# Patient Record
Sex: FEMALE | Race: BLACK OR AFRICAN AMERICAN | NOT HISPANIC OR LATINO | URBAN - METROPOLITAN AREA
[De-identification: names, ages, dates, MRNs, and addresses within clinical notes are randomized per-mention and may not be internally consistent; named-entity substitution may affect disease eponyms.]

---

## 2017-05-20 ENCOUNTER — EMERGENCY (EMERGENCY)
Facility: HOSPITAL | Age: 47
LOS: 1 days | Discharge: HOME | End: 2017-05-20
Admitting: EMERGENCY MEDICINE
Payer: MEDICAID

## 2017-05-20 VITALS
DIASTOLIC BLOOD PRESSURE: 83 MMHG | HEART RATE: 76 BPM | OXYGEN SATURATION: 100 % | RESPIRATION RATE: 18 BRPM | TEMPERATURE: 98 F | SYSTOLIC BLOOD PRESSURE: 143 MMHG

## 2017-05-20 DIAGNOSIS — F43.20 ADJUSTMENT DISORDER, UNSPECIFIED: ICD-10-CM

## 2017-05-20 DIAGNOSIS — R69 ILLNESS, UNSPECIFIED: ICD-10-CM

## 2017-05-20 PROCEDURE — 90792 PSYCH DIAG EVAL W/MED SRVCS: CPT

## 2017-05-20 PROCEDURE — 99284 EMERGENCY DEPT VISIT MOD MDM: CPT

## 2017-05-20 NOTE — ED BEHAVIORAL HEALTH ASSESSMENT NOTE - OTHER
family CVM argument with  ( has not called back and there is no grounds to hold Patient against her will in the ED awaiting his call back for several more hours.)

## 2017-05-20 NOTE — ED BEHAVIORAL HEALTH ASSESSMENT NOTE - SUMMARY
46 yo female with no formal psychiatric history BIB EMS from home after she locked herself in the bathroom while having a verbal argument with her  and 48 yo female with no formal psychiatric history BIB EMS from home after she locked herself in the bathroom while having a verbal argument with her  and daughter, made a suicidal statement during emotional angst which she said she did not mean literally. Patient said she wanted to communicate to her family how hurt she was by their seemingly uncaring attitude about her birthday and how her feelings was hurt. Patient denies any suicidal/homicidal ideation, names protective factors (jennifer in God; suicide is a sin; family; hope for future.) She plans on celebrating her birthday by herself by going to the mall and buying a nice outfit fort herself this weekend. 48 yo female with no formal psychiatric history, at baseline high functioning, BIB EMS from home after she locked herself in the bathroom while having a verbal argument with her  and daughter, made a suicidal statement during emotional angst which she said she did not mean literally. Patient said she wanted to communicate to her family how hurt she was by their seemingly uncaring attitude about her birthday (and Mother's Day before that) and how her feelings was hurt. Patient denies any suicidal/homicidal ideation, names protective factors (jennifer in God; suicide is a sin; family; hope for future.) She plans on celebrating her birthday by herself by going to the mall and buying a nice outfit fort herself this weekend. Patient is also displaying help seeking / motivated behavior and was able to name several things she will do for herself, change about her lifestyle so she can feel better and happier. Patient does not exhibit any psychiatric symptoms along the mood/psychosis/anxiety spectrum. She has not engaged in any high risk behavior, she does not use substances and has no weapons in the home. No clinical indication for psych admission. Discharge.

## 2017-05-20 NOTE — ED PROVIDER NOTE - OBJECTIVE STATEMENT
46 y/o F hx HTN BIBA w c/o agitation and verbalizing suicidal comment to family  secondary to family " not paying me any attention  for my birthday"  Denies pain, SOB, fever, chills, chest/abdominal discomfort. Denies SI/HI/AH/VH. Denies falling, punching or kicking any objects. Denies use of alcohol or illicit drugs.

## 2017-05-20 NOTE — ED BEHAVIORAL HEALTH ASSESSMENT NOTE - OTHER PAST PSYCHIATRIC HISTORY (INCLUDE DETAILS REGARDING ONSET, COURSE OF ILLNESS, INPATIENT/OUTPATIENT TREATMENT)
no formally known or reported psychiatric history (no psychiatric hospitalizations, no formal diagnosis or treatment; no suicide attempts; no self-injurious behavior; no hx of aggression/violence; no legal issues; no substance use)

## 2017-05-20 NOTE — ED BEHAVIORAL HEALTH NOTE - BEHAVIORAL HEALTH NOTE
in attempt to gain collateral message left at patient's home # [phone] with request for return call.

## 2017-05-20 NOTE — ED BEHAVIORAL HEALTH ASSESSMENT NOTE - DESCRIPTION
calm, cooperative overweight  for 30 years; also helping to raise 's 3 kids from a prior relationship as their mother has passed away. Patient has a 26yo daughter who just got a new boyfriend and who spends all her time with this new beau. overweight; ndometrial cancer diagnosis in 2014 (chemo, radiation; no surgery; goes for follow up q6months and was told she is in remission)

## 2017-05-20 NOTE — ED BEHAVIORAL HEALTH ASSESSMENT NOTE - SUICIDE PROTECTIVE FACTORS
High spirituality/Supportive social network or family/Responsibility to family and others/Future oriented/Fear of death or dying due to pain/suffering/Identifies reasons for living

## 2017-05-20 NOTE — ED ADULT NURSE NOTE - OBJECTIVE STATEMENT
Received pt in  pt c/o being upset with  & daughter due to the fact, they didn't recognized her on mother's day & birthday. Pt calm & cooperative denies Si/Hi/AVh at present eval on going.

## 2017-05-20 NOTE — ED BEHAVIORAL HEALTH ASSESSMENT NOTE - RISK ASSESSMENT
Chronic risk factors: ongoing substance use; psychosocial stressors; chronic untreatable illness; single. Protective factors: young; healthy; medication and treatment compliant; no history of hospitalizations, no formal diagnosis; no suicide attempts; no self-injurious behavior; no hx of aggression/violence; no legal issues; motivated for help; articulate; strong family support; access to health services. No acute risk factors identified Chronic risk factors: hx of cancer 9though in remission at this time). Protective factors: , female gender; no hx of psych hospitalizations, no formal psych diagnosis or treatment; no known hx of suicide attempts or self-injurious behavior; no hx of aggression/violence; no legal issues; no hx of substance ir drug use; motivated for help; articulate; strong family support; engaged in family. No acute risk factors identified - has history of arguing with her  and being disappointed by family

## 2017-05-20 NOTE — ED BEHAVIORAL HEALTH ASSESSMENT NOTE - REFERRAL / APPOINTMENT DETAILS
n/a. recommend patient socialize with her friends regularly 1-2/week and do something fun where she can relax, be herself and take it easy

## 2017-05-20 NOTE — ED ADULT TRIAGE NOTE - CHIEF COMPLAINT QUOTE
pt brought in by ems after family called because pt locked herself in bathroom after having verbal fight with  / pt does not have any psych hx/ pt calm at triage  denies homo cidal or suicidal thougths  pt states just feeling overwhelmed

## 2017-05-20 NOTE — ED PROVIDER NOTE - MEDICAL DECISION MAKING DETAILS
46 y/o F hx HTN  No evidence of physical injuries, broken skin or deformities.   Medical evaluation performed. There is no clinical evidence of intoxication or any acute medical problem requiring immediate intervention. Patient is awaiting psychiatric consultation. Final disposition will be determined by psychiatrist.

## 2017-05-20 NOTE — ED BEHAVIORAL HEALTH ASSESSMENT NOTE - HPI (INCLUDE ILLNESS QUALITY, SEVERITY, DURATION, TIMING, CONTEXT, MODIFYING FACTORS, ASSOCIATED SIGNS AND SYMPTOMS)
Patient is a , employed, 48 yo AAF, domiciled with family in a private home, with no formal known or reported psychiatric history (no psychiatric hospitalizations, no formal diagnosis or treatment; no suicide attempts; no self-injurious behavior; no hx of aggression/violence; no legal issues; no substance use) who was BIB EMS from home after she was engaged in a verbal argument with her , she locked herself in the bathroom Patient is a , employed, 46 yo AAF, domiciled with family in a private home, with no formal known or reported psychiatric history (no psychiatric hospitalizations, no formal diagnosis or treatment; no suicide attempts; no self-injurious behavior; no hx of aggression/violence; no legal issues; no substance use) who was BIB EMS from home after she was engaged in a verbal argument with her , she locked herself in the bathroom and made a statement to the effect of "I guess I might as well kill myself."     Endorses stable euthymic mood, regular sleep / appetite / energy level / concentration / bathroom habits. Denies any symptoms of hypomania/skyla/psychosis/depression/ anxiety/panic. Denies any active or passive suicidal or homicidal ideation. Names protective factors (jennifer; family; hope for future). Endorses medication compliance. Denies adverse medication side effects Patient is a , employed, 48 yo AAF, domiciled with family in a private home, with no formal known or reported psychiatric history (no psychiatric hospitalizations, no formal diagnosis or treatment; no suicide attempts; no self-injurious behavior; no hx of aggression/violence; no legal issues; no substance use) who was BIB EMS from home after she was engaged in a verbal argument with her , she locked herself in the bathroom and made a statement to the effect of "I guess I might as well kill myself."     Patient states that she has been doing well up until Mother's Day when her daughter told her that they will have a special celebration which turned out to be non-existent as her daughter came later on Mother's Day, spent it mostly with her new by friend and tried to make it up to Patient with balloon and new sneakers. Patient felt disappointed and rejected. Patient also helps to raise her 's 3 kids from a prior relationship and she felt like she takes care of so many people, she should be appreciated more on Mother's Day. Patient's birthday was yesterday - in the morning, her  and daughter told her to get ready, dress up nicely and they'll take her out. Patient got ready, told friends who called not to come over as she will be going out with her  and daughter and waited dressed up until 8pm when they came home. Patient felt demoralized, saddened and unappreciated. She drank some wine to relax (denies excessive use), was tossing and turning all night and could not sleep. She still felt very upset this morning and she made it known. This resulted in a verbal argument with her  and daughter who blamed her for "over reacting" so she locked herself into the bathroom, prayed to God to make her feel better and told her family "I may just as well kill myself."    Endorses stable euthymic mood, regular sleep / appetite / energy level / concentration / bathroom habits. Denies any symptoms of hypomania/skyla/psychosis/depression/ anxiety/panic. Denies any active or passive suicidal or homicidal ideation. Names protective factors (jennifer; family; hope for future). Endorses medication compliance. Denies adverse medication side effects Patient is a , employed, 48 yo AAF, domiciled with family in a private home, with no formal known or reported psychiatric history (no psychiatric hospitalizations, no formal diagnosis or treatment; no suicide attempts; no self-injurious behavior; no hx of aggression/violence; no legal issues; no substance use) who was BIB EMS from home after she was engaged in a verbal argument with her , she locked herself in the bathroom and made a statement to the effect of "I guess I might as well kill myself."     Patient states that she has been doing well up until Mother's Day when her daughter told her that they will have a special celebration which turned out to be non-existent as her daughter came later on Mother's Day, spent it mostly with her new by friend and tried to make it up to Patient with balloon and new sneakers. Patient felt disappointed and rejected. Patient also helps to raise her 's 3 kids from a prior relationship and she felt like she takes care of so many people, she should be appreciated more on Mother's Day. Patient's birthday was yesterday - in the morning, her  and daughter told her to get ready, dress up nicely and they'll take her out. Patient got ready, told friends who called not to come over as she will be going out with her  and daughter and waited dressed up until 8pm when they came home. Patient felt demoralized, saddened and unappreciated. She drank some wine to relax (denies excessive use), was tossing and turning all night and could not sleep. She still felt very upset this morning and she made it known. This resulted in a verbal argument with her  and daughter who blamed her for "over reacting" so she locked herself into the bathroom, prayed to God to make her feel better and told her family "I may just as well kill myself."  Patient states she did not mean this literally - she just wanted her family to know how bad she felt about their treatment of her. Patient states she would never kill herself as she believes this is a sin, she is [employer] and believes in God. Moreover, she has a new appreciation/ outlook on life since she got endometrial cancer diagnosis in 2014 (chemo, radiation; no surgery; goes for follow up q6months and was told she is in remission). Patient not sure who called 911 but understands that they probably got concerned and felt they had to after what she said. She said her  already called her while she was in the ED. She feels safe going home; she has no ill will towards her  and daughter. She plans on "going home, taking a shower and going to the mall to buy myself something nice for my birthday." Patient states that her sister is her confidante and told Patient not to give so much to others all the time because she needs to keep some for herself too. patient agrees with this and is planning on giving her well being some time as well - going out with friends again, returning to [employer] and finding a Zoroastrianism she likes.     Otherwise, Patient endorses a stable euthymic mood, unchanged, regular sleep / appetite / energy level / concentration. Denies any past or current symptoms of hypomania/skyla/psychosis/major depression/ anxiety/panic. Denies any active or passive suicidal or homicidal ideation. Names protective factors (jennifer; family; hope for future). Denies drug/substance use. Denies access to weapons.     COLLATERAL FROM : left message on his cell awaiting call back. An hour later, he still has not returned call back. ( has not called back and there is no grounds to hold Patient against her will in the ED awaiting his call back for several more hours.)

## 2017-05-20 NOTE — ED BEHAVIORAL HEALTH ASSESSMENT NOTE - AXIS III
overweight overweight; endometrial cancer diagnosis in 2014 (chemo, radiation; no surgery; goes for follow up q6months and was told she is in remission)

## 2021-12-20 ENCOUNTER — INPATIENT (INPATIENT)
Facility: HOSPITAL | Age: 51
LOS: 4 days | Discharge: HOME HEALTH | End: 2021-12-25
Attending: GENERAL ACUTE CARE HOSPITAL | Admitting: GENERAL ACUTE CARE HOSPITAL
Payer: MEDICAID

## 2021-12-20 VITALS
TEMPERATURE: 98 F | SYSTOLIC BLOOD PRESSURE: 160 MMHG | OXYGEN SATURATION: 100 % | HEART RATE: 94 BPM | HEIGHT: 66 IN | RESPIRATION RATE: 16 BRPM | WEIGHT: 257.94 LBS | DIASTOLIC BLOOD PRESSURE: 101 MMHG

## 2021-12-20 PROBLEM — I10 ESSENTIAL (PRIMARY) HYPERTENSION: Chronic | Status: ACTIVE | Noted: 2017-05-20

## 2021-12-20 LAB
ALBUMIN SERPL ELPH-MCNC: 3.1 G/DL — LOW (ref 3.3–5)
ALP SERPL-CCNC: 61 U/L — SIGNIFICANT CHANGE UP (ref 40–120)
ALT FLD-CCNC: 9 U/L — LOW (ref 12–78)
ANION GAP SERPL CALC-SCNC: 10 MMOL/L — SIGNIFICANT CHANGE UP (ref 5–17)
APTT BLD: 51.3 SEC — HIGH (ref 27.5–35.5)
AST SERPL-CCNC: 14 U/L — LOW (ref 15–37)
BILIRUB SERPL-MCNC: 0.4 MG/DL — SIGNIFICANT CHANGE UP (ref 0.2–1.2)
BUN SERPL-MCNC: 38 MG/DL — HIGH (ref 7–23)
CALCIUM SERPL-MCNC: 8.4 MG/DL — LOW (ref 8.5–10.1)
CHLORIDE SERPL-SCNC: 107 MMOL/L — SIGNIFICANT CHANGE UP (ref 96–108)
CO2 SERPL-SCNC: 23 MMOL/L — SIGNIFICANT CHANGE UP (ref 22–31)
CREAT SERPL-MCNC: 3.64 MG/DL — HIGH (ref 0.5–1.3)
FLUAV AG NPH QL: SIGNIFICANT CHANGE UP
FLUBV AG NPH QL: SIGNIFICANT CHANGE UP
GLUCOSE SERPL-MCNC: 87 MG/DL — SIGNIFICANT CHANGE UP (ref 70–99)
HCT VFR BLD CALC: 26.2 % — LOW (ref 34.5–45)
HCT VFR BLD CALC: 27 % — LOW (ref 34.5–45)
HGB BLD-MCNC: 8.5 G/DL — LOW (ref 11.5–15.5)
HGB BLD-MCNC: 8.7 G/DL — LOW (ref 11.5–15.5)
INR BLD: 1.07 RATIO — SIGNIFICANT CHANGE UP (ref 0.88–1.16)
MCHC RBC-ENTMCNC: 26.9 PG — LOW (ref 27–34)
MCHC RBC-ENTMCNC: 26.9 PG — LOW (ref 27–34)
MCHC RBC-ENTMCNC: 32.2 GM/DL — SIGNIFICANT CHANGE UP (ref 32–36)
MCHC RBC-ENTMCNC: 32.4 GM/DL — SIGNIFICANT CHANGE UP (ref 32–36)
MCV RBC AUTO: 82.9 FL — SIGNIFICANT CHANGE UP (ref 80–100)
MCV RBC AUTO: 83.6 FL — SIGNIFICANT CHANGE UP (ref 80–100)
NRBC # BLD: 0 /100 WBCS — SIGNIFICANT CHANGE UP (ref 0–0)
NRBC # BLD: 0 /100 WBCS — SIGNIFICANT CHANGE UP (ref 0–0)
NT-PROBNP SERPL-SCNC: 203 PG/ML — HIGH (ref 0–125)
PLATELET # BLD AUTO: 302 K/UL — SIGNIFICANT CHANGE UP (ref 150–400)
PLATELET # BLD AUTO: 321 K/UL — SIGNIFICANT CHANGE UP (ref 150–400)
POTASSIUM SERPL-MCNC: 4.2 MMOL/L — SIGNIFICANT CHANGE UP (ref 3.5–5.3)
POTASSIUM SERPL-SCNC: 4.2 MMOL/L — SIGNIFICANT CHANGE UP (ref 3.5–5.3)
PROT SERPL-MCNC: 7.6 GM/DL — SIGNIFICANT CHANGE UP (ref 6–8.3)
PROTHROM AB SERPL-ACNC: 12.4 SEC — SIGNIFICANT CHANGE UP (ref 10.6–13.6)
RBC # BLD: 3.16 M/UL — LOW (ref 3.8–5.2)
RBC # BLD: 3.23 M/UL — LOW (ref 3.8–5.2)
RBC # FLD: 15 % — HIGH (ref 10.3–14.5)
RBC # FLD: 15.4 % — HIGH (ref 10.3–14.5)
SARS-COV-2 RNA SPEC QL NAA+PROBE: SIGNIFICANT CHANGE UP
SODIUM SERPL-SCNC: 140 MMOL/L — SIGNIFICANT CHANGE UP (ref 135–145)
WBC # BLD: 7.61 K/UL — SIGNIFICANT CHANGE UP (ref 3.8–10.5)
WBC # BLD: 7.71 K/UL — SIGNIFICANT CHANGE UP (ref 3.8–10.5)
WBC # FLD AUTO: 7.61 K/UL — SIGNIFICANT CHANGE UP (ref 3.8–10.5)
WBC # FLD AUTO: 7.71 K/UL — SIGNIFICANT CHANGE UP (ref 3.8–10.5)

## 2021-12-20 PROCEDURE — 99285 EMERGENCY DEPT VISIT HI MDM: CPT

## 2021-12-20 PROCEDURE — 93970 EXTREMITY STUDY: CPT | Mod: 26

## 2021-12-20 PROCEDURE — 50432 PLMT NEPHROSTOMY CATHETER: CPT | Mod: 50

## 2021-12-20 PROCEDURE — 71045 X-RAY EXAM CHEST 1 VIEW: CPT | Mod: 26

## 2021-12-20 PROCEDURE — 76775 US EXAM ABDO BACK WALL LIM: CPT | Mod: 26

## 2021-12-20 PROCEDURE — 93010 ELECTROCARDIOGRAM REPORT: CPT

## 2021-12-20 PROCEDURE — 99222 1ST HOSP IP/OBS MODERATE 55: CPT

## 2021-12-20 RX ORDER — SODIUM CHLORIDE 9 MG/ML
1000 INJECTION INTRAMUSCULAR; INTRAVENOUS; SUBCUTANEOUS
Refills: 0 | Status: DISCONTINUED | OUTPATIENT
Start: 2021-12-20 | End: 2021-12-20

## 2021-12-20 RX ORDER — HYDRALAZINE HCL 50 MG
10 TABLET ORAL THREE TIMES A DAY
Refills: 0 | Status: DISCONTINUED | OUTPATIENT
Start: 2021-12-20 | End: 2021-12-25

## 2021-12-20 RX ORDER — METOPROLOL TARTRATE 50 MG
50 TABLET ORAL DAILY
Refills: 0 | Status: DISCONTINUED | OUTPATIENT
Start: 2021-12-20 | End: 2021-12-23

## 2021-12-20 RX ORDER — HEPARIN SODIUM 5000 [USP'U]/ML
5000 INJECTION INTRAVENOUS; SUBCUTANEOUS EVERY 12 HOURS
Refills: 0 | Status: DISCONTINUED | OUTPATIENT
Start: 2021-12-20 | End: 2021-12-22

## 2021-12-20 RX ADMIN — Medication 10 MILLIGRAM(S): at 21:46

## 2021-12-20 NOTE — PATIENT PROFILE ADULT - FALL HARM RISK - UNIVERSAL INTERVENTIONS
Bed in lowest position, wheels locked, appropriate side rails in place/Call bell, personal items and telephone in reach/Instruct patient to call for assistance before getting out of bed or chair/Non-slip footwear when patient is out of bed/[place] to call system/Physically safe environment - no spills, clutter or unnecessary equipment/Purposeful Proactive Rounding/Room/bathroom lighting operational, light cord in reach

## 2021-12-20 NOTE — ED ADULT TRIAGE NOTE - CHIEF COMPLAINT QUOTE
PT a&O x4  ambulatory with cane. hx of slipped disc 2014. . pt c.o lower back pain and new onset of left leg swelling 4-5 days lower back pain 7/10. took tylenol at home minimal relief. PMH htn

## 2021-12-20 NOTE — H&P ADULT - ASSESSMENT
52 yo F    w/PMH of HTN (on amlodipine),  sciatica  presents to the ED for b/l leg swelling.   Pt states that for the past x1-2 weeks she has noticed swelling in both of her legs.    Pt reports chronic pain that radiates down her left leg, from her sciatica, has been exacerbated  Denies fever/chills, cough, SOB, CP, abdominal pain, N/V/D or trauma.       *  admitted  with leg edema  doppler legs, pending  *    ASHLEY  vs  CKD  crt is 3.6     bladder  scan. ordered   renal u/s   renal d r iesha  *  HTN   on toprol, hydralazine  * Anemia   hb is 8.7  stool  guaiac     on  dvt  ppx/  s/q  heparin   echo ordered    50 yo F    w/PMH of HTN (on amlodipine),  sciatica  presents to the ED for b/l leg swelling.   Pt states that for the past x1-2 weeks she has noticed swelling in both of her legs.    Pt reports chronic pain that radiates down her left leg, from her sciatica, has been exacerbated  Denies fever/chills, cough, SOB, CP, abdominal pain, N/V/D or trauma.       *  admitted  with leg edema  doppler legs, pending  *    ASHLEY  vs  CKD  crt is 3.6     bladder  scan. ordered   renal u/s ordered   renal d r iesha  *  HTN   on toprol, hydralazine  * Anemia   hb is 8.7  stool  guaiac   gi dr muñoz   * on  dvt  ppx/  s/q  heparin   echo ordered    50 yo F    w/PMH of HTN (on amlodipine),  sciatica  presents to the ED for b/l leg swelling.   Pt states that for the past x1-2 weeks she has noticed swelling in both of her legs.    Pt reports chronic pain that radiates down her left leg, from her sciatica, has been exacerbated  Denies fever/chills, cough, SOB, CP, abdominal pain, N/V/D or trauma.       *  admitted  with leg edema  doppler legs, pending  *    ASHLEY  vs  CKD  was  taking alleve  for pain/  denies  dark stools  crt is 3.6  stopped  nsiad/ hctz  and  benzapril     bladder  scan. ordered   renal u/s ordered   renal d r iesha  *  HTN  was  on norvasc,  labetolol,  benzapril  and hctz  at home   on toprol, hydralazine  * Anemia/ ?  gastritis form alleve   hb is 8.7  stool  guaiac   gi dr muñoz   * on  dvt  ppx/  s/q  heparin   echo ordered

## 2021-12-20 NOTE — ED PROVIDER NOTE - OBJECTIVE STATEMENT
52 yo F w/PMH of HTN (on amlodipine), pinched nerve presents to the ED for b/l leg swelling. Pt states that for the past x1-2 weeks she has noticed swelling in both of her legs. Pt reports chronic pain that radiates down her left leg, from her sciatica, has been exacerbated, but denies any new pains. Denies fever/chills, cough, SOB, CP, abdominal pain, N/V/D or trauma.

## 2021-12-20 NOTE — ED PROVIDER NOTE - PHYSICAL EXAMINATION
General: Awake, alert and oriented. No acute distress. Well developed, hydrated and nourished. Appears stated age.   Skin: Skin in warm, dry and intact without rashes or lesions. Appropriate color for ethnicity  HENMT: head normocephalic and atraumatic; bilateral external ears without swelling. no nasal discharge. moist oral mucosa. supple neck, trachea midline  EYES: Conjunctiva clear. nonicteric sclera. EOM intact, Eyelids are normal in appearance without swelling or lesions.  Cardiac: well perfused, s1, s2, rrr  Respiratory: breathing comfortably on room air. no audible wheezing or stridor  Abdominal: nondistended, soft, nontender  MSK: Neck and back are without deformity, visible external skin changes, or signs of trauma. Curvature of the cervical, thoracic, and lumbar spine are within normal limits. no external signs of trauma. no apparent deficits in ROM of any extremity. swelling with mild pitting of bilateral lower extreities extending up to bilateral thighs.   Neurological: The patient is awake, alert and oriented to person, place, and time with normal speech. CN 2-12 grossly intact. no apparent deficits. Memory is normal and thought process is intact. No gait abnormalities are appreciated.   Psychiatric: Appropriate mood and affect. Good judgement and insight. No visual or auditory hallucinations.

## 2021-12-20 NOTE — H&P ADULT - HISTORY OF PRESENT ILLNESS
52 yo F    w/PMH of HTN (on amlodipine), pinched nerve  presents to the ED for b/l leg swelling.   Pt states that for the past x1-2 weeks she has noticed swelling in both of her legs.    Pt reports chronic pain that radiates down her left leg, from her sciatica, has been exacerbated, but denies any new pains. Denies fever/chills, cough, SOB, CP, abdominal pain, N/V/D or trauma.  52 yo F    w/PMH of HTN (on amlodipine), pinched nerve  presents to the ED for b/l leg swelling.   Pt states that for the past x1-2 weeks she has noticed swelling in both of her legs.  senies  cp/sob/abd  pain    Pt reports chronic pain that radiates down her left leg, from her sciatica,  is  able to  ambulate.   Denies fever/chills, cough, SOB, CP, abdominal pain, N/V/D or trauma.

## 2021-12-20 NOTE — H&P ADULT - NSHPLABSRESULTS_GEN_ALL_CORE
LABS:                        8.7    7.71  )-----------( 302      ( 20 Dec 2021 17:32 )             27.0     12-20    140  |  107  |  38<H>  ----------------------------<  87  4.2   |  23  |  3.64<H>    Ca    8.4<L>      20 Dec 2021 17:32    TPro  7.6  /  Alb  3.1<L>  /  TBili  0.4  /  DBili  x   /  AST  14<L>  /  ALT  9<L>  /  AlkPhos  61  12-20    PT/INR - ( 20 Dec 2021 17:32 )   PT: 12.4 sec;   INR: 1.07 ratio         PTT - ( 20 Dec 2021 17:32 )  PTT:51.3 sec

## 2021-12-20 NOTE — ED PROVIDER NOTE - CLINICAL SUMMARY MEDICAL DECISION MAKING FREE TEXT BOX
No clinical signs of trauma or infection, pt was taking aleve daily for several months up until x2 weeks ago, considered decreased  cardiac output as cause of leg swelling, however pt with no symptoms of SOB or CP. Will evaluate for DVT with US. If negative ED workup pt has PMD follow up scheduled for tomorrow, symptoms possibly due to amlodipine use. No clinical signs of trauma or infection, pt was taking aleve daily for several months up until x2 weeks ago, considered decreased  cardiac output as cause of leg swelling, however pt with no symptoms of SOB or CP. Will evaluate for DVT with US. If negative ED workup pt has PMD follow up scheduled for tomorrow, symptoms possibly due to amlodipine use.    patinet with acute renal failure, likely 2/2 nsaid use. urinating regularly. will admit for further evaluation andd treatment of acute renal failure No clinical signs of trauma or infection, pt was taking aleve daily for several months up until x2 weeks ago, considered decreased  cardiac output as cause of leg swelling, however pt with no symptoms of SOB or CP. Will evaluate for DVT with US. If negative ED workup pt has PMD follow up scheduled for tomorrow, symptoms possibly due to amlodipine use.    patinet with acute renal failure, likely 2/2 nsaid use. urinating regularly. will admit for further evaluation andd treatment of acute renal failure. US renal ordered, to be followed as inpatient

## 2021-12-20 NOTE — H&P ADULT - NSHPPHYSICALEXAM_GEN_ALL_CORE
PHYSICAL EXAMINATION:  Vital Signs Last 24 Hrs  T(C): 36.9 (20 Dec 2021 13:21), Max: 36.9 (20 Dec 2021 13:21)  T(F): 98.4 (20 Dec 2021 13:21), Max: 98.4 (20 Dec 2021 13:21)  HR: 94 (20 Dec 2021 13:21) (94 - 94)  BP: 160/101 (20 Dec 2021 13:21) (160/101 - 160/101)  BP(mean): --  RR: 16 (20 Dec 2021 13:21) (16 - 16)  SpO2: 100% (20 Dec 2021 13:21) (100% - 100%)  CAPILLARY BLOOD GLUCOSE            GENERAL: NAD, well-groomed,  HEAD:  atraumatic, normocephalic  EYES: sclera anicteric  ENMT: mucous membranes moist  NECK: supple, No JVD  CHEST/LUNG: clear to auscultation bilaterally;    no      rales   ,   no rhonchi,   HEART: normal S1, S2  ABDOMEN: BS+, soft, ND, NT   EXTREMITIES:   edema    b/l LEs  NEURO: awake, ,     moves all extremities  SKIN: no     rash PHYSICAL EXAMINATION:  Vital Signs Last 24 Hrs  T(C): 36.9 (20 Dec 2021 13:21), Max: 36.9 (20 Dec 2021 13:21)  T(F): 98.4 (20 Dec 2021 13:21), Max: 98.4 (20 Dec 2021 13:21)  HR: 94 (20 Dec 2021 13:21) (94 - 94)  BP: 160/101 (20 Dec 2021 13:21) (160/101 - 160/101)  BP(mean): --  RR: 16 (20 Dec 2021 13:21) (16 - 16)  SpO2: 100% (20 Dec 2021 13:21) (100% - 100%)  CAPILLARY BLOOD GLUCOSE            GENERAL: NAD, well-groomed,  HEAD:  atraumatic, normocephalic  EYES: sclera anicteric  ENMT: mucous membranes moist  NECK: supple, No JVD  CHEST/LUNG: clear to auscultation bilaterally;    no      rales   ,   no rhonchi,   HEART: normal S1, S2  ABDOMEN: BS+, soft, ND, NT   EXTREMITIES:   edema    b/l LEs  NEURO: awake, ,     moves all extremities  no focal weakness  SKIN: no     rash

## 2021-12-21 LAB
ANION GAP SERPL CALC-SCNC: 7 MMOL/L — SIGNIFICANT CHANGE UP (ref 5–17)
BUN SERPL-MCNC: 38 MG/DL — HIGH (ref 7–23)
CALCIUM SERPL-MCNC: 8.6 MG/DL — SIGNIFICANT CHANGE UP (ref 8.5–10.1)
CHLORIDE SERPL-SCNC: 110 MMOL/L — HIGH (ref 96–108)
CO2 SERPL-SCNC: 25 MMOL/L — SIGNIFICANT CHANGE UP (ref 22–31)
CREAT SERPL-MCNC: 3.68 MG/DL — HIGH (ref 0.5–1.3)
FERRITIN SERPL-MCNC: 276 NG/ML — HIGH (ref 15–150)
GLUCOSE SERPL-MCNC: 91 MG/DL — SIGNIFICANT CHANGE UP (ref 70–99)
HCT VFR BLD CALC: 25.9 % — LOW (ref 34.5–45)
HGB BLD-MCNC: 8.4 G/DL — LOW (ref 11.5–15.5)
IRON SATN MFR SERPL: 14 % — SIGNIFICANT CHANGE UP (ref 14–50)
IRON SATN MFR SERPL: 38 UG/DL — SIGNIFICANT CHANGE UP (ref 30–160)
MCHC RBC-ENTMCNC: 26.9 PG — LOW (ref 27–34)
MCHC RBC-ENTMCNC: 32.4 GM/DL — SIGNIFICANT CHANGE UP (ref 32–36)
MCV RBC AUTO: 83 FL — SIGNIFICANT CHANGE UP (ref 80–100)
NRBC # BLD: 0 /100 WBCS — SIGNIFICANT CHANGE UP (ref 0–0)
PLATELET # BLD AUTO: 306 K/UL — SIGNIFICANT CHANGE UP (ref 150–400)
POTASSIUM SERPL-MCNC: 4.2 MMOL/L — SIGNIFICANT CHANGE UP (ref 3.5–5.3)
POTASSIUM SERPL-SCNC: 4.2 MMOL/L — SIGNIFICANT CHANGE UP (ref 3.5–5.3)
RBC # BLD: 3.12 M/UL — LOW (ref 3.8–5.2)
RBC # FLD: 15.4 % — HIGH (ref 10.3–14.5)
SODIUM SERPL-SCNC: 142 MMOL/L — SIGNIFICANT CHANGE UP (ref 135–145)
TIBC SERPL-MCNC: 269 UG/DL — SIGNIFICANT CHANGE UP (ref 220–430)
UIBC SERPL-MCNC: 231 UG/DL — SIGNIFICANT CHANGE UP (ref 110–370)
WBC # BLD: 6.81 K/UL — SIGNIFICANT CHANGE UP (ref 3.8–10.5)
WBC # FLD AUTO: 6.81 K/UL — SIGNIFICANT CHANGE UP (ref 3.8–10.5)

## 2021-12-21 PROCEDURE — 99233 SBSQ HOSP IP/OBS HIGH 50: CPT

## 2021-12-21 PROCEDURE — 93306 TTE W/DOPPLER COMPLETE: CPT | Mod: 26

## 2021-12-21 RX ORDER — ACETAMINOPHEN 500 MG
650 TABLET ORAL EVERY 6 HOURS
Refills: 0 | Status: DISCONTINUED | OUTPATIENT
Start: 2021-12-21 | End: 2021-12-25

## 2021-12-21 RX ADMIN — Medication 10 MILLIGRAM(S): at 16:10

## 2021-12-21 RX ADMIN — Medication 50 MILLIGRAM(S): at 05:39

## 2021-12-21 RX ADMIN — HEPARIN SODIUM 5000 UNIT(S): 5000 INJECTION INTRAVENOUS; SUBCUTANEOUS at 05:39

## 2021-12-21 RX ADMIN — Medication 10 MILLIGRAM(S): at 21:18

## 2021-12-21 RX ADMIN — Medication 650 MILLIGRAM(S): at 21:18

## 2021-12-21 RX ADMIN — Medication 650 MILLIGRAM(S): at 05:39

## 2021-12-21 RX ADMIN — Medication 650 MILLIGRAM(S): at 06:10

## 2021-12-21 RX ADMIN — Medication 650 MILLIGRAM(S): at 22:00

## 2021-12-21 RX ADMIN — Medication 10 MILLIGRAM(S): at 05:39

## 2021-12-21 RX ADMIN — HEPARIN SODIUM 5000 UNIT(S): 5000 INJECTION INTRAVENOUS; SUBCUTANEOUS at 17:38

## 2021-12-21 NOTE — PHYSICAL THERAPY INITIAL EVALUATION ADULT - PERTINENT HX OF CURRENT PROBLEM, REHAB EVAL
Patient admitted with B LE swelling. Venous doppler negative for DVT. Patient with chronic back pain.

## 2021-12-21 NOTE — PHYSICAL THERAPY INITIAL EVALUATION ADULT - GAIT TRAINING, PT EVAL
Patient will ambulate 500 feet without device independently for community ambulation in 2-3 days. Patient will ascend/descend 3 steps with no rails 2-3 days to safely navigate home environment.

## 2021-12-21 NOTE — PHYSICAL THERAPY INITIAL EVALUATION ADULT - ADDITIONAL COMMENTS
Patient lives in a private house with 3 steps to enter, +B rails. Once inside everything is situated on the first floor. Patient reports she owns a straight cane but was only recently using it because of her B LE swelling.

## 2021-12-21 NOTE — PROGRESS NOTE ADULT - ASSESSMENT
52 y/o F with PMH of HTN, sciatica, presented to ED for b/l leg swelling admitted to medical for ASHLEY 2/2 NSAIDS with anemia.     HTN  Currently on hydralazine and metoprolol  F/U TSH, lipid panel, HgA1c  Titrate BP meds    ASHLEY on CKD  - Cr: 3.68  - Most likely exacerbated from chronic use of NSAIDS  - Adjust all meds as per CrCl  - Nephro consult  - Renal U/S    B/L lower extremity swelling  No DVT  F/U ECHO  - Does not appear fluid overloaded    Anemia  F/U iron studies  unknown baseline  Transfuse PRN  GI eval    Sciatica  - PT eval.    DVT PPX  Heparin

## 2021-12-21 NOTE — CONSULT NOTE ADULT - SUBJECTIVE AND OBJECTIVE BOX
Patient chart reviewed, full consult to follow.     ASHLEY associated with NSAIDs use ; moderate bilateral hydronephrosis;     Thank you for the courtesy of this consultation.         Patient chart reviewed, full consult to follow.     ASHLEY associated with NSAIDs use ; moderate bilateral hydronephrosis;   Anemia; back pain ;     UA micro; eos  CT abd pelvis to elucidate etiology of hydronephrosis; assess for HIEN  PVR acceptable   Paraprotein screen       Thank you for the courtesy of this consultation.   [facility] NEPHROLOGY SERVICES, [facility]  NEPHROLOGY AND HYPERTENSION  [address]  [address]  [place] [address]  [phone]    MD LENNY WASHINGTON MD ANDREY GONCHARUK, MD MADHU KORRAPATI, MD YELENA ROSENBERG, MD BINNY KOSHY, MD CHRISTOPHER CAPUTO, MD EDWARD BOVER, MD      Information from chart:  "Patient is a 51y old  Female who presents with a chief complaint of leg  swelling (21 Dec 2021 15:34)    HPI:   50 yo F    w/PMH of HTN (on amlodipine), pinched nerve  presents to the ED for b/l leg swelling.   Pt states that for the past x1-2 weeks she has noticed swelling in both of her legs.  senies  cp/sob/abd  pain    Pt reports chronic pain that radiates down her left leg, from her sciatica,  is  able to  ambulate.   Denies fever/chills, cough, SOB, CP, abdominal pain, N/V/D or trauma. (20 Dec 2021 18:55)   "    Patient with back pain  Taking Aleve 2-3 months daily;       PAST MEDICAL & SURGICAL HISTORY:  HTN (hypertension)    No significant past surgical history      FAMILY HISTORY:  No pertinent family history in first degree relatives      Allergies    No Known Allergies    Intolerances      Home Medications:    MEDICATIONS  (STANDING):  heparin   Injectable 5000 Unit(s) SubCutaneous every 12 hours  hydrALAZINE 10 milliGRAM(s) Oral three times a day  metoprolol succinate ER 50 milliGRAM(s) Oral daily    MEDICATIONS  (PRN):  acetaminophen     Tablet .. 650 milliGRAM(s) Oral every 6 hours PRN Severe Pain (7 - 10)    Vital Signs Last 24 Hrs  T(C): 36.9 (21 Dec 2021 17:33), Max: 37 (21 Dec 2021 00:43)  T(F): 98.4 (21 Dec 2021 17:33), Max: 98.6 (21 Dec 2021 00:43)  HR: 85 (21 Dec 2021 17:33) (85 - 89)  BP: 151/89 (21 Dec 2021 17:33) (151/89 - 165/80)  BP(mean): --  RR: 18 (21 Dec 2021 17:33) (17 - 18)  SpO2: 98% (21 Dec 2021 17:33) (98% - 100%)    Daily     Daily     CAPILLARY BLOOD GLUCOSE        PHYSICAL EXAM:      T(C): 36.9 (12-21-21 @ 17:33), Max: 37 (12-21-21 @ 00:43)  HR: 85 (12-21-21 @ 17:33) (85 - 89)  BP: 151/89 (12-21-21 @ 17:33) (151/89 - 165/80)  RR: 18 (12-21-21 @ 17:33) (17 - 18)  SpO2: 98% (12-21-21 @ 17:33) (98% - 100%)  Wt(kg): --  Lungs clear  Heart S1S2  Abd soft NT ND  Extremities:   tr edema              12-21    142  |  110<H>  |  38<H>  ----------------------------<  91  4.2   |  25  |  3.68<H>    Ca    8.6      21 Dec 2021 06:33    TPro  7.6  /  Alb  3.1<L>  /  TBili  0.4  /  DBili  x   /  AST  14<L>  /  ALT  9<L>  /  AlkPhos  61  12-20                          8.4    6.81  )-----------( 306      ( 21 Dec 2021 06:33 )             25.9     Creatinine Trend: 3.68<--, 3.64<--          Assessment   ASHLEY associated with NSAIDs use ; moderate bilateral hydronephrosis;   Anemia; back pain ;     Plan  UA micro; eos  CT abd pelvis to elucidate etiology of hydronephrosis; assess for HIEN  PVR acceptable   Paraprotein screen           Delfino Leung MD            Thank you for the courtesy of this consultation.

## 2021-12-21 NOTE — PROGRESS NOTE ADULT - SUBJECTIVE AND OBJECTIVE BOX
Patient is a 51y old  Female who presents with a chief complaint of leg  swelling (21 Dec 2021 14:11)    INTERVAL HPI/OVERNIGHT EVENTS: Patients seen and examined at bedside this morning. No acute events overnight. Pt reports no CP, SOB, or urinary problems.     MEDICATIONS  (STANDING):  heparin   Injectable 5000 Unit(s) SubCutaneous every 12 hours  hydrALAZINE 10 milliGRAM(s) Oral three times a day  metoprolol succinate ER 50 milliGRAM(s) Oral daily    MEDICATIONS  (PRN):  acetaminophen     Tablet .. 650 milliGRAM(s) Oral every 6 hours PRN Severe Pain (7 - 10)    Allergies    No Known Allergies    Intolerances      REVIEW OF SYSTEMS:  All other systems reviewed and are negative    Vital Signs Last 24 Hrs  T(C): 36.7 (21 Dec 2021 11:45), Max: 37 (20 Dec 2021 21:35)  T(F): 98.1 (21 Dec 2021 11:45), Max: 98.6 (20 Dec 2021 21:35)  HR: 88 (21 Dec 2021 11:45) (86 - 90)  BP: 164/91 (21 Dec 2021 11:45) (153/81 - 165/80)  BP(mean): --  RR: 17 (21 Dec 2021 11:45) (17 - 18)  SpO2: 100% (21 Dec 2021 11:45) (98% - 100%)  Daily     Daily   I&O's Summary    CAPILLARY BLOOD GLUCOSE        PHYSICAL EXAM:  GENERAL: NAD, well-groomed, well-developed  HEAD:  Atraumatic, Normocephalic  EYES: EOMI, PERRLA, conjunctiva and sclera clear  ENMT: No tonsillar erythema, exudates, or enlargement; Moist mucous membranes, Good dentition, No lesions  NECK: Supple, No JVD, Normal thyroid  NERVOUS SYSTEM:  Alert & Oriented X3, Good concentration; Motor Strength 5/5 B/L upper and lower extremities; DTRs 2+ intact and symmetric  CHEST/LUNG: Clear to percussion bilaterally; No rales, rhonchi, wheezing, or rubs  HEART: Regular rate and rhythm; 3/6 murmur in RUSB  ABDOMEN: Soft, Nontender, Nondistended; Bowel sounds present  EXTREMITIES:  2+ Peripheral Pulses. 2+ b/l edema to midshins.   LYMPH: No lymphadenopathy noted  SKIN: No rashes or lesions    Labs                          8.4    6.81  )-----------( 306      ( 21 Dec 2021 06:33 )             25.9     12-21    142  |  110<H>  |  38<H>  ----------------------------<  91  4.2   |  25  |  3.68<H>    Ca    8.6      21 Dec 2021 06:33    TPro  7.6  /  Alb  3.1<L>  /  TBili  0.4  /  DBili  x   /  AST  14<L>  /  ALT  9<L>  /  AlkPhos  61  12-20    PT/INR - ( 20 Dec 2021 17:32 )   PT: 12.4 sec;   INR: 1.07 ratio         PTT - ( 20 Dec 2021 17:32 )  PTT:51.3 sec

## 2021-12-21 NOTE — CONSULT NOTE ADULT - SUBJECTIVE AND OBJECTIVE BOX
HPI:   52 yo F    w/PMH of HTN (on amlodipine), pinched nerve  presents to the ED for b/l leg swelling.   Pt states that for the past x1-2 weeks she has noticed swelling in both of her legs.  senies  cp/sob/abd  pain    Pt reports chronic pain that radiates down her left leg, from her sciatica,  is  able to  ambulate.   Denies fever/chills, cough, SOB, CP, abdominal pain, N/V/D or trauma. (20 Dec 2021 18:55)  ----- As Above ------   Patient found to have an elevated BUN / creatinine   Asked to see patient regarding anemia. Patient states that she was diagnosed with anemia a long time ago but it had subsequently resolved. The patient states that she had been taking Aleve and it caused her to become constipated. She need to strain and that is when she saw bright red blood per rectum. Once she stopped the Aleve, the constipation / bleeding resolved. The patient denies melena, hematochezia, hematemesis, nausea, vomiting, abdominal pain, constipation, diarrhea, or change in bowel movements The patient was supposed to have a colonoscopy but never actually scheduled it.       PAST MEDICAL & SURGICAL HISTORY:  HTN (hypertension)    No significant past surgical history        MEDICATIONS  (STANDING):  heparin   Injectable 5000 Unit(s) SubCutaneous every 12 hours  hydrALAZINE 10 milliGRAM(s) Oral three times a day  metoprolol succinate ER 50 milliGRAM(s) Oral daily    MEDICATIONS  (PRN):  acetaminophen     Tablet .. 650 milliGRAM(s) Oral every 6 hours PRN Severe Pain (7 - 10)      Allergies    No Known Allergies    Intolerances        FAMILY HISTORY:  No pertinent family history in first degree relatives        REVIEW OF SYSTEMS:    CONSTITUTIONAL: No fever, weight loss,   EYES: No eye pain, visual disturbances, or discharge  ENMT:  No difficulty hearing, tinnitus, vertigo; No sinus or throat pain  NECK: No pain or stiffness  BREASTS: No pain, masses, or nipple discharge  RESPIRATORY: No cough, wheezing, chills or hemoptysis; No shortness of breath  CARDIOVASCULAR: No chest pain, palpitations, dizziness, or leg swelling  GASTROINTESTINAL: See above   GENITOURINARY: No dysuria, frequency, hematuria, or incontinence  NEUROLOGICAL: No headaches, memory loss, loss of strength, numbness, or tremors  SKIN: No itching, burning, rashes, or lesions   LYMPH NODES: No enlarged glands  ENDOCRINE: No heat or cold intolerance; No hair loss  MUSCULOSKELETAL: No joint pain or swelling; No muscle, back, or extremity pain  PSYCHIATRIC: No depression, anxiety, mood swings, or difficulty sleeping  HEME/LYMPH: No easy bruising, or bleeding gums  ALLERGY AND IMMUNOLOGIC: No hives or eczema          SOCIAL HISTORY:    FAMILY HISTORY:  No pertinent family history in first degree relatives        Vital Signs Last 24 Hrs  T(C): 36.7 (21 Dec 2021 11:45), Max: 37 (20 Dec 2021 21:35)  T(F): 98.1 (21 Dec 2021 11:45), Max: 98.6 (20 Dec 2021 21:35)  HR: 88 (21 Dec 2021 11:45) (86 - 90)  BP: 164/91 (21 Dec 2021 11:45) (153/81 - 165/80)  BP(mean): --  RR: 17 (21 Dec 2021 11:45) (17 - 18)  SpO2: 100% (21 Dec 2021 11:45) (98% - 100%)    PHYSICAL EXAM:    GENERAL: NAD, well-groomed, well-developed  HEAD:  Atraumatic, Normocephalic  EYES: EOMI, PERRLA, conjunctiva and sclera clear  ENMT: No tonsillar erythema, exudates, or enlargement; Moist mucous membranes, Good dentition, No lesions  NECK: Supple, No JVD, Normal thyroid  NERVOUS SYSTEM:  Alert & Oriented X3, Good concentration; Motor Strength 5/5 B/L upper and lower extremities;  CHEST/LUNG: Clear to percussion bilaterally; No rales, rhonchi, wheezing, or rubs  HEART: Regular rate and rhythm; No murmurs, rubs, or gallops  ABDOMEN: Soft, Nontender, Nondistended; Bowel sounds present  EXTREMITIES:  2+ Peripheral Pulses, No clubbing, cyanosis, or edema  LYMPH: No lymphadenopathy noted   RECTAL: Deferred   SKIN: No rashes or lesions    LABS:                        8.4    6.81  )-----------( 306      ( 21 Dec 2021 06:33 )             25.9       CBC:  12-21 @ 06:33  WBC  6.81  HGB 8.4  HCT 25.9 Plate 306  MCV 83.0  12-20 @ 21:46  WBC  7.61  HGB 8.5  HCT 26.2 Plate 321  MCV 82.9  12-20 @ 17:32  WBC  7.71  HGB 8.7  HCT 27.0 Plate 302  MCV 83.6           21 Dec 2021 06:33    142    |  110    |  38     ----------------------------<  91     4.2     |  25     |  3.68   20 Dec 2021 17:32    140    |  107    |  38     ----------------------------<  87     4.2     |  23     |  3.64     Ca    8.6        21 Dec 2021 06:33  Ca    8.4        20 Dec 2021 17:32    TPro  7.6    /  Alb  3.1    /  TBili  0.4    /  DBili  x      /  AST  14     /  ALT  9      /  AlkPhos  61     20 Dec 2021 17:32    PT/INR - ( 20 Dec 2021 17:32 )   PT: 12.4 sec;   INR: 1.07 ratio         PTT - ( 20 Dec 2021 17:32 )  PTT:51.3 sec        RADIOLOGY & ADDITIONAL STUDIES: HPI:   50 yo F    w/PMH of HTN (on amlodipine), pinched nerve  presents to the ED for b/l leg swelling.   Pt states that for the past x1-2 weeks she has noticed swelling in both of her legs.  senies  cp/sob/abd  pain    Pt reports chronic pain that radiates down her left leg, from her sciatica,  is  able to  ambulate.   Denies fever/chills, cough, SOB, CP, abdominal pain, N/V/D or trauma. (20 Dec 2021 18:55)  ----- As Above ------   Patient found to have an elevated BUN / creatinine   Asked to see patient regarding anemia. Patient states that she was diagnosed with anemia a long time ago but it had subsequently resolved. The patient states that she had been taking Aleve and it caused her to become constipated. She need to strain and that is when she saw bright red blood ( not mixed in with the stool ) per rectum. Once she stopped the Aleve, the constipation / bleeding resolved. The patient denies melena, hematochezia, hematemesis, nausea, vomiting, abdominal pain, constipation, diarrhea, or change in bowel movements The patient was supposed to have a colonoscopy but never actually scheduled it.       PAST MEDICAL & SURGICAL HISTORY:  HTN (hypertension)    No significant past surgical history        MEDICATIONS  (STANDING):  heparin   Injectable 5000 Unit(s) SubCutaneous every 12 hours  hydrALAZINE 10 milliGRAM(s) Oral three times a day  metoprolol succinate ER 50 milliGRAM(s) Oral daily    MEDICATIONS  (PRN):  acetaminophen     Tablet .. 650 milliGRAM(s) Oral every 6 hours PRN Severe Pain (7 - 10)      Allergies    No Known Allergies    Intolerances        FAMILY HISTORY:  No pertinent family history in first degree relatives        REVIEW OF SYSTEMS:    CONSTITUTIONAL: No fever, weight loss,   EYES: No eye pain, visual disturbances, or discharge  ENMT:  No difficulty hearing, tinnitus, vertigo; No sinus or throat pain  NECK: No pain or stiffness  BREASTS: No pain, masses, or nipple discharge  RESPIRATORY: No cough, wheezing, chills or hemoptysis; No shortness of breath  CARDIOVASCULAR: No chest pain, palpitations, dizziness, or leg swelling  GASTROINTESTINAL: See above   GENITOURINARY: No dysuria, frequency, hematuria, or incontinence  NEUROLOGICAL: No headaches, memory loss, loss of strength, numbness, or tremors  SKIN: No itching, burning, rashes, or lesions   LYMPH NODES: No enlarged glands  ENDOCRINE: No heat or cold intolerance; No hair loss  MUSCULOSKELETAL: No joint pain or swelling; No muscle, back, or extremity pain  PSYCHIATRIC: No depression, anxiety, mood swings, or difficulty sleeping  HEME/LYMPH: No easy bruising, or bleeding gums  ALLERGY AND IMMUNOLOGIC: No hives or eczema          SOCIAL HISTORY:    FAMILY HISTORY:  No pertinent family history in first degree relatives        Vital Signs Last 24 Hrs  T(C): 36.7 (21 Dec 2021 11:45), Max: 37 (20 Dec 2021 21:35)  T(F): 98.1 (21 Dec 2021 11:45), Max: 98.6 (20 Dec 2021 21:35)  HR: 88 (21 Dec 2021 11:45) (86 - 90)  BP: 164/91 (21 Dec 2021 11:45) (153/81 - 165/80)  BP(mean): --  RR: 17 (21 Dec 2021 11:45) (17 - 18)  SpO2: 100% (21 Dec 2021 11:45) (98% - 100%)    PHYSICAL EXAM:    GENERAL: NAD, well-groomed, well-developed  HEAD:  Atraumatic, Normocephalic  EYES: EOMI, PERRLA, conjunctiva and sclera clear  ENMT: No tonsillar erythema, exudates, or enlargement; Moist mucous membranes, Good dentition, No lesions  NECK: Supple, No JVD, Normal thyroid  NERVOUS SYSTEM:  Alert & Oriented X3, Good concentration; Motor Strength 5/5 B/L upper and lower extremities;  CHEST/LUNG: Clear to percussion bilaterally; No rales, rhonchi, wheezing, or rubs  HEART: Regular rate and rhythm; No murmurs, rubs, or gallops  ABDOMEN: Soft, Nontender, Nondistended; Bowel sounds present  EXTREMITIES:  2+ Peripheral Pulses, No clubbing, cyanosis, or edema  LYMPH: No lymphadenopathy noted   RECTAL: Deferred   SKIN: No rashes or lesions    LABS:                        8.4    6.81  )-----------( 306      ( 21 Dec 2021 06:33 )             25.9       CBC:  12-21 @ 06:33  WBC  6.81  HGB 8.4  HCT 25.9 Plate 306  MCV 83.0  12-20 @ 21:46  WBC  7.61  HGB 8.5  HCT 26.2 Plate 321  MCV 82.9  12-20 @ 17:32  WBC  7.71  HGB 8.7  HCT 27.0 Plate 302  MCV 83.6           21 Dec 2021 06:33    142    |  110    |  38     ----------------------------<  91     4.2     |  25     |  3.68   20 Dec 2021 17:32    140    |  107    |  38     ----------------------------<  87     4.2     |  23     |  3.64     Ca    8.6        21 Dec 2021 06:33  Ca    8.4        20 Dec 2021 17:32    TPro  7.6    /  Alb  3.1    /  TBili  0.4    /  DBili  x      /  AST  14     /  ALT  9      /  AlkPhos  61     20 Dec 2021 17:32    PT/INR - ( 20 Dec 2021 17:32 )   PT: 12.4 sec;   INR: 1.07 ratio         PTT - ( 20 Dec 2021 17:32 )  PTT:51.3 sec        RADIOLOGY & ADDITIONAL STUDIES:

## 2021-12-21 NOTE — PHYSICAL THERAPY INITIAL EVALUATION ADULT - BALANCE TRAINING, PT EVAL
Patient will improve static and dynamic standing balance by 1 grade without device in 2-3 days to improve safety and decrease risk of falls.

## 2021-12-22 LAB
A1C WITH ESTIMATED AVERAGE GLUCOSE RESULT: 5.6 % — SIGNIFICANT CHANGE UP (ref 4–5.6)
ALBUMIN SERPL ELPH-MCNC: 3.1 G/DL — LOW (ref 3.3–5)
ALP SERPL-CCNC: 60 U/L — SIGNIFICANT CHANGE UP (ref 40–120)
ALT FLD-CCNC: 13 U/L — SIGNIFICANT CHANGE UP (ref 12–78)
ANION GAP SERPL CALC-SCNC: 10 MMOL/L — SIGNIFICANT CHANGE UP (ref 5–17)
APPEARANCE UR: CLEAR — SIGNIFICANT CHANGE UP
AST SERPL-CCNC: 14 U/L — LOW (ref 15–37)
BACTERIA # UR AUTO: ABNORMAL
BILIRUB SERPL-MCNC: 0.4 MG/DL — SIGNIFICANT CHANGE UP (ref 0.2–1.2)
BILIRUB UR-MCNC: NEGATIVE — SIGNIFICANT CHANGE UP
BUN SERPL-MCNC: 49 MG/DL — HIGH (ref 7–23)
CALCIUM SERPL-MCNC: 8.7 MG/DL — SIGNIFICANT CHANGE UP (ref 8.5–10.1)
CHLORIDE SERPL-SCNC: 110 MMOL/L — HIGH (ref 96–108)
CO2 SERPL-SCNC: 23 MMOL/L — SIGNIFICANT CHANGE UP (ref 22–31)
COLOR SPEC: YELLOW — SIGNIFICANT CHANGE UP
CREAT ?TM UR-MCNC: 95 MG/DL — SIGNIFICANT CHANGE UP
CREAT SERPL-MCNC: 4.3 MG/DL — HIGH (ref 0.5–1.3)
DIFF PNL FLD: NEGATIVE — SIGNIFICANT CHANGE UP
EOSINOPHIL NFR URNS MANUAL: NEGATIVE — SIGNIFICANT CHANGE UP
EPI CELLS # UR: SIGNIFICANT CHANGE UP
ESTIMATED AVERAGE GLUCOSE: 114 MG/DL — SIGNIFICANT CHANGE UP (ref 68–114)
FERRITIN SERPL-MCNC: 289 NG/ML — HIGH (ref 15–150)
GLUCOSE SERPL-MCNC: 89 MG/DL — SIGNIFICANT CHANGE UP (ref 70–99)
GLUCOSE UR QL: NEGATIVE MG/DL — SIGNIFICANT CHANGE UP
HCG SERPL-ACNC: 5 MIU/ML — SIGNIFICANT CHANGE UP
HCT VFR BLD CALC: 26.3 % — LOW (ref 34.5–45)
HGB BLD-MCNC: 8.3 G/DL — LOW (ref 11.5–15.5)
IGA FLD-MCNC: 234 MG/DL — SIGNIFICANT CHANGE UP (ref 84–499)
IGG FLD-MCNC: 1028 MG/DL — SIGNIFICANT CHANGE UP (ref 610–1660)
IGM SERPL-MCNC: 65 MG/DL — SIGNIFICANT CHANGE UP (ref 35–242)
IRON SATN MFR SERPL: 16 % — SIGNIFICANT CHANGE UP (ref 14–50)
IRON SATN MFR SERPL: 43 UG/DL — SIGNIFICANT CHANGE UP (ref 30–160)
KAPPA LC SER QL IFE: 4.97 MG/DL — HIGH (ref 0.33–1.94)
KAPPA/LAMBDA FREE LIGHT CHAIN RATIO, SERUM: 2 RATIO — HIGH (ref 0.26–1.65)
KETONES UR-MCNC: NEGATIVE — SIGNIFICANT CHANGE UP
LAMBDA LC SER QL IFE: 2.48 MG/DL — SIGNIFICANT CHANGE UP (ref 0.57–2.63)
LDH SERPL L TO P-CCNC: 306 U/L — HIGH (ref 50–242)
LEUKOCYTE ESTERASE UR-ACNC: ABNORMAL
MCHC RBC-ENTMCNC: 26.4 PG — LOW (ref 27–34)
MCHC RBC-ENTMCNC: 31.6 GM/DL — LOW (ref 32–36)
MCV RBC AUTO: 83.8 FL — SIGNIFICANT CHANGE UP (ref 80–100)
NITRITE UR-MCNC: NEGATIVE — SIGNIFICANT CHANGE UP
NRBC # BLD: 0 /100 WBCS — SIGNIFICANT CHANGE UP (ref 0–0)
PH UR: 5 — SIGNIFICANT CHANGE UP (ref 5–8)
PLATELET # BLD AUTO: 314 K/UL — SIGNIFICANT CHANGE UP (ref 150–400)
POTASSIUM SERPL-MCNC: 4.3 MMOL/L — SIGNIFICANT CHANGE UP (ref 3.5–5.3)
POTASSIUM SERPL-SCNC: 4.3 MMOL/L — SIGNIFICANT CHANGE UP (ref 3.5–5.3)
PROT ?TM UR-MCNC: 5 MG/DL — SIGNIFICANT CHANGE UP (ref 0–12)
PROT ?TM UR-MCNC: 6 MG/DL — SIGNIFICANT CHANGE UP (ref 0–12)
PROT SERPL-MCNC: 6.7 G/DL — SIGNIFICANT CHANGE UP (ref 6–8.3)
PROT SERPL-MCNC: 6.7 G/DL — SIGNIFICANT CHANGE UP (ref 6–8.3)
PROT SERPL-MCNC: 7.2 GM/DL — SIGNIFICANT CHANGE UP (ref 6–8.3)
PROT UR-MCNC: NEGATIVE MG/DL — SIGNIFICANT CHANGE UP
PROT/CREAT UR-RTO: 0.1 RATIO — SIGNIFICANT CHANGE UP (ref 0–0.2)
RBC # BLD: 3.14 M/UL — LOW (ref 3.8–5.2)
RBC # FLD: 15.4 % — HIGH (ref 10.3–14.5)
RBC CASTS # UR COMP ASSIST: SIGNIFICANT CHANGE UP /HPF (ref 0–4)
SODIUM SERPL-SCNC: 143 MMOL/L — SIGNIFICANT CHANGE UP (ref 135–145)
SP GR SPEC: 1.01 — SIGNIFICANT CHANGE UP (ref 1.01–1.02)
TIBC SERPL-MCNC: 279 UG/DL — SIGNIFICANT CHANGE UP (ref 220–430)
UIBC SERPL-MCNC: 236 UG/DL — SIGNIFICANT CHANGE UP (ref 110–370)
URATE SERPL-MCNC: 9.5 MG/DL — HIGH (ref 2.5–7)
UROBILINOGEN FLD QL: NEGATIVE MG/DL — SIGNIFICANT CHANGE UP
WBC # BLD: 7.16 K/UL — SIGNIFICANT CHANGE UP (ref 3.8–10.5)
WBC # FLD AUTO: 7.16 K/UL — SIGNIFICANT CHANGE UP (ref 3.8–10.5)
WBC UR QL: SIGNIFICANT CHANGE UP

## 2021-12-22 PROCEDURE — 72141 MRI NECK SPINE W/O DYE: CPT | Mod: 26

## 2021-12-22 PROCEDURE — 76856 US EXAM PELVIC COMPLETE: CPT | Mod: 26

## 2021-12-22 PROCEDURE — 99233 SBSQ HOSP IP/OBS HIGH 50: CPT

## 2021-12-22 PROCEDURE — 99221 1ST HOSP IP/OBS SF/LOW 40: CPT

## 2021-12-22 PROCEDURE — 74176 CT ABD & PELVIS W/O CONTRAST: CPT | Mod: 26

## 2021-12-22 RX ORDER — SENNA PLUS 8.6 MG/1
2 TABLET ORAL AT BEDTIME
Refills: 0 | Status: DISCONTINUED | OUTPATIENT
Start: 2021-12-22 | End: 2021-12-25

## 2021-12-22 RX ADMIN — Medication 10 MILLIGRAM(S): at 16:00

## 2021-12-22 RX ADMIN — Medication 50 MILLIGRAM(S): at 05:56

## 2021-12-22 RX ADMIN — HEPARIN SODIUM 5000 UNIT(S): 5000 INJECTION INTRAVENOUS; SUBCUTANEOUS at 05:56

## 2021-12-22 RX ADMIN — Medication 650 MILLIGRAM(S): at 06:46

## 2021-12-22 RX ADMIN — Medication 10 MILLIGRAM(S): at 21:19

## 2021-12-22 RX ADMIN — Medication 650 MILLIGRAM(S): at 16:17

## 2021-12-22 RX ADMIN — Medication 10 MILLIGRAM(S): at 05:56

## 2021-12-22 RX ADMIN — Medication 650 MILLIGRAM(S): at 05:56

## 2021-12-22 NOTE — PROGRESS NOTE ADULT - ASSESSMENT
52 y/o F with PMH of HTN, sciatica, presented to ED for b/l leg swelling admitted to medical for ASHLEY , found to have moderate b/l hydronephrosis large uterine/myometrial abnormality on imaging concerning for malignancy.     Assessment and Plan:   b/l hydronephrosis large uterine/myometrial abnormality , concerning for malignancy   -urology unable to place stents, plan for b/l nephrotomy placement tomorrow by IR , NPO MN , hold AC   discussed case with IR unable to bx omental nodule is too tiny and RP LN are also small and not perc accessible,   patient will need GYN ONC for endometrial Bx and further care planning   MRI C/T/L spine to r/o mets   pelvic US     HTN  hydralazine and metoprolol    F/U TSH, lipid panel, HgA1c  Titrate BP meds    ASHLEY on CKD  - Cr: 3.68  - Most likely exacerbated from chronic use of NSAIDS  - Adjust all meds as per CrCl  - Nephro following   - Renal U/S Mild to moderate bilateral hydronephrosis. PVR 58cc     B/L lower extremity swelling  No DVT  ECHO: pEF    Anemia  F/U iron studies  unknown baseline  Transfuse PRN  GI eval    Sciatica  - PT eval.  MRI C/T/L spine     DVT PPX  Heparin SQ --on hold for procedure   fall precautions

## 2021-12-22 NOTE — PROGRESS NOTE ADULT - SUBJECTIVE AND OBJECTIVE BOX
[facility] NEPHROLOGY SERVICES, [facility]  NEPHROLOGY AND HYPERTENSION  [address]  [address]  [place] [address]  [phone]    MD LENNY WASHINGTON, MD MYESHA PENALOZA MD YELENA ROSENBERG, MD MILAGROS LINDA, MD REBECCA RIVERS MD          Patient events noted  no distress  ct findings discussed with patient and daughter; WON Tidwell present     MEDICATIONS  (STANDING):  hydrALAZINE 10 milliGRAM(s) Oral three times a day  metoprolol succinate ER 50 milliGRAM(s) Oral daily  senna 2 Tablet(s) Oral at bedtime    MEDICATIONS  (PRN):  acetaminophen     Tablet .. 650 milliGRAM(s) Oral every 6 hours PRN Severe Pain (7 - 10)      12-22-21 @ 07:01  -  12-23-21 @ 07:00  --------------------------------------------------------  IN: 860 mL / OUT: 0 mL / NET: 860 mL      PHYSICAL EXAM:      T(C): 36.7 (12-23-21 @ 05:05), Max: 36.8 (12-22-21 @ 17:18)  HR: 81 (12-23-21 @ 05:05) (76 - 83)  BP: 158/92 (12-23-21 @ 05:05) (147/92 - 159/98)  RR: 19 (12-23-21 @ 05:05) (18 - 19)  SpO2: 99% (12-23-21 @ 05:05) (98% - 99%)  Wt(kg): --  Lungs clear  Heart S1S2  Abd soft NT ND  Extremities:   tr edema                         --, 4.30<--, 3.68<--, 3.64<--      Assessment   ASHLEY post renal azotemia; risk for NSAIDs AIN  Uterine mass, RPLAN    Plan:  For PCN bilateral tomorrow  Discussed with patient and daughter;   Discussed with IR;       Delfino Leung MD

## 2021-12-22 NOTE — CONSULT NOTE ADULT - SUBJECTIVE AND OBJECTIVE BOX
UROLOGY CONSULT NOTE    HPI:  Patient is a 50 yo F w/PMH of HTN (on amlodipine), pinched nerve presents to the ED for b/l leg swelling. Pt states that for the past x1-2 weeks she has noticed swelling in both of her legs. Denies  cp/sob/abd  pain. Pt reports chronic pain that radiates down her left leg, from her sciatica,  is  able to  ambulate. Denies fever/chills, cough, SOB, CP, abdominal pain, N/V/D or trauma. (20 Dec 2021 18:55)      PAST MEDICAL & SURGICAL HISTORY:  HTN (hypertension)    No significant past surgical history      REVIEW OF SYSTEMS:  Constitutional: Denies fever, weight loss, fatigue  Eye: Denies eye pain, visual changes, discharge, blurred vision  ENT: Denies hearing changes, tinnitus, vertigo, sinus congestion, sore throat  Neck: Denies pain or stiffness  Respiratory: Denies cough, wheezing, chills, hemoptysis, shortness of breath, difficulty breathing  Cardiovascular: Denies chest pain, palpitations, dizziness, leg swelling  Gastrointestinal: Denies abdominal pain, nausea, vomiting, hematemesis, diarrhea, constipation, melena, hematochezia  Genitourinary: Denies dysuria, frequency, hematuria, retention, incontinence  Neurological: Denies headaches, memory loss, loss of strength, numbness, tremors  Skin: Denies itching, burning, rashes, lesions   Endocrine: Denies heat or cold intolerance, hair loss  Musculoskeletal: Denies joint pain or swelling, back, extremity pain  Psychiatric: Denies depression, anxiety, mood swings, difficulty sleeping, suicidal ideation  Hematology: Denies easy bruising, bleeding gums  Immunologic: Denies hives or eczema    MEDICATIONS  (STANDING):  hydrALAZINE 10 milliGRAM(s) Oral three times a day  metoprolol succinate ER 50 milliGRAM(s) Oral daily    MEDICATIONS  (PRN):  acetaminophen     Tablet .. 650 milliGRAM(s) Oral every 6 hours PRN Severe Pain (7 - 10)      Allergies  No Known Allergies    Intolerances        SOCIAL HISTORY          Smoking: Yes [ ]  No [ ]   ______pk yrs          ETOH  Yes [ ]  No [ ]  Social [ ]          DRUGS:  Yes [ ]  No [ ]  if so what______________    FAMILY HISTORY:  No pertinent family history in first degree relatives        Vital Signs Last 24 Hrs  T(C): 36.6 (22 Dec 2021 11:20), Max: 36.9 (21 Dec 2021 17:33)  T(F): 97.8 (22 Dec 2021 11:20), Max: 98.4 (21 Dec 2021 17:33)  HR: 76 (22 Dec 2021 11:20) (76 - 89)  BP: 147/92 (22 Dec 2021 11:20) (147/92 - 158/78)  BP(mean): --  RR: 18 (22 Dec 2021 11:20) (18 - 18)  SpO2: 98% (22 Dec 2021 11:20) (98% - 99%)    Physical Exam:    General:  Appears stated age, well-groomed, well-nourished, no distress  Eyes : DAYNA  HENT:  WNL, no JVD  Chest:  clear breath sounds  Cardiovascular:  Regular rate & rhythm  Abdomen:    :  Extremities:    Skin:    Musculoskeletal:    Neuro/Psych:        LABS:                        8.3    7.16  )-----------( 314      ( 22 Dec 2021 06:49 )             26.3     12-22    143  |  110<H>  |  49<H>  ----------------------------<  89  4.3   |  23  |  4.30<H>    Ca    8.7      22 Dec 2021 06:49    TPro  7.2  /  Alb  3.1<L>  /  TBili  0.4  /  DBili  x   /  AST  14<L>  /  ALT  13  /  AlkPhos  60  12-22    PT/INR - ( 20 Dec 2021 17:32 )   PT: 12.4 sec;   INR: 1.07 ratio         PTT - ( 20 Dec 2021 17:32 )  PTT:51.3 sec  Urinalysis Basic - ( 22 Dec 2021 06:54 )    Color: Yellow / Appearance: Clear / S.010 / pH: x  Gluc: x / Ketone: Negative  / Bili: Negative / Urobili: Negative mg/dL   Blood: x / Protein: Negative mg/dL / Nitrite: Negative   Leuk Esterase: Trace / RBC: 0-2 /HPF / WBC 3-5   Sq Epi: x / Non Sq Epi: Few / Bacteria: Occasional        RADIOLOGY & ADDITIONAL STUDIES: UROLOGY CONSULT NOTE    HPI:  Patient is a 52 yo F w/PMH of HTN (on amlodipine), pinched nerve presents to the ED for b/l leg swelling. Pt states that for the past x1-2 weeks she has noticed swelling in both of her legs. Denies  cp/sob/abd  pain. Pt reports chronic pain that radiates down her left leg, from her sciatica,  is  able to  ambulate. Denies fever/chills, cough, SOB, CP, abdominal pain, N/V/D or trauma. (20 Dec 2021 18:55)    Urology consulted for b/l hydronephrosis and increasing Cr. Patient denies any urinary symptoms, denies dysuria, incomplete emptying, gross hematuria. States that has a hx of cervical cancer with last radiation treatment in  and has been followed by oncology and gynecology since. Since admission two days ago Cr has increased from 3.6-->4.3. Bladder decompressed on imagining. Uterus enlarged with retroperitoneal lymphadenopathy.     PAST MEDICAL & SURGICAL HISTORY:  HTN (hypertension)    No significant past surgical history      REVIEW OF SYSTEMS:  Constitutional: Denies fever, weight loss, fatigue  Eye: Denies eye pain, visual changes, discharge, blurred vision  ENT: Denies hearing changes, tinnitus, vertigo, sinus congestion, sore throat  Neck: Denies pain or stiffness  Respiratory: Denies cough, wheezing, chills, hemoptysis, shortness of breath, difficulty breathing  Cardiovascular: Denies chest pain, palpitations, dizziness, leg swelling  Gastrointestinal: Denies abdominal pain, nausea, vomiting, hematemesis, diarrhea, constipation, melena, hematochezia  Genitourinary: Denies dysuria, frequency, hematuria, retention, incontinence  Neurological: Denies headaches, memory loss, loss of strength, numbness, tremors  Skin: Denies itching, burning, rashes, lesions   Endocrine: Denies heat or cold intolerance, hair loss  Musculoskeletal: Denies joint pain or swelling, back, extremity pain  Psychiatric: Denies depression, anxiety, mood swings, difficulty sleeping, suicidal ideation  Hematology: Denies easy bruising, bleeding gums  Immunologic: Denies hives or eczema    MEDICATIONS  (STANDING):  hydrALAZINE 10 milliGRAM(s) Oral three times a day  metoprolol succinate ER 50 milliGRAM(s) Oral daily    MEDICATIONS  (PRN):  acetaminophen     Tablet .. 650 milliGRAM(s) Oral every 6 hours PRN Severe Pain (7 - 10)      Allergies  No Known Allergies    Intolerances        SOCIAL HISTORY          Smoking: Yes [x ]  No [ ]   __5____pk yrs          ETOH  Yes [ ]  No [x ]  Social [ ]          DRUGS:  Yes [ ]  No [x ]  if so what______________    FAMILY HISTORY:  No pertinent family history in first degree relatives        Vital Signs Last 24 Hrs  T(C): 36.6 (22 Dec 2021 11:20), Max: 36.9 (21 Dec 2021 17:33)  T(F): 97.8 (22 Dec 2021 11:20), Max: 98.4 (21 Dec 2021 17:33)  HR: 76 (22 Dec 2021 11:20) (76 - 89)  BP: 147/92 (22 Dec 2021 11:20) (147/92 - 158/78)  BP(mean): --  RR: 18 (22 Dec 2021 11:20) (18 - 18)  SpO2: 98% (22 Dec 2021 11:20) (98% - 99%)    Physical Exam:    GENERAL: NAD, well-groomed, thin  HEAD:  Atraumatic, Normocephalic  EYES: EOMI, PERRLA, conjunctiva and sclera clear  NECK: Supple, No JVD, Normal thyroid  NERVOUS SYSTEM:  Alert & Oriented X3, Good concentration  CHEST/LUNG: Clear to percussion bilaterally  HEART: Regular rate and rhythm  ABDOMEN: Soft, mildly diffuse tenderness, Nondistended  EXTREMITIES:  2+ Peripheral Pulses  LYMPH: No cervical adenopathy  : No suprapubic tenderness, no bladder distention, no gross hematuria.  SKIN: No rashes or lesions      LABS:                        8.3    7.16  )-----------( 314      ( 22 Dec 2021 06:49 )             26.3     12-22    143  |  110<H>  |  49<H>  ----------------------------<  89  4.3   |  23  |  4.30<H>    Ca    8.7      22 Dec 2021 06:49    TPro  7.2  /  Alb  3.1<L>  /  TBili  0.4  /  DBili  x   /  AST  14<L>  /  ALT  13  /  AlkPhos  60  12-    PT/INR - ( 20 Dec 2021 17:32 )   PT: 12.4 sec;   INR: 1.07 ratio         PTT - ( 20 Dec 2021 17:32 )  PTT:51.3 sec  Urinalysis Basic - ( 22 Dec 2021 06:54 )    Color: Yellow / Appearance: Clear / S.010 / pH: x  Gluc: x / Ketone: Negative  / Bili: Negative / Urobili: Negative mg/dL   Blood: x / Protein: Negative mg/dL / Nitrite: Negative   Leuk Esterase: Trace / RBC: 0-2 /HPF / WBC 3-5   Sq Epi: x / Non Sq Epi: Few / Bacteria: Occasional        RADIOLOGY & ADDITIONAL STUDIES:  ACC: [id number] EXAM:  CT ABDOMEN AND PELVIS                          PROCEDURE DATE:  2021          INTERPRETATION:  CLINICAL INFORMATION: Acute kidney injury    COMPARISON: Ultrasound dated 2021    CONTRAST/COMPLICATIONS:  IV Contrast: None  Oral Contrast: None  Complications: None    PROCEDURE:  CT of the Abdomen and Pelvis was performed.  Sagittal and coronal reformats were performed.    FINDINGS:  LOWER CHEST: Basilar atelectasis. Cardiomegaly. Coronary artery   calcifications.    Please note that evaluation of the abdominal organs and vascular   structures is limited by lack of intravenous contrast.    LIVER: Multiple subcentimeter hepatic hypodensities, too small to further   characterize.  BILE DUCTS: Mild dilatation.  GALLBLADDER: Within normal limits.  SPLEEN: Within normal limits.  PANCREAS: Within normal limits.  ADRENALS: 1.5 cm indeterminate left adrenal nodule.  KIDNEYS/URETERS: Moderate bilateral hydroureteronephrosis. No renal or   ureteral calculi.    BLADDER: Within normal limits.  REPRODUCTIVE ORGANS: Calcified uterine fibroid. Endometrium distended by   fluid.    BOWEL: No bowel obstruction. Appendix is not visualized. No evidence of   inflammation in the pericecal region.  PERITONEUM: Mild abdominal and pelvic fluid. Omental nodularity.  VESSELS: Within normal limits.  RETROPERITONEUM/LYMPH NODES: Mild retroperitoneal, pelvic, and groin   adenopathy measuring up to 1.9 x 1.1 cm (image 2:55).  ABDOMINAL WALL: Fat-containing umbilical hernia. Subcutaneous soft tissue   edema.  BONES: Mild degenerative changes.    IMPRESSION:    1. Moderate bilateral hydroureteronephrosis. No renal or ureteral calculi.  2. Endometrium distended by fluid. Recommend pelvic ultrasound for   further assessment.  3. Omental nodularity suspicious for carcinomatosis.  4. Mild abdominal and pelvic fluid.  5. Mild retroperitoneal, pelvic, and groin adenopathy.

## 2021-12-22 NOTE — PROGRESS NOTE ADULT - ASSESSMENT
HPI:   50 yo F    w/PMH of HTN (on amlodipine), pinched nerve  presents to the ED for b/l leg swelling.   Pt states that for the past x1-2 weeks she has noticed swelling in both of her legs.  senies  cp/sob/abd  pain    Pt reports chronic pain that radiates down her left leg, from her sciatica,  is  able to  ambulate.   Denies fever/chills, cough, SOB, CP, abdominal pain, N/V/D or trauma. (20 Dec 2021 18:55)  ----- As Above ------   Patient found to have an elevated BUN / creatinine   Asked to see patient regarding anemia. Patient states that she was diagnosed with anemia a long time ago but it had subsequently resolved. The patient states that she had been taking Aleve and it caused her to become constipated. She need to strain and that is when she saw bright red blood ( not mixed in with the stool ) per rectum. Once she stopped the Aleve, the constipation / bleeding resolved. The patient denies melena, hematochezia, hematemesis, nausea, vomiting, abdominal pain, constipation, diarrhea, or change in bowel movements The patient was supposed to have a colonoscopy but never actually scheduled it.     A) Anemia, normocytic - No signs of iron deficiency anemia. Episodes of bright red blood ( not mixed in with the stool ) per rectum only when she was straining while constipated which had resolved. Patient never had a colonoscopy  No need for emergent colonoscopy. Would work up renal disease / hydronephrosis before scheduling colonoscopy. Patient states that she has a gastroenterologist in her community that she would make an appointment with once she is discharged.   B) Omental nodularity suspicious for carcinomatosis with lymphadenopathy Will need biopsy, tumor markers  c) Vague abdominal pain - Patient is lactose intolerant  -  Will add lactose res HPI:   52 yo F    w/PMH of HTN (on amlodipine), pinched nerve  presents to the ED for b/l leg swelling.   Pt states that for the past x1-2 weeks she has noticed swelling in both of her legs.  senies  cp/sob/abd  pain    Pt reports chronic pain that radiates down her left leg, from her sciatica,  is  able to  ambulate.   Denies fever/chills, cough, SOB, CP, abdominal pain, N/V/D or trauma. (20 Dec 2021 18:55)  ----- As Above ------   Patient found to have an elevated BUN / creatinine   Asked to see patient regarding anemia. Patient states that she was diagnosed with anemia a long time ago but it had subsequently resolved. The patient states that she had been taking Aleve and it caused her to become constipated. She need to strain and that is when she saw bright red blood ( not mixed in with the stool ) per rectum. Once she stopped the Aleve, the constipation / bleeding resolved. The patient denies melena, hematochezia, hematemesis, nausea, vomiting, abdominal pain, constipation, diarrhea, or change in bowel movements The patient was supposed to have a colonoscopy but never actually scheduled it.     A) Anemia, normocytic - No signs of iron deficiency anemia. Episodes of bright red blood ( not mixed in with the stool ) per rectum only when she was straining while constipated which had resolved. Patient never had a colonoscopy  No need for emergent colonoscopy. Would work up renal disease / hydronephrosis before scheduling colonoscopy. Patient states that she has a gastroenterologist in her community that she would make an appointment with once she is discharged.   B) Omental nodularity suspicious for carcinomatosis with lymphadenopathy Will need biopsy / tumor markers  C) Vague abdominal pain - Patient is lactose intolerant  -  Will add lactose restriction.

## 2021-12-22 NOTE — PROGRESS NOTE ADULT - SUBJECTIVE AND OBJECTIVE BOX
Patient is a 51y old  Female who presents with a chief complaint of leg  swelling (21 Dec 2021 14:11)    INTERVAL HPI/OVERNIGHT EVENTS: Patients seen and examined at bedside this morning. No acute events overnight.  Denies fever, chills, N/V, dizziness, HA, cough, CP, palpitations, SOB, abdominal pain, dysuria, diarrhea, constipation.     MEDICATIONS  (STANDING):  heparin   Injectable 5000 Unit(s) SubCutaneous every 12 hours  hydrALAZINE 10 milliGRAM(s) Oral three times a day  metoprolol succinate ER 50 milliGRAM(s) Oral daily    MEDICATIONS  (PRN):  acetaminophen     Tablet .. 650 milliGRAM(s) Oral every 6 hours PRN Severe Pain (7 - 10)    Allergies    No Known Allergies    Intolerances    Vital Signs Last 24 Hrs  T(C): 36.8 (22 Dec 2021 17:18), Max: 36.8 (22 Dec 2021 17:18)  T(F): 98.2 (22 Dec 2021 17:18), Max: 98.2 (22 Dec 2021 17:18)  HR: 80 (22 Dec 2021 21:17) (76 - 86)  BP: 155/84 (22 Dec 2021 21:17) (147/92 - 159/98)  BP(mean): --  RR: 18 (22 Dec 2021 17:18) (18 - 18)  SpO2: 98% (22 Dec 2021 17:18) (98% - 99%)    CAPILLARY BLOOD GLUCOSE        PHYSICAL EXAM:  GENERAL: NAD, well-groomed, well-developed, no increased WOB   HEAD:  Atraumatic, Normocephalic  EYES: EOMI, PERRLA, conjunctiva and sclera clear  ENMT: No tonsillar erythema, exudates, or enlargement; Moist mucous membranes  NECK: Supple, No JVD  NERVOUS SYSTEM:  Alert & Oriented X3, Good concentration; non focal  CHEST/LUNG: CTAB  HEART: Regular rate and rhythm; 3/6 murmur in RUSB  ABDOMEN: Soft, Nontender, Nondistended; Bowel sounds present  EXTREMITIES:  2+ Peripheral Pulses b/l . 2+ b/l edema to midshins. no calf tenderness b/l       Labs                                     8.3    7.16  )-----------( 314      ( 22 Dec 2021 06:49 )             26.3   12-22    143  |  110<H>  |  49<H>  ----------------------------<  89  4.3   |  23  |  4.30<H>    Ca    8.7      22 Dec 2021 06:49    TPro  6.7  /  Alb  x   /  TBili  x   /  DBili  x   /  AST  x   /  ALT  x   /  AlkPhos  x   12-22    Consultant(s) Notes Reveiwed [ x] Yes     Care Discussed with [x ] Consultants  [ x] Patient  [ ] Family  [ x] /   [x ] Other; RN    Care discussed in detail with patient.  All questions and concerns addressed

## 2021-12-22 NOTE — CONSULT NOTE ADULT - ASSESSMENT
Interventional Radiology  Evaluate for Procedure: B/l percutaneous nephrostomy placement.    HPI: 51y Female with moderate b/l hydronephrosis large uterine/myometrial abnormality, possible malignancy    Allergies:   Medications (Abx/Cardiac/Anticoagulation/Blood Products)  heparin   Injectable: 5000 Unit(s) SubCutaneous (12-22 @ 05:56)  hydrALAZINE: 10 milliGRAM(s) Oral (12-22 @ 05:56)  metoprolol succinate ER: 50 milliGRAM(s) Oral (12-22 @ 05:56)    Data:  T(C): 36.6  HR: 76  BP: 147/92  RR: 18  SpO2: 98%    -WBC 7.16 / HgB 8.3 / Hct 26.3 / Plt 314  -Na 143 / Cl 110 / BUN 49 / Glucose 89  -K 4.3 / CO2 23 / Cr 4.30  -ALT 13 / Alk Phos 60 / T.Bili 0.4  -INR 1.07 / PTT 51.3    Radiology: moderate b/l hydronephrosis large uterine/myometrial abnormality, possible malignancy    Assessment/Plan:   -51y Female with moderate b/l hydronephrosis large uterine/myometrial abnormality   -Urology unable to place stents  -Plan for b/l PCN, possible Nephro-U tomorrow with anesthesia. Make NPO midnight, hold anticoagulation now
HPI:   52 yo F    w/PMH of HTN (on amlodipine), pinched nerve  presents to the ED for b/l leg swelling.   Pt states that for the past x1-2 weeks she has noticed swelling in both of her legs.  senies  cp/sob/abd  pain    Pt reports chronic pain that radiates down her left leg, from her sciatica,  is  able to  ambulate.   Denies fever/chills, cough, SOB, CP, abdominal pain, N/V/D or trauma. (20 Dec 2021 18:55)  ----- As Above ------   Patient found to have an elevated BUN / creatinine   Asked to see patient regarding anemia. Patient states that she was diagnosed with anemia a long time ago but it had subsequently resolved. The patient states that she had been taking Aleve and it caused her to become constipated. She need to strain and that is when she saw bright red blood ( not mixed in with the stool ) per rectum. Once she stopped the Aleve, the constipation / bleeding resolved. The patient denies melena, hematochezia, hematemesis, nausea, vomiting, abdominal pain, constipation, diarrhea, or change in bowel movements The patient was supposed to have a colonoscopy but never actually scheduled it.     Anemia, normocytic - No signs of iron deficiency anemia. Episodes of bright red blood ( not mixed in with the stool ) per rectum only when she was straining while constipated which had resolved. Patient never had a colonoscop   ===  No need for emergent colonoscopy. Would work up renal disease / hydronephrosis before scheduling colonoscopy. Patient states that she has a gastroenterologist in her community that she would make an appointment with once she is discharged. 
Impression: Patient is a 52 yo F w/PMH of HTN (on amlodipine), pinched nerve presents to the ED for b/l leg swelling. Pt states that for the past x1-2 weeks she has noticed swelling in both of her legs. Urology consulted for b/l hydronephrosis and increasing Cr    Recommendations:  --Bladder scan post void  -- Given the level of obstruction seen on imaging in the uterus and retroperitoneum, recommend bilateral PCN placement with IR  --recommend GYN consult

## 2021-12-22 NOTE — CONSULT NOTE ADULT - ATTENDING COMMENTS
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  no voiding c/o    having bilat perc and possible neph u.    needs gyn-onc to see here or as outpatient-will need biopsy/expl lap    reviewed options re: renal issues-percs will likely provide best drainage as well as access for f/u change and studies    d/w pt and family

## 2021-12-22 NOTE — PROGRESS NOTE ADULT - SUBJECTIVE AND OBJECTIVE BOX
Patient is a 51y old  Female who presents with a chief complaint of leg  swelling (22 Dec 2021 12:11)      HPI:   52 yo F    w/PMH of HTN (on amlodipine), pinched nerve  presents to the ED for b/l leg swelling.   Pt states that for the past x1-2 weeks she has noticed swelling in both of her legs.  senies  cp/sob/abd  pain    Pt reports chronic pain that radiates down her left leg, from her sciatica,  is  able to  ambulate.   Denies fever/chills, cough, SOB, CP, abdominal pain, N/V/D or trauma. (20 Dec 2021 18:55)      INTERVAL HPI/OVERNIGHT EVENTS:  Vague abdominal pain. started in the hospital.  The patient denies melena, hematochezia, hematemesis, nausea, vomiting, constipation, diarrhea, or change in bowel movements Patient NPO for IR    MEDICATIONS  (STANDING):  hydrALAZINE 10 milliGRAM(s) Oral three times a day  metoprolol succinate ER 50 milliGRAM(s) Oral daily    MEDICATIONS  (PRN):  acetaminophen     Tablet .. 650 milliGRAM(s) Oral every 6 hours PRN Severe Pain (7 - 10)      FAMILY HISTORY:  No pertinent family history in first degree relatives        Allergies    No Known Allergies    Intolerances        PMH/PSH:  HTN (hypertension)    No significant past surgical history          REVIEW OF SYSTEMS:  CONSTITUTIONAL: No fever, weight loss, or fatigue  EYES: No eye pain, visual disturbances, or discharge  ENMT:  No difficulty hearing, tinnitus, vertigo; No sinus or throat pain  NECK: No pain or stiffness  BREASTS: No pain, masses, or nipple discharge  RESPIRATORY: No cough, wheezing, chills or hemoptysis; No shortness of breath  CARDIOVASCULAR: No chest pain, palpitations, dizziness, or leg swelling  GASTROINTESTINAL: See above   GENITOURINARY: No dysuria, frequency, hematuria, or incontinence  NEUROLOGICAL: No headaches, memory loss, loss of strength, numbness, or tremors  SKIN: No itching, burning, rashes, or lesions   LYMPH NODES: No enlarged glands  ENDOCRINE: No heat or cold intolerance; No hair loss  MUSCULOSKELETAL: No joint pain or swelling; No muscle, back, or extremity pain  PSYCHIATRIC: No depression, anxiety, mood swings, or difficulty sleeping  HEME/LYMPH: No easy bruising, or bleeding gums  ALLERGY AND IMMUNOLOGIC: No hives or eczema    Vital Signs Last 24 Hrs  T(C): 36.6 (22 Dec 2021 11:20), Max: 36.9 (21 Dec 2021 17:33)  T(F): 97.8 (22 Dec 2021 11:20), Max: 98.4 (21 Dec 2021 17:33)  HR: 76 (22 Dec 2021 11:20) (76 - 89)  BP: 147/92 (22 Dec 2021 11:20) (147/92 - 158/78)  BP(mean): --  RR: 18 (22 Dec 2021 11:20) (18 - 18)  SpO2: 98% (22 Dec 2021 11:20) (98% - 99%)    PHYSICAL EXAM:  GENERAL: NAD, well-groomed, well-developed  HEAD:  Atraumatic, Normocephalic  EYES: EOMI, PERRLA, conjunctiva and sclera clear  NECK: Supple, No JVD, Normal thyroid  NERVOUS SYSTEM:  Alert & Oriented X3, Good concentration; Motor Strength 5/5 B/L upper and lower extremities;   CHEST/LUNG: Clear to percussion bilaterally; No rales, rhonchi, wheezing, or rubs  HEART: Regular rate and rhythm; No murmurs, rubs, or gallops  ABDOMEN: Soft, Nontender, Nondistended; Bowel sounds present  EXTREMITIES:  2+ Peripheral Pulses, No clubbing, cyanosis, or edema  LYMPH: No lymphadenopathy noted  SKIN: No rashes or lesions    LAB                          8.3    7.16  )-----------( 314      ( 22 Dec 2021 06:49 )             26.3       CBC:   @ 06:49  WBC 7.16   Hgb 8.3   Hct 26.3   Plts 314  MCV 83.8   @ 06:33  WBC 6.81   Hgb 8.4   Hct 25.9   Plts 306  MCV 83.0   @ 21:46  WBC 7.61   Hgb 8.5   Hct 26.2   Plts 321  MCV 82.9   @ 17:32  WBC 7.71   Hgb 8.7   Hct 27.0   Plts 302  MCV 83.6      Chemistry:   @ 06:49  Na+ 143  K+ 4.3  Cl- 110  CO2 23  BUN 49  Cr 4.30      @ 06:33  Na+ 142  K+ 4.2  Cl- 110  CO2 25  BUN 38  Cr 3.68      @ 17:32  Na+ 140  K+ 4.2  Cl- 107  CO2 23  BUN 38  Cr 3.64         Glucose, Serum: 89 mg/dL ( @ 06:49)  Glucose, Serum: 91 mg/dL ( @ 06:33)  Glucose, Serum: 87 mg/dL ( @ 17:32)      22 Dec 2021 06:49    143    |  110    |  49     ----------------------------<  89     4.3     |  23     |  4.30   21 Dec 2021 06:33    142    |  110    |  38     ----------------------------<  91     4.2     |  25     |  3.68   20 Dec 2021 17:32    140    |  107    |  38     ----------------------------<  87     4.2     |  23     |  3.64     Ca    8.7        22 Dec 2021 06:49  Ca    8.6        21 Dec 2021 06:33  Ca    8.4        20 Dec 2021 17:32    TPro  7.2    /  Alb  3.1    /  TBili  0.4    /  DBili  x      /  AST  14     /  ALT  13     /  AlkPhos  60     22 Dec 2021 06:49  TPro  7.6    /  Alb  3.1    /  TBili  0.4    /  DBili  x      /  AST  14     /  ALT  9      /  AlkPhos  61     20 Dec 2021 17:32      PT/INR - ( 20 Dec 2021 17:32 )   PT: 12.4 sec;   INR: 1.07 ratio         PTT - ( 20 Dec 2021 17:32 )  PTT:51.3 sec    Urinalysis Basic - ( 22 Dec 2021 06:54 )    Color: Yellow / Appearance: Clear / S.010 / pH: x  Gluc: x / Ketone: Negative  / Bili: Negative / Urobili: Negative mg/dL   Blood: x / Protein: Negative mg/dL / Nitrite: Negative   Leuk Esterase: Trace / RBC: 0-2 /HPF / WBC 3-5   Sq Epi: x / Non Sq Epi: Few / Bacteria: Occasional        CAPILLARY BLOOD GLUCOSE              RADIOLOGY & ADDITIONAL TESTS:    Imaging Personally Reviewed:  [ ] YES  [ ] NO    Consultant(s) Notes Reviewed:  [ ] YES  [ ] NO    Care Discussed with Consultants/Other Providers [ ] YES  [ ] NO Patient is a 51y old  Female who presents with a chief complaint of leg  swelling (22 Dec 2021 12:11)      HPI:   50 yo F    w/PMH of HTN (on amlodipine), pinched nerve  presents to the ED for b/l leg swelling.   Pt states that for the past x1-2 weeks she has noticed swelling in both of her legs.  senies  cp/sob/abd  pain    Pt reports chronic pain that radiates down her left leg, from her sciatica,  is  able to  ambulate.   Denies fever/chills, cough, SOB, CP, abdominal pain, N/V/D or trauma. (20 Dec 2021 18:55)      INTERVAL HPI/OVERNIGHT EVENTS:  Vague abdominal pain. started in the hospital.  The patient denies melena, hematochezia, hematemesis, nausea, vomiting, constipation, diarrhea, or change in bowel movements Patient NPO for IR    MEDICATIONS  (STANDING):  hydrALAZINE 10 milliGRAM(s) Oral three times a day  metoprolol succinate ER 50 milliGRAM(s) Oral daily    MEDICATIONS  (PRN):  acetaminophen     Tablet .. 650 milliGRAM(s) Oral every 6 hours PRN Severe Pain (7 - 10)      FAMILY HISTORY:  No pertinent family history in first degree relatives        Allergies    No Known Allergies    Intolerances        PMH/PSH:  HTN (hypertension)    No significant past surgical history          REVIEW OF SYSTEMS:  CONSTITUTIONAL: No fever, weight loss, or fatigue  EYES: No eye pain, visual disturbances, or discharge  ENMT:  No difficulty hearing, tinnitus, vertigo; No sinus or throat pain  NECK: No pain or stiffness  BREASTS: No pain, masses, or nipple discharge  RESPIRATORY: No cough, wheezing, chills or hemoptysis; No shortness of breath  CARDIOVASCULAR: No chest pain, palpitations, dizziness, or leg swelling  GASTROINTESTINAL: See above   GENITOURINARY: No dysuria, frequency, hematuria, or incontinence  NEUROLOGICAL: No headaches, memory loss, loss of strength, numbness, or tremors  SKIN: No itching, burning, rashes, or lesions   LYMPH NODES: No enlarged glands  ENDOCRINE: No heat or cold intolerance; No hair loss  MUSCULOSKELETAL: No joint pain or swelling; No muscle, back, or extremity pain  PSYCHIATRIC: No depression, anxiety, mood swings, or difficulty sleeping  HEME/LYMPH: No easy bruising, or bleeding gums  ALLERGY AND IMMUNOLOGIC: No hives or eczema    Vital Signs Last 24 Hrs  T(C): 36.6 (22 Dec 2021 11:20), Max: 36.9 (21 Dec 2021 17:33)  T(F): 97.8 (22 Dec 2021 11:20), Max: 98.4 (21 Dec 2021 17:33)  HR: 76 (22 Dec 2021 11:20) (76 - 89)  BP: 147/92 (22 Dec 2021 11:20) (147/92 - 158/78)  BP(mean): --  RR: 18 (22 Dec 2021 11:20) (18 - 18)  SpO2: 98% (22 Dec 2021 11:20) (98% - 99%)    PHYSICAL EXAM:  GENERAL: NAD, well-groomed, well-developed  HEAD:  Atraumatic, Normocephalic  EYES: EOMI, PERRLA, conjunctiva and sclera clear  NECK: Supple, No JVD, Normal thyroid  NERVOUS SYSTEM:  Alert & Oriented X3, Good concentration; Motor Strength 5/5 B/L upper and lower extremities;   CHEST/LUNG: Clear to percussion bilaterally; No rales, rhonchi, wheezing, or rubs  HEART: Regular rate and rhythm; No murmurs, rubs, or gallops  ABDOMEN: Soft, Nontender, Nondistended; Bowel sounds present  EXTREMITIES:  2+ Peripheral Pulses, No clubbing, cyanosis, or edema  LYMPH: No lymphadenopathy noted  SKIN: No rashes or lesions    LAB                          8.3    7.16  )-----------( 314      ( 22 Dec 2021 06:49 )             26.3       CBC:   @ 06:49  WBC 7.16   Hgb 8.3   Hct 26.3   Plts 314  MCV 83.8   @ 06:33  WBC 6.81   Hgb 8.4   Hct 25.9   Plts 306  MCV 83.0   @ 21:46  WBC 7.61   Hgb 8.5   Hct 26.2   Plts 321  MCV 82.9   @ 17:32  WBC 7.71   Hgb 8.7   Hct 27.0   Plts 302  MCV 83.6      Chemistry:   @ 06:49  Na+ 143  K+ 4.3  Cl- 110  CO2 23  BUN 49  Cr 4.30      @ 06:33  Na+ 142  K+ 4.2  Cl- 110  CO2 25  BUN 38  Cr 3.68      @ 17:32  Na+ 140  K+ 4.2  Cl- 107  CO2 23  BUN 38  Cr 3.64         Glucose, Serum: 89 mg/dL ( @ 06:49)  Glucose, Serum: 91 mg/dL ( @ 06:33)  Glucose, Serum: 87 mg/dL ( @ 17:32)      22 Dec 2021 06:49    143    |  110    |  49     ----------------------------<  89     4.3     |  23     |  4.30   21 Dec 2021 06:33    142    |  110    |  38     ----------------------------<  91     4.2     |  25     |  3.68   20 Dec 2021 17:32    140    |  107    |  38     ----------------------------<  87     4.2     |  23     |  3.64     Ca    8.7        22 Dec 2021 06:49  Ca    8.6        21 Dec 2021 06:33  Ca    8.4        20 Dec 2021 17:32    TPro  7.2    /  Alb  3.1    /  TBili  0.4    /  DBili  x      /  AST  14     /  ALT  13     /  AlkPhos  60     22 Dec 2021 06:49  TPro  7.6    /  Alb  3.1    /  TBili  0.4    /  DBili  x      /  AST  14     /  ALT  9      /  AlkPhos  61     20 Dec 2021 17:32      PT/INR - ( 20 Dec 2021 17:32 )   PT: 12.4 sec;   INR: 1.07 ratio         PTT - ( 20 Dec 2021 17:32 )  PTT:51.3 sec    Urinalysis Basic - ( 22 Dec 2021 06:54 )    Color: Yellow / Appearance: Clear / S.010 / pH: x  Gluc: x / Ketone: Negative  / Bili: Negative / Urobili: Negative mg/dL   Blood: x / Protein: Negative mg/dL / Nitrite: Negative   Leuk Esterase: Trace / RBC: 0-2 /HPF / WBC 3-5   Sq Epi: x / Non Sq Epi: Few / Bacteria: Occasional        CAPILLARY BLOOD GLUCOSE              RADIOLOGY & ADDITIONAL TESTS:  < from: CT Abdomen and Pelvis No Cont (21 @ 09:00) >    ACC: [id number] EXAM:  CT ABDOMEN AND PELVIS                          PROCEDURE DATE:  2021          INTERPRETATION:  CLINICAL INFORMATION: Acute kidney injury    COMPARISON: Ultrasound dated 2021    CONTRAST/COMPLICATIONS:  IV Contrast: None  Oral Contrast: None  Complications: None    PROCEDURE:  CT of the Abdomen and Pelvis was performed.  Sagittal and coronal reformats were performed.    FINDINGS:  LOWER CHEST: Basilar atelectasis. Cardiomegaly. Coronary artery   calcifications.    Please note that evaluation of the abdominal organs and vascular   structures is limited by lack of intravenous contrast.    LIVER: Multiple subcentimeter hepatic hypodensities, too small to further   characterize.  BILE DUCTS: Mild dilatation.  GALLBLADDER: Within normal limits.  SPLEEN: Within normal limits.  PANCREAS: Within normal limits.  ADRENALS: 1.5 cm indeterminate left adrenal nodule.  KIDNEYS/URETERS: Moderate bilateral hydroureteronephrosis. No renal or   ureteral calculi.    BLADDER: Within normal limits.  REPRODUCTIVE ORGANS: Calcified uterine fibroid. Endometrium distended by   fluid.    BOWEL: No bowel obstruction. Appendix is not visualized. No evidence of   inflammation in the pericecal region.  PERITONEUM: Mild abdominal and pelvic fluid. Omental nodularity.  VESSELS: Within normal limits.  RETROPERITONEUM/LYMPH NODES: Mild retroperitoneal, pelvic, and groin   adenopathy measuring up to 1.9 x 1.1 cm (image 2:55).  ABDOMINAL WALL: Fat-containing umbilical hernia. Subcutaneous soft tissue   edema.  BONES: Mild degenerative changes.    IMPRESSION:    1. Moderate bilateral hydroureteronephrosis. No renal or ureteral calculi.  2. Endometrium distended by fluid. Recommend pelvic ultrasound for   further assessment.  3. Omental nodularity suspicious for carcinomatosis.  4. Mild abdominal and pelvic fluid.  5. Mild retroperitoneal, pelvic, and groin adenopathy.        --- End of Report ---            NADIA DIEGO MD; Attending Radiologist  This document has been electronically signed. Dec 22 2021  9:45AM    < end of copied text >      Imaging Personally Reviewed:  [ ] YES  [ ] NO    Consultant(s) Notes Reviewed:  [ ] YES  [ ] NO    Care Discussed with Consultants/Other Providers [ ] YES  [ ] NO

## 2021-12-23 LAB
ANION GAP SERPL CALC-SCNC: 8 MMOL/L — SIGNIFICANT CHANGE UP (ref 5–17)
BUN SERPL-MCNC: 51 MG/DL — HIGH (ref 7–23)
CALCIUM SERPL-MCNC: 8.5 MG/DL — SIGNIFICANT CHANGE UP (ref 8.5–10.1)
CANCER AG125 SERPL-ACNC: 246 U/ML — HIGH
CEA SERPL-MCNC: 769 NG/ML — HIGH (ref 0–3.8)
CHLORIDE SERPL-SCNC: 109 MMOL/L — HIGH (ref 96–108)
CHOLEST SERPL-MCNC: 118 MG/DL — SIGNIFICANT CHANGE UP
CO2 SERPL-SCNC: 24 MMOL/L — SIGNIFICANT CHANGE UP (ref 22–31)
CREAT SERPL-MCNC: 4.68 MG/DL — HIGH (ref 0.5–1.3)
GLUCOSE SERPL-MCNC: 90 MG/DL — SIGNIFICANT CHANGE UP (ref 70–99)
HCT VFR BLD CALC: 24.5 % — LOW (ref 34.5–45)
HDLC SERPL-MCNC: 49 MG/DL — LOW
HGB BLD-MCNC: 7.8 G/DL — LOW (ref 11.5–15.5)
LIPID PNL WITH DIRECT LDL SERPL: 56 MG/DL — SIGNIFICANT CHANGE UP
MCHC RBC-ENTMCNC: 26.5 PG — LOW (ref 27–34)
MCHC RBC-ENTMCNC: 31.8 GM/DL — LOW (ref 32–36)
MCV RBC AUTO: 83.3 FL — SIGNIFICANT CHANGE UP (ref 80–100)
NON HDL CHOLESTEROL: 69 MG/DL — SIGNIFICANT CHANGE UP
NRBC # BLD: 0 /100 WBCS — SIGNIFICANT CHANGE UP (ref 0–0)
PLATELET # BLD AUTO: 298 K/UL — SIGNIFICANT CHANGE UP (ref 150–400)
POTASSIUM SERPL-MCNC: 4.4 MMOL/L — SIGNIFICANT CHANGE UP (ref 3.5–5.3)
POTASSIUM SERPL-SCNC: 4.4 MMOL/L — SIGNIFICANT CHANGE UP (ref 3.5–5.3)
RBC # BLD: 2.94 M/UL — LOW (ref 3.8–5.2)
RBC # FLD: 15.4 % — HIGH (ref 10.3–14.5)
SODIUM SERPL-SCNC: 141 MMOL/L — SIGNIFICANT CHANGE UP (ref 135–145)
TRIGL SERPL-MCNC: 67 MG/DL — SIGNIFICANT CHANGE UP
TSH SERPL-MCNC: 1.62 UIU/ML — SIGNIFICANT CHANGE UP (ref 0.36–3.74)
WBC # BLD: 7.34 K/UL — SIGNIFICANT CHANGE UP (ref 3.8–10.5)
WBC # FLD AUTO: 7.34 K/UL — SIGNIFICANT CHANGE UP (ref 3.8–10.5)

## 2021-12-23 PROCEDURE — 99233 SBSQ HOSP IP/OBS HIGH 50: CPT

## 2021-12-23 PROCEDURE — 50432 PLMT NEPHROSTOMY CATHETER: CPT

## 2021-12-23 RX ORDER — FERROUS SULFATE 325(65) MG
325 TABLET ORAL DAILY
Refills: 0 | Status: DISCONTINUED | OUTPATIENT
Start: 2021-12-23 | End: 2021-12-25

## 2021-12-23 RX ORDER — METOPROLOL TARTRATE 50 MG
100 TABLET ORAL DAILY
Refills: 0 | Status: DISCONTINUED | OUTPATIENT
Start: 2021-12-24 | End: 2021-12-25

## 2021-12-23 RX ORDER — FUROSEMIDE 40 MG
20 TABLET ORAL DAILY
Refills: 0 | Status: DISCONTINUED | OUTPATIENT
Start: 2021-12-23 | End: 2021-12-24

## 2021-12-23 RX ADMIN — Medication 650 MILLIGRAM(S): at 20:18

## 2021-12-23 RX ADMIN — Medication 10 MILLIGRAM(S): at 21:15

## 2021-12-23 RX ADMIN — Medication 10 MILLIGRAM(S): at 05:44

## 2021-12-23 RX ADMIN — Medication 650 MILLIGRAM(S): at 20:48

## 2021-12-23 RX ADMIN — Medication 10 MILLIGRAM(S): at 13:13

## 2021-12-23 RX ADMIN — Medication 50 MILLIGRAM(S): at 05:44

## 2021-12-23 NOTE — PRE PROCEDURE NOTE - PRE PROCEDURE EVALUATION
Patient is a 51F h/o HTN, cervical CA s/p RT 2014, with b/l hydronephrosis and increasing creatinine likely due to compression from enlarged uterus with RP LAD.   Patient scheduled for b/l percutaneous nephrostomies today.    AM labs reviewed, attending aware.     Exam-  Gen: A&Ox3  CV: S1S2 RRR  Lungs: Respirations nonlabored  Abd: Soft NTND  : No CVA tenderness b/l, no palpable bladder distention     Patient NPO since midnight.   A/C held.   All questions answered.   Consent in chart.

## 2021-12-23 NOTE — DIETITIAN INITIAL EVALUATION ADULT. - ORAL INTAKE PTA/DIET HISTORY
Pt reports good appetite and PO intake PTA. Reports she does not consume beef PTA and has recently started to consume lots of vegetables PTA.

## 2021-12-23 NOTE — DIETITIAN INITIAL EVALUATION ADULT. - PERTINENT MEDS FT
MEDICATIONS  (STANDING):  hydrALAZINE 10 milliGRAM(s) Oral three times a day  metoprolol succinate ER 50 milliGRAM(s) Oral daily  senna 2 Tablet(s) Oral at bedtime    MEDICATIONS  (PRN):  acetaminophen     Tablet .. 650 milliGRAM(s) Oral every 6 hours PRN Severe Pain (7 - 10)

## 2021-12-23 NOTE — DIETITIAN INITIAL EVALUATION ADULT. - OTHER INFO
Pt reports good appetite and PO intake during LOS Denies difficulty chewing/swallowing. Pt denies nausea, vomiting, diarrhea, or constipation. States recent weight gain of ~8 pounds due to water retention. States  pounds ~1 month ago. Weight gain as noted below.   Pt reports MD advised avoiding potassium containing foods, though no indications noted as per lab work on this admission. Pt given potassium foods list and made aware RD remains available.

## 2021-12-23 NOTE — PROGRESS NOTE ADULT - SUBJECTIVE AND OBJECTIVE BOX
Patient is a 51y old  Female who presents with a chief complaint of leg  swelling (21 Dec 2021 14:11)    INTERVAL HPI/OVERNIGHT EVENTS: Patients seen and examined at bedside this morning. No acute events overnight.  stated she feels more pressure when urinating now . denies flank pain b/l     Denies fever, chills, N/V, dizziness, HA, cough, CP, palpitations, SOB, abdominal pain, dysuria, diarrhea, constipation, brbpr or tarry stool.     MEDICATIONS  (STANDING):  heparin   Injectable 5000 Unit(s) SubCutaneous every 12 hours  hydrALAZINE 10 milliGRAM(s) Oral three times a day  metoprolol succinate ER 50 milliGRAM(s) Oral daily    MEDICATIONS  (PRN):  acetaminophen     Tablet .. 650 milliGRAM(s) Oral every 6 hours PRN Severe Pain (7 - 10)    Allergies    No Known Allergies    Intolerances    Vital Signs Last 24 Hrs  T(C): 36.8 (23 Dec 2021 17:09), Max: 36.8 (22 Dec 2021 23:31)  T(F): 98.2 (23 Dec 2021 17:09), Max: 98.3 (22 Dec 2021 23:31)  HR: 85 (23 Dec 2021 17:09) (78 - 85)  BP: 167/98 (23 Dec 2021 17:09) (139/89 - 173/99)  BP(mean): --  RR: 18 (23 Dec 2021 17:09) (17 - 19)  SpO2: 98% (23 Dec 2021 17:09) (97% - 100%)    CAPILLARY BLOOD GLUCOSE        PHYSICAL EXAM:  GENERAL: NAD, well-groomed, well-developed, no increased WOB   HEAD:  Atraumatic, Normocephalic  EYES: EOMI, PERRLA, conjunctiva and sclera clear  ENMT: No tonsillar erythema, exudates, or enlargement; Moist mucous membranes  NECK: Supple, No JVD  NERVOUS SYSTEM:  Alert & Oriented X3, Good concentration; non focal  CHEST/LUNG: CTAB  HEART:  +S1/S2   ABDOMEN: Soft, Nontender, Nondistended; Bowel sounds present  EXTREMITIES:  2+ Peripheral Pulses b/l . 2+ b/l edema to midshins. no calf tenderness b/l   no CVA tenderness b/l       Labs                        7.8    7.34  )-----------( 298      ( 23 Dec 2021 03:25 )             24.5   12-23    141  |  109<H>  |  51<H>  ----------------------------<  90  4.4   |  24  |  4.68<H>    Ca    8.5      23 Dec 2021 03:25    TPro  6.7  /  Alb  x   /  TBili  x   /  DBili  x   /  AST  x   /  ALT  x   /  AlkPhos  x   12-22      Consultant(s) Notes Reviewed [ x] Yes     Care Discussed with [x ] Consultants  [ x] Patient  [ ] Family  [ x] /   [x ] Other; RN    Care discussed in detail with patient.  All questions and concerns addressed

## 2021-12-23 NOTE — PROGRESS NOTE ADULT - SUBJECTIVE AND OBJECTIVE BOX
Patient is a 51y old  Female who presents with a chief complaint of leg  swelling (22 Dec 2021 23:32)      HPI:   50 yo F    w/PMH of HTN (on amlodipine), pinched nerve  presents to the ED for b/l leg swelling.   Pt states that for the past x1-2 weeks she has noticed swelling in both of her legs.  senies  cp/sob/abd  pain    Pt reports chronic pain that radiates down her left leg, from her sciatica,  is  able to  ambulate.   Denies fever/chills, cough, SOB, CP, abdominal pain, N/V/D or trauma. (20 Dec 2021 18:55)      INTERVAL HPI/OVERNIGHT EVENTS:  Patient now c/o of vague abdominal discomfort on R lower back radiating around to anterior axiliary line ( yesterday it was the left side ). The patient denies melena, hematochezia, hematemesis, nausea, vomiting,  constipation, diarrhea, or change in bowel movements     MEDICATIONS  (STANDING):  hydrALAZINE 10 milliGRAM(s) Oral three times a day  metoprolol succinate ER 50 milliGRAM(s) Oral daily  senna 2 Tablet(s) Oral at bedtime    MEDICATIONS  (PRN):  acetaminophen     Tablet .. 650 milliGRAM(s) Oral every 6 hours PRN Severe Pain (7 - 10)      FAMILY HISTORY:  No pertinent family history in first degree relatives        Allergies    No Known Allergies    Intolerances        PMH/PSH:  HTN (hypertension)    No significant past surgical history          REVIEW OF SYSTEMS:  CONSTITUTIONAL: No fever, weight loss, or fatigue  EYES: No eye pain, visual disturbances, or discharge  ENMT:  No difficulty hearing, tinnitus, vertigo; No sinus or throat pain  NECK: No pain or stiffness  BREASTS: No pain, masses, or nipple discharge  RESPIRATORY: No cough, wheezing, chills or hemoptysis; No shortness of breath  CARDIOVASCULAR: No chest pain, palpitations, dizziness, or leg swelling  GASTROINTESTINAL: See above   GENITOURINARY: No dysuria, frequency, hematuria, or incontinence  NEUROLOGICAL: No headaches, memory loss, loss of strength, numbness, or tremors  SKIN: No itching, burning, rashes, or lesions   LYMPH NODES: No enlarged glands  ENDOCRINE: No heat or cold intolerance; No hair loss  MUSCULOSKELETAL: No joint pain or swelling; No muscle, back, or extremity pain  PSYCHIATRIC: No depression, anxiety, mood swings, or difficulty sleeping  HEME/LYMPH: No easy bruising, or bleeding gums  ALLERGY AND IMMUNOLOGIC: No hives or eczema    Vital Signs Last 24 Hrs  T(C): 36.7 (23 Dec 2021 05:05), Max: 36.8 (22 Dec 2021 17:18)  T(F): 98.1 (23 Dec 2021 05:05), Max: 98.3 (22 Dec 2021 23:31)  HR: 81 (23 Dec 2021 05:05) (76 - 83)  BP: 158/92 (23 Dec 2021 05:05) (147/92 - 159/98)  BP(mean): --  RR: 19 (23 Dec 2021 05:05) (18 - 19)  SpO2: 99% (23 Dec 2021 05:05) (98% - 99%)    PHYSICAL EXAM:  GENERAL: NAD, well-groomed, well-developed  HEAD:  Atraumatic, Normocephalic  EYES: EOMI, PERRLA, conjunctiva and sclera clear  NECK: Supple, No JVD, Normal thyroid  NERVOUS SYSTEM:  Alert & Oriented X3, Good concentration; Motor Strength 5/5 B/L upper and lower extremities;   CHEST/LUNG: Clear to percussion bilaterally; No rales, rhonchi, wheezing, or rubs  HEART: Regular rate and rhythm; No murmurs, rubs, or gallops  ABDOMEN: Soft, Nontender, Nondistended; Bowel sounds present  EXTREMITIES:  2+ Peripheral Pulses, No clubbing, cyanosis, or edema  LYMPH: No lymphadenopathy noted  SKIN: No rashes or lesions    LAB                          7.8    7.34  )-----------( 298      ( 23 Dec 2021 03:25 )             24.5       CBC:   @ 03:25  WBC 7.34   Hgb 7.8   Hct 24.5   Plts 298  MCV 83.3   @ 06:49  WBC 7.16   Hgb 8.3   Hct 26.3   Plts 314  MCV 83.8   @ 06:33  WBC 6.81   Hgb 8.4   Hct 25.9   Plts 306  MCV 83.0  12 @ 21:46  WBC 7.61   Hgb 8.5   Hct 26.2   Plts 321  MCV 82.9   @ 17:32  WBC 7.71   Hgb 8.7   Hct 27.0   Plts 302  MCV 83.6      Chemistry:   @ 03:25  Na+ 141  K+ 4.4  Cl- 109  CO2 24  BUN 51  Cr 4.68      @ 06:49  Na+ 143  K+ 4.3  Cl- 110  CO2 23  BUN 49  Cr 4.30      @ 06:33  Na+ 142  K+ 4.2  Cl- 110  CO2 25  BUN 38  Cr 3.68      @ 17:32  Na+ 140  K+ 4.2  Cl- 107  CO2 23  BUN 38  Cr 3.64         Glucose, Serum: 90 mg/dL ( @ 03:25)  Glucose, Serum: 89 mg/dL ( @ 06:49)  Glucose, Serum: 91 mg/dL ( @ 06:33)  Glucose, Serum: 87 mg/dL ( @ 17:32)      23 Dec 2021 03:25    141    |  109    |  51     ----------------------------<  90     4.4     |  24     |  4.68   22 Dec 2021 06:49    143    |  110    |  49     ----------------------------<  89     4.3     |  23     |  4.30   21 Dec 2021 06:33    142    |  110    |  38     ----------------------------<  91     4.2     |  25     |  3.68   20 Dec 2021 17:32    140    |  107    |  38     ----------------------------<  87     4.2     |  23     |  3.64     Ca    8.5        23 Dec 2021 03:25  Ca    8.7        22 Dec 2021 06:49  Ca    8.6        21 Dec 2021 06:33  Ca    8.4        20 Dec 2021 17:32    TPro  6.7    /  Alb  x      /  TBili  x      /  DBili  x      /  AST  x      /  ALT  x      /  AlkPhos  x      22 Dec 2021 09:39  TPro  7.2    /  Alb  3.1    /  TBili  0.4    /  DBili  x      /  AST  14     /  ALT  13     /  AlkPhos  60     22 Dec 2021 06:49  TPro  7.6    /  Alb  3.1    /  TBili  0.4    /  DBili  x      /  AST  14     /  ALT  9      /  AlkPhos  61     20 Dec 2021 17:32          Urinalysis Basic - ( 22 Dec 2021 06:54 )    Color: Yellow / Appearance: Clear / S.010 / pH: x  Gluc: x / Ketone: Negative  / Bili: Negative / Urobili: Negative mg/dL   Blood: x / Protein: Negative mg/dL / Nitrite: Negative   Leuk Esterase: Trace / RBC: 0-2 /HPF / WBC 3-5   Sq Epi: x / Non Sq Epi: Few / Bacteria: Occasional        CAPILLARY BLOOD GLUCOSE              RADIOLOGY & ADDITIONAL TESTS:    Imaging Personally Reviewed:  [ ] YES  [ ] NO    Consultant(s) Notes Reviewed:  [ ] YES  [ ] NO    Care Discussed with Consultants/Other Providers [ ] YES  [ ] NO

## 2021-12-23 NOTE — PROGRESS NOTE ADULT - SUBJECTIVE AND OBJECTIVE BOX
NEPHROLOGY PROGRESS NOTE    CHIEF COMPLAINT:  ASHLEY    HPI:  Patient presently at IR for bilateral PCN.  Small interval rise in Cr noted.  Electrolytes normal.     EXAM:  T(F): 98 (21 @ 11:38)  HR: 82 (21 @ 11:38)  BP: 139/89 (21 @ 11:38)  RR: 18 (21 @ 11:38)  SpO2: 100% (21 @ 11:38)         LABS                             7.8    7.34  )-----------( 298      ( 23 Dec 2021 03:25 )             24.5              141  |  109<H>  |  51<H>  ----------------------------<  90  4.4   |  24  |  4.68<H>    Ca    8.5      23 Dec 2021 03:25    TPro  6.7  /  Alb  x   /  TBili  x   /  DBili  x   /  AST  x   /  ALT  x   /  AlkPhos  x       Urinalysis Basic - ( 22 Dec 2021 06:54 )  Color: Yellow / Appearance: Clear / S.010 / pH: x  Gluc: x / Ketone: Negative  / Bili: Negative / Urobili: Negative mg/dL   Blood: x / Protein: Negative mg/dL / Nitrite: Negative   Leuk Esterase: Trace / RBC: 0-2 /HPF / WBC 3-5   Sq Epi: x / Non Sq Epi: Few / Bacteria: Occasional      Assessment   ASHLEY post renal azotemia  Uterine mass, RPLAN    Plan:  Monitor renal functional trend and output from nephrostomies     Denies complaints    Vital Signs Last 24 Hrs  T(C): 37.1 (12-24-21 @ 17:13), Max: 37.1 (12-24-21 @ 17:13)  T(F): 98.7 (12-24-21 @ 17:13), Max: 98.7 (12-24-21 @ 17:13)  HR: 83 (12-24-21 @ 17:13) (81 - 84)  BP: 149/90 (12-24-21 @ 17:13) (134/82 - 156/84)  RR: 18 (12-24-21 @ 17:13) (18 - 18)  SpO2: 97% (12-24-21 @ 17:13) (97% - 98%)    I&O's Detail    23 Dec 2021 07:01  -  24 Dec 2021 07:00  --------------------------------------------------------  OUT:    Nephrostomy Tube (mL): 1550 mL    Nephrostomy Tube (mL): 1850 mL  Total OUT: 3400 mL    24 Dec 2021 07:01  -  24 Dec 2021 21:33  --------------------------------------------------------  IN:    Oral Fluid: 960 mL  Total IN: 960 mL    OUT:    Nephrostomy Tube (mL): 1250 mL    Nephrostomy Tube (mL): 1100 mL  Total OUT: 2350 mL    s1s2  b/l air entry  soft, ND  AO                       8.3    8.95  )-----------( 317      ( 24 Dec 2021 20:32 )             25.5     24 Dec 2021 07:17    144    |  111    |  48     ----------------------------<  110    4.3     |  25     |  3.82     Ca    9.1        24 Dec 2021 07:17  Phos  5.6       24 Dec 2021 07:17  Mg     2.2       24 Dec 2021 07:17    TPro  7.4    /  Alb  3.2    /  TBili  0.4    /  DBili  x      /  AST  15     /  ALT  13     /  AlkPhos  59     24 Dec 2021 07:17    LIVER FUNCTIONS - ( 24 Dec 2021 07:17 )  Alb: 3.2 g/dL / Pro: 7.4 gm/dL / ALK PHOS: 59 U/L / ALT: 13 U/L / AST: 15 U/L / GGT: x           acetaminophen     Tablet .. 650 milliGRAM(s) Oral every 6 hours PRN  cyanocobalamin 1000 MICROGram(s) Oral daily  ferrous    sulfate 325 milliGRAM(s) Oral daily  furosemide    Tablet 20 milliGRAM(s) Oral daily  hydrALAZINE 10 milliGRAM(s) Oral three times a day  hydrocortisone hemorrhoidal Suppository 1 Suppository(s) Rectal daily  metoprolol succinate  milliGRAM(s) Oral daily  senna 2 Tablet(s) Oral at bedtime    Assessment/Plan:    Post-renal ASHLEY  S/p b/l PCN  Cr is improving  Avoid nephrotoxins  Pt w/post-obstructive diuresis  Pls avoid diuretics   F/u BMP, UO    [phone]

## 2021-12-23 NOTE — PROGRESS NOTE ADULT - SUBJECTIVE AND OBJECTIVE BOX
Patient seen and examined bedside resting comfortably.  No complaints offered.   Voiding spontaneously without difficulty.  NPO for PCN placement    T(F): 98 (12-23-21 @ 11:38), Max: 98.3 (12-22-21 @ 23:31)  HR: 82 (12-23-21 @ 11:38) (80 - 83)  BP: 139/89 (12-23-21 @ 11:38) (139/89 - 159/98)  RR: 18 (12-23-21 @ 11:38) (18 - 19)  SpO2: 100% (12-23-21 @ 11:38) (98% - 100%)    PHYSICAL EXAM:    General: NAD, alert and awake  HEENT: NCAT, EOMI, conjunctiva clear  Chest: nonlabored respirations, CTA b/l.  Abdomen: soft, NT/ND.   Extremities: Calf soft, nontender b/l.   : No suprapubic tenderness or bladder distention. Voiding spontaneously      LABS:                        7.8    7.34  )-----------( 298      ( 23 Dec 2021 03:25 )             24.5   12-23    141  |  109<H>  |  51<H>  ----------------------------<  90  4.4   |  24  |  4.68<H>    Ca    8.5      23 Dec 2021 03:25    TPro  6.7  /  Alb  x   /  TBili  x   /  DBili  x   /  AST  x   /  ALT  x   /  AlkPhos  x   12-22    I&O's Detail    22 Dec 2021 07:01  -  23 Dec 2021 07:00  --------------------------------------------------------  IN:    Oral Fluid: 860 mL  Total IN: 860 mL    OUT:  Total OUT: 0 mL    Total NET: 860 mL

## 2021-12-23 NOTE — PROGRESS NOTE ADULT - ASSESSMENT
Impression: Patient is a 52 yo F w/PMH of HTN (on amlodipine), pinched nerve presents to the ED for b/l leg swelling. Pt states that for the past x1-2 weeks she has noticed swelling in both of her legs. Urology consulted for b/l hydronephrosis and increasing Cr    Recommendations:  -- Given the level of obstruction seen on imaging in the uterus and retroperitoneum, recommend bilateral PCN placement with IR  --Trend Cr, post procedure  --recommend GYN onc consult

## 2021-12-23 NOTE — DIETITIAN INITIAL EVALUATION ADULT. - CALCULATED TO (ML/KG)
Subjective:      Patient ID: Leonidas Sidhu is a 76 y.o. male.     HPI    Review of Systems    Objective:   Physical Exam    Assessment:            Plan:
are normal. He exhibits no distension and no mass. There is no tenderness. There is no rebound and no guarding. Musculoskeletal: Normal range of motion. He exhibits no edema or tenderness. Lymphadenopathy:     He has no cervical adenopathy. Neurological: He is alert and oriented to person, place, and time. He has normal reflexes. No cranial nerve deficit. He exhibits normal muscle tone. Coordination normal.   Skin: Skin is warm and dry. No rash noted. No erythema. No pallor. Psychiatric: He has a normal mood and affect. Nursing note and vitals reviewed. Assessment:      1. Orthopnea     2. Moderate persistent reactive airway disease without complication     3. History of coronary artery disease     4. Exertional dyspnea           Plan:      Outpatient Encounter Prescriptions as of 12/27/2017   Medication Sig Dispense Refill    carvedilol (COREG) 3.125 MG tablet Take 3.125 mg by mouth 2 times daily (with meals)      furosemide (LASIX) 20 MG tablet Take 20 mg by mouth 2 times daily      tamsulosin (FLOMAX) 0.4 MG capsule Take 0.4 mg by mouth daily      Omega-3 Fatty Acids (FISH OIL) 1200 MG CAPS Take by mouth      multivitamin-iron-minerals-folic acid (CENTRUM) chewable tablet Take 1 tablet by mouth daily      Probiotic Product (PROBIOTIC DAILY PO) Take by mouth      albuterol sulfate HFA (PROVENTIL HFA) 108 (90 Base) MCG/ACT inhaler Inhale 2 puffs into the lungs every 6 hours as needed for Wheezing 1 Inhaler 3    simvastatin (ZOCOR) 80 MG tablet TAKE ONE TABLET BY MOUTH EVERY EVENING 30 tablet 0    aspirin 81 MG tablet Take 81 mg by mouth daily.         [DISCONTINUED] finasteride (PROSCAR) 5 MG tablet       [DISCONTINUED] albuterol sulfate HFA (PROVENTIL HFA) 108 (90 BASE) MCG/ACT inhaler Inhale 2 puffs into the lungs every 6 hours as needed for Wheezing 1 Inhaler 3    [DISCONTINUED] spironolactone (ALDACTONE) 25 MG tablet       [DISCONTINUED] Flaxseed, Linseed, (FLAX SEED OIL PO) Take  by
[id number]

## 2021-12-23 NOTE — DIETITIAN NUTRITION RISK NOTIFICATION - TREATMENT: THE FOLLOWING DIET HAS BEEN RECOMMENDED
Diet, Renal Restrictions:   For patients receiving Renal Replacement - No Protein Restr, No Conc K, No Conc Phos, Low Sodium  Lactose Restricted (Milk Sugar Intoler.) (12-22-21 @ 13:59) [Available for Activation]  Diet, NPO after Midnight:      NPO Start Date: 22-Dec-2021,   NPO Start Time: 23:59 (12-22-21 @ 12:05) [Active]  Diet, Renal Restrictions:   For patients receiving Renal Replacement - No Protein Restr, No Conc K, No Conc Phos, Low Sodium (12-20-21 @ 18:55) [Active]

## 2021-12-23 NOTE — DIETITIAN INITIAL EVALUATION ADULT. - PERTINENT LABORATORY DATA
12-23 Na141 mmol/L Glu 90 mg/dL K+ 4.4 mmol/L Cr  4.68 mg/dL<H> BUN 51 mg/dL<H> 12-22 Alb 3.1 g/dL<L> 12-23 Chol 118 mg/dL LDL --    HDL 49 mg/dL<L> Trig 67 mg/dL

## 2021-12-23 NOTE — PROGRESS NOTE ADULT - ASSESSMENT
HPI:   50 yo F    w/PMH of HTN (on amlodipine), pinched nerve  presents to the ED for b/l leg swelling.   Pt states that for the past x1-2 weeks she has noticed swelling in both of her legs.  senies  cp/sob/abd  pain    Pt reports chronic pain that radiates down her left leg, from her sciatica,  is  able to  ambulate.   Denies fever/chills, cough, SOB, CP, abdominal pain, N/V/D or trauma. (20 Dec 2021 18:55)  ----- As Above ------   Patient found to have an elevated BUN / creatinine   Asked to see patient regarding anemia. Patient states that she was diagnosed with anemia a long time ago but it had subsequently resolved. The patient states that she had been taking Aleve and it caused her to become constipated. She need to strain and that is when she saw bright red blood ( not mixed in with the stool ) per rectum. Once she stopped the Aleve, the constipation / bleeding resolved. The patient denies melena, hematochezia, hematemesis, nausea, vomiting, abdominal pain, constipation, diarrhea, or change in bowel movements The patient was supposed to have a colonoscopy but never actually scheduled it.     A) Anemia, normocytic - No signs of iron deficiency anemia !! . Episodes of bright red blood ( not mixed in with the stool ) per rectum only when she was straining while constipated which had resolved. Patient never had a colonoscopy  No need for emergent colonoscopy. Would work up renal disease / hydronephrosis before scheduling colonoscopy. Patient states that she has a gastroenterologist in her community that she would make an appointment with once she is discharged.   B) Omental nodularity suspicious for carcinomatosis with lymphadenopathy Will need biopsy / tumor markers  C) Vague abdominal pain - Patient is lactose intolerant  -  Now on  lactose restrictions. Pains seem more related to MS / radiculopathy  ==== No active GI complaints - Will follow on a PRN basis

## 2021-12-23 NOTE — PROGRESS NOTE ADULT - ASSESSMENT
52 y/o F with PMH of HTN, sciatica, presented to ED for b/l leg swelling admitted to medical for ASHLEY , found to have moderate b/l hydronephrosis large uterine/myometrial abnormality on imaging concerning for malignancy.     of note: patient has history of cervical cancer 2014 s/p chemo/radiation ; states she was "not a candidate for hysterectomy" ? but doesn't understand why, states follow-up scans have been negative . Last visited her GYN/ONC 6 months ago    follows with GYN/ONC at [facility] in [place].   Patient endorses she is going to make an appointment with her GYN/ONC within the next 1 week     Assessment and Plan:     b/l hydronephrosis large uterine/myometrial abnormality , concerning for malignancy   ASHLEY, postrenal azotemia   Pelvic US: Uterus: 9.9 cm x 7.1 cm x 7.1 cm. 3.3 x 2.9 x 2.8 cm intramural calcified fibroid in anterior uterine segment.   Fluid within the endometrium measuring 3.9 x 3.9 cm and this   is abnormal for postmenopausal status. Hysteroscopy is recommended to   rule out an obstructive lower endometrial or cervical mass lesion. In   addition, there is nodular contour of the endometrial lining on   transvaginal pelvic ultrasound. Endometrial biopsy is recommended.    MRI C/T spine limited as patient refused exam : unremarkable.   elevated , CEA    obstruction at the level of the mid-distal ureters.  -urology unable to place stents, plan for b/l nephrotomy placement today 12/23/21, NO anticoagulation 48 hours,  flush daily NS 10cc  discussed case with IR unable to bx omental nodule is too tiny and RP LN are also small and not perc accessible,   patient will need GYN ONC for endometrial Bx and further care planning/definitive management.   patient follows with GYN/ONC at [facility] and will make appointment for next week        Normocytic anemia , AOCD?   no e/o active bleeding or bruising   GI following   - No signs of iron deficiency anemia  Episodes of bright red blood ( not mixed in with the stool ) per rectum only when she was straining while constipated which had resolved. Patient never had a colonoscopy. Outpatient nonemergent colonoscopy , discussed with patient who will follow-up outpatient   monitor CBC   transfuse prn if Hgb <7 or if patient symptomatic     HTN  hydralazine 10mg tid    increased metoprolol ER to 100mg daily   TSH OK , LDL 56, A1c 5.6%    B/L lower extremity swelling  No DVT b/l   ECHO: pEF  small dose lasix added     Sciatica  MRI C/T spine limited as patient refused exam : unremarkable.   Home with outpatient PT  pain control prn     Preventative measures   Heparin SQ --on hold for procedure   general precautions   patient ambulatory

## 2021-12-23 NOTE — DIETITIAN INITIAL EVALUATION ADULT. - OTHER CALCULATIONS
Ht (cm): 167.6cm   Wt (kg): 119.5kg (dosing weight 12/21)   BMI: 42.5     IBW: 58.9kg +/- 10% %IBW: 203% UBW: 114.3kg %UBW: 105%

## 2021-12-24 LAB
ALBUMIN SERPL ELPH-MCNC: 3.2 G/DL — LOW (ref 3.3–5)
ALP SERPL-CCNC: 59 U/L — SIGNIFICANT CHANGE UP (ref 40–120)
ALT FLD-CCNC: 13 U/L — SIGNIFICANT CHANGE UP (ref 12–78)
ANION GAP SERPL CALC-SCNC: 8 MMOL/L — SIGNIFICANT CHANGE UP (ref 5–17)
AST SERPL-CCNC: 15 U/L — SIGNIFICANT CHANGE UP (ref 15–37)
BILIRUB SERPL-MCNC: 0.4 MG/DL — SIGNIFICANT CHANGE UP (ref 0.2–1.2)
BLD GP AB SCN SERPL QL: SIGNIFICANT CHANGE UP
BUN SERPL-MCNC: 48 MG/DL — HIGH (ref 7–23)
CALCIUM SERPL-MCNC: 9.1 MG/DL — SIGNIFICANT CHANGE UP (ref 8.5–10.1)
CHLORIDE SERPL-SCNC: 111 MMOL/L — HIGH (ref 96–108)
CO2 SERPL-SCNC: 25 MMOL/L — SIGNIFICANT CHANGE UP (ref 22–31)
CREAT SERPL-MCNC: 3.82 MG/DL — HIGH (ref 0.5–1.3)
FOLATE SERPL-MCNC: 11.5 NG/ML — SIGNIFICANT CHANGE UP
GLUCOSE SERPL-MCNC: 110 MG/DL — HIGH (ref 70–99)
HCT VFR BLD CALC: 25.5 % — LOW (ref 34.5–45)
HCT VFR BLD CALC: 26.7 % — LOW (ref 34.5–45)
HGB BLD-MCNC: 8.3 G/DL — LOW (ref 11.5–15.5)
HGB BLD-MCNC: 8.6 G/DL — LOW (ref 11.5–15.5)
MAGNESIUM SERPL-MCNC: 2.2 MG/DL — SIGNIFICANT CHANGE UP (ref 1.6–2.6)
MCHC RBC-ENTMCNC: 26.5 PG — LOW (ref 27–34)
MCHC RBC-ENTMCNC: 26.7 PG — LOW (ref 27–34)
MCHC RBC-ENTMCNC: 32.2 GM/DL — SIGNIFICANT CHANGE UP (ref 32–36)
MCHC RBC-ENTMCNC: 32.5 GM/DL — SIGNIFICANT CHANGE UP (ref 32–36)
MCV RBC AUTO: 82 FL — SIGNIFICANT CHANGE UP (ref 80–100)
MCV RBC AUTO: 82.4 FL — SIGNIFICANT CHANGE UP (ref 80–100)
NRBC # BLD: 0 /100 WBCS — SIGNIFICANT CHANGE UP (ref 0–0)
NRBC # BLD: 0 /100 WBCS — SIGNIFICANT CHANGE UP (ref 0–0)
PHOSPHATE SERPL-MCNC: 5.6 MG/DL — HIGH (ref 2.5–4.5)
PLATELET # BLD AUTO: 317 K/UL — SIGNIFICANT CHANGE UP (ref 150–400)
PLATELET # BLD AUTO: 346 K/UL — SIGNIFICANT CHANGE UP (ref 150–400)
POTASSIUM SERPL-MCNC: 4.3 MMOL/L — SIGNIFICANT CHANGE UP (ref 3.5–5.3)
POTASSIUM SERPL-SCNC: 4.3 MMOL/L — SIGNIFICANT CHANGE UP (ref 3.5–5.3)
PROT SERPL-MCNC: 7.4 GM/DL — SIGNIFICANT CHANGE UP (ref 6–8.3)
RBC # BLD: 3.11 M/UL — LOW (ref 3.8–5.2)
RBC # BLD: 3.24 M/UL — LOW (ref 3.8–5.2)
RBC # FLD: 15.4 % — HIGH (ref 10.3–14.5)
RBC # FLD: 15.5 % — HIGH (ref 10.3–14.5)
SODIUM SERPL-SCNC: 144 MMOL/L — SIGNIFICANT CHANGE UP (ref 135–145)
VIT B12 SERPL-MCNC: 387 PG/ML — SIGNIFICANT CHANGE UP (ref 232–1245)
WBC # BLD: 7.94 K/UL — SIGNIFICANT CHANGE UP (ref 3.8–10.5)
WBC # BLD: 8.95 K/UL — SIGNIFICANT CHANGE UP (ref 3.8–10.5)
WBC # FLD AUTO: 7.94 K/UL — SIGNIFICANT CHANGE UP (ref 3.8–10.5)
WBC # FLD AUTO: 8.95 K/UL — SIGNIFICANT CHANGE UP (ref 3.8–10.5)

## 2021-12-24 PROCEDURE — 99232 SBSQ HOSP IP/OBS MODERATE 35: CPT

## 2021-12-24 RX ORDER — PREGABALIN 225 MG/1
1000 CAPSULE ORAL DAILY
Refills: 0 | Status: DISCONTINUED | OUTPATIENT
Start: 2021-12-24 | End: 2021-12-25

## 2021-12-24 RX ORDER — HYDROCORTISONE 1 %
1 OINTMENT (GRAM) TOPICAL DAILY
Refills: 0 | Status: DISCONTINUED | OUTPATIENT
Start: 2021-12-24 | End: 2021-12-25

## 2021-12-24 RX ADMIN — Medication 20 MILLIGRAM(S): at 05:18

## 2021-12-24 RX ADMIN — Medication 10 MILLIGRAM(S): at 13:11

## 2021-12-24 RX ADMIN — Medication 10 MILLIGRAM(S): at 05:17

## 2021-12-24 RX ADMIN — PREGABALIN 1000 MICROGRAM(S): 225 CAPSULE ORAL at 11:27

## 2021-12-24 RX ADMIN — Medication 100 MILLIGRAM(S): at 05:24

## 2021-12-24 RX ADMIN — Medication 325 MILLIGRAM(S): at 11:27

## 2021-12-24 RX ADMIN — SENNA PLUS 2 TABLET(S): 8.6 TABLET ORAL at 21:47

## 2021-12-24 RX ADMIN — Medication 10 MILLIGRAM(S): at 21:47

## 2021-12-24 RX ADMIN — Medication 1 SUPPOSITORY(S): at 18:01

## 2021-12-24 NOTE — PROGRESS NOTE ADULT - SUBJECTIVE AND OBJECTIVE BOX
Denies complaints    Vital Signs Last 24 Hrs  T(C): 37.1 (12-24-21 @ 17:13), Max: 37.1 (12-24-21 @ 17:13)  T(F): 98.7 (12-24-21 @ 17:13), Max: 98.7 (12-24-21 @ 17:13)  HR: 83 (12-24-21 @ 17:13) (81 - 84)  BP: 149/90 (12-24-21 @ 17:13) (134/82 - 156/84)  RR: 18 (12-24-21 @ 17:13) (18 - 18)  SpO2: 97% (12-24-21 @ 17:13) (97% - 98%)    I&O's Detail    23 Dec 2021 07:01  -  24 Dec 2021 07:00  --------------------------------------------------------  OUT:    Nephrostomy Tube (mL): 1550 mL    Nephrostomy Tube (mL): 1850 mL  Total OUT: 3400 mL    24 Dec 2021 07:01  -  24 Dec 2021 21:33  --------------------------------------------------------  IN:    Oral Fluid: 960 mL  Total IN: 960 mL    OUT:    Nephrostomy Tube (mL): 1250 mL    Nephrostomy Tube (mL): 1100 mL  Total OUT: 2350 mL    s1s2  b/l air entry  soft, ND  AO                       8.3    8.95  )-----------( 317      ( 24 Dec 2021 20:32 )             25.5     24 Dec 2021 07:17    144    |  111    |  48     ----------------------------<  110    4.3     |  25     |  3.82     Ca    9.1        24 Dec 2021 07:17  Phos  5.6       24 Dec 2021 07:17  Mg     2.2       24 Dec 2021 07:17    TPro  7.4    /  Alb  3.2    /  TBili  0.4    /  DBili  x      /  AST  15     /  ALT  13     /  AlkPhos  59     24 Dec 2021 07:17    LIVER FUNCTIONS - ( 24 Dec 2021 07:17 )  Alb: 3.2 g/dL / Pro: 7.4 gm/dL / ALK PHOS: 59 U/L / ALT: 13 U/L / AST: 15 U/L / GGT: x           acetaminophen     Tablet .. 650 milliGRAM(s) Oral every 6 hours PRN  cyanocobalamin 1000 MICROGram(s) Oral daily  ferrous    sulfate 325 milliGRAM(s) Oral daily  furosemide    Tablet 20 milliGRAM(s) Oral daily  hydrALAZINE 10 milliGRAM(s) Oral three times a day  hydrocortisone hemorrhoidal Suppository 1 Suppository(s) Rectal daily  metoprolol succinate  milliGRAM(s) Oral daily  senna 2 Tablet(s) Oral at bedtime    Assessment/Plan:    Post-renal ASHLEY  S/p b/l PCN  Cr is improving  Avoid nephrotoxins  Pt w/post-obstructive diuresis  Pls avoid diuretics   F/u BMP, UO    [phone]

## 2021-12-24 NOTE — PROGRESS NOTE ADULT - ASSESSMENT
52 y/o F with PMH of HTN, sciatica, presented to ED for b/l leg swelling admitted to medical for ASHLEY , found to have moderate b/l hydronephrosis large uterine/myometrial abnormality on imaging concerning for malignancy.     of note: patient has history of cervical cancer 2014 s/p chemo/radiation ; states she was "not a candidate for hysterectomy" ? but doesn't understand why, states follow-up scans have been negative . Last visited her GYN/ONC 6 months ago    follows with GYN/ONC at [facility] in [place].   Patient endorses she is going to make an appointment with her GYN/ONC within the next 1 week     Assessment and Plan:     b/l hydronephrosis large uterine/myometrial abnormality , concerning for malignancy   ASHLEY, postrenal azotemia   Pelvic US: Uterus: 9.9 cm x 7.1 cm x 7.1 cm. 3.3 x 2.9 x 2.8 cm intramural calcified fibroid in anterior uterine segment.   Fluid within the endometrium measuring 3.9 x 3.9 cm and this   is abnormal for postmenopausal status. Hysteroscopy is recommended to   rule out an obstructive lower endometrial or cervical mass lesion. In   addition, there is nodular contour of the endometrial lining on   transvaginal pelvic ultrasound. Endometrial biopsy is recommended.    MRI C/T spine limited as patient refused exam : unremarkable.   elevated , CEA    obstruction at the level of the mid-distal ureters.  -urology unable to place stents, s/p b/l nephrotomy placement 12/23/21, NO anticoagulation 48 hours--until 12/26/21,  flush daily NS 10cc  discussed case with IR unable to bx omental nodule is too tiny and RP LN are also small and not perc accessible,   patient will need GYN ONC for endometrial Bx and further care planning/definitive management.   patient follows with GYN/ONC at [facility] and will make appointment for next week        Normocytic anemia , AOCD?   no e/o active bleeding or bruising   GI following   - No signs of iron deficiency anemia  Episodes of bright red blood ( not mixed in with the stool ) per rectum only when she was straining while constipated which had resolved. Patient never had a colonoscopy. Outpatient nonemergent colonoscopy , discussed with patient who will follow-up outpatient   monitor CBC   transfuse prn if Hgb <7 or if patient symptomatic   H/H stable     HTN  hydralazine 10mg tid , metoprolol ER to 100mg daily   TSH OK , LDL 56, A1c 5.6%    B/L lower extremity swelling  No DVT b/l   ECHO: pEF  small dose lasix     Sciatica  MRI C/T spine limited as patient refused exam : unremarkable.   Home with outpatient PT  pain control prn     Preventative measures   Heparin SQ --on hold until 12/26/21   general precautions   patient ambulatory    98

## 2021-12-24 NOTE — PROGRESS NOTE ADULT - TIME BILLING
GI
min spent reviewing chart, examining patient, discussing plan with patient and family
GI

## 2021-12-24 NOTE — PROGRESS NOTE ADULT - PROVIDER SPECIALTY LIST ADULT
Hospitalist
Nephrology
Gastroenterology
Gastroenterology
Nephrology
Nephrology
Hospitalist
Urology

## 2021-12-24 NOTE — PROGRESS NOTE ADULT - SUBJECTIVE AND OBJECTIVE BOX
Patient is a 51y old  Female who presents with a chief complaint of leg  swelling (21 Dec 2021 14:11)    INTERVAL HPI/OVERNIGHT EVENTS: Patients seen and examined at bedside this morning. No acute events overnight.      Denies fever, chills, N/V, dizziness, HA, cough, CP, palpitations, SOB, abdominal pain, dysuria, diarrhea, constipation, brbpr or tarry stool.     MEDICATIONS  (STANDING):  heparin   Injectable 5000 Unit(s) SubCutaneous every 12 hours  hydrALAZINE 10 milliGRAM(s) Oral three times a day  metoprolol succinate ER 50 milliGRAM(s) Oral daily    MEDICATIONS  (PRN):  acetaminophen     Tablet .. 650 milliGRAM(s) Oral every 6 hours PRN Severe Pain (7 - 10)    Allergies    No Known Allergies    Intolerances    Vital Signs Last 24 Hrs  T(C): 36.7 (24 Dec 2021 05:51), Max: 36.8 (23 Dec 2021 17:09)  T(F): 98 (24 Dec 2021 05:51), Max: 98.2 (23 Dec 2021 17:09)  HR: 84 (24 Dec 2021 05:51) (78 - 85)  BP: 134/82 (24 Dec 2021 05:51) (134/82 - 173/99)  BP(mean): --  RR: 18 (24 Dec 2021 05:51) (17 - 18)  SpO2: 98% (24 Dec 2021 05:51) (97% - 100%)      PHYSICAL EXAM:  GENERAL: NAD, well-groomed, well-developed, no increased WOB   HEAD:  Atraumatic, Normocephalic  EYES: EOMI, PERRLA, conjunctiva and sclera clear  ENMT: No tonsillar erythema, exudates, or enlargement; Moist mucous membranes  NECK: Supple, No JVD  NERVOUS SYSTEM:  Alert & Oriented X3, Good concentration; non focal  CHEST/LUNG: CTAB  HEART:  +S1/S2   ABDOMEN: Soft, Nontender, Nondistended; Bowel sounds present  EXTREMITIES:  2+ Peripheral Pulses b/l . 2+ b/l edema to midshins. no calf tenderness b/l   b/l nephrostomy tubes       Labs                                   8.6    7.94  )-----------( 346      ( 24 Dec 2021 07:17 )             26.7   12-24    144  |  111<H>  |  48<H>  ----------------------------<  110<H>  4.3   |  25  |  3.82<H>    Ca    9.1      24 Dec 2021 07:17  Phos  5.6     12-24  Mg     2.2     12-24    TPro  7.4  /  Alb  3.2<L>  /  TBili  0.4  /  DBili  x   /  AST  15  /  ALT  13  /  AlkPhos  59  12-24        Consultant(s) Notes Reviewed [ x] Yes     Care Discussed with [x ] Consultants  [ x] Patient  [ ] Family  [ x] /   [x ] Other; RN    Care discussed in detail with patient.  All questions and concerns addressed

## 2021-12-24 NOTE — PROGRESS NOTE ADULT - NUTRITIONAL ASSESSMENT
This patient has been assessed with a concern for Malnutrition and has been determined to have a diagnosis/diagnoses of Morbid obesity (BMI > 40).    This patient is being managed with:   Diet Renal Restrictions-  For patients receiving Renal Replacement - No Protein Restr No Conc K No Conc Phos Low Sodium  Entered: Dec 20 2021  6:54PM    
This patient has been assessed with a concern for Malnutrition and has been determined to have a diagnosis/diagnoses of Morbid obesity (BMI > 40).    This patient is being managed with:   Diet Renal Restrictions-  For patients receiving Renal Replacement - No Protein Restr No Conc K No Conc Phos Low Sodium  Entered: Dec 20 2021  6:54PM

## 2021-12-24 NOTE — PROGRESS NOTE ADULT - REASON FOR ADMISSION
leg  swelling

## 2021-12-25 ENCOUNTER — TRANSCRIPTION ENCOUNTER (OUTPATIENT)
Age: 51
End: 2021-12-25

## 2021-12-25 VITALS
SYSTOLIC BLOOD PRESSURE: 145 MMHG | HEART RATE: 85 BPM | DIASTOLIC BLOOD PRESSURE: 96 MMHG | RESPIRATION RATE: 17 BRPM | OXYGEN SATURATION: 98 % | TEMPERATURE: 99 F

## 2021-12-25 LAB
ANION GAP SERPL CALC-SCNC: 8 MMOL/L — SIGNIFICANT CHANGE UP (ref 5–17)
BUN SERPL-MCNC: 44 MG/DL — HIGH (ref 7–23)
CALCIUM SERPL-MCNC: 9 MG/DL — SIGNIFICANT CHANGE UP (ref 8.5–10.1)
CHLORIDE SERPL-SCNC: 106 MMOL/L — SIGNIFICANT CHANGE UP (ref 96–108)
CO2 SERPL-SCNC: 26 MMOL/L — SIGNIFICANT CHANGE UP (ref 22–31)
CREAT SERPL-MCNC: 3.23 MG/DL — HIGH (ref 0.5–1.3)
GLUCOSE SERPL-MCNC: 95 MG/DL — SIGNIFICANT CHANGE UP (ref 70–99)
HCT VFR BLD CALC: 26.9 % — LOW (ref 34.5–45)
HGB BLD-MCNC: 8.7 G/DL — LOW (ref 11.5–15.5)
MAGNESIUM SERPL-MCNC: 2.6 MG/DL — SIGNIFICANT CHANGE UP (ref 1.6–2.6)
MCHC RBC-ENTMCNC: 26.7 PG — LOW (ref 27–34)
MCHC RBC-ENTMCNC: 32.3 GM/DL — SIGNIFICANT CHANGE UP (ref 32–36)
MCV RBC AUTO: 82.5 FL — SIGNIFICANT CHANGE UP (ref 80–100)
NRBC # BLD: 0 /100 WBCS — SIGNIFICANT CHANGE UP (ref 0–0)
PHOSPHATE SERPL-MCNC: 4.3 MG/DL — SIGNIFICANT CHANGE UP (ref 2.5–4.5)
PLATELET # BLD AUTO: 328 K/UL — SIGNIFICANT CHANGE UP (ref 150–400)
POTASSIUM SERPL-MCNC: 4 MMOL/L — SIGNIFICANT CHANGE UP (ref 3.5–5.3)
POTASSIUM SERPL-SCNC: 4 MMOL/L — SIGNIFICANT CHANGE UP (ref 3.5–5.3)
RBC # BLD: 3.26 M/UL — LOW (ref 3.8–5.2)
RBC # FLD: 15.6 % — HIGH (ref 10.3–14.5)
SODIUM SERPL-SCNC: 140 MMOL/L — SIGNIFICANT CHANGE UP (ref 135–145)
WBC # BLD: 9.02 K/UL — SIGNIFICANT CHANGE UP (ref 3.8–10.5)
WBC # FLD AUTO: 9.02 K/UL — SIGNIFICANT CHANGE UP (ref 3.8–10.5)

## 2021-12-25 PROCEDURE — 99239 HOSP IP/OBS DSCHRG MGMT >30: CPT

## 2021-12-25 RX ORDER — HYDRALAZINE HCL 50 MG
1 TABLET ORAL
Qty: 90 | Refills: 0
Start: 2021-12-25 | End: 2022-01-23

## 2021-12-25 RX ORDER — HYDROCORTISONE 1 %
1 OINTMENT (GRAM) TOPICAL
Qty: 0 | Refills: 0 | DISCHARGE
Start: 2021-12-25

## 2021-12-25 RX ORDER — FERROUS SULFATE 325(65) MG
1 TABLET ORAL
Qty: 0 | Refills: 0 | DISCHARGE
Start: 2021-12-25

## 2021-12-25 RX ORDER — ACETAMINOPHEN 500 MG
2 TABLET ORAL
Qty: 0 | Refills: 0 | DISCHARGE
Start: 2021-12-25

## 2021-12-25 RX ORDER — METOPROLOL TARTRATE 50 MG
1 TABLET ORAL
Qty: 30 | Refills: 0
Start: 2021-12-25 | End: 2022-01-23

## 2021-12-25 RX ORDER — PREGABALIN 225 MG/1
1 CAPSULE ORAL
Qty: 30 | Refills: 0
Start: 2021-12-25 | End: 2022-01-23

## 2021-12-25 RX ADMIN — Medication 100 MILLIGRAM(S): at 05:36

## 2021-12-25 RX ADMIN — Medication 325 MILLIGRAM(S): at 11:52

## 2021-12-25 RX ADMIN — Medication 10 MILLIGRAM(S): at 05:36

## 2021-12-25 RX ADMIN — Medication 10 MILLIGRAM(S): at 14:51

## 2021-12-25 NOTE — DISCHARGE NOTE PROVIDER - NSDCFUADDAPPT_GEN_ALL_CORE_FT
It is important to see your primary physician as well as other necessary consultants within the next week to perform a comprehensive medical review.  Call their offices for an appointment as soon as you leave the hospital.  If you do not have a primary physician or cant reach him/her, contact the [facility] Physician Referral Service at [phone].  Your medical issues appear to be stable at this time, but if your symptoms recur or worsen, contact your physicians and/or return to the hospital if necessary.  If you encounter any issues or questions with your medication, call your physicians before stopping the medication.

## 2021-12-25 NOTE — DISCHARGE NOTE NURSING/CASE MANAGEMENT/SOCIAL WORK - PATIENT PORTAL LINK FT
You can access the FollowMyHealth Patient Portal offered by [facility] by registering at the following website: [email or web address]. By joining [employer]’s FollowMyHealth portal, you will also be able to view your health information using other applications (apps) compatible with our system.

## 2021-12-25 NOTE — DISCHARGE NOTE PROVIDER - NSDCCPCAREPLAN_GEN_ALL_CORE_FT
PRINCIPAL DISCHARGE DIAGNOSIS  Diagnosis: Acute renal failure  Assessment and Plan of Treatment:       SECONDARY DISCHARGE DIAGNOSES  Diagnosis: Omental mass  Assessment and Plan of Treatment:     Diagnosis: Endometrial thickening on ultrasound  Assessment and Plan of Treatment:     Diagnosis: Essential hypertension  Assessment and Plan of Treatment:

## 2021-12-25 NOTE — DISCHARGE NOTE PROVIDER - DETAILS OF MALNUTRITION DIAGNOSIS/DIAGNOSES
This patient has been assessed with a concern for Malnutrition and was treated during this hospitalization for the following Nutrition diagnosis/diagnoses:     -  12/23/2021: Morbid obesity (BMI > 40)

## 2021-12-25 NOTE — DISCHARGE NOTE PROVIDER - CARE PROVIDER_API CALL
Delfino Leung  INTERNAL MEDICINE  [address], [address]  [place], [place] [id number]  Phone: [phone]  Fax: [phone]  Scheduled Appointment: 12/27/2021    Eddie Partida)  Urology  [address] [place] [address]  Phone: [phone]  Fax: [phone]  Follow Up Time: 1 week    GYN/ONC,   [facility]  Phone: (   )    -  Fax: (   )    -  Follow Up Time: 1 week

## 2021-12-25 NOTE — DISCHARGE NOTE NURSING/CASE MANAGEMENT/SOCIAL WORK - NSDCPEFALRISK_GEN_ALL_CORE
For information on Fall & Injury Prevention, visit: [email or web address] OR  [email or web address] OR  https://www.cdc.gov/steadi/patient.html

## 2021-12-25 NOTE — DISCHARGE NOTE PROVIDER - CARE PROVIDERS DIRECT ADDRESSES
[email or web address]_Unknown

## 2021-12-25 NOTE — DISCHARGE NOTE PROVIDER - PROVIDER TOKENS
PROVIDER:[TOKEN:[[id number]],SCHEDULEDAPPT:[12/27/2021]],PROVIDER:[TOKEN:[[id number]],FOLLOWUP:[1 week]],FREE:[LAST:[GYN/ONC],PHONE:[(   )    -],FAX:[(   )    -],ADDRESS:[[facility]],FOLLOWUP:[1 week]]

## 2021-12-25 NOTE — DISCHARGE NOTE PROVIDER - HOSPITAL COURSE
50 y/o F with PMH of HTN, sciatica, presented to ED for b/l leg swelling admitted to medical for ASHLEY , found to have moderate b/l hydronephrosis large uterine/myometrial abnormality on imaging concerning for malignancy.     of note: patient has history of cervical cancer 2014 s/p chemo/radiation ; states she was "not a candidate for hysterectomy" ? but doesn't understand why, states follow-up scans have been negative . Last visited her GYN/ONC 6 months ago    follows with GYN/ONC at [facility] in [place].   Patient endorses she is going to make an appointment with her GYN/ONC within the next 1 week     DISCHARGE DIAGNOSES:    b/l hydronephrosis large uterine/myometrial abnormality , concerning for malignancy   ASHLEY, postrenal azotemia   Pelvic US: Uterus: 9.9 cm x 7.1 cm x 7.1 cm. 3.3 x 2.9 x 2.8 cm intramural calcified fibroid in anterior uterine segment.   Fluid within the endometrium measuring 3.9 x 3.9 cm and this   is abnormal for postmenopausal status. Hysteroscopy is recommended to   rule out an obstructive lower endometrial or cervical mass lesion. In   addition, there is nodular contour of the endometrial lining on   transvaginal pelvic ultrasound. Endometrial biopsy is recommended.    MRI C/T spine limited as patient refused exam : unremarkable.   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  -urology unable to place stents, s/p b/l nephrotomy placement 12/23/21,  flush daily NS 10cc  discussed case with IR unable to bx omental nodule is too tiny and RP LN are also small and not perc accessible,   patient will need GYN ONC for endometrial Bx and further care planning/definitive management.   patient follows with GYN/ONC at [facility] and will make appointment for next week        Normocytic anemia , AOCD?   no e/o active bleeding or bruising   - No signs of iron deficiency anemia  Episodes of bright red blood ( not mixed in with the stool ) per rectum only when she was straining while constipated which had resolved. Patient never had a colonoscopy. Outpatient nonemergent colonoscopy , discussed with patient who will follow-up outpatient   H/H stable       HTN  increase hydralazine 25mg mg tid ,   metoprolol ER to 100mg daily   TSH OK , LDL 56, A1c 5.6%    B/L lower extremity swelling  No DVT b/l   ECHO: pEF      Sciatica  MRI C/T spine limited as patient refused exam : unremarkable.   Home , refused outpatient PT   pain control prn     DISCUSSED ALL ABOVE WITH PATIENT AND DAUGHTER AT BEDSIDE. ABSOLUTELY NO NSAIDS/DIURETICS/ACEI/ARB. FOLLOW-UP WITH UROLOGY, NEPHROLOGY AND GYN/ONC WITHIN 1 WEEK. PREFERRABLY FOLLOW-UP WITH NEPHROLOGY ON MONDAY 12/27/21. TEACH BACK METHOD APPLIED. PATIENT AND DAUGHTER EXPRESSED UNDERSTANDING AND AGREEABLE.     Discharge time : 40 min     RETURN PARAMETERS DISCUSSED WITH PATIENT, PATIENT EXPRESSED UNDERSTANDING AND IS AGREEABLE. DISCUSSED WITH PATIENT ON REFRAINING FROM DRIVING UNTIL FOLLOW-UP/ CLEARED BY PMD. PATIENT EXPRESSED UNDERSTANDING.    50 y/o F with PMH of HTN, sciatica, presented to ED for b/l leg swelling admitted to medical for ASHLEY , found to have moderate b/l hydronephrosis large uterine/myometrial abnormality on imaging concerning for malignancy.     of note: patient has history of cervical cancer 2014 s/p chemo/radiation ; states she was "not a candidate for hysterectomy" ? but doesn't understand why, states follow-up scans have been negative . Last visited her GYN/ONC 6 months ago    follows with GYN/ONC at [facility] in [place].   Patient endorses she is going to make an appointment with her GYN/ONC within the next 1 week     DISCHARGE DIAGNOSES:    b/l hydronephrosis large uterine/myometrial abnormality , concerning for malignancy   ASHLEY, postrenal azotemia   Pelvic US: Uterus: 9.9 cm x 7.1 cm x 7.1 cm. 3.3 x 2.9 x 2.8 cm intramural calcified fibroid in anterior uterine segment.   Fluid within the endometrium measuring 3.9 x 3.9 cm and this   is abnormal for postmenopausal status. Hysteroscopy is recommended to   rule out an obstructive lower endometrial or cervical mass lesion. In   addition, there is nodular contour of the endometrial lining on   transvaginal pelvic ultrasound. Endometrial biopsy is recommended.    MRI C/T spine limited as patient refused exam : unremarkable.   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  -urology unable to place stents, s/p b/l nephrotomy placement 12/23/21,  flush daily NS 10cc  discussed case with IR unable to bx omental nodule is too tiny and RP LN are also small and not perc accessible,   patient will need GYN ONC for endometrial Bx and further care planning/definitive management.   patient follows with GYN/ONC at [facility] and will make appointment for next week        Normocytic anemia , AOCD?   no e/o active bleeding or bruising   - No signs of iron deficiency anemia  Episodes of bright red blood ( not mixed in with the stool ) per rectum only when she was straining while constipated which had resolved. Patient never had a colonoscopy. Outpatient nonemergent colonoscopy , discussed with patient who will follow-up outpatient   H/H stable       HTN  increase hydralazine 25mg mg tid ,   metoprolol ER to 100mg daily   TSH OK , LDL 56, A1c 5.6%    B/L lower extremity swelling  No DVT b/l   ECHO: pEF      Sciatica  MRI C/T spine limited as patient refused exam : unremarkable.   Home , refused outpatient PT   pain control prn     External hemorrhoids,   patient was stating she was constipated and was "pushing"  constipation resolved   anusol     DISCUSSED ALL ABOVE WITH PATIENT AND DAUGHTER AT BEDSIDE. ABSOLUTELY NO NSAIDS/DIURETICS/ACEI/ARB. FOLLOW-UP WITH UROLOGY, NEPHROLOGY AND GYN/ONC WITHIN 1 WEEK. PREFERRABLY FOLLOW-UP WITH NEPHROLOGY ON MONDAY 12/27/21. TEACH BACK METHOD APPLIED. PATIENT AND DAUGHTER EXPRESSED UNDERSTANDING AND AGREEABLE.     Discharge time : 40 min     RETURN PARAMETERS DISCUSSED WITH PATIENT, PATIENT EXPRESSED UNDERSTANDING AND IS AGREEABLE. DISCUSSED WITH PATIENT ON REFRAINING FROM DRIVING UNTIL FOLLOW-UP/ CLEARED BY PMD. PATIENT EXPRESSED UNDERSTANDING.    52 y/o F with PMH of HTN, sciatica, presented to ED for b/l leg swelling admitted to medical for ASHLEY , found to have moderate b/l hydronephrosis large uterine/myometrial abnormality on imaging concerning for malignancy.     of note: patient has history of cervical cancer 2014 s/p chemo/radiation ; states she was "not a candidate for hysterectomy" ? but doesn't understand why, states follow-up scans have been negative . Last visited her GYN/ONC 6 months ago    follows with GYN/ONC at [facility] in [place].   Patient endorses she is going to make an appointment with her GYN/ONC within the next 1 week     DISCHARGE DIAGNOSES:    b/l hydronephrosis large uterine/myometrial abnormality , concerning for malignancy   ASHLEY, postrenal azotemia   Pelvic US: Uterus: 9.9 cm x 7.1 cm x 7.1 cm. 3.3 x 2.9 x 2.8 cm intramural calcified fibroid in anterior uterine segment.   Fluid within the endometrium measuring 3.9 x 3.9 cm and this   is abnormal for postmenopausal status. Hysteroscopy is recommended to   rule out an obstructive lower endometrial or cervical mass lesion. In   addition, there is nodular contour of the endometrial lining on   transvaginal pelvic ultrasound. Endometrial biopsy is recommended.    MRI C/T spine limited as patient refused exam : unremarkable.   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  -urology unable to place stents, s/p b/l nephrotomy placement 12/23/21,  flush daily NS 10cc  discussed case with IR unable to bx omental nodule is too tiny and RP LN are also small and not perc accessible,   patient will need GYN ONC for endometrial Bx and further care planning/definitive management.   patient follows with GYN/ONC at [facility] and will make appointment for next week    copies of Abd CT and pelvic US provided to patient       Normocytic anemia , AOCD?   no e/o active bleeding or bruising   - No signs of iron deficiency anemia  Episodes of bright red blood ( not mixed in with the stool ) per rectum only when she was straining while constipated which had resolved. Patient never had a colonoscopy. Outpatient nonemergent colonoscopy , discussed with patient who will follow-up outpatient   H/H stable       HTN  increase hydralazine 25mg mg tid ,   metoprolol ER to 100mg daily   TSH OK , LDL 56, A1c 5.6%    B/L lower extremity swelling  No DVT b/l   ECHO: pEF      Sciatica  MRI C/T spine limited as patient refused exam : unremarkable.   Home , refused outpatient PT   pain control prn     External hemorrhoids,   patient was stating she was constipated and was "pushing"  constipation resolved   anusol     DISCUSSED ALL ABOVE WITH PATIENT AND DAUGHTER AT BEDSIDE. ABSOLUTELY NO NSAIDS/DIURETICS/ACEI/ARB. FOLLOW-UP WITH UROLOGY, NEPHROLOGY AND GYN/ONC WITHIN 1 WEEK. PREFERRABLY FOLLOW-UP WITH NEPHROLOGY ON MONDAY 12/27/21. TEACH BACK METHOD APPLIED. PATIENT AND DAUGHTER EXPRESSED UNDERSTANDING AND AGREEABLE.     Discharge time : 40 min     RETURN PARAMETERS DISCUSSED WITH PATIENT, PATIENT EXPRESSED UNDERSTANDING AND IS AGREEABLE. DISCUSSED WITH PATIENT ON REFRAINING FROM DRIVING UNTIL FOLLOW-UP/ CLEARED BY PMD. PATIENT EXPRESSED UNDERSTANDING.

## 2021-12-25 NOTE — DISCHARGE NOTE PROVIDER - NSDCMRMEDTOKEN_GEN_ALL_CORE_FT
acetaminophen 325 mg oral tablet: 2 tab(s) orally every 6 hours, As needed,   cyanocobalamin 1000 mcg oral tablet: 1 tab(s) orally once a day  ferrous sulfate 325 mg (65 mg elemental iron) oral tablet: 1 tab(s) orally once a day  hydrALAZINE 25 mg oral tablet: 1 tab(s) orally 3 times a day   hydrocortisone 25 mg rectal suppository: 1 suppository(ies) rectal once a day  metoprolol succinate 100 mg oral tablet, extended release: 1 tab(s) orally once a day

## 2021-12-25 NOTE — DISCHARGE NOTE NURSING/CASE MANAGEMENT/SOCIAL WORK - NSDCFUADDAPPT_GEN_ALL_CORE_FT
It is important to see your primary physician as well as other necessary consultants within the next week to perform a comprehensive medical review.  Call their offices for an appointment as soon as you leave the hospital.  If you do not have a primary physician or cant reach him/her, contact the [facility] Physician Referral Service at [phone].  Your medical issues appear to be stable at this time, but if your symptoms recur or worsen, contact your physicians and/or return to the hospital if necessary.  If you encounter any issues or questions with your medication, call your physicians before stopping the medication.

## 2021-12-29 ENCOUNTER — NON-APPOINTMENT (OUTPATIENT)
Age: 51
End: 2021-12-29

## 2021-12-29 ENCOUNTER — APPOINTMENT (OUTPATIENT)
Dept: UROLOGY | Facility: CLINIC | Age: 51
End: 2021-12-29
Payer: MEDICAID

## 2021-12-29 VITALS
OXYGEN SATURATION: 94 % | DIASTOLIC BLOOD PRESSURE: 100 MMHG | SYSTOLIC BLOOD PRESSURE: 172 MMHG | HEART RATE: 97 BPM | TEMPERATURE: 98 F

## 2021-12-29 DIAGNOSIS — R39.89 OTHER SYMPTOMS AND SIGNS INVOLVING THE GENITOURINARY SYSTEM: ICD-10-CM

## 2021-12-29 DIAGNOSIS — I10 ESSENTIAL (PRIMARY) HYPERTENSION: ICD-10-CM

## 2021-12-29 DIAGNOSIS — Z85.41 PERSONAL HISTORY OF MALIGNANT NEOPLASM OF CERVIX UTERI: ICD-10-CM

## 2021-12-29 DIAGNOSIS — R19.00 INTRA-ABDOMINAL AND PELVIC SWELLING, MASS AND LUMP, UNSPECIFIED SITE: ICD-10-CM

## 2021-12-29 DIAGNOSIS — Z80.3 FAMILY HISTORY OF MALIGNANT NEOPLASM OF BREAST: ICD-10-CM

## 2021-12-29 DIAGNOSIS — Z80.0 FAMILY HISTORY OF MALIGNANT NEOPLASM OF DIGESTIVE ORGANS: ICD-10-CM

## 2021-12-29 PROBLEM — Z00.00 ENCOUNTER FOR PREVENTIVE HEALTH EXAMINATION: Status: ACTIVE | Noted: 2021-12-29

## 2021-12-29 PROCEDURE — 99215 OFFICE O/P EST HI 40 MIN: CPT

## 2021-12-29 RX ORDER — FERROUS SULFATE 325(65) MG
325 TABLET ORAL
Refills: 0 | Status: ACTIVE | COMMUNITY

## 2021-12-29 RX ORDER — CYANOCOBALAMIN (VITAMIN B-12) 1000 MCG
1000 TABLET ORAL
Refills: 0 | Status: ACTIVE | COMMUNITY

## 2021-12-29 RX ORDER — METOPROLOL SUCCINATE 100 MG/1
100 TABLET, EXTENDED RELEASE ORAL
Refills: 0 | Status: ACTIVE | COMMUNITY

## 2021-12-29 RX ORDER — HYDROCORTISONE ACETATE 25 MG/1
25 SUPPOSITORY RECTAL
Refills: 0 | Status: ACTIVE | COMMUNITY

## 2021-12-29 RX ORDER — HYDRALAZINE HYDROCHLORIDE 25 MG/1
25 TABLET ORAL
Refills: 0 | Status: ACTIVE | COMMUNITY

## 2021-12-29 RX ORDER — ACETAMINOPHEN 325 MG/1
325 TABLET ORAL
Refills: 0 | Status: ACTIVE | COMMUNITY

## 2021-12-29 NOTE — ASSESSMENT
[FreeTextEntry1] : Dressings were changed and left tube was unraveled.\par Irrigated easily from both sides for no clots or mucous.\par Draining nicely as she left. \par \par Will schedule her for f/u appt and coordinate IR conversion to Nephro - u and can cap sequentially before considering internalization, as will likely need further treatment, but needs Gyn Onc STAT and likely nephrology. \par needs repeat blood work\par \par  staff gone; will call pt to arrange f/u.

## 2021-12-29 NOTE — HISTORY OF PRESENT ILLNESS
[FreeTextEntry1] : MADELYN MAN is a 51 year old F who presents with b/l indwelling PCN's placed at [facility] on 12/ /21 for mod b/l hydronephrosis. She presented to ED with b/l LE edema and a Cr of 3.64, GFR 16 ml/min. Upon discharge on 12/25/21, Cr was still 3.23. Imaging suggested carcinomatosis and nodularity seen on CT, as well as pelvic and inguinal lymphadenopathy. TVUS then showed thickened endometrium and fluid, both of which were abnormal findings. CEA and  elevated.\par \par Patient reports h/o cervical ca treated at [facility] in 2014 with chemo and radiation, after cervical biopsy. She states there was no surgery and that since then, she has had no periods. THere has been no abnormal vaginal bleeding. Mammograms have been normal. She has never had a colonoscopy.\par \par Today, she c/o bladder pressure and discomfort. She also reports that volume in bags is significantly less than yesterday, though I drained 100 ml from left bag and 75 from right side. She also voided here < 30 ml with PVR  1

## 2021-12-29 NOTE — PHYSICAL EXAM
[Abdomen Soft] : soft [Costovertebral Angle Tenderness] : no ~M costovertebral angle tenderness [FreeTextEntry1] : PCN sites were clean and intact. tubing from left back was tightly coiled - unwound and secured

## 2021-12-30 LAB — M PROTEIN 24H UR ELPH-MRATE: SIGNIFICANT CHANGE UP

## 2021-12-31 LAB
% ALBUMIN: 49.8 % — SIGNIFICANT CHANGE UP
% ALPHA 1: 6.4 % — SIGNIFICANT CHANGE UP
% ALPHA 2: 16.2 % — SIGNIFICANT CHANGE UP
% BETA: 13.4 % — SIGNIFICANT CHANGE UP
% GAMMA: 14.2 % — SIGNIFICANT CHANGE UP
ALBUMIN SERPL ELPH-MCNC: 3.3 G/DL — LOW (ref 3.6–5.5)
ALBUMIN/GLOB SERPL ELPH: 1 RATIO — SIGNIFICANT CHANGE UP
ALPHA1 GLOB SERPL ELPH-MCNC: 0.4 G/DL — SIGNIFICANT CHANGE UP (ref 0.1–0.4)
ALPHA2 GLOB SERPL ELPH-MCNC: 1.1 G/DL — HIGH (ref 0.5–1)
B-GLOBULIN SERPL ELPH-MCNC: 0.9 G/DL — SIGNIFICANT CHANGE UP (ref 0.5–1)
GAMMA GLOBULIN: 1 G/DL — SIGNIFICANT CHANGE UP (ref 0.6–1.6)
INTERPRETATION SERPL IFE-IMP: SIGNIFICANT CHANGE UP
PROT PATTERN SERPL ELPH-IMP: SIGNIFICANT CHANGE UP

## 2022-01-02 ENCOUNTER — NON-APPOINTMENT (OUTPATIENT)
Age: 52
End: 2022-01-02

## 2022-01-02 ENCOUNTER — TRANSCRIPTION ENCOUNTER (OUTPATIENT)
Age: 52
End: 2022-01-02

## 2022-01-03 ENCOUNTER — NON-APPOINTMENT (OUTPATIENT)
Age: 52
End: 2022-01-03

## 2022-01-03 DIAGNOSIS — N13.30 UNSPECIFIED HYDRONEPHROSIS: ICD-10-CM

## 2022-01-03 DIAGNOSIS — E73.9 LACTOSE INTOLERANCE, UNSPECIFIED: ICD-10-CM

## 2022-01-03 DIAGNOSIS — M54.42 LUMBAGO WITH SCIATICA, LEFT SIDE: ICD-10-CM

## 2022-01-03 DIAGNOSIS — D64.9 ANEMIA, UNSPECIFIED: ICD-10-CM

## 2022-01-03 DIAGNOSIS — R60.9 EDEMA, UNSPECIFIED: ICD-10-CM

## 2022-01-03 DIAGNOSIS — I12.9 HYPERTENSIVE CHRONIC KIDNEY DISEASE WITH STAGE 1 THROUGH STAGE 4 CHRONIC KIDNEY DISEASE, OR UNSPECIFIED CHRONIC KIDNEY DISEASE: ICD-10-CM
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
## 2022-01-03 DIAGNOSIS — T39.395A ADVERSE EFFECT OF OTHER NONSTEROIDAL ANTI-INFLAMMATORY DRUGS [NSAID], INITIAL ENCOUNTER: ICD-10-CM

## 2022-01-03 DIAGNOSIS — R93.89 ABNORMAL FINDINGS ON DIAGNOSTIC IMAGING OF OTHER SPECIFIED BODY STRUCTURES: ICD-10-CM

## 2022-01-03 DIAGNOSIS — N18.9 CHRONIC KIDNEY DISEASE, UNSPECIFIED: ICD-10-CM

## 2022-01-03 DIAGNOSIS — E66.01 MORBID (SEVERE) OBESITY DUE TO EXCESS CALORIES: ICD-10-CM

## 2022-01-03 DIAGNOSIS — K66.8 OTHER SPECIFIED DISORDERS OF PERITONEUM: ICD-10-CM

## 2022-01-03 DIAGNOSIS — N17.9 ACUTE KIDNEY FAILURE, UNSPECIFIED: ICD-10-CM

## 2022-01-05 ENCOUNTER — APPOINTMENT (OUTPATIENT)
Dept: UROLOGY | Facility: CLINIC | Age: 52
End: 2022-01-05
Payer: MEDICAID

## 2022-01-05 ENCOUNTER — NON-APPOINTMENT (OUTPATIENT)
Age: 52
End: 2022-01-05

## 2022-01-05 VITALS
HEIGHT: 66 IN | DIASTOLIC BLOOD PRESSURE: 83 MMHG | TEMPERATURE: 98.7 F | OXYGEN SATURATION: 95 % | BODY MASS INDEX: 38.89 KG/M2 | SYSTOLIC BLOOD PRESSURE: 147 MMHG | WEIGHT: 242 LBS | HEART RATE: 103 BPM

## 2022-01-05 DIAGNOSIS — R34 ANURIA AND OLIGURIA: ICD-10-CM

## 2022-01-05 DIAGNOSIS — R10.2 PELVIC AND PERINEAL PAIN: ICD-10-CM

## 2022-01-05 PROCEDURE — 99213 OFFICE O/P EST LOW 20 MIN: CPT | Mod: 25

## 2022-01-05 PROCEDURE — 51701 INSERT BLADDER CATHETER: CPT

## 2022-01-05 NOTE — HISTORY OF PRESENT ILLNESS
[FreeTextEntry1] : MADELYN MAN is a 51 year old F who presents with indwelling b/l PCN's and reports of GH the other day.\par She now has clear urine draining in bags, but is c/o pain 8/10 in pelvis. SHe was wondering if she had a UTI, but I explained there is not much in her bladder and she is sure to demonstrate some bacteria etc.\par \par She was cath'ed for only 20 ml and it will be sent for UA and culture.

## 2022-01-05 NOTE — ASSESSMENT
[FreeTextEntry1] : Pt had PCN's upside down and we re pinned to gown, THey did have urine in both and it was yellow.. Reports pelvic pressure. \par \par Told safe to take Tylenol 1000 mg po bid, as she says if she takes that amount, it helps her. I can't give TOradol right now due to her renal fxn.\par \par I explained we may end up doing cysto if GH, but need cx first.  She should keep her appointment with GYN ONC and renal which are both next week.\par \par IF n/v/f/c, to ER. I explained that I believe her pelvic pain is from the pelvic mass etc.

## 2022-01-07 LAB — BACTERIA UR CULT: NORMAL

## 2022-01-10 ENCOUNTER — NON-APPOINTMENT (OUTPATIENT)
Age: 52
End: 2022-01-10

## 2022-01-13 ENCOUNTER — APPOINTMENT (OUTPATIENT)
Dept: UROLOGY | Facility: CLINIC | Age: 52
End: 2022-01-13

## 2022-01-24 ENCOUNTER — APPOINTMENT (OUTPATIENT)
Dept: UROLOGY | Facility: CLINIC | Age: 52
End: 2022-01-24
Payer: MEDICAID

## 2022-01-24 VITALS
DIASTOLIC BLOOD PRESSURE: 90 MMHG | BODY MASS INDEX: 39.06 KG/M2 | TEMPERATURE: 98.4 F | HEART RATE: 92 BPM | WEIGHT: 242 LBS | SYSTOLIC BLOOD PRESSURE: 152 MMHG | OXYGEN SATURATION: 100 %

## 2022-01-24 DIAGNOSIS — N17.9 ACUTE KIDNEY FAILURE, UNSPECIFIED: ICD-10-CM

## 2022-01-24 DIAGNOSIS — N13.5 CROSSING VESSEL AND STRICTURE OF URETER W/OUT HYDRONEPHROSIS: ICD-10-CM

## 2022-01-24 DIAGNOSIS — R31.0 GROSS HEMATURIA: ICD-10-CM

## 2022-01-24 DIAGNOSIS — N13.30 UNSPECIFIED HYDRONEPHROSIS: ICD-10-CM

## 2022-01-24 PROCEDURE — 99213 OFFICE O/P EST LOW 20 MIN: CPT | Mod: 25

## 2022-01-24 PROCEDURE — 52000 CYSTOURETHROSCOPY: CPT

## 2022-01-24 NOTE — ASSESSMENT
[FreeTextEntry1] : Bladder biopsy was not performed due to turbid urine: instrumented specimen was obtained for cytology, FISH and culture. Explained need for tubes to remain open to gravity for Cr to ravin. She will need her kidneys functioning well should she need treatment in near future.\par \par Based on her hx, but now with lung lesions, I am not sure what we are dealing with, and she will definitely need these tubes for the immediate future. Would await oncologist's findings based on biopsy, etc.  I also suggested she consider seeing a urologist at the same location for possible ease of, and coordination of care.\par I reassured her that the PCN's were placed on 12/23/21 and can be exchanged every 3 mo.\par \par She voided well after the cystoscopy and I will call her with any pertinent findings.

## 2022-01-24 NOTE — LETTER BODY
[Dear  ___] : Dear  [unfilled], [Consult Letter:] : I had the pleasure of evaluating your patient, [unfilled]. [Please see my note below.] : Please see my note below. [Consult Closing:] : Thank you very much for allowing me to participate in the care of this patient.  If you have any questions, please do not hesitate to contact me. [FreeTextEntry1] : Please see my note. I will keep you informed. [FreeTextEntry3] : Sincerely,\par \par Fara Weinstein MD\par Clinical \par [facility]\par [facility]\par \par \par

## 2022-01-24 NOTE — HISTORY OF PRESENT ILLNESS
[FreeTextEntry1] : MADELYN MAN is a 51 year old F who presents with recent ARF, w Cr 3.5 and now 1.82/GFR 36 ml/min on 1/4/22.\par \par b/l PCN's draining yellow urine. Left side clearer and right slightly turbid.\par \par Says she is voiding from below and since she called on 1/5/22, there has been no GH.\par Her last cx here was negative for infection from a voided specimen, but today, the urine on cystoscopy had a "snowy" appearance and was somewhat turbid. 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 of a lung lesion, which is being scheduled to be biopsied.  In hospital CEA and  were both elevated.

## 2022-01-24 NOTE — PHYSICAL EXAM
[Costovertebral Angle Tenderness] : no ~M costovertebral angle tenderness [FreeTextEntry1] : PCN sites are clean

## 2022-01-28 LAB
BACTERIA UR CULT: ABNORMAL
URINE CYTOLOGY: NORMAL

## 2022-02-01 LAB — HLX UV FISH FINAL REPORT: NORMAL

## 2022-02-05 ENCOUNTER — NON-APPOINTMENT (OUTPATIENT)
Age: 52
End: 2022-02-05

## 2022-02-07 ENCOUNTER — NON-APPOINTMENT (OUTPATIENT)
Age: 52
End: 2022-02-07

## 2022-02-07 ENCOUNTER — APPOINTMENT (OUTPATIENT)
Dept: UROLOGY | Facility: CLINIC | Age: 52
End: 2022-02-07

## 2022-02-08 ENCOUNTER — EMERGENCY (EMERGENCY)
Facility: HOSPITAL | Age: 52
LOS: 0 days | Discharge: HOME | End: 2022-02-08
Attending: EMERGENCY MEDICINE
Payer: MEDICAID

## 2022-02-08 VITALS
DIASTOLIC BLOOD PRESSURE: 82 MMHG | HEART RATE: 88 BPM | RESPIRATION RATE: 16 BRPM | OXYGEN SATURATION: 98 % | SYSTOLIC BLOOD PRESSURE: 131 MMHG | TEMPERATURE: 98 F

## 2022-02-08 VITALS
TEMPERATURE: 98 F | DIASTOLIC BLOOD PRESSURE: 83 MMHG | SYSTOLIC BLOOD PRESSURE: 147 MMHG | HEART RATE: 105 BPM | RESPIRATION RATE: 16 BRPM | OXYGEN SATURATION: 97 % | WEIGHT: 244.05 LBS | HEIGHT: 66 IN

## 2022-02-08 DIAGNOSIS — M79.89 OTHER SPECIFIED SOFT TISSUE DISORDERS: ICD-10-CM

## 2022-02-08 DIAGNOSIS — N39.0 URINARY TRACT INFECTION, SITE NOT SPECIFIED: ICD-10-CM

## 2022-02-08 DIAGNOSIS — R60.0 LOCALIZED EDEMA: ICD-10-CM

## 2022-02-08 DIAGNOSIS — I10 ESSENTIAL (PRIMARY) HYPERTENSION: ICD-10-CM

## 2022-02-08 DIAGNOSIS — R10.13 EPIGASTRIC PAIN: ICD-10-CM

## 2022-02-08 LAB
ALBUMIN SERPL ELPH-MCNC: 2.3 G/DL — LOW (ref 3.3–5)
ALP SERPL-CCNC: 114 U/L — SIGNIFICANT CHANGE UP (ref 40–120)
ALT FLD-CCNC: 16 U/L — SIGNIFICANT CHANGE UP (ref 12–78)
ANION GAP SERPL CALC-SCNC: 6 MMOL/L — SIGNIFICANT CHANGE UP (ref 5–17)
APPEARANCE UR: ABNORMAL
APTT BLD: 48.3 SEC — HIGH (ref 27.5–35.5)
AST SERPL-CCNC: 21 U/L — SIGNIFICANT CHANGE UP (ref 15–37)
BACTERIA # UR AUTO: ABNORMAL
BASOPHILS # BLD AUTO: 0.05 K/UL — SIGNIFICANT CHANGE UP (ref 0–0.2)
BASOPHILS NFR BLD AUTO: 0.4 % — SIGNIFICANT CHANGE UP (ref 0–2)
BILIRUB SERPL-MCNC: 0.3 MG/DL — SIGNIFICANT CHANGE UP (ref 0.2–1.2)
BILIRUB UR-MCNC: NEGATIVE — SIGNIFICANT CHANGE UP
BUN SERPL-MCNC: 32 MG/DL — HIGH (ref 7–23)
CALCIUM SERPL-MCNC: 9.1 MG/DL — SIGNIFICANT CHANGE UP (ref 8.5–10.1)
CHLORIDE SERPL-SCNC: 104 MMOL/L — SIGNIFICANT CHANGE UP (ref 96–108)
CO2 SERPL-SCNC: 27 MMOL/L — SIGNIFICANT CHANGE UP (ref 22–31)
COLOR SPEC: YELLOW — SIGNIFICANT CHANGE UP
COMMENT - URINE: SIGNIFICANT CHANGE UP
CREAT SERPL-MCNC: 1.81 MG/DL — HIGH (ref 0.5–1.3)
DIFF PNL FLD: ABNORMAL
EOSINOPHIL # BLD AUTO: 0.07 K/UL — SIGNIFICANT CHANGE UP (ref 0–0.5)
EOSINOPHIL NFR BLD AUTO: 0.6 % — SIGNIFICANT CHANGE UP (ref 0–6)
EPI CELLS # UR: ABNORMAL
GLUCOSE SERPL-MCNC: 106 MG/DL — HIGH (ref 70–99)
GLUCOSE UR QL: NEGATIVE MG/DL — SIGNIFICANT CHANGE UP
GRAN CASTS # UR COMP ASSIST: ABNORMAL /LPF
HCT VFR BLD CALC: 24.1 % — LOW (ref 34.5–45)
HGB BLD-MCNC: 7.4 G/DL — LOW (ref 11.5–15.5)
IMM GRANULOCYTES NFR BLD AUTO: 0.7 % — SIGNIFICANT CHANGE UP (ref 0–1.5)
INR BLD: 1.35 RATIO — HIGH (ref 0.88–1.16)
KETONES UR-MCNC: NEGATIVE — SIGNIFICANT CHANGE UP
LACTATE SERPL-SCNC: 0.9 MMOL/L — SIGNIFICANT CHANGE UP (ref 0.7–2)
LEUKOCYTE ESTERASE UR-ACNC: ABNORMAL
LYMPHOCYTES # BLD AUTO: 1.02 K/UL — SIGNIFICANT CHANGE UP (ref 1–3.3)
LYMPHOCYTES # BLD AUTO: 9 % — LOW (ref 13–44)
MCHC RBC-ENTMCNC: 24.5 PG — LOW (ref 27–34)
MCHC RBC-ENTMCNC: 30.7 G/DL — LOW (ref 32–36)
MCV RBC AUTO: 79.8 FL — LOW (ref 80–100)
MONOCYTES # BLD AUTO: 0.79 K/UL — SIGNIFICANT CHANGE UP (ref 0–0.9)
MONOCYTES NFR BLD AUTO: 6.9 % — SIGNIFICANT CHANGE UP (ref 2–14)
NEUTROPHILS # BLD AUTO: 9.36 K/UL — HIGH (ref 1.8–7.4)
NEUTROPHILS NFR BLD AUTO: 82.4 % — HIGH (ref 43–77)
NITRITE UR-MCNC: POSITIVE
NRBC # BLD: 0 /100 WBCS — SIGNIFICANT CHANGE UP (ref 0–0)
PH UR: 5 — SIGNIFICANT CHANGE UP (ref 5–8)
PLATELET # BLD AUTO: 769 K/UL — HIGH (ref 150–400)
POTASSIUM SERPL-MCNC: 4.2 MMOL/L — SIGNIFICANT CHANGE UP (ref 3.5–5.3)
POTASSIUM SERPL-SCNC: 4.2 MMOL/L — SIGNIFICANT CHANGE UP (ref 3.5–5.3)
PROT SERPL-MCNC: 7.7 GM/DL — SIGNIFICANT CHANGE UP (ref 6–8.3)
PROT UR-MCNC: 100 MG/DL
PROTHROM AB SERPL-ACNC: 15.4 SEC — HIGH (ref 10.6–13.6)
RBC # BLD: 3.02 M/UL — LOW (ref 3.8–5.2)
RBC # FLD: 17.7 % — HIGH (ref 10.3–14.5)
RBC CASTS # UR COMP ASSIST: ABNORMAL /HPF (ref 0–4)
SODIUM SERPL-SCNC: 137 MMOL/L — SIGNIFICANT CHANGE UP (ref 135–145)
SP GR SPEC: 1.02 — SIGNIFICANT CHANGE UP (ref 1.01–1.02)
UROBILINOGEN FLD QL: NEGATIVE MG/DL — SIGNIFICANT CHANGE UP
WBC # BLD: 11.37 K/UL — HIGH (ref 3.8–10.5)
WBC # FLD AUTO: 11.37 K/UL — HIGH (ref 3.8–10.5)
WBC UR QL: >50

## 2022-02-08 PROCEDURE — 93970 EXTREMITY STUDY: CPT | Mod: 26

## 2022-02-08 PROCEDURE — 99284 EMERGENCY DEPT VISIT MOD MDM: CPT

## 2022-02-08 RX ORDER — CEFTRIAXONE 500 MG/1
1000 INJECTION, POWDER, FOR SOLUTION INTRAMUSCULAR; INTRAVENOUS ONCE
Refills: 0 | Status: COMPLETED | OUTPATIENT
Start: 2022-02-08 | End: 2022-02-08

## 2022-02-08 RX ORDER — CEFDINIR 250 MG/5ML
1 POWDER, FOR SUSPENSION ORAL
Qty: 20 | Refills: 0
Start: 2022-02-08 | End: 2022-02-17

## 2022-02-08 RX ORDER — TRAMADOL HYDROCHLORIDE 50 MG/1
1 TABLET ORAL
Qty: 12 | Refills: 0
Start: 2022-02-08 | End: 2022-02-10

## 2022-02-08 RX ADMIN — CEFTRIAXONE 100 MILLIGRAM(S): 500 INJECTION, POWDER, FOR SOLUTION INTRAMUSCULAR; INTRAVENOUS at 13:44

## 2022-02-08 NOTE — ED PROVIDER NOTE - CLINICAL SUMMARY MEDICAL DECISION MAKING FREE TEXT BOX
Pt with recent renal dysfunction secondary to hydronephrosis with functional nephrostomy tubes bilaterally, evaluate with labs and UA for bilateral leg edema. Evaluate for DVT with ultrasound doppler. Pt with recent renal dysfunction secondary to hydronephrosis with functional nephrostomy tubes bilaterally, evaluate with labs and UA for bilateral leg edema. Evaluate for DVT with ultrasound doppler.    Noted UTI with UA/Ucx sent Dr. Weinstein aware otherwise also case discussed with Dr Leung - Cr improving - will dc with cefdinir. Pt also complaining of back pain that had been present with lumbar disc disease, no recent fall or worsening asking for additional pain medication in addition to her tylenol for pain.

## 2022-02-08 NOTE — ED PROVIDER NOTE - PATIENT PORTAL LINK FT
You can access the FollowMyHealth Patient Portal offered by [facility] by registering at the following website: [email or web address]. By joining [employer]’s FollowMyHealth portal, you will also be able to view your health information using other applications (apps) compatible with our system.

## 2022-02-08 NOTE — ED PROVIDER NOTE - MUSCULOSKELETAL, MLM
Spine appears normal, range of motion is not limited, no muscle or joint tenderness. Bilateral legs non pitting edema +1, no calf tenderness noted, and no discoloration.

## 2022-02-08 NOTE — ED PROVIDER NOTE - OBJECTIVE STATEMENT
51 year old female with PMH of HTN, bilateral hydronephrosis, and GERD presents to ED for bilateral leg swelling. Pt noticed leg swelling for x3 days. Pt was seen by her urologist x2 weeks ago and had labs and UA done, but wasn't informed of the results yet. Pt c/o mid epigastric discomfort. Pt wants to have workup done in the ED before her next urology appointment in April. Pt denies nausea, fever, chills, SOB, CP, and, dysuria.

## 2022-02-08 NOTE — ED PROVIDER NOTE - NSFOLLOWUPINSTRUCTIONS_ED_ALL_ED_FT
Peripheral Edema       Peripheral edema is swelling that is caused by a buildup of fluid. Peripheral edema most often affects the lower legs, ankles, and feet. It can also develop in the arms, hands, and face. The area of the body that has peripheral edema will look swollen. It may also feel heavy or warm. Your clothes may start to feel tight. Pressing on the area may make a temporary dent in your skin. You may not be able to move your swollen arm or leg as much as usual.    There are many causes of peripheral edema. It can happen because of a complication of other conditions such as congestive heart failure, kidney disease, or a problem with your blood circulation. It also can be a side effect of certain medicines or because of an infection. It often happens to women during pregnancy. Sometimes, the cause is not known.      Follow these instructions at home:      Managing pain, stiffness, and swelling      •Raise (elevate) your legs while you are sitting or lying down.      •Move around often to prevent stiffness and to lessen swelling.      • Do not sit or stand for long periods of time.      •Wear support stockings as told by your health care provider.      Medicines     •Take over-the-counter and prescription medicines only as told by your health care provider.      •Your health care provider may prescribe medicine to help your body get rid of excess water (diuretic).      General instructions     •Pay attention to any changes in your symptoms.      •Follow instructions from your health care provider about limiting salt (sodium) in your diet. Sometimes, eating less salt may reduce swelling.      •Moisturize skin daily to help prevent skin from cracking and draining.      •Keep all follow-up visits as told by your health care provider. This is important.        Contact a health care provider if you have:    •A fever.      •Edema that starts suddenly or is getting worse, especially if you are pregnant or have a medical condition.      •Swelling in only one leg.      •Increased swelling, redness, or pain in one or both of your legs.      •Drainage or sores at the area where you have edema.        Get help right away if you:    •Develop shortness of breath, especially when you are lying down.      •Have pain in your chest or abdomen.      •Feel weak.      •Feel faint.        Summary    •Peripheral edema is swelling that is caused by a buildup of fluid. Peripheral edema most often affects the lower legs, ankles, and feet.      •Move around often to prevent stiffness and to lessen swelling. Do not sit or stand for long periods of time.      •Pay attention to any changes in your symptoms.      •Contact a health care provider if you have edema that starts suddenly or is getting worse, especially if you are pregnant or have a medical condition.      •Get help right away if you develop shortness of breath, especially when lying down.      This information is not intended to replace advice given to you by your health care provider. Make sure you discuss any questions you have with your health care provider.        Urinary Tract Infection in Women    WHAT YOU NEED TO KNOW:    A urinary tract infection (UTI) is caused by bacteria that get inside your urinary tract. Most bacteria that enter your urinary tract come out when you urinate. If the bacteria stay in your urinary tract, you may get an infection. Your urinary tract includes your kidneys, ureters, bladder, and urethra. Urine is made in your kidneys, and it flows from the ureters to the bladder. Urine leaves the bladder through the urethra. A UTI is more common in your lower urinary tract, which includes your bladder and urethra.     Female Urinary System         DISCHARGE INSTRUCTIONS:    Return to the emergency department if:   •You are urinating very little or not at all.      •You have a high fever with shaking chills.       •You have side or back pain that gets worse.      Call your doctor if:   •You have a fever.      •You do not feel better after 2 days of taking antibiotics.      •You are vomiting.       •You have questions or concerns about your condition or care.      Medicines:   •Antibiotics help fight a bacterial infection. If you have UTIs often (called recurrent UTIs), you may be given antibiotics to take regularly. You will be given directions for when and how to use antibiotics. The goal is to prevent UTIs but not cause antibiotic resistance by using antibiotics too often.      •Medicines may be given to decrease pain and burning when you urinate. They will also help decrease the feeling that you need to urinate often. These medicines will make your urine orange or red.      •Take your medicine as directed. Contact your healthcare provider if you think your medicine is not helping or if you have side effects. Tell him or her if you are allergic to any medicine. Keep a list of the medicines, vitamins, and herbs you take. Include the amounts, and when and why you take them. Bring the list or the pill bottles to follow-up visits. Carry your medicine list with you in case of an emergency.      Follow up with your doctor as directed: Write down your questions so you remember to ask them during your visits.     Prevent another UTI:   •Empty your bladder often. Urinate and empty your bladder as soon as you feel the need. Do not hold your urine for long periods of time.      •Wipe from front to back after you urinate or have a bowel movement. This will help prevent germs from getting into your urinary tract through your urethra.      •Drink liquids as directed. Ask how much liquid to drink each day and which liquids are best for you. You may need to drink more liquids than usual to help flush out the bacteria. Do not drink alcohol, caffeine, or citrus juices. These can irritate your bladder and increase your symptoms. Your healthcare provider may recommend cranberry juice to help prevent a UTI.      •Urinate after you have sex. This can help flush out bacteria passed during sex.      •Do not douche or use feminine deodorants. These can change the chemical balance in your vagina.      •Change sanitary pads or tampons often. This will help prevent germs from getting into your urinary tract.       •Talk to your healthcare provider about your birth control method. You may need to change your method if it is increasing your risk for UTIs.      •Wear cotton underwear and clothes that are loose. Tight pants and nylon underwear can trap moisture and cause bacteria to grow.      •Vaginal estrogen may be recommended. This medicine helps prevent UTIs in women who have gone through menopause or are in ariel-menopause.      •Do pelvic muscle exercises often. Pelvic muscle exercises may help you start and stop urinating. Strong pelvic muscles may help you empty your bladder easier. Squeeze these muscles tightly for 5 seconds like you are trying to hold back urine. Then relax for 5 seconds. Gradually work up to squeezing for 10 seconds. Do 3 sets of 15 repetitions a day, or as directed.

## 2022-02-08 NOTE — ED ADULT NURSE NOTE - OBJECTIVE STATEMENT
Patient c/o B/L swelling to lower extremities, left leg notable more swollen than the right. Swelling also noted in left ankle. States swelling goes down with elevation but as soon as she walks around the swelling returns. Denies SOB or chest pain. Patient also wants to check her urine, she is awaiting an appointment with her urologist, patient has B/L nephrostomy tubes in place.

## 2022-02-08 NOTE — ED PROVIDER NOTE - CARE PLAN
1 Principal Discharge DX:	UTI (urinary tract infection), bacterial  Secondary Diagnosis:	Edema leg

## 2022-02-08 NOTE — ED PROVIDER NOTE - CARE PROVIDER_API CALL
Delfino Leung  INTERNAL MEDICINE  [address] [facility], [address] [place] [address] [phone]  Phone: [phone]  Fax: [phone]  Follow Up Time: 4-6 Days

## 2022-02-08 NOTE — ED ADULT TRIAGE NOTE - CHIEF COMPLAINT QUOTE
p/w left leg swelling since Saturday, worse with standing. b/l draining bags tubing is "pulling, I want to get it looked at"

## 2022-02-08 NOTE — ED PROVIDER NOTE - NEUROLOGICAL, MLM
LINKS immunization registry updated  Care Everywhere updated  Health Maintenance updated  Chart reviewed for overdue Proactive [facility] ([facility]) health maintenance testing (CRS, Breast Ca, Diabetic Eye Exam)   Orders entered:N/A   Alert and oriented, no focal deficits, no motor or sensory deficits.

## 2022-02-08 NOTE — ED ADULT NURSE NOTE - DRUG PRE-SCREENING (DAST -1)
Registered Dietitian Follow-Up     Patient Profile Reviewed                           Yes [x]   No []     Nutrition History Previously Obtained        Yes [x]  No []       Pertinent Subjective Information:     Pertinent Medical Interventions:  Acute kidney injury on CKD stage 4 sec to ATN, metabolic acidosis, hyponatremia. Noted elevated potassium, and phosphorus. Decision made for Amputation Surgery LLE today - Surgery this Friday 6/19/2020. per podiatry     Diet order: No active diet order at time of assessment d/t pt in OR for Exc Dbx of st/b, Left foot     Anthropometrics:  - Ht.  - Wt.  - %wt change  - BMI  - IBW     Pertinent Lab Data:     Pertinent Meds:     Physical Findings:  - Appearance:  - GI function:  - Tubes:  - Oral/Mouth cavity:  - Skin:     Nutrition Requirements  Weight Used:     Estimated Energy Needs    Continue []  Adjust []  Adjusted Energy Recommendations:   kcal/day        Estimated Protein Needs    Continue []  Adjust []  Adjusted Protein Recommendations:   gm/day        Estimated Fluid Needs        Continue []  Adjust []  Adjusted Fluid Recommendations:   mL/day     Nutrient Intake:        [] Previous Nutrition Diagnosis:            [] Ongoing          [] Resolved    [] No active nutrition diagnosis identified at this time     Nutrition Diagnostic #1  Problem:  Etiology:  Statement:     Nutrition Diagnostic #2  Problem:  Etiology:  Statement:     Nutrition Intervention      Goal/Expected Outcome:     Indicator/Monitoring: Registered Dietitian Follow-Up     Patient Profile Reviewed                           Yes [x]   No []     Nutrition History Previously Obtained        Yes [x]  No []       Pertinent Subjective Information:     Pertinent Medical Interventions:  Acute kidney injury on CKD stage 4 sec to ATN, metabolic acidosis, hyponatremia. Noted elevated potassium, and phosphorus. Decision made for Amputation Surgery LLE today - Surgery this Friday 6/19/2020. Per podiatry, in OR today for wound debridement of left foot.      Diet order: DASH/TLC, consistent carbohydrate (no evening snack), 1,000ml fluid restriction, Lacto Veg (accepts milk and milk products)      Anthropometrics:  - Ht. 66"  - Wt.   151.4lbs (6/18) -- wt changes may be due to bed scale error, fluid shifts noted pt on lasix  145.5lbs (6/10)   132.9lbs (6/4)  - %wt change  - BMI 24.1  - IBW 142lbs     Pertinent Lab Data: (6/18) Mg 2.5, Na 126, K 5.1, , Cr 4.5, , eGFR 12, Phos 8.1, A1c 10.6% (6/6)  CAPILLARY BLOOD GLUCOSE  POCT Blood Glucose.: 244 mg/dL (18 Jun 2020 07:19)  POCT Blood Glucose.: 154 mg/dL (18 Jun 2020 04:39)  POCT Blood Glucose.: 147 mg/dL (18 Jun 2020 02:22)  POCT Blood Glucose.: 191 mg/dL (17 Jun 2020 21:52)  POCT Blood Glucose.: 150 mg/dL (17 Jun 2020 16:24)       Pertinent Meds: aspirin, abx, dextrose 5% @50ml/hr, lactated ringers @ 75ml/hr, maalox, lipitor, phoslo, folic acid, lasix, zofran, protonix, sodium bicarbonate, lokelma     Physical Findings:  - Appearance: No new edema since initial RD assessment noted in EMR  - GI function: Last BM 6/18  - Tubes: N/A  - Oral/Mouth cavity: No chewing or swallowing difficulties   - Skin: L heel, diabetic ulcer      Nutrition Requirements  Weight Used: 66kg -- continued from initial RD assessment 6/11     Estimated Energy Needs    Continue [x]  Adjust []  calorie 1586-1719kcal (MSJ x 1.2-1.3 AF)     Estimated Protein Needs    Continue [x]  Adjust []  protein 59-66g (0.9-1.0g/kg CBW) for CKD -- not on RRT at this time     Estimated Fluid Needs        Continue [x]  Adjust []  fluid per LIP     Nutrient Intake: Per EMR was consuming ~75% of his meal tray. Will continue to monitor intake after BKA     [] Previous Nutrition Diagnosis:            [] Ongoing          [] Resolved    [x] No active nutrition diagnosis identified at this time        Nutrition Intervention: meals and snacks     Goal/Expected Outcome: Pt to meet greater than 75% of estimated needs once diet is advanced from NPO      Indicator/Monitoring: diet order, energy intake, NFPF, body comp, renal/glucose profile    Recommendations:  1. Continue current diet order as tolerated  2. Add no concentrated potassium and phosphorus to diet order Registered Dietitian Follow-Up     Patient Profile Reviewed                           Yes [x]   No []     Nutrition History Previously Obtained        Yes [x]  No []       Pertinent Subjective Information: Pt sleeping and unarousable during RD visit. Per RN flow sheets, pt's PO intake was at 75% prior to OR visit. Will continue to follow up to monitor PO intake pending BKA.      Pertinent Medical Interventions:  Acute kidney injury on CKD stage 4 sec to ATN, metabolic acidosis, hyponatremia. Noted elevated potassium, and phosphorus. Decision made for Amputation Surgery LLE today - Surgery this Friday 6/19/2020. Per podiatry, in OR today for wound debridement of left foot.      Diet order: DASH/TLC, consistent carbohydrate (no evening snack), 1,000ml fluid restriction, Lacto Veg (accepts milk and milk products)      Anthropometrics:  - Ht. 66"  - Wt.   151.4lbs (6/18) -- wt changes may be due to bed scale error, fluid shifts noted pt on lasix  145.5lbs (6/10)   132.9lbs (6/4)  - %wt change  - BMI 24.1  - IBW 142lbs     Pertinent Lab Data: (6/18) Mg 2.5, Na 126, K 5.1, , Cr 4.5, , eGFR 12, Phos 8.1, A1c 10.6% (6/6)  CAPILLARY BLOOD GLUCOSE  POCT Blood Glucose.: 244 mg/dL (18 Jun 2020 07:19)  POCT Blood Glucose.: 154 mg/dL (18 Jun 2020 04:39)  POCT Blood Glucose.: 147 mg/dL (18 Jun 2020 02:22)  POCT Blood Glucose.: 191 mg/dL (17 Jun 2020 21:52)  POCT Blood Glucose.: 150 mg/dL (17 Jun 2020 16:24)       Pertinent Meds: aspirin, abx, dextrose 5% @50ml/hr, lactated ringers @ 75ml/hr, maalox, lipitor, phoslo, folic acid, lasix, zofran, protonix, sodium bicarbonate, lokelma     Physical Findings:  - Appearance: No new edema since initial RD assessment noted in EMR  - GI function: Last BM 6/18  - Tubes: N/A  - Oral/Mouth cavity: No chewing or swallowing difficulties   - Skin: L heel, diabetic ulcer      Nutrition Requirements  Weight Used: 66kg -- continued from initial RD assessment 6/11     Estimated Energy Needs    Continue [x]  Adjust []  calorie 1586-1719kcal (MSJ x 1.2-1.3 AF)     Estimated Protein Needs    Continue [x]  Adjust []  protein 59-66g (0.9-1.0g/kg CBW) for CKD -- not on RRT at this time     Estimated Fluid Needs        Continue [x]  Adjust []  fluid per LIP     Nutrient Intake: Per EMR was consuming ~75% of his meal tray. Will continue to monitor intake after BKA     [x] No active nutrition diagnosis identified at this time        Nutrition Intervention: meals and snacks     Goal/Expected Outcome: Pt to meet greater than 75% of estimated needs once diet is advanced from NPO      Indicator/Monitoring: diet order, energy intake, NFPF, body comp, renal/glucose profile    Recommendations:  1. Continue current diet order as tolerated  2. Add no concentrated potassium and phosphorus to diet order Registered Dietitian Follow-Up     Patient Profile Reviewed                           Yes [x]   No []     Nutrition History Previously Obtained        Yes [x]  No []       Pertinent Subjective Information: Pt sleeping and unarousable during RD visit. Per RN flow sheets, pt's PO intake was at 75% prior to OR visit. Will continue to follow up to monitor PO intake pending BKA.      Pertinent Medical Interventions:  Acute kidney injury on CKD stage 4 sec to ATN, metabolic acidosis, hyponatremia. Noted elevated potassium, and phosphorus. Decision made for Amputation Surgery LLE today - Surgery this Friday 6/19/2020. Per podiatry, in OR today for wound debridement of left foot.      Diet order: DASH/TLC, consistent carbohydrate (no evening snack), 1,000ml fluid restriction, Lacto Veg (accepts milk and milk products)      Anthropometrics:  - Ht. 66"  - Wt.   151.4lbs (6/18) -- wt changes may be due to bed scale error, fluid shifts noted pt on lasix  145.5lbs (6/10)   132.9lbs (6/4)  - %wt change  - BMI 24.1  - IBW 142lbs     Pertinent Lab Data: (6/18) H/H 7.3/21.2, Mg 2.5, Na 126, K 5.1, , Cr 4.5, , eGFR 12, Phos 8.1, A1c 10.6% (6/6)  CAPILLARY BLOOD GLUCOSE  POCT Blood Glucose.: 244 mg/dL (18 Jun 2020 07:19)  POCT Blood Glucose.: 154 mg/dL (18 Jun 2020 04:39)  POCT Blood Glucose.: 147 mg/dL (18 Jun 2020 02:22)  POCT Blood Glucose.: 191 mg/dL (17 Jun 2020 21:52)  POCT Blood Glucose.: 150 mg/dL (17 Jun 2020 16:24)       Pertinent Meds: aspirin, abx, dextrose 5% @50ml/hr, lactated ringers @ 75ml/hr, maalox, lipitor, phoslo, folic acid, lasix, zofran, protonix, sodium bicarbonate, lokelma     Physical Findings:  - Appearance: No new edema since initial RD assessment noted in EMR  - GI function: Last BM 6/18  - Tubes: N/A  - Oral/Mouth cavity: No chewing or swallowing difficulties   - Skin: L heel, diabetic ulcer      Nutrition Requirements  Weight Used: 66kg -- continued from initial RD assessment 6/11     Estimated Energy Needs    Continue [x]  Adjust []  calorie 1586-1719kcal (MSJ x 1.2-1.3 AF)     Estimated Protein Needs    Continue [x]  Adjust []  protein 59-66g (0.9-1.0g/kg CBW) for CKD -- not on RRT at this time     Estimated Fluid Needs        Continue [x]  Adjust []  fluid per LIP     Nutrient Intake: Per EMR was consuming ~75% of his meal tray. Will continue to monitor intake after BKA     [x] No active nutrition diagnosis identified at this time        Nutrition Intervention: meals and snacks     Goal/Expected Outcome: Pt to meet greater than 75% of estimated needs once diet is advanced from NPO      Indicator/Monitoring: diet order, energy intake, NFPF, body comp, renal/glucose profile    Recommendations:  1. Continue current diet order as tolerated  2. Add no concentrated potassium and phosphorus to diet order Registered Dietitian Follow-Up     Patient Profile Reviewed                           Yes [x]   No []     Nutrition History Previously Obtained        Yes [x]  No []       Pertinent Subjective Information: Pt sleeping and unarousable during RD visit. Per RN flow sheets, pt's PO intake was at 75% prior to OR visit. Will continue to follow up to monitor PO intake pending BKA. Per previous RD assessment noted that pt is lacto-ovo (consumes milk and milk products and eggs). Currently lacto-veg in EMR.      Pertinent Medical Interventions:  Acute kidney injury on CKD stage 4 sec to ATN, metabolic acidosis, hyponatremia. Noted elevated potassium, and phosphorus. Decision made for Amputation Surgery LLE today - Surgery this Friday 6/19/2020. Per podiatry, in OR today for wound debridement of left foot.      Diet order: DASH/TLC, consistent carbohydrate (no evening snack), 1,000ml fluid restriction, Lacto Veg (accepts milk and milk products)      Anthropometrics:  - Ht. 66"  - Wt.   151.4lbs (6/18) -- wt changes may be due to bed scale error, fluid shifts noted pt on lasix  145.5lbs (6/10)   132.9lbs (6/4)  - %wt change  - BMI 24.1  - IBW 142lbs     Pertinent Lab Data: (6/18) H/H 7.3/21.2, Mg 2.5, Na 126, K 5.1, , Cr 4.5, , eGFR 12, Phos 8.1, A1c 10.6% (6/6)  CAPILLARY BLOOD GLUCOSE  POCT Blood Glucose.: 244 mg/dL (18 Jun 2020 07:19)  POCT Blood Glucose.: 154 mg/dL (18 Jun 2020 04:39)  POCT Blood Glucose.: 147 mg/dL (18 Jun 2020 02:22)  POCT Blood Glucose.: 191 mg/dL (17 Jun 2020 21:52)  POCT Blood Glucose.: 150 mg/dL (17 Jun 2020 16:24)       Pertinent Meds: aspirin, abx, dextrose 5% @50ml/hr, lactated ringers @ 75ml/hr, maalox, lipitor, phoslo, folic acid, lasix, zofran, protonix, sodium bicarbonate, lokelma     Physical Findings:  - Appearance: No new edema since initial RD assessment noted in EMR  - GI function: Last BM 6/18  - Tubes: N/A  - Oral/Mouth cavity: No chewing or swallowing difficulties   - Skin: L heel, diabetic ulcer      Nutrition Requirements  Weight Used: 66kg -- continued from initial RD assessment 6/11     Estimated Energy Needs    Continue [x]  Adjust []  calorie 1586-1719kcal (MSJ x 1.2-1.3 AF)     Estimated Protein Needs    Continue [x]  Adjust []  protein 59-66g (0.9-1.0g/kg CBW) for CKD -- not on RRT at this time     Estimated Fluid Needs        Continue [x]  Adjust []  fluid per LIP     Nutrient Intake: Per EMR was consuming ~75% of his meal tray. Will continue to monitor intake after BKA     Nutrition Diagnostic #1  P: altered lab values   E: worsening chronic kidney disease  S: phosphorus 8.1, potassium 5.1        Nutrition Intervention: meals and snacks     Goal/Expected Outcome: Pt to meet greater than 75% of estimated needs once diet is advanced from NPO      Indicator/Monitoring: diet order, energy intake, NFPF, body comp, renal/glucose profile    Recommendations:  1. Continue current diet order as tolerated  2. Add no concentrated potassium and phosphorus to diet order  2. Add lacto-ovo diet modification as pt reported he eats milk, milk products, and eggs per initial RD assessment Registered Dietitian Follow-Up     Patient Profile Reviewed                           Yes [x]   No []     Nutrition History Previously Obtained        Yes [x]  No []       Pertinent Subjective Information: Pt sleeping and unarousable during RD visit. Per RN pt has been experiencing fecal incontinence the past few days. RN spoke with daughter who notes that pt won't eat because he is embarrassed that he needs to be changed. Daughter dose not think it is appetite related. Daughter was encouraged to bring in home cooked food to entice pt to eat. Will continue to follow up to monitor PO intake pending BKA. Per previous RD assessment noted that pt is lacto-ovo (consumes milk and milk products and eggs). Currently lacto-veg in EMR. Noted pt received brief DASH/TLC, consistent carb diet education from initial RD assessment.      Pertinent Medical Interventions:  Acute kidney injury on CKD stage 4 sec to ATN, metabolic acidosis, hyponatremia. Noted elevated potassium, and phosphorus. Decision made for Amputation Surgery LLE today - Surgery this Friday 6/19/2020. Per podiatry, in OR today for wound debridement of left foot.      Diet order: DASH/TLC, consistent carbohydrate (no evening snack), 1,000ml fluid restriction, Lacto Veg (accepts milk and milk products)      Anthropometrics:  - Ht. 66"  - Wt.   151.4lbs (6/18) -- wt changes may be due to bed scale error, fluid shifts noted pt on lasix  145.5lbs (6/10)   132.9lbs (6/4)  - %wt change  - BMI 24.1  - IBW 142lbs     Pertinent Lab Data: (6/18) H/H 7.3/21.2, Mg 2.5, Na 126, K 5.1, , Cr 4.5, , eGFR 12, Phos 8.1, A1c 10.6% (6/6)  CAPILLARY BLOOD GLUCOSE  POCT Blood Glucose.: 244 mg/dL (18 Jun 2020 07:19)  POCT Blood Glucose.: 154 mg/dL (18 Jun 2020 04:39)  POCT Blood Glucose.: 147 mg/dL (18 Jun 2020 02:22)  POCT Blood Glucose.: 191 mg/dL (17 Jun 2020 21:52)  POCT Blood Glucose.: 150 mg/dL (17 Jun 2020 16:24)       Pertinent Meds: aspirin, abx, dextrose 5% @50ml/hr, lactated ringers @ 75ml/hr, maalox, lipitor, phoslo, folic acid, lasix, zofran, protonix, sodium bicarbonate, lokelma     Physical Findings:  - Appearance: No new edema since initial RD assessment noted in EMR  - GI function: Last BM 6/18  - Tubes: N/A  - Oral/Mouth cavity: No chewing or swallowing difficulties   - Skin: L heel, diabetic ulcer      Nutrition Requirements  Weight Used: 66kg -- continued from initial RD assessment 6/11     Estimated Energy Needs    Continue [x]  Adjust []  calorie 1586-1719kcal (MSJ x 1.2-1.3 AF)     Estimated Protein Needs    Continue [x]  Adjust []  protein 59-66g (0.9-1.0g/kg CBW) for CKD -- not on RRT at this time     Estimated Fluid Needs        Continue [x]  Adjust []  fluid per LIP     Nutrient Intake: Per EMR was consuming ~75% of his meal tray. Will continue to monitor intake after BKA     Nutrition Diagnostic #1  P: altered lab values   E: worsening chronic kidney disease  S: phosphorus 8.1, potassium 5.1        Nutrition Intervention: meals and snacks     Goal/Expected Outcome: Pt to meet greater than 75% of estimated needs once diet is advanced from NPO      Indicator/Monitoring: diet order, energy intake, NFPF, body comp, renal/glucose profile    Recommendations:  1. Continue current diet order as tolerated  2. Add no concentrated potassium and phosphorus to diet order  2. Add lacto-ovo diet modification as pt reported he eats milk, milk products, and eggs per initial RD assessment  3. Encourage PO intake Registered Dietitian Follow-Up     Patient Profile Reviewed                           Yes [x]   No []     Nutrition History Previously Obtained        Yes [x]  No []       Pertinent Subjective Information: Pt sleeping and unarousable during RD visit. Per RN pt has been experiencing fecal incontinence the past few days. RN spoke with daughter who notes that pt won't eat because he is embarrassed that he needs to be changed. Daughter dose not think it is appetite related. Daughter was encouraged to bring in home cooked food to entice pt to eat. Will continue to follow up to monitor PO intake pending BKA. Per previous RD assessment noted that pt is lacto-ovo (consumes milk and milk products and eggs). Currently lacto-veg in EMR. Noted pt received brief DASH/TLC, consistent carb diet education from initial RD assessment.      Pertinent Medical Interventions:  Acute kidney injury on CKD stage 4 sec to ATN, metabolic acidosis, hyponatremia. Noted elevated potassium, and phosphorus. Decision made for Amputation Surgery LLE today - Surgery this Friday 6/19/2020. Per podiatry, in OR today for wound debridement of left foot.      Diet order: DASH/TLC, consistent carbohydrate (no evening snack), 1,000ml fluid restriction, Lacto Veg (accepts milk and milk products)      Anthropometrics:  - Ht. 66"  - Wt.   151.4lbs (6/18) -- wt changes may be due to fluid shifts noted pt on lasix  145.5lbs (6/10)   132.9lbs (6/4)  - %wt change  - BMI 24.1  - IBW 142lbs     Pertinent Lab Data: (6/18) H/H 7.3/21.2, Mg 2.5, Na 126, K 5.1, , Cr 4.5, , eGFR 12, Phos 8.1, A1c 10.6% (6/6)  CAPILLARY BLOOD GLUCOSE  POCT Blood Glucose.: 244 mg/dL (18 Jun 2020 07:19)  POCT Blood Glucose.: 154 mg/dL (18 Jun 2020 04:39)  POCT Blood Glucose.: 147 mg/dL (18 Jun 2020 02:22)  POCT Blood Glucose.: 191 mg/dL (17 Jun 2020 21:52)  POCT Blood Glucose.: 150 mg/dL (17 Jun 2020 16:24)       Pertinent Meds: aspirin, abx, dextrose 5% @50ml/hr, lactated ringers @ 75ml/hr, maalox, lipitor, phoslo, folic acid, lasix, zofran, protonix, sodium bicarbonate, lokelma     Physical Findings:  - Appearance: No new edema since initial RD assessment noted in EMR  - GI function: Last BM 6/18  - Tubes: N/A  - Oral/Mouth cavity: No chewing or swallowing difficulties   - Skin: L heel, diabetic ulcer      Nutrition Requirements  Weight Used: 66kg -- continued from initial RD assessment 6/11     Estimated Energy Needs    Continue [x]  Adjust []  calorie 1586-1719kcal (MSJ x 1.2-1.3 AF)     Estimated Protein Needs    Continue [x]  Adjust []  protein 59-66g (0.9-1.0g/kg CBW) for CKD -- not on RRT at this time     Estimated Fluid Needs        Continue [x]  Adjust []  fluid per LIP     Nutrient Intake: Per EMR was consuming ~75% of his meal tray. Will continue to monitor intake after BKA     Nutrition Diagnostic #1  P: altered lab values   E: worsening chronic kidney disease  S: phosphorus 8.1, potassium 5.1        Nutrition Intervention: meals and snacks     Goal/Expected Outcome: Pt to meet greater than 75% of estimated needs in the next 4 days     Indicator/Monitoring: diet order, energy intake, NFPF, body comp, renal/glucose profile    Recommendations:  1. Continue current diet order as tolerated  2. Add no concentrated potassium and phosphorus to diet order  2. Add lacto-ovo diet modification as pt reported he eats milk, milk products, and eggs per initial RD assessment  3. Encourage PO intake Statement Selected

## 2022-02-08 NOTE — ED ADULT NURSE NOTE - MODE OF DISCHARGE
Patient presented after waiting 30 minutes with no reaction to  injections. Discharged from clinic.    Yuki Tai RN       Ambulatory

## 2022-02-24 DIAGNOSIS — Z43.6 ENCOUNTER FOR ATTENTION TO OTHER ARTIFICIAL OPENINGS OF URINARY TRACT: ICD-10-CM

## 2022-03-14 ENCOUNTER — NON-APPOINTMENT (OUTPATIENT)
Age: 52
End: 2022-03-14

## 2022-03-17 ENCOUNTER — NON-APPOINTMENT (OUTPATIENT)
Age: 52
End: 2022-03-17

## 2022-04-11 ENCOUNTER — OUTPATIENT (OUTPATIENT)
Dept: OUTPATIENT SERVICES | Facility: HOSPITAL | Age: 52
LOS: 1 days | Discharge: HOME | End: 2022-04-11

## 2022-04-11 DIAGNOSIS — U07.1 COVID-19: ICD-10-CM

## 2022-04-11 LAB
FLUAV AG NPH QL: SIGNIFICANT CHANGE UP
FLUBV AG NPH QL: SIGNIFICANT CHANGE UP
SARS-COV-2 RNA SPEC QL NAA+PROBE: SIGNIFICANT CHANGE UP

## 2022-04-13 ENCOUNTER — OUTPATIENT (OUTPATIENT)
Dept: OUTPATIENT SERVICES | Facility: HOSPITAL | Age: 52
LOS: 1 days | Discharge: HOME | End: 2022-04-13
Payer: MEDICAID

## 2022-04-13 ENCOUNTER — APPOINTMENT (OUTPATIENT)
Age: 52
End: 2022-04-13

## 2022-04-13 ENCOUNTER — TRANSCRIPTION ENCOUNTER (OUTPATIENT)
Age: 52
End: 2022-04-13

## 2022-04-13 VITALS
DIASTOLIC BLOOD PRESSURE: 82 MMHG | SYSTOLIC BLOOD PRESSURE: 134 MMHG | TEMPERATURE: 97 F | HEART RATE: 76 BPM | RESPIRATION RATE: 17 BRPM | OXYGEN SATURATION: 99 %

## 2022-04-13 VITALS
HEART RATE: 94 BPM | TEMPERATURE: 98 F | SYSTOLIC BLOOD PRESSURE: 153 MMHG | RESPIRATION RATE: 18 BRPM | OXYGEN SATURATION: 97 % | DIASTOLIC BLOOD PRESSURE: 89 MMHG

## 2022-04-13 DIAGNOSIS — N13.30 UNSPECIFIED HYDRONEPHROSIS: ICD-10-CM

## 2022-04-13 PROCEDURE — 50435 EXCHANGE NEPHROSTOMY CATH: CPT | Mod: 50

## 2022-04-13 NOTE — ASU DISCHARGE PLAN (ADULT/PEDIATRIC) - NS MD DC FALL RISK RISK
For information on Fall & Injury Prevention, visit: [email or web address] OR  [email or web address] OR  https://www.cdc.gov/steadi/patient.html

## 2022-04-13 NOTE — PRE PROCEDURE NOTE - PRE PROCEDURE EVALUATION
Interventional Radiology    HPI: 51y Female presents for routine PCN exchange    Allergies: NKDA    Data:    T(C): --  HR: --  BP: --  RR: --  SpO2: --    Exam  General: No acute distress  Chest: Non labored breathing  Abdomen: Non-distended  Extremities: No swelling, warm      Plan: 51y Female presents for PCN Exchange  -Risks/Benefits/alternatives explained with the patient and/or healthcare proxy and witnessed informed consent obtained.

## 2022-04-13 NOTE — ASU DISCHARGE PLAN (ADULT/PEDIATRIC) - ASU DC SPECIAL INSTRUCTIONSFT
PCN Exchange    Discharge Instructions  - You have had your PCN Exchanged  - Keep the area clean and dry.  - Do not soak in a tub or pool with the drain, however you may shower with the drain and dressing covered in plastic wrap.  - Do not put traction on the drain and be careful that the drain does not get accidentally dislodged or kinked.  - Record output daily from the drain. Empty the bag as needed.  - You may resume your normal diet.  - You may resume your normal medications.  - It is normal to experience some pain over the site for the next few days. You may take apply ice to the area (20 minutes on, 20 minutes off) and take Tylenol for that pain. Do not take more frequently than every 6 hours and do not exceed more than 3000mg of Tylenol in a 24 hour period.    Notify your primary physician and/or Interventional Radiology IMMEDIATELY if you experience any of the following       - Fever of 100.4F  or 38C       - Chills or Rigors/ Shakes       - Swelling and/or Redness in the area of the puncture site       - Worsening Pain       - Blood soaked bandages or worsening bleeding       - Lightheadedness and/or dizziness upon standing       - Chest Pain/ Tightness       - Shortness of Breath       - Difficulty walking    If you have a problem that you believe requires IMMEDIATE attention, please go to your NEAREST Emergency Room. If you believe your problem can safely wait until you speak to a physician, please call Interventional Radiology for any concerns.    During Normal Weekday Business Hours- You can contact the Interventional Radiology department during normal business hours via telephone.  During Evenings and Weekends- If you need to contact Interventional Radiology during off hours, do so by calling the hospital and requesting to be connected to the Interventional Radiologist on call.

## 2022-06-01 NOTE — PHYSICAL THERAPY INITIAL EVALUATION ADULT - PLANNED THERAPY INTERVENTIONS, PT EVAL
Initial Anesthesia Post-op Note    Patient: Bozena Beltran  Procedure(s) Performed: BRONCHOSCOPY  Anesthesia type: MAC    Vitals Value Taken Time   Temp 36.3 Â°C (97.3 Â°F) 06/01/22 1110   Pulse 88 06/01/22 1205   Resp 18 06/01/22 1205   SpO2 95 % 06/01/22 1205   /73 06/01/22 1205         Patient Location: PACU Phase 1  Post-op Vital Signs:stable  Level of Consciousness: awake and alert  Respiratory Status: spontaneous ventilation  Cardiovascular stable  Hydration: euvolemic  Pain Management: adequately controlled  Handoff: Handoff to receiving nurse was performed and questions were answered  Vomiting: none  Nausea: None  Airway Patency:patent  Post-op Assessment: no complications, patient tolerated procedure well with no complications, no evidence of recall and dentition within defined limits      No complications documented. balance training/bed mobility training/gait training/strengthening/transfer training

## 2022-06-04 ENCOUNTER — EMERGENCY (EMERGENCY)
Facility: HOSPITAL | Age: 52
LOS: 0 days | Discharge: ANOTHER HOSPITAL | End: 2022-06-04
Attending: STUDENT IN AN ORGANIZED HEALTH CARE EDUCATION/TRAINING PROGRAM
Payer: MEDICAID

## 2022-06-04 VITALS
RESPIRATION RATE: 30 BRPM | TEMPERATURE: 99 F | SYSTOLIC BLOOD PRESSURE: 125 MMHG | DIASTOLIC BLOOD PRESSURE: 80 MMHG | OXYGEN SATURATION: 97 % | HEART RATE: 130 BPM

## 2022-06-04 VITALS
RESPIRATION RATE: 17 BRPM | WEIGHT: 218.04 LBS | OXYGEN SATURATION: 98 % | HEIGHT: 66 IN | HEART RATE: 162 BPM | SYSTOLIC BLOOD PRESSURE: 145 MMHG | DIASTOLIC BLOOD PRESSURE: 92 MMHG | TEMPERATURE: 103 F

## 2022-06-04 DIAGNOSIS — K66.8 OTHER SPECIFIED DISORDERS OF PERITONEUM: ICD-10-CM

## 2022-06-04 DIAGNOSIS — R50.81 FEVER PRESENTING WITH CONDITIONS CLASSIFIED ELSEWHERE: ICD-10-CM

## 2022-06-04 DIAGNOSIS — R00.0 TACHYCARDIA, UNSPECIFIED: ICD-10-CM

## 2022-06-04 DIAGNOSIS — R50.9 FEVER, UNSPECIFIED: ICD-10-CM

## 2022-06-04 DIAGNOSIS — I10 ESSENTIAL (PRIMARY) HYPERTENSION: ICD-10-CM

## 2022-06-04 DIAGNOSIS — Z87.19 PERSONAL HISTORY OF OTHER DISEASES OF THE DIGESTIVE SYSTEM: ICD-10-CM

## 2022-06-04 DIAGNOSIS — M54.50 LOW BACK PAIN, UNSPECIFIED: ICD-10-CM

## 2022-06-04 DIAGNOSIS — Z20.822 CONTACT WITH AND (SUSPECTED) EXPOSURE TO COVID-19: ICD-10-CM

## 2022-06-04 LAB
ALBUMIN SERPL ELPH-MCNC: 2.2 G/DL — LOW (ref 3.3–5)
ALP SERPL-CCNC: 81 U/L — SIGNIFICANT CHANGE UP (ref 40–120)
ALT FLD-CCNC: 7 U/L — LOW (ref 12–78)
ANION GAP SERPL CALC-SCNC: 6 MMOL/L — SIGNIFICANT CHANGE UP (ref 5–17)
ANISOCYTOSIS BLD QL: SIGNIFICANT CHANGE UP
APTT BLD: 37.5 SEC — HIGH (ref 27.5–35.5)
AST SERPL-CCNC: 16 U/L — SIGNIFICANT CHANGE UP (ref 15–37)
BASOPHILS # BLD AUTO: 0 K/UL — SIGNIFICANT CHANGE UP (ref 0–0.2)
BASOPHILS NFR BLD AUTO: 0 % — SIGNIFICANT CHANGE UP (ref 0–2)
BILIRUB SERPL-MCNC: 0.9 MG/DL — SIGNIFICANT CHANGE UP (ref 0.2–1.2)
BUN SERPL-MCNC: 15 MG/DL — SIGNIFICANT CHANGE UP (ref 7–23)
CALCIUM SERPL-MCNC: 8.7 MG/DL — SIGNIFICANT CHANGE UP (ref 8.5–10.1)
CHLORIDE SERPL-SCNC: 100 MMOL/L — SIGNIFICANT CHANGE UP (ref 96–108)
CO2 SERPL-SCNC: 31 MMOL/L — SIGNIFICANT CHANGE UP (ref 22–31)
CREAT SERPL-MCNC: 0.84 MG/DL — SIGNIFICANT CHANGE UP (ref 0.5–1.3)
DACRYOCYTES BLD QL SMEAR: SLIGHT — SIGNIFICANT CHANGE UP
EGFR: 83 ML/MIN/1.73M2 — SIGNIFICANT CHANGE UP
EOSINOPHIL # BLD AUTO: 0 K/UL — SIGNIFICANT CHANGE UP (ref 0–0.5)
EOSINOPHIL NFR BLD AUTO: 0 % — SIGNIFICANT CHANGE UP (ref 0–6)
GLUCOSE SERPL-MCNC: 105 MG/DL — HIGH (ref 70–99)
HCT VFR BLD CALC: 24.5 % — LOW (ref 34.5–45)
HGB BLD-MCNC: 7.5 G/DL — LOW (ref 11.5–15.5)
HYPOCHROMIA BLD QL: SIGNIFICANT CHANGE UP
INR BLD: 1.29 RATIO — HIGH (ref 0.88–1.16)
LACTATE SERPL-SCNC: 0.9 MMOL/L — SIGNIFICANT CHANGE UP (ref 0.7–2)
LG PLATELETS BLD QL AUTO: SLIGHT — SIGNIFICANT CHANGE UP
LYMPHOCYTES # BLD AUTO: 0.18 K/UL — LOW (ref 1–3.3)
LYMPHOCYTES # BLD AUTO: 44 % — SIGNIFICANT CHANGE UP (ref 13–44)
MACROCYTES BLD QL: SLIGHT — SIGNIFICANT CHANGE UP
MANUAL SMEAR VERIFICATION: SIGNIFICANT CHANGE UP
MCHC RBC-ENTMCNC: 27.1 PG — SIGNIFICANT CHANGE UP (ref 27–34)
MCHC RBC-ENTMCNC: 30.6 G/DL — LOW (ref 32–36)
MCV RBC AUTO: 88.4 FL — SIGNIFICANT CHANGE UP (ref 80–100)
MICROCYTES BLD QL: SLIGHT — SIGNIFICANT CHANGE UP
MONOCYTES # BLD AUTO: 0.02 K/UL — SIGNIFICANT CHANGE UP (ref 0–0.9)
MONOCYTES NFR BLD AUTO: 4 % — SIGNIFICANT CHANGE UP (ref 2–14)
NEUTROPHILS # BLD AUTO: 0.17 K/UL — LOW (ref 1.8–7.4)
NEUTROPHILS NFR BLD AUTO: 40 % — LOW (ref 43–77)
NRBC # BLD: 0 /100 — SIGNIFICANT CHANGE UP (ref 0–0)
NRBC # BLD: SIGNIFICANT CHANGE UP /100 WBCS (ref 0–0)
OVALOCYTES BLD QL SMEAR: SIGNIFICANT CHANGE UP
PLAT MORPH BLD: NORMAL — SIGNIFICANT CHANGE UP
PLATELET # BLD AUTO: 202 K/UL — SIGNIFICANT CHANGE UP (ref 150–400)
PLATELET CLUMP BLD QL SMEAR: ABNORMAL
POIKILOCYTOSIS BLD QL AUTO: SIGNIFICANT CHANGE UP
POLYCHROMASIA BLD QL SMEAR: SLIGHT — SIGNIFICANT CHANGE UP
POTASSIUM SERPL-MCNC: 3 MMOL/L — LOW (ref 3.5–5.3)
POTASSIUM SERPL-SCNC: 3 MMOL/L — LOW (ref 3.5–5.3)
PROT SERPL-MCNC: 7 GM/DL — SIGNIFICANT CHANGE UP (ref 6–8.3)
PROTHROM AB SERPL-ACNC: 15.4 SEC — HIGH (ref 10.5–13.4)
RAPID RVP RESULT: SIGNIFICANT CHANGE UP
RBC # BLD: 2.77 M/UL — LOW (ref 3.8–5.2)
RBC # FLD: 21.3 % — HIGH (ref 10.3–14.5)
RBC BLD AUTO: SIGNIFICANT CHANGE UP
SARS-COV-2 RNA SPEC QL NAA+PROBE: SIGNIFICANT CHANGE UP
SCHISTOCYTES BLD QL AUTO: SLIGHT — SIGNIFICANT CHANGE UP
SODIUM SERPL-SCNC: 137 MMOL/L — SIGNIFICANT CHANGE UP (ref 135–145)
TARGETS BLD QL SMEAR: SIGNIFICANT CHANGE UP
TOXIC GRANULES BLD QL SMEAR: PRESENT — SIGNIFICANT CHANGE UP
VARIANT LYMPHS # BLD: 12 % — HIGH (ref 0–6)
WBC # BLD: 0.42 K/UL — CRITICAL LOW (ref 3.8–10.5)
WBC # FLD AUTO: 0.42 K/UL — CRITICAL LOW (ref 3.8–10.5)

## 2022-06-04 PROCEDURE — 71045 X-RAY EXAM CHEST 1 VIEW: CPT | Mod: 26

## 2022-06-04 PROCEDURE — 99285 EMERGENCY DEPT VISIT HI MDM: CPT

## 2022-06-04 PROCEDURE — 74176 CT ABD & PELVIS W/O CONTRAST: CPT | Mod: 26,MA

## 2022-06-04 PROCEDURE — 93010 ELECTROCARDIOGRAM REPORT: CPT

## 2022-06-04 RX ORDER — MEROPENEM 1 G/30ML
1000 INJECTION INTRAVENOUS ONCE
Refills: 0 | Status: COMPLETED | OUTPATIENT
Start: 2022-06-04 | End: 2022-06-04

## 2022-06-04 RX ORDER — MORPHINE SULFATE 50 MG/1
4 CAPSULE, EXTENDED RELEASE ORAL ONCE
Refills: 0 | Status: DISCONTINUED | OUTPATIENT
Start: 2022-06-04 | End: 2022-06-04

## 2022-06-04 RX ORDER — POTASSIUM CHLORIDE 20 MEQ
10 PACKET (EA) ORAL ONCE
Refills: 0 | Status: COMPLETED | OUTPATIENT
Start: 2022-06-04 | End: 2022-06-04

## 2022-06-04 RX ORDER — SODIUM CHLORIDE 9 MG/ML
2000 INJECTION, SOLUTION INTRAVENOUS ONCE
Refills: 0 | Status: COMPLETED | OUTPATIENT
Start: 2022-06-04 | End: 2022-06-04

## 2022-06-04 RX ORDER — KETOROLAC TROMETHAMINE 30 MG/ML
15 SYRINGE (ML) INJECTION ONCE
Refills: 0 | Status: DISCONTINUED | OUTPATIENT
Start: 2022-06-04 | End: 2022-06-04

## 2022-06-04 RX ORDER — ACETAMINOPHEN 500 MG
1000 TABLET ORAL ONCE
Refills: 0 | Status: COMPLETED | OUTPATIENT
Start: 2022-06-04 | End: 2022-06-04

## 2022-06-04 RX ORDER — VANCOMYCIN HCL 1 G
1000 VIAL (EA) INTRAVENOUS ONCE
Refills: 0 | Status: DISCONTINUED | OUTPATIENT
Start: 2022-06-04 | End: 2022-06-04

## 2022-06-04 RX ORDER — POTASSIUM CHLORIDE 20 MEQ
10 PACKET (EA) ORAL ONCE
Refills: 0 | Status: DISCONTINUED | OUTPATIENT
Start: 2022-06-04 | End: 2022-06-04

## 2022-06-04 RX ORDER — PIPERACILLIN AND TAZOBACTAM 4; .5 G/20ML; G/20ML
3.38 INJECTION, POWDER, LYOPHILIZED, FOR SOLUTION INTRAVENOUS ONCE
Refills: 0 | Status: COMPLETED | OUTPATIENT
Start: 2022-06-04 | End: 2022-06-04

## 2022-06-04 RX ORDER — HYDROMORPHONE HYDROCHLORIDE 2 MG/ML
1 INJECTION INTRAMUSCULAR; INTRAVENOUS; SUBCUTANEOUS ONCE
Refills: 0 | Status: DISCONTINUED | OUTPATIENT
Start: 2022-06-04 | End: 2022-06-04

## 2022-06-04 RX ORDER — ONDANSETRON 8 MG/1
4 TABLET, FILM COATED ORAL ONCE
Refills: 0 | Status: COMPLETED | OUTPATIENT
Start: 2022-06-04 | End: 2022-06-04

## 2022-06-04 RX ADMIN — Medication 15 MILLIGRAM(S): at 19:01

## 2022-06-04 RX ADMIN — PIPERACILLIN AND TAZOBACTAM 200 GRAM(S): 4; .5 INJECTION, POWDER, LYOPHILIZED, FOR SOLUTION INTRAVENOUS at 22:34

## 2022-06-04 RX ADMIN — Medication 400 MILLIGRAM(S): at 16:40

## 2022-06-04 RX ADMIN — Medication 100 MILLIEQUIVALENT(S): at 21:25

## 2022-06-04 RX ADMIN — HYDROMORPHONE HYDROCHLORIDE 1 MILLIGRAM(S): 2 INJECTION INTRAMUSCULAR; INTRAVENOUS; SUBCUTANEOUS at 22:34

## 2022-06-04 RX ADMIN — MEROPENEM 100 MILLIGRAM(S): 1 INJECTION INTRAVENOUS at 18:01

## 2022-06-04 RX ADMIN — MORPHINE SULFATE 4 MILLIGRAM(S): 50 CAPSULE, EXTENDED RELEASE ORAL at 19:01

## 2022-06-04 RX ADMIN — SODIUM CHLORIDE 2000 MILLILITER(S): 9 INJECTION, SOLUTION INTRAVENOUS at 16:40

## 2022-06-04 RX ADMIN — ONDANSETRON 4 MILLIGRAM(S): 8 TABLET, FILM COATED ORAL at 16:40

## 2022-06-04 RX ADMIN — Medication 100 MILLIEQUIVALENT(S): at 18:45

## 2022-06-04 RX ADMIN — MORPHINE SULFATE 4 MILLIGRAM(S): 50 CAPSULE, EXTENDED RELEASE ORAL at 16:39

## 2022-06-04 NOTE — ED PROVIDER NOTE - OBJECTIVE STATEMENT
Pt is a 52 year old female with PMH of HTN, b/l hydronephrosis with b/l nephrostomy tubes, exchanged in April by IR, cervical cancer unstaged, on chemo 5 days ago, with weekly VNS service noted fever and tachycardia to 140s. Pt c/o chills at 10 AM this morning. C/o chronic lower back pain. Hx of herniated disc in lower back, hydronephrosis due to cervical cancer and enlarged uterus causing mass effect on ureters. Pt able void. Denies purulent drainage from nephrostomy tube.

## 2022-06-04 NOTE — ED ADULT NURSE NOTE - OBJECTIVE STATEMENT
Pt presents to the ED complaining of weakness. Hx b/l nephrostomy tubes, unstaged cervical cancer, on chemo [id number]. Pt febrile/tachy in triage. complaining of constant back pain, chills and fever. Pt states she's able to void, but slowly.

## 2022-06-04 NOTE — ED ADULT TRIAGE NOTE - CHIEF COMPLAINT QUOTE
pt a&O x4 pt biba form home hx Cervical CA, receiving chemo, bilateral nephrostomy tubes. Pt c.o of generalized weakness, nausea, lower back pain. Hr 142, PVC on ems monitor.

## 2022-06-04 NOTE — ED PROVIDER NOTE - PROGRESS NOTE DETAILS
Pt signed out by Dr. Olvera pending CT and admission for neutropenic fever. CT shows pneumoperitoneum and probable perforated uterus. OB/GYN Dr. Ibarra consulted, recommends transfer. Patient is having her back pain, but no abdominal pain. BP stable, alert and oriented. Pt signed out by Dr. Olvera pending CT and admission for neutropenic fever. CT shows pneumoperitoneum and probable perforated uterus. OB/GYN Dr. Ibarra consulted, recommends transfer. Patient is having her back pain, but no abdominal pain. BP stable, alert and oriented. Pt stable for transfer.

## 2022-06-04 NOTE — ED PROVIDER NOTE - NSDESTINATION_ED_A_ED
"Clinic Administered Medication Documentation    Administrations This Visit     testosterone cypionate (DEPOTESTOSTERONE) injection 150 mg     Admin Date  05/19/2022 Action  Given Dose  150 mg Route  Intramuscular Site  Right Ventrogluteal Administered By  Madelin Norton RN    Ordering Provider: Sumaya Alonso MD    Patient Supplied?: Yes    Comments: right ventrogluteal                  Testosterone Documentation     Prior to injection, verified patient identity using patient's name and date of birth. Medication was administered. Please see MAR and medication order for additional information. Patient instructed to remain in clinic for 15 minutes, report any adverse reaction to staff immediately  and stay in clinic after the injection but patient declined.    Reminders     - Check vial for refills remaining and initiate refill request if no refills remain.      - Verify with patient that medication was paid for at pharmacy. If it was, check the \"patient supplied\" box on the MAR.     Was entire vial of medication used? No, The remainder 50MG of 200MG was discarded as unavoidable waste.  Vial/Syringe: Single dose vial  Expiration Date:  11/30/2023    Was this medication supplied by the patient? Yes, Medication was received directly from patient in a tamper proof bag (follow site specific policies)      Given Right ventrogluteal site    Madelin Norton RN      "
[facility]

## 2022-06-05 ENCOUNTER — INPATIENT (INPATIENT)
Facility: HOSPITAL | Age: 52
LOS: 44 days | Discharge: OTHER INSTITUTION | End: 2022-07-20
Attending: OBSTETRICS & GYNECOLOGY
Payer: MEDICAID

## 2022-06-05 VITALS
OXYGEN SATURATION: 96 % | TEMPERATURE: 101 F | SYSTOLIC BLOOD PRESSURE: 119 MMHG | DIASTOLIC BLOOD PRESSURE: 89 MMHG | HEART RATE: 140 BPM | RESPIRATION RATE: 18 BRPM | HEIGHT: 66 IN

## 2022-06-05 DIAGNOSIS — Z93.6 OTHER ARTIFICIAL OPENINGS OF URINARY TRACT STATUS: Chronic | ICD-10-CM

## 2022-06-05 DIAGNOSIS — K66.8 OTHER SPECIFIED DISORDERS OF PERITONEUM: ICD-10-CM

## 2022-06-05 LAB
ANION GAP SERPL CALC-SCNC: 16 MMOL/L — HIGH (ref 7–14)
ANISOCYTOSIS BLD QL: SLIGHT — SIGNIFICANT CHANGE UP
APPEARANCE UR: CLEAR — SIGNIFICANT CHANGE UP
APPEARANCE UR: CLEAR — SIGNIFICANT CHANGE UP
BACTERIA # UR AUTO: ABNORMAL
BACTERIA # UR AUTO: NEGATIVE — SIGNIFICANT CHANGE UP
BASOPHILS # BLD AUTO: 0 K/UL — SIGNIFICANT CHANGE UP (ref 0–0.2)
BASOPHILS NFR BLD AUTO: 0 % — SIGNIFICANT CHANGE UP (ref 0–2)
BILIRUB UR-MCNC: NEGATIVE — SIGNIFICANT CHANGE UP
BILIRUB UR-MCNC: NEGATIVE — SIGNIFICANT CHANGE UP
BLD GP AB SCN SERPL QL: SIGNIFICANT CHANGE UP
BUN SERPL-MCNC: 19 MG/DL — SIGNIFICANT CHANGE UP (ref 7–23)
CALCIUM SERPL-MCNC: 8.6 MG/DL — SIGNIFICANT CHANGE UP (ref 8.4–10.5)
CHLORIDE SERPL-SCNC: 99 MMOL/L — SIGNIFICANT CHANGE UP (ref 98–107)
CO2 SERPL-SCNC: 18 MMOL/L — LOW (ref 22–31)
COLOR SPEC: YELLOW — SIGNIFICANT CHANGE UP
COLOR SPEC: YELLOW — SIGNIFICANT CHANGE UP
CREAT SERPL-MCNC: 0.78 MG/DL — SIGNIFICANT CHANGE UP (ref 0.5–1.3)
DACRYOCYTES BLD QL SMEAR: SLIGHT — SIGNIFICANT CHANGE UP
DIFF PNL FLD: ABNORMAL
DIFF PNL FLD: ABNORMAL
EGFR: 90 ML/MIN/1.73M2 — SIGNIFICANT CHANGE UP
EOSINOPHIL # BLD AUTO: 0 K/UL — SIGNIFICANT CHANGE UP (ref 0–0.5)
EOSINOPHIL NFR BLD AUTO: 0 % — SIGNIFICANT CHANGE UP (ref 0–6)
EPI CELLS # UR: 2 /HPF — SIGNIFICANT CHANGE UP (ref 0–5)
EPI CELLS # UR: 2 /HPF — SIGNIFICANT CHANGE UP (ref 0–5)
GIANT PLATELETS BLD QL SMEAR: PRESENT — SIGNIFICANT CHANGE UP
GLUCOSE BLDC GLUCOMTR-MCNC: 97 MG/DL — SIGNIFICANT CHANGE UP (ref 70–99)
GLUCOSE SERPL-MCNC: 87 MG/DL — SIGNIFICANT CHANGE UP (ref 70–99)
GLUCOSE UR QL: NEGATIVE — SIGNIFICANT CHANGE UP
GLUCOSE UR QL: NEGATIVE — SIGNIFICANT CHANGE UP
HCG SERPL-ACNC: <5 MIU/ML — SIGNIFICANT CHANGE UP
HCT VFR BLD CALC: 25.4 % — LOW (ref 34.5–45)
HGB BLD-MCNC: 7.6 G/DL — LOW (ref 11.5–15.5)
HYALINE CASTS # UR AUTO: 1 /LPF — SIGNIFICANT CHANGE UP (ref 0–7)
HYPOCHROMIA BLD QL: SLIGHT — SIGNIFICANT CHANGE UP
IANC: 0.2 K/UL — LOW (ref 1.8–7.4)
KETONES UR-MCNC: NEGATIVE — SIGNIFICANT CHANGE UP
KETONES UR-MCNC: NEGATIVE — SIGNIFICANT CHANGE UP
LEUKOCYTE ESTERASE UR-ACNC: ABNORMAL
LEUKOCYTE ESTERASE UR-ACNC: ABNORMAL
LYMPHOCYTES # BLD AUTO: 0.22 K/UL — LOW (ref 1–3.3)
LYMPHOCYTES # BLD AUTO: 43.9 % — SIGNIFICANT CHANGE UP (ref 13–44)
MACROCYTES BLD QL: SLIGHT — SIGNIFICANT CHANGE UP
MAGNESIUM SERPL-MCNC: 1.9 MG/DL — SIGNIFICANT CHANGE UP (ref 1.6–2.6)
MANUAL SMEAR VERIFICATION: SIGNIFICANT CHANGE UP
MCHC RBC-ENTMCNC: 27.2 PG — SIGNIFICANT CHANGE UP (ref 27–34)
MCHC RBC-ENTMCNC: 29.9 GM/DL — LOW (ref 32–36)
MCV RBC AUTO: 91 FL — SIGNIFICANT CHANGE UP (ref 80–100)
METAMYELOCYTES # FLD: 3.8 % — HIGH (ref 0–1)
MONOCYTES # BLD AUTO: 0.09 K/UL — SIGNIFICANT CHANGE UP (ref 0–0.9)
MONOCYTES NFR BLD AUTO: 18.7 % — HIGH (ref 2–14)
MYELOCYTES NFR BLD: 2.8 % — HIGH (ref 0–0)
NEUTROPHILS # BLD AUTO: 0.15 K/UL — LOW (ref 1.8–7.4)
NEUTROPHILS NFR BLD AUTO: 25.2 % — LOW (ref 43–77)
NEUTS BAND # BLD: 5.6 % — SIGNIFICANT CHANGE UP (ref 0–6)
NITRITE UR-MCNC: NEGATIVE — SIGNIFICANT CHANGE UP
NITRITE UR-MCNC: NEGATIVE — SIGNIFICANT CHANGE UP
PH UR: 6.5 — SIGNIFICANT CHANGE UP (ref 5–8)
PH UR: 7 — SIGNIFICANT CHANGE UP (ref 5–8)
PHOSPHATE SERPL-MCNC: 3.8 MG/DL — SIGNIFICANT CHANGE UP (ref 2.5–4.5)
PLAT MORPH BLD: NORMAL — SIGNIFICANT CHANGE UP
PLATELET # BLD AUTO: 184 K/UL — SIGNIFICANT CHANGE UP (ref 150–400)
PLATELET COUNT - ESTIMATE: NORMAL — SIGNIFICANT CHANGE UP
POIKILOCYTOSIS BLD QL AUTO: SLIGHT — SIGNIFICANT CHANGE UP
POLYCHROMASIA BLD QL SMEAR: SLIGHT — SIGNIFICANT CHANGE UP
POTASSIUM SERPL-MCNC: 4.6 MMOL/L — SIGNIFICANT CHANGE UP (ref 3.5–5.3)
POTASSIUM SERPL-SCNC: 4.6 MMOL/L — SIGNIFICANT CHANGE UP (ref 3.5–5.3)
PROT UR-MCNC: ABNORMAL
PROT UR-MCNC: ABNORMAL
RBC # BLD: 2.79 M/UL — LOW (ref 3.8–5.2)
RBC # FLD: 21 % — HIGH (ref 10.3–14.5)
RBC BLD AUTO: ABNORMAL
RBC CASTS # UR COMP ASSIST: 6 /HPF — HIGH (ref 0–4)
RBC CASTS # UR COMP ASSIST: 8 /HPF — HIGH (ref 0–4)
SCHISTOCYTES BLD QL AUTO: SLIGHT — SIGNIFICANT CHANGE UP
SODIUM SERPL-SCNC: 133 MMOL/L — LOW (ref 135–145)
SP GR SPEC: 1.01 — SIGNIFICANT CHANGE UP (ref 1–1.05)
SP GR SPEC: 1.01 — SIGNIFICANT CHANGE UP (ref 1–1.05)
SPHEROCYTES BLD QL SMEAR: SLIGHT — SIGNIFICANT CHANGE UP
UROBILINOGEN FLD QL: ABNORMAL
UROBILINOGEN FLD QL: ABNORMAL
WBC # BLD: 0.5 K/UL — CRITICAL LOW (ref 3.8–10.5)
WBC # FLD AUTO: 0.5 K/UL — CRITICAL LOW (ref 3.8–10.5)
WBC UR QL: 6 /HPF — HIGH (ref 0–5)
WBC UR QL: 7 /HPF — HIGH (ref 0–5)

## 2022-06-05 PROCEDURE — 93010 ELECTROCARDIOGRAM REPORT: CPT

## 2022-06-05 PROCEDURE — 71275 CT ANGIOGRAPHY CHEST: CPT | Mod: 26

## 2022-06-05 PROCEDURE — 99222 1ST HOSP IP/OBS MODERATE 55: CPT

## 2022-06-05 PROCEDURE — 99223 1ST HOSP IP/OBS HIGH 75: CPT | Mod: GC

## 2022-06-05 PROCEDURE — 99291 CRITICAL CARE FIRST HOUR: CPT

## 2022-06-05 PROCEDURE — 74177 CT ABD & PELVIS W/CONTRAST: CPT | Mod: 26

## 2022-06-05 PROCEDURE — 93010 ELECTROCARDIOGRAM REPORT: CPT | Mod: 77

## 2022-06-05 RX ORDER — AMLODIPINE BESYLATE 2.5 MG/1
10 TABLET ORAL DAILY
Refills: 0 | Status: DISCONTINUED | OUTPATIENT
Start: 2022-06-05 | End: 2022-06-09

## 2022-06-05 RX ORDER — SODIUM CHLORIDE 9 MG/ML
1000 INJECTION, SOLUTION INTRAVENOUS ONCE
Refills: 0 | Status: COMPLETED | OUTPATIENT
Start: 2022-06-05 | End: 2022-06-05

## 2022-06-05 RX ORDER — CEFEPIME 1 G/1
2000 INJECTION, POWDER, FOR SOLUTION INTRAMUSCULAR; INTRAVENOUS EVERY 8 HOURS
Refills: 0 | Status: DISCONTINUED | OUTPATIENT
Start: 2022-06-05 | End: 2022-06-08

## 2022-06-05 RX ORDER — IBUPROFEN 200 MG
600 TABLET ORAL EVERY 6 HOURS
Refills: 0 | Status: DISCONTINUED | OUTPATIENT
Start: 2022-06-05 | End: 2022-06-07

## 2022-06-05 RX ORDER — CEFEPIME 1 G/1
INJECTION, POWDER, FOR SOLUTION INTRAMUSCULAR; INTRAVENOUS
Refills: 0 | Status: DISCONTINUED | OUTPATIENT
Start: 2022-06-05 | End: 2022-06-08

## 2022-06-05 RX ORDER — SODIUM CHLORIDE 9 MG/ML
1000 INJECTION, SOLUTION INTRAVENOUS
Refills: 0 | Status: DISCONTINUED | OUTPATIENT
Start: 2022-06-05 | End: 2022-06-08

## 2022-06-05 RX ORDER — METOPROLOL TARTRATE 50 MG
100 TABLET ORAL DAILY
Refills: 0 | Status: DISCONTINUED | OUTPATIENT
Start: 2022-06-05 | End: 2022-06-09

## 2022-06-05 RX ORDER — HYDROMORPHONE HYDROCHLORIDE 2 MG/ML
1 INJECTION INTRAMUSCULAR; INTRAVENOUS; SUBCUTANEOUS ONCE
Refills: 0 | Status: DISCONTINUED | OUTPATIENT
Start: 2022-06-05 | End: 2022-06-05

## 2022-06-05 RX ORDER — CEFEPIME 1 G/1
2000 INJECTION, POWDER, FOR SOLUTION INTRAMUSCULAR; INTRAVENOUS ONCE
Refills: 0 | Status: COMPLETED | OUTPATIENT
Start: 2022-06-05 | End: 2022-06-05

## 2022-06-05 RX ORDER — METOCLOPRAMIDE HCL 10 MG
10 TABLET ORAL EVERY 8 HOURS
Refills: 0 | Status: DISCONTINUED | OUTPATIENT
Start: 2022-06-05 | End: 2022-06-10

## 2022-06-05 RX ORDER — ACETAMINOPHEN 500 MG
1000 TABLET ORAL ONCE
Refills: 0 | Status: COMPLETED | OUTPATIENT
Start: 2022-06-05 | End: 2022-06-05

## 2022-06-05 RX ORDER — FILGRASTIM 480MCG/1.6
500 VIAL (ML) INJECTION ONCE
Refills: 0 | Status: COMPLETED | OUTPATIENT
Start: 2022-06-05 | End: 2022-06-05

## 2022-06-05 RX ORDER — HEPARIN SODIUM 5000 [USP'U]/ML
5000 INJECTION INTRAVENOUS; SUBCUTANEOUS EVERY 8 HOURS
Refills: 0 | Status: DISCONTINUED | OUTPATIENT
Start: 2022-06-05 | End: 2022-06-08

## 2022-06-05 RX ORDER — SODIUM CHLORIDE 9 MG/ML
1000 INJECTION INTRAMUSCULAR; INTRAVENOUS; SUBCUTANEOUS ONCE
Refills: 0 | Status: COMPLETED | OUTPATIENT
Start: 2022-06-05 | End: 2022-06-05

## 2022-06-05 RX ORDER — INFLUENZA VIRUS VACCINE 15; 15; 15; 15 UG/.5ML; UG/.5ML; UG/.5ML; UG/.5ML
0.5 SUSPENSION INTRAMUSCULAR ONCE
Refills: 0 | Status: COMPLETED | OUTPATIENT
Start: 2022-06-05 | End: 2022-06-05

## 2022-06-05 RX ORDER — HYDROMORPHONE HYDROCHLORIDE 2 MG/ML
0.5 INJECTION INTRAMUSCULAR; INTRAVENOUS; SUBCUTANEOUS EVERY 4 HOURS
Refills: 0 | Status: DISCONTINUED | OUTPATIENT
Start: 2022-06-05 | End: 2022-06-05

## 2022-06-05 RX ORDER — FENTANYL CITRATE 50 UG/ML
50 INJECTION INTRAVENOUS ONCE
Refills: 0 | Status: DISCONTINUED | OUTPATIENT
Start: 2022-06-05 | End: 2022-06-05

## 2022-06-05 RX ORDER — ONDANSETRON 8 MG/1
4 TABLET, FILM COATED ORAL EVERY 6 HOURS
Refills: 0 | Status: DISCONTINUED | OUTPATIENT
Start: 2022-06-05 | End: 2022-06-10

## 2022-06-05 RX ORDER — FILGRASTIM 480MCG/1.6
5 VIAL (ML) INJECTION ONCE
Refills: 0 | Status: DISCONTINUED | OUTPATIENT
Start: 2022-06-05 | End: 2022-06-05

## 2022-06-05 RX ORDER — HYDROMORPHONE HYDROCHLORIDE 2 MG/ML
1 INJECTION INTRAMUSCULAR; INTRAVENOUS; SUBCUTANEOUS EVERY 4 HOURS
Refills: 0 | Status: DISCONTINUED | OUTPATIENT
Start: 2022-06-05 | End: 2022-06-07

## 2022-06-05 RX ORDER — ACETAMINOPHEN 500 MG
975 TABLET ORAL EVERY 6 HOURS
Refills: 0 | Status: DISCONTINUED | OUTPATIENT
Start: 2022-06-05 | End: 2022-06-08

## 2022-06-05 RX ADMIN — Medication 100 MILLIGRAM(S): at 05:23

## 2022-06-05 RX ADMIN — Medication 1000 MILLIGRAM(S): at 02:15

## 2022-06-05 RX ADMIN — HYDROMORPHONE HYDROCHLORIDE 1 MILLIGRAM(S): 2 INJECTION INTRAMUSCULAR; INTRAVENOUS; SUBCUTANEOUS at 18:38

## 2022-06-05 RX ADMIN — Medication 600 MILLIGRAM(S): at 06:20

## 2022-06-05 RX ADMIN — FENTANYL CITRATE 50 MICROGRAM(S): 50 INJECTION INTRAVENOUS at 03:23

## 2022-06-05 RX ADMIN — SODIUM CHLORIDE 1000 MILLILITER(S): 9 INJECTION, SOLUTION INTRAVENOUS at 09:27

## 2022-06-05 RX ADMIN — HYDROMORPHONE HYDROCHLORIDE 1 MILLIGRAM(S): 2 INJECTION INTRAMUSCULAR; INTRAVENOUS; SUBCUTANEOUS at 23:56

## 2022-06-05 RX ADMIN — FENTANYL CITRATE 50 MICROGRAM(S): 50 INJECTION INTRAVENOUS at 03:53

## 2022-06-05 RX ADMIN — Medication 600 MILLIGRAM(S): at 05:23

## 2022-06-05 RX ADMIN — HYDROMORPHONE HYDROCHLORIDE 1 MILLIGRAM(S): 2 INJECTION INTRAMUSCULAR; INTRAVENOUS; SUBCUTANEOUS at 14:29

## 2022-06-05 RX ADMIN — HYDROMORPHONE HYDROCHLORIDE 1 MILLIGRAM(S): 2 INJECTION INTRAMUSCULAR; INTRAVENOUS; SUBCUTANEOUS at 22:00

## 2022-06-05 RX ADMIN — HYDROMORPHONE HYDROCHLORIDE 1 MILLIGRAM(S): 2 INJECTION INTRAMUSCULAR; INTRAVENOUS; SUBCUTANEOUS at 10:30

## 2022-06-05 RX ADMIN — HEPARIN SODIUM 5000 UNIT(S): 5000 INJECTION INTRAVENOUS; SUBCUTANEOUS at 05:24

## 2022-06-05 RX ADMIN — Medication 1000 MILLIGRAM(S): at 01:45

## 2022-06-05 RX ADMIN — Medication 975 MILLIGRAM(S): at 05:23

## 2022-06-05 RX ADMIN — HYDROMORPHONE HYDROCHLORIDE 1 MILLIGRAM(S): 2 INJECTION INTRAMUSCULAR; INTRAVENOUS; SUBCUTANEOUS at 15:00

## 2022-06-05 RX ADMIN — HYDROMORPHONE HYDROCHLORIDE 1 MILLIGRAM(S): 2 INJECTION INTRAMUSCULAR; INTRAVENOUS; SUBCUTANEOUS at 19:09

## 2022-06-05 RX ADMIN — AMLODIPINE BESYLATE 10 MILLIGRAM(S): 2.5 TABLET ORAL at 05:24

## 2022-06-05 RX ADMIN — ONDANSETRON 4 MILLIGRAM(S): 8 TABLET, FILM COATED ORAL at 13:21

## 2022-06-05 RX ADMIN — HYDROMORPHONE HYDROCHLORIDE 1 MILLIGRAM(S): 2 INJECTION INTRAMUSCULAR; INTRAVENOUS; SUBCUTANEOUS at 21:16

## 2022-06-05 RX ADMIN — Medication 500 MICROGRAM(S): at 14:18

## 2022-06-05 RX ADMIN — HYDROMORPHONE HYDROCHLORIDE 1 MILLIGRAM(S): 2 INJECTION INTRAMUSCULAR; INTRAVENOUS; SUBCUTANEOUS at 10:14

## 2022-06-05 RX ADMIN — Medication 400 MILLIGRAM(S): at 01:15

## 2022-06-05 RX ADMIN — SODIUM CHLORIDE 150 MILLILITER(S): 9 INJECTION, SOLUTION INTRAVENOUS at 08:22

## 2022-06-05 RX ADMIN — CEFEPIME 100 MILLIGRAM(S): 1 INJECTION, POWDER, FOR SOLUTION INTRAMUSCULAR; INTRAVENOUS at 21:17

## 2022-06-05 RX ADMIN — Medication 975 MILLIGRAM(S): at 06:20

## 2022-06-05 RX ADMIN — FENTANYL CITRATE 50 MICROGRAM(S): 50 INJECTION INTRAVENOUS at 02:15

## 2022-06-05 RX ADMIN — CEFEPIME 100 MILLIGRAM(S): 1 INJECTION, POWDER, FOR SOLUTION INTRAMUSCULAR; INTRAVENOUS at 13:21

## 2022-06-05 RX ADMIN — SODIUM CHLORIDE 1000 MILLILITER(S): 9 INJECTION INTRAMUSCULAR; INTRAVENOUS; SUBCUTANEOUS at 01:14

## 2022-06-05 RX ADMIN — SODIUM CHLORIDE 150 MILLILITER(S): 9 INJECTION, SOLUTION INTRAVENOUS at 21:18

## 2022-06-05 RX ADMIN — CEFEPIME 100 MILLIGRAM(S): 1 INJECTION, POWDER, FOR SOLUTION INTRAMUSCULAR; INTRAVENOUS at 03:26

## 2022-06-05 RX ADMIN — FENTANYL CITRATE 50 MICROGRAM(S): 50 INJECTION INTRAVENOUS at 01:20

## 2022-06-05 RX ADMIN — HEPARIN SODIUM 5000 UNIT(S): 5000 INJECTION INTRAVENOUS; SUBCUTANEOUS at 21:16

## 2022-06-05 RX ADMIN — HEPARIN SODIUM 5000 UNIT(S): 5000 INJECTION INTRAVENOUS; SUBCUTANEOUS at 13:21

## 2022-06-05 NOTE — H&P ADULT - HISTORY OF PRESENT ILLNESS
Gyn Oncology Admission Note    52y  with recurrent cervical CA (per patient stage III) presenting to the ED as transfer from [place] a/w neutropenic fever. Patient reports she had last cycle of chemotherapy 5 days prior.  Patient had VNS care stop by yesterday and was noted to be tachycardic on vital signs and was sent to the ED.  She reports feeling weak + fatigued. + upper abdominal pain that worsens while having chills. She denies any vomiting, chest pain, SOB. Last BM yesterday morning which was normal.     At [facility] she was given Meropenem and Zosyn and was noted to be tachycardic to 140s and febrile to 38.9 and was thus transferred for further management.     MICU was consulted on patient with recommendation to continue with broad spectrum antibiotics. Patient receives Gyn Oncology care at [facility] [employer]). She reports that she initially was diagnosed with stage III cervical CA in  s/p VBRT + chemo but never had surgical intervention. She reports now being s/p 5 cycles of chemo, unsure of regimen but next due .    OB/GYN HISTORY:  x 1, sAB x 2. Denies any ovarian cysts, fibroids, STIs   PSHx: b/l PCN  and exchange  , hernia repair  PMHx:  HTN   All: No Known Allergies  Meds: Metoprolol 100mg qd, unsure of other medications  Psych: denies any anxiety or depression   Social: Denies any tobacco, alcohol, or illicit substance use                Gyn Oncology Admission Note    52y  with recurrent cervical CA (per patient stage III) presenting to the ED as transfer from [place] a/w neutropenic fever. Patient reports she had last cycle of chemotherapy 5 days prior.  Patient had VNS care stop by yesterday and was noted to be tachycardic on vital signs and was sent to the ED.  She reports feeling weak + fatigued. + upper abdominal pain that worsens while having chills. She denies any vomiting, chest pain, SOB. Last BM yesterday morning which was normal.     At [facility] she was given Meropenem and Zosyn and was noted to be tachycardic to 140s and febrile to 39.5 and was thus transferred for further management.     MICU was consulted on patient with recommendation to continue with broad spectrum antibiotics. Patient receives Gyn Oncology care at [facility] [employer] [place]). She reports that she initially was diagnosed with stage III cervical CA in  s/p VBRT + chemo but never had surgical intervention. She reports now being s/p 5 cycles of chemo, unsure of regimen but next due .    OB/GYN HISTORY:  x 1, sAB x 2. Denies any ovarian cysts, fibroids, STIs   PSHx: b/l PCN  and exchange  , hernia repair  PMHx:  HTN   All: No Known Allergies  Meds: Metoprolol 100mg qd, unsure of other medications  Psych: denies any anxiety or depression   Social: Denies any tobacco, alcohol, or illicit substance use

## 2022-06-05 NOTE — H&P ADULT - ASSESSMENT
52y  with recurrent cervical CA (per patient stage III) presenting to the ED as transfer from [place] a/w neutropenic fever. CTAP significant for pneumoperitoneum to the right of the uterus.     Neuro: PO pain meds.   CV: continue to monitor tachycardia. Hx of HTN, Lopressor PRN. f/u AM CBC.  Pulm: Saturating well on room air, encourage oob/amb  GI: Continue regular diet  :  Voiding spontaneously  Heme: HSQ and SCDs for DVT ppx  FEN: LR@100.  replete electrolytes prn   ID:   - Tmax 39  @  - Cefepime (-)  s/p Meropenem + Zosyn ()   - f/u Bcx, Ucx ()  Endo: No active issues   Dispo: admission for management of neutropenic fever     d/w Peter Aiken PGY-6  Shasta Cosme PGY-2 52y  with recurrent cervical CA (per patient stage III) presenting to the ED as transfer from [place] a/w neutropenic fever. CTAP significant for pneumoperitoneum to the right of the uterus.     Neuro: PO pain meds.   CV: continue to monitor tachycardia. Hx of HTN, Lopressor PRN. f/u AM CBC.  Pulm: Saturating well on room air, encourage oob/amb  GI: Continue regular diet  :  Voiding spontaneously  Heme: HSQ and SCDs for DVT ppx  FEN: LR@100.  replete electrolytes prn   ID:   - Tmax 39.5  @1552  - Cefepime (-)  s/p Meropenem + Zosyn ()   - f/u Bcx, Ucx (), AM CBC   Endo: No active issues   Dispo: admission for management of neutropenic fever     d/w Peter Aiken PGY-6  Shasta Cosme PGY-2 52y  with recurrent cervical CA (per patient stage III) presenting to the ED as transfer from [place] a/w neutropenic fever. CTAP significant for pneumoperitoneum to the right of the uterus.     Neuro: PO pain meds.   CV: continue to monitor tachycardia. Hx of HTN, Lopressor PRN. f/u AM CBC.  Pulm: Saturating well on room air, encourage oob/amb  GI: Continue regular diet  :  Voiding spontaneously  Heme: HSQ and SCDs for DVT ppx  FEN: LR@100.  replete electrolytes prn   ID:   - Tmax 39.5  @1552  - Cefepime (-)  s/p Meropenem + Zosyn ()   - f/u Bcx, Ucx (), AM CBC   Endo: No active issues   Dispo: admission for management of neutropenic fever     d/w Peter Aiken PGY-6    Shasta Cosme PGY-2    GYN ONC Fellow Addendum:  Chart reviewed. Pt seen and evaluated. 51 yo with recurrent cervical cancer receiving chemotherapy with 3 agents (presumably platinum, taxol, avastin) with cycle 5 given on  presented to [place] ER with abdominal pain and elevated heart rate identified by home nurse. Found to be severely neutropenic and febrile to 39.5. CT scan showed limited visualization of the known cervical cancer due to the lack of intravenous contrast. Increased soft tissue and inflammatory change adjacent to the lower uterine segment/cervix with associated fluid and gas distended endometrium. Pneumoperitoneum with slightly greater foci of free air to the right of the uterus with potential source the uterus or cervix possibly at the level of the cervical cancer. Descending and sigmoid colitis with the distal sigmoid colon abutting the right uterine wall. She was given zosyn and meropenem and transferred to [facility].    Pt persistently tachycardic to 130s. No further fevers. On exam, abd soft, nondistended, mildly TTP, -rebound, -guarding.     Febrile Neutropenia  - Cefepime 2g q8H, F/u BCx, UCx  - Aggressive fluid resuscitation  - , given risk factors will give neupogen  - CT chest to r/o PE as cause of tachycardia  - CT A/P with IV contrast to better evaluate mass. Likely tumor necrosis rather than pneumoperitoneum given benign abdomen.  - DVT ppx: lovenox    D/w Dr. Lillymer  A. Kredentser, PGY6

## 2022-06-05 NOTE — ED ADULT TRIAGE NOTE - CHIEF COMPLAINT QUOTE
alert oriented transfer fron [place]  c/o fever and tachycardia  in triage Temporal Arteritis 100.7 dx with pneumoperitoneum at [facility]   c/o low back and abd pain   PMHx cervical Ca on chemo  left 18g helock intact LR infusing  right 20g heplock  was given K riders at [facility]

## 2022-06-05 NOTE — ED ADULT NURSE NOTE - CHIEF COMPLAINT QUOTE
alert oriented transfer fron [place]  c/o fever and tachycardia  in triage Temporal Arteritis 100.7 dx with pneumoperitoneum at [place]   c/o low back and abd pain   PMHx cervical Ca on chemo  left 18g helock intact LR infusing  right 20g heplock  was given K riders at [place]

## 2022-06-05 NOTE — PATIENT PROFILE ADULT - FALL HARM RISK - RISK INTERVENTIONS

## 2022-06-05 NOTE — ED PROVIDER NOTE - CLINICAL SUMMARY MEDICAL DECISION MAKING FREE TEXT BOX
52 year old female with PMH of HTN, b/l hydronephrosis with b/l nephrostomy tubes, exchanged in April by IR, cervical cancer unstaged, on chemo 5 days ago, with weekly VNS service noted fever and tachycardia to 140s found to have pneumoperitoneum at [facility] and transferred to [facility] for OBGYN consult and recs. Pt has pain at this time and found to have fever 100.3F here. Will provide IVF, IV tylenol, pain meds for pain and make NPO for possible OR. OBGYN consulted and aware. Pt has tachycardia but appears otherwise vitally stable. Already received IVAbx at OSH prior to arrival. Will monitor closely and likely admit to GYN service.

## 2022-06-05 NOTE — ED ADULT NURSE NOTE - OBJECTIVE STATEMENT
Pt arrives as transfer from Pt arrives as transfer from [facility], pt arrives with 18g iv to Left AC and 20g iv to Right AC placed on 6/4/22.  Pt arrives febrile and tachy.  Transfer for OBGYN consult due to CT findings.  Pt seen by VNS and told to go to hospital due to fever and HR.  Pt assessed by Attending and medicated as per EMAR.  Urine is dark but pt reports it was yellow earlier today.  Pt c/o pain to lower back and abd.

## 2022-06-05 NOTE — CHART NOTE - NSCHARTNOTEFT_GEN_A_CORE
GYN ONC Fellow:  CT A/P read with stable pneumoperitoneum. Discussed findings and clinical picture with Radiology. Likely due to tumor necrosis; however, will defer to final read in AM. With benign exam and stability of imaging low likelihood of perforated viscus. Will continue to monitor closely.    GRIS Mckeon, PGY6

## 2022-06-05 NOTE — CHART NOTE - NSCHARTNOTEFT_GEN_A_CORE
*charting delayed 2/2 clinical duties*    Pt reports feel tired with unchanged and mild upper abdominal pain. No peritoneal signs (rebound, guarding) on abdominal exam. Persistent tachycardia with stable blood pressures. EKG with sinus tachycardia and non-specific t-wave abnormalities. CTAP discussed with radiology, free intraperitoneal air with foci in endometrium and to right of uterus with defect in uterine wall. Loop of bowel overlaying uterus with thickening, can not r/o perforation given lack of PO contrast. CTA repeated given inadequate study, no evidence fo clot in proximal arteries. Distal arteries with filling defect, possibly 2/2 timing of contrast though radiology resident deferred to attending evaluation (Rads=Dr Almazan). Will repeat EKG and f/u final CT reads in AM.    Joelle Garcia, PGY-2  d/w Dr Aiken *charting delayed 2/2 clinical duties*    Pt reports feel tired with unchanged and mild upper abdominal pain. No peritoneal signs (rebound, guarding) on abdominal exam. Persistent tachycardia with stable blood pressures. EKG with sinus tachycardia and non-specific t-wave abnormalities. CTAP discussed with radiology, free intraperitoneal air with foci in endometrium and to right of uterus with defect in uterine wall. Loop of bowel overlaying uterus with thickening, can not r/o perforation given lack of PO contrast. CTA repeated given inadequate study, no evidence fo clot in proximal arteries. Distal arteries with filling defect, possibly 2/2 timing of contrast though radiology resident deferred to attending evaluation (Rads=Dr Almazan). Will repeat EKG and f/u final CT reads in AM.    Joelle Garcia, PGY-2  d/w Dr Aiken    addaye: repeat EKG: sinus tachycardia with occasional PVC

## 2022-06-05 NOTE — PATIENT PROFILE ADULT - NSPROPASSIVESMOKEEXPOSURE_GEN_A_NUR
Unknown Griseofulvin Counseling:  I discussed with the patient the risks of griseofulvin including but not limited to photosensitivity, cytopenia, liver damage, nausea/vomiting and severe allergy.  The patient understands that this medication is best absorbed when taken with a fatty meal (e.g., ice cream or french fries).

## 2022-06-05 NOTE — H&P ADULT - NSHPLABSRESULTS_GEN_ALL_CORE
LABS:                        7.5    0.42  )-----------( 202      ( 04 Jun 2022 16:35 )             24.5     06-04    137  |  100  |  15  ----------------------------<  105<H>  3.0<L>   |  31  |  0.84    Ca    8.7      04 Jun 2022 16:35    TPro  7.0  /  Alb  2.2<L>  /  TBili  0.9  /  DBili  x   /  AST  16  /  ALT  7<L>  /  AlkPhos  81  06-04    PT/INR - ( 04 Jun 2022 16:35 )   PT: 15.4 sec;   INR: 1.29 ratio         PTT - ( 04 Jun 2022 16:35 )  PTT:37.5 sec    RADIOLOGY & ADDITIONAL STUDIES:  < from: CT Abdomen and Pelvis No Cont (06.04.22 @ 20:58) >    INTERPRETATION:  CLINICAL INFORMATION: Weakness, sepsis and back pain.   Bilateral hydronephrosis and bilateral nephrostomy tubes last exchanged   in April. Unstaged cervical cancer with last chemotherapy 5 days ago.    COMPARISON: CT of the abdomen and pelvis from 12/22/2021.    CONTRAST/COMPLICATIONS:  IV Contrast: None  Evaluation of the visceral organs is limited without   intravenous contrast  Oral Contrast: None  Complications: None reported at time of study completion    PROCEDURE:  CT of the Abdomen and Pelvis was performed.  Sagittal and coronal reformats were performed.    FINDINGS:  LOWER CHEST: Bibasilar subsegmental atelectasis. 1.3 cm round airspace   opacity in the right lower lobe (series 2, image 12) previously more   triangular in configuration.    LIVER: Hepatic cysts.  BILE DUCTS: Normal caliber.  GALLBLADDER: Within normal limits.  SPLEEN: Within normal limits.  PANCREAS: Within normal limits.  ADRENALS: Indeterminate 1.3 cm left adrenal nodule. Right adrenal gland   within normal limits.  KIDNEYS/URETERS: Bilateral percutaneous nephrostomy tubes with pigtails   in the renal pelvis. Focus of gas in the right renal pelvis likely post   procedural in nature. No hydronephrosis.    BLADDER: Within normal limits.  REPRODUCTIVE ORGANS: Calcified anterior uterine fibroid. Distended   endometrium to 3.6 cm with fluid and gas. Limited visualization of the   known cervical cancer due to the lack of intravenous contrast. Increased   soft tissue and inflammatory change adjacent to the lower uterine   segment/cervix.    BOWEL: Small hiatal hernia. Circumferential wall thickening of the   descending and sigmoid colon compatible with a colitis. Distal sigmoid   colon abuts the right uterine wall. Moderate amount of stool in the   colon. No bowel obstruction. Appendix is not visualized.  PERITONEUM: Moderate volume of abdominal and pelvic ascites.   Pneumoperitoneum with slightly greater foci of of free air to the right   of the uterus. Previously demonstrated omental nodularity likely obscured   by the ascites.  VESSELS: Within normal limits.  RETROPERITONEUM/LYMPH NODES: Retroperitoneal lymphadenopathy measuring up   to 2.0 x 1.5 cm.  ABDOMINAL WALL: Small supraumbilical hernia containing fat and fluid.   Generalized anasarca.  BONES: Mild degenerative changes of the spine.    IMPRESSION:  1. Limited visualization of the known cervical cancer due to thelack of   intravenous contrast. Increased soft tissue and inflammatory change   adjacent to the lower uterine segment/cervix with associated fluid and   gas distended endometrium.  2. Pneumoperitoneum with slightly greater foci of of free air to the   right of the uterus with potential source the uterus or cervix possibly   at the level of the cervical cancer.  3. Descending and sigmoid colitis with the distal sigmoid colon abutting   the right uterine wall.  4. Moderate volume abdominal and pelvic ascites.  5. Round airspace opacity in the right lower lobe for which a pulmonary   nodule secondary to metastatic disease is not excluded.    Findings were discussed with Dr. Garcia 6/4/2022 9:55 PM by Dr. Coe with   read back confirmation.

## 2022-06-05 NOTE — CONSULT NOTE ADULT - ASSESSMENT
52F with HTN, b/l hydronephrosis s/p b/l NT's (placed Dec 2021 s/p exchange Apr 2022), cervical cancer on chemotherapy transferred from [facility] for GYN evaluation of pneumoperitoneum possibly 2/2 perforated uterus. MICU consulted for sepsis.     #Sepsis   -Pt currently hemodynamically stable, -140's   -Bcx/Ucx collected, s/p abx   -CT AP with endometrial fluid/gas, pneumoperitoneum, ascites, and colitis.   -Would continue with broad spectrum abx, source likely abdominal 2/2 possibly perforated uterus  -Further workup per GYN team  -If not amenable to GYN intervention, would recommend surgery consult     Patient not currently a candidate for MICU. Please reconsult as needed.     Mary Anne Fonseca, PGY-2  Available on [employer] Teams

## 2022-06-05 NOTE — H&P ADULT - NSHPSOCIALHISTORY_GEN_ALL_CORE
Psych: denies any anxiety or depression   Social: Denies any tobacco, alcohol, or illicit substance use

## 2022-06-05 NOTE — H&P ADULT - ATTENDING COMMENTS
patient presenting with neutropenia - continue cefepime  cx pending  CT chest PE protocol given tachycardia  Fluid rescusitation  CT with pneumoperitoneum surrounding the uterus, likely tumor necrosis, not peritoneal  will re-eval abd/pelvis with contrast CT  will discuss palliative care given 3rd line of chemo  no aggressive measures given disease status

## 2022-06-05 NOTE — ED PROVIDER NOTE - CROS ED RESP ALL NEG
negative... Bi-Rhombic Flap Text: The defect edges were debeveled with a #15 scalpel blade.  Given the location of the defect and the proximity to free margins a bi-rhombic flap was deemed most appropriate.  Using a sterile surgical marker, an appropriate rhombic flap was drawn incorporating the defect. The area thus outlined was incised deep to adipose tissue with a #15 scalpel blade.  The skin margins were undermined to an appropriate distance in all directions utilizing iris scissors.

## 2022-06-05 NOTE — ED PROVIDER NOTE - CRITICAL CARE ATTENDING CONTRIBUTION TO CARE
Upon my evaluation, this patient had a high probability of imminent or life-threatening deterioration due to PNEUMOPERITONEUM which required my direct attention, intervention, and personal management.  The patient has a  medical condition that impairs one or more vital organ systems.  Frequent personal assessment and adjustment of medical interventions was performed.      I have personally provided 33 minutes of critical care time exclusive of time spent on separately billable procedures. Time includes review of laboratory data, radiology results, discussion with consultants, patient and family; monitoring for potential decompensation, as well as time spent retrieving data and reviewing the chart and documenting the visit. Interventions were performed as documented above. Upon my evaluation, this patient had a high probability of imminent or life-threatening deterioration due to PNEUMOPERITONEUM and TACHYCARDIA which required my direct attention, intervention, and personal management.  The patient has a  medical condition that impairs one or more vital organ systems.  Frequent personal assessment and adjustment of medical interventions was performed.      I have personally provided 33 minutes of critical care time exclusive of time spent on separately billable procedures. Time includes review of laboratory data, radiology results, discussion with consultants, patient and family; monitoring for potential decompensation, as well as time spent retrieving data and reviewing the chart and documenting the visit. Interventions were performed as documented above.

## 2022-06-05 NOTE — H&P ADULT - NSHPPHYSICALEXAM_GEN_ALL_CORE
Vital Signs Last 24 Hrs  T(C): 37.4 (05 Jun 2022 02:52), Max: 39.5 (04 Jun 2022 15:52)  T(F): 99.3 (05 Jun 2022 02:52), Max: 103.1 (04 Jun 2022 15:52)  HR: 125 (05 Jun 2022 02:52) (125 - 162)  BP: 121/74 (05 Jun 2022 02:52) (119/89 - 145/92)  BP(mean): --  RR: 19 (05 Jun 2022 02:52) (17 - 30)  SpO2: 100% (05 Jun 2022 02:52) (96% - 100%)    PHYSICAL EXAM:    GENERAL: NAD, well-developed  HEAD:  Atraumatic, Normocephalic  EYES:  conjunctiva and sclera clear  ENMT:  Moist mucous membranes, Good dentition, No lesions  NECK: Supple  NERVOUS SYSTEM:  Alert & Oriented X3, Good concentration  CHEST/LUNG: Clear to percussion bilaterally  HEART: tachycardic, normal rhythm; No murmurs, rubs, or gallops  ABDOMEN: Soft, mildly distended; Bowel sounds present, No rebound, No guarding. Mild tenderness to palpation in lower abdomen  EXTREMITIES:  + b/l 2+ LE edema  SKIN: No rashes or lesions

## 2022-06-05 NOTE — RAPID RESPONSE TEAM SUMMARY - NSSITUATIONBACKGROUNDRRT_GEN_ALL_CORE
RN team called rapid response due to HR in 130s. Patient noted to have /110, otherwise VS stable and patient seen and evaluated with Keri Gavin PGY-4 and  Dr. Barajas. Patient denies chest pain, SOB.

## 2022-06-05 NOTE — ED PROVIDER NOTE - OBJECTIVE STATEMENT
51 yo F with Past Medical History of HTN, b/l hydronephrosis with b/l nephrostomy tubes, exchanged in April by IR, cervical cancer unstaged, on chemo 5 days ago, with weekly VNS service noted fever and tachycardia to 140s. Pt c/o chills at 10 AM this morning. C/o chronic lower back pain. Hx of herniated disc in lower back, hydronephrosis due to cervical cancer and enlarged uterus causing mass effect on ureters. Pt able void. Denies purulent drainage from nephrostomy tube. Pt was seen at [facility] and transferred to [facility] for OBGYN to see as there was pneumoperitoneum found on CT and possible source is perforated uterus from CA. Pt states she has 8/10 pain in back and 10/10 in abd/pelv.

## 2022-06-05 NOTE — CONSULT NOTE ADULT - SUBJECTIVE AND OBJECTIVE BOX
CHIEF COMPLAINT: Fever + tachycardia     HPI: 52F with HTN, b/l hydronephrosis s/p b/l NT's (placed Dec 2021 s/p exchange Apr 2022), cervical cancer on chemotherapy transferred from [facility] after p/w fever to 101 and tachycardia to 140's per visiting RN. Per patient, she received chemotherapy 5 days ago and afterwards she stated she felt fatigued for 2 days but otherwise in her usual state of health.1 day prior, she noted some chills. Denies CP, palpitations, SOB, n/v/d. She has had exacerbation of her abdominal pain and lower back pain. Denies abdominal distension, dysuria, change in vaginal discharge, flank pain, change in NT output.     At [facility], patient had a CT AP, which showed pneumoperitoneum possibly 2/2 perforated uterus. Patient septic, febrile to 102 and tachycardic to 140's. Bcx were collected and she received meropenem and zosyn. Thus far, patient has received 3L IVF and also ordered for vancomycin. Gyn consulted in ED. MICU consulted for sepsis.     PAST MEDICAL & SURGICAL HISTORY:  HTN (hypertension)      No significant past surgical history          FAMILY HISTORY:  No pertinent family history in first degree relatives        SOCIAL HISTORY:      Allergies  No Known Allergies  Intolerances            OBJECTIVE:  ICU Vital Signs Last 24 Hrs  T(C): 37.4 (05 Jun 2022 02:16), Max: 39.5 (04 Jun 2022 15:52)  T(F): 99.4 (05 Jun 2022 02:16), Max: 103.1 (04 Jun 2022 15:52)  HR: 134 (05 Jun 2022 02:16) (130 - 162)  BP: 137/87 (05 Jun 2022 02:16) (119/89 - 145/92)  RR: 17 (05 Jun 2022 01:17) (17 - 30)  SpO2: 100% (05 Jun 2022 01:17) (96% - 100%)          HOSPITAL MEDICATIONS:  MEDICATIONS  (STANDING):    MEDICATIONS  (PRN):      LABS:                        7.5    0.42  )-----------( 202      ( 04 Jun 2022 16:35 )             24.5     06-04    137  |  100  |  15  ----------------------------<  105<H>  3.0<L>   |  31  |  0.84    Ca    8.7      04 Jun 2022 16:35    TPro  7.0  /  Alb  2.2<L>  /  TBili  0.9  /  DBili  x   /  AST  16  /  ALT  7<L>  /  AlkPhos  81  06-04    PT/INR - ( 04 Jun 2022 16:35 )   PT: 15.4 sec;   INR: 1.29 ratio         PTT - ( 04 Jun 2022 16:35 )  PTT:37.5 sec          MICROBIOLOGY:     RADIOLOGY:  [ ] Reviewed and interpreted by me    EKG: CHIEF COMPLAINT: Fever + tachycardia     HPI: 52F with HTN, b/l hydronephrosis s/p b/l NT's (placed Dec 2021 s/p exchange Apr 2022), cervical cancer on chemotherapy transferred from [facility] after p/w fever to 101 and tachycardia to 140's per visiting RN. Per patient, she received chemotherapy 5 days ago and afterwards she stated she felt fatigued for 2 days but otherwise in her usual state of health.1 day prior, she noted some chills. Denies CP, palpitations, SOB, n/v/d. She has had exacerbation of her abdominal pain and lower back pain. Denies abdominal distension, dysuria, change in vaginal discharge, flank pain, change in NT output.     At [facility], patient had a CT AP, which showed pneumoperitoneum possibly 2/2 perforated uterus. Patient septic, febrile to 102 and tachycardic to 140's. Bcx were collected and she received meropenem and zosyn. Thus far, patient has received 3L IVF and also ordered for vancomycin. Gyn consulted in ED. MICU consulted for sepsis.     PAST MEDICAL & SURGICAL HISTORY:  HTN (hypertension)      No significant past surgical history          FAMILY HISTORY:  No pertinent family history in first degree relatives          Allergies  No Known Allergies  Intolerances            OBJECTIVE:  ICU Vital Signs Last 24 Hrs  T(C): 37.4 (05 Jun 2022 02:16), Max: 39.5 (04 Jun 2022 15:52)  T(F): 99.4 (05 Jun 2022 02:16), Max: 103.1 (04 Jun 2022 15:52)  HR: 134 (05 Jun 2022 02:16) (130 - 162)  BP: 137/87 (05 Jun 2022 02:16) (119/89 - 145/92)  RR: 17 (05 Jun 2022 01:17) (17 - 30)  SpO2: 100% (05 Jun 2022 01:17) (96% - 100%)    Physical Exam:     General: No acute distress, well-appearing female laying on strecher     Eyes: PERRL, EOMI     ENT: MMM, no oropharyngeal lesions or erythema appreciated     Pulm: No increased WOB. CTAB. No wheezing.     CV: Tachycardic. S1&S2+. No M/R/G appreciated.     Abdomen: +BS. Soft, distended. Tender to deep palpation below her umbilicus. No organomegaly.     : B/l NT's with yellow urine.     MSK: Nml ROM    Extremities: No peripheral edema or cyanosis.     Neuro: A&Ox3, no focal deficits     Skin: Warm and dry. No visible rash.       HOSPITAL MEDICATIONS:  MEDICATIONS  (STANDING):    MEDICATIONS  (PRN):      LABS:                        7.5    0.42  )-----------( 202      ( 04 Jun 2022 16:35 )             24.5     06-04    137  |  100  |  15  ----------------------------<  105<H>  3.0<L>   |  31  |  0.84    Ca    8.7      04 Jun 2022 16:35    TPro  7.0  /  Alb  2.2<L>  /  TBili  0.9  /  DBili  x   /  AST  16  /  ALT  7<L>  /  AlkPhos  81  06-04    PT/INR - ( 04 Jun 2022 16:35 )   PT: 15.4 sec;   INR: 1.29 ratio         PTT - ( 04 Jun 2022 16:35 )  PTT:37.5 sec          MICROBIOLOGY:     RADIOLOGY:  [ ] Reviewed and interpreted by me    EKG:

## 2022-06-06 DIAGNOSIS — I10 ESSENTIAL (PRIMARY) HYPERTENSION: ICD-10-CM

## 2022-06-06 DIAGNOSIS — R33.9 RETENTION OF URINE, UNSPECIFIED: ICD-10-CM

## 2022-06-06 DIAGNOSIS — D70.9 NEUTROPENIA, UNSPECIFIED: ICD-10-CM

## 2022-06-06 DIAGNOSIS — N13.9 OBSTRUCTIVE AND REFLUX UROPATHY, UNSPECIFIED: ICD-10-CM

## 2022-06-06 DIAGNOSIS — A41.9 SEPSIS, UNSPECIFIED ORGANISM: ICD-10-CM

## 2022-06-06 DIAGNOSIS — C55 MALIGNANT NEOPLASM OF UTERUS, PART UNSPECIFIED: ICD-10-CM

## 2022-06-06 LAB
ANION GAP SERPL CALC-SCNC: 17 MMOL/L — HIGH (ref 7–14)
BASOPHILS # BLD AUTO: 0.01 K/UL — SIGNIFICANT CHANGE UP (ref 0–0.2)
BASOPHILS NFR BLD AUTO: 2.5 % — HIGH (ref 0–2)
BLD GP AB SCN SERPL QL: NEGATIVE — SIGNIFICANT CHANGE UP
BUN SERPL-MCNC: 21 MG/DL — SIGNIFICANT CHANGE UP (ref 7–23)
CALCIUM SERPL-MCNC: 9.1 MG/DL — SIGNIFICANT CHANGE UP (ref 8.4–10.5)
CHLORIDE SERPL-SCNC: 96 MMOL/L — LOW (ref 98–107)
CO2 SERPL-SCNC: 24 MMOL/L — SIGNIFICANT CHANGE UP (ref 22–31)
CREAT SERPL-MCNC: 0.83 MG/DL — SIGNIFICANT CHANGE UP (ref 0.5–1.3)
EGFR: 84 ML/MIN/1.73M2 — SIGNIFICANT CHANGE UP
EOSINOPHIL # BLD AUTO: 0 K/UL — SIGNIFICANT CHANGE UP (ref 0–0.5)
EOSINOPHIL NFR BLD AUTO: 0 % — SIGNIFICANT CHANGE UP (ref 0–6)
GLUCOSE SERPL-MCNC: 92 MG/DL — SIGNIFICANT CHANGE UP (ref 70–99)
HCT VFR BLD CALC: 28.4 % — LOW (ref 34.5–45)
HGB BLD-MCNC: 8.7 G/DL — LOW (ref 11.5–15.5)
IANC: 0.16 K/UL — LOW (ref 1.8–7.4)
IMM GRANULOCYTES NFR BLD AUTO: 0 % — SIGNIFICANT CHANGE UP (ref 0–1.5)
LACTATE SERPL-SCNC: 2.5 MMOL/L — HIGH (ref 0.5–2)
LYMPHOCYTES # BLD AUTO: 0.18 K/UL — LOW (ref 1–3.3)
LYMPHOCYTES # BLD AUTO: 45 % — HIGH (ref 13–44)
MAGNESIUM SERPL-MCNC: 2.2 MG/DL — SIGNIFICANT CHANGE UP (ref 1.6–2.6)
MCHC RBC-ENTMCNC: 27.1 PG — SIGNIFICANT CHANGE UP (ref 27–34)
MCHC RBC-ENTMCNC: 30.6 GM/DL — LOW (ref 32–36)
MCV RBC AUTO: 88.5 FL — SIGNIFICANT CHANGE UP (ref 80–100)
MONOCYTES # BLD AUTO: 0.05 K/UL — SIGNIFICANT CHANGE UP (ref 0–0.9)
MONOCYTES NFR BLD AUTO: 12.5 % — SIGNIFICANT CHANGE UP (ref 2–14)
NEUTROPHILS # BLD AUTO: 0.16 K/UL — LOW (ref 1.8–7.4)
NEUTROPHILS NFR BLD AUTO: 40 % — LOW (ref 43–77)
NRBC # BLD: 0 /100 WBCS — SIGNIFICANT CHANGE UP
NRBC # FLD: 0 K/UL — SIGNIFICANT CHANGE UP
PHOSPHATE SERPL-MCNC: 3.5 MG/DL — SIGNIFICANT CHANGE UP (ref 2.5–4.5)
PLATELET # BLD AUTO: 147 K/UL — LOW (ref 150–400)
POTASSIUM SERPL-MCNC: 4.4 MMOL/L — SIGNIFICANT CHANGE UP (ref 3.5–5.3)
POTASSIUM SERPL-SCNC: 4.4 MMOL/L — SIGNIFICANT CHANGE UP (ref 3.5–5.3)
RBC # BLD: 3.21 M/UL — LOW (ref 3.8–5.2)
RBC # FLD: 20.2 % — HIGH (ref 10.3–14.5)
RH IG SCN BLD-IMP: POSITIVE — SIGNIFICANT CHANGE UP
RH IG SCN BLD-IMP: POSITIVE — SIGNIFICANT CHANGE UP
SODIUM SERPL-SCNC: 137 MMOL/L — SIGNIFICANT CHANGE UP (ref 135–145)
TSH SERPL-MCNC: 2.89 UIU/ML — SIGNIFICANT CHANGE UP (ref 0.27–4.2)
WBC # BLD: 0.4 K/UL — CRITICAL LOW (ref 3.8–10.5)
WBC # FLD AUTO: 0.4 K/UL — CRITICAL LOW (ref 3.8–10.5)

## 2022-06-06 PROCEDURE — 99223 1ST HOSP IP/OBS HIGH 75: CPT

## 2022-06-06 PROCEDURE — 99232 SBSQ HOSP IP/OBS MODERATE 35: CPT

## 2022-06-06 RX ORDER — FILGRASTIM 480MCG/1.6
480 VIAL (ML) INJECTION DAILY
Refills: 0 | Status: DISCONTINUED | OUTPATIENT
Start: 2022-06-06 | End: 2022-06-10

## 2022-06-06 RX ORDER — FILGRASTIM 480MCG/1.6
500 VIAL (ML) INJECTION DAILY
Refills: 0 | Status: DISCONTINUED | OUTPATIENT
Start: 2022-06-06 | End: 2022-06-06

## 2022-06-06 RX ADMIN — HYDROMORPHONE HYDROCHLORIDE 1 MILLIGRAM(S): 2 INJECTION INTRAMUSCULAR; INTRAVENOUS; SUBCUTANEOUS at 10:52

## 2022-06-06 RX ADMIN — HYDROMORPHONE HYDROCHLORIDE 1 MILLIGRAM(S): 2 INJECTION INTRAMUSCULAR; INTRAVENOUS; SUBCUTANEOUS at 21:10

## 2022-06-06 RX ADMIN — HEPARIN SODIUM 5000 UNIT(S): 5000 INJECTION INTRAVENOUS; SUBCUTANEOUS at 13:34

## 2022-06-06 RX ADMIN — Medication 975 MILLIGRAM(S): at 14:30

## 2022-06-06 RX ADMIN — SODIUM CHLORIDE 150 MILLILITER(S): 9 INJECTION, SOLUTION INTRAVENOUS at 13:39

## 2022-06-06 RX ADMIN — HEPARIN SODIUM 5000 UNIT(S): 5000 INJECTION INTRAVENOUS; SUBCUTANEOUS at 21:10

## 2022-06-06 RX ADMIN — Medication 975 MILLIGRAM(S): at 13:35

## 2022-06-06 RX ADMIN — Medication 100 MILLIGRAM(S): at 05:26

## 2022-06-06 RX ADMIN — Medication 10 MILLIGRAM(S): at 16:14

## 2022-06-06 RX ADMIN — Medication 480 MICROGRAM(S): at 13:31

## 2022-06-06 RX ADMIN — HYDROMORPHONE HYDROCHLORIDE 1 MILLIGRAM(S): 2 INJECTION INTRAMUSCULAR; INTRAVENOUS; SUBCUTANEOUS at 17:32

## 2022-06-06 RX ADMIN — CEFEPIME 100 MILLIGRAM(S): 1 INJECTION, POWDER, FOR SOLUTION INTRAMUSCULAR; INTRAVENOUS at 21:10

## 2022-06-06 RX ADMIN — Medication 600 MILLIGRAM(S): at 11:45

## 2022-06-06 RX ADMIN — SODIUM CHLORIDE 150 MILLILITER(S): 9 INJECTION, SOLUTION INTRAVENOUS at 17:02

## 2022-06-06 RX ADMIN — SODIUM CHLORIDE 150 MILLILITER(S): 9 INJECTION, SOLUTION INTRAVENOUS at 21:10

## 2022-06-06 RX ADMIN — HEPARIN SODIUM 5000 UNIT(S): 5000 INJECTION INTRAVENOUS; SUBCUTANEOUS at 05:27

## 2022-06-06 RX ADMIN — Medication 600 MILLIGRAM(S): at 10:49

## 2022-06-06 RX ADMIN — HYDROMORPHONE HYDROCHLORIDE 1 MILLIGRAM(S): 2 INJECTION INTRAMUSCULAR; INTRAVENOUS; SUBCUTANEOUS at 11:15

## 2022-06-06 RX ADMIN — AMLODIPINE BESYLATE 10 MILLIGRAM(S): 2.5 TABLET ORAL at 05:26

## 2022-06-06 RX ADMIN — CEFEPIME 100 MILLIGRAM(S): 1 INJECTION, POWDER, FOR SOLUTION INTRAMUSCULAR; INTRAVENOUS at 05:26

## 2022-06-06 RX ADMIN — Medication 600 MILLIGRAM(S): at 17:33

## 2022-06-06 RX ADMIN — Medication 10 MILLIGRAM(S): at 05:13

## 2022-06-06 RX ADMIN — HYDROMORPHONE HYDROCHLORIDE 1 MILLIGRAM(S): 2 INJECTION INTRAMUSCULAR; INTRAVENOUS; SUBCUTANEOUS at 00:15

## 2022-06-06 RX ADMIN — ONDANSETRON 4 MILLIGRAM(S): 8 TABLET, FILM COATED ORAL at 04:22

## 2022-06-06 RX ADMIN — HYDROMORPHONE HYDROCHLORIDE 1 MILLIGRAM(S): 2 INJECTION INTRAMUSCULAR; INTRAVENOUS; SUBCUTANEOUS at 21:35

## 2022-06-06 RX ADMIN — ONDANSETRON 4 MILLIGRAM(S): 8 TABLET, FILM COATED ORAL at 10:56

## 2022-06-06 RX ADMIN — HYDROMORPHONE HYDROCHLORIDE 1 MILLIGRAM(S): 2 INJECTION INTRAMUSCULAR; INTRAVENOUS; SUBCUTANEOUS at 17:02

## 2022-06-06 RX ADMIN — CEFEPIME 100 MILLIGRAM(S): 1 INJECTION, POWDER, FOR SOLUTION INTRAMUSCULAR; INTRAVENOUS at 13:31

## 2022-06-06 RX ADMIN — Medication 600 MILLIGRAM(S): at 17:03

## 2022-06-06 NOTE — PROVIDER CONTACT NOTE (OTHER) - ACTION/TREATMENT ORDERED:
MD at bedside, repeat EKG done, Pt going for CT Angio shortly, pt will receive 1 unit PRBC after test

## 2022-06-06 NOTE — PROGRESS NOTE ADULT - ATTENDING COMMENTS
Patient seen and evaluated on am rounds with PA.   Treatment records from [facility] obtained.   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   Treated with definitive radiation and weekly cisplatin at [facility].   Final radiation was 10/29/2014.   Was in CR until late 2021 at which time recurrence was diagnosed in setting of bilateral ureteral obstruction.   Underwent bilateral PCNs and tissue biopsy.   Scans showed a PET avid uterine mass, carcinomatosis, adenopathy and pulmonary disease confirmed by EBUS biopsy.   Started on systemic chemotherapy with Carbo AUC 5, Paxol 135 mg/m2 and Avastin 15 mg/m2 on q3 week schedule.   Received cycle 5 6 days ago.   Presented with profound neutropenia, fever.   CT with obvious carcinomatosis, moderate ascites and air in uterus and in small pockets in pelvis.   Treatment for neutropenic fever initiated with Cefepime and Neupogen.   ANC remains <200 today.   Scans reviewed in detail - there is some concern for small volume bowel perforation - particularly as patient has been on ambar.   Abdominal exam today is soft and non-tender without rebound or guarding.   Will need studies with oral/rectal contrast to better evaluate the integrity of the bowel.   There appears to be very little response to the chemo that has been administered.

## 2022-06-06 NOTE — CONSULT NOTE ADULT - ASSESSMENT
52F HTN, cervical CA on chemo - last session last week - c/b b/l hydronephrosis s/p B/L nephrostomy tube placement, p/w severe sepsis from neutropenic fever vs infected tumor.

## 2022-06-06 NOTE — PROGRESS NOTE ADULT - ASSESSMENT
52y  with recurrent cervical CA who presented to the ED as transfer from [place] admitted for mgmt of neutropenic fever. On presentation, patient was found to be tachycardic to 130s, ANC of 200, and febrile. CTAP significant for pneumoperitoneum to the right of the uterus, CT Angio w/o evidence of PE. Patient currently in stable condition, afebrile overnight, abdominal pain improved.     Onc: recurrent cervical cancer  - recently started on new chemo regimen, last cycle on   - CTAP with increased soft tissue and inflammatory change adjacent to the lower uterine segment/cervix with associated fluid and gas distended endometrium. Pneumoperitoneum with slightly greater foci of free air to the right of the uterus with potential source the uterus or cervix possibly at the level of the cervical cancer; likely tumor necrosis rather than pneumoperitoneum given benign abdomen  - F/U final read for CTAP and CT Angio  Neuro: PO Tylenol and Dilaudid prn for pain control  CV: hemodynamically stable, continue to monitor tachycardia  - s/p 1uPRBc overnight, f/u AM CBC  - Hx of HTN, Cont home Amlodipine and Metoprolol   Pulm: O2 sat wnl on RA, increase ambulation, encourage incentive spirometry use  GI: tolerating reg diet, Zofran/Reglan prn  : Voiding spontaneously UOP adequate  FEN: LR@150; f/u BMP, replete electrolytes prn  Heme: HSQ and SCDs while in bed for DVT ppx  ID: Neutropenic fever, s/p Neupogen (22)  - WBC ct 0.5, ; con't to monitor  - afebrile overnight; Tmax 39.5 (on ); cont to trend fever curve  - Cefepime (-)  s/p Meropenem + Zosyn ()   - f/u Bcx, Ucx ()  Dispo: continue routine post-op care    Patient seen and examined by gyn-onc team and attending.    NANCY Parker MD PGY2 52y  with recurrent cervical CA who presented to the ED as transfer from [place] admitted for mgmt of neutropenic fever. On presentation, patient was found to be tachycardic to 130s, ANC of 200, and febrile. CTAP significant for pneumoperitoneum to the right of the uterus, CT Angio w/o evidence of PE. Patient currently in stable condition, afebrile overnight, abdominal pain improved.     Onc: recurrent cervical cancer  - recently started on new chemo regimen, last cycle on   - CTAP with increased soft tissue and inflammatory change adjacent to the lower uterine segment/cervix with associated fluid and gas distended endometrium. Pneumoperitoneum with slightly greater foci of free air to the right of the uterus with potential source the uterus or cervix possibly at the level of the cervical cancer; likely tumor necrosis rather than pneumoperitoneum given benign abdomen  - F/U final read for CTAP and CT Angio  Neuro: PO Tylenol and Dilaudid prn for pain control  CV: hemodynamically stable, continue to monitor tachycardia  - s/p 1uPRBc overnight, f/u AM CBC  - Hx of HTN, Cont home Amlodipine and Metoprolol   Pulm: O2 sat wnl on RA, increase ambulation, encourage incentive spirometry use  GI: tolerating reg diet, Zofran/Reglan prn  : Voiding spontaneously UOP adequate  FEN: LR@150; f/u BMP, replete electrolytes prn  Heme: HSQ and SCDs while in bed for DVT ppx  ID: Neutropenic fever, s/p Neupogen (22)  - WBC ct 0.5, ; con't to monitor  - afebrile overnight; Tmax 39.5 (on ); cont to trend fever curve  - Cefepime (-)  s/p Meropenem + Zosyn ()   - f/u Bcx, Ucx ()      Patient seen and examined by gyn-onc team and attending.    NANCY Parker MD PGY2

## 2022-06-06 NOTE — PROGRESS NOTE ADULT - SUBJECTIVE AND OBJECTIVE BOX
Gyn ONC Progress Note HD#2    Subjective:   Patient seen and examined at bedside.  No acute events overnight. No acute complaints.  Pain well controlled. Patient is tolerating PO, but dec appetite. She reports mild nausea without vomiting. She is passing flatus and voiding spontaneously.Denies lightheadedness, dizziness, CP, SOB, N/V, fevers, and chills.    Objective:  T(F): 98.3 (22 @ 01:38), Max: 98.7 (22 @ 18:08)  HR: 124 (22 @ 01:38) (77 - 130)  BP: 128/87 (22 @ 01:38) (128/87 - 139/96)  RR: 16 (22 @ 01:38) (16 - 18)  SpO2: 96% (22 @ 01:38) (95% - 98%)  Wt(kg): --    I&O's Summary  2022 07:01  -  2022 06:47  --------------------------------------------------------  IN: 3990 mL / OUT: 1455 mL / NET: 2535 mL      MEDICATIONS  (STANDING):  amLODIPine   Tablet 10 milliGRAM(s) Oral daily  cefepime   IVPB      cefepime   IVPB 2000 milliGRAM(s) IV Intermittent every 8 hours  heparin   Injectable 5000 Unit(s) SubCutaneous every 8 hours  lactated ringers. 1000 milliLiter(s) (150 mL/Hr) IV Continuous <Continuous>  metoprolol succinate  milliGRAM(s) Oral daily    MEDICATIONS  (PRN):  acetaminophen     Tablet .. 975 milliGRAM(s) Oral every 6 hours PRN Temp greater or equal to 38C (100.4F), Mild Pain (1 - 3)  HYDROmorphone  Injectable 1 milliGRAM(s) IV Push every 4 hours PRN Severe Pain (7 - 10)  ibuprofen  Tablet. 600 milliGRAM(s) Oral every 6 hours PRN Moderate Pain (4 - 6)  metoclopramide Injectable 10 milliGRAM(s) IV Push every 8 hours PRN nausea/vomiting  ondansetron Injectable 4 milliGRAM(s) IV Push every 6 hours PRN Nausea and/or Vomiting      Physical Exam:  Constitutional: NAD, A+O x3  CV: RRR  Lungs: clear to auscultation bilaterally  Abdomen: soft, nondistended, no guarding/rebound, normal bowel sounds  Back: b/l nephrostomy tubes draining dark yellow urine  Extremities: no lower extremity edema or calf tenderness bilaterally; venodynes in place    LABS:               7.6    0.50  )-----------( 184      ( -05 @ 11:08 )             25.4                7.5    0.42  )-----------( 202      ( 04 @ 16:35 )             24.5     06-05  133<L>  |  99     |  19     ----------------------------<  87     4.6     |  18<L>  |  0.78     Ca    8.6        2022 11:08  Phos  3.8       06-05  Mg     1.90      06-05    PT/INR - ( 2022 16:35 )   PT: 15.4 sec;   INR: 1.29 ratio    PTT - ( 2022 16:35 )  PTT:37.5 sec    Urinalysis Basic - ( 2022 10:46 )  Color: Yellow / Appearance: Clear / S.014 / pH: x  Gluc: x / Ketone: Negative  / Bili: Negative / Urobili: 6 mg/dL   Blood: x / Protein: 100 mg/dL / Nitrite: Negative   Leuk Esterase: Small / RBC: 8 /HPF / WBC 7 /HPF   Sq Epi: x / Non Sq Epi: 2 /HPF / Bacteria: Few

## 2022-06-06 NOTE — PROVIDER CONTACT NOTE (OTHER) - BACKGROUND
patient admitted for peritoneal disorder as per admitting with hx of CA-chemo/VBRT, patient continues tachy in 120s.

## 2022-06-06 NOTE — CONSULT NOTE ADULT - SUBJECTIVE AND OBJECTIVE BOX
[facility] Division of Hospital Medicine  Waldo Busby MD  Pager (JOEL-FINA, 8A-5P): [phone]  Other Times:  [phone]    Patient is a 52y old  Female who presents with a chief complaint of neutropenic fever (2022 06:47)      HPI:  Patient is 52F HTN, cervical CA on chemo - last session last week - c/b b/l hydronephrosis s/p B/L nephrostomy tube placement, p/w severe sepsis from neutropenic fever vs infected tumor. Mild abdominal pain - vague, 2/10 in severity, diffuse - but complaining of nausea, fevers..  Offers no other complaints.   No CP, SOB, Cough, lightheadedness, dizziness, abdominal pain, diarrhea, dysuria.    Pain Symptoms if applicable:             	                         none	   mild         moderate         severe  Pain:	             2               0	    1-3	     4-6	         7-10  Location:	Abdomen  Modifying factors:	  Associated symptoms:	    Allergies    No Known Allergies    Intolerances        HOME MEDICATIONS: Reviewed    MEDICATIONS  (STANDING):  amLODIPine   Tablet 10 milliGRAM(s) Oral daily  cefepime   IVPB      cefepime   IVPB 2000 milliGRAM(s) IV Intermittent every 8 hours  filgrastim-sndz (ZARXIO) Injectable 480 MICROGram(s) SubCutaneous daily  heparin   Injectable 5000 Unit(s) SubCutaneous every 8 hours  lactated ringers. 1000 milliLiter(s) (150 mL/Hr) IV Continuous <Continuous>  metoprolol succinate  milliGRAM(s) Oral daily    MEDICATIONS  (PRN):  acetaminophen     Tablet .. 975 milliGRAM(s) Oral every 6 hours PRN Temp greater or equal to 38C (100.4F), Mild Pain (1 - 3)  HYDROmorphone  Injectable 1 milliGRAM(s) IV Push every 4 hours PRN Severe Pain (7 - 10)  ibuprofen  Tablet. 600 milliGRAM(s) Oral every 6 hours PRN Moderate Pain (4 - 6)  metoclopramide Injectable 10 milliGRAM(s) IV Push every 8 hours PRN nausea/vomiting  ondansetron Injectable 4 milliGRAM(s) IV Push every 6 hours PRN Nausea and/or Vomiting      PAST MEDICAL & SURGICAL HISTORY:  HTN (hypertension)      Cervical cancer      Nephrostomy status          SOCIAL HISTORY:  No tobacco/alcohol use/abuse.    FAMILY HISTORY:  FH: liver cancer (Mother)        REVIEW OF SYSTEMS:    CONSTITUTIONAL: No weight loss, or fatigue  EYES: No eye pain, visual disturbances, or discharge  ENMT:  No difficulty hearing, tinnitus, vertigo; No sinus or throat pain  NECK: No pain or stiffness  RESPIRATORY: No cough, wheezing, chills or hemoptysis; No shortness of breath  CARDIOVASCULAR: No chest pain, palpitations, dizziness, or leg swelling  GASTROINTESTINAL: No abdominal or epigastric pain. No hematemesis; No diarrhea or constipation. No melena or hematochezia.  GENITOURINARY: No dysuria, frequency, hematuria, or incontinence  NEUROLOGICAL: No headaches, memory loss, loss of strength, numbness, or tremors  SKIN: No itching, burning, rashes, or lesions   LYMPH NODES: No enlarged glands  ENDOCRINE: No heat or cold intolerance; No hair loss  MUSCULOSKELETAL: No muscle or back pain  PSYCHIATRIC: No depression, anxiety, mood swings, or difficulty sleeping  HEME/LYMPH: No easy bruising, or bleeding gums  ALLERGY AND IMMUNOLOGIC: No hives or eczema    [] Unable to obtain due to poor mental status    Vital Signs Last 24 Hrs  T(C): 36.8 (2022 10:01), Max: 37.1 (2022 18:08)  T(F): 98.2 (2022 10:01), Max: 98.7 (2022 18:08)  HR: 109 (2022 10:01) (77 - 132)  BP: 121/81 (2022 10:01) (121/81 - 139/96)  BP(mean): --  RR: 18 (2022 10:01) (16 - 18)  SpO2: 97% (2022 10:01) (95% - 98%)  CAPILLARY BLOOD GLUCOSE          PHYSICAL EXAM:    CONSTITUTIONAL: NAD, obese  EYES: PERRLA; conjunctiva and sclera clear  ENMT: Moist oral mucosa, no pharyngeal injection or exudates; normal dentition  NECK: Supple, no palpable masses; no thyromegaly  RESPIRATORY: Normal respiratory effort; lungs are clear to auscultation bilaterally  CARDIOVASCULAR: tachycardia, normal S1 and S2, no murmur/rub/gallop; No lower extremity edema; Peripheral pulses are 2+ bilaterally  ABDOMEN: minimally tender to palpation, quiet bowel sounds, no rebound/guarding; No hepatosplenomegaly; nephrostomy tubes in place; draining clear urine.  MUSCULOSKELETAL:  Normal gait; no clubbing or cyanosis of digits; no joint swelling or tenderness to palpation  PSYCH: A+O to person, place, and time; affect appropriate  NEUROLOGY: CN 2-12 are intact and symmetric; no gross sensory deficits   SKIN: No rashes; no palpable lesions    LABS:                        8.7    0.40  )-----------( 147      ( 2022 06:00 )             28.4     06-06    137  |  96<L>  |  21  ----------------------------<  92  4.4   |  24  |  0.83    Ca    9.1      2022 06:00  Phos  3.5     06-06  Mg     2.20     06-06    TPro  7.0  /  Alb  2.2<L>  /  TBili  0.9  /  DBili  x   /  AST  16  /  ALT  7<L>  /  AlkPhos  81  06-04    PT/INR - ( 2022 16:35 )   PT: 15.4 sec;   INR: 1.29 ratio         PTT - ( 2022 16:35 )  PTT:37.5 sec  Urinalysis Basic - ( 2022 10:46 )    Color: Yellow / Appearance: Clear / S.014 / pH: x  Gluc: x / Ketone: Negative  / Bili: Negative / Urobili: 6 mg/dL   Blood: x / Protein: 100 mg/dL / Nitrite: Negative   Leuk Esterase: Small / RBC: 8 /HPF / WBC 7 /HPF   Sq Epi: x / Non Sq Epi: 2 /HPF / Bacteria: Few      CAPILLARY BLOOD GLUCOSE      POCT Blood Glucose.: 97 mg/dL (2022 04:21)      RADIOLOGY & ADDITIONAL STUDIES:    Imaging:   CT of A/P pending  Personally Reviewed:  [ ] YES               EKG:   Personally Reviewed:  [ ] YES       Care Discussed with Consultant(s)/Other Providers:  Care Discussed with Primary Team.      [ ] Increased delirium risk  [ ] Delirium and other risks can be reduced by:          -early ambulation          -minimizing "tethers" - IV, oxygen, catheters, etc          -avoiding hypnotics and sedatives          -maintaining hydration/nutrition          -avoid anticholinergics - diphenhydramine, etc          -pain control          -supportive environment

## 2022-06-07 ENCOUNTER — TRANSCRIPTION ENCOUNTER (OUTPATIENT)
Age: 52
End: 2022-06-07

## 2022-06-07 LAB
-  AMIKACIN: SIGNIFICANT CHANGE UP
-  AMIKACIN: SIGNIFICANT CHANGE UP
-  AMOXICILLIN/CLAVULANIC ACID: SIGNIFICANT CHANGE UP
-  AMOXICILLIN/CLAVULANIC ACID: SIGNIFICANT CHANGE UP
-  AMPICILLIN/SULBACTAM: SIGNIFICANT CHANGE UP
-  AMPICILLIN/SULBACTAM: SIGNIFICANT CHANGE UP
-  AMPICILLIN: SIGNIFICANT CHANGE UP
-  AZTREONAM: SIGNIFICANT CHANGE UP
-  AZTREONAM: SIGNIFICANT CHANGE UP
-  CEFAZOLIN: SIGNIFICANT CHANGE UP
-  CEFAZOLIN: SIGNIFICANT CHANGE UP
-  CEFEPIME: SIGNIFICANT CHANGE UP
-  CEFEPIME: SIGNIFICANT CHANGE UP
-  CEFOXITIN: SIGNIFICANT CHANGE UP
-  CEFOXITIN: SIGNIFICANT CHANGE UP
-  CEFTRIAXONE: SIGNIFICANT CHANGE UP
-  CEFTRIAXONE: SIGNIFICANT CHANGE UP
-  CIPROFLOXACIN: SIGNIFICANT CHANGE UP
-  ERTAPENEM: SIGNIFICANT CHANGE UP
-  ERTAPENEM: SIGNIFICANT CHANGE UP
-  GENTAMICIN: SIGNIFICANT CHANGE UP
-  GENTAMICIN: SIGNIFICANT CHANGE UP
-  LEVOFLOXACIN: SIGNIFICANT CHANGE UP
-  MEROPENEM: SIGNIFICANT CHANGE UP
-  MEROPENEM: SIGNIFICANT CHANGE UP
-  PIPERACILLIN/TAZOBACTAM: SIGNIFICANT CHANGE UP
-  PIPERACILLIN/TAZOBACTAM: SIGNIFICANT CHANGE UP
-  TETRACYCLINE: SIGNIFICANT CHANGE UP
-  TIGECYCLINE: SIGNIFICANT CHANGE UP
-  TIGECYCLINE: SIGNIFICANT CHANGE UP
-  TOBRAMYCIN: SIGNIFICANT CHANGE UP
-  TOBRAMYCIN: SIGNIFICANT CHANGE UP
-  TRIMETHOPRIM/SULFAMETHOXAZOLE: SIGNIFICANT CHANGE UP
-  TRIMETHOPRIM/SULFAMETHOXAZOLE: SIGNIFICANT CHANGE UP
-  VANCOMYCIN: SIGNIFICANT CHANGE UP
ALBUMIN SERPL ELPH-MCNC: 2.7 G/DL — LOW (ref 3.3–5)
ALP SERPL-CCNC: 69 U/L — SIGNIFICANT CHANGE UP (ref 40–120)
ALT FLD-CCNC: 9 U/L — SIGNIFICANT CHANGE UP (ref 4–33)
ANION GAP SERPL CALC-SCNC: 19 MMOL/L — HIGH (ref 7–14)
ANION GAP SERPL CALC-SCNC: 22 MMOL/L — HIGH (ref 7–14)
ANISOCYTOSIS BLD QL: SLIGHT — SIGNIFICANT CHANGE UP
APTT BLD: 42.1 SEC — HIGH (ref 27–36.3)
AST SERPL-CCNC: 25 U/L — SIGNIFICANT CHANGE UP (ref 4–32)
BASE EXCESS BLDV CALC-SCNC: -2.8 MMOL/L — LOW (ref -2–3)
BASOPHILS # BLD AUTO: 0 K/UL — SIGNIFICANT CHANGE UP (ref 0–0.2)
BASOPHILS # BLD AUTO: 0.01 K/UL — SIGNIFICANT CHANGE UP (ref 0–0.2)
BASOPHILS NFR BLD AUTO: 0 % — SIGNIFICANT CHANGE UP (ref 0–2)
BASOPHILS NFR BLD AUTO: 2.1 % — HIGH (ref 0–2)
BILIRUB DIRECT SERPL-MCNC: 1 MG/DL — HIGH (ref 0–0.3)
BILIRUB INDIRECT FLD-MCNC: 0.4 MG/DL — SIGNIFICANT CHANGE UP (ref 0–1)
BILIRUB SERPL-MCNC: 1.4 MG/DL — HIGH (ref 0.2–1.2)
BLOOD GAS VENOUS COMPREHENSIVE RESULT: SIGNIFICANT CHANGE UP
BUN SERPL-MCNC: 29 MG/DL — HIGH (ref 7–23)
BUN SERPL-MCNC: 34 MG/DL — HIGH (ref 7–23)
CALCIUM SERPL-MCNC: 9.2 MG/DL — SIGNIFICANT CHANGE UP (ref 8.4–10.5)
CALCIUM SERPL-MCNC: 9.6 MG/DL — SIGNIFICANT CHANGE UP (ref 8.4–10.5)
CHLORIDE BLDV-SCNC: 99 MMOL/L — SIGNIFICANT CHANGE UP (ref 96–108)
CHLORIDE SERPL-SCNC: 96 MMOL/L — LOW (ref 98–107)
CHLORIDE SERPL-SCNC: 97 MMOL/L — LOW (ref 98–107)
CO2 BLDV-SCNC: 24.6 MMOL/L — SIGNIFICANT CHANGE UP (ref 22–26)
CO2 SERPL-SCNC: 20 MMOL/L — LOW (ref 22–31)
CO2 SERPL-SCNC: 21 MMOL/L — LOW (ref 22–31)
CREAT SERPL-MCNC: 1.11 MG/DL — SIGNIFICANT CHANGE UP (ref 0.5–1.3)
CREAT SERPL-MCNC: 1.12 MG/DL — SIGNIFICANT CHANGE UP (ref 0.5–1.3)
CULTURE RESULTS: SIGNIFICANT CHANGE UP
CULTURE RESULTS: SIGNIFICANT CHANGE UP
DACRYOCYTES BLD QL SMEAR: SLIGHT — SIGNIFICANT CHANGE UP
EGFR: 58 ML/MIN/1.73M2 — LOW
EGFR: 59 ML/MIN/1.73M2 — LOW
EOSINOPHIL # BLD AUTO: 0 K/UL — SIGNIFICANT CHANGE UP (ref 0–0.5)
EOSINOPHIL # BLD AUTO: 0.01 K/UL — SIGNIFICANT CHANGE UP (ref 0–0.5)
EOSINOPHIL NFR BLD AUTO: 0 % — SIGNIFICANT CHANGE UP (ref 0–6)
EOSINOPHIL NFR BLD AUTO: 2.2 % — SIGNIFICANT CHANGE UP (ref 0–6)
GAS PNL BLDV: 136 MMOL/L — SIGNIFICANT CHANGE UP (ref 136–145)
GIANT PLATELETS BLD QL SMEAR: PRESENT — SIGNIFICANT CHANGE UP
GLUCOSE BLDV-MCNC: 69 MG/DL — LOW (ref 70–99)
GLUCOSE SERPL-MCNC: 78 MG/DL — SIGNIFICANT CHANGE UP (ref 70–99)
GLUCOSE SERPL-MCNC: 80 MG/DL — SIGNIFICANT CHANGE UP (ref 70–99)
HCO3 BLDV-SCNC: 23 MMOL/L — SIGNIFICANT CHANGE UP (ref 22–29)
HCT VFR BLD CALC: 26.5 % — LOW (ref 34.5–45)
HCT VFR BLD CALC: 26.9 % — LOW (ref 34.5–45)
HCT VFR BLDA CALC: 26 % — LOW (ref 34.5–46.5)
HGB BLD CALC-MCNC: 8.5 G/DL — LOW (ref 11.5–15.5)
HGB BLD-MCNC: 8.3 G/DL — LOW (ref 11.5–15.5)
HGB BLD-MCNC: 8.5 G/DL — LOW (ref 11.5–15.5)
HYPOCHROMIA BLD QL: SLIGHT — SIGNIFICANT CHANGE UP
IANC: 0.15 K/UL — LOW (ref 1.8–7.4)
IANC: 0.16 K/UL — LOW (ref 1.8–7.4)
IMM GRANULOCYTES NFR BLD AUTO: 0 % — SIGNIFICANT CHANGE UP (ref 0–1.5)
IMM GRANULOCYTES NFR BLD AUTO: 6.4 % — HIGH (ref 0–1.5)
INR BLD: 1.28 RATIO — HIGH (ref 0.88–1.16)
LACTATE BLDV-MCNC: 3.8 MMOL/L — HIGH (ref 0.5–2)
LACTATE SERPL-SCNC: 1.9 MMOL/L — SIGNIFICANT CHANGE UP (ref 0.5–2)
LACTATE SERPL-SCNC: 3.4 MMOL/L — HIGH (ref 0.5–2)
LYMPHOCYTES # BLD AUTO: 0.13 K/UL — LOW (ref 1–3.3)
LYMPHOCYTES # BLD AUTO: 0.19 K/UL — LOW (ref 1–3.3)
LYMPHOCYTES # BLD AUTO: 27.7 % — SIGNIFICANT CHANGE UP (ref 13–44)
LYMPHOCYTES # BLD AUTO: 41.3 % — SIGNIFICANT CHANGE UP (ref 13–44)
MACROCYTES BLD QL: SLIGHT — SIGNIFICANT CHANGE UP
MAGNESIUM SERPL-MCNC: 2 MG/DL — SIGNIFICANT CHANGE UP (ref 1.6–2.6)
MAGNESIUM SERPL-MCNC: 2.1 MG/DL — SIGNIFICANT CHANGE UP (ref 1.6–2.6)
MANUAL SMEAR VERIFICATION: SIGNIFICANT CHANGE UP
MCHC RBC-ENTMCNC: 27.4 PG — SIGNIFICANT CHANGE UP (ref 27–34)
MCHC RBC-ENTMCNC: 27.6 PG — SIGNIFICANT CHANGE UP (ref 27–34)
MCHC RBC-ENTMCNC: 31.3 GM/DL — LOW (ref 32–36)
MCHC RBC-ENTMCNC: 31.6 GM/DL — LOW (ref 32–36)
MCV RBC AUTO: 87.3 FL — SIGNIFICANT CHANGE UP (ref 80–100)
MCV RBC AUTO: 87.5 FL — SIGNIFICANT CHANGE UP (ref 80–100)
METAMYELOCYTES # FLD: 1 % — SIGNIFICANT CHANGE UP (ref 0–1)
METHOD TYPE: SIGNIFICANT CHANGE UP
MICROCYTES BLD QL: SLIGHT — SIGNIFICANT CHANGE UP
MONOCYTES # BLD AUTO: 0.11 K/UL — SIGNIFICANT CHANGE UP (ref 0–0.9)
MONOCYTES # BLD AUTO: 0.14 K/UL — SIGNIFICANT CHANGE UP (ref 0–0.9)
MONOCYTES NFR BLD AUTO: 23.9 % — HIGH (ref 2–14)
MONOCYTES NFR BLD AUTO: 29.8 % — HIGH (ref 2–14)
NEUTROPHILS # BLD AUTO: 0.15 K/UL — LOW (ref 1.8–7.4)
NEUTROPHILS # BLD AUTO: 0.16 K/UL — LOW (ref 1.8–7.4)
NEUTROPHILS NFR BLD AUTO: 32.6 % — LOW (ref 43–77)
NEUTROPHILS NFR BLD AUTO: 34 % — LOW (ref 43–77)
NEUTS BAND # BLD: 10.3 % — CRITICAL HIGH (ref 0–6)
NRBC # BLD: 0 /100 WBCS — SIGNIFICANT CHANGE UP
NRBC # BLD: 0 /100 WBCS — SIGNIFICANT CHANGE UP
NRBC # BLD: 1 /100 — HIGH (ref 0–0)
NRBC # FLD: 0 K/UL — SIGNIFICANT CHANGE UP
NRBC # FLD: 0 K/UL — SIGNIFICANT CHANGE UP
ORGANISM # SPEC MICROSCOPIC CNT: SIGNIFICANT CHANGE UP
OVALOCYTES BLD QL SMEAR: SLIGHT — SIGNIFICANT CHANGE UP
PCO2 BLDV: 45 MMHG — HIGH (ref 39–42)
PH BLDV: 7.32 — SIGNIFICANT CHANGE UP (ref 7.32–7.43)
PHOSPHATE SERPL-MCNC: 2.7 MG/DL — SIGNIFICANT CHANGE UP (ref 2.5–4.5)
PHOSPHATE SERPL-MCNC: 3.4 MG/DL — SIGNIFICANT CHANGE UP (ref 2.5–4.5)
PLAT MORPH BLD: NORMAL — SIGNIFICANT CHANGE UP
PLATELET # BLD AUTO: 50 K/UL — LOW (ref 150–400)
PLATELET # BLD AUTO: 85 K/UL — LOW (ref 150–400)
PLATELET COUNT - ESTIMATE: ABNORMAL
PO2 BLDV: 29 MMHG — SIGNIFICANT CHANGE UP
POIKILOCYTOSIS BLD QL AUTO: SLIGHT — SIGNIFICANT CHANGE UP
POLYCHROMASIA BLD QL SMEAR: SIGNIFICANT CHANGE UP
POTASSIUM BLDV-SCNC: 3.9 MMOL/L — SIGNIFICANT CHANGE UP (ref 3.5–5.1)
POTASSIUM SERPL-MCNC: 3.4 MMOL/L — LOW (ref 3.5–5.3)
POTASSIUM SERPL-MCNC: 4.3 MMOL/L — SIGNIFICANT CHANGE UP (ref 3.5–5.3)
POTASSIUM SERPL-SCNC: 3.4 MMOL/L — LOW (ref 3.5–5.3)
POTASSIUM SERPL-SCNC: 4.3 MMOL/L — SIGNIFICANT CHANGE UP (ref 3.5–5.3)
PROT SERPL-MCNC: 7.3 G/DL — SIGNIFICANT CHANGE UP (ref 6–8.3)
PROTHROM AB SERPL-ACNC: 14.9 SEC — HIGH (ref 10.5–13.4)
RBC # BLD: 3.03 M/UL — LOW (ref 3.8–5.2)
RBC # BLD: 3.08 M/UL — LOW (ref 3.8–5.2)
RBC # FLD: 20.7 % — HIGH (ref 10.3–14.5)
RBC # FLD: 20.7 % — HIGH (ref 10.3–14.5)
RBC BLD AUTO: ABNORMAL
SAO2 % BLDV: 40.4 % — SIGNIFICANT CHANGE UP
SODIUM SERPL-SCNC: 137 MMOL/L — SIGNIFICANT CHANGE UP (ref 135–145)
SODIUM SERPL-SCNC: 138 MMOL/L — SIGNIFICANT CHANGE UP (ref 135–145)
SPECIMEN SOURCE: SIGNIFICANT CHANGE UP
SPECIMEN SOURCE: SIGNIFICANT CHANGE UP
SPHEROCYTES BLD QL SMEAR: SLIGHT — SIGNIFICANT CHANGE UP
VARIANT LYMPHS # BLD: 8.3 % — HIGH (ref 0–6)
WBC # BLD: 0.39 K/UL — CRITICAL LOW (ref 3.8–10.5)
WBC # BLD: 0.47 K/UL — CRITICAL LOW (ref 3.8–10.5)
WBC # FLD AUTO: 0.39 K/UL — CRITICAL LOW (ref 3.8–10.5)
WBC # FLD AUTO: 0.47 K/UL — CRITICAL LOW (ref 3.8–10.5)

## 2022-06-07 PROCEDURE — 99233 SBSQ HOSP IP/OBS HIGH 50: CPT

## 2022-06-07 PROCEDURE — 99232 SBSQ HOSP IP/OBS MODERATE 35: CPT

## 2022-06-07 RX ORDER — POTASSIUM CHLORIDE 20 MEQ
20 PACKET (EA) ORAL
Refills: 0 | Status: COMPLETED | OUTPATIENT
Start: 2022-06-07 | End: 2022-06-07

## 2022-06-07 RX ADMIN — Medication 480 MICROGRAM(S): at 11:31

## 2022-06-07 RX ADMIN — HYDROMORPHONE HYDROCHLORIDE 1 MILLIGRAM(S): 2 INJECTION INTRAMUSCULAR; INTRAVENOUS; SUBCUTANEOUS at 03:35

## 2022-06-07 RX ADMIN — Medication 10 MILLIGRAM(S): at 21:07

## 2022-06-07 RX ADMIN — CEFEPIME 100 MILLIGRAM(S): 1 INJECTION, POWDER, FOR SOLUTION INTRAMUSCULAR; INTRAVENOUS at 21:06

## 2022-06-07 RX ADMIN — HYDROMORPHONE HYDROCHLORIDE 1 MILLIGRAM(S): 2 INJECTION INTRAMUSCULAR; INTRAVENOUS; SUBCUTANEOUS at 12:16

## 2022-06-07 RX ADMIN — HEPARIN SODIUM 5000 UNIT(S): 5000 INJECTION INTRAVENOUS; SUBCUTANEOUS at 05:28

## 2022-06-07 RX ADMIN — Medication 600 MILLIGRAM(S): at 05:28

## 2022-06-07 RX ADMIN — HYDROMORPHONE HYDROCHLORIDE 1 MILLIGRAM(S): 2 INJECTION INTRAMUSCULAR; INTRAVENOUS; SUBCUTANEOUS at 12:01

## 2022-06-07 RX ADMIN — Medication 975 MILLIGRAM(S): at 23:00

## 2022-06-07 RX ADMIN — CEFEPIME 100 MILLIGRAM(S): 1 INJECTION, POWDER, FOR SOLUTION INTRAMUSCULAR; INTRAVENOUS at 05:36

## 2022-06-07 RX ADMIN — HEPARIN SODIUM 5000 UNIT(S): 5000 INJECTION INTRAVENOUS; SUBCUTANEOUS at 21:07

## 2022-06-07 RX ADMIN — Medication 20 MILLIEQUIVALENT(S): at 09:48

## 2022-06-07 RX ADMIN — Medication 20 MILLIEQUIVALENT(S): at 08:45

## 2022-06-07 RX ADMIN — SODIUM CHLORIDE 150 MILLILITER(S): 9 INJECTION, SOLUTION INTRAVENOUS at 18:58

## 2022-06-07 RX ADMIN — Medication 975 MILLIGRAM(S): at 11:32

## 2022-06-07 RX ADMIN — Medication 600 MILLIGRAM(S): at 06:15

## 2022-06-07 RX ADMIN — AMLODIPINE BESYLATE 10 MILLIGRAM(S): 2.5 TABLET ORAL at 05:28

## 2022-06-07 RX ADMIN — Medication 975 MILLIGRAM(S): at 12:02

## 2022-06-07 RX ADMIN — HYDROMORPHONE HYDROCHLORIDE 1 MILLIGRAM(S): 2 INJECTION INTRAMUSCULAR; INTRAVENOUS; SUBCUTANEOUS at 03:07

## 2022-06-07 RX ADMIN — HYDROMORPHONE HYDROCHLORIDE 1 MILLIGRAM(S): 2 INJECTION INTRAMUSCULAR; INTRAVENOUS; SUBCUTANEOUS at 08:10

## 2022-06-07 RX ADMIN — HYDROMORPHONE HYDROCHLORIDE 1 MILLIGRAM(S): 2 INJECTION INTRAMUSCULAR; INTRAVENOUS; SUBCUTANEOUS at 07:55

## 2022-06-07 RX ADMIN — HEPARIN SODIUM 5000 UNIT(S): 5000 INJECTION INTRAVENOUS; SUBCUTANEOUS at 13:34

## 2022-06-07 RX ADMIN — SODIUM CHLORIDE 150 MILLILITER(S): 9 INJECTION, SOLUTION INTRAVENOUS at 21:06

## 2022-06-07 RX ADMIN — CEFEPIME 100 MILLIGRAM(S): 1 INJECTION, POWDER, FOR SOLUTION INTRAMUSCULAR; INTRAVENOUS at 13:34

## 2022-06-07 RX ADMIN — Medication 20 MILLIEQUIVALENT(S): at 11:31

## 2022-06-07 RX ADMIN — Medication 100 MILLIGRAM(S): at 05:28

## 2022-06-07 NOTE — PROGRESS NOTE ADULT - ATTENDING COMMENTS
Patient seen and examined, agree with gyn housestaff  Repeat CT today with PO contrast  Monitor clinically

## 2022-06-07 NOTE — PROGRESS NOTE ADULT - PROBLEM SELECTOR PLAN 2
s/p chemo last week but also possibility of infected uterine tumor  - Cefepime IV (6/5-)  - Zarxio given  - monitor CBC.

## 2022-06-07 NOTE — PROGRESS NOTE ADULT - ASSESSMENT
52F HTN, cervical CA on chemo - last session last week - c/b b/l hydronephrosis s/p B/L nephrostomy tube placement, p/w severe sepsis from neutropenic fever vs infected necrotic tumor.

## 2022-06-07 NOTE — PROGRESS NOTE ADULT - SUBJECTIVE AND OBJECTIVE BOX
[facility] Division of Hospital Medicine  Waldo Busby MD  Pager (JOEL-F, 8A-5P): [phone]  Other Times:  [phone]    Patient is a 52y old  Female who presents with a chief complaint of neutropenic fever (2022 06:37)    SUBJECTIVE / OVERNIGHT EVENTS:  Patient states taht she feels better than yesterday. Still with nausea.  Awaiting for CT of A/P with PO/IV contrast to better visualize uterus. No pain at this time.  No F/C, CP, SOB, Cough, lightheadedness, dizziness, abdominal pain, diarrhea, dysuria.    MEDICATIONS  (STANDING):  amLODIPine   Tablet 10 milliGRAM(s) Oral daily  cefepime   IVPB      cefepime   IVPB 2000 milliGRAM(s) IV Intermittent every 8 hours  filgrastim-sndz (ZARXIO) Injectable 480 MICROGram(s) SubCutaneous daily  heparin   Injectable 5000 Unit(s) SubCutaneous every 8 hours  lactated ringers. 1000 milliLiter(s) (150 mL/Hr) IV Continuous <Continuous>  metoprolol succinate  milliGRAM(s) Oral daily  potassium chloride    Tablet ER 20 milliEquivalent(s) Oral every 2 hours    MEDICATIONS  (PRN):  acetaminophen     Tablet .. 975 milliGRAM(s) Oral every 6 hours PRN Temp greater or equal to 38C (100.4F), Mild Pain (1 - 3)  HYDROmorphone  Injectable 1 milliGRAM(s) IV Push every 4 hours PRN Severe Pain (7 - 10)  ibuprofen  Tablet. 600 milliGRAM(s) Oral every 6 hours PRN Moderate Pain (4 - 6)  metoclopramide Injectable 10 milliGRAM(s) IV Push every 8 hours PRN nausea/vomiting  ondansetron Injectable 4 milliGRAM(s) IV Push every 6 hours PRN Nausea and/or Vomiting      Vital Signs Last 24 Hrs  T(C): 37.1 (2022 10:16), Max: 37.1 (2022 10:16)  T(F): 98.8 (2022 10:16), Max: 98.8 (2022 10:16)  HR: 104 (2022 10:16) (104 - 127)  BP: 108/68 (2022 10:16) (108/68 - 129/89)  BP(mean): --  RR: 18 (2022 10:16) (16 - 20)  SpO2: 96% (2022 10:16) (94% - 100%)  CAPILLARY BLOOD GLUCOSE        I&O's Summary    2022 07:01  -  2022 07:00  --------------------------------------------------------  IN: 4765 mL / OUT: 889 mL / NET: 3876 mL    2022 07:01  -  2022 10:32  --------------------------------------------------------  IN: 740 mL / OUT: 100 mL / NET: 640 mL        PHYSICAL EXAM:  CONSTITUTIONAL: NAD  EYES: PERRLA; conjunctiva and sclera clear  ENMT: Moist oral mucosa, no pharyngeal injection or exudates; normal dentition  NECK: Supple, no palpable masses; no thyromegaly  RESPIRATORY: Normal respiratory effort; lungs are clear to auscultation bilaterally  CARDIOVASCULAR: Regular rate and rhythm, normal S1 and S2, no murmur/rub/gallop; No lower extremity edema; Peripheral pulses are 2+ bilaterally  ABDOMEN: Tender to palpation, quiet bowel sounds, mild guarding; No hepatosplenomegaly  MUSCULOSKELETAL:  Did not assess gait; no clubbing or cyanosis of digits; no joint swelling or tenderness to palpation  PSYCH: A+O to person, place, and time; affect appropriate  NEUROLOGY: CN 2-12 are intact and symmetric; no gross sensory deficits   SKIN: No rashes; no palpable lesions    LABS:                        8.3    0.39  )-----------( 85       ( 2022 05:40 )             26.5     -07    137  |  97<L>  |  29<H>  ----------------------------<  80  3.4<L>   |  21<L>  |  1.12    Ca    9.2      2022 05:40  Phos  3.4     06-07  Mg     2.00     06-07            Urinalysis Basic - ( 2022 10:46 )    Color: Yellow / Appearance: Clear / S.014 / pH: x  Gluc: x / Ketone: Negative  / Bili: Negative / Urobili: 6 mg/dL   Blood: x / Protein: 100 mg/dL / Nitrite: Negative   Leuk Esterase: Small / RBC: 8 /HPF / WBC 7 /HPF   Sq Epi: x / Non Sq Epi: 2 /HPF / Bacteria: Few        RADIOLOGY & ADDITIONAL TESTS:    Imaging Personally Reviewed:    Care Discussed with Consultants/Other Providers:

## 2022-06-07 NOTE — PROGRESS NOTE ADULT - SUBJECTIVE AND OBJECTIVE BOX
HD#3    Subjective:   Pt seen and examined at bedside. No events overnight. Reports increased abdominal tightness/ full sensation. Pain well controlled with current pain regimen. Patient reports increased ambulation. Passing flatus. Able to tolerate a pretzels overnight. Baseline nausea, reports emesis. Small volume bilious fluid. Pt denies fever, chills, chest pain, SOB, lightheadedness, dizziness.    Objective:  T(F): 97.7 (22 @ 01:48), Max: 98.3 (22 @ 17:33)  HR: 110 (22 @ 03:05) (109 - 127)  BP: 128/76 (22 @ 03:05) (121/81 - 129/89)  RR: 18 (22 @ 01:48) (17 - 20)  SpO2: 98% (22 @ 01:48) (97% - 100%)  Wt(kg): --  I&O's Summary    2022 07:  -  2022 07:00  --------------------------------------------------------  IN: 4730 mL / OUT: 1680 mL / NET: 3050 mL    2022 07:01  -  2022 06:37  --------------------------------------------------------  IN: 3445 mL / OUT: 839 mL / NET: 2606 mL      CAPILLARY BLOOD GLUCOSE      MEDICATIONS  (STANDING):  amLODIPine   Tablet 10 milliGRAM(s) Oral daily  cefepime   IVPB      cefepime   IVPB 2000 milliGRAM(s) IV Intermittent every 8 hours  filgrastim-sndz (ZARXIO) Injectable 480 MICROGram(s) SubCutaneous daily  heparin   Injectable 5000 Unit(s) SubCutaneous every 8 hours  lactated ringers. 1000 milliLiter(s) (150 mL/Hr) IV Continuous <Continuous>  metoprolol succinate  milliGRAM(s) Oral daily    MEDICATIONS  (PRN):  acetaminophen     Tablet .. 975 milliGRAM(s) Oral every 6 hours PRN Temp greater or equal to 38C (100.4F), Mild Pain (1 - 3)  HYDROmorphone  Injectable 1 milliGRAM(s) IV Push every 4 hours PRN Severe Pain (7 - 10)  ibuprofen  Tablet. 600 milliGRAM(s) Oral every 6 hours PRN Moderate Pain (4 - 6)  metoclopramide Injectable 10 milliGRAM(s) IV Push every 8 hours PRN nausea/vomiting  ondansetron Injectable 4 milliGRAM(s) IV Push every 6 hours PRN Nausea and/or Vomiting      Physical Exam:  Constitutional: NAD, A+O x3  CV: Tachycardic with a regular rhythm Cap refill <2s   Lungs: CTA b/l, good air flow b/l  Abdomen: soft, moderate distension, tender to palpation. Without guarding rebound, or peritoneal signs. + Bowel sounds  b/l Nephrostomy tube in place. Recently emptied, containing dark brown urine.   Extremities: no lower extremity edema or calf tenderness bilaterally; Venodyne in place and turned on.     LABS:      137    |  96<L>  |  21     ----------------------------<  92     4.4     |  24     |  0.83     Ca    9.1        2022 06:00  Phos  3.5       -  Mg     2.20      -06        Urinalysis Basic - ( 2022 10:46 )    Color: Yellow / Appearance: Clear / S.014 / pH: x  Gluc: x / Ketone: Negative  / Bili: Negative / Urobili: 6 mg/dL   Blood: x / Protein: 100 mg/dL / Nitrite: Negative   Leuk Esterase: Small / RBC: 8 /HPF / WBC 7 /HPF   Sq Epi: x / Non Sq Epi: 2 /HPF / Bacteria: Few            < from: CT Angio Chest PE Protocol w/ IV Cont (22 @ 21:47) >  IMPRESSION:    *  No pulmonary embolus.  *  Redemonstration of bilateral lung nodules and mildly enlarged right   paratracheal/right hilar lymph nodes.    --- End of Report ---    < end of copied text >  < from: CT Abdomen and Pelvis w/ IV Cont (22 @ 17:45) >  IMPRESSION:  *  No central pulmonary embolism.  *Redemonstration of ill-defined fluid and gas collection slightly   posterior to the right uterine wall. In addition there is gas within the   uterine cavity, new from . Findings may represent infected and   necrotic tumor versus a rectosigmoid-vaginal fistula. CT abdomen pelvis   with oral and rectal contrast may be done for further evaluation as   clinically warranted.  *  Bilateral lung nodules probably metastatic. Further evaluation can be   performed with PET CT.  *  Mediastinal, hilar and retroperitoneal lymphadenopathy, as described   above.  *  Redemonstration of descending and rectosigmoid colitis with the   sigmoid colon abutting the right uterine wall.  *  Relatively unchanged moderate volume ascites.    --- End of Report ---    < end of copied text >

## 2022-06-07 NOTE — CONSULT NOTE ADULT - SUBJECTIVE AND OBJECTIVE BOX
HPI:  52F with recurrent cervical CA (stage III, s/p chemo) c/b b/l hydronephrosis s/p B/L nephrostomy tube placement, p/w severe sepsis from neutropenic fever vs infected necrotic tumor.    Neurology consulted for acute mental status change on 6/7. Per gyn onc chart note, The patient was about to get a CT A/P with oral contrast when she started spitting up. She became agitated and refused the scan. She was brought back to the floor. On the floor when evaluated, she was oriented to person/place; however, was not oriented to time. She was answering basic questions incorrectly, but was responsive. Her family was at bedside and concerned about her change in mental status. The patient said that she feels well. Denies HA, dizziness, lightheadedness, CP, SOB, palpitations, abd pain. She has been spitting up mostly saliva and occasional brown emesis. Vomiting has increased in frequency since her intake of PO contrast.   Per RN at bedside, pt was repeating phrases and words, "not making sense".   On interview, pt vomiting profusely and giving limited hx. Pt reports that her daughter was there earlier and was concerned that she was confused, unable to elaborate.       REVIEW OF SYSTEMS    A 10-system ROS was performed and is negative except for those items noted above and/or in the HPI.    PAST MEDICAL & SURGICAL HISTORY:  HTN (hypertension)      Cervical cancer      Nephrostomy status        FAMILY HISTORY:  FH: liver cancer (Mother)      SOCIAL HISTORY:   T/E/D:   Occupation:   Lives with:     MEDICATIONS (HOME):  Home Medications:    MEDICATIONS  (STANDING):  amLODIPine   Tablet 10 milliGRAM(s) Oral daily  cefepime   IVPB      cefepime   IVPB 2000 milliGRAM(s) IV Intermittent every 8 hours  filgrastim-sndz (ZARXIO) Injectable 480 MICROGram(s) SubCutaneous daily  heparin   Injectable 5000 Unit(s) SubCutaneous every 8 hours  lactated ringers. 1000 milliLiter(s) (150 mL/Hr) IV Continuous <Continuous>  metoprolol succinate  milliGRAM(s) Oral daily    MEDICATIONS  (PRN):  acetaminophen     Tablet .. 975 milliGRAM(s) Oral every 6 hours PRN Temp greater or equal to 38C (100.4F), Mild Pain (1 - 3)  metoclopramide Injectable 10 milliGRAM(s) IV Push every 8 hours PRN nausea/vomiting  ondansetron Injectable 4 milliGRAM(s) IV Push every 6 hours PRN Nausea and/or Vomiting    ALLERGIES/INTOLERANCES:  Allergies  No Known Allergies    Intolerances    VITALS & EXAMINATION:  Vital Signs Last 24 Hrs  T(C): 36.4 (07 Jun 2022 20:45), Max: 37.1 (07 Jun 2022 10:16)  T(F): 97.5 (07 Jun 2022 20:45), Max: 98.8 (07 Jun 2022 10:16)  HR: 110 (07 Jun 2022 20:45) (104 - 116)  BP: 121/81 (07 Jun 2022 20:45) (108/68 - 128/76)  BP(mean): --  RR: 17 (07 Jun 2022 20:45) (16 - 18)  SpO2: 100% (07 Jun 2022 20:45) (94% - 100%)    General: Exam very limited as pt actively vomiting  Constitutional: Obese Female, appears stated age, in apparent distress, actively vomiting  Head: Normocephalic & atraumatic.  Respiratory: Visible increased work of breathing at rest    Neurological (>12):  MS: Awake, alert, oriented to person, place (hospital). Not oriented to time (perseverating on 8 22). Exam . Normal affect. Follows most simple commands. Unable to test more of MS exam given pt's refusal.     Language: Perseverating, repeating 8, 22, "oh my god", "not today". Speech is clear, fluent with mildly impaired repetition (although limited by MS) & comprehension (able to name objects thumb, follow most simple commands.    CNs:   BTT b/l. EOMI. V1-3 intact to LT, well developed masseter muscles b/l. No facial asymmetry b/l, full eye closure strength b/l. Hearing grossly normal (rubbing fingers) b/l. Gag reflex deferred. Head turning & shoulder shrug intact b/l. Tongue midline, normal movements, no atrophy.    Motor: Normal muscle bulk. B/l postural and action tremors. No pronator drift. 4/5 b/l biceps, triceps,  strength. B/l LE at least 3/5.     Sensation: Intact to LT b/l throughout.     Cortical: Extinction on DSS (neglect): none    Reflexes:              Biceps(C5)       BR(C6)     Triceps(C7)               Patellar(L4)    Achilles(S1)    Plantar Resp  R	1	          1             		        0		    0		mute  L	1	          1	             		        0		    0		mute     Coordination:  B/l intention tremors to FTN     Gait: deferred    LABORATORY:  CBC                       8.5    0.47  )-----------( 50       ( 07 Jun 2022 20:28 )             26.9     Chem 06-07    138  |  96<L>  |  34<H>  ----------------------------<  78  4.3   |  20<L>  |  1.11    Ca    9.6      07 Jun 2022 20:28  Phos  2.7     06-07  Mg     2.10     06-07    TPro  7.3  /  Alb  2.7<L>  /  TBili  1.4<H>  /  DBili  1.0<H>  /  AST  25  /  ALT  9   /  AlkPhos  69  06-07    LFTs LIVER FUNCTIONS - ( 07 Jun 2022 20:28 )  Alb: 2.7 g/dL / Pro: 7.3 g/dL / ALK PHOS: 69 U/L / ALT: 9 U/L / AST: 25 U/L / GGT: x           Coagulopathy PT/INR - ( 07 Jun 2022 20:28 )   PT: 14.9 sec;   INR: 1.28 ratio         PTT - ( 07 Jun 2022 20:28 )  PTT:42.1 sec      STUDIES & IMAGING:  Studies (EKG, EEG, EMG, etc):     Radiology (XR, CT, MR, U/S, TTE/DK): HPI:  52F with recurrent cervical CA (stage III, s/p chemo) c/b b/l hydronephrosis s/p B/L nephrostomy tube placement, p/w severe sepsis from neutropenic fever vs infected necrotic tumor. Pt received (Carbo AUC 5, Paxol 135 mg/m2 and Avastin 15 mg/m2 on q3 week schedule), last cycle on 5/31.    Neurology consulted for acute mental status change on 6/7. Per gyn onc chart note, The patient was about to get a CT A/P with oral contrast when she started spitting up. She became agitated and refused the scan. She was brought back to the floor. On the floor when evaluated, she was oriented to person/place; however, was not oriented to time. She was answering basic questions incorrectly, but was responsive. Her family was at bedside and concerned about her change in mental status. The patient said that she feels well. Denies HA, dizziness, lightheadedness, CP, SOB, palpitations, abd pain. She has been spitting up mostly saliva and occasional brown emesis. Vomiting has increased in frequency since her intake of PO contrast.   Per RN at bedside, pt was repeating phrases and words, "not making sense".   On interview, pt vomiting profusely and giving limited hx. Pt reports that her daughter was there earlier and was concerned that she was confused, unable to elaborate.       REVIEW OF SYSTEMS    A 10-system ROS was performed and is negative except for those items noted above and/or in the HPI.    PAST MEDICAL & SURGICAL HISTORY:  HTN (hypertension)      Cervical cancer      Nephrostomy status        FAMILY HISTORY:  FH: liver cancer (Mother)      SOCIAL HISTORY:   T/E/D:   Occupation:   Lives with:     MEDICATIONS (HOME):  Home Medications:    MEDICATIONS  (STANDING):  amLODIPine   Tablet 10 milliGRAM(s) Oral daily  cefepime   IVPB      cefepime   IVPB 2000 milliGRAM(s) IV Intermittent every 8 hours  filgrastim-sndz (ZARXIO) Injectable 480 MICROGram(s) SubCutaneous daily  heparin   Injectable 5000 Unit(s) SubCutaneous every 8 hours  lactated ringers. 1000 milliLiter(s) (150 mL/Hr) IV Continuous <Continuous>  metoprolol succinate  milliGRAM(s) Oral daily    MEDICATIONS  (PRN):  acetaminophen     Tablet .. 975 milliGRAM(s) Oral every 6 hours PRN Temp greater or equal to 38C (100.4F), Mild Pain (1 - 3)  metoclopramide Injectable 10 milliGRAM(s) IV Push every 8 hours PRN nausea/vomiting  ondansetron Injectable 4 milliGRAM(s) IV Push every 6 hours PRN Nausea and/or Vomiting    ALLERGIES/INTOLERANCES:  Allergies  No Known Allergies    Intolerances    VITALS & EXAMINATION:  Vital Signs Last 24 Hrs  T(C): 36.4 (07 Jun 2022 20:45), Max: 37.1 (07 Jun 2022 10:16)  T(F): 97.5 (07 Jun 2022 20:45), Max: 98.8 (07 Jun 2022 10:16)  HR: 110 (07 Jun 2022 20:45) (104 - 116)  BP: 121/81 (07 Jun 2022 20:45) (108/68 - 128/76)  BP(mean): --  RR: 17 (07 Jun 2022 20:45) (16 - 18)  SpO2: 100% (07 Jun 2022 20:45) (94% - 100%)    General: Exam very limited as pt actively vomiting  Constitutional: Obese Female, appears stated age, in apparent distress, actively vomiting  Head: Normocephalic & atraumatic.  Respiratory: Visible increased work of breathing at rest    Neurological (>12):  MS: Awake, alert, oriented to person, place (hospital). Not oriented to time (perseverating on 8 22). Exam . Normal affect. Follows most simple commands. Unable to test more of MS exam given pt's refusal.     Language: Perseverating, repeating 8, 22, "oh my god", "not today". Speech is clear, fluent with mildly impaired repetition (although limited by MS) & comprehension (able to name objects thumb, follow most simple commands.    CNs:   BTT b/l. EOMI. V1-3 intact to LT, well developed masseter muscles b/l. No facial asymmetry b/l, full eye closure strength b/l. Hearing grossly normal (rubbing fingers) b/l. Gag reflex deferred. Head turning & shoulder shrug intact b/l. Tongue midline, normal movements, no atrophy.    Motor: Normal muscle bulk. B/l postural and action tremors. No pronator drift. 4/5 b/l biceps, triceps,  strength. B/l LE at least 3/5.     Sensation: Intact to LT b/l throughout.     Cortical: Extinction on DSS (neglect): none    Reflexes:              Biceps(C5)       BR(C6)     Triceps(C7)               Patellar(L4)    Achilles(S1)    Plantar Resp  R	1	          1             		        0		    0		mute  L	1	          1	             		        0		    0		mute     Coordination:  B/l intention tremors to FTN     Gait: deferred    LABORATORY:  CBC                       8.5    0.47  )-----------( 50       ( 07 Jun 2022 20:28 )             26.9     Chem 06-07    138  |  96<L>  |  34<H>  ----------------------------<  78  4.3   |  20<L>  |  1.11    Ca    9.6      07 Jun 2022 20:28  Phos  2.7     06-07  Mg     2.10     06-07    TPro  7.3  /  Alb  2.7<L>  /  TBili  1.4<H>  /  DBili  1.0<H>  /  AST  25  /  ALT  9   /  AlkPhos  69  06-07    LFTs LIVER FUNCTIONS - ( 07 Jun 2022 20:28 )  Alb: 2.7 g/dL / Pro: 7.3 g/dL / ALK PHOS: 69 U/L / ALT: 9 U/L / AST: 25 U/L / GGT: x           Coagulopathy PT/INR - ( 07 Jun 2022 20:28 )   PT: 14.9 sec;   INR: 1.28 ratio         PTT - ( 07 Jun 2022 20:28 )  PTT:42.1 sec      STUDIES & IMAGING:  Studies (EKG, EEG, EMG, etc):     Radiology (XR, CT, MR, U/S, TTE/DK):

## 2022-06-07 NOTE — PROGRESS NOTE ADULT - ASSESSMENT
52y  with recurrent cervical CA who presented to the ED as transfer from [place] admitted for mgmt of neutropenic fever. On presentation, patient was found to be tachycardic to 130s, ANC of 200, and febrile.     Onc: recurrent cervical cancer. 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 Treated with definitive radiation and weekly cisplatin at [facility]. Final radiation was 10/29/2014.   - Recurrence in  with bilateral ureteral obstruction. Recently started on new chemo regimen (Carbo AUC 5, Paxol 135 mg/m2 and Avastin 15 mg/m2 on q3 week schedule), last cycle on   - CT A/P as above. Demonstrates carcinomatosis   Neuro: PO Tylenol and Motrin for pain control  CV: Tachycardic.  - s/p 1uPRBc. Hct trend as above. F/U AM CBC   - Hx of HTN, Cont home Amlodipine and Metoprolol.   - Per hospitalist, tachycardia 2/2 to sepsis. Treat underlying cause.   Pulm: CTA without evidence of a PE. Report as above. O2 sat wnl on RA, increase ambulation, encourage incentive spirometry use  GI: Tolerating reg diet, Zofran/Reglan prn       CT abdomen/pelvis as above. Concern for necrotic tumor verse rectosigmoid-vaginal fistula. With moderate volume ascites        Will need CT A/P with rectal/oral contrast.   : B/L nephrostomy tubes in place.   FEN: LR@150; f/u BMP, replete electrolytes prn  Heme: HSQ and SCDs while in bed for DVT ppx  ID: Neutropenic fever, continue daily Neupogen   - WBC ct 0.5, ANC <200; con't to monitor  - F/u AM lactate   - afebrile overnight; Tmax 39.5 (on ); cont to trend fever curve  - Cefepime (-)  s/p Meropenem + Zosyn ()   - f/u Bcx, Ucx()    Linda Magallanes, PGY-1

## 2022-06-07 NOTE — PROGRESS NOTE ADULT - PROBLEM SELECTOR PLAN 1
with leukopenia, tachycardia, fever POA  source could be infected tumor or neutropenic fever  - Lactate improved  - IVF hydration given - continue to monitor fluid status.  - on Cefepime IV  - Monitor CBC  - tachycardia likely secondary to ongoing sepsis - treat underlying cause.

## 2022-06-07 NOTE — CONSULT NOTE ADULT - ASSESSMENT
52F with recurrent cervical CA (per patient stage III) presenting to the ED as transfer from [place] a/w neutropenic fever. Neurology consulted for acute mental status change on 6/7. Per gyn onc chart note, pt couldn't tolerate CT A/P with oral contrast with increased vomiting. She became agitated and refused the scan. She was brought back to the floor. On the floor when evaluated, she was oriented to person/place; however, was not oriented to time. She was answering basic questions incorrectly, but was responsive. Per RN at bedside, pt was repeating phrases and words, "not making sense". Neuro exam notable for perseveration, oriented to person, place, generalized weakness.    Impression: AMS (perseveration, disorientation) likely 2/2 toxic/metabolic/infectious etiology vs. 2/2 cefepime     Recommendations:   [] check CBC, CMP, Mg, Phos, UA, Utox, TSH, T3/T4, RPR, vitamin B1, B6, B12, folate, homocysteine, methylmalonic acid, lactate, creatinine kinase, ammonia, Cu, SPEP, HIV, ESR, CRP, Zn  [] recommend considering alternative antibiotic to cefepime   [] f/u CTH  [] EEG (awake and asleep)  [] neurochecks  [] fall precautions  [] rest of toxic/metabolic/infectious workup per primary team    Case to be seen and discussed with Dr. Cage in AM

## 2022-06-07 NOTE — PROGRESS NOTE ADULT - PROBLEM SELECTOR PLAN 1
GYN ONC Fellow Addendum:    Pt seen and examined at bedside. Agree with above. Pain controlled. Tolerating reg diet, -n/v. +flatus, +BM. ambulating and voiding. Denies fevers, chills.     VS reviewed  Labs reviewed    - Continue current pain regimen  - Febrile neutropenia: last T 38.2 on 6/5 @0024, continue cefepime; CT A/P with po contrast  - Neutropenia: due to chemo,  continue daily neupogen, neutropenic precautions  - Thromboytopenia: due to chemo, no evidence of bleeding  - Rising Cr: likely due to contrast, continue IVF  - Tachycardia: improved  - Encourage ambulation and IS use  - Replete lytes prn  - DVT ppx: HSQ  - Dispo: continue inpatient management    GRIS Mckeon, PGY6

## 2022-06-07 NOTE — PROGRESS NOTE ADULT - PROBLEM SELECTOR PLAN 3
Concern for infected tumor vs perforation  - management as per primary team.  - Awaiting CT A/P with PO/IV contrast

## 2022-06-07 NOTE — CHART NOTE - NSCHARTNOTEFT_GEN_A_CORE
Pt seen at bedside. Oriented to self, but not place or time. Requires frequent redirection. Unable to consent at this time for imaging. Health care proxy Lydia Oleary called with no answer at provided number to provide consent. Will try again at later time.     Joelle Garcia, PGY-2 Pt seen at bedside. Oriented to self, but not place or time. Requires frequent redirection. Unable to consent at this time for imaging given patient's inability to follow directions.     Joelle Garcia, PGY-2 Pt seen at bedside. Oriented to self, but not place or time. Requires frequent redirection. Unable to consent at this time for imaging given patient's inability to follow directions. Discussed with CT tech who attempted CT AP earlier today. Per CT tech, pt would need to be fully sedated for imaging.     Joelle Garcia, PGY-2 Pt seen at bedside. Oriented to self, but not place or time. Requires frequent redirection. Unable to consent at this time for imaging given patient's inability to follow directions. Discussed with CT tech who attempted CT AP earlier today. Per CT tech, pt would need to be fully sedated for imaging. Will defer CT overnight.     Joelle Garcia, PGY-2    d/w Dr Aiken

## 2022-06-08 ENCOUNTER — TRANSCRIPTION ENCOUNTER (OUTPATIENT)
Age: 52
End: 2022-06-08

## 2022-06-08 ENCOUNTER — RESULT REVIEW (OUTPATIENT)
Age: 52
End: 2022-06-08

## 2022-06-08 DIAGNOSIS — D69.6 THROMBOCYTOPENIA, UNSPECIFIED: ICD-10-CM

## 2022-06-08 DIAGNOSIS — K92.2 GASTROINTESTINAL HEMORRHAGE, UNSPECIFIED: ICD-10-CM

## 2022-06-08 DIAGNOSIS — E03.9 HYPOTHYROIDISM, UNSPECIFIED: ICD-10-CM

## 2022-06-08 DIAGNOSIS — G93.40 ENCEPHALOPATHY, UNSPECIFIED: ICD-10-CM

## 2022-06-08 DIAGNOSIS — D62 ACUTE POSTHEMORRHAGIC ANEMIA: ICD-10-CM

## 2022-06-08 LAB
A1C WITH ESTIMATED AVERAGE GLUCOSE RESULT: 5.5 % — SIGNIFICANT CHANGE UP (ref 4–5.6)
ALBUMIN SERPL ELPH-MCNC: 2.3 G/DL — LOW (ref 3.3–5)
ALP SERPL-CCNC: 58 U/L — SIGNIFICANT CHANGE UP (ref 40–120)
ALT FLD-CCNC: 8 U/L — SIGNIFICANT CHANGE UP (ref 4–33)
AMMONIA BLD-MCNC: 28 UMOL/L — SIGNIFICANT CHANGE UP (ref 11–55)
AMPHET UR-MCNC: NEGATIVE — SIGNIFICANT CHANGE UP
ANION GAP SERPL CALC-SCNC: 19 MMOL/L — HIGH (ref 7–14)
ANION GAP SERPL CALC-SCNC: 20 MMOL/L — HIGH (ref 7–14)
APTT BLD: 31.8 SEC — SIGNIFICANT CHANGE UP (ref 27–36.3)
APTT BLD: 41 SEC — HIGH (ref 27–36.3)
AST SERPL-CCNC: 23 U/L — SIGNIFICANT CHANGE UP (ref 4–32)
BARBITURATES UR SCN-MCNC: NEGATIVE — SIGNIFICANT CHANGE UP
BASOPHILS # BLD AUTO: 0 K/UL — SIGNIFICANT CHANGE UP (ref 0–0.2)
BASOPHILS # BLD AUTO: 0.01 K/UL — SIGNIFICANT CHANGE UP (ref 0–0.2)
BASOPHILS NFR BLD AUTO: 0 % — SIGNIFICANT CHANGE UP (ref 0–2)
BASOPHILS NFR BLD AUTO: 0.9 % — SIGNIFICANT CHANGE UP (ref 0–2)
BENZODIAZ UR-MCNC: NEGATIVE — SIGNIFICANT CHANGE UP
BILIRUB SERPL-MCNC: 1.2 MG/DL — SIGNIFICANT CHANGE UP (ref 0.2–1.2)
BLD GP AB SCN SERPL QL: NEGATIVE — SIGNIFICANT CHANGE UP
BLOOD GAS ARTERIAL - LYTES,HGB,ICA,LACT RESULT: SIGNIFICANT CHANGE UP
BUN SERPL-MCNC: 33 MG/DL — HIGH (ref 7–23)
BUN SERPL-MCNC: 38 MG/DL — HIGH (ref 7–23)
CALCIUM SERPL-MCNC: 8.4 MG/DL — SIGNIFICANT CHANGE UP (ref 8.4–10.5)
CALCIUM SERPL-MCNC: 9.1 MG/DL — SIGNIFICANT CHANGE UP (ref 8.4–10.5)
CHLORIDE SERPL-SCNC: 101 MMOL/L — SIGNIFICANT CHANGE UP (ref 98–107)
CHLORIDE SERPL-SCNC: 98 MMOL/L — SIGNIFICANT CHANGE UP (ref 98–107)
CK SERPL-CCNC: 28 U/L — SIGNIFICANT CHANGE UP (ref 25–170)
CO2 SERPL-SCNC: 21 MMOL/L — LOW (ref 22–31)
CO2 SERPL-SCNC: 21 MMOL/L — LOW (ref 22–31)
COCAINE METAB.OTHER UR-MCNC: NEGATIVE — SIGNIFICANT CHANGE UP
CREAT SERPL-MCNC: 0.9 MG/DL — SIGNIFICANT CHANGE UP (ref 0.5–1.3)
CREAT SERPL-MCNC: 1.07 MG/DL — SIGNIFICANT CHANGE UP (ref 0.5–1.3)
CREATININE URINE RESULT, DAU: 42 MG/DL — SIGNIFICANT CHANGE UP
CRP SERPL-MCNC: 421.9 MG/L — HIGH
EGFR: 62 ML/MIN/1.73M2 — SIGNIFICANT CHANGE UP
EGFR: 76 ML/MIN/1.73M2 — SIGNIFICANT CHANGE UP
EOSINOPHIL # BLD AUTO: 0 K/UL — SIGNIFICANT CHANGE UP (ref 0–0.5)
EOSINOPHIL # BLD AUTO: 0 K/UL — SIGNIFICANT CHANGE UP (ref 0–0.5)
EOSINOPHIL NFR BLD AUTO: 0 % — SIGNIFICANT CHANGE UP (ref 0–6)
EOSINOPHIL NFR BLD AUTO: 0 % — SIGNIFICANT CHANGE UP (ref 0–6)
ERYTHROCYTE [SEDIMENTATION RATE] IN BLOOD: 119 MM/HR — HIGH (ref 4–25)
ESTIMATED AVERAGE GLUCOSE: 111 — SIGNIFICANT CHANGE UP
FIBRINOGEN PPP-MCNC: 996 MG/DL — HIGH (ref 330–520)
FOLATE SERPL-MCNC: 9.3 NG/ML — SIGNIFICANT CHANGE UP (ref 3.1–17.5)
GAS PNL BLDA: SIGNIFICANT CHANGE UP
GLUCOSE SERPL-MCNC: 83 MG/DL — SIGNIFICANT CHANGE UP (ref 70–99)
GLUCOSE SERPL-MCNC: 93 MG/DL — SIGNIFICANT CHANGE UP (ref 70–99)
HCT VFR BLD CALC: 22.7 % — LOW (ref 34.5–45)
HCT VFR BLD CALC: 23.4 % — LOW (ref 34.5–45)
HCT VFR BLD CALC: 26.4 % — LOW (ref 34.5–45)
HCYS SERPL-MCNC: 7.5 UMOL/L — SIGNIFICANT CHANGE UP
HGB BLD-MCNC: 7.3 G/DL — LOW (ref 11.5–15.5)
HGB BLD-MCNC: 7.4 G/DL — LOW (ref 11.5–15.5)
HGB BLD-MCNC: 8.9 G/DL — LOW (ref 11.5–15.5)
HIV 1+2 AB+HIV1 P24 AG SERPL QL IA: SIGNIFICANT CHANGE UP
IANC: 0.32 K/UL — LOW (ref 1.8–7.4)
IANC: 0.69 K/UL — LOW (ref 1.8–7.4)
IMM GRANULOCYTES NFR BLD AUTO: 7.1 % — HIGH (ref 0–1.5)
IMM GRANULOCYTES NFR BLD AUTO: 8.9 % — HIGH (ref 0–1.5)
INR BLD: 1.4 RATIO — HIGH (ref 0.88–1.16)
INR BLD: 1.43 RATIO — HIGH (ref 0.88–1.16)
LACTATE SERPL-SCNC: 2.7 MMOL/L — HIGH (ref 0.5–2)
LYMPHOCYTES # BLD AUTO: 0.12 K/UL — LOW (ref 1–3.3)
LYMPHOCYTES # BLD AUTO: 0.19 K/UL — LOW (ref 1–3.3)
LYMPHOCYTES # BLD AUTO: 17 % — SIGNIFICANT CHANGE UP (ref 13–44)
LYMPHOCYTES # BLD AUTO: 21.4 % — SIGNIFICANT CHANGE UP (ref 13–44)
MAGNESIUM SERPL-MCNC: 1.7 MG/DL — SIGNIFICANT CHANGE UP (ref 1.6–2.6)
MAGNESIUM SERPL-MCNC: 2 MG/DL — SIGNIFICANT CHANGE UP (ref 1.6–2.6)
MCHC RBC-ENTMCNC: 27.1 PG — SIGNIFICANT CHANGE UP (ref 27–34)
MCHC RBC-ENTMCNC: 28.2 PG — SIGNIFICANT CHANGE UP (ref 27–34)
MCHC RBC-ENTMCNC: 29.6 PG — SIGNIFICANT CHANGE UP (ref 27–34)
MCHC RBC-ENTMCNC: 31.6 GM/DL — LOW (ref 32–36)
MCHC RBC-ENTMCNC: 32.2 GM/DL — SIGNIFICANT CHANGE UP (ref 32–36)
MCHC RBC-ENTMCNC: 33.7 GM/DL — SIGNIFICANT CHANGE UP (ref 32–36)
MCV RBC AUTO: 85.7 FL — SIGNIFICANT CHANGE UP (ref 80–100)
MCV RBC AUTO: 87.6 FL — SIGNIFICANT CHANGE UP (ref 80–100)
MCV RBC AUTO: 87.7 FL — SIGNIFICANT CHANGE UP (ref 80–100)
METHADONE UR-MCNC: NEGATIVE — SIGNIFICANT CHANGE UP
MONOCYTES # BLD AUTO: 0.08 K/UL — SIGNIFICANT CHANGE UP (ref 0–0.9)
MONOCYTES # BLD AUTO: 0.13 K/UL — SIGNIFICANT CHANGE UP (ref 0–0.9)
MONOCYTES NFR BLD AUTO: 11.6 % — SIGNIFICANT CHANGE UP (ref 2–14)
MONOCYTES NFR BLD AUTO: 14.3 % — HIGH (ref 2–14)
NEUTROPHILS # BLD AUTO: 0.32 K/UL — LOW (ref 1.8–7.4)
NEUTROPHILS # BLD AUTO: 0.69 K/UL — LOW (ref 1.8–7.4)
NEUTROPHILS NFR BLD AUTO: 57.2 % — SIGNIFICANT CHANGE UP (ref 43–77)
NEUTROPHILS NFR BLD AUTO: 61.6 % — SIGNIFICANT CHANGE UP (ref 43–77)
NRBC # BLD: 4 /100 WBCS — SIGNIFICANT CHANGE UP
NRBC # BLD: 5 /100 WBCS — SIGNIFICANT CHANGE UP
NRBC # BLD: 6 /100 WBCS — SIGNIFICANT CHANGE UP
NRBC # FLD: 0.02 K/UL — HIGH
NRBC # FLD: 0.07 K/UL — HIGH
NRBC # FLD: 0.08 K/UL — HIGH
OPIATES UR-MCNC: POSITIVE
OXYCODONE UR-MCNC: POSITIVE
PCP SPEC-MCNC: SIGNIFICANT CHANGE UP
PCP UR-MCNC: NEGATIVE — SIGNIFICANT CHANGE UP
PHOSPHATE SERPL-MCNC: 2.6 MG/DL — SIGNIFICANT CHANGE UP (ref 2.5–4.5)
PHOSPHATE SERPL-MCNC: 3 MG/DL — SIGNIFICANT CHANGE UP (ref 2.5–4.5)
PLATELET # BLD AUTO: 34 K/UL — LOW (ref 150–400)
PLATELET # BLD AUTO: 45 K/UL — LOW (ref 150–400)
PLATELET # BLD AUTO: 66 K/UL — LOW (ref 150–400)
POTASSIUM SERPL-MCNC: 3 MMOL/L — LOW (ref 3.5–5.3)
POTASSIUM SERPL-MCNC: 3.1 MMOL/L — LOW (ref 3.5–5.3)
POTASSIUM SERPL-SCNC: 3 MMOL/L — LOW (ref 3.5–5.3)
POTASSIUM SERPL-SCNC: 3.1 MMOL/L — LOW (ref 3.5–5.3)
PROT SERPL-MCNC: 6.3 G/DL — SIGNIFICANT CHANGE UP (ref 6–8.3)
PROT SERPL-MCNC: 6.3 G/DL — SIGNIFICANT CHANGE UP (ref 6–8.3)
PROTHROM AB SERPL-ACNC: 16.3 SEC — HIGH (ref 10.5–13.4)
PROTHROM AB SERPL-ACNC: 16.7 SEC — HIGH (ref 10.5–13.4)
RBC # BLD: 2.59 M/UL — LOW (ref 3.8–5.2)
RBC # BLD: 2.73 M/UL — LOW (ref 3.8–5.2)
RBC # BLD: 3.01 M/UL — LOW (ref 3.8–5.2)
RBC # FLD: 15.9 % — HIGH (ref 10.3–14.5)
RBC # FLD: 19.8 % — HIGH (ref 10.3–14.5)
RBC # FLD: 20.6 % — HIGH (ref 10.3–14.5)
RH IG SCN BLD-IMP: POSITIVE — SIGNIFICANT CHANGE UP
SARS-COV-2 RNA SPEC QL NAA+PROBE: SIGNIFICANT CHANGE UP
SODIUM SERPL-SCNC: 139 MMOL/L — SIGNIFICANT CHANGE UP (ref 135–145)
SODIUM SERPL-SCNC: 141 MMOL/L — SIGNIFICANT CHANGE UP (ref 135–145)
T PALLIDUM AB TITR SER: NEGATIVE — SIGNIFICANT CHANGE UP
T3 SERPL-MCNC: 41 NG/DL — LOW (ref 80–200)
T4 AB SER-ACNC: 3.17 UG/DL — LOW (ref 5.1–13)
T4 FREE SERPL-MCNC: 0.8 NG/DL — LOW (ref 0.9–1.8)
THC UR QL: NEGATIVE — SIGNIFICANT CHANGE UP
VIT B12 SERPL-MCNC: 2000 PG/ML — HIGH (ref 200–900)
WBC # BLD: 0.56 K/UL — CRITICAL LOW (ref 3.8–10.5)
WBC # BLD: 1.12 K/UL — LOW (ref 3.8–10.5)
WBC # BLD: 1.74 K/UL — LOW (ref 3.8–10.5)
WBC # FLD AUTO: 0.56 K/UL — CRITICAL LOW (ref 3.8–10.5)
WBC # FLD AUTO: 1.12 K/UL — LOW (ref 3.8–10.5)
WBC # FLD AUTO: 1.74 K/UL — LOW (ref 3.8–10.5)

## 2022-06-08 PROCEDURE — 97605 NEG PRS WND THER DME<=50SQCM: CPT

## 2022-06-08 PROCEDURE — 88302 TISSUE EXAM BY PATHOLOGIST: CPT | Mod: 26

## 2022-06-08 PROCEDURE — 44143 PARTIAL REMOVAL OF COLON: CPT

## 2022-06-08 PROCEDURE — 99223 1ST HOSP IP/OBS HIGH 75: CPT | Mod: GC

## 2022-06-08 PROCEDURE — 49010 EXPLORATION BEHIND ABDOMEN: CPT

## 2022-06-08 PROCEDURE — 44139 MOBILIZATION OF COLON: CPT

## 2022-06-08 PROCEDURE — 70450 CT HEAD/BRAIN W/O DYE: CPT | Mod: 26

## 2022-06-08 PROCEDURE — 99223 1ST HOSP IP/OBS HIGH 75: CPT

## 2022-06-08 PROCEDURE — 74018 RADEX ABDOMEN 1 VIEW: CPT | Mod: 26

## 2022-06-08 PROCEDURE — 49020 DRAINAGE ABDOM ABSCESS OPEN: CPT | Mod: 59

## 2022-06-08 PROCEDURE — 84165 PROTEIN E-PHORESIS SERUM: CPT | Mod: 26

## 2022-06-08 PROCEDURE — 99233 SBSQ HOSP IP/OBS HIGH 50: CPT

## 2022-06-08 PROCEDURE — 88309 TISSUE EXAM BY PATHOLOGIST: CPT | Mod: 26

## 2022-06-08 PROCEDURE — 99222 1ST HOSP IP/OBS MODERATE 55: CPT

## 2022-06-08 PROCEDURE — 88342 IMHCHEM/IMCYTCHM 1ST ANTB: CPT | Mod: 26

## 2022-06-08 PROCEDURE — 88304 TISSUE EXAM BY PATHOLOGIST: CPT | Mod: 26

## 2022-06-08 DEVICE — STAPLER COVIDIEN TRI-STAPLE 60MM PURPLE RELOAD: Type: IMPLANTABLE DEVICE | Status: FUNCTIONAL

## 2022-06-08 DEVICE — LIGATING CLIPS WECK HORIZON MEDIUM (BLUE) 24: Type: IMPLANTABLE DEVICE | Status: FUNCTIONAL

## 2022-06-08 DEVICE — STAPLER ECHELON CONTOUR CURVED CUTTER 40MM WITH (GREEN) RELOAD: Type: IMPLANTABLE DEVICE | Status: FUNCTIONAL

## 2022-06-08 DEVICE — SURGICEL NU-KNIT 6 X 9": Type: IMPLANTABLE DEVICE | Status: FUNCTIONAL

## 2022-06-08 DEVICE — LIGATING CLIPS WECK HORIZON LARGE (ORANGE) 24: Type: IMPLANTABLE DEVICE | Status: FUNCTIONAL

## 2022-06-08 RX ORDER — PIPERACILLIN AND TAZOBACTAM 4; .5 G/20ML; G/20ML
3.38 INJECTION, POWDER, LYOPHILIZED, FOR SOLUTION INTRAVENOUS ONCE
Refills: 0 | Status: COMPLETED | OUTPATIENT
Start: 2022-06-08 | End: 2022-06-08

## 2022-06-08 RX ORDER — SODIUM CHLORIDE 9 MG/ML
1000 INJECTION, SOLUTION INTRAVENOUS
Refills: 0 | Status: DISCONTINUED | OUTPATIENT
Start: 2022-06-08 | End: 2022-06-10

## 2022-06-08 RX ORDER — PANTOPRAZOLE SODIUM 20 MG/1
40 TABLET, DELAYED RELEASE ORAL
Refills: 0 | Status: DISCONTINUED | OUTPATIENT
Start: 2022-06-08 | End: 2022-06-20

## 2022-06-08 RX ORDER — DIPHENHYDRAMINE HCL 50 MG
25 CAPSULE ORAL ONCE
Refills: 0 | Status: COMPLETED | OUTPATIENT
Start: 2022-06-08 | End: 2022-06-08

## 2022-06-08 RX ORDER — POTASSIUM CHLORIDE 20 MEQ
10 PACKET (EA) ORAL
Refills: 0 | Status: DISCONTINUED | OUTPATIENT
Start: 2022-06-08 | End: 2022-06-08

## 2022-06-08 RX ORDER — MAGNESIUM SULFATE 500 MG/ML
2 VIAL (ML) INJECTION ONCE
Refills: 0 | Status: COMPLETED | OUTPATIENT
Start: 2022-06-08 | End: 2022-06-09

## 2022-06-08 RX ORDER — POTASSIUM CHLORIDE 20 MEQ
10 PACKET (EA) ORAL
Refills: 0 | Status: COMPLETED | OUTPATIENT
Start: 2022-06-08 | End: 2022-06-08

## 2022-06-08 RX ORDER — HALOPERIDOL DECANOATE 100 MG/ML
5 INJECTION INTRAMUSCULAR ONCE
Refills: 0 | Status: COMPLETED | OUTPATIENT
Start: 2022-06-08 | End: 2022-06-08

## 2022-06-08 RX ORDER — HALOPERIDOL DECANOATE 100 MG/ML
2 INJECTION INTRAMUSCULAR ONCE
Refills: 0 | Status: COMPLETED | OUTPATIENT
Start: 2022-06-08 | End: 2022-06-08

## 2022-06-08 RX ORDER — POTASSIUM CHLORIDE 20 MEQ
10 PACKET (EA) ORAL
Refills: 0 | Status: COMPLETED | OUTPATIENT
Start: 2022-06-08 | End: 2022-06-09

## 2022-06-08 RX ORDER — HALOPERIDOL DECANOATE 100 MG/ML
0.5 INJECTION INTRAMUSCULAR EVERY 6 HOURS
Refills: 0 | Status: DISCONTINUED | OUTPATIENT
Start: 2022-06-08 | End: 2022-06-09

## 2022-06-08 RX ORDER — SODIUM CHLORIDE 9 MG/ML
1000 INJECTION INTRAMUSCULAR; INTRAVENOUS; SUBCUTANEOUS
Refills: 0 | Status: DISCONTINUED | OUTPATIENT
Start: 2022-06-08 | End: 2022-06-08

## 2022-06-08 RX ORDER — PIPERACILLIN AND TAZOBACTAM 4; .5 G/20ML; G/20ML
3.38 INJECTION, POWDER, LYOPHILIZED, FOR SOLUTION INTRAVENOUS EVERY 8 HOURS
Refills: 0 | Status: DISCONTINUED | OUTPATIENT
Start: 2022-06-08 | End: 2022-06-20

## 2022-06-08 RX ORDER — PROPOFOL 10 MG/ML
10 INJECTION, EMULSION INTRAVENOUS
Qty: 1000 | Refills: 0 | Status: DISCONTINUED | OUTPATIENT
Start: 2022-06-08 | End: 2022-06-09

## 2022-06-08 RX ORDER — ACETAMINOPHEN 500 MG
1000 TABLET ORAL EVERY 6 HOURS
Refills: 0 | Status: COMPLETED | OUTPATIENT
Start: 2022-06-08 | End: 2022-06-09

## 2022-06-08 RX ADMIN — Medication 2 MILLIGRAM(S): at 14:23

## 2022-06-08 RX ADMIN — HALOPERIDOL DECANOATE 0.5 MILLIGRAM(S): 100 INJECTION INTRAMUSCULAR at 11:52

## 2022-06-08 RX ADMIN — PROPOFOL 6.02 MICROGRAM(S)/KG/MIN: 10 INJECTION, EMULSION INTRAVENOUS at 23:39

## 2022-06-08 RX ADMIN — Medication 100 MILLIEQUIVALENT(S): at 11:10

## 2022-06-08 RX ADMIN — HEPARIN SODIUM 5000 UNIT(S): 5000 INJECTION INTRAVENOUS; SUBCUTANEOUS at 05:58

## 2022-06-08 RX ADMIN — Medication 25 MILLIGRAM(S): at 02:12

## 2022-06-08 RX ADMIN — SODIUM CHLORIDE 125 MILLILITER(S): 9 INJECTION, SOLUTION INTRAVENOUS at 23:38

## 2022-06-08 RX ADMIN — Medication 400 MILLIGRAM(S): at 16:38

## 2022-06-08 RX ADMIN — Medication 975 MILLIGRAM(S): at 00:05

## 2022-06-08 RX ADMIN — Medication 480 MICROGRAM(S): at 11:52

## 2022-06-08 RX ADMIN — PANTOPRAZOLE SODIUM 40 MILLIGRAM(S): 20 TABLET, DELAYED RELEASE ORAL at 05:58

## 2022-06-08 RX ADMIN — SODIUM CHLORIDE 150 MILLILITER(S): 9 INJECTION INTRAMUSCULAR; INTRAVENOUS; SUBCUTANEOUS at 11:52

## 2022-06-08 RX ADMIN — Medication 100 MILLIEQUIVALENT(S): at 14:49

## 2022-06-08 RX ADMIN — Medication 400 MILLIGRAM(S): at 09:43

## 2022-06-08 RX ADMIN — CEFEPIME 100 MILLIGRAM(S): 1 INJECTION, POWDER, FOR SOLUTION INTRAMUSCULAR; INTRAVENOUS at 05:58

## 2022-06-08 RX ADMIN — ONDANSETRON 4 MILLIGRAM(S): 8 TABLET, FILM COATED ORAL at 00:31

## 2022-06-08 RX ADMIN — PIPERACILLIN AND TAZOBACTAM 200 GRAM(S): 4; .5 INJECTION, POWDER, LYOPHILIZED, FOR SOLUTION INTRAVENOUS at 08:22

## 2022-06-08 RX ADMIN — PIPERACILLIN AND TAZOBACTAM 25 GRAM(S): 4; .5 INJECTION, POWDER, LYOPHILIZED, FOR SOLUTION INTRAVENOUS at 17:13

## 2022-06-08 RX ADMIN — HALOPERIDOL DECANOATE 5 MILLIGRAM(S): 100 INJECTION INTRAMUSCULAR at 13:49

## 2022-06-08 RX ADMIN — Medication 100 MILLIEQUIVALENT(S): at 10:16

## 2022-06-08 NOTE — CONSULT NOTE ADULT - SUBJECTIVE AND OBJECTIVE BOX
HPI:    52y  with recurrent cervical CA (per patient stage III) presenting to the ED as transfer from [place] a/w neutropenic fever.     Consulted for: Hypothyroidism r/o    Patient's TFTs were checked and TSH was found to be wnl at 2.89. Total T4 was low at 3.17 and TT3 was low at 41.   Unable to obtain any history from patient as she had received Haloperidol and Ativan for CT scan done prior to interview.    PAST MEDICAL & SURGICAL HISTORY:  HTN (hypertension)  Cervical cancer  Nephrostomy status    FAMILY HISTORY:  FH: liver cancer (Mother)    Social History: Unable to obtain from patient     Home Medications:       MEDICATIONS  (STANDING):  acetaminophen   IVPB .. 1000 milliGRAM(s) IV Intermittent every 6 hours  amLODIPine   Tablet 10 milliGRAM(s) Oral daily  filgrastim-sndz (ZARXIO) Injectable 480 MICROGram(s) SubCutaneous daily  haloperidol    Injectable 0.5 milliGRAM(s) IntraMuscular every 6 hours  metoprolol succinate  milliGRAM(s) Oral daily  pantoprazole  Injectable 40 milliGRAM(s) IV Push two times a day  piperacillin/tazobactam IVPB.. 3.375 Gram(s) IV Intermittent every 8 hours  sodium chloride 0.9%. 1000 milliLiter(s) (150 mL/Hr) IV Continuous <Continuous>    MEDICATIONS  (PRN):  haloperidol    Injectable 0.5 milliGRAM(s) IntraMuscular every 6 hours PRN Agitation  metoclopramide Injectable 10 milliGRAM(s) IV Push every 8 hours PRN nausea/vomiting  ondansetron Injectable 4 milliGRAM(s) IV Push every 6 hours PRN Nausea and/or Vomiting      Allergies    No Known Allergies    Intolerances      Review of Systems:  unable to obtain     PHYSICAL EXAM:  VITALS: T(C): 36.6 (22 @ 14:00)  T(F): 97.8 (22 @ 14:00), Max: 98 (22 @ 18:11)  HR: 115 (22 @ 14:00) (110 - 122)  BP: 106/66 (22 @ 14:00) (106/66 - 129/77)  RR:  (16 - 20)  SpO2:  (94% - 100%)  Wt(kg): --  GENERAL: NAD, asleep p  EYES: asleep  THYROID: normal appearance  RESPIRATORY: no visual respiratory distress  GI: Soft  PSYCH: asleep                              7.4    0.56  )-----------( 45       ( 2022 06:00 )             23.4       08    139  |  98  |  38<H>  ----------------------------<  83  3.0<L>   |  21<L>  |  1.07    eGFR: 62    Ca    9.1        Mg     2.00       Phos  2.6     08    TPro  6.3  /  Alb  2.3<L>  /  TBili  1.2  /  DBili  x   /  AST  23  /  ALT  8   /  AlkPhos  58        Thyroid Function Tests:   @ 06:00 TSH -- FreeT4 -- T3 41 Anti TPO -- Anti Thyroglobulin Ab -- TSI --   @ 06:59 TSH 2.89 FreeT4 -- T3 -- Anti TPO -- Anti Thyroglobulin Ab -- TSI --      A1C with Estimated Average Glucose Result: 5.5 % (22 @ 06:00)  A1C with Estimated Average Glucose Result: 5.6 % (21 @ 14:09)          Radiology:                HPI:    52y  with recurrent cervical CA (per patient stage III) presenting to the ED as transfer from [place] a/w neutropenic fever.     Consulted for: Hypothyroidism r/o    Patient's TFTs were checked and TSH was found to be wnl at 2.89. Total T4 was low at 3.17 and TT3 was low at 41.   Unable to obtain any history from patient as she had received Haloperidol and Ativan for CT scan done prior to interview.          PAST MEDICAL & SURGICAL HISTORY:  HTN (hypertension)  Cervical cancer  Nephrostomy status    FAMILY HISTORY:  FH: liver cancer (Mother)    Social History: Unable to obtain from patient     Home Medications:     Home Medications:   * No Current Medications as of 2022 11:45 documented in Structured Notes  · 	traMADol 50 mg oral tablet: 1 tab(s) orally every 6 hours MDD:4  · 	cefdinir 300 mg oral capsule: 1 cap(s) orally every 12 hours   · 	cyanocobalamin 1000 mcg oral tablet: 1 tab(s) orally once a day  · 	hydrALAZINE 25 mg oral tablet: 1 tab(s) orally 3 times a day   · 	ferrous sulfate 325 mg (65 mg elemental iron) oral tablet: 1 tab(s) orally once a day  · 	hydrocortisone 25 mg rectal suppository: 1 suppository(ies) rectal once a day  · 	metoprolol succinate 100 mg oral tablet, extended release: 1 tab(s) orally once a day  · 	acetaminophen 325 mg oral tablet: 2 tab(s) orally every 6 hours, As needed,       MEDICATIONS  (STANDING):  acetaminophen   IVPB .. 1000 milliGRAM(s) IV Intermittent every 6 hours  amLODIPine   Tablet 10 milliGRAM(s) Oral daily  filgrastim-sndz (ZARXIO) Injectable 480 MICROGram(s) SubCutaneous daily  haloperidol    Injectable 0.5 milliGRAM(s) IntraMuscular every 6 hours  metoprolol succinate  milliGRAM(s) Oral daily  pantoprazole  Injectable 40 milliGRAM(s) IV Push two times a day  piperacillin/tazobactam IVPB.. 3.375 Gram(s) IV Intermittent every 8 hours  sodium chloride 0.9%. 1000 milliLiter(s) (150 mL/Hr) IV Continuous <Continuous>    MEDICATIONS  (PRN):  haloperidol    Injectable 0.5 milliGRAM(s) IntraMuscular every 6 hours PRN Agitation  metoclopramide Injectable 10 milliGRAM(s) IV Push every 8 hours PRN nausea/vomiting  ondansetron Injectable 4 milliGRAM(s) IV Push every 6 hours PRN Nausea and/or Vomiting      Allergies    No Known Allergies    Intolerances      Review of Systems:  unable to obtain     PHYSICAL EXAM:  VITALS: T(C): 36.6 (22 @ 14:00)  T(F): 97.8 (22 @ 14:00), Max: 98 (22 @ 18:11)  HR: 115 (22 @ 14:00) (110 - 122)  BP: 106/66 (22 @ 14:00) (106/66 - 129/77)  RR:  (16 - 20)  SpO2:  (94% - 100%)  Wt(kg): --  GENERAL: NAD, asleep  EYES: asleep  THYROID: normal appearance  RESPIRATORY: no visual respiratory distress  GI: Soft  PSYCH: asleep                              7.4    0.56  )-----------( 45       ( 2022 06:00 )             23.4       08    139  |  98  |  38<H>  ----------------------------<  83  3.0<L>   |  21<L>  |  1.07    eGFR: 62    Ca    9.1        Mg     2.00       Phos  2.6         TPro  6.3  /  Alb  2.3<L>  /  TBili  1.2  /  DBili  x   /  AST  23  /  ALT  8   /  AlkPhos  58        Thyroid Function Tests:   @ 06:00 TSH -- FreeT4 -- T3 41 Anti TPO -- Anti Thyroglobulin Ab -- TSI --   @ 06:59 TSH 2.89 FreeT4 -- T3 -- Anti TPO -- Anti Thyroglobulin Ab -- TSI --      A1C with Estimated Average Glucose Result: 5.5 % (22 @ 06:00)  A1C with Estimated Average Glucose Result: 5.6 % (21 @ 14:09)          Radiology:

## 2022-06-08 NOTE — PROGRESS NOTE ADULT - ASSESSMENT
52F HTN, cervical CA on chemo - last session last week - c/b b/l hydronephrosis s/p B/L nephrostomy tube placement, p/w severe sepsis from neutropenic fever vs infected necrotic tumor c/b acute encephalopathy with acute anemia and coagulopathy from thrombocytopenia with GI Bleed.

## 2022-06-08 NOTE — PROGRESS NOTE ADULT - PROBLEM SELECTOR PLAN 2
with leukopenia, tachycardia, fever POA  source could be infected tumor or neutropenic fever c/b GI bleed  - Lactate elevated  - IVF hydration given - continue to monitor fluid status.  - on Cefepime IV changed to Zosyn IV  - Monitor CBC  - tachycardia likely secondary to ongoing sepsis - treat underlying cause.

## 2022-06-08 NOTE — CONSULT NOTE ADULT - SUBJECTIVE AND OBJECTIVE BOX
Chief Complaint:  Patient is a 52y old  Female who presents with a chief complaint of neutropenic fever (08 Jun 2022 06:55)      HPI: 52F with recurrent cervical CA (stage III, s/p chemo) c/b b/l hydronephrosis s/p B/L nephrostomy tube placement, p/w severe sepsis from neutropenic fever vs infected necrotic tumor. Pt received (Carbo AUC 5, Paxol 135 mg/m2 and Avastin 15 mg/m2 on q3 week schedule), last cycle on 5/31.        Otherwise, patient denies fevers, chills, weight loss, dysphagia, odynophagia, early satiety, poor oral intake, abdominal pain, nausea, vomiting, diarrhea, melena, hematemesis, hematochezia, change in stool caliber, or family history of GI-related cancers.    Allergies:  No Known Allergies      Home Medications:    Hospital Medications:  acetaminophen   IVPB .. 1000 milliGRAM(s) IV Intermittent every 6 hours  amLODIPine   Tablet 10 milliGRAM(s) Oral daily  filgrastim-sndz (ZARXIO) Injectable 480 MICROGram(s) SubCutaneous daily  haloperidol    Injectable 2 milliGRAM(s) IV Push once  heparin   Injectable 5000 Unit(s) SubCutaneous every 8 hours  lactated ringers. 1000 milliLiter(s) IV Continuous <Continuous>  metoclopramide Injectable 10 milliGRAM(s) IV Push every 8 hours PRN  metoprolol succinate  milliGRAM(s) Oral daily  ondansetron Injectable 4 milliGRAM(s) IV Push every 6 hours PRN  pantoprazole  Injectable 40 milliGRAM(s) IV Push two times a day      PMHX/PSHX:  HTN (hypertension)    Cervical cancer    No significant past surgical history    Nephrostomy status        Family history:  No pertinent family history in first degree relatives    FH: liver cancer (Mother)     Denies any family history of GI-related disease or cancers.    Social History:   ETOH: denies  Tobacco: denies  Illicit drug use: denies    ROS: 14 point ROS negative unless otherwise stated in HPI      Vital Signs:  Vital Signs Last 24 Hrs  T(C): 36.3 (08 Jun 2022 06:26), Max: 37.1 (07 Jun 2022 10:16)  T(F): 97.4 (08 Jun 2022 06:26), Max: 98.8 (07 Jun 2022 10:16)  HR: 110 (08 Jun 2022 06:26) (104 - 122)  BP: 117/90 (08 Jun 2022 06:26) (108/68 - 121/81)  BP(mean): --  RR: 20 (08 Jun 2022 06:26) (16 - 20)  SpO2: 99% (08 Jun 2022 06:26) (94% - 100%)  Daily     Daily     PHYSICAL EXAM:     GENERAL:  Appears stated age, well-groomed, well-nourished, no distress  HEENT:  NC/AT,  conjunctivae clear and pink  CHEST:  Full & symmetric excursion, no increased effort, breath sounds clear  HEART:  Regular rhythm, S1, S2, no murmur/rub/S3/S4  ABDOMEN:  Soft, non-tender, non-distended, normoactive bowel sounds,    EXTREMITIES:  no cyanosis,clubbing or edema  SKIN:  No rash/erythema/ecchymoses/petechiae/wounds/abscess/warm/dry  NEURO:  Alert, oriented      LABS:                        7.4    0.56  )-----------( 45       ( 08 Jun 2022 06:00 )             23.4     06-08    139  |  98  |  38<H>  ----------------------------<  83  3.0<L>   |  21<L>  |  1.07    Ca    9.1      08 Jun 2022 06:00  Phos  2.6     06-08  Mg     2.00     06-08    TPro  6.3  /  Alb  2.3<L>  /  TBili  1.2  /  DBili  x   /  AST  23  /  ALT  8   /  AlkPhos  58  06-08    LIVER FUNCTIONS - ( 08 Jun 2022 06:00 )  Alb: 2.3 g/dL / Pro: 6.3 g/dL / ALK PHOS: 58 U/L / ALT: 8 U/L / AST: 23 U/L / GGT: x           PT/INR - ( 07 Jun 2022 20:28 )   PT: 14.9 sec;   INR: 1.28 ratio         PTT - ( 07 Jun 2022 20:28 )  PTT:42.1 sec    Amylase Serum--      Lipase serum--       [id number]      Imaging:              Chief Complaint:  Patient is a 52y old  Female who presents with a chief complaint of neutropenic fever (08 Jun 2022 06:55)    HPI: 52F with recurrent cervical CA (stage III, dx 2014 s/p VBRT + chemo, never had surgical intervention) c/b b/l hydronephrosis s/p B/L nephrostomy tube placement, p/w severe sepsis from neutropenic fever vs infected necrotic tumor; transferred from [facility] 6/5 for management of sepsis. Pt received (Carbo AUC 5, Paxol 135 mg/m2 and Avastin 15 mg/m2 on q3 week schedule), last cycle on 5/31. Patient receives Gyn Oncology care at [facility] ([place]     Pt was reporting fatigue and upper abd pain. Had CT A/P with IV cont 6/5 showing redemonstration of ill-defined fluid and gas collection slightly posterior to the right uterine wall; in addition there is gas within the uterine cavity, new from 2021, findings may represent infected and necrotic tumor versus a rectosigmoid-vaginal fistula, redemonstration of descending and rectosigmoid colitis with the sigmoid colon abutting the right uterine wall, relatively unchanged moderate volume ascites. Between 6/6-6/7 started having N/V, initially started as bilious emesis but had reported brown emesis 6/7-6/8 which worsened after taking PO contrast for CT AP a/w acute change in mental status. She became agitated and refused the scan. GI consulted for evaluation of N/V.    Otherwise, patient denies chills, weight loss, dysphagia, odynophagia, early satiety, poor oral intake, diarrhea, melena, hematemesis, hematochezia, change in stool caliber, or family history of GI-related cancers.    Allergies:  No Known Allergies      Home Medications:    Hospital Medications:  acetaminophen   IVPB .. 1000 milliGRAM(s) IV Intermittent every 6 hours  amLODIPine   Tablet 10 milliGRAM(s) Oral daily  filgrastim-sndz (ZARXIO) Injectable 480 MICROGram(s) SubCutaneous daily  haloperidol    Injectable 2 milliGRAM(s) IV Push once  heparin   Injectable 5000 Unit(s) SubCutaneous every 8 hours  lactated ringers. 1000 milliLiter(s) IV Continuous <Continuous>  metoclopramide Injectable 10 milliGRAM(s) IV Push every 8 hours PRN  metoprolol succinate  milliGRAM(s) Oral daily  ondansetron Injectable 4 milliGRAM(s) IV Push every 6 hours PRN  pantoprazole  Injectable 40 milliGRAM(s) IV Push two times a day      PMHX/PSHX:  HTN (hypertension)    Cervical cancer    No significant past surgical history    Nephrostomy status        Family history:  No pertinent family history in first degree relatives    FH: liver cancer (Mother)     Denies any family history of GI-related disease or cancers.    Social History:   ETOH: denies  Tobacco: denies  Illicit drug use: denies    ROS: 14 point ROS negative unless otherwise stated in HPI      Vital Signs:  Vital Signs Last 24 Hrs  T(C): 36.3 (08 Jun 2022 06:26), Max: 37.1 (07 Jun 2022 10:16)  T(F): 97.4 (08 Jun 2022 06:26), Max: 98.8 (07 Jun 2022 10:16)  HR: 110 (08 Jun 2022 06:26) (104 - 122)  BP: 117/90 (08 Jun 2022 06:26) (108/68 - 121/81)  BP(mean): --  RR: 20 (08 Jun 2022 06:26) (16 - 20)  SpO2: 99% (08 Jun 2022 06:26) (94% - 100%)  Daily     Daily     PHYSICAL EXAM:     GENERAL:  Appears stated age, well-groomed, well-nourished, no distress  HEENT:  NC/AT,  conjunctivae clear and pink  CHEST:  Full & symmetric excursion, no increased effort, breath sounds clear  HEART:  Regular rhythm, S1, S2, no murmur/rub/S3/S4  ABDOMEN:  Soft, non-tender, non-distended, normoactive bowel sounds,    EXTREMITIES:  no cyanosis,clubbing or edema  SKIN:  No rash/erythema/ecchymoses/petechiae/wounds/abscess/warm/dry  NEURO:  Alert, oriented      LABS:                        7.4    0.56  )-----------( 45       ( 08 Jun 2022 06:00 )             23.4     06-08    139  |  98  |  38<H>  ----------------------------<  83  3.0<L>   |  21<L>  |  1.07    Ca    9.1      08 Jun 2022 06:00  Phos  2.6     06-08  Mg     2.00     06-08    TPro  6.3  /  Alb  2.3<L>  /  TBili  1.2  /  DBili  x   /  AST  23  /  ALT  8   /  AlkPhos  58  06-08    LIVER FUNCTIONS - ( 08 Jun 2022 06:00 )  Alb: 2.3 g/dL / Pro: 6.3 g/dL / ALK PHOS: 58 U/L / ALT: 8 U/L / AST: 23 U/L / GGT: x           PT/INR - ( 07 Jun 2022 20:28 )   PT: 14.9 sec;   INR: 1.28 ratio         PTT - ( 07 Jun 2022 20:28 )  PTT:42.1 sec    Amylase Serum--      Lipase serum--       [id number]      Imaging:              Chief Complaint:  Patient is a 52y old  Female who presents with a chief complaint of neutropenic fever (08 Jun 2022 06:55)    HPI: 52F with recurrent cervical CA (stage III, dx 2014 s/p VBRT + chemo, never had surgical intervention) c/b b/l hydronephrosis s/p B/L nephrostomy tube placement, p/w severe sepsis from neutropenic fever vs infected necrotic tumor; transferred from [facility] 6/5 for management of sepsis. History obtained through chart review as pt was too altered to provide history. Pt received (Carbo AUC 5, Paxol 135 mg/m2 and Avastin 15 mg/m2 on q3 week schedule), last cycle on 5/31. Patient receives Gyn Oncology care at [facility]).     Upon transfer was reporting fatigue and upper abd pain. Had CT A/P with IV cont 6/5 showing redemonstration of ill-defined fluid and gas collection slightly posterior to the right uterine wall; in addition there is gas within the uterine cavity, new from 2021, findings may represent infected and necrotic tumor versus a rectosigmoid-vaginal fistula, redemonstration of descending and rectosigmoid colitis with the sigmoid colon abutting the right uterine wall, relatively unchanged moderate volume ascites. Between 6/6-6/7 started having N/V, initially started as bilious emesis but had reported brown emesis 6/7-6/8 which worsened after taking PO contrast for CT AP a/w acute change in mental status. She became agitated and refused the scan. GI consulted for evaluation of N/V.    Allergies:  No Known Allergies      Home Medications:    Hospital Medications:  acetaminophen   IVPB .. 1000 milliGRAM(s) IV Intermittent every 6 hours  amLODIPine   Tablet 10 milliGRAM(s) Oral daily  filgrastim-sndz (ZARXIO) Injectable 480 MICROGram(s) SubCutaneous daily  haloperidol    Injectable 2 milliGRAM(s) IV Push once  heparin   Injectable 5000 Unit(s) SubCutaneous every 8 hours  lactated ringers. 1000 milliLiter(s) IV Continuous <Continuous>  metoclopramide Injectable 10 milliGRAM(s) IV Push every 8 hours PRN  metoprolol succinate  milliGRAM(s) Oral daily  ondansetron Injectable 4 milliGRAM(s) IV Push every 6 hours PRN  pantoprazole  Injectable 40 milliGRAM(s) IV Push two times a day      PMHX/PSHX:  HTN (hypertension)    Cervical cancer    No significant past surgical history    Nephrostomy status        Family history:  unable to obtain as pt is altered    Social History: unable to obtain as pt is altered    ROS: unable to obtain as pt is altered      Vital Signs:  Vital Signs Last 24 Hrs  T(C): 36.3 (08 Jun 2022 06:26), Max: 37.1 (07 Jun 2022 10:16)  T(F): 97.4 (08 Jun 2022 06:26), Max: 98.8 (07 Jun 2022 10:16)  HR: 110 (08 Jun 2022 06:26) (104 - 122)  BP: 117/90 (08 Jun 2022 06:26) (108/68 - 121/81)  BP(mean): --  RR: 20 (08 Jun 2022 06:26) (16 - 20)  SpO2: 99% (08 Jun 2022 06:26) (94% - 100%)  Daily     Daily     PHYSICAL EXAM: Pt refusing examination    GENERAL:  Appears stated age, chronically ill appearing  HEENT: +bitemporal wasting      LABS:                        7.4    0.56  )-----------( 45       ( 08 Jun 2022 06:00 )             23.4     06-08    139  |  98  |  38<H>  ----------------------------<  83  3.0<L>   |  21<L>  |  1.07    Ca    9.1      08 Jun 2022 06:00  Phos  2.6     06-08  Mg     2.00     06-08    TPro  6.3  /  Alb  2.3<L>  /  TBili  1.2  /  DBili  x   /  AST  23  /  ALT  8   /  AlkPhos  58  06-08    LIVER FUNCTIONS - ( 08 Jun 2022 06:00 )  Alb: 2.3 g/dL / Pro: 6.3 g/dL / ALK PHOS: 58 U/L / ALT: 8 U/L / AST: 23 U/L / GGT: x           PT/INR - ( 07 Jun 2022 20:28 )   PT: 14.9 sec;   INR: 1.28 ratio         PTT - ( 07 Jun 2022 20:28 )  PTT:42.1 sec    Amylase Serum--      Lipase serum--       [id number]      Imaging:         ACC: [id number] EXAM:  CT ABDOMEN AND PELVIS IC                        ACC: [id number] EXAM:  CT ANGIO CHEST PULM ART [facility]                          PROCEDURE DATE:  06/05/2022          INTERPRETATION:  CLINICAL INFORMATION: Cervical cancer with poor   evaluation of the lesion on recent CT abdomen pelvis. Persistent   tachycardia. Evaluate for pulmonary embolism.    COMPARISON: CT abdomen pelvis 6/4/2022, 12/22/2021    CONTRAST/COMPLICATIONS:  IV Contrast: Omnipaque 350 (accession [id number])  90 cc administered   0   cc discarded  Oral Contrast: NONE  Complications: None reported at time of study completion    PROCEDURE:  CT Angiography of the Chest was performed followed by portal venous phase   imaging of the Abdomen and Pelvis.  Sagittal and coronal reformats were performed as well as 3D (MIP)   reconstructions.    FINDINGS:  CHEST:  LUNGS AND LARGE AIRWAYS: Patent central airways. Left upper lobe 0.9 cm   nodule (2:53). Right lower lobe subpleural nodule measures 1.5 cm (2:84),   previously 1.2 cm on CT dated 12/22/2021. There are other smaller nodules   for example: 0.2-0.3 cm right upper lobe nodules (series 3 image 58).   Bilateral atelectasis/subsegmental atelectasis.  PLEURA: A left pleural effusion.  VESSELS: Limited evaluationfor pulmonary embolism due to poorly timed   and/or inadequate bolus to adequately opacify the pulmonary arteries. No   central pulmonary embolism. Atherosclerotic changes of the coronary   arteries.  HEART: Heart size is normal. No pericardial effusion.  MEDIASTINUM AND KEARA: Mediastinal and hilar lymphadenopathy for reference   there is a right paratracheal lymph node that measures 2.4 x 1.8 cm   (2:49) in the right hilar lymph node that measures 1.7 x 1.3 cm (2:62).   0.7 cm short axis left supraclavicular lymph node (series 3 image 34).  CHEST WALL AND LOWER NECK: Within normal limits.    ABDOMEN AND PELVIS:  LIVER: Few too small to characterize hypodensities within the liver   parenchyma are unchanged.  BILE DUCTS: Normal caliber.  GALLBLADDER: Layering sludge.  SPLEEN: Within normal limits.  PANCREAS: Within normal limits.  ADRENALS: Indeterminate 1.2 cm left adrenal nodule is unchanged from 21.   The right adrenal gland is within normal limits.  KIDNEYS/URETERS: Bilateral percutaneous nephrostomy tubes. Focus of air   in the right renal pelvis, likely postprocedural in nature. Right kidney   subcentimeter hypodensity, too small to characterize.    BLADDER: Within normal limits.  REPRODUCTIVE ORGANS: Calcified anterior uterine fibroid. Redemonstration   of endometrial distention up to 3.6 cm, filled with fluid and gas.   Redemonstration of ill-defined fluid and gas collection slightly   posterior to the right uterine wall. A portion of the collection measures   3.3 x 1.6 cm (2:209).    BOWEL: Circumferential thickening of the rectosigmoid and descending   colon, compatible with colitis. The distal sigmoid colon again abuts the   right uterine wall. No bowel obstruction. Appendix is not visualized.  PERITONEUM: Moderate volume ascites, likely obscuring previously   visualized omental nodularity. Decrease in pneumoperitoneum.  VESSELS: Within normal limits.  RETROPERITONEUM/LYMPH NODES: Retroperitoneal lymphadenopathy, for   reference there is a 2.1 x 1.2 left para-aortic lymph node (2:152) with   internal calcification.  ABDOMINAL WALL: Anasarca. Small supraumbilical hernia containing fat and   fluid.  BONES: Degenerative changes. Old right-sided rib fractures.    IMPRESSION:  *  No central pulmonary embolism.  *Redemonstration of ill-defined fluid and gas collection slightly   posterior to the right uterine wall. In addition there is gas within the   uterine cavity, new from 2021. Findings may represent infected and   necrotic tumor versus a rectosigmoid-vaginal fistula. CT abdomen pelvis   with oral and rectal contrast may be done for further evaluation as   clinically warranted.  *  Bilateral lung nodules probably metastatic. Further evaluation can be   performed with PET CT.  *  Mediastinal, hilar and retroperitoneal lymphadenopathy, as described   above.  *  Redemonstration of descending and rectosigmoid colitis with the   sigmoid colon abutting the right uterine wall.  *  Relatively unchanged moderate volume ascites.       HPI: 52F with recurrent cervical CA (stage III, dx 2014 s/p VBRT + chemo, never had surgical intervention) c/b b/l hydronephrosis s/p B/L nephrostomy tube placement, p/w severe sepsis from neutropenic fever vs infected necrotic tumor; transferred from [facility] 6/5 for management of sepsis.     History obtained through chart review as pt was too altered to provide history. Pt received (Carbo AUC 5, Paxol 135 mg/m2 and Avastin 15 mg/m2 on q3 week schedule), last cycle on 5/31. Patient receives Gyn Oncology care at [facility]).     Upon transfer was reporting fatigue and upper abd pain. Had CT A/P with IV cont 6/5 showing redemonstration of ill-defined fluid and gas collection slightly posterior to the right uterine wall; in addition there is gas within the uterine cavity, new from 2021, findings may represent infected and necrotic tumor versus a rectosigmoid-vaginal fistula, re-demonstration of descending and rectosigmoid colitis with the sigmoid colon abutting the right uterine wall, relatively unchanged moderate volume ascites. Between 6/6-6/7 started having N/V, initially started as bilious emesis but had reported brown emesis 6/7-6/8 which worsened after taking PO contrast for CT AP a/w acute change in mental status. She became agitated and refused the scan. GI consulted for evaluation of N/V.    Allergies:  No Known Allergies      Home Medications:    Hospital Medications:  acetaminophen   IVPB .. 1000 milliGRAM(s) IV Intermittent every 6 hours  amLODIPine   Tablet 10 milliGRAM(s) Oral daily  filgrastim-sndz (ZARXIO) Injectable 480 MICROGram(s) SubCutaneous daily  haloperidol    Injectable 2 milliGRAM(s) IV Push once  heparin   Injectable 5000 Unit(s) SubCutaneous every 8 hours  lactated ringers. 1000 milliLiter(s) IV Continuous <Continuous>  metoclopramide Injectable 10 milliGRAM(s) IV Push every 8 hours PRN  metoprolol succinate  milliGRAM(s) Oral daily  ondansetron Injectable 4 milliGRAM(s) IV Push every 6 hours PRN  pantoprazole  Injectable 40 milliGRAM(s) IV Push two times a day      PMHX/PSHX:  HTN (hypertension)    Cervical cancer    No significant past surgical history    Nephrostomy status        Family history:  unable to obtain as pt is altered    Social History: unable to obtain as pt is altered    ROS: unable to obtain as pt is altered      Vital Signs:  Vital Signs Last 24 Hrs  T(C): 36.3 (08 Jun 2022 06:26), Max: 37.1 (07 Jun 2022 10:16)  T(F): 97.4 (08 Jun 2022 06:26), Max: 98.8 (07 Jun 2022 10:16)  HR: 110 (08 Jun 2022 06:26) (104 - 122)  BP: 117/90 (08 Jun 2022 06:26) (108/68 - 121/81)  BP(mean): --  RR: 20 (08 Jun 2022 06:26) (16 - 20)  SpO2: 99% (08 Jun 2022 06:26) (94% - 100%)  Daily     Daily     PHYSICAL EXAM:     Exam limited as patient uncooperative and agitated    GENERAL:  Appears stated age, chronically ill appearing  HEENT: +bitemporal wasting      LABS:                        7.4    0.56  )-----------( 45       ( 08 Jun 2022 06:00 )             23.4     06-08    139  |  98  |  38<H>  ----------------------------<  83  3.0<L>   |  21<L>  |  1.07    Ca    9.1      08 Jun 2022 06:00  Phos  2.6     06-08  Mg     2.00     06-08    TPro  6.3  /  Alb  2.3<L>  /  TBili  1.2  /  DBili  x   /  AST  23  /  ALT  8   /  AlkPhos  58  06-08    LIVER FUNCTIONS - ( 08 Jun 2022 06:00 )  Alb: 2.3 g/dL / Pro: 6.3 g/dL / ALK PHOS: 58 U/L / ALT: 8 U/L / AST: 23 U/L / GGT: x           PT/INR - ( 07 Jun 2022 20:28 )   PT: 14.9 sec;   INR: 1.28 ratio         PTT - ( 07 Jun 2022 20:28 )  PTT:42.1 sec    Amylase Serum--      Lipase serum--       [id number]      Imaging:         ACC: [id number] EXAM:  CT ABDOMEN AND PELVIS IC                        ACC: [id number] EXAM:  CT ANGIO CHEST PULM ART [facility]                          PROCEDURE DATE:  06/05/2022          INTERPRETATION:  CLINICAL INFORMATION: Cervical cancer with poor   evaluation of the lesion on recent CT abdomen pelvis. Persistent   tachycardia. Evaluate for pulmonary embolism.    COMPARISON: CT abdomen pelvis 6/4/2022, 12/22/2021    CONTRAST/COMPLICATIONS:  IV Contrast: Omnipaque 350 (accession [id number])  90 cc administered   0   cc discarded  Oral Contrast: NONE  Complications: None reported at time of study completion    PROCEDURE:  CT Angiography of the Chest was performed followed by portal venous phase   imaging of the Abdomen and Pelvis.  Sagittal and coronal reformats were performed as well as 3D (MIP)   reconstructions.    FINDINGS:  CHEST:  LUNGS AND LARGE AIRWAYS: Patent central airways. Left upper lobe 0.9 cm   nodule (2:53). Right lower lobe subpleural nodule measures 1.5 cm (2:84),   previously 1.2 cm on CT dated 12/22/2021. There are other smaller nodules   for example: 0.2-0.3 cm right upper lobe nodules (series 3 image 58).   Bilateral atelectasis/subsegmental atelectasis.  PLEURA: A left pleural effusion.  VESSELS: Limited evaluationfor pulmonary embolism due to poorly timed   and/or inadequate bolus to adequately opacify the pulmonary arteries. No   central pulmonary embolism. Atherosclerotic changes of the coronary   arteries.  HEART: Heart size is normal. No pericardial effusion.  MEDIASTINUM AND KEARA: Mediastinal and hilar lymphadenopathy for reference   there is a right paratracheal lymph node that measures 2.4 x 1.8 cm   (2:49) in the right hilar lymph node that measures 1.7 x 1.3 cm (2:62).   0.7 cm short axis left supraclavicular lymph node (series 3 image 34).  CHEST WALL AND LOWER NECK: Within normal limits.    ABDOMEN AND PELVIS:  LIVER: Few too small to characterize hypodensities within the liver   parenchyma are unchanged.  BILE DUCTS: Normal caliber.  GALLBLADDER: Layering sludge.  SPLEEN: Within normal limits.  PANCREAS: Within normal limits.  ADRENALS: Indeterminate 1.2 cm left adrenal nodule is unchanged from 21.   The right adrenal gland is within normal limits.  KIDNEYS/URETERS: Bilateral percutaneous nephrostomy tubes. Focus of air   in the right renal pelvis, likely postprocedural in nature. Right kidney   subcentimeter hypodensity, too small to characterize.    BLADDER: Within normal limits.  REPRODUCTIVE ORGANS: Calcified anterior uterine fibroid. Redemonstration   of endometrial distention up to 3.6 cm, filled with fluid and gas.   Redemonstration of ill-defined fluid and gas collection slightly   posterior to the right uterine wall. A portion of the collection measures   3.3 x 1.6 cm (2:209).    BOWEL: Circumferential thickening of the rectosigmoid and descending   colon, compatible with colitis. The distal sigmoid colon again abuts the   right uterine wall. No bowel obstruction. Appendix is not visualized.  PERITONEUM: Moderate volume ascites, likely obscuring previously   visualized omental nodularity. Decrease in pneumoperitoneum.  VESSELS: Within normal limits.  RETROPERITONEUM/LYMPH NODES: Retroperitoneal lymphadenopathy, for   reference there is a 2.1 x 1.2 left para-aortic lymph node (2:152) with   internal calcification.  ABDOMINAL WALL: Anasarca. Small supraumbilical hernia containing fat and   fluid.  BONES: Degenerative changes. Old right-sided rib fractures.    IMPRESSION:  *  No central pulmonary embolism.  *Redemonstration of ill-defined fluid and gas collection slightly   posterior to the right uterine wall. In addition there is gas within the   uterine cavity, new from 2021. Findings may represent infected and   necrotic tumor versus a rectosigmoid-vaginal fistula. CT abdomen pelvis   with oral and rectal contrast may be done for further evaluation as   clinically warranted.  *  Bilateral lung nodules probably metastatic. Further evaluation can be   performed with PET CT.  *  Mediastinal, hilar and retroperitoneal lymphadenopathy, as described   above.  *  Redemonstration of descending and rectosigmoid colitis with the   sigmoid colon abutting the right uterine wall.  *  Relatively unchanged moderate volume ascites.

## 2022-06-08 NOTE — CHART NOTE - NSCHARTNOTEFT_GEN_A_CORE
GYN ONC Fellow:    I spoke with Lydia (patient's daughter). We reviewed her hospital and clinical course, including concern for possible bowel perforation causing pneumoperitoneum seen on prior CT scan. We discussed the patient's acute mental status change and concern for stroke vs ICH vs brain mets and plan for urgent CT head. I expressed my concern that this may represent progression of her underlying malignancy given extent of disease on imaging in reference to prior scans despite chemotherapy. Discussed that pt unable to comply with instructions for po contrast at this time to assess for perforation. Will obtain abdominal x-ray to assess for worsening free air. Due to extent of disease, we discussed that the risks posed by surgery to correct a perforation would be of limited prognostic benefit.     Lydia ([phone]) and her father Jeff ([phone]) are the HCP for the patient. We discussed DNR/DNI in the event that natural death occurs. At this time, desire all intervention FULL CODE.    GRIS Mckeon, PGY6

## 2022-06-08 NOTE — PROGRESS NOTE ADULT - PROBLEM SELECTOR PLAN 1
GYN ONC Fellow Addendum:    Pt seen and examined at bedside. Agree with above. Overnight pt with worsening mental status. This AM orients to voice but does not participate in interaction. Episode of coffee ground emesis overnight.     VS reviewed  Labs reviewed    - AMS: obtain CT head, appreciate neuro recs, 1:1 sit, T3/T4 low consult endocrine  - Febrile neutropenia: afebrile x 48 hours, change cefepime to zosyn due to possible neurotoxic side effects, continue daily neupogen,  now  - GI Bleed: transfuse 1 unit, consult GI  - Thrombocytopenia: likely due to chemo, transfuse platelets in setting of GI bleed  - NPO, IVF  - Encourage ambulation and IS use  - Replete lytes prn  - DVT ppx: SCDs, hold chemical ppx due to plts and bleed  - Dispo: continue inpatient management    GRIS Mckeon, PGY6 GYN ONC Fellow Addendum:    Pt seen and examined at bedside. Agree with above. Overnight pt with worsening mental status. This AM orients to voice but does not participate in interaction. Episode of coffee ground emesis overnight.     VS reviewed  Labs reviewed    - AMS: obtain CT head, appreciate neuro recs, 1:1 sit, T3/T4 low consult endocrine  - Febrile neutropenia: afebrile x 48 hours, change cefepime to zosyn due to possible neurotoxic side effects, BCx no growth, neph tube cx with serratia and few e faecalis;  continue daily neupogen,  now  - GI Bleed: transfuse 1 unit, consult GI  - Thrombocytopenia: likely due to chemo, transfuse platelets in setting of GI bleed  - NPO, IVF  - Encourage ambulation and IS use  - Replete lytes prn  - DVT ppx: SCDs, hold chemical ppx due to plts and bleed  - Dispo: continue inpatient management    GRIS Mckeon, PGY6

## 2022-06-08 NOTE — PROGRESS NOTE ADULT - PROBLEM SELECTOR PLAN 3
Coffee ground emesis noted on 6/7 - complicated by acute blood loss anemia and thrombocytopenia  - GI consult appreciated  - Protonix IV BID  - NPO except meds

## 2022-06-08 NOTE — CHART NOTE - NSCHARTNOTEFT_GEN_A_CORE
R2 GYN ONC Event Note    Patient seen at bedside and escorted to CT for urgent Head CT for change in mental status, AAO x0. Patient received 5mg of Haloperidol prior to transport for irritation. Upon transfer from bed to CT scanner, patient became more agitated, 2mg of Ativan given and patient was able to remain calm for the remainder of the exam.     Vital Signs Last 24 Hrs  T(C): 36.7 (2022 10:26), Max: 36.7 (2022 18:11)  T(F): 98 (2022 10:26), Max: 98 (2022 18:11)  HR: 119 (2022 10:26) (110 - 122)  BP: 129/77 (2022 10:26) (109/91 - 129/77)  BP(mean): --  RR: 18 (2022 10:26) (16 - 20)  SpO2: 94% (2022 10:26) (94% - 100%)      52y  with recurrent cervical CA who presented to the ED as transfer from [place] admitted for mgmt of neutropenic fever. On presentation, patient was found to be tachycardic to 130s, ANC of 200, and febrile. She received 1uPRBC on admission had H/H has been stable. She became acutely altered yesterday afternoon without improvement. Tachycardia has been improving and vitals otherwise wnl.  - f/u Head CT read  - Appreciate GHOS recs  - Neuro recs reviewed, MRI and EEG to be ordered  - f/u GI final recs  - Transfuse 1uPRBC  - Will con't plan otherwise noted in progress note    1:1 at bedside for transport  Transported w/ Dr. Headley PGY-4 and Dr. Frankel PGY-3  Elena PGY-2

## 2022-06-08 NOTE — PROGRESS NOTE ADULT - PROBLEM SELECTOR PLAN 6
s/p chemo last week but also possibility of infected uterine tumor  - Cefepime IV (6/5-8)  - Zarxio given  - monitor CBC.

## 2022-06-08 NOTE — CHART NOTE - NSCHARTNOTEFT_GEN_A_CORE
Patient seen and evaluated. Imaging reviewed - CT head negative  patient has acute mental status changes, and AXR with increased free air  patient likely has a perforated viscous, I discussed with her daughter Lydia and Aunt Adrianna the findings. We discussed terminal cancer and residual disease after 5 cycles of chemo. We discussed that she would need emergent surgery, including colostomy - prognosis is poor, she will need ICU care and potentially may not get better after surgery. Patient is altered and cannot make her own decisions. She is not , although has lived with [facility] for 30[facility] years. Daughter states she is the HCP, no documentation at this time.  We discussed next steps, daughter and Aunt want to proceed with surgery. They understand that she may never get better after surgery given tumor burden and bowel perforation.  They are aware she will have a permanent colostomy  all questions were answered in detail. Will plan for exlap/possible bowel resection, colostomy/ileostomy, all indicated procedures  discussed risks including bleeding, infection, injury to bowel/bladder/vessels/nerves/ureters, risks of blood transfusion, reoperation, ICU admission  patient's platelets are low, she is neutropenic will finish 1 u pRBCs on floor and plan for additional blood in the OR and platelets    Betina Merino MD

## 2022-06-08 NOTE — PROGRESS NOTE ADULT - ASSESSMENT
Assessment/Plan: 52y  with recurrent cervical CA who presented to the ED as transfer from [place] admitted for mgmt of neutropenic fever. On presentation, patient was found to be tachycardic to 130s, ANC of 200, and febrile. She received 1uPRBC on admission had H/H has been stable. She became acutely altered yesterday afternoon without improvement. Tachycardia has been improving and vitals otherwise wnl.    Onc: recurrent cervical cancer. 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 Treated with definitive radiation and weekly cisplatin at [facility]. Final radiation was 10/29/2014.   - Recurrence in  with bilateral ureteral obstruction. Recently started on new chemo regimen (Carbo AUC 5, Paxol 135 mg/m2 and Avastin 15 mg/m2 on q3 week schedule), last cycle on   - CT A/P as above. Demonstrates carcinomatosis   Neuro: Hold narcotics for AMS. Will switch to IV tylenol for pain. Neurology consulted for acute AMS. Will continue 1:1 for monitoring.   CV: Tachycardia to 100s-110s which was an overall improvement since admission. Continue Amlodipine/Metoprolol for HTN. f/u AM CBC. Continue to monitor VS Per routine.  Pulm: Saturating well on RA. Increase incentive spirometry.  GI: Made NPO for episode of coffee ground emesis. GI consulted for recs. Continue IV Protonix/Reglan.  : b/l nephrostomy tubes in place. Unable to evaluate insertion site this AM as patient refused. UOP has been decreasing; however, this AM drained 90/110 of dark yellow urine. Patient has ASHLEY likley 2/2 IV Contrast & Dehydration. Will continue to monitor UOP. f/u AM BMP. Replete electrolytes PRN  Heme: Continue HSQ/Venodynes for DVT ppx. Increase OOB.    ID: Afebrile overnight. Continue Cefepime for presumed sepsis on admission. Continue to trend WBC. Continue Neupogen for neutropenic fever. f/u AM CBC w/ diff  Dispo: continue inpatient care. Appreciate Neurology/General hospitalist/GI recs.     Eddie Headley PGY-4       Assessment/Plan: 52y  with recurrent cervical CA who presented to the ED as transfer from [place] admitted for mgmt of neutropenic fever. On presentation, patient was found to be tachycardic to 130s, ANC of 200, and febrile. She received 1uPRBC on admission had H/H has been stable. She became acutely altered yesterday afternoon without improvement. Tachycardia has been improving and vitals otherwise wnl.    Onc: recurrent cervical cancer. 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 Treated with definitive radiation and weekly cisplatin at [facility]. Final radiation was 10/29/2014.   - Recurrence in  with bilateral ureteral obstruction. Recently started on new chemo regimen (Carbo AUC 5, Paxol 135 mg/m2 and Avastin 15 mg/m2 on q3 week schedule), last cycle on   - CT A/P as above. Demonstrates carcinomatosis   Neuro: Hold narcotics for AMS. Will switch to IV tylenol for pain. Neurology consulted for acute AMS. Will continue 1:1 for monitoring.   CV: Tachycardia to 100s-110s which was an overall improvement since admission. Continue Amlodipine/Metoprolol for HTN. f/u AM CBC. Continue to monitor VS Per routine.  - Per hospitalist, tachycardia 2/2 to sepsis. Treat underlying cause.  Pulm: Saturating well on RA. Increase incentive spirometry. CTA without evidence of a PE  GI: Made NPO for episode of coffee ground emesis. GI consulted for recs. Continue IV Protonix/Reglan. Pt refused CTAP w/ PO Contrast yesterday. Will defer to GI for recs.   : b/l nephrostomy tubes in place. Unable to evaluate insertion site this AM as patient refused. UOP has been decreasing; however, this AM drained 90/110 of dark yellow urine. Patient has ASHLEY likley 2/2 IV Contrast & Dehydration. Will continue to monitor UOP. f/u AM BMP. Replete electrolytes PRN  Heme: Continue HSQ/Venodynes for DVT ppx. Increase OOB.    ID: Afebrile overnight. Continue Cefepime for presumed sepsis on admission. Continue to trend WBC. Continue Zarixo for neutropenic fever. WBC 0.47, ANC 0.16. Lactate 2.5->1.9->3.4. f/u AM CBC w/ diff  Dispo: continue inpatient care. Appreciate Neurology/General hospitalist/GI recs.     Eddie Headley PGY-4

## 2022-06-08 NOTE — PROVIDER CONTACT NOTE (OTHER) - SITUATION
MD Ma was called because patient vomited large amount of coffee color emesis. During the shift patient also had episodes of nausea and spitting up small amount of brown color fluid

## 2022-06-08 NOTE — PROGRESS NOTE ADULT - PROBLEM SELECTOR PLAN 1
Concern for acute exacerbation of her medical condition as cause   but need to r/o mets to Brain from malignancy and ICH  - Awaiting CT Head  - treat underlying medical condition  - Appreciate neurology consult  - haldol PRN for patient safety if agitation is causing self-harm.  - continue neuro checks

## 2022-06-08 NOTE — BRIEF OPERATIVE NOTE - COMMENTS
Pt left the OR intubated with abthera wound vac in place. Pt left the OR intubated with abthera wound vac in place.  Dictation# [id number]

## 2022-06-08 NOTE — BRIEF OPERATIVE NOTE - OPERATION/FINDINGS
EUA: Large mas approximately 1-2cm within the vagina along the anterior abdominal wall. Cole blood seen when hilliard placed likely from fistula vs. tumor.   Abdomen: diffusely adherent bowel within the abdomen/pelvis with grossly metastatic disease. Large tumor noted in the pelvis. Peritoneal carcinomatosis. Multiple omental nodules. Liver edge appeared normal. Large stool burden in transverse colon with decompressed bowel in the sigmoid. 1cm hole noted in distal sigmoid colon. EUA: Firm /solid mass, fibrosis/nonmobile. Cole blood seen when hilliard placed likely from fistula vs. tumor. approx 600 cc blood, irrigated hilliard and exit of saline through the vagina noted, fistula vesicovaginal  Abdomen: diffusely adherent bowel within the abdomen/pelvis. Large tumor vs. radiation noted in the pelvis. Liver edge appeared normal. Large stool burden in transverse colon with decompressed bowel in the sigmoid. 1cm hole noted in distal sigmoid colon.

## 2022-06-08 NOTE — CHART NOTE - NSCHARTNOTEFT_GEN_A_CORE
Spoke with her daughter again, plan to proceed with OR  discussed case with Surg Onc , plan for divertion, or washout alone pending intraop findings  2 physician consent obtained for surgery given HCP is daughter on the phone, not at [facility] yet, on her way.

## 2022-06-08 NOTE — CONSULT NOTE ADULT - ASSESSMENT
52y  with recurrent cervical CA (per patient stage III) presenting to the ED as transfer from [place] a/w neutropenic fever.     Consulted for: Hypothyroidism r/o    #Hypothyroidism  TSH 2.89, TT3 41, TT4 3.17.   -Check FT4 in setting of low albumin, which can falsely decrease total T3 and T4 values  -No hx of immunotherapy use  -Will obtain further history when patient is more awake.     Hazel Starr MD  Endocrine Fellow  Can be reached via teams. For follow up questions, discharge recommendations, or new consults, please call answering service at [phone] (weekdays); [phone] (nights/weekends)

## 2022-06-08 NOTE — PROGRESS NOTE ADULT - SUBJECTIVE AND OBJECTIVE BOX
[facility] Division of Hospital Medicine  Waldo Busby MD  Pager (M-F, 8A-5P): [phone]  Other Times:  [phone]    Patient is a 52y old  Female who presents with a chief complaint of neutropenic fever (08 Jun 2022 07:43)    SUBJECTIVE / OVERNIGHT EVENTS:  events from previous 24 hours noted.  Currently still altered in mental status - unable to participate in the history and physical. No overnight issues with F/C, SOB, Cough, diarrhea.    MEDICATIONS  (STANDING):  acetaminophen   IVPB .. 1000 milliGRAM(s) IV Intermittent every 6 hours  amLODIPine   Tablet 10 milliGRAM(s) Oral daily  filgrastim-sndz (ZARXIO) Injectable 480 MICROGram(s) SubCutaneous daily  lactated ringers. 1000 milliLiter(s) (150 mL/Hr) IV Continuous <Continuous>  metoprolol succinate  milliGRAM(s) Oral daily  pantoprazole  Injectable 40 milliGRAM(s) IV Push two times a day  piperacillin/tazobactam IVPB.. 3.375 Gram(s) IV Intermittent every 8 hours  potassium chloride  10 mEq/100 mL IVPB 10 milliEquivalent(s) IV Intermittent every 1 hour    MEDICATIONS  (PRN):  haloperidol    Injectable 0.5 milliGRAM(s) IntraMuscular every 6 hours PRN Agitation  metoclopramide Injectable 10 milliGRAM(s) IV Push every 8 hours PRN nausea/vomiting  ondansetron Injectable 4 milliGRAM(s) IV Push every 6 hours PRN Nausea and/or Vomiting      Vital Signs Last 24 Hrs  T(C): 36.3 (08 Jun 2022 06:26), Max: 37.1 (07 Jun 2022 14:00)  T(F): 97.4 (08 Jun 2022 06:26), Max: 98.8 (07 Jun 2022 14:00)  HR: 110 (08 Jun 2022 06:26) (106 - 122)  BP: 117/90 (08 Jun 2022 06:26) (109/91 - 121/81)  BP(mean): --  RR: 20 (08 Jun 2022 06:26) (16 - 20)  SpO2: 99% (08 Jun 2022 06:26) (96% - 100%)  CAPILLARY BLOOD GLUCOSE        I&O's Summary    07 Jun 2022 07:01  -  08 Jun 2022 07:00  --------------------------------------------------------  IN: 4160 mL / OUT: 870 mL / NET: 3290 mL        PHYSICAL EXAM:  CONSTITUTIONAL: NAD  EYES: PERRLA; conjunctiva and sclera clear  ENMT: Moist oral mucosa, no pharyngeal injection or exudates; normal dentition  NECK: Supple, no palpable masses; no thyromegaly  RESPIRATORY: Normal respiratory effort; lungs are clear to auscultation bilaterally  CARDIOVASCULAR: Regular rate and rhythm, normal S1 and S2, no murmur/rub/gallop; No lower extremity edema; Peripheral pulses are 2+ bilaterally  ABDOMEN: Tender to palpation, quiet bowel sounds, mild guarding; No hepatosplenomegaly  MUSCULOSKELETAL:  Did not assess gait; no clubbing or cyanosis of digits; no joint swelling or tenderness to palpation  PSYCH: Awake but not oriented to person, place, or time; agitated  NEUROLOGY: Unable to participate in the neuro exam.  SKIN: No rashes; no palpable lesions    LABS:                        7.4    0.56  )-----------( 45       ( 08 Jun 2022 06:00 )             23.4     06-08    139  |  98  |  38<H>  ----------------------------<  83  3.0<L>   |  21<L>  |  1.07    Ca    9.1      08 Jun 2022 06:00  Phos  2.6     06-08  Mg     2.00     06-08    TPro  6.3  /  Alb  2.3<L>  /  TBili  1.2  /  DBili  x   /  AST  23  /  ALT  8   /  AlkPhos  58  06-08    PT/INR - ( 07 Jun 2022 20:28 )   PT: 14.9 sec;   INR: 1.28 ratio         PTT - ( 07 Jun 2022 20:28 )  PTT:42.1 sec  CARDIAC MARKERS ( 08 Jun 2022 06:00 )  x     / x     / 28 U/L / x     / x              RADIOLOGY & ADDITIONAL TESTS:    Imaging Personally Reviewed:    Care Discussed with Consultants/Other Providers:

## 2022-06-08 NOTE — PROGRESS NOTE ADULT - ATTENDING COMMENTS
Patient seen and examined at 12 - patient has acute mental status changes, will obtain CT head noncontrast now  called radiology  patient has abdominal discomfort, doesn't want abdominal exam - however appears more tender than previous, abd exam prior was soft/nt  Will call family for further discussion of findings.

## 2022-06-08 NOTE — CONSULT NOTE ADULT - ASSESSMENT
52F with recurrent cervical CA (stage III, dx 2014 s/p VBRT + chemo, never had surgical intervention) c/b b/l hydronephrosis s/p B/L nephrostomy tube placement, p/w severe sepsis from neutropenic fever vs infected necrotic tumor; transferred from [facility] 6/5 for management of sepsis. History obtained through chart review as pt was too altered to provide history. Pt received (Carbo AUC 5, Paxol 135 mg/m2 and Avastin 15 mg/m2 on q3 week schedule), last cycle on 5/31. [facility] Upon transfer was reporting fatigue and upper abd pain. Had CT A/P with IV cont 6/5 showing redemonstration of ill-defined fluid and gas collection slightly posterior to the right uterine wall; in addition there is gas within the uterine cavity, new from 2021, findings may represent infected and necrotic tumor versus a rectosigmoid-vaginal fistula, redemonstration of descending and rectosigmoid colitis with the sigmoid colon abutting the right uterine wall, relatively unchanged moderate volume ascites. Between 6/6-6/7 started having N/V, initially started as bilious emesis but had reported brown emesis 6/7-6/8 which worsened after taking PO contrast for CT AP a/w acute change in mental status. She became agitated and refused the scan. GI consulted for evaluation of N/V.    #abd pain with N/V c/b 1x CGE- d/dx includes infectious etiology given neutropenic fever includes gastroenteritis vs ascites vs esophagitis vs rarer central causes such as brain lesions  # 52F with recurrent cervical CA (stage III, dx 2014 s/p VBRT + chemo, never had surgical intervention) c/b b/l hydronephrosis s/p B/L nephrostomy tube placement, p/w severe sepsis from neutropenic fever vs infected necrotic tumor; transferred from [facility] 6/5 for management of sepsis. History obtained through chart review as pt was too altered to provide history. Pt received (Carbo AUC 5, Paxol 135 mg/m2 and Avastin 15 mg/m2 on q3 week schedule), last cycle on 5/31. [facility] Upon transfer was reporting fatigue and upper abd pain. Had CT A/P with IV cont 6/5 showing redemonstration of ill-defined fluid and gas collection slightly posterior to the right uterine wall; in addition there is gas within the uterine cavity, new from 2021, findings may represent infected and necrotic tumor versus a rectosigmoid-vaginal fistula, redemonstration of descending and rectosigmoid colitis with the sigmoid colon abutting the right uterine wall, relatively unchanged moderate volume ascites. Between 6/6-6/7 started having N/V, initially started as bilious emesis but had reported brown emesis 6/7-6/8 which worsened after taking PO contrast for CT AP a/w acute change in mental status. She became agitated and refused the scan. GI consulted for evaluation of N/V.    #abd pain with N/V c/b 1x CGE- d/dx includes infectious etiology given neutropenic fever includes gastroenteritis vs ascites vs esophagitis vs rarer central causes such as brain lesions  #pneumoperitoenum- appears to be decreasing  52F with recurrent cervical CA (stage III, dx 2014 s/p VBRT + chemo, never had surgical intervention) c/b b/l hydronephrosis s/p B/L nephrostomy tube placement, p/w severe sepsis from neutropenic fever vs infected necrotic tumor; transferred from [facility] 6/5 for management of sepsis. History obtained through chart review as pt was too altered to provide history. Upon transfer was reporting fatigue and upper abd pain. Had CT A/P with IV cont 6/5 showing redemonstration of ill-defined fluid and gas collection slightly posterior to the right uterine wall; in addition there is gas within the uterine cavity, new from 2021, findings may represent infected and necrotic tumor versus a rectosigmoid-vaginal fistula, redemonstration of descending and rectosigmoid colitis with the sigmoid colon abutting the right uterine wall, relatively unchanged moderate volume ascites. Between 6/6-6/7 started having N/V, initially started as bilious emesis but had reported brown emesis 6/7-6/8 which worsened after taking PO contrast for CT AP a/w acute change in mental status. GI consulted for evaluation of N/V.    Impression:  #abd pain with N/V c/b 1x CGE- d/dx includes infectious etiology given neutropenic fever includes gastroenteritis vs ascites vs esophagitis vs MWT vs malignancy vs CNS causes such as brain lesions  #pneumoperitoenum- appears to be decreasing on latest CT A/P  #gas in uterine cavity- c/f infected tumor vs rectosigmoid-vaginal fistula  #descending and rectosigmoid colitis with the sigmoid colon abutting the right uterine wall- likely 2/2 underlying malignancy vs possible infectious etiology  #sepsis 2/2 neutropenic fever vs infected necrotic tumor- s/p cefepime (stopped due to acute encephalopathy); now on Zoysn  #acute encephalopathy- could be 2/2 infectious vs medication induced (thought possibly 2/2 cefepime vs opiates), pending CTH to r/o mass effect/brain mets and further w/u by neuro  #recurrent cervical CA (stage III, dx 2014 s/p VBRT + chemo, never had surgical intervention) - s/p (Carbo AUC 5, Paxol 135 mg/m2 and Avastin 15 mg/m2 on q3 week schedule), last cycle on 5/31. Patient receives Gyn Oncology care at [facility]).   #pancytopenia likely 2/2 underlying malignancy/chemo    Recs:  -given neutropenic fever, acute encephalopathy, no plan for EGD at this time  -attempted to contact LANETTE pt's daughter Lydia to discuss findings/plan/GOC multiple times but no response  -trend vitals, CBC, and monitor for clinical signs of bleeding  -if diarrhea --> send C diff and GI PCR  -maintain active type and screen  -transfusion goal to maintain hemoglobin >/= 7.0, plt >50 K  -avoid NSAIDs  -PPI IV BID acceptable for now  -encephalopathy workup per neuro  -c/w abx for neutropenic fever    **THIS NOTE IS NOT FINALIZED UNTIL SIGNED BY THE ATTENDING**    Avelina Escobar MD  GI Fellow, PGY-4  Available via Microsoft Teams    NON-URGENT CONSULTS:  Please email [email or web address] [place]  [email or web address]  AT NIGHT AND ON WEEKENDS:  Contact on-call GI fellow via answering service ([phone]) from 5pm-8am and on weekends/holidays  MONDAY-FRIDAY 8AM-5PM:  Pager# [phone] ([facility]) or [phone] ([facility])  GI Phone# [phone] ([facility])       52F with recurrent cervical CA (stage III, dx 2014 s/p VBRT + chemo, never had surgical intervention) c/b b/l hydronephrosis s/p B/L nephrostomy tube placement, p/w severe sepsis from neutropenic fever vs infected necrotic tumor; transferred from [facility] 6/5 for management of sepsis. GI consulted for evaluation of N/V and coffee ground emesis.    Impression:  #abd pain with N/V c/b 1x CGE- d/dx of nausea/vomiting includes infectious etiology given neutropenic fever (ie gastroenteritis, colitis) vs ascites (with associated compression) vs underlying malignancy vs CNS abnormality given AMS; may have had CGE in setting of esophagitis vs MWT vs malignancy  #pneumoperitoenum- possibly from necrotic tumor, appears to be decreasing on latest CT A/P  #gas in uterine cavity- c/f infected tumor vs rectosigmoid-vaginal fistula  #descending and rectosigmoid colitis with the sigmoid colon abutting the right uterine wall- likely 2/2 underlying malignancy vs possible infectious etiology  #sepsis 2/2 neutropenic fever vs infected necrotic tumor- s/p cefepime (stopped due to acute encephalopathy); now on Zoysn  #acute encephalopathy- could be 2/2 infectious vs medication induced (thought possibly 2/2 cefepime vs opiates), pending CTH to r/o mass effect/brain mets and further w/u by neuro  #recurrent cervical CA (stage III, dx 2014 s/p VBRT + chemo, never had surgical intervention) - s/p (Carbo AUC 5, Paxol 135 mg/m2 and Avastin 15 mg/m2 on q3 week schedule), last cycle on 5/31. Patient receives Gyn Oncology care at [facility]).   #pancytopenia likely 2/2 underlying malignancy/chemo    Recs:  -given neutropenic fever, acute encephalopathy, and absence of ongoing bleeding, no plan for EGD at this time  -attempted to contact NOK pt's daughter Lydia to discuss findings/plan/GOC multiple times but no response  -trend vitals, CBC, and monitor for clinical signs of bleeding  -if diarrhea --> send C diff and GI PCR  -maintain active type and screen  -transfusion goal to maintain hemoglobin >/= 7.0, plt >50 K  -avoid NSAIDs  -PPI IV BID acceptable for now  -encephalopathy workup per neuro  -c/w abx for neutropenic fever    **THIS NOTE IS NOT FINALIZED UNTIL SIGNED BY THE ATTENDING**    Avelina Escobar MD  GI Fellow, PGY-4  Available via Microsoft Teams    NON-URGENT CONSULTS:  Please email [email or web address]  [email or web address]  AT NIGHT AND ON WEEKENDS:  Contact on-call GI fellow via answering service ([phone]) from 5pm-8am and on weekends/holidays  MONDAY-FRIDAY 8AM-5PM:  Pager# [phone] ([facility]) or [phone] ([facility])  GI Phone# [phone] ([facility])

## 2022-06-08 NOTE — PROGRESS NOTE ADULT - PROBLEM SELECTOR PLAN 4
likely due to acute GI bleed  - transfuse PRBC to optimized Hgb level - ideally Hgb >7 but currently would benefit from at least 1u PRBC due to bone marrow suppression from recent chemo and GI bleed as consumptive process.

## 2022-06-08 NOTE — PROGRESS NOTE ADULT - SUBJECTIVE AND OBJECTIVE BOX
R4 Gyn ONC Progress Note HD#4    Subjective:   Pt seen and examined at bedside. Overnight, the patient was unable to tolerate her CT AP w/ PO contrast. She became acutely confused and combative. She was placed on a 1:1 observation for monitoring. This morning she was lethargic. She had not slept overnight and felt tired. She responded with yes/no to questions. This morning she had an episode of significant coffee ground emesis. Vomiting has increased since PO contrast intake. Pt denies fever, chills, chest pain, SOB, lightheadedness, dizziness.      Objective:  T(F): 97.4 (06-08-22 @ 06:26), Max: 98.8 (06-07-22 @ 10:16)  HR: 110 (06-08-22 @ 06:26) (104 - 122)  BP: 117/90 (06-08-22 @ 06:26) (108/68 - 121/81)  RR: 20 (06-08-22 @ 06:26) (16 - 20)  SpO2: 99% (06-08-22 @ 06:26) (94% - 100%)  Wt(kg): --  I&O's Summary    06 Jun 2022 07:01  -  07 Jun 2022 07:00  --------------------------------------------------------  IN: 4765 mL / OUT: 889 mL / NET: 3876 mL    07 Jun 2022 07:01  -  08 Jun 2022 06:56  --------------------------------------------------------  IN: 4160 mL / OUT: 870 mL / NET: 3290 mL      CAPILLARY BLOOD GLUCOSE          MEDICATIONS  (STANDING):  amLODIPine   Tablet 10 milliGRAM(s) Oral daily  cefepime   IVPB 2000 milliGRAM(s) IV Intermittent every 8 hours  cefepime   IVPB      filgrastim-sndz (ZARXIO) Injectable 480 MICROGram(s) SubCutaneous daily  heparin   Injectable 5000 Unit(s) SubCutaneous every 8 hours  lactated ringers. 1000 milliLiter(s) (150 mL/Hr) IV Continuous <Continuous>  metoprolol succinate  milliGRAM(s) Oral daily  pantoprazole  Injectable 40 milliGRAM(s) IV Push two times a day    MEDICATIONS  (PRN):  acetaminophen     Tablet .. 975 milliGRAM(s) Oral every 6 hours PRN Temp greater or equal to 38C (100.4F), Mild Pain (1 - 3)  metoclopramide Injectable 10 milliGRAM(s) IV Push every 8 hours PRN nausea/vomiting  ondansetron Injectable 4 milliGRAM(s) IV Push every 6 hours PRN Nausea and/or Vomiting      Physical Exam:  Constitutional: NAD, A+O x3  Lungs/CV: would not allow ascultation  Abdomen: soft, softly-distended, mild tenderness, no guarding/rebound  Nephrostomy tubes draining dark yellow urine. Would not allow for evaluation of insertion sites.  Extremities: no lower extremity edema or calf tenderness bilaterally; venodynes in place    LABS:  06-07    138    |  96<L>  |  34<H>  ----------------------------<  78     4.3     |  20<L>  |  1.11   06-07    137    |  97<L>  |  29<H>  ----------------------------<  80     3.4<L>   |  21<L>  |  1.12     Ca    9.6        07 Jun 2022 20:28  Ca    9.2        07 Jun 2022 05:40  Phos  2.7       06-07  Phos  3.4       06-07  Mg     2.10      06-07  Mg     2.00      06-07    TPro  7.3    /  Alb  2.7<L>  /  TBili  1.4<H>  /  DBili  1.0<H>  /  AST  25     /  ALT  9      /  AlkPhos  69     06-07        PT/INR - ( 07 Jun 2022 20:28 )   PT: 14.9 sec;   INR: 1.28 ratio         PTT - ( 07 Jun 2022 20:28 )  PTT:42.1 sec

## 2022-06-08 NOTE — PROGRESS NOTE ADULT - PROBLEM SELECTOR PLAN 5
Suspect related to consumptive process from GI bleed and necrotic uterine mass with impaired production from bone marrow suppression from recent chemo  - would transfuse single donor Plt since Plt <50

## 2022-06-09 ENCOUNTER — TRANSCRIPTION ENCOUNTER (OUTPATIENT)
Age: 52
End: 2022-06-09

## 2022-06-09 PROBLEM — C53.9 MALIGNANT NEOPLASM OF CERVIX UTERI, UNSPECIFIED: Chronic | Status: ACTIVE | Noted: 2022-06-05

## 2022-06-09 LAB
ANION GAP SERPL CALC-SCNC: 17 MMOL/L — HIGH (ref 7–14)
APTT BLD: 33.2 SEC — SIGNIFICANT CHANGE UP (ref 27–36.3)
BASOPHILS # BLD AUTO: 0 K/UL — SIGNIFICANT CHANGE UP (ref 0–0.2)
BASOPHILS NFR BLD AUTO: 0 % — SIGNIFICANT CHANGE UP (ref 0–2)
BLOOD GAS ARTERIAL COMPREHENSIVE RESULT: SIGNIFICANT CHANGE UP
BLOOD GAS ARTERIAL COMPREHENSIVE RESULT: SIGNIFICANT CHANGE UP
BUN SERPL-MCNC: 32 MG/DL — HIGH (ref 7–23)
CALCIUM SERPL-MCNC: 8 MG/DL — LOW (ref 8.4–10.5)
CHLORIDE SERPL-SCNC: 101 MMOL/L — SIGNIFICANT CHANGE UP (ref 98–107)
CO2 SERPL-SCNC: 21 MMOL/L — LOW (ref 22–31)
CREAT SERPL-MCNC: 0.92 MG/DL — SIGNIFICANT CHANGE UP (ref 0.5–1.3)
EGFR: 74 ML/MIN/1.73M2 — SIGNIFICANT CHANGE UP
EOSINOPHIL # BLD AUTO: 0.03 K/UL — SIGNIFICANT CHANGE UP (ref 0–0.5)
EOSINOPHIL NFR BLD AUTO: 0.9 % — SIGNIFICANT CHANGE UP (ref 0–6)
GLUCOSE BLDC GLUCOMTR-MCNC: 91 MG/DL — SIGNIFICANT CHANGE UP (ref 70–99)
GLUCOSE BLDC GLUCOMTR-MCNC: 97 MG/DL — SIGNIFICANT CHANGE UP (ref 70–99)
GLUCOSE SERPL-MCNC: 104 MG/DL — HIGH (ref 70–99)
HCT VFR BLD CALC: 25.2 % — LOW (ref 34.5–45)
HGB BLD-MCNC: 8.8 G/DL — LOW (ref 11.5–15.5)
IANC: 2.63 K/UL — SIGNIFICANT CHANGE UP (ref 1.8–7.4)
INR BLD: 1.37 RATIO — HIGH (ref 0.88–1.16)
LYMPHOCYTES # BLD AUTO: 0.27 K/UL — LOW (ref 1–3.3)
LYMPHOCYTES # BLD AUTO: 8.2 % — LOW (ref 13–44)
MAGNESIUM SERPL-MCNC: 1.9 MG/DL — SIGNIFICANT CHANGE UP (ref 1.6–2.6)
MCHC RBC-ENTMCNC: 29.5 PG — SIGNIFICANT CHANGE UP (ref 27–34)
MCHC RBC-ENTMCNC: 34.9 GM/DL — SIGNIFICANT CHANGE UP (ref 32–36)
MCV RBC AUTO: 84.6 FL — SIGNIFICANT CHANGE UP (ref 80–100)
MONOCYTES # BLD AUTO: 0.18 K/UL — SIGNIFICANT CHANGE UP (ref 0–0.9)
MONOCYTES NFR BLD AUTO: 5.5 % — SIGNIFICANT CHANGE UP (ref 2–14)
NEUTROPHILS # BLD AUTO: 2.66 K/UL — SIGNIFICANT CHANGE UP (ref 1.8–7.4)
NEUTROPHILS NFR BLD AUTO: 51.8 % — SIGNIFICANT CHANGE UP (ref 43–77)
NRBC # BLD: 2 /100 WBCS — SIGNIFICANT CHANGE UP
NRBC # FLD: 0.08 K/UL — HIGH
PHOSPHATE SERPL-MCNC: 2.8 MG/DL — SIGNIFICANT CHANGE UP (ref 2.5–4.5)
PLATELET # BLD AUTO: 38 K/UL — LOW (ref 150–400)
POTASSIUM SERPL-MCNC: 3.2 MMOL/L — LOW (ref 3.5–5.3)
POTASSIUM SERPL-SCNC: 3.2 MMOL/L — LOW (ref 3.5–5.3)
PROTHROM AB SERPL-ACNC: 16 SEC — HIGH (ref 10.5–13.4)
RBC # BLD: 2.98 M/UL — LOW (ref 3.8–5.2)
RBC # FLD: 16.4 % — HIGH (ref 10.3–14.5)
SODIUM SERPL-SCNC: 139 MMOL/L — SIGNIFICANT CHANGE UP (ref 135–145)
WBC # BLD: 3.33 K/UL — LOW (ref 3.8–10.5)
WBC # FLD AUTO: 3.33 K/UL — LOW (ref 3.8–10.5)

## 2022-06-09 PROCEDURE — 99233 SBSQ HOSP IP/OBS HIGH 50: CPT

## 2022-06-09 PROCEDURE — 71045 X-RAY EXAM CHEST 1 VIEW: CPT | Mod: 26

## 2022-06-09 PROCEDURE — 99232 SBSQ HOSP IP/OBS MODERATE 35: CPT | Mod: GC

## 2022-06-09 PROCEDURE — 99291 CRITICAL CARE FIRST HOUR: CPT

## 2022-06-09 PROCEDURE — 99232 SBSQ HOSP IP/OBS MODERATE 35: CPT

## 2022-06-09 PROCEDURE — 99292 CRITICAL CARE ADDL 30 MIN: CPT

## 2022-06-09 RX ORDER — NOREPINEPHRINE BITARTRATE/D5W 8 MG/250ML
0.05 PLASTIC BAG, INJECTION (ML) INTRAVENOUS
Qty: 8 | Refills: 0 | Status: DISCONTINUED | OUTPATIENT
Start: 2022-06-09 | End: 2022-06-11

## 2022-06-09 RX ORDER — DEXMEDETOMIDINE HYDROCHLORIDE IN 0.9% SODIUM CHLORIDE 4 UG/ML
0.05 INJECTION INTRAVENOUS
Qty: 400 | Refills: 0 | Status: DISCONTINUED | OUTPATIENT
Start: 2022-06-09 | End: 2022-06-11

## 2022-06-09 RX ORDER — FENTANYL CITRATE 50 UG/ML
25 INJECTION INTRAVENOUS ONCE
Refills: 0 | Status: DISCONTINUED | OUTPATIENT
Start: 2022-06-09 | End: 2022-06-09

## 2022-06-09 RX ORDER — FENTANYL CITRATE 50 UG/ML
0.5 INJECTION INTRAVENOUS
Qty: 2500 | Refills: 0 | Status: DISCONTINUED | OUTPATIENT
Start: 2022-06-09 | End: 2022-06-10

## 2022-06-09 RX ORDER — CHLORHEXIDINE GLUCONATE 213 G/1000ML
15 SOLUTION TOPICAL EVERY 12 HOURS
Refills: 0 | Status: DISCONTINUED | OUTPATIENT
Start: 2022-06-09 | End: 2022-06-19

## 2022-06-09 RX ORDER — NOREPINEPHRINE BITARTRATE/D5W 8 MG/250ML
0.05 PLASTIC BAG, INJECTION (ML) INTRAVENOUS
Qty: 8 | Refills: 0 | Status: DISCONTINUED | OUTPATIENT
Start: 2022-06-09 | End: 2022-06-09

## 2022-06-09 RX ORDER — SODIUM CHLORIDE 9 MG/ML
1000 INJECTION, SOLUTION INTRAVENOUS ONCE
Refills: 0 | Status: COMPLETED | OUTPATIENT
Start: 2022-06-09 | End: 2022-06-09

## 2022-06-09 RX ORDER — CHLORHEXIDINE GLUCONATE 213 G/1000ML
1 SOLUTION TOPICAL DAILY
Refills: 0 | Status: DISCONTINUED | OUTPATIENT
Start: 2022-06-09 | End: 2022-06-21

## 2022-06-09 RX ADMIN — Medication 400 MILLIGRAM(S): at 00:29

## 2022-06-09 RX ADMIN — FENTANYL CITRATE 5.02 MICROGRAM(S)/KG/HR: 50 INJECTION INTRAVENOUS at 08:17

## 2022-06-09 RX ADMIN — Medication 100 MILLIEQUIVALENT(S): at 00:41

## 2022-06-09 RX ADMIN — Medication 9.41 MICROGRAM(S)/KG/MIN: at 19:13

## 2022-06-09 RX ADMIN — SODIUM CHLORIDE 125 MILLILITER(S): 9 INJECTION, SOLUTION INTRAVENOUS at 08:18

## 2022-06-09 RX ADMIN — Medication 400 MILLIGRAM(S): at 05:42

## 2022-06-09 RX ADMIN — PIPERACILLIN AND TAZOBACTAM 25 GRAM(S): 4; .5 INJECTION, POWDER, LYOPHILIZED, FOR SOLUTION INTRAVENOUS at 08:23

## 2022-06-09 RX ADMIN — FENTANYL CITRATE 5.02 MICROGRAM(S)/KG/HR: 50 INJECTION INTRAVENOUS at 19:12

## 2022-06-09 RX ADMIN — Medication 1000 MILLIGRAM(S): at 06:00

## 2022-06-09 RX ADMIN — FENTANYL CITRATE 25 MICROGRAM(S): 50 INJECTION INTRAVENOUS at 11:33

## 2022-06-09 RX ADMIN — PIPERACILLIN AND TAZOBACTAM 25 GRAM(S): 4; .5 INJECTION, POWDER, LYOPHILIZED, FOR SOLUTION INTRAVENOUS at 16:17

## 2022-06-09 RX ADMIN — Medication 1000 MILLIGRAM(S): at 00:45

## 2022-06-09 RX ADMIN — PANTOPRAZOLE SODIUM 40 MILLIGRAM(S): 20 TABLET, DELAYED RELEASE ORAL at 05:41

## 2022-06-09 RX ADMIN — CHLORHEXIDINE GLUCONATE 15 MILLILITER(S): 213 SOLUTION TOPICAL at 05:41

## 2022-06-09 RX ADMIN — PIPERACILLIN AND TAZOBACTAM 25 GRAM(S): 4; .5 INJECTION, POWDER, LYOPHILIZED, FOR SOLUTION INTRAVENOUS at 00:30

## 2022-06-09 RX ADMIN — PROPOFOL 6.02 MICROGRAM(S)/KG/MIN: 10 INJECTION, EMULSION INTRAVENOUS at 08:17

## 2022-06-09 RX ADMIN — Medication 9.41 MICROGRAM(S)/KG/MIN: at 08:18

## 2022-06-09 RX ADMIN — Medication 100 MILLIEQUIVALENT(S): at 01:40

## 2022-06-09 RX ADMIN — SODIUM CHLORIDE 1000 MILLILITER(S): 9 INJECTION, SOLUTION INTRAVENOUS at 05:05

## 2022-06-09 RX ADMIN — DEXMEDETOMIDINE HYDROCHLORIDE IN 0.9% SODIUM CHLORIDE 1.26 MICROGRAM(S)/KG/HR: 4 INJECTION INTRAVENOUS at 19:12

## 2022-06-09 RX ADMIN — DEXMEDETOMIDINE HYDROCHLORIDE IN 0.9% SODIUM CHLORIDE 1.26 MICROGRAM(S)/KG/HR: 4 INJECTION INTRAVENOUS at 14:18

## 2022-06-09 RX ADMIN — CHLORHEXIDINE GLUCONATE 15 MILLILITER(S): 213 SOLUTION TOPICAL at 17:56

## 2022-06-09 RX ADMIN — Medication 480 MICROGRAM(S): at 14:06

## 2022-06-09 RX ADMIN — Medication 100 MILLIEQUIVALENT(S): at 03:20

## 2022-06-09 RX ADMIN — Medication 9.41 MICROGRAM(S)/KG/MIN: at 05:35

## 2022-06-09 RX ADMIN — FENTANYL CITRATE 25 MICROGRAM(S): 50 INJECTION INTRAVENOUS at 08:29

## 2022-06-09 RX ADMIN — SODIUM CHLORIDE 100 MILLILITER(S): 9 INJECTION, SOLUTION INTRAVENOUS at 19:13

## 2022-06-09 RX ADMIN — PIPERACILLIN AND TAZOBACTAM 25 GRAM(S): 4; .5 INJECTION, POWDER, LYOPHILIZED, FOR SOLUTION INTRAVENOUS at 23:33

## 2022-06-09 RX ADMIN — Medication 25 GRAM(S): at 00:41

## 2022-06-09 RX ADMIN — CHLORHEXIDINE GLUCONATE 1 APPLICATION(S): 213 SOLUTION TOPICAL at 12:00

## 2022-06-09 RX ADMIN — PANTOPRAZOLE SODIUM 40 MILLIGRAM(S): 20 TABLET, DELAYED RELEASE ORAL at 17:56

## 2022-06-09 NOTE — DISCHARGE NOTE PROVIDER - NSDCCPCAREPLAN_GEN_ALL_CORE_FT
PRINCIPAL DISCHARGE DIAGNOSIS  Diagnosis: Recurrent cervical cancer  Assessment and Plan of Treatment:        PRINCIPAL DISCHARGE DIAGNOSIS  Diagnosis: Recurrent cervical cancer  Assessment and Plan of Treatment:       SECONDARY DISCHARGE DIAGNOSES  Diagnosis: Neutropenic fever  Assessment and Plan of Treatment:     Diagnosis: Obstructive uropathy  Assessment and Plan of Treatment:     Diagnosis: GI bleed  Assessment and Plan of Treatment:     Diagnosis: Anemia due to acute blood loss  Assessment and Plan of Treatment:     Diagnosis: Severe sepsis  Assessment and Plan of Treatment:

## 2022-06-09 NOTE — PROGRESS NOTE ADULT - ASSESSMENT
52F with recurrent cervical CA (stage III, dx 2014 s/p VBRT + chemo, never had surgical intervention) c/b b/l hydronephrosis s/p B/L nephrostomy tube placement, p/w severe sepsis from neutropenic fever vs infected necrotic tumor; transferred from [facility] 6/5 for management of sepsis.  Patient became acutely altered on 6/8/22 with increased free air noted on AXR with concern for perforated viscous.  Patient is now S/P ex-lap, washout, open resection of left colon and creation of colostomy on 6/9/22. GI consulted for evaluation of N/V and coffee ground emesis.    Impression:  #abd pain with N/V c/b 1x CGE- d/dx of nausea/vomiting includes infectious etiology given neutropenic fever (ie gastroenteritis, colitis) vs ascites (with associated compression) vs underlying malignancy vs CNS abnormality given AMS; may have had CGE in setting of esophagitis vs MWT vs malignancy  #pneumoperitoenum- possibly from necrotic tumor; S/P ex-lap, washout, open resection of left colon and creation of colostomy on 6/9/22  #gas in uterine cavity- c/f infected tumor vs rectosigmoid-vaginal fistula  #descending and rectosigmoid colitis with the sigmoid colon abutting the right uterine wall- likely 2/2 underlying malignancy vs possible infectious etiology  #sepsis 2/2 neutropenic fever vs infected necrotic tumor- s/p cefepime (stopped due to acute encephalopathy); now on Zoysn  #acute encephalopathy- could be 2/2 infectious vs medication induced (thought possibly 2/2 cefepime vs opiates), pending CTH to r/o mass effect/brain mets and further w/u by neuro  #recurrent cervical CA (stage III, dx 2014 s/p VBRT + chemo, never had surgical intervention) - s/p (Carbo AUC 5, Paxol 135 mg/m2 and Avastin 15 mg/m2 on q3 week schedule), last cycle on 5/31. Patient receives Gyn Oncology care at [facility] Jose Pizarro).   #pancytopenia likely 2/2 underlying malignancy/chemo    Recs:  -keep NGT to low intermittent suction as continuous suction will cause mucosal trauma/injury/ulceration/bleeding  -given neutropenic fever, acute encephalopathy, and absence of ongoing bleeding, no plan for EGD at this time  -attempted to contact NOK pt's daughter Lydia to discuss findings/plan/GOC multiple times but no response  -trend vitals, CBC, and monitor for clinical signs of bleeding  -if diarrhea --> send C diff and GI PCR  -maintain active type and screen  -transfusion goal to maintain hemoglobin >/= 7.0, plt >50 K  -avoid NSAIDs  -PPI IV BID acceptable for now  -encephalopathy workup per neuro  -c/w abx for neutropenic fever    We will sign off at this time. Please reconsult/page if questions.      **THIS NOTE IS NOT FINALIZED UNTIL SIGNED BY THE ATTENDING**    Avelina Escobar MD  GI Fellow, PGY-4  Available via [employer] Teams    NON-URGENT CONSULTS:  Please email [email or web address]  [email or web address]  AT NIGHT AND ON WEEKENDS:  Contact on-call GI fellow via answering service ([phone]) from 5pm-8am and on weekends/holidays  MONDAY-FRIDAY 8AM-5PM:  Pager# [phone] ([facility]) or [phone] ([facility])  GI Phone# [phone] ([facility])   52F with recurrent cervical CA (stage III, dx 2014 s/p VBRT + chemo, never had surgical intervention) c/b b/l hydronephrosis s/p B/L nephrostomy tube placement, p/w severe sepsis from neutropenic fever vs infected necrotic tumor; transferred from [facility] 6/5 for management of sepsis.  Patient became acutely altered on 6/8/22 with increased free air noted on AXR with concern for perforated viscous.  Patient is now S/P ex-lap, washout, open resection of left colon and creation of colostomy on 6/9/22. GI consulted for evaluation of N/V and coffee ground emesis.    Impression:  #abd pain with N/V c/b 1x CGE- d/dx of nausea/vomiting includes infectious etiology given neutropenic fever (ie gastroenteritis, colitis) vs ascites (with associated compression) vs underlying malignancy vs CNS abnormality given AMS; may have had CGE in setting of bowel perf vs esophagitis vs MWT vs malignancy  #pneumoperitoenum- possibly from necrotic tumor; S/P ex-lap, washout, open resection of left colon and creation of colostomy on 6/9/22  #gas in uterine cavity- c/f infected tumor vs rectosigmoid-vaginal fistula  #descending and rectosigmoid colitis with the sigmoid colon abutting the right uterine wall- likely 2/2 underlying malignancy vs possible infectious etiology  #sepsis 2/2 neutropenic fever vs infected necrotic tumor- s/p cefepime (stopped due to acute encephalopathy); now on Zoysn  #acute encephalopathy- could be 2/2 infectious vs medication induced (thought possibly 2/2 cefepime vs opiates), pending CTH to r/o mass effect/brain mets and further w/u by neuro  #recurrent cervical CA (stage III, dx 2014 s/p VBRT + chemo, never had surgical intervention) - s/p (Carbo AUC 5, Paxol 135 mg/m2 and Avastin 15 mg/m2 on q3 week schedule), last cycle on 5/31. Patient receives Gyn Oncology care at [facility] Jose Pizarro).   #pancytopenia likely 2/2 underlying malignancy/chemo    Recs:  -keep NGT to low intermittent suction as continuous suction may cause mucosal trauma/injury/ulceration/bleeding  -attempted to contact NO pt's daughter Lydia to discuss findings/plan/GOC multiple times but no response; however, given current clinical status and absence of recurrent overt bleeding, would defer EGD  -trend vitals, CBC, and monitor for clinical signs of bleeding  -maintain active type and screen  -transfusion goal to maintain hemoglobin >/= 7.0, plt >50 K  -avoid NSAIDs  -PPI IV BID acceptable for now  -encephalopathy workup per neuro  -c/w abx for neutropenic fever    We will sign off at this time. Please reconsult/page if questions.      **THIS NOTE IS NOT FINALIZED UNTIL SIGNED BY THE ATTENDING**    Avelina Escobar MD  GI Fellow, PGY-4  Available via [employer] Teams    NON-URGENT CONSULTS:  Please email [email or web address]  AT NIGHT AND ON WEEKENDS:  Contact on-call GI fellow via answering service ([phone]) from 5pm-8am and on weekends/holidays  MONDAY-FRIDAY 8AM-5PM:  Pager# [phone] ([facility]) or [phone] ([facility])  GI Phone# [phone] ([facility])   07-Sep-2018

## 2022-06-09 NOTE — PROGRESS NOTE ADULT - PROBLEM SELECTOR PLAN 1
GYN ONC Fellow Addendum:    Pt seen and examined at bedside. Intubated and sedated.     VS reviewed  Labs reviewed    - Sepsis due to sigmoid perforation: continue zosyn, on levophed 0.03 wean as tolerated, continue IVF, lactate improved  - Neutropenia: continue neupogen until WBC 10  - Thrombocytopenia  - NPO, NGT  - GI ppx  - Replete lytes prn  - DVT ppx: SCDs, hold chemical in setting of low plts  - Appreciate SICU care    GRIS Mckeon, PGY6

## 2022-06-09 NOTE — CONSULT NOTE ADULT - SUBJECTIVE AND OBJECTIVE BOX
SICU Consultation Note  =====================================================  HPI: 52y female with recurrent cervical CA (per patient stage III) presentingas transfer from [facility] neutropenic fever. Patient reports she had last cycle of chemotherapy 5 days prior.  Patient had VNS care stop by yesterday and was noted to be tachycardic on vital signs and was sent to the ED.  She reports feeling weak + fatigued. + upper abdominal pain that worsens while having chills. She denies any vomiting, chest pain, SOB.    At [facility] she was given Meropenem and Zosyn and was noted to be tachycardic to 140s and febrile to 39.5 and was thus transferred for further management. She became acutely altered yesterday afternoon without improvement and AXR with increased free air concern for perforated viscous. She is now s/p washout, open resection of left colon, colostomy. SICU consulted for close hemodynamic monitoring.       Surgery Information Washout, open resection of left colon, colostomy   Case Duration: 4hr   EBL: 2L 	IV Fluids: 4.5 L crystalloid, 3L albumin	Blood Products:  5u PRBC	Urine Output: 300mL           Gyn Oncology Admission Note    52y  with recurrent cervical CA (per patient stage III) presenting to the ED as transfer from [facility] neutropenic fever. Patient reports she had last cycle of chemotherapy 5 days prior.  Patient had VNS care stop by yesterday and was noted to be tachycardic on vital signs and was sent to the ED.  She reports feeling weak + fatigued. + upper abdominal pain that worsens while having chills. She denies any vomiting, chest pain, SOB. Last BM yesterday morning which was normal.     At [facility] she was given Meropenem and Zosyn and was noted to be tachycardic to 140s and febrile to 39.5 and was thus transferred for further management.     MICU was consulted on patient with recommendation to continue with broad spectrum antibiotics. Patient receives Gyn Oncology care at [facility]). She reports that she initially was diagnosed with stage III cervical CA in  s/p VBRT + chemo but never had surgical intervention. She reports now being s/p 5 cycles of chemo, unsure of regimen but next due .    OB/GYN HISTORY:  x 1, sAB x 2. Denies any ovarian cysts, fibroids, STIs   PSHx: b/l PCN  and exchange  , hernia repair  PMHx:  HTN   All: No Known Allergies  Meds: Metoprolol 100mg qd, unsure of other medications  Psych: denies any anxiety or depression   Social: Denies any tobacco, alcohol, or illicit substance use              (2022 03:21)    Allergies:   PAST MEDICAL & SURGICAL HISTORY:  HTN (hypertension)      Cervical cancer      Nephrostomy status        FAMILY HISTORY:  FH: liver cancer (Mother)        ADVANCE DIRECTIVES: Full Code      REVIEW OF SYSTEMS:    General: Non-Contributory  Skin/Breast: Non-Contributory  Ophthalmologic: Non-Contributory  ENMT: Non-Contributory  Respiratory and Thorax: Non-Contributory  Cardiovascular: Non-Contributory  Gastrointestinal: Non-Contributory  Genitourinary: Non-Contributory  Musculoskeletal: Non-Contributory  Neurological: Non-Contributory  Psychiatric: Non-Contributory  Hematology/Lymphatics: Non-Contributory  Endocrine: Non-Contributory  Allergic/Immunologic: Non-Contributory    HOME MEDICATIONS:    · 	traMADol 50 mg oral tablet: 1 tab(s) orally every 6 hours MDD:4  · 	cefdinir 300 mg oral capsule: 1 cap(s) orally every 12 hours   · 	cyanocobalamin 1000 mcg oral tablet: 1 tab(s) orally once a day  · 	hydrALAZINE 25 mg oral tablet: 1 tab(s) orally 3 times a day   · 	ferrous sulfate 325 mg (65 mg elemental iron) oral tablet: 1 tab(s) orally once a day  · 	hydrocortisone 25 mg rectal suppository: 1 suppository(ies) rectal once a day  · 	metoprolol succinate 100 mg oral tablet, extended release: 1 tab(s) orally once a day  · 	acetaminophen 325 mg oral tablet: 2 tab(s) orally every 6 hours, As needed,     CURRENT MEDICATIONS:   --------------------------------------------------------------------------------------  Neurologic Medications  acetaminophen   IVPB .. 1000 milliGRAM(s) IV Intermittent every 6 hours  haloperidol    Injectable 0.5 milliGRAM(s) IntraMuscular every 6 hours PRN Agitation  haloperidol    Injectable 0.5 milliGRAM(s) IntraMuscular every 6 hours  metoclopramide Injectable 10 milliGRAM(s) IV Push every 8 hours PRN nausea/vomiting  ondansetron Injectable 4 milliGRAM(s) IV Push every 6 hours PRN Nausea and/or Vomiting  propofol Infusion 10 MICROgram(s)/kG/Min IV Continuous <Continuous>    Respiratory Medications    Cardiovascular Medications  amLODIPine   Tablet 10 milliGRAM(s) Oral daily  metoprolol succinate  milliGRAM(s) Oral daily    Gastrointestinal Medications  lactated ringers. 1000 milliLiter(s) IV Continuous <Continuous>  magnesium sulfate  IVPB 2 Gram(s) IV Intermittent once  pantoprazole  Injectable 40 milliGRAM(s) IV Push two times a day  potassium chloride  10 mEq/100 mL IVPB 10 milliEquivalent(s) IV Intermittent every 1 hour    Genitourinary Medications    Hematologic/Oncologic Medications  filgrastim-sndz (ZARXIO) Injectable 480 MICROGram(s) SubCutaneous daily    Antimicrobial/Immunologic Medications  piperacillin/tazobactam IVPB.. 3.375 Gram(s) IV Intermittent every 8 hours    Endocrine/Metabolic Medications    Topical/Other Medications    --------------------------------------------------------------------------------------    VITAL SIGNS, INS/OUTS (last 24 hours):  --------------------------------------------------------------------------------------  Vital Signs Last 24 Hrs  T(C): 36.3 (2022 00:00), Max: 36.7 (2022 05:00)  T(F): 97.4 (2022 00:00), Max: 98 (2022 05:00)  HR: 124 (2022 00:17) (110 - 124)  BP: 129/71 (2022 00:00) (106/66 - 186/110)  BP(mean): 87 (2022 00:00) (87 - 130)  RR: 18 (2022 00:00) (16 - 20)  SpO2: 94% (2022 00:17) (91% - 99%)    I&O's Detail    2022 07:01  -  2022 07:00  --------------------------------------------------------  IN:    IV PiggyBack: 50 mL    Lactated Ringers: 3450 mL    Oral Fluid: 960 mL  Total IN: 4460 mL    OUT:    Nephrostomy Tube (mL): 410 mL    Nephrostomy Tube (mL): 410 mL    Voided (mL): 50 mL  Total OUT: 870 mL    Total NET: 3590 mL      2022 07:01  -  2022 00:54  --------------------------------------------------------  IN:    IV PiggyBack: 550 mL    Lactated Ringers: 600 mL    Lactated Ringers: 250 mL    Propofol: 63.3 mL    sodium chloride 0.9%: 600 mL  Total IN: 2063.3 mL    OUT:    Nephrostomy Tube (mL): 1000 mL    Nephrostomy Tube (mL): 1150 mL    Oral Fluid: 0 mL  Total OUT: 2150 mL    Total NET: -86.7 mL        --------------------------------------------------------------------------------------    EXAM  NEUROLOGY  Exam:     HEENT  Exam: Normocephalic, atraumatic.     RESPIRATORY  Exam: Normal expansion/effort.    Mechanical Ventilation: Mode: AC/ CMV (Assist Control/ Continuous Mandatory Ventilation), RR (machine): 18, TV (machine): 500, FiO2: 60, PEEP: 5, MAP: 10, PIP: 26    CARDIOVASCULAR  Exam: S1, S2.  Regular rate and rhythm.      GI/NUTRITION  Exam: Abdomen soft, Non-tender, Non-distended. Midline wound c/d/i with abthera vac in place  Current Diet:  NPO     VASCULAR  Exam: Extremities warm, pink, well-perfused.        METABOLIC/FLUIDS/ELECTROLYTES  lactated ringers. 1000 milliLiter(s) IV Continuous <Continuous>  magnesium sulfate  IVPB 2 Gram(s) IV Intermittent once  potassium chloride  10 mEq/100 mL IVPB 10 milliEquivalent(s) IV Intermittent every 1 hour      HEMATOLOGIC  [x] DVT Prophylaxis:   Transfusions:	[x] PRBC	[x] Platelets		[x] FFP	[x] Cryoprecipitate    INFECTIOUS DISEASE  Antimicrobials/Immunologic Medications:  filgrastim-sndz (ZARXIO) Injectable 480 MICROGram(s) SubCutaneous daily  piperacillin/tazobactam IVPB.. 3.375 Gram(s) IV Intermittent every 8 hours      Tubes/Lines/Drains   [x] Peripheral IV  [] Central Venous Line     	[] R	[] L	[] IJ	[] Fem	[] SC	Date Placed:   [x] Arterial Line		[] R	[x] L	[] Fem	[x] Rad	[] Ax	Date Placed:   [] PICC:         	[] Midline		[] Mediport  [x] Urinary Catheter		Date Placed:     LABS  --------------------------------------------------------------------------------------                        8.9    1.74  )-----------( 66       ( 2022 22:35 )             26.4     06-08    141  |  101  |  33<H>  ----------------------------<  93  3.1<L>   |  21<L>  |  0.90    Ca    8.4      2022 22:35  Phos  3.0       Mg     1.70         TPro  6.3  /  Alb  2.3<L>  /  TBili  1.2  /  DBili  x   /  AST  23  /  ALT  8   /  AlkPhos  58  0608    PT/INR - ( 2022 22:35 )   PT: 16.3 sec;   INR: 1.40 ratio         PTT - ( 2022 22:35 )  PTT:31.8 sec  --------------------------------------------------------------------------------------        ASSESSMENT:  52y Female ***    PLAN:  ***  Neurologic:   Respiratory:   Cardiovascular:   Gastrointestinal/Nutrition:   Renal/Genitourinary:   Hematologic:   Infectious Disease:   Tubes/Lines/Drains:   Endocrine:   Disposition:     --------------------------------------------------------------------------------------    Critical Care Diagnoses:   SICU Consultation Note  =====================================================  HPI: 52y female with recurrent cervical CA (per patient stage III) presenting as transfer from [facility] neutropenic fever. Patient reports she had last cycle of chemotherapy 5 days prior.  Patient had VNS care stop by yesterday and was noted to be tachycardic on vital signs and was sent to the ED.  She reports feeling weak + fatigued. + upper abdominal pain that worsens while having chills. She denies any vomiting, chest pain, SOB.    At [facility] she was given Meropenem and Zosyn and was noted to be tachycardic to 140s and febrile to 39.5 and was thus transferred for further management. She became acutely altered yesterday afternoon without improvement and AXR with increased free air concern for perforated viscous. She is now s/p washout, open resection of left colon, colostomy. SICU consulted for close hemodynamic monitoring.       Surgery Information Washout, open resection of left colon, colostomy   Case Duration: 4hr   EBL: 2L 	IV Fluids: 4.5 L crystalloid, 3L albumin	Blood Products:  5u PRBC	Urine Output: 300mL           Gyn Oncology Admission Note    52y  with recurrent cervical CA (per patient stage III) presenting to the ED as transfer from [facility] neutropenic fever. Patient reports she had last cycle of chemotherapy 5 days prior.  Patient had VNS care stop by yesterday and was noted to be tachycardic on vital signs and was sent to the ED.  She reports feeling weak + fatigued. + upper abdominal pain that worsens while having chills. She denies any vomiting, chest pain, SOB. Last BM yesterday morning which was normal.     At [facility] she was given Meropenem and Zosyn and was noted to be tachycardic to 140s and febrile to 39.5 and was thus transferred for further management.     MICU was consulted on patient with recommendation to continue with broad spectrum antibiotics. Patient receives Gyn Oncology care at [facility]). She reports that she initially was diagnosed with stage III cervical CA in  s/p VBRT + chemo but never had surgical intervention. She reports now being s/p 5 cycles of chemo, unsure of regimen but next due .    OB/GYN HISTORY:  x 1, sAB x 2. Denies any ovarian cysts, fibroids, STIs   PSHx: b/l PCN  and exchange  , hernia repair  PMHx:  HTN   All: No Known Allergies  Meds: Metoprolol 100mg qd, unsure of other medications  Psych: denies any anxiety or depression   Social: Denies any tobacco, alcohol, or illicit substance use              (2022 03:21)    Allergies:   PAST MEDICAL & SURGICAL HISTORY:  HTN (hypertension)      Cervical cancer      Nephrostomy status        FAMILY HISTORY:  FH: liver cancer (Mother)        ADVANCE DIRECTIVES: Full Code      REVIEW OF SYSTEMS:    General: Non-Contributory  Skin/Breast: Non-Contributory  Ophthalmologic: Non-Contributory  ENMT: Non-Contributory  Respiratory and Thorax: Non-Contributory  Cardiovascular: Non-Contributory  Gastrointestinal: Non-Contributory  Genitourinary: Non-Contributory  Musculoskeletal: Non-Contributory  Neurological: Non-Contributory  Psychiatric: Non-Contributory  Hematology/Lymphatics: Non-Contributory  Endocrine: Non-Contributory  Allergic/Immunologic: Non-Contributory    HOME MEDICATIONS:    · 	traMADol 50 mg oral tablet: 1 tab(s) orally every 6 hours MDD:4  · 	cefdinir 300 mg oral capsule: 1 cap(s) orally every 12 hours   · 	cyanocobalamin 1000 mcg oral tablet: 1 tab(s) orally once a day  · 	hydrALAZINE 25 mg oral tablet: 1 tab(s) orally 3 times a day   · 	ferrous sulfate 325 mg (65 mg elemental iron) oral tablet: 1 tab(s) orally once a day  · 	hydrocortisone 25 mg rectal suppository: 1 suppository(ies) rectal once a day  · 	metoprolol succinate 100 mg oral tablet, extended release: 1 tab(s) orally once a day  · 	acetaminophen 325 mg oral tablet: 2 tab(s) orally every 6 hours, As needed,     CURRENT MEDICATIONS:   --------------------------------------------------------------------------------------  Neurologic Medications  acetaminophen   IVPB .. 1000 milliGRAM(s) IV Intermittent every 6 hours  haloperidol    Injectable 0.5 milliGRAM(s) IntraMuscular every 6 hours PRN Agitation  haloperidol    Injectable 0.5 milliGRAM(s) IntraMuscular every 6 hours  metoclopramide Injectable 10 milliGRAM(s) IV Push every 8 hours PRN nausea/vomiting  ondansetron Injectable 4 milliGRAM(s) IV Push every 6 hours PRN Nausea and/or Vomiting  propofol Infusion 10 MICROgram(s)/kG/Min IV Continuous <Continuous>    Respiratory Medications    Cardiovascular Medications  amLODIPine   Tablet 10 milliGRAM(s) Oral daily  metoprolol succinate  milliGRAM(s) Oral daily    Gastrointestinal Medications  lactated ringers. 1000 milliLiter(s) IV Continuous <Continuous>  magnesium sulfate  IVPB 2 Gram(s) IV Intermittent once  pantoprazole  Injectable 40 milliGRAM(s) IV Push two times a day  potassium chloride  10 mEq/100 mL IVPB 10 milliEquivalent(s) IV Intermittent every 1 hour    Genitourinary Medications    Hematologic/Oncologic Medications  filgrastim-sndz (ZARXIO) Injectable 480 MICROGram(s) SubCutaneous daily    Antimicrobial/Immunologic Medications  piperacillin/tazobactam IVPB.. 3.375 Gram(s) IV Intermittent every 8 hours    Endocrine/Metabolic Medications    Topical/Other Medications    --------------------------------------------------------------------------------------    VITAL SIGNS, INS/OUTS (last 24 hours):  --------------------------------------------------------------------------------------  Vital Signs Last 24 Hrs  T(C): 36.3 (2022 00:00), Max: 36.7 (2022 05:00)  T(F): 97.4 (2022 00:00), Max: 98 (2022 05:00)  HR: 124 (2022 00:17) (110 - 124)  BP: 129/71 (2022 00:00) (106/66 - 186/110)  BP(mean): 87 (2022 00:00) (87 - 130)  RR: 18 (2022 00:00) (16 - 20)  SpO2: 94% (2022 00:17) (91% - 99%)    I&O's Detail    2022 07:01  -  2022 07:00  --------------------------------------------------------  IN:    IV PiggyBack: 50 mL    Lactated Ringers: 3450 mL    Oral Fluid: 960 mL  Total IN: 4460 mL    OUT:    Nephrostomy Tube (mL): 410 mL    Nephrostomy Tube (mL): 410 mL    Voided (mL): 50 mL  Total OUT: 870 mL    Total NET: 3590 mL      2022 07:01  -  2022 00:54  --------------------------------------------------------  IN:    IV PiggyBack: 550 mL    Lactated Ringers: 600 mL    Lactated Ringers: 250 mL    Propofol: 63.3 mL    sodium chloride 0.9%: 600 mL  Total IN: 2063.3 mL    OUT:    Nephrostomy Tube (mL): 1000 mL    Nephrostomy Tube (mL): 1150 mL    Oral Fluid: 0 mL  Total OUT: 2150 mL    Total NET: -86.7 mL        --------------------------------------------------------------------------------------    EXAM  NEUROLOGY  Exam: Intubated and Sedated    HEENT  Exam: Normocephalic, atraumatic.     RESPIRATORY  Exam: Normal expansion/effort.    Mechanical Ventilation: Mode: AC/ CMV (Assist Control/ Continuous Mandatory Ventilation), RR (machine): 18, TV (machine): 500, FiO2: 60, PEEP: 5, MAP: 10, PIP: 26    CARDIOVASCULAR  Exam: S1, S2.  Regular rate and rhythm.      GI/NUTRITION  Exam: Abdomen soft, Non-tender, Non-distended. Midline wound c/d/i with abthera vac in place  Current Diet:  NPO     VASCULAR  Exam: Extremities warm, pink, well-perfused.        METABOLIC/FLUIDS/ELECTROLYTES  lactated ringers. 1000 milliLiter(s) IV Continuous <Continuous>  magnesium sulfate  IVPB 2 Gram(s) IV Intermittent once  potassium chloride  10 mEq/100 mL IVPB 10 milliEquivalent(s) IV Intermittent every 1 hour      HEMATOLOGIC  [x] DVT Prophylaxis:   Transfusions:	[x] PRBC	[x] Platelets		[x] FFP	[x] Cryoprecipitate    INFECTIOUS DISEASE  Antimicrobials/Immunologic Medications:  filgrastim-sndz (ZARXIO) Injectable 480 MICROGram(s) SubCutaneous daily  piperacillin/tazobactam IVPB.. 3.375 Gram(s) IV Intermittent every 8 hours      Tubes/Lines/Drains   [x] Peripheral IV  [] Central Venous Line     	[] R	[] L	[] IJ	[] Fem	[] SC	Date Placed:   [x] Arterial Line		[] R	[x] L	[] Fem	[x] Rad	[] Ax	Date Placed:   [] PICC:         	[] Midline		[] Mediport  [x] Urinary Catheter		Date Placed:     LABS  --------------------------------------------------------------------------------------                        8.9    1.74  )-----------( 66       ( 2022 22:35 )             26.4     06-08    141  |  101  |  33<H>  ----------------------------<  93  3.1<L>   |  21<L>  |  0.90    Ca    8.4      2022 22:35  Phos  3.0     06-08  Mg     1.70     06-08    TPro  6.3  /  Alb  2.3<L>  /  TBili  1.2  /  DBili  x   /  AST  23  /  ALT  8   /  AlkPhos  58  06-08    PT/INR - ( 2022 22:35 )   PT: 16.3 sec;   INR: 1.40 ratio         PTT - ( 2022 22:35 )  PTT:31.8 sec  --------------------------------------------------------------------------------------        ASSESSMENT:  52y with a PMHx of HTN, cervical CA (stage III)  on chemo - last session last week - c/b b/l hydronephrosis s/p B/L nephrostomy tube placement, p/w severe sepsis from neutropenic fever. Pt was acutely altered yesterday afternoon without improvement and AXR showed increased free air with concern for perforated viscous. She is now s/p washout, open resection of left colon, with colostomy creation. Recovering in SICU  for close hemodynamic monitoring.       PLAN:    Neurologic:  - sedated with Propofol   - IV Tylenol PRN     Respiratory:   - intubated  - monitor daily CXR and ABG  - continuous pulse ox    Cardiovascular:   - hold home meds  - maintain MAP > 65    Gastrointestinal/Nutrition:   - NPO  - NGT- monitor output  - Ostomy- monitor output  - GI ppx with Protonix daily     Renal/Genitourinary:   - hilliard   - strict I's and O's  - LR at 125/hr  - monitor electrolytes and replete PRN    Hematologic:   - Received 5u PRBC intra op  - monitor H/H  - transfuse PRN    Infectious Disease:   - neutropenic fever   - monitor CBC  - Cont. Zosyn    Endocrine:   - monitor glucose q6 hours    Tubes/Lines/Drains:   PIVs  LUE arrow  L. Radial A-line  NGT  Nephrostomy tube x2  Hilliard    Disposition: SICU    --------------------------------------------------------------------------------------    Critical Care Diagnoses:

## 2022-06-09 NOTE — PROGRESS NOTE ADULT - SUBJECTIVE AND OBJECTIVE BOX
Neurology Progress Note    Interval History - patient underwent emergent repair of perforated viscus    Subjective:  Pt seen and examined during bedside rounds - she is unable to provide history due to intubation/sedation.    Objective:   Vital Signs Last 24 Hrs  T(C): 36.4 (09 Jun 2022 08:00), Max: 36.7 (08 Jun 2022 17:29)  T(F): 97.5 (09 Jun 2022 08:00), Max: 98 (08 Jun 2022 17:29)  HR: 114 (09 Jun 2022 10:32) (110 - 124)  BP: 131/77 (09 Jun 2022 08:00) (100/62 - 186/110)  BP(mean): 93 (09 Jun 2022 08:00) (74 - 130)  RR: 16 (09 Jun 2022 10:00) (15 - 26)  SpO2: 100% (09 Jun 2022 10:32) (91% - 100%)    General Exam:   PHYSICAL EXAM:  GENERAL: NAD, intubated/sedated on Propofol and Fentanyl for comfort  HEENT: Normocephalic;  conjunctivae and sclerae clear; dry mucous membranes  NECK: Supple  EXTREMITIES: No cyanosis; lower extremity non-pitting edema b/l; no calf tenderness  SKIN: Warm and dry; no rash    NEURO EXAM:  - Mental Status: Eyes closed, do not open spontaneously, does not respond to external stimuli, does not follow commands  Language: intubated  - Cranial Nerves II-XII:  PERRL 2mm b/l (right more sluggish); slight esotropia of left eye but no overt deviation, does not move extraocular muscles on command, blink to threat intact bilaterally, corneals intact bilaterally, OCR deferred, unable to assess facial symmetry reliably due to ETT, cough/gag deferred  - Motor: withdraws to noxious stimuli in all extremities, right more than left, UE>LE; normal muscle bulk and tone throughout; RLE muscle stiffness  - Reflexes: 2+ and symmetric at the biceps, triceps, brachioradialis, could not elicit patellars or ankles;  plantar reflexes mute  - Sensory: grimaces in response to noxious stimuli applied to UEs  - Coordination/Gait: unable to assess due to mental status/fall risk    Other:    06-09    139  |  101  |  32<H>  ----------------------------<  104<H>  3.2<L>   |  21<L>  |  0.92    Ca    8.0<L>      09 Jun 2022 05:19  Phos  2.8     06-09  Mg     1.90     06-09    TPro  6.3  /  Alb  2.3<L>  /  TBili  1.2  /  DBili  x   /  AST  23  /  ALT  8   /  AlkPhos  58  06-08    LIVER FUNCTIONS - ( 08 Jun 2022 06:00 )  Alb: 2.3 g/dL / Pro: 6.3 g/dL / ALK PHOS: 58 U/L / ALT: 8 U/L / AST: 23 U/L / GGT: x                                 8.8    3.33  )-----------( 38       ( 09 Jun 2022 05:19 )             25.2     Radiology  CT Head No Cont (06.08.22 @ 14:35) >  Impression:  Unremarkable noncontrast head CT.    MEDICATIONS  (STANDING):  chlorhexidine 0.12% Liquid 15 milliLiter(s) Oral Mucosa every 12 hours  chlorhexidine 4% Liquid 1 Application(s) Topical daily  fentaNYL   Infusion. 0.5 MICROgram(s)/kG/Hr (5.02 mL/Hr) IV Continuous <Continuous>  filgrastim-sndz (ZARXIO) Injectable 480 MICROGram(s) SubCutaneous daily  lactated ringers. 1000 milliLiter(s) (125 mL/Hr) IV Continuous <Continuous>  norepinephrine Infusion 0.05 MICROgram(s)/kG/Min (9.41 mL/Hr) IV Continuous <Continuous>  pantoprazole  Injectable 40 milliGRAM(s) IV Push two times a day  piperacillin/tazobactam IVPB.. 3.375 Gram(s) IV Intermittent every 8 hours  propofol Infusion 10 MICROgram(s)/kG/Min (6.02 mL/Hr) IV Continuous <Continuous>    MEDICATIONS  (PRN):  metoclopramide Injectable 10 milliGRAM(s) IV Push every 8 hours PRN nausea/vomiting  ondansetron Injectable 4 milliGRAM(s) IV Push every 6 hours PRN Nausea and/or Vomiting            Neurology Progress Note    Interval History - patient underwent emergent repair of perforated viscus    Subjective:  Pt seen and examined during bedside rounds - she is unable to provide history due to intubation/sedation.    Objective:   Vital Signs Last 24 Hrs  T(C): 36.4 (09 Jun 2022 08:00), Max: 36.7 (08 Jun 2022 17:29)  T(F): 97.5 (09 Jun 2022 08:00), Max: 98 (08 Jun 2022 17:29)  HR: 114 (09 Jun 2022 10:32) (110 - 124)  BP: 131/77 (09 Jun 2022 08:00) (100/62 - 186/110)  BP(mean): 93 (09 Jun 2022 08:00) (74 - 130)  RR: 16 (09 Jun 2022 10:00) (15 - 26)  SpO2: 100% (09 Jun 2022 10:32) (91% - 100%)    General Exam:   PHYSICAL EXAM:  GENERAL: NAD, intubated/sedated on Propofol and Fentanyl for comfort  HEENT: Normocephalic;  conjunctivae and sclerae clear; dry mucous membranes  NECK: Supple  EXTREMITIES: No cyanosis; lower extremity non-pitting edema b/l; no calf tenderness  SKIN: Warm and dry; no rash    NEURO EXAM:  - Mental Status: Eyes closed, do not open spontaneously, does not respond to external stimuli, does not follow commands  Language: intubated  - Cranial Nerves II-XII:  PERRL 2mm b/l (right more sluggish); slight esotropia of left eye but no overt deviation, does not move extraocular muscles on command, blink to threat intact bilaterally, corneals intact bilaterally, OCR deferred with significant voluntary head movement, unable to assess facial symmetry reliably due to ETT, cough/gag deferred  - Motor: withdraws to noxious stimuli in all extremities, right more than left, UE>LE; normal muscle bulk and tone throughout; RLE muscle stiffness  - Reflexes: 2+ and symmetric at the biceps, triceps, brachioradialis, could not elicit patellars or ankles;  plantar reflexes mute  - Sensory: grimaces in response to noxious stimuli applied to UEs  - Coordination/Gait: unable to assess due to mental status/fall risk    Other:    06-09    139  |  101  |  32<H>  ----------------------------<  104<H>  3.2<L>   |  21<L>  |  0.92    Ca    8.0<L>      09 Jun 2022 05:19  Phos  2.8     06-09  Mg     1.90     06-09    TPro  6.3  /  Alb  2.3<L>  /  TBili  1.2  /  DBili  x   /  AST  23  /  ALT  8   /  AlkPhos  58  06-08    LIVER FUNCTIONS - ( 08 Jun 2022 06:00 )  Alb: 2.3 g/dL / Pro: 6.3 g/dL / ALK PHOS: 58 U/L / ALT: 8 U/L / AST: 23 U/L / GGT: x                                 8.8    3.33  )-----------( 38       ( 09 Jun 2022 05:19 )             25.2     Radiology  CT Head No Cont (06.08.22 @ 14:35) >  Impression:  Unremarkable noncontrast head CT.    MEDICATIONS  (STANDING):  chlorhexidine 0.12% Liquid 15 milliLiter(s) Oral Mucosa every 12 hours  chlorhexidine 4% Liquid 1 Application(s) Topical daily  fentaNYL   Infusion. 0.5 MICROgram(s)/kG/Hr (5.02 mL/Hr) IV Continuous <Continuous>  filgrastim-sndz (ZARXIO) Injectable 480 MICROGram(s) SubCutaneous daily  lactated ringers. 1000 milliLiter(s) (125 mL/Hr) IV Continuous <Continuous>  norepinephrine Infusion 0.05 MICROgram(s)/kG/Min (9.41 mL/Hr) IV Continuous <Continuous>  pantoprazole  Injectable 40 milliGRAM(s) IV Push two times a day  piperacillin/tazobactam IVPB.. 3.375 Gram(s) IV Intermittent every 8 hours  propofol Infusion 10 MICROgram(s)/kG/Min (6.02 mL/Hr) IV Continuous <Continuous>    MEDICATIONS  (PRN):  metoclopramide Injectable 10 milliGRAM(s) IV Push every 8 hours PRN nausea/vomiting  ondansetron Injectable 4 milliGRAM(s) IV Push every 6 hours PRN Nausea and/or Vomiting

## 2022-06-09 NOTE — PROGRESS NOTE ADULT - ASSESSMENT
52F with recurrent cervical CA (per patient stage III) presenting to the ED as transfer from [place] a/w neutropenic fever. Neurology consulted for acute mental status change on 6/7. Per gyn onc chart note, pt couldn't tolerate CT A/P with oral contrast with increased vomiting. She became agitated and refused the scan. She was brought back to the floor. On the floor when evaluated, she was oriented to person/place; however, was not oriented to time. She was answering basic questions incorrectly, but was responsive. Per RN at bedside, pt was repeating phrases and words, "not making sense". Course complicated by perforated viscus requiring emergent repair, performed 6/8. Repeat neuro exam today limited due to sedation (Propofol/Fentanyl); does not open eyes or follow commands, no spontaneous movement of extremities, withdrawal of upper extremities to noxious stimuli R>L and minimal response in LEs with nailbed pressure, does not localize to painful stimuli.    Impression: Altered mental status secondary to septic encephalopathy however will rule out nonconvulsive seizures and CNS infectious if no source found    Recommendations:  [] Wean sedation as tolerated  [] MRI brain w/wo irina  [] Continuous EEG  [] Infectious disease consultation    Pt seen and examined w/ neurology attending, Dr. Anu Cage.    Mila Donaldson,   PGY-3  Neurology Resident

## 2022-06-09 NOTE — DISCHARGE NOTE PROVIDER - NSDCCPTREATMENT_GEN_ALL_CORE_FT
PRINCIPAL PROCEDURE  Procedure: Exploratory laparotomy  Findings and Treatment:       SECONDARY PROCEDURE  Procedure: Abdominal washout  Findings and Treatment:

## 2022-06-09 NOTE — DIETITIAN INITIAL EVALUATION ADULT - ORAL INTAKE PTA/DIET HISTORY
Pt on mechanical ventilation/sedation.  Unable to obtain nutrition hx at this time.  Extensive chart review conducted to obtain nutrition related information.  Pt currently w/NGT to [facility] for gastric decompression.  S/P OR yesterday.  Plan for possible OR tomorrow for abdominal washout and possible closure.  Pt transferred from [facility] w/abdominal pain.  Pt noted to take FeSO4 supplements at home.  Pt noted with N/V on 6/6-6/7 and  w/brown emesis on 6/7-6/8.  Pt was on Regular diet w/Ensure Enlive 3x/d prior to NPO status on 6/6. Pt w/previous [facility] admission 12/2021-RDN note reviewed.  Pt w/good intake at that time.  Was on a renal replacement diet during that admission.  Recent wt gain reported 2/2 fluid retention.  UBW noted as 252lbs (114.5kg) and wt increased to 2643.5lbs (119.5kg).  Current admission wt indicates recent wt loss.

## 2022-06-09 NOTE — CHART NOTE - NSCHARTNOTEFT_GEN_A_CORE
GYN ONC Fellow:    I spoke with Lydia, the patient's daughter. We discussed her mother's ongoing critical condition that has remained stable throughout the day. We discussed plan of care with return to OR tomorrow for washout and possible abdominal closure. I recommended that she come to the hospital tomorrow morning in order to sign HCP and consent forms and to answer all related questions.    GRIS Mckeon. PGY6

## 2022-06-09 NOTE — PROGRESS NOTE ADULT - ASSESSMENT
Ms. Rudd is a 52 Year-Old Lady with recurrent cervical CA on chemo presenting with intraabominal free air found to have bowel perforation now status post 6/8  exploratory laparotomy, abdominal washout, rectosigmoid colectomy, diverting colostomy and ABThera™ placement.     - Appreciate excellent care per Surgical Intensive Care Unit, Gyn Onc.   - Monitor ostomy function.   - [facility] discussion.     D Team Surgical Oncology Pager #[phone]

## 2022-06-09 NOTE — DIETITIAN INITIAL EVALUATION ADULT - NSFNSGIIOFT_GEN_A_CORE
06-08-22 @ 07:01  -  06-09-22 @ 07:00  --------------------------------------------------------  OUT:    Nasogastric/Oral tube (mL): 200 mL  Total OUT: 200 mL    Total NET: -200 mL

## 2022-06-09 NOTE — CHART NOTE - NSCHARTNOTEFT_GEN_A_CORE
Patient seen and examined at bedside, recently post-op. Patient is sedated on propofol. Unresponsive to stimuli at this time. No acute postoperative events.     Vital Signs Last 24 Hours  T(C): 36.3 (06-09-22 @ 00:00), Max: 36.7 (06-08-22 @ 05:00)  HR: 119 (06-09-22 @ 01:00) (110 - 124)  BP: 109/64 (06-09-22 @ 01:00) (106/66 - 186/110)  RR: 18 (06-09-22 @ 01:00) (16 - 20)  SpO2: 96% (06-09-22 @ 01:00) (91% - 99%)    I&O's Summary    07 Jun 2022 07:01  -  08 Jun 2022 07:00  --------------------------------------------------------  IN: 4460 mL / OUT: 870 mL / NET: 3590 mL    08 Jun 2022 07:01  -  09 Jun 2022 01:20  --------------------------------------------------------  IN: 2303.4 mL / OUT: 3055 mL / NET: -751.6 mL        Physical Exam:  Gen: Comfortable, NAD  Psych: sedated   CV: Tachycardic, regular rhythm   Pulm: Mechanical breath sounds 2/2 ventilator otherwise clear   Abd: Soft, non-distended, +BS, abthera in place with surrounding incision edges well perfused; ostomy pink and well perfused, scant serosanguinous output; NG tube in place with bilious output   : b/l nephrostomy tubes draining light yellow urine; catheter with blood tinged urine   Ext: Warm & well perfused.     Labs:             8.9<L>  1.74<L> )-----------( 66<L>    ( 06-08 @ 22:35 )             26.4<L>               7.3<L>  1.12<L> )-----------( 34<L>    ( 06-08 @ 18:14 )             22.7<L>               7.4<L>  0.56<LL> )-----------( 45<L>    ( 06-08 @ 06:00 )             23.4<L>               8.5<L>  0.47<LL> )-----------( 50<L>    ( 06-07 @ 20:28 )             26.9<L>               8.3<L>  0.39<LL> )-----------( 85<L>    ( 06-07 @ 05:40 )             26.5<L>        MEDICATIONS  (STANDING):  acetaminophen   IVPB .. 1000 milliGRAM(s) IV Intermittent every 6 hours  chlorhexidine 4% Liquid 1 Application(s) Topical daily  filgrastim-sndz (ZARXIO) Injectable 480 MICROGram(s) SubCutaneous daily  lactated ringers. 1000 milliLiter(s) (125 mL/Hr) IV Continuous <Continuous>  pantoprazole  Injectable 40 milliGRAM(s) IV Push two times a day  piperacillin/tazobactam IVPB.. 3.375 Gram(s) IV Intermittent every 8 hours  potassium chloride  10 mEq/100 mL IVPB 10 milliEquivalent(s) IV Intermittent every 1 hour  propofol Infusion 10 MICROgram(s)/kG/Min (6.02 mL/Hr) IV Continuous <Continuous>    MEDICATIONS  (PRN):  metoclopramide Injectable 10 milliGRAM(s) IV Push every 8 hours PRN nausea/vomiting  ondansetron Injectable 4 milliGRAM(s) IV Push every 6 hours PRN Nausea and/or Vomiting      A/P:     Joelle Garcia, PGY-2 Patient seen and examined at bedside, recently post-op. Patient is sedated on propofol. Unresponsive to stimuli at this time. No acute postoperative events.     Vital Signs Last 24 Hours  T(C): 36.3 (22 @ 00:00), Max: 36.7 (22 @ 05:00)  HR: 119 (22 @ 01:00) (110 - 124)  BP: 109/64 (22 @ 01:00) (106/66 - 186/110)  RR: 18 (22 @ 01:00) (16 - 20)  SpO2: 96% (22 @ 01:00) (91% - 99%)    I&O's Summary    2022 07:  -  2022 07:00  --------------------------------------------------------  IN: 4460 mL / OUT: 870 mL / NET: 3590 mL    2022 07:01  -  2022 01:20  --------------------------------------------------------  IN: 2303.4 mL / OUT: 3055 mL / NET: -751.6 mL        Physical Exam:  Gen: Comfortable, NAD  Psych: sedated   CV: Tachycardic, regular rhythm   Pulm: Mechanical breath sounds 2/2 ventilator otherwise clear   Abd: Soft, non-distended, +BS, abthera in place with surrounding incision edges well perfused; ostomy pink and well perfused, scant serosanguinous output; NG tube in place with bilious output   : b/l nephrostomy tubes draining light yellow urine; catheter with blood tinged urine   Ext: Warm & well perfused.     Labs:             8.9<L>  1.74<L> )-----------( 66<L>    (  @ 22:35 )             26.4<L>               7.3<L>  1.12<L> )-----------( 34<L>    (  @ 18:14 )             22.7<L>               7.4<L>  0.56<LL> )-----------( 45<L>    (  @ 06:00 )             23.4<L>               8.5<L>  0.47<LL> )-----------( 50<L>    (  @ 20:28 )             26.9<L>               8.3<L>  0.39<LL> )-----------( 85<L>    (  @ 05:40 )             26.5<L>        MEDICATIONS  (STANDING):  acetaminophen   IVPB .. 1000 milliGRAM(s) IV Intermittent every 6 hours  chlorhexidine 4% Liquid 1 Application(s) Topical daily  filgrastim-sndz (ZARXIO) Injectable 480 MICROGram(s) SubCutaneous daily  lactated ringers. 1000 milliLiter(s) (125 mL/Hr) IV Continuous <Continuous>  pantoprazole  Injectable 40 milliGRAM(s) IV Push two times a day  piperacillin/tazobactam IVPB.. 3.375 Gram(s) IV Intermittent every 8 hours  potassium chloride  10 mEq/100 mL IVPB 10 milliEquivalent(s) IV Intermittent every 1 hour  propofol Infusion 10 MICROgram(s)/kG/Min (6.02 mL/Hr) IV Continuous <Continuous>    MEDICATIONS  (PRN):  metoclopramide Injectable 10 milliGRAM(s) IV Push every 8 hours PRN nausea/vomiting  ondansetron Injectable 4 milliGRAM(s) IV Push every 6 hours PRN Nausea and/or Vomiting      A/P: 52y  with recurrent cervical CA who presented to the ED as transfer from [place] admitted for mgmt of neutropenic fever. On presentation, patient was found to be tachycardic to 130s, ANC of 200, and febrile. She received 1uPRBC on admission had H/H has been stable. She became acutely altered  with new coffee ground emesis and increasing free air on abdominal xray on  increasing concern for bowel perforation. Pt taken for emergent exploratory laparotomy, abdominal washout, rectosigmoid colectomy, diverting colostomy, bronchoscopy and abthera placement (ebl 2000cc, s/p 5UpRBC, 3L albumin, 3U Plts, 3U FFP, 3U Cryo).    Onc: recurrent cervical cancer. 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 Treated with definitive radiation and weekly cisplatin at [facility]. Final radiation was 10/29/2014.   - Recurrence in  with bilateral ureteral obstruction. Recently started on new chemo regimen (Carbo AUC 5, Paxol 135 mg/m2 and Avastin 15 mg/m2 on q3 week schedule), last cycle on   - CT A/P as above. Demonstrates carcinomatosis   Neuro: Continue Tylenol IV and propofol per SICU.   CV: Tachycardia to 100s-110s which was an overall improvement since admission. Pt off pressors. Continue to monitor VS per routine.  - Per hospitalist, tachycardia 2/2 to sepsis. Treat underlying cause.  Pulm: Intubated on VC. Extubation per SICU.   GI: NPO. GI following. Continue IV Protonix/Reglan. NG tube in place (- ).   : b/l nephrostomy tubes in place. Infante in place.Patient has ASHLEY likley 2/2 IV Contrast & Dehydration. Will continue to monitor UOP. Labs per SICU. Replete electrolytes PRN  Heme: Continue Venodynes for DVT ppx. Increase OOB.    ID: Afebrile. Continue Zosyn (s/p Cefepime) for presumed sepsis on admission. Continue to trend WBC. Continue Zarixo for neutropenic fever. WBC 0.47, ANC 0.16. Lactate 2.5->1.9->3.4. Labs per SICU  Dispo: continued inpatient care per SICU     Joelle Garcia, PGY-2

## 2022-06-09 NOTE — PROGRESS NOTE ADULT - SUBJECTIVE AND OBJECTIVE BOX
Surgery Progress Note     Subjective/24hour Events:   Patient seen and examined.   To Operating Theatre yesterday for L jodi and colostomy for perforated sigmoid.   Remains intubated, sedated, on low dose pressors.     Vital Signs:  Vital Signs Last 24 Hrs  T(C): 36.4 (09 Jun 2022 08:00), Max: 36.7 (08 Jun 2022 10:26)  T(F): 97.5 (09 Jun 2022 08:00), Max: 98 (08 Jun 2022 10:26)  HR: 114 (09 Jun 2022 08:46) (110 - 124)  BP: 131/77 (09 Jun 2022 08:00) (100/62 - 186/110)  BP(mean): 93 (09 Jun 2022 08:00) (74 - 130)  RR: 16 (09 Jun 2022 08:46) (15 - 26)  SpO2: 100% (09 Jun 2022 08:46) (91% - 100%)    CAPILLARY BLOOD GLUCOSE          I&O's Detail    08 Jun 2022 07:01  -  09 Jun 2022 07:00  --------------------------------------------------------  IN:    IV PiggyBack: 850 mL    Lactated Ringers: 600 mL    Lactated Ringers: 1125 mL    Lactated Ringers Bolus: 1000 mL    Norepinephrine: 15 mL    Propofol: 177.4 mL    sodium chloride 0.9%: 600 mL  Total IN: 4367.4 mL    OUT:    Indwelling Catheter - Urethral (mL): 395 mL    Nasogastric/Oral tube (mL): 200 mL    Nephrostomy Tube (mL): 1450 mL    Nephrostomy Tube (mL): 1375 mL    Oral Fluid: 0 mL    VAC (Vacuum Assisted Closure) System (mL): 750 mL  Total OUT: 4170 mL    Total NET: 197.4 mL          MEDICATIONS  (STANDING):  chlorhexidine 0.12% Liquid 15 milliLiter(s) Oral Mucosa every 12 hours  chlorhexidine 4% Liquid 1 Application(s) Topical daily  fentaNYL   Infusion. 0.5 MICROgram(s)/kG/Hr (5.02 mL/Hr) IV Continuous <Continuous>  filgrastim-sndz (ZARXIO) Injectable 480 MICROGram(s) SubCutaneous daily  lactated ringers. 1000 milliLiter(s) (125 mL/Hr) IV Continuous <Continuous>  norepinephrine Infusion 0.05 MICROgram(s)/kG/Min (9.41 mL/Hr) IV Continuous <Continuous>  pantoprazole  Injectable 40 milliGRAM(s) IV Push two times a day  piperacillin/tazobactam IVPB.. 3.375 Gram(s) IV Intermittent every 8 hours  propofol Infusion 10 MICROgram(s)/kG/Min (6.02 mL/Hr) IV Continuous <Continuous>    MEDICATIONS  (PRN):  metoclopramide Injectable 10 milliGRAM(s) IV Push every 8 hours PRN nausea/vomiting  ondansetron Injectable 4 milliGRAM(s) IV Push every 6 hours PRN Nausea and/or Vomiting      Physical Exam:  Gen: Intubated, sedated.   Lungs: Mechanical breathing.   Ab: ABThera™ in place. Ostomy pink, viable, no gas or stool.     Labs:    06-09    139  |  101  |  32<H>  ----------------------------<  104<H>  3.2<L>   |  21<L>  |  0.92    Ca    8.0<L>      09 Jun 2022 05:19  Phos  2.8     06-09  Mg     1.90     06-09    TPro  6.3  /  Alb  2.3<L>  /  TBili  1.2  /  DBili  x   /  AST  23  /  ALT  8   /  AlkPhos  58  06-08    LIVER FUNCTIONS - ( 08 Jun 2022 06:00 )  Alb: 2.3 g/dL / Pro: 6.3 g/dL / ALK PHOS: 58 U/L / ALT: 8 U/L / AST: 23 U/L / GGT: x                                 8.8    3.33  )-----------( 38       ( 09 Jun 2022 05:19 )             25.2     PT/INR - ( 09 Jun 2022 05:19 )   PT: 16.0 sec;   INR: 1.37 ratio         PTT - ( 09 Jun 2022 05:19 )  PTT:33.2 sec

## 2022-06-09 NOTE — DIETITIAN INITIAL EVALUATION ADULT - PERTINENT LABORATORY DATA
06-09    139  |  101  |  32<H>  ----------------------------<  104<H>  3.2<L>   |  21<L>  |  0.92    Ca    8.0<L>      09 Jun 2022 05:19  Phos  2.8     06-09  Mg     1.90     06-09    TPro  6.3  /  Alb  2.3<L>  /  TBili  1.2  /  DBili  x   /  AST  23  /  ALT  8   /  AlkPhos  58  06-08  A1C with Estimated Average Glucose Result: 5.5 % (06-08-22 @ 06:00)  A1C with Estimated Average Glucose Result: 5.6 % (12-22-21 @ 14:09)

## 2022-06-09 NOTE — PROGRESS NOTE ADULT - ASSESSMENT
52F with recurrent cervical CA (per patient stage III) presenting to the ED as transfer from [place] a/w neutropenic fever,  Course complicated by perforated viscus requiring emergent repair, performed 6/8 and altered mental status. now in SICU s/p intubation  endocrine called for concern for hypothyroidism       the current TFTS show a low total T4 and total T3 (these are to be expected in the acute illness setting and also when a pt has a low albumin which would entail a low total thyroid binding globulin, which would lead to low total T3 and total T4  pt has a normal TSH  she has a slightly low Free T4 at 0.8  would have expected a higher TSH with lower free T4, however given her acute illness these TFTS can be line with sick euthyroid   Sick euthyroid will lead to a lower Free T4  TSH can also be low to normal in these cases of sick euthyroid  would repeat Free T4 in one week with TSH trend  am not inclined to treat this as hypothyroidism at this time.   TFTS are affected in critical illness which make it harder to make a to truly delinate underlaying thyroid disease and therefore it is best to repeat these studies once the pt is clinically improving.              Janice Brito MD  Attending Physician   Department of Endocrinology, Diabetes and Metabolism     Pager  [phone] [please provide 10 digit call back number]  ZE 9-5  [email or web address]  Nights and weekends: [phone]  Please note that this patient may be followed by a different provider tomorrow.   If no answer or after hours, please contact [phone].  For final dc reccomendations, please call [phone] or page the endocrine fellow on call.

## 2022-06-09 NOTE — PROGRESS NOTE ADULT - SUBJECTIVE AND OBJECTIVE BOX
R3 Gyn ONC Progress Note POD#1  HD#5    Subjective:   Pt seen and examined at bedside in SICU. Pt is still sedated and intubated, unresponsive to stimuli. Per patient's RN, patient briefly on pressors overnight, now weaned off and normotensive. Otherwise no other acute events overnight.     Objective:  T(F): 97.5 (06-09-22 @ 04:00), Max: 98 (06-08-22 @ 10:26)  HR: 122 (06-09-22 @ 06:00) (110 - 124)  BP: 129/100 (06-09-22 @ 06:00) (100/62 - 186/110)  RR: 21 (06-09-22 @ 06:00) (16 - 21)  SpO2: 97% (06-09-22 @ 04:00) (91% - 98%)  I&O's Summary    07 Jun 2022 07:01  -  08 Jun 2022 07:00  --------------------------------------------------------  IN: 4460 mL / OUT: 870 mL / NET: 3590 mL    08 Jun 2022 07:01  -  09 Jun 2022 06:38  --------------------------------------------------------  IN: 4224.8 mL / OUT: 4135 mL / NET: 89.8 mL      MEDICATIONS  (STANDING):  chlorhexidine 0.12% Liquid 15 milliLiter(s) Oral Mucosa every 12 hours  chlorhexidine 4% Liquid 1 Application(s) Topical daily  filgrastim-sndz (ZARXIO) Injectable 480 MICROGram(s) SubCutaneous daily  lactated ringers. 1000 milliLiter(s) (125 mL/Hr) IV Continuous <Continuous>  norepinephrine Infusion 0.05 MICROgram(s)/kG/Min (9.41 mL/Hr) IV Continuous <Continuous>  pantoprazole  Injectable 40 milliGRAM(s) IV Push two times a day  piperacillin/tazobactam IVPB.. 3.375 Gram(s) IV Intermittent every 8 hours  propofol Infusion 10 MICROgram(s)/kG/Min (6.02 mL/Hr) IV Continuous <Continuous>    MEDICATIONS  (PRN):  metoclopramide Injectable 10 milliGRAM(s) IV Push every 8 hours PRN nausea/vomiting  ondansetron Injectable 4 milliGRAM(s) IV Push every 6 hours PRN Nausea and/or Vomiting      Physical Exam:  Constitutional: sedated, intubated, in NAD  CV: RR S1S2 no m/r/g  Lungs: mechanical breath sounds  Abdomen: soft, nondistended, +BS. Abthera in placed over midline vertical incision, c/d/i. Ostomy site pink with minimal serosanguinous output into bag. NG tube with bilious output.   : neph tube with clear output, hilliard in place with clear yellow urine  Extremities: no lower extremity edema or calf tenderness bilaterally; venodynes in place    LABS:  06-09    139    |  101    |  32<H>  ----------------------------<  104<H>  3.2<L>   |  21<L>  |  0.92   06-08    141    |  101    |  33<H>  ----------------------------<  93     3.1<L>   |  21<L>  |  0.90   06-08    139    |  98     |  38<H>  ----------------------------<  83     3.0<L>   |  21<L>  |  1.07   06-07    138    |  96<L>  |  34<H>  ----------------------------<  78     4.3     |  20<L>  |  1.11     Ca    8.0<L>      09 Jun 2022 05:19  Ca    8.4        08 Jun 2022 22:35  Ca    9.1        08 Jun 2022 06:00  Ca    9.6        07 Jun 2022 20:28  Phos  2.8       06-09  Phos  3.0       06-08  Phos  2.6       06-08  Phos  2.7       06-07  Mg     1.90      06-09  Mg     1.70      06-08  Mg     2.00      06-08  Mg     2.10      06-07    TPro  6.3    /  Alb  2.3<L>  /  TBili  1.2    /  DBili  x      /  AST  23     /  ALT  8      /  AlkPhos  58     06-08  TPro  7.3    /  Alb  2.7<L>  /  TBili  1.4<H>  /  DBili  1.0<H>  /  AST  25     /  ALT  9      /  AlkPhos  69     06-07        PT/INR - ( 09 Jun 2022 05:19 )   PT: 16.0 sec;   INR: 1.37 ratio         PTT - ( 09 Jun 2022 05:19 )  PTT:33.2 sec   R3 Gyn ONC Progress Note POD#1  HD#5    Subjective:   Pt seen and examined at bedside in SICU. Pt is still sedated and intubated, unresponsive to stimuli. Per patient's RN, patient briefly on pressors overnight, now weaned off and normotensive. Otherwise no other acute events overnight.     Objective:  T(F): 97.5 (06-09-22 @ 04:00), Max: 98 (06-08-22 @ 10:26)  HR: 122 (06-09-22 @ 06:00) (110 - 124)  BP: 129/100 (06-09-22 @ 06:00) (100/62 - 186/110)  RR: 21 (06-09-22 @ 06:00) (16 - 21)  SpO2: 97% (06-09-22 @ 04:00) (91% - 98%)  I&O's Summary    07 Jun 2022 07:01  -  08 Jun 2022 07:00  --------------------------------------------------------  IN: 4460 mL / OUT: 870 mL / NET: 3590 mL    08 Jun 2022 07:01  -  09 Jun 2022 06:38  --------------------------------------------------------  IN: 4224.8 mL / OUT: 4135 mL / NET: 89.8 mL      MEDICATIONS  (STANDING):  chlorhexidine 0.12% Liquid 15 milliLiter(s) Oral Mucosa every 12 hours  chlorhexidine 4% Liquid 1 Application(s) Topical daily  filgrastim-sndz (ZARXIO) Injectable 480 MICROGram(s) SubCutaneous daily  lactated ringers. 1000 milliLiter(s) (125 mL/Hr) IV Continuous <Continuous>  norepinephrine Infusion 0.05 MICROgram(s)/kG/Min (9.41 mL/Hr) IV Continuous <Continuous>  pantoprazole  Injectable 40 milliGRAM(s) IV Push two times a day  piperacillin/tazobactam IVPB.. 3.375 Gram(s) IV Intermittent every 8 hours  propofol Infusion 10 MICROgram(s)/kG/Min (6.02 mL/Hr) IV Continuous <Continuous>    MEDICATIONS  (PRN):  metoclopramide Injectable 10 milliGRAM(s) IV Push every 8 hours PRN nausea/vomiting  ondansetron Injectable 4 milliGRAM(s) IV Push every 6 hours PRN Nausea and/or Vomiting      Physical Exam:  Constitutional: sedated, intubated  CV: RR S1S2 no m/r/g  Lungs: mechanical breath sounds  Abdomen: soft, nondistended, +BS. Abthera in placed over midline vertical incision, c/d/i. Ostomy site pink with minimal serosanguinous output into bag. NG tube with bilious output.   : neph tube with clear output, hilliard in place with clear yellow urine  Extremities: no lower extremity edema or calf tenderness bilaterally; venodynes in place    LABS:  06-09    139    |  101    |  32<H>  ----------------------------<  104<H>  3.2<L>   |  21<L>  |  0.92   06-08    141    |  101    |  33<H>  ----------------------------<  93     3.1<L>   |  21<L>  |  0.90   06-08    139    |  98     |  38<H>  ----------------------------<  83     3.0<L>   |  21<L>  |  1.07   06-07    138    |  96<L>  |  34<H>  ----------------------------<  78     4.3     |  20<L>  |  1.11     Ca    8.0<L>      09 Jun 2022 05:19  Ca    8.4        08 Jun 2022 22:35  Ca    9.1        08 Jun 2022 06:00  Ca    9.6        07 Jun 2022 20:28  Phos  2.8       06-09  Phos  3.0       06-08  Phos  2.6       06-08  Phos  2.7       06-07  Mg     1.90      06-09  Mg     1.70      06-08  Mg     2.00      06-08  Mg     2.10      06-07    TPro  6.3    /  Alb  2.3<L>  /  TBili  1.2    /  DBili  x      /  AST  23     /  ALT  8      /  AlkPhos  58     06-08  TPro  7.3    /  Alb  2.7<L>  /  TBili  1.4<H>  /  DBili  1.0<H>  /  AST  25     /  ALT  9      /  AlkPhos  69     06-07        PT/INR - ( 09 Jun 2022 05:19 )   PT: 16.0 sec;   INR: 1.37 ratio         PTT - ( 09 Jun 2022 05:19 )  PTT:33.2 sec

## 2022-06-09 NOTE — PROGRESS NOTE ADULT - ASSESSMENT
Patient is a 52 year old  female with a PMHx of HTN and recurrent cervical cancer (per patient stage III) who presented as transfer from [place] with neutropenic fever.  Hospital course complicated by free air noted on CT Abdomen / Pelvis.  Patient became acutely altered on 22 with increased free air noted on AXR with concern for perforated viscous.  Patient is now S/P ex-lap, washout, open resection of left colon and creation of colostomy on 22.  Patient transferred to SICU post operatively for hemodynamic monitoring.      PLAN:    NEUROLOGY:  - Sedated  - Wean off Propofol gtt and Fentanyl gtt as tolerated    RESPIRATORY:  - Intubated  - CPAP trials as tolerated  - Plan for possible extubation today  - Monitor daily chest x-rays and ABG  - Maintain O2 saturation >92%    CARDIOVASCULAR:  - Hypotensive - likely secondary to sedation  - Wean off Levophed gtt as BP tolerates  - Keep MAP >65    GI / NUTRITION:  - NPO  - GI prophylaxis with IV Protonix 40mg QD  - IV Reglan and IV Zofran PRN    RENAL / GENITOURINARY:  - LR @125cc/hr  - Indwelling hilliard catheter  - Bilateral nephrostomy tubes  - Monitor electrolytes and replete PRN  - Continue to monitor strict ins and outs q1 hour    HEMATOLOGIC:  - Received 5 units PRBC, 3 units FFP, 3 units PLT and 1 unit cryo intra-operatively  - Monitor CBC daily  - Transfuse PRN    INFECTIOUS DISEASE:  - Neutropenic fevers with WBC of 3.33  - Continue Neupogen per Heme / [facility]  - Continue with IV Zosyn    ENDOCRINOLOGY:  - Monitor fignersticks q6 hours while NPO      Disposition: SICU    --------------------------------------------------------------------------------------

## 2022-06-09 NOTE — PROGRESS NOTE ADULT - SUBJECTIVE AND OBJECTIVE BOX
POST ANESTHESIA EVALUATION    52y Female POSTOP DAY 1 S/P     MENTAL STATUS: Patient participation [  ] Awake     [  ] Arousable     [  x] Sedated    AIRWAY PATENCY: [  ] Satisfactory  [ x ] Other: remains intubated    Vital Signs Last 24 Hrs  T(C): 36.8 (09 Jun 2022 12:00), Max: 36.8 (09 Jun 2022 12:00)  T(F): 98.2 (09 Jun 2022 12:00), Max: 98.2 (09 Jun 2022 12:00)  HR: 131 (09 Jun 2022 12:30) (110 - 131)  BP: 144/87 (09 Jun 2022 12:00) (100/62 - 186/110)  BP(mean): 96 (09 Jun 2022 12:00) (74 - 130)  RR: 35 (09 Jun 2022 12:30) (15 - 35)  SpO2: 97% (09 Jun 2022 12:30) (91% - 100%)  I&O's Summary    08 Jun 2022 07:01  -  09 Jun 2022 07:00  --------------------------------------------------------  IN: 4367.4 mL / OUT: 4170 mL / NET: 197.4 mL    09 Jun 2022 07:01  -  09 Jun 2022 12:55  --------------------------------------------------------  IN: 877.3 mL / OUT: 782 mL / NET: 95.3 mL          NAUSEA/ VOMITTING:  [  x] NONE  [  ] CONTROLLED [  ] OTHER     PAIN: [  x] CONTROLLED WITH CURRENT REGIMEN  [  ] OTHER    [  x] NO APPARENT ANESTHESIA COMPLICATIONS      Comments:

## 2022-06-09 NOTE — DISCHARGE NOTE PROVIDER - NSDCFUSCHEDAPPT_GEN_ALL_CORE_FT
Julio Louis  [facility]  [facility] [phone]   Scheduled Appointment: 06/10/2022

## 2022-06-09 NOTE — DISCHARGE NOTE PROVIDER - HOSPITAL COURSE
51 yo   with recurrent cervical cancer receiving chemotherapy with 3 agents (presumably platinum, taxol, avastin) with cycle 5 given on  presented to [place] ER with abdominal pain and elevated heart rate identified by home nurse. Found to be severely neutropenic and febrile to 39.5. CT w/o contrast demonstrated pneumoperitoneum with slightly greater foci of free air to the right of the uterus at the level of the cervical cancer. Descending and sigmoid colitis with the distal sigmoid colon abutting the right uterine wall. She was given zosyn and meropenem and transferred to [facility].  Blood and urine cultures obtained. MICU was consulted on patient with recommendation to continue with broad spectrum antibiotics of Cefepime. ANC of 200 noted and daily Neupogen was initiated. Pt was placed on neutropenic precautions. A rapid response was called for patient's tachycardia to the 130's. Pt's home BP meds were restarted and continued workup was persued. CTA was negative for a PE.  EKG demonstrated sinus tachycardia. CT A/P w/ IV contrast was repeated demonstrating stable pneumoperitoneum. Concern for necrotic tumor verse rectosigmoid-vaginal fistula.. Diagnosis of tumor necrosis was favored at this time 2/2 to patients benign abdomen. On HD#2, hospitalist was consulted,     Pt received a total of 7u of pRBC's, 3u Plt, and 3u of FFP during inpatient admission. (at the time of )           On HD#4 pt was brought to the OR for an emergent exploratory laparotomy, abdominal washout, rectosigmoid colectomy, diverting colostomy, bronchoscopy, and abthera placement. EBL was 2000cc Please see operative note for details. Pt was brought to the SICU post operatively and weaned off pressors overnight. Post operation day one hematocrit was 25.2 which was an appropriate post-surgical change. On HD#6 the patient returned to the OR for an ***            53 yo   with recurrent cervical cancer receiving chemotherapy with 3 agents (presumably platinum, taxol, avastin) with cycle 5 given on  presented to [place] ER with abdominal pain and elevated heart rate identified by home nurse. Found to be severely neutropenic and febrile to 39.5. CT w/o contrast demonstrated pneumoperitoneum with slightly greater foci of free air to the right of the uterus at the level of the cervical cancer. Descending and sigmoid colitis with the distal sigmoid colon abutting the right uterine wall. She was given zosyn and meropenem and transferred to [facility].  Blood and urine cultures obtained. MICU was consulted on patient with recommendation to continue with broad spectrum antibiotics of Cefepime. ANC of 200 noted and daily Neupogen was initiated. Pt was placed on neutropenic precautions. A rapid response was called for patient's tachycardia to the 130's. Pt's home BP meds were restarted and continued workup was persued. CTA was negative for a PE.  EKG demonstrated sinus tachycardia. CT A/P w/ IV contrast was repeated demonstrating stable pneumoperitoneum. Concern for necrotic tumor verse rectosigmoid-vaginal fistula.. Diagnosis of tumor necrosis was favored at this time 2/2 to patients benign abdomen. On HD#2, plan was made for CT with PO contrast to evaluate for potential fistula. In the scanner pt became agitated and refused the scan. When the patient returned to the floor she was AxO x2 with repetitive emesis.  Narcotics were held. Neuro was consulted. CT head was ordered, but patient refused. Broad workup was initiated for septic/metabolic/oncologic etiology of encephalopathy. ID was consulted. On HD#4, emesis was noted to be coffee ground in color with worsening mental status becoming acutely confused and combative. Neuro was made aware. GI was consulted. Antibiotics were changed from Cefepime to Zosyn. CT head demonstrated no acute findings. Repeat KUB demonstrated increased free air. Pt was brought to the OR for an emergent exploratory laparotomy, abdominal washout, rectosigmoid colectomy, diverting colostomy, bronchoscopy, and abthera placement. EBL was 2000cc Please see operative note for details. Pt was brought to the SICU post operatively and weaned off pressors overnight. Post operation day one hematocrit was 25.2 which was an appropriate post-surgical change. On HD#6 the patient returned to the OR for an ***       Pt received a total of 7u of pRBC's, 3u Plt, and 3u of FFP during inpatient admission. (at the time of )            53 yo   with recurrent cervical cancer receiving chemotherapy with 3 agents (presumably platinum, taxol, avastin) with cycle 5 given on  presented to [place] ER with abdominal pain and elevated heart rate identified by home nurse. Found to be severely neutropenic and febrile to 39.5. CT w/o contrast demonstrated pneumoperitoneum with slightly greater foci of free air to the right of the uterus at the level of the cervical cancer. Descending and sigmoid colitis with the distal sigmoid colon abutting the right uterine wall. She was given zosyn and meropenem and transferred to [facility].  Blood and urine cultures obtained. MICU was consulted on patient with recommendation to continue with broad spectrum antibiotics of Cefepime. ANC of 200 noted and daily Neupogen was initiated. Pt was placed on neutropenic precautions. A rapid response was called for patient's tachycardia to the 130's. Pt's home BP meds were restarted and continued workup was persued. CTA was negative for a PE.  EKG demonstrated sinus tachycardia. CT A/P w/ IV contrast was repeated demonstrating stable pneumoperitoneum. Concern for necrotic tumor verse rectosigmoid-vaginal fistula.. Diagnosis of tumor necrosis was favored at this time 2/2 to patients benign abdomen. On HD#2, plan was made for CT with PO contrast to evaluate for potential fistula. In the scanner pt became agitated and refused the scan. When the patient returned to the floor she was AxO x2 with repetitive emesis.  Narcotics were held. Neuro was consulted. CT head was ordered, but patient refused. Broad workup was initiated for septic/metabolic/oncologic etiology of encephalopathy. ID was consulted. On HD#4, emesis was noted to be coffee ground in color with worsening mental status becoming acutely confused and combative. Neuro was made aware. GI was consulted. Antibiotics were changed from Cefepime to Zosyn. CT head demonstrated no acute findings. Repeat KUB demonstrated increased free air. Pt was brought to the OR for an emergent exploratory laparotomy, abdominal washout, rectosigmoid colectomy, diverting colostomy, bronchoscopy, and abthera placement. EBL was 2000cc Please see operative note for details. Pt was brought to the SICU post operatively and weaned off pressors overnight. Post operation day one hematocrit was 25.2 which was an appropriate post-surgical change.   The patient's clinical status and wound required RTOR x3 with ex-lap, abdominal washout, and abthera placement on , 6/10 and .  Pt received a total of 14u of pRBC's, 20u Plt, and 3u of FFP at this point. On  RTOR again for ex-lap, abdominal washout and fascial closure w/ [id number] vicryl mesh.  A skin skin closure device  w/ wound vac was placed by Plastic surgery on . Pt was extubtated on . She remained in the SICU until being transferred to the floor on . Hospital course after SICU was notable for persistent tachycardia for which EKG showed sinus tachycarida, multiple CT angio's were obtained to rule out PE . Tachycardia was presumed to be secondary to pain as patient remained extremely uncomfortabe requiring around the clock pain medication throughout her hospital course.TSH also obtained, but T4 was normal. Palliative care was consulted on  for symptom management and assistance w/ goals of care. On , patient had mucocutaneous separation of the stoma which was evaluated and packed by surgery. Wound vac was also removed by plastics given purulent discharge observed from the umbilicus. Wound packed w/ saline-soaked gauze and covered. In addition, patient was noted to become febrile to 100.8*F. Restarted on Zosyn. Patient also observed to be with increased pain and lethargic (only intermittently arousable and responsive to verbal stimuli). SICU re-consulted on  for sepsis and deteriorating mental status. Recommended continuing goals of care discussion and avoiding high level of care given the patient's poor prognosis. Daughter chose to have pt remain full code after drain was placed around ostomy by surgery. Case was escalated to ethics. After extensive discussion and multi-disciplinary counseling with multiple services, patient was changed to DNR/DNI on HD#27 (). During her hospital course, patient had been previously evaluated by PMNR for PT/OT, but given deterioration in clinical status, the patient had been unable to participate. Mental status noted to improve starting 7/3 but nevertheless, waxing and waning. Starting , blood was noted in her ostomy output and given c/f GI bleed, GI was consulted they recommended protonix BID. Patient declined EGD today.             51 yo   with recurrent cervical cancer receiving chemotherapy with 3 agents (presumably platinum, taxol, avastin) with cycle 5 given on  presented to [place] ER with abdominal pain and elevated heart rate identified by home nurse. Found to be severely neutropenic and febrile to 39.5. CT w/o contrast demonstrated pneumoperitoneum with slightly greater foci of free air to the right of the uterus at the level of the cervical cancer. Descending and sigmoid colitis with the distal sigmoid colon abutting the right uterine wall. She was given zosyn and meropenem and transferred to [facility].  Blood and urine cultures obtained. MICU was consulted on patient with recommendation to continue with broad spectrum antibiotics of Cefepime. ANC of 200 noted and daily Neupogen was initiated. Pt was placed on neutropenic precautions. A rapid response was called for patient's tachycardia to the 130's. Pt's home BP meds were restarted and continued workup was persued. CTA was negative for a PE.  EKG demonstrated sinus tachycardia. CT A/P w/ IV contrast was repeated demonstrating stable pneumoperitoneum. Concern for necrotic tumor verse rectosigmoid-vaginal fistula.. Diagnosis of tumor necrosis was favored at this time 2/2 to patients benign abdomen. On HD#2, plan was made for CT with PO contrast to evaluate for potential fistula. In the scanner pt became agitated and refused the scan. When the patient returned to the floor she was AxO x2 with repetitive emesis.  Narcotics were held. Neuro was consulted. CT head was ordered, but patient refused. Broad workup was initiated for septic/metabolic/oncologic etiology of encephalopathy. ID was consulted. On HD#4, emesis was noted to be coffee ground in color with worsening mental status becoming acutely confused and combative. Neuro was made aware. GI was consulted. Antibiotics were changed from Cefepime to Zosyn. CT head demonstrated no acute findings. Repeat KUB demonstrated increased free air. Pt was brought to the OR for an emergent exploratory laparotomy, abdominal washout, rectosigmoid colectomy, diverting colostomy, bronchoscopy, and abthera placement. EBL was 2000cc Please see operative note for details. Pt was brought to the SICU post operatively and weaned off pressors overnight. Post operation day one hematocrit was 25.2 which was an appropriate post-surgical change.   The patient's clinical status and wound required RTOR x3 with ex-lap, abdominal washout, and abthera placement on , 6/10 and .  Pt received a total of 14u of pRBC's, 20u Plt, and 3u of FFP at this point. On  RTOR again for ex-lap, abdominal washout and fascial closure w/ [id number] vicryl mesh.  A skin skin closure device  w/ wound vac was placed by Plastic surgery on . Pt was extubtated on . She remained in the SICU until being transferred to the floor on . Hospital course after SICU was notable for persistent tachycardia for which EKG showed sinus tachycarida, multiple CT angio's were obtained to rule out PE . Tachycardia was presumed to be secondary to pain as patient remained extremely uncomfortabe requiring around the clock pain medication throughout her hospital course. TSH also obtained, but T4 was normal. Palliative care was consulted on  for symptom management and assistance w/ goals of care. On , patient had mucocutaneous separation of the stoma which was evaluated and packed by surgery. Wound vac was also removed by plastics given purulent discharge observed from the umbilicus. Wound packed w/ saline-soaked gauze and covered. In addition, patient was noted to become febrile to 100.8*F. Restarted on Zosyn. Patient also observed to be with increased pain and lethargic (only intermittently arousable and responsive to verbal stimuli). SICU re-consulted on  for sepsis and deteriorating mental status. Recommended continuing goals of care discussion and avoiding high level of care given the patient's poor prognosis. Daughter chose to have pt remain full code after drain was placed around ostomy by surgery. Case was escalated to ethics. After extensive discussion and multi-disciplinary counseling with multiple services, patient was changed to DNR/DNI on HD#27 (). During her hospital course, patient had been previously evaluated by PMNR for PT/OT, but given deterioration in clinical status, the patient had been unable to participate. Mental status noted to improve starting 7/3 but nevertheless, waxing and waning. Starting , blood was noted in her ostomy output and given c/f GI bleed, GI was consulted they recommended protonix BID. Patient ultimately declined EGD. On HD#34, patient decided to pursue hospice care. Patient transfused 2U of pRBCs on HD#36. Patient discharged to hospice on ***             51 yo   with recurrent cervical cancer receiving chemotherapy with 3 agents (presumably platinum, taxol, avastin) with cycle 5 given on  presented to [place] ER with abdominal pain and elevated heart rate identified by home nurse. Found to be severely neutropenic and febrile to 39.5. CT w/o contrast demonstrated pneumoperitoneum with slightly greater foci of free air to the right of the uterus at the level of the cervical cancer. Descending and sigmoid colitis with the distal sigmoid colon abutting the right uterine wall. She was given zosyn and meropenem and transferred to [facility].  Blood and urine cultures obtained. MICU was consulted on patient with recommendation to continue with broad spectrum antibiotics of Cefepime. ANC of 200 noted and daily Neupogen was initiated. Pt was placed on neutropenic precautions. A rapid response was called for patient's tachycardia to the 130's. Pt's home BP meds were restarted and continued workup was persued. CTA was negative for a PE.  EKG demonstrated sinus tachycardia. CT A/P w/ IV contrast was repeated demonstrating stable pneumoperitoneum. Concern for necrotic tumor verse rectosigmoid-vaginal fistula.. Diagnosis of tumor necrosis was favored at this time 2/2 to patients benign abdomen. On HD#2, plan was made for CT with PO contrast to evaluate for potential fistula. In the scanner pt became agitated and refused the scan. When the patient returned to the floor she was AxO x2 with repetitive emesis.  Narcotics were held. Neuro was consulted. CT head was ordered, but patient refused. Broad workup was initiated for septic/metabolic/oncologic etiology of encephalopathy. ID was consulted. On HD#4, emesis was noted to be coffee ground in color with worsening mental status becoming acutely confused and combative. Neuro was made aware. GI was consulted. Antibiotics were changed from Cefepime to Zosyn. CT head demonstrated no acute findings. Repeat KUB demonstrated increased free air. Pt was brought to the OR for an emergent exploratory laparotomy, abdominal washout, rectosigmoid colectomy, diverting colostomy, bronchoscopy, and abthera placement. EBL was 2000cc Please see operative note for details. Pt was brought to the SICU post operatively and weaned off pressors overnight. Post operation day one hematocrit was 25.2 which was an appropriate post-surgical change.   The patient's clinical status and wound required RTOR x3 with ex-lap, abdominal washout, and abthera placement on , 6/10 and .  Pt received a total of 14u of pRBC's, 20u Plt, and 3u of FFP at this point. On  RTOR again for ex-lap, abdominal washout and fascial closure w/ [id number] vicryl mesh.  A skin skin closure device  w/ wound vac was placed by Plastic surgery on . Pt was extubtated on . She remained in the SICU until being transferred to the floor on . Hospital course after SICU was notable for persistent tachycardia for which EKG showed sinus tachycarida, multiple CT angio's were obtained to rule out PE . Tachycardia was presumed to be secondary to pain as patient remained extremely uncomfortabe requiring around the clock pain medication throughout her hospital course. TSH also obtained, but T4 was normal. Palliative care was consulted on  for symptom management and assistance w/ goals of care. On , patient had mucocutaneous separation of the stoma which was evaluated and packed by surgery. Wound vac was also removed by plastics given purulent discharge observed from the umbilicus. Wound packed w/ saline-soaked gauze and covered. In addition, patient was noted to become febrile to 100.8*F. Restarted on Zosyn. Patient also observed to be with increased pain and lethargic (only intermittently arousable and responsive to verbal stimuli). SICU re-consulted on  for sepsis and deteriorating mental status. Recommended continuing goals of care discussion and avoiding high level of care given the patient's poor prognosis. Daughter chose to have pt remain full code after drain was placed around ostomy by surgery. Case was escalated to ethics. After extensive discussion and multi-disciplinary counseling with multiple services, patient was changed to DNR/DNI on HD#27 (). During her hospital course, patient had been previously evaluated by PMNR for PT/OT, but given deterioration in clinical status, the patient had been unable to participate. Mental status noted to improve starting 7/3 but nevertheless, waxing and waning. Starting , blood was noted in her ostomy output and given c/f GI bleed, GI was consulted they recommended protonix BID. Patient ultimately declined EGD. On HD#34, patient decided to pursue hospice care. Patient transfused 2U of pRBCs on HD#36. Patient discharged to [place] 2022             53 yo   with recurrent cervical cancer receiving chemotherapy with 3 agents (presumably platinum, taxol, avastin) with cycle 5 given on  presented to [place] ER with abdominal pain and elevated heart rate identified by home nurse. Found to be severely neutropenic and febrile to 39.5. CT w/o contrast demonstrated pneumoperitoneum with slightly greater foci of free air to the right of the uterus at the level of the cervical cancer. Descending and sigmoid colitis with the distal sigmoid colon abutting the right uterine wall. She was given zosyn and meropenem and transferred to [facility].  Blood and urine cultures obtained. MICU was consulted on patient with recommendation to continue with broad spectrum antibiotics of Cefepime. ANC of 200 noted and daily Neupogen was initiated. Pt was placed on neutropenic precautions. A rapid response was called for patient's tachycardia to the 130's. Pt's home BP meds were restarted and continued workup was persued. CTA was negative for a PE.  EKG demonstrated sinus tachycardia. CT A/P w/ IV contrast was repeated demonstrating stable pneumoperitoneum. Concern for necrotic tumor versus rectosigmoid-vaginal fistula.. Diagnosis of tumor necrosis was favored at this time 2/2 to patients benign abdomen. On HD#2, plan was made for CT with PO contrast to evaluate for potential fistula. In the scanner pt became agitated and refused the scan. When the patient returned to the floor she was AxO x2 with repetitive emesis.  Narcotics were held. Neuro was consulted. CT head was ordered, but patient refused. Broad workup was initiated for septic/metabolic/oncologic etiology of encephalopathy. ID was consulted. On HD#4, emesis was noted to be coffee ground in color with worsening mental status becoming acutely confused and combative. Neuro was made aware. GI was consulted. Antibiotics were changed from Cefepime to Zosyn. CT head demonstrated no acute findings. Repeat KUB demonstrated increased free air. Abd XRay Flat /Upright demonstrated new air. Discussion held with family - emergent surgery vs. palliative care at that time, given concern for perforated bowel due to avastin. and persistent disease. Patient's family wanted everything to be done, therefore patient was taken to the OR. Pt was brought to the OR for an emergent exploratory laparotomy, abdominal washout, rectosigmoid colectomy, diverting colostomy, bronchoscopy, and abthera placement. Please see operative note for details. Pt was brought to the SICU post operatively and weaned off pressors overnight. Post operation day one hematocrit was 25.2 which was an appropriate post-surgical change.   The patient's clinical status and wound required RTOR x3 with ex-lap, abdominal washout, and abthera placement on , 6/10 and .  Pt received a total of 14u of pRBC's, 20u Plt, and 3u of FFP at this point. On  RTOR again for ex-lap, abdominal washout and fascial closure w/ [id number] vicryl mesh.  A skin skin closure device  w/ wound vac was placed by Plastic surgery on . Pt was extubtated on . She remained in the SICU until being transferred to the floor on . Hospital course after SICU was notable for persistent tachycardia for which EKG showed sinus tachycarida, multiple CT angio's were obtained to rule out PE . Tachycardia was presumed to be secondary to pain as patient remained extremely uncomfortabe requiring around the clock pain medication throughout her hospital course. TSH also obtained, but T4 was normal. Palliative care was consulted on  for symptom management and assistance w/ goals of care. On , patient had mucocutaneous separation of the stoma which was evaluated and packed by surgery. Wound vac was also removed by plastics given purulent discharge observed from the umbilicus. Wound packed w/ saline-soaked gauze and covered. In addition, patient was noted to become febrile to 100.8*F. Restarted on Zosyn. Patient also observed to be with increased pain and lethargic (only intermittently arousable and responsive to verbal stimuli). SICU re-consulted on  for sepsis and deteriorating mental status. Recommended continuing goals of care discussion and avoiding high level of care given the patient's poor prognosis. Daughter chose to have pt remain full code after drain was placed around ostomy by surgery. Case was escalated to ethics. After extensive discussion and multi-disciplinary counseling with multiple services, patient was changed to DNR/DNI on HD#27 (). During her hospital course, patient had been previously evaluated by PMNR for PT/OT, but given deterioration in clinical status, the patient had been unable to participate. Mental status noted to improve starting 7/3 but nevertheless, waxing and waning. Starting 7/6, blood was noted in her ostomy output and given c/f GI bleed, GI was consulted they recommended protonix BID. Patient ultimately declined EGD. On HD#34, patient decided to pursue hospice care. Patient transfused 2U of pRBCs on HD#36. Multiple meetings held, patient and family desired transfer to hospice at [place]. Patient not a candidate for further chemo treatment. Patient discharged to [place] 2022.

## 2022-06-09 NOTE — DISCHARGE NOTE PROVIDER - CARE PROVIDER_API CALL
Betina Merino)  Gynecologic Oncology; Obstetrics and Gynecology  [address] [place] [address]  Phone: [phone]  Fax: [phone]  Follow Up Time:

## 2022-06-09 NOTE — DIETITIAN INITIAL EVALUATION ADULT - PERTINENT MEDS FT
MEDICATIONS  (STANDING):  chlorhexidine 0.12% Liquid 15 milliLiter(s) Oral Mucosa every 12 hours  chlorhexidine 4% Liquid 1 Application(s) Topical daily  fentaNYL   Infusion. 0.5 MICROgram(s)/kG/Hr (5.02 mL/Hr) IV Continuous <Continuous>  filgrastim-sndz (ZARXIO) Injectable 480 MICROGram(s) SubCutaneous daily  lactated ringers. 1000 milliLiter(s) (125 mL/Hr) IV Continuous <Continuous>  norepinephrine Infusion 0.05 MICROgram(s)/kG/Min (9.41 mL/Hr) IV Continuous <Continuous>  pantoprazole  Injectable 40 milliGRAM(s) IV Push two times a day  piperacillin/tazobactam IVPB.. 3.375 Gram(s) IV Intermittent every 8 hours  propofol Infusion 10 MICROgram(s)/kG/Min (6.02 mL/Hr) IV Continuous <Continuous>    MEDICATIONS  (PRN):  metoclopramide Injectable 10 milliGRAM(s) IV Push every 8 hours PRN nausea/vomiting  ondansetron Injectable 4 milliGRAM(s) IV Push every 6 hours PRN Nausea and/or Vomiting

## 2022-06-09 NOTE — PROGRESS NOTE ADULT - ATTENDING COMMENTS
Critical Care Dx    J95.89 Acute respiratory insufficiency, postoperative   -unable to wean from vent due to lack of reliable mental status and not generating own breath  -CXR noted  -serial abg's   D62 Anemia due to acute blood loss   -trend crit. Resuscitated well  E87.6 Hypokalemia   -replete K and Mag  A41.9 Severe sepsis   R65.21 Severe sepsis with septic shock   -s/p source control  -zosyn x 4 days  -target MAP 65, monitor u/o  -trend labs      The patient is a critical care patient with life threatening hemodynamic and respiratory instability in SICU.  I have personally interviewed and examined the patient, reviewed data and laboratory tests/x-rays and all pertinent electronic images.  The SICU team has a constant risk benefit analyzes discussion with the primary team, all consultants, House Staff and PA's on all decisions.   Time involved in performance of separately billable procedures was not counted toward my critical care time. There is no overlap.    I have personally provided 60 minutes of critical care time concurrently with the resident/fellow. This time excludes time spent on separate procedures and time spent teaching. I have reviewed the resident's/fellow's documentation and agree with the assessment and plan of care.  I was physically present for the key portions of the evaluation and management (E/M) service provided.      Rolando Thomas MD  Acute and Critical Care Surgery

## 2022-06-09 NOTE — DIETITIAN INITIAL EVALUATION ADULT - ADD RECOMMEND
1) Monitor weights, labs, BM's, skin integrity, NGT output; 2) Consider alternate means of nutrition support if NPO prolonged to meet nutritional needs.

## 2022-06-09 NOTE — PROGRESS NOTE ADULT - SUBJECTIVE AND OBJECTIVE BOX
Chief Complaint:  Patient is a 52y old  Female who presents with a chief complaint of neutropenic fever (2022 12:55)      Interval Events: Patient became acutely altered on 22 with increased free air noted on AXR with concern for perforated viscous.  Patient is now S/P ex-lap, washout, open resection of left colon and creation of colostomy on 22.      Hospital Medications:  chlorhexidine 0.12% Liquid 15 milliLiter(s) Oral Mucosa every 12 hours  chlorhexidine 4% Liquid 1 Application(s) Topical daily  dexMEDEtomidine Infusion 0.05 MICROgram(s)/kG/Hr IV Continuous <Continuous>  fentaNYL   Infusion. 0.5 MICROgram(s)/kG/Hr IV Continuous <Continuous>  filgrastim-sndz (ZARXIO) Injectable 480 MICROGram(s) SubCutaneous daily  lactated ringers. 1000 milliLiter(s) IV Continuous <Continuous>  metoclopramide Injectable 10 milliGRAM(s) IV Push every 8 hours PRN  norepinephrine Infusion 0.05 MICROgram(s)/kG/Min IV Continuous <Continuous>  ondansetron Injectable 4 milliGRAM(s) IV Push every 6 hours PRN  pantoprazole  Injectable 40 milliGRAM(s) IV Push two times a day  piperacillin/tazobactam IVPB.. 3.375 Gram(s) IV Intermittent every 8 hours  propofol Infusion 10 MICROgram(s)/kG/Min IV Continuous <Continuous>      PMHX/PSHX:  HTN (hypertension)    Cervical cancer    No significant past surgical history    Nephrostomy status            ROS: 14 point ROS negative unless otherwise stated in subjective      PHYSICAL EXAM:     GENERAL:  +intubated and sedated  HEENT:  NC/AT,  +bitemporal wasting; +ETT; +NGT to LIS (bilious output)  CHEST:  +intubated, vented breath sounds  HEART: +tachycardic  ABDOMEN:  +wound vac in place; +ostomy with liquid brown output  EXTREMITIES:  1+ BL LE edema  SKIN:  No rash/erythema/ecchymoses/petechiae/wounds/jaundice  NEURO:  sedated    Vital Signs:  Vital Signs Last 24 Hrs  T(C): 36.8 (2022 12:00), Max: 36.8 (2022 12:00)  T(F): 98.2 (2022 12:00), Max: 98.2 (2022 12:00)  HR: 131 (2022 12:30) (110 - 131)  BP: 144/87 (2022 12:00) (100/62 - 186/110)  BP(mean): 96 (2022 12:00) (74 - 130)  RR: 35 (2022 12:30) (15 - 35)  SpO2: 97% (2022 12:30) (91% - 100%)  Daily Height in cm: 167.6 (2022 17:29)    Daily Weight in k.7 (2022 06:00)    LABS:                        8.8    3.33  )-----------( 38       ( 2022 05:19 )             25.2     06-09    139  |  101  |  32<H>  ----------------------------<  104<H>  3.2<L>   |  21<L>  |  0.92    Ca    8.0<L>      2022 05:19  Phos  2.8     06-09  Mg     1.90     06-09    TPro  6.3  /  Alb  2.3<L>  /  TBili  1.2  /  DBili  x   /  AST  23  /  ALT  8   /  AlkPhos  58  06-08    LIVER FUNCTIONS - ( 2022 06:00 )  Alb: 2.3 g/dL / Pro: 6.3 g/dL / ALK PHOS: 58 U/L / ALT: 8 U/L / AST: 23 U/L / GGT: x           PT/INR - ( 2022 05:19 )   PT: 16.0 sec;   INR: 1.37 ratio         PTT - ( 2022 05:19 )  PTT:33.2 sec        Imaging:        ACC: [id number] EXAM:  XR ABDOMEN PORTABLE URGENT 1V                          PROCEDURE DATE:  2022          INTERPRETATION:  CLINICAL INFORMATION: Metastatic cancer with concern for   bowel perforation    EXAM: single frontal view of the abdomen.    COMPARISON: CT abdomen 2022    FINDINGS:  Bilateral nephrostomy tubes.  Nonobstructive bowel gas pattern.  Contrast seen within bowel loops in the left lower quadrant.  No pneumoperitoneum.  The visualized lung bases are clear.    IMPRESSION:  No intraperitoneal free air as clinically questioned.       Interval Events: Patient became acutely altered on 22 with increased free air noted on AXR with concern for perforated viscous.  Patient is now s/p ex-lap, washout, open resection of left colon and creation of colostomy on 22.    Hospital Medications:  chlorhexidine 0.12% Liquid 15 milliLiter(s) Oral Mucosa every 12 hours  chlorhexidine 4% Liquid 1 Application(s) Topical daily  dexMEDEtomidine Infusion 0.05 MICROgram(s)/kG/Hr IV Continuous <Continuous>  fentaNYL   Infusion. 0.5 MICROgram(s)/kG/Hr IV Continuous <Continuous>  filgrastim-sndz (ZARXIO) Injectable 480 MICROGram(s) SubCutaneous daily  lactated ringers. 1000 milliLiter(s) IV Continuous <Continuous>  metoclopramide Injectable 10 milliGRAM(s) IV Push every 8 hours PRN  norepinephrine Infusion 0.05 MICROgram(s)/kG/Min IV Continuous <Continuous>  ondansetron Injectable 4 milliGRAM(s) IV Push every 6 hours PRN  pantoprazole  Injectable 40 milliGRAM(s) IV Push two times a day  piperacillin/tazobactam IVPB.. 3.375 Gram(s) IV Intermittent every 8 hours  propofol Infusion 10 MICROgram(s)/kG/Min IV Continuous <Continuous>      PMHX/PSHX:  HTN (hypertension)    Cervical cancer    No significant past surgical history    Nephrostomy status            ROS: 14 point ROS negative unless otherwise stated in subjective      PHYSICAL EXAM:     GENERAL:  +intubated and sedated  HEENT:  NC/AT,  +bitemporal wasting; +ETT; +NGT to LIS (bilious output)  CHEST:  +intubated, vented breath sounds  HEART: +tachycardic  ABDOMEN:  +wound vac in place; +ostomy with liquid brown output  EXTREMITIES:  1+ BL LE edema  SKIN:  No rash/erythema/ecchymoses/petechiae/wounds/jaundice  NEURO:  sedated    Vital Signs:  Vital Signs Last 24 Hrs  T(C): 36.8 (2022 12:00), Max: 36.8 (2022 12:00)  T(F): 98.2 (2022 12:00), Max: 98.2 (2022 12:00)  HR: 131 (2022 12:30) (110 - 131)  BP: 144/87 (2022 12:00) (100/62 - 186/110)  BP(mean): 96 (2022 12:00) (74 - 130)  RR: 35 (2022 12:30) (15 - 35)  SpO2: 97% (2022 12:30) (91% - 100%)  Daily Height in cm: 167.6 (2022 17:29)    Daily Weight in k.7 (2022 06:00)    LABS:                        8.8    3.33  )-----------( 38       ( 2022 05:19 )             25.2     06-09    139  |  101  |  32<H>  ----------------------------<  104<H>  3.2<L>   |  21<L>  |  0.92    Ca    8.0<L>      2022 05:19  Phos  2.8       Mg     1.90         TPro  6.3  /  Alb  2.3<L>  /  TBili  1.2  /  DBili  x   /  AST  23  /  ALT  8   /  AlkPhos  58  06-08    LIVER FUNCTIONS - ( 2022 06:00 )  Alb: 2.3 g/dL / Pro: 6.3 g/dL / ALK PHOS: 58 U/L / ALT: 8 U/L / AST: 23 U/L / GGT: x           PT/INR - ( 2022 05:19 )   PT: 16.0 sec;   INR: 1.37 ratio         PTT - ( 2022 05:19 )  PTT:33.2 sec        Imaging:        ACC: [id number] EXAM:  XR ABDOMEN PORTABLE URGENT 1V                          PROCEDURE DATE:  2022          INTERPRETATION:  CLINICAL INFORMATION: Metastatic cancer with concern for   bowel perforation    EXAM: single frontal view of the abdomen.    COMPARISON: CT abdomen 2022    FINDINGS:  Bilateral nephrostomy tubes.  Nonobstructive bowel gas pattern.  Contrast seen within bowel loops in the left lower quadrant.  No pneumoperitoneum.  The visualized lung bases are clear.    IMPRESSION:  No intraperitoneal free air as clinically questioned.

## 2022-06-09 NOTE — PROGRESS NOTE ADULT - ATTENDING COMMENTS
#Worsening peritoneum s/p emergent ex-lap with evidence of grossly metastatic disease, including large tumor in pelvis and sigmoid perforation s/p washout, open resection of left colon, colostomy  #Coffee ground emesis, nausea, vomiting  #Abnormal CT with findings c/f necrotic tumor vs rectosigmoid-vaginal fistula, possible colitis, pneumoperitoneum   #Metastatic cervical cancer    --PPI BID  --Defer EGD at this time given current clinical status and absence of further overt UGIB  --Maintain Hgb > 7, Plt > 50   --Ongoing GOC per primary team

## 2022-06-09 NOTE — DISCHARGE NOTE PROVIDER - NSDCMRMEDTOKEN_GEN_ALL_CORE_FT
hydrALAZINE 25 mg oral tablet: 1 tab(s) orally 3 times a day   metoprolol succinate 100 mg oral tablet, extended release: 1 tab(s) orally once a day   acetaminophen 325 mg oral tablet: 3 tab(s) orally every 6 hours, As needed, Mild Pain (1 - 3)  enoxaparin: 40 milligram(s) subcutaneous once a day  hydrALAZINE 25 mg oral tablet: 1 tab(s) orally 3 times a day   metoprolol succinate 100 mg oral tablet, extended release: 1 tab(s) orally once a day  mirtazapine 15 mg oral tablet: 1 tab(s) orally once a day  oxyCODONE 15 mg oral tablet, extended release: 1 tab(s) orally every 12 hours  oxyCODONE 5 mg oral tablet: 1 tab(s) orally every 3 hours, As needed, Moderate Pain (4 - 6)  oxyCODONE 7.5 mg oral tablet: 1 tab(s) orally every 3 hours, As needed, Severe Pain (7 - 10)   acetaminophen 10 mg/mL intravenous solution: 100 milliliter(s) intravenous every 6 hours, As needed, Mild Pain (1 - 3)  aluminum hydroxide-magnesium hydroxide 200 mg-200 mg/5 mL oral suspension: 30 milliliter(s) orally every 4 hours, As needed, Dyspepsia  cyclobenzaprine 5 mg oral tablet: 1 tab(s) orally 3 times a day, As needed, Muscle Spasm  enoxaparin: 40 milligram(s) subcutaneous once a day  hydrALAZINE 25 mg oral tablet: 1 tab(s) orally 3 times a day   HYDROmorphone: 0.5 milligram(s) intravenously every 3 hours, As Needed  HYDROmorphone: 1 milligram(s) intravenously every 4 hours, As Needed  metoprolol succinate 100 mg oral tablet, extended release: 1 tab(s) orally once a day  mirtazapine 15 mg oral tablet: 1 tab(s) orally once a day  oxyCODONE 30 mg oral tablet, extended release: 1 tab(s) orally every 12 hours  pantoprazole 40 mg intravenous injection: 40 milligram(s) intravenous 2 times a day  piperacillin-tazobactam: 3.375 granules intravenously every 8 hours  simethicone 80 mg oral tablet, chewable: 1 tab(s) orally 3 times a day, As needed, Gas

## 2022-06-09 NOTE — PROGRESS NOTE ADULT - SUBJECTIVE AND OBJECTIVE BOX
Chief Complaint/Follow-up on:   concern for hypothyroid      Subjective:  interim events noted:  pt with perforated viscous s/p washout now in SICU, intubated sedated         MEDICATIONS  (STANDING):  chlorhexidine 0.12% Liquid 15 milliLiter(s) Oral Mucosa every 12 hours  chlorhexidine 4% Liquid 1 Application(s) Topical daily  dexMEDEtomidine Infusion 0.05 MICROgram(s)/kG/Hr (1.26 mL/Hr) IV Continuous <Continuous>  fentaNYL   Infusion. 0.5 MICROgram(s)/kG/Hr (5.02 mL/Hr) IV Continuous <Continuous>  filgrastim-sndz (ZARXIO) Injectable 480 MICROGram(s) SubCutaneous daily  lactated ringers. 1000 milliLiter(s) (100 mL/Hr) IV Continuous <Continuous>  norepinephrine Infusion 0.05 MICROgram(s)/kG/Min (9.41 mL/Hr) IV Continuous <Continuous>  pantoprazole  Injectable 40 milliGRAM(s) IV Push two times a day  piperacillin/tazobactam IVPB.. 3.375 Gram(s) IV Intermittent every 8 hours  propofol Infusion 10 MICROgram(s)/kG/Min (6.02 mL/Hr) IV Continuous <Continuous>    MEDICATIONS  (PRN):  metoclopramide Injectable 10 milliGRAM(s) IV Push every 8 hours PRN nausea/vomiting  ondansetron Injectable 4 milliGRAM(s) IV Push every 6 hours PRN Nausea and/or Vomiting      PHYSICAL EXAM:  VITALS: T(C): 36.8 (06-09-22 @ 12:00)  T(F): 98.2 (06-09-22 @ 12:00), Max: 98.2 (06-09-22 @ 12:00)  HR: 131 (06-09-22 @ 12:30) (110 - 131)  BP: 144/87 (06-09-22 @ 12:00) (100/62 - 186/110)  RR:  (15 - 35)  SpO2:  (91% - 100%)  Wt(kg): --  GENERAL: intubated sedated   RESPIRATORY: intubated   CARDIOVASCULAR: Regular rate and rhythm;    POCT Blood Glucose.: 97 mg/dL (06-09-22 @ 11:50)    06-09    139  |  101  |  32<H>  ----------------------------<  104<H>  3.2<L>   |  21<L>  |  0.92    eGFR: 74    Ca    8.0<L>      06-09  Mg     1.90     06-09  Phos  2.8     06-09    TPro  6.3  /  Alb  2.3<L>  /  TBili  1.2  /  DBili  x   /  AST  23  /  ALT  8   /  AlkPhos  58  06-08          Thyroid Function Tests:  06-08 @ 06:00 TSH -- FreeT4 0.8 T3 41 Anti TPO -- Anti Thyroglobulin Ab -- TSI --  06-06 @ 06:59 TSH 2.89 FreeT4 -- T3 -- Anti TPO -- Anti Thyroglobulin Ab -- TSI --

## 2022-06-09 NOTE — PROGRESS NOTE ADULT - SUBJECTIVE AND OBJECTIVE BOX
SICU Daily Progress Note  =====================================================  Interval / Overnight Events: Emergently went to OR yesterday for pneumoperitoneum.  Planned for return to OR tomorrow with GYN / Onc.      HPI:  Patient is a 52 year old  female with a PMHx of HTN and recurrent cervical cancer (per patient stage III) who presented as transfer from [place] with neutropenic fever. (2022 03:21)      PAST MEDICAL & SURGICAL HISTORY:  HTN (hypertension)  Cervical cancer  Nephrostomy status      ALLERGIES:  No Known Allergies    --------------------------------------------------------------------------------------    MEDICATIONS:    Neurologic Medications  fentaNYL   Infusion. 0.5 MICROgram(s)/kG/Hr IV Continuous <Continuous>  metoclopramide Injectable 10 milliGRAM(s) IV Push every 8 hours PRN nausea/vomiting  ondansetron Injectable 4 milliGRAM(s) IV Push every 6 hours PRN Nausea and/or Vomiting  propofol Infusion 10 MICROgram(s)/kG/Min IV Continuous <Continuous>    Cardiovascular Medications  norepinephrine Infusion 0.05 MICROgram(s)/kG/Min IV Continuous <Continuous>    Gastrointestinal Medications  lactated ringers. 1000 milliLiter(s) IV Continuous <Continuous>  pantoprazole  Injectable 40 milliGRAM(s) IV Push two times a day    Hematologic/Oncologic Medications  filgrastim-sndz (ZARXIO) Injectable 480 MICROGram(s) SubCutaneous daily    Antimicrobial/Immunologic Medications  piperacillin/tazobactam IVPB.. 3.375 Gram(s) IV Intermittent every 8 hours    Topical/Other Medications  chlorhexidine 0.12% Liquid 15 milliLiter(s) Oral Mucosa every 12 hours  chlorhexidine 4% Liquid 1 Application(s) Topical daily    --------------------------------------------------------------------------------------    VITAL SIGNS:  ICU Vital Signs Last 24 Hrs  T(C): 36.4 (2022 08:00), Max: 36.7 (2022 10:26)  T(F): 97.5 (2022 08:00), Max: 98 (2022 10:26)  HR: 112 (2022 09:00) (110 - 124)  BP: 131/77 (2022 08:00) (100/62 - 186/110)  BP(mean): 93 (2022 08:00) (74 - 130)  ABP: 98/52 (2022 09:00) (87/45 - 186/83)  ABP(mean): 67 (2022 09:00) (56 - 118)  RR: 16 (2022 09:00) (15 - 26)  SpO2: 100% (2022 09:) (91% - 100%)    --------------------------------------------------------------------------------------    INS AND OUTS:    2022 07:01  -  2022 07:00  --------------------------------------------------------  IN:    IV PiggyBack: 850 mL    Lactated Ringers: 1125 mL    Lactated Ringers: 600 mL    Lactated Ringers Bolus: 1000 mL    Norepinephrine: 15 mL    Propofol: 177.4 mL    sodium chloride 0.9%: 600 mL  Total IN: 4367.4 mL    OUT:    Indwelling Catheter - Urethral (mL): 395 mL    Nasogastric/Oral tube (mL): 200 mL    Nephrostomy Tube (mL): 1450 mL    Nephrostomy Tube (mL): 1375 mL    Oral Fluid: 0 mL    VAC (Vacuum Assisted Closure) System (mL): 750 mL  Total OUT: 4170 mL    Total NET: 197.4 mL      2022 07:01  -  2022 09:55  --------------------------------------------------------  IN:    FentaNYL: 20 mL    Lactated Ringers: 250 mL    Norepinephrine: 18.8 mL    Propofol: 35.1 mL  Total IN: 323.9 mL    OUT:    Indwelling Catheter - Urethral (mL): 10 mL    Nephrostomy Tube (mL): 200 mL    Nephrostomy Tube (mL): 100 mL  Total OUT: 310 mL    Total NET: 13.9 mL    --------------------------------------------------------------------------------------    EXAM    NEUROLOGY  Exam: Sedated.    HEENT  Exam: Normocephalic, atraumatic.    RESPIRATORY  Exam: Intubated.  Mechanical Ventilation: Mode: AC/ CMV (Assist Control/ Continuous Mandatory Ventilation), RR (machine): 18, TV (machine): 500, FiO2: 60, PEEP: 8, ITime: 0.9, MAP: 15, PIP: 33    CARDIOVASCULAR  Exam: S1, S2.  Regular rate and rhythm.    GI/NUTRITION  Exam: Abdomen soft, Non-tender, Non-distended.  Abthera VAC in place.  NGT.  Current Diet: NPO    RENAL  Exam: Bilateral nephrostomy tubes.    MUSCULOSKELETAL  Exam: All extremities moving spontaneously without limitations.      METABOLIC / FLUIDS / ELECTROLYTES  lactated ringers. 1000 milliLiter(s) IV Continuous <Continuous>      INFECTIOUS DISEASE  Antimicrobials/Immunologic Medications:  filgrastim-sndz (ZARXIO) Injectable 480 MICROGram(s) SubCutaneous daily  piperacillin/tazobactam IVPB.. 3.375 Gram(s) IV Intermittent every 8 hours      TUBES / LINES / DRAINS  [x] Peripheral IV  [] Central Venous Line     	[] R	[] L	[] IJ	[] Fem	[] SC	Date Placed:   [] Arterial Line		[] R	[] L	[] Fem	[] Rad	[] Ax	Date Placed:   [] PICC		[] Midline		[] Mediport  [x] Urinary Catheter		Date Placed:   [x] Necessity of urinary, arterial, and venous catheters discussed    --------------------------------------------------------------------------------------

## 2022-06-09 NOTE — PROGRESS NOTE ADULT - ASSESSMENT
A/P: 52y  with recurrent cervical CA who presented to the ED as transfer from [place] admitted for mgmt of neutropenic fever. On presentation, patient was found to be tachycardic to 130s, ANC of 200, and febrile. She received 1uPRBC on admission. She became acutely altered  with new coffee ground emesis and increasing free air on abdominal xray on  increasing concern for bowel perforation. Pt taken for emergent exploratory laparotomy, abdominal washout, rectosigmoid colectomy, diverting colostomy, bronchoscopy and abthera placement on  (ebl 2000cc, s/p 5UpRBC, 3L albumin, 3U Plts, 3U FFP, 3U Cryo). Patient overall stable overnight in SICU.     Onc: recurrent cervical cancer. 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 Treated with definitive radiation and weekly cisplatin at [facility]. Final radiation was 10/29/2014.   - Recurrence in  with bilateral ureteral obstruction. Recently started on new chemo regimen (Carbo AUC 5, Paxol 135 mg/m2 and Avastin 15 mg/m2 on q3 week schedule), last cycle on   - CT A/P as above. Demonstrates carcinomatosis   Neuro: Continue Tylenol IV and propofol per SICU.   CV: baseline tachycardia since admission. Pt off pressors at this time. Continue to monitor VS per routine.  - Per hospitalist, tachycardia 2/2 to sepsis. Treat underlying cause.  Pulm: Intubated on VC. Extubation per SICU.   GI: NPO. GI following. Continue IV Protonix/Reglan. NG tube in place (- ).   : b/l nephrostomy tubes in place. Infante in place. Patient has ASHLEY likely 2/2 IV Contrast & Dehydration. Will continue to monitor UOP. Labs per SICU. Replete electrolytes PRN  Heme: Continue Venodynes for DVT ppx. Increase OOB.    ID: Afebrile. Continue Zosyn (s/p Cefepime) for presumed sepsis on admission. Continue to trend WBC. Continue Zarixo for neutropenic fever. WBC 0.47, ANC 0.16. Lactate 2.5->1.9->3.4. Labs per SICU  Dispo: continued inpatient care per SICU     RFrankel PGY3 A/P: 52y  with recurrent cervical CA who presented to the ED as transfer from [place] admitted for mgmt of neutropenic fever. On presentation, patient was found to be tachycardic to 130s, ANC of 200, and febrile. She received 1uPRBC on admission. She became acutely altered  with new coffee ground emesis and increasing free air on abdominal xray on  increasing concern for bowel perforation. Pt taken for emergent exploratory laparotomy, abdominal washout, rectosigmoid colectomy, diverting colostomy, bronchoscopy and abthera placement on  (ebl 2000cc, s/p 5UpRBC, 3L albumin, 3U Plts, 3U FFP, 3U Cryo). Patient overall stable overnight in SICU.     Onc: recurrent cervical cancer. 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 Treated with definitive radiation and weekly cisplatin at [facility]. Final radiation was 10/29/2014.   - Recurrence in  with bilateral ureteral obstruction. Recently started on new chemo regimen (Carbo AUC 5, Paxol 135 mg/m2 and Avastin 15 mg/m2 on q3 week schedule), last cycle on   - CT A/P as above. Demonstrates carcinomatosis   Neuro: Continue Tylenol IV and propofol per SICU.   CV: baseline tachycardia since admission. Pt off pressors at this time. Continue to monitor VS per routine.  - Per hospitalist, tachycardia 2/2 to sepsis. Treat underlying cause.  Pulm: Intubated on VC. Extubation per SICU.   GI: NPO. GI following. Continue IV Protonix/Reglan. NG tube in place (- ).   : b/l nephrostomy tubes in place. Infante in place. Patient has ASHLEY likely 2/2 IV Contrast & Dehydration. Will continue to monitor UOP. Labs per SICU. Replete electrolytes PRN  Heme: Continue Venodynes for DVT ppx. Increase OOB.    ID: Afebrile. Continue Zosyn (s/p Cefepime) for presumed sepsis on admission. Continue to trend WBC. Continue Zarixo for neutropenic fever. WBC 0.47, ANC 0.16. Lactate 2.5->1.9->3.4. Labs per SICU  Dispo: continued inpatient care per SICU     RFrankel PGY3 A/P: 52y  with recurrent cervical CA who presented to the ED as transfer from [place] admitted for mgmt of neutropenic fever. On presentation, patient was found to be tachycardic to 130s, ANC of 200, and febrile. She received 1uPRBC on admission. She became acutely altered  with new coffee ground emesis and increasing free air on abdominal xray on  increasing concern for bowel perforation. Pt taken for emergent exploratory laparotomy, abdominal washout, rectosigmoid colectomy, diverting colostomy, bronchoscopy and abthera placement on  (ebl 2000cc, s/p 5UpRBC, 3L albumin, 3U Plts, 3U FFP, 3U Cryo). Patient overall stable overnight in SICU.     Onc: recurrent cervical cancer. 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 Treated with definitive radiation and weekly cisplatin at [facility]. Final radiation was 10/29/2014.   - Recurrence in  with bilateral ureteral obstruction. Recently started on new chemo regimen (Carbo AUC 5, Paxol 135 mg/m2 and Avastin 15 mg/m2 on q3 week schedule), last cycle on   - CT A/P as above. Demonstrates carcinomatosis   Neuro: Continue Tylenol IV and propofol per SICU.   CV: baseline tachycardia since admission. Pt off pressors at this time. Continue to monitor VS per routine.  - Per hospitalist, tachycardia 2/2 to sepsis. Treat underlying cause.  Pulm: Intubated on VC. Extubation per SICU.   GI: NPO. GI following. Continue IV Protonix/Reglan. NG tube in place (- ).   : b/l nephrostomy tubes in place. Infante in place. Patient has ASHLEY likely 2/2 IV Contrast & Dehydration. Will continue to monitor UOP. Labs per SICU. Replete electrolytes PRN  Heme: Continue Venodynes for DVT ppx. Increase OOB.    ID: Afebrile. Continue Zosyn (s/p Cefepime) for presumed sepsis on admission. Continue to trend WBC. Continue Zarixo for neutropenic fever. WBC 0.47, ANC 0.16. Lactate 2.5->1.9->3.4. Labs per SICU  Dispo: continued inpatient care per SICU; plan for possible OR tomorrow 6/10 for abdominal washout and possible closure.     RFrankel PGY3

## 2022-06-09 NOTE — DISCHARGE NOTE PROVIDER - DETAILS OF MALNUTRITION DIAGNOSIS/DIAGNOSES
This patient has been assessed with a concern for Malnutrition and was treated during this hospitalization for the following Nutrition diagnosis/diagnoses:     -  06/09/2022: Severe protein-calorie malnutrition

## 2022-06-09 NOTE — PROGRESS NOTE ADULT - ATTENDING COMMENTS
Septic encephalopathy.  R/O primary neurological cause for encephalopathy.   MRI brain w/wo gado  Continuous EEG  Consider ID consult  Thank you

## 2022-06-09 NOTE — DIETITIAN INITIAL EVALUATION ADULT - OTHER INFO
Patient is a 52 year old  female with a PMHx of HTN and recurrent cervical cancer (per patient stage III) who presented as transfer from [place] with neutropenic fever.  Hospital course complicated by free air noted on CT Abdomen / Pelvis.  Patient became acutely altered on 22 with increased free air noted on AXR with concern for perforated viscous.  Patient is now S/P ex-lap, washout, open resection of left colon and creation of colostomy on 22.  Patient transferred to SICU post operatively for hemodynamic monitoring.

## 2022-06-10 ENCOUNTER — TRANSCRIPTION ENCOUNTER (OUTPATIENT)
Age: 52
End: 2022-06-10

## 2022-06-10 ENCOUNTER — APPOINTMENT (OUTPATIENT)
Dept: SURGICAL ONCOLOGY | Facility: HOSPITAL | Age: 52
End: 2022-06-10

## 2022-06-10 LAB
ANION GAP SERPL CALC-SCNC: 10 MMOL/L — SIGNIFICANT CHANGE UP (ref 7–14)
ANION GAP SERPL CALC-SCNC: 13 MMOL/L — SIGNIFICANT CHANGE UP (ref 7–14)
ANION GAP SERPL CALC-SCNC: 13 MMOL/L — SIGNIFICANT CHANGE UP (ref 7–14)
ANION GAP SERPL CALC-SCNC: 17 MMOL/L — HIGH (ref 7–14)
APTT BLD: 25.6 SEC — LOW (ref 27–36.3)
APTT BLD: 30.8 SEC — SIGNIFICANT CHANGE UP (ref 27–36.3)
APTT BLD: 34.9 SEC — SIGNIFICANT CHANGE UP (ref 27–36.3)
BASOPHILS # BLD AUTO: 0 K/UL — SIGNIFICANT CHANGE UP (ref 0–0.2)
BASOPHILS # BLD AUTO: 0.01 K/UL — SIGNIFICANT CHANGE UP (ref 0–0.2)
BASOPHILS NFR BLD AUTO: 0 % — SIGNIFICANT CHANGE UP (ref 0–2)
BASOPHILS NFR BLD AUTO: 0.1 % — SIGNIFICANT CHANGE UP (ref 0–2)
BLD GP AB SCN SERPL QL: NEGATIVE — SIGNIFICANT CHANGE UP
BLOOD GAS ARTERIAL - LYTES,HGB,ICA,LACT RESULT: SIGNIFICANT CHANGE UP
BLOOD GAS ARTERIAL - LYTES,HGB,ICA,LACT RESULT: SIGNIFICANT CHANGE UP
BLOOD GAS ARTERIAL COMPREHENSIVE RESULT: SIGNIFICANT CHANGE UP
BUN SERPL-MCNC: 32 MG/DL — HIGH (ref 7–23)
BUN SERPL-MCNC: 32 MG/DL — HIGH (ref 7–23)
BUN SERPL-MCNC: 34 MG/DL — HIGH (ref 7–23)
BUN SERPL-MCNC: 34 MG/DL — HIGH (ref 7–23)
CALCIUM SERPL-MCNC: 7.9 MG/DL — LOW (ref 8.4–10.5)
CALCIUM SERPL-MCNC: 7.9 MG/DL — LOW (ref 8.4–10.5)
CALCIUM SERPL-MCNC: 8 MG/DL — LOW (ref 8.4–10.5)
CALCIUM SERPL-MCNC: 8.1 MG/DL — LOW (ref 8.4–10.5)
CHLORIDE SERPL-SCNC: 103 MMOL/L — SIGNIFICANT CHANGE UP (ref 98–107)
CHLORIDE SERPL-SCNC: 104 MMOL/L — SIGNIFICANT CHANGE UP (ref 98–107)
CHLORIDE SERPL-SCNC: 105 MMOL/L — SIGNIFICANT CHANGE UP (ref 98–107)
CHLORIDE SERPL-SCNC: 106 MMOL/L — SIGNIFICANT CHANGE UP (ref 98–107)
CO2 SERPL-SCNC: 20 MMOL/L — LOW (ref 22–31)
CO2 SERPL-SCNC: 22 MMOL/L — SIGNIFICANT CHANGE UP (ref 22–31)
CO2 SERPL-SCNC: 24 MMOL/L — SIGNIFICANT CHANGE UP (ref 22–31)
CO2 SERPL-SCNC: 25 MMOL/L — SIGNIFICANT CHANGE UP (ref 22–31)
CREAT SERPL-MCNC: 1.11 MG/DL — SIGNIFICANT CHANGE UP (ref 0.5–1.3)
CREAT SERPL-MCNC: 1.19 MG/DL — SIGNIFICANT CHANGE UP (ref 0.5–1.3)
CREAT SERPL-MCNC: 1.22 MG/DL — SIGNIFICANT CHANGE UP (ref 0.5–1.3)
CREAT SERPL-MCNC: 1.24 MG/DL — SIGNIFICANT CHANGE UP (ref 0.5–1.3)
CULTURE RESULTS: SIGNIFICANT CHANGE UP
CULTURE RESULTS: SIGNIFICANT CHANGE UP
EGFR: 52 ML/MIN/1.73M2 — LOW
EGFR: 53 ML/MIN/1.73M2 — LOW
EGFR: 54 ML/MIN/1.73M2 — LOW
EGFR: 59 ML/MIN/1.73M2 — LOW
EOSINOPHIL # BLD AUTO: 0 K/UL — SIGNIFICANT CHANGE UP (ref 0–0.5)
EOSINOPHIL # BLD AUTO: 0.02 K/UL — SIGNIFICANT CHANGE UP (ref 0–0.5)
EOSINOPHIL NFR BLD AUTO: 0 % — SIGNIFICANT CHANGE UP (ref 0–6)
EOSINOPHIL NFR BLD AUTO: 0.2 % — SIGNIFICANT CHANGE UP (ref 0–6)
GLUCOSE BLDC GLUCOMTR-MCNC: 102 MG/DL — HIGH (ref 70–99)
GLUCOSE BLDC GLUCOMTR-MCNC: 104 MG/DL — HIGH (ref 70–99)
GLUCOSE BLDC GLUCOMTR-MCNC: 119 MG/DL — HIGH (ref 70–99)
GLUCOSE BLDC GLUCOMTR-MCNC: 83 MG/DL — SIGNIFICANT CHANGE UP (ref 70–99)
GLUCOSE BLDC GLUCOMTR-MCNC: 94 MG/DL — SIGNIFICANT CHANGE UP (ref 70–99)
GLUCOSE SERPL-MCNC: 102 MG/DL — HIGH (ref 70–99)
GLUCOSE SERPL-MCNC: 104 MG/DL — HIGH (ref 70–99)
GLUCOSE SERPL-MCNC: 107 MG/DL — HIGH (ref 70–99)
GLUCOSE SERPL-MCNC: 78 MG/DL — SIGNIFICANT CHANGE UP (ref 70–99)
HCT VFR BLD CALC: 24.3 % — LOW (ref 34.5–45)
HCT VFR BLD CALC: 24.7 % — LOW (ref 34.5–45)
HCT VFR BLD CALC: 26.7 % — LOW (ref 34.5–45)
HCT VFR BLD CALC: 26.7 % — LOW (ref 34.5–45)
HCT VFR BLD CALC: 27.2 % — LOW (ref 34.5–45)
HGB BLD-MCNC: 8.2 G/DL — LOW (ref 11.5–15.5)
HGB BLD-MCNC: 8.2 G/DL — LOW (ref 11.5–15.5)
HGB BLD-MCNC: 8.9 G/DL — LOW (ref 11.5–15.5)
HGB BLD-MCNC: 9.2 G/DL — LOW (ref 11.5–15.5)
HGB BLD-MCNC: 9.2 G/DL — LOW (ref 11.5–15.5)
IANC: 7.55 K/UL — HIGH (ref 1.8–7.4)
IANC: 9.46 K/UL — HIGH (ref 1.8–7.4)
IMM GRANULOCYTES NFR BLD AUTO: 9.6 % — HIGH (ref 0–1.5)
INR BLD: 1.21 RATIO — HIGH (ref 0.88–1.16)
INR BLD: 1.23 RATIO — HIGH (ref 0.88–1.16)
INR BLD: 1.32 RATIO — HIGH (ref 0.88–1.16)
LYMPHOCYTES # BLD AUTO: 0.42 K/UL — LOW (ref 1–3.3)
LYMPHOCYTES # BLD AUTO: 0.73 K/UL — LOW (ref 1–3.3)
LYMPHOCYTES # BLD AUTO: 4.5 % — LOW (ref 13–44)
LYMPHOCYTES # BLD AUTO: 6.3 % — LOW (ref 13–44)
MAGNESIUM SERPL-MCNC: 1.8 MG/DL — SIGNIFICANT CHANGE UP (ref 1.6–2.6)
MAGNESIUM SERPL-MCNC: 1.9 MG/DL — SIGNIFICANT CHANGE UP (ref 1.6–2.6)
MAGNESIUM SERPL-MCNC: 1.9 MG/DL — SIGNIFICANT CHANGE UP (ref 1.6–2.6)
MAGNESIUM SERPL-MCNC: 2 MG/DL — SIGNIFICANT CHANGE UP (ref 1.6–2.6)
MCHC RBC-ENTMCNC: 28.9 PG — SIGNIFICANT CHANGE UP (ref 27–34)
MCHC RBC-ENTMCNC: 28.9 PG — SIGNIFICANT CHANGE UP (ref 27–34)
MCHC RBC-ENTMCNC: 29.2 PG — SIGNIFICANT CHANGE UP (ref 27–34)
MCHC RBC-ENTMCNC: 29.3 PG — SIGNIFICANT CHANGE UP (ref 27–34)
MCHC RBC-ENTMCNC: 29.3 PG — SIGNIFICANT CHANGE UP (ref 27–34)
MCHC RBC-ENTMCNC: 33.2 GM/DL — SIGNIFICANT CHANGE UP (ref 32–36)
MCHC RBC-ENTMCNC: 33.3 GM/DL — SIGNIFICANT CHANGE UP (ref 32–36)
MCHC RBC-ENTMCNC: 33.7 GM/DL — SIGNIFICANT CHANGE UP (ref 32–36)
MCHC RBC-ENTMCNC: 33.8 GM/DL — SIGNIFICANT CHANGE UP (ref 32–36)
MCHC RBC-ENTMCNC: 34.5 GM/DL — SIGNIFICANT CHANGE UP (ref 32–36)
MCV RBC AUTO: 85 FL — SIGNIFICANT CHANGE UP (ref 80–100)
MCV RBC AUTO: 85.6 FL — SIGNIFICANT CHANGE UP (ref 80–100)
MCV RBC AUTO: 86.6 FL — SIGNIFICANT CHANGE UP (ref 80–100)
MCV RBC AUTO: 86.7 FL — SIGNIFICANT CHANGE UP (ref 80–100)
MCV RBC AUTO: 87.9 FL — SIGNIFICANT CHANGE UP (ref 80–100)
MONOCYTES # BLD AUTO: 0.27 K/UL — SIGNIFICANT CHANGE UP (ref 0–0.9)
MONOCYTES # BLD AUTO: 0.6 K/UL — SIGNIFICANT CHANGE UP (ref 0–0.9)
MONOCYTES NFR BLD AUTO: 2.3 % — SIGNIFICANT CHANGE UP (ref 2–14)
MONOCYTES NFR BLD AUTO: 6.4 % — SIGNIFICANT CHANGE UP (ref 2–14)
NEUTROPHILS # BLD AUTO: 7.95 K/UL — HIGH (ref 1.8–7.4)
NEUTROPHILS # BLD AUTO: 9.46 K/UL — HIGH (ref 1.8–7.4)
NEUTROPHILS NFR BLD AUTO: 77.3 % — HIGH (ref 43–77)
NEUTROPHILS NFR BLD AUTO: 81.5 % — HIGH (ref 43–77)
NRBC # BLD: 1 /100 WBCS — SIGNIFICANT CHANGE UP
NRBC # BLD: 2 /100 WBCS — SIGNIFICANT CHANGE UP
NRBC # FLD: 0.14 K/UL — HIGH
NRBC # FLD: 0.19 K/UL — HIGH
NRBC # FLD: 0.19 K/UL — HIGH
NRBC # FLD: 0.23 K/UL — HIGH
PHOSPHATE SERPL-MCNC: 2.2 MG/DL — LOW (ref 2.5–4.5)
PHOSPHATE SERPL-MCNC: 2.9 MG/DL — SIGNIFICANT CHANGE UP (ref 2.5–4.5)
PHOSPHATE SERPL-MCNC: 3.3 MG/DL — SIGNIFICANT CHANGE UP (ref 2.5–4.5)
PHOSPHATE SERPL-MCNC: 3.4 MG/DL — SIGNIFICANT CHANGE UP (ref 2.5–4.5)
PLATELET # BLD AUTO: 14 K/UL — CRITICAL LOW (ref 150–400)
PLATELET # BLD AUTO: 14 K/UL — CRITICAL LOW (ref 150–400)
PLATELET # BLD AUTO: 20 K/UL — CRITICAL LOW (ref 150–400)
PLATELET # BLD AUTO: 21 K/UL — LOW (ref 150–400)
PLATELET # BLD AUTO: 25 K/UL — LOW (ref 150–400)
POTASSIUM SERPL-MCNC: 2.9 MMOL/L — CRITICAL LOW (ref 3.5–5.3)
POTASSIUM SERPL-MCNC: 3.6 MMOL/L — SIGNIFICANT CHANGE UP (ref 3.5–5.3)
POTASSIUM SERPL-MCNC: 3.8 MMOL/L — SIGNIFICANT CHANGE UP (ref 3.5–5.3)
POTASSIUM SERPL-MCNC: 3.8 MMOL/L — SIGNIFICANT CHANGE UP (ref 3.5–5.3)
POTASSIUM SERPL-SCNC: 2.9 MMOL/L — CRITICAL LOW (ref 3.5–5.3)
POTASSIUM SERPL-SCNC: 3.6 MMOL/L — SIGNIFICANT CHANGE UP (ref 3.5–5.3)
POTASSIUM SERPL-SCNC: 3.8 MMOL/L — SIGNIFICANT CHANGE UP (ref 3.5–5.3)
POTASSIUM SERPL-SCNC: 3.8 MMOL/L — SIGNIFICANT CHANGE UP (ref 3.5–5.3)
PROTHROM AB SERPL-ACNC: 14.1 SEC — HIGH (ref 10.5–13.4)
PROTHROM AB SERPL-ACNC: 14.3 SEC — HIGH (ref 10.5–13.4)
PROTHROM AB SERPL-ACNC: 15.4 SEC — HIGH (ref 10.5–13.4)
RBC # BLD: 2.81 M/UL — LOW (ref 3.8–5.2)
RBC # BLD: 2.84 M/UL — LOW (ref 3.8–5.2)
RBC # BLD: 3.08 M/UL — LOW (ref 3.8–5.2)
RBC # BLD: 3.14 M/UL — LOW (ref 3.8–5.2)
RBC # BLD: 3.14 M/UL — LOW (ref 3.8–5.2)
RBC # FLD: 17.3 % — HIGH (ref 10.3–14.5)
RBC # FLD: 17.5 % — HIGH (ref 10.3–14.5)
RBC # FLD: 17.7 % — HIGH (ref 10.3–14.5)
RBC # FLD: 18 % — HIGH (ref 10.3–14.5)
RBC # FLD: 18.1 % — HIGH (ref 10.3–14.5)
RH IG SCN BLD-IMP: POSITIVE — SIGNIFICANT CHANGE UP
SODIUM SERPL-SCNC: 140 MMOL/L — SIGNIFICANT CHANGE UP (ref 135–145)
SODIUM SERPL-SCNC: 140 MMOL/L — SIGNIFICANT CHANGE UP (ref 135–145)
SODIUM SERPL-SCNC: 141 MMOL/L — SIGNIFICANT CHANGE UP (ref 135–145)
SODIUM SERPL-SCNC: 141 MMOL/L — SIGNIFICANT CHANGE UP (ref 135–145)
SPECIMEN SOURCE: SIGNIFICANT CHANGE UP
SPECIMEN SOURCE: SIGNIFICANT CHANGE UP
WBC # BLD: 10.83 K/UL — HIGH (ref 3.8–10.5)
WBC # BLD: 12.04 K/UL — HIGH (ref 3.8–10.5)
WBC # BLD: 12.35 K/UL — HIGH (ref 3.8–10.5)
WBC # BLD: 13.8 K/UL — HIGH (ref 3.8–10.5)
WBC # BLD: 9.3 K/UL — SIGNIFICANT CHANGE UP (ref 3.8–10.5)
WBC # FLD AUTO: 10.83 K/UL — HIGH (ref 3.8–10.5)
WBC # FLD AUTO: 12.04 K/UL — HIGH (ref 3.8–10.5)
WBC # FLD AUTO: 12.35 K/UL — HIGH (ref 3.8–10.5)
WBC # FLD AUTO: 13.8 K/UL — HIGH (ref 3.8–10.5)
WBC # FLD AUTO: 9.3 K/UL — SIGNIFICANT CHANGE UP (ref 3.8–10.5)

## 2022-06-10 PROCEDURE — 97605 NEG PRS WND THER DME<=50SQCM: CPT | Mod: 79

## 2022-06-10 PROCEDURE — 71045 X-RAY EXAM CHEST 1 VIEW: CPT | Mod: 26

## 2022-06-10 PROCEDURE — 74178 CT ABD&PLV WO CNTR FLWD CNTR: CPT | Mod: 26

## 2022-06-10 PROCEDURE — 49000 EXPLORATION OF ABDOMEN: CPT | Mod: 58

## 2022-06-10 PROCEDURE — 49002 REOPENING OF ABDOMEN: CPT | Mod: 79

## 2022-06-10 PROCEDURE — 49020 DRAINAGE ABDOM ABSCESS OPEN: CPT | Mod: 79

## 2022-06-10 PROCEDURE — 99291 CRITICAL CARE FIRST HOUR: CPT | Mod: GC

## 2022-06-10 RX ORDER — HYDROMORPHONE HYDROCHLORIDE 2 MG/ML
0.5 INJECTION INTRAMUSCULAR; INTRAVENOUS; SUBCUTANEOUS EVERY 6 HOURS
Refills: 0 | Status: DISCONTINUED | OUTPATIENT
Start: 2022-06-10 | End: 2022-06-10

## 2022-06-10 RX ORDER — HYDROMORPHONE HYDROCHLORIDE 2 MG/ML
1 INJECTION INTRAMUSCULAR; INTRAVENOUS; SUBCUTANEOUS EVERY 6 HOURS
Refills: 0 | Status: DISCONTINUED | OUTPATIENT
Start: 2022-06-10 | End: 2022-06-10

## 2022-06-10 RX ORDER — HYDROMORPHONE HYDROCHLORIDE 2 MG/ML
1 INJECTION INTRAMUSCULAR; INTRAVENOUS; SUBCUTANEOUS
Refills: 0 | Status: DISCONTINUED | OUTPATIENT
Start: 2022-06-10 | End: 2022-06-11

## 2022-06-10 RX ORDER — HYDROMORPHONE HYDROCHLORIDE 2 MG/ML
1 INJECTION INTRAMUSCULAR; INTRAVENOUS; SUBCUTANEOUS ONCE
Refills: 0 | Status: DISCONTINUED | OUTPATIENT
Start: 2022-06-10 | End: 2022-06-11

## 2022-06-10 RX ORDER — FENTANYL CITRATE 50 UG/ML
0.5 INJECTION INTRAVENOUS
Qty: 2500 | Refills: 0 | Status: DISCONTINUED | OUTPATIENT
Start: 2022-06-10 | End: 2022-06-16

## 2022-06-10 RX ORDER — POTASSIUM PHOSPHATE, MONOBASIC POTASSIUM PHOSPHATE, DIBASIC 236; 224 MG/ML; MG/ML
30 INJECTION, SOLUTION INTRAVENOUS ONCE
Refills: 0 | Status: COMPLETED | OUTPATIENT
Start: 2022-06-10 | End: 2022-06-10

## 2022-06-10 RX ORDER — POTASSIUM CHLORIDE 20 MEQ
10 PACKET (EA) ORAL
Refills: 0 | Status: COMPLETED | OUTPATIENT
Start: 2022-06-10 | End: 2022-06-10

## 2022-06-10 RX ORDER — DEXTROSE MONOHYDRATE, SODIUM CHLORIDE, AND POTASSIUM CHLORIDE 50; .745; 4.5 G/1000ML; G/1000ML; G/1000ML
1000 INJECTION, SOLUTION INTRAVENOUS
Refills: 0 | Status: DISCONTINUED | OUTPATIENT
Start: 2022-06-10 | End: 2022-06-10

## 2022-06-10 RX ORDER — DEXTROSE 50 % IN WATER 50 %
25 SYRINGE (ML) INTRAVENOUS ONCE
Refills: 0 | Status: COMPLETED | OUTPATIENT
Start: 2022-06-10 | End: 2022-06-10

## 2022-06-10 RX ORDER — POTASSIUM CHLORIDE 20 MEQ
10 PACKET (EA) ORAL
Refills: 0 | Status: COMPLETED | OUTPATIENT
Start: 2022-06-10 | End: 2022-06-11

## 2022-06-10 RX ORDER — MAGNESIUM SULFATE 500 MG/ML
2 VIAL (ML) INJECTION ONCE
Refills: 0 | Status: COMPLETED | OUTPATIENT
Start: 2022-06-10 | End: 2022-06-10

## 2022-06-10 RX ORDER — FENTANYL CITRATE 50 UG/ML
0.5 INJECTION INTRAVENOUS
Qty: 2500 | Refills: 0 | Status: DISCONTINUED | OUTPATIENT
Start: 2022-06-10 | End: 2022-06-10

## 2022-06-10 RX ORDER — SODIUM CHLORIDE 9 MG/ML
1000 INJECTION, SOLUTION INTRAVENOUS
Refills: 0 | Status: DISCONTINUED | OUTPATIENT
Start: 2022-06-10 | End: 2022-06-10

## 2022-06-10 RX ORDER — HYDROMORPHONE HYDROCHLORIDE 2 MG/ML
0.5 INJECTION INTRAMUSCULAR; INTRAVENOUS; SUBCUTANEOUS EVERY 4 HOURS
Refills: 0 | Status: DISCONTINUED | OUTPATIENT
Start: 2022-06-10 | End: 2022-06-11

## 2022-06-10 RX ORDER — CALCIUM GLUCONATE 100 MG/ML
2 VIAL (ML) INTRAVENOUS ONCE
Refills: 0 | Status: COMPLETED | OUTPATIENT
Start: 2022-06-10 | End: 2022-06-10

## 2022-06-10 RX ADMIN — CHLORHEXIDINE GLUCONATE 15 MILLILITER(S): 213 SOLUTION TOPICAL at 17:11

## 2022-06-10 RX ADMIN — Medication 9.41 MICROGRAM(S)/KG/MIN: at 19:35

## 2022-06-10 RX ADMIN — Medication 100 MILLIEQUIVALENT(S): at 22:30

## 2022-06-10 RX ADMIN — Medication 9.41 MICROGRAM(S)/KG/MIN: at 00:49

## 2022-06-10 RX ADMIN — Medication 25 GRAM(S): at 04:15

## 2022-06-10 RX ADMIN — DEXTROSE MONOHYDRATE, SODIUM CHLORIDE, AND POTASSIUM CHLORIDE 100 MILLILITER(S): 50; .745; 4.5 INJECTION, SOLUTION INTRAVENOUS at 07:57

## 2022-06-10 RX ADMIN — CHLORHEXIDINE GLUCONATE 15 MILLILITER(S): 213 SOLUTION TOPICAL at 06:01

## 2022-06-10 RX ADMIN — Medication 100 MILLIEQUIVALENT(S): at 06:53

## 2022-06-10 RX ADMIN — PIPERACILLIN AND TAZOBACTAM 25 GRAM(S): 4; .5 INJECTION, POWDER, LYOPHILIZED, FOR SOLUTION INTRAVENOUS at 07:56

## 2022-06-10 RX ADMIN — FENTANYL CITRATE 5.02 MICROGRAM(S)/KG/HR: 50 INJECTION INTRAVENOUS at 19:32

## 2022-06-10 RX ADMIN — Medication 100 MILLIEQUIVALENT(S): at 04:57

## 2022-06-10 RX ADMIN — Medication 200 GRAM(S): at 06:03

## 2022-06-10 RX ADMIN — PIPERACILLIN AND TAZOBACTAM 25 GRAM(S): 4; .5 INJECTION, POWDER, LYOPHILIZED, FOR SOLUTION INTRAVENOUS at 16:09

## 2022-06-10 RX ADMIN — FENTANYL CITRATE 5.02 MICROGRAM(S)/KG/HR: 50 INJECTION INTRAVENOUS at 07:57

## 2022-06-10 RX ADMIN — POTASSIUM PHOSPHATE, MONOBASIC POTASSIUM PHOSPHATE, DIBASIC 83.33 MILLIMOLE(S): 236; 224 INJECTION, SOLUTION INTRAVENOUS at 04:15

## 2022-06-10 RX ADMIN — DEXMEDETOMIDINE HYDROCHLORIDE IN 0.9% SODIUM CHLORIDE 1.26 MICROGRAM(S)/KG/HR: 4 INJECTION INTRAVENOUS at 02:53

## 2022-06-10 RX ADMIN — HYDROMORPHONE HYDROCHLORIDE 1 MILLIGRAM(S): 2 INJECTION INTRAMUSCULAR; INTRAVENOUS; SUBCUTANEOUS at 17:37

## 2022-06-10 RX ADMIN — FENTANYL CITRATE 5.02 MICROGRAM(S)/KG/HR: 50 INJECTION INTRAVENOUS at 11:25

## 2022-06-10 RX ADMIN — DEXMEDETOMIDINE HYDROCHLORIDE IN 0.9% SODIUM CHLORIDE 1.26 MICROGRAM(S)/KG/HR: 4 INJECTION INTRAVENOUS at 00:48

## 2022-06-10 RX ADMIN — Medication 9.41 MICROGRAM(S)/KG/MIN: at 07:56

## 2022-06-10 RX ADMIN — Medication 100 MILLIEQUIVALENT(S): at 05:56

## 2022-06-10 RX ADMIN — PANTOPRAZOLE SODIUM 40 MILLIGRAM(S): 20 TABLET, DELAYED RELEASE ORAL at 06:02

## 2022-06-10 RX ADMIN — Medication 100 MILLIEQUIVALENT(S): at 21:30

## 2022-06-10 RX ADMIN — Medication 25 MILLILITER(S): at 01:24

## 2022-06-10 RX ADMIN — Medication 100 MILLIEQUIVALENT(S): at 02:52

## 2022-06-10 RX ADMIN — Medication 100 MILLIEQUIVALENT(S): at 07:56

## 2022-06-10 RX ADMIN — SODIUM CHLORIDE 100 MILLILITER(S): 9 INJECTION, SOLUTION INTRAVENOUS at 00:49

## 2022-06-10 RX ADMIN — FENTANYL CITRATE 5.02 MICROGRAM(S)/KG/HR: 50 INJECTION INTRAVENOUS at 00:48

## 2022-06-10 RX ADMIN — Medication 480 MICROGRAM(S): at 11:25

## 2022-06-10 RX ADMIN — Medication 100 MILLIEQUIVALENT(S): at 03:00

## 2022-06-10 RX ADMIN — CHLORHEXIDINE GLUCONATE 1 APPLICATION(S): 213 SOLUTION TOPICAL at 11:35

## 2022-06-10 RX ADMIN — DEXMEDETOMIDINE HYDROCHLORIDE IN 0.9% SODIUM CHLORIDE 1.26 MICROGRAM(S)/KG/HR: 4 INJECTION INTRAVENOUS at 07:57

## 2022-06-10 RX ADMIN — PANTOPRAZOLE SODIUM 40 MILLIGRAM(S): 20 TABLET, DELAYED RELEASE ORAL at 17:11

## 2022-06-10 RX ADMIN — HYDROMORPHONE HYDROCHLORIDE 1 MILLIGRAM(S): 2 INJECTION INTRAMUSCULAR; INTRAVENOUS; SUBCUTANEOUS at 17:07

## 2022-06-10 RX ADMIN — DEXMEDETOMIDINE HYDROCHLORIDE IN 0.9% SODIUM CHLORIDE 1.26 MICROGRAM(S)/KG/HR: 4 INJECTION INTRAVENOUS at 19:32

## 2022-06-10 NOTE — PROVIDER CONTACT NOTE (OTHER) - ACTION/TREATMENT ORDERED:
PA contacting urology. will continue to monitor pt. PA contacting urology. will continue to monitor pt. labs drawn. pt. ordered for CT

## 2022-06-10 NOTE — CHART NOTE - NSCHARTNOTEFT_GEN_A_CORE
Discussed intraop findings with patient's daughter, HCP Lydia  patient had abdominal washout, apthera replaced    will have family meeting at 4pm    Betina Merino MD

## 2022-06-10 NOTE — CHART NOTE - NSCHARTNOTEFT_GEN_A_CORE
GYN Oncology Note    Family meeting held at SICU with patient's daughter and HCP Lydia and 5 members of family at SICU. We discussed hospital course and updates from surgery today and reviewed Ms. Rudd's oncologic care to this point. Lydia states that Ms. Rudd would want everything done to prolong her life. We discussed the plan including repeat washout and possible closure Sunday 6/12 and that she will require a prolonged hospital recovery.   Furthermore, she is not a candidate for cancer directed treatment at this time and we explained that in this interval the tumor can progress and spread.   All questions answered.    Joaquin MCKEON

## 2022-06-10 NOTE — PROGRESS NOTE ADULT - SUBJECTIVE AND OBJECTIVE BOX
SICU Daily Progress Note  =====================================================  Interval/Overnight Events:       - RTOR tomorrow @1230PM for ex lap, washout, possible closure  - family member, daughter, to be at bedside tomorrow for HCP discussion as well as for consent form signature per discussion with gyn onc fellow  - weaning levophed as tolerated (0.13 --> 0.06 in last from 7pm to 12 am)    HPI:   52y female with recurrent cervical CA (per patient stage III) presenting as transfer from [place] a/w neutropenic fever. Patient reports she had last cycle of chemotherapy 5 days prior.  Patient had VNS care stop by yesterday and was noted to be tachycardic on vital signs and was sent to the ED.  She reports feeling weak + fatigued. + upper abdominal pain that worsens while having chills. She denies any vomiting, chest pain, SOB.     At [facility] she was given Meropenem and Zosyn and was noted to be tachycardic to 140s and febrile to 39.5 and was thus transferred for further management. She became acutely altered yesterday afternoon without improvement and AXR with increased free air concern for perforated viscous. She is now s/p washout, open resection of left colon, colostomy. SICU consulted for close hemodynamic monitoring.       Surgery Information Washout, open resection of left colon, colostomy   Case Duration: 4hr   EBL: 2L 	IV Fluids: 4.5 L crystalloid, 3L albumin	Blood Products:  5u PRBC	Urine Output: 300mL     Allergies: No Known Allergies      MEDICATIONS:   --------------------------------------------------------------------------------------  Neurologic Medications  dexMEDEtomidine Infusion 0.05 MICROgram(s)/kG/Hr IV Continuous <Continuous>  fentaNYL   Infusion. 0.5 MICROgram(s)/kG/Hr IV Continuous <Continuous>  metoclopramide Injectable 10 milliGRAM(s) IV Push every 8 hours PRN nausea/vomiting  ondansetron Injectable 4 milliGRAM(s) IV Push every 6 hours PRN Nausea and/or Vomiting    Respiratory Medications    Cardiovascular Medications  norepinephrine Infusion 0.05 MICROgram(s)/kG/Min IV Continuous <Continuous>    Gastrointestinal Medications  lactated ringers. 1000 milliLiter(s) IV Continuous <Continuous>  pantoprazole  Injectable 40 milliGRAM(s) IV Push two times a day    Genitourinary Medications    Hematologic/Oncologic Medications  filgrastim-sndz (ZARXIO) Injectable 480 MICROGram(s) SubCutaneous daily    Antimicrobial/Immunologic Medications  piperacillin/tazobactam IVPB.. 3.375 Gram(s) IV Intermittent every 8 hours    Endocrine/Metabolic Medications    Topical/Other Medications  chlorhexidine 0.12% Liquid 15 milliLiter(s) Oral Mucosa every 12 hours  chlorhexidine 4% Liquid 1 Application(s) Topical daily    --------------------------------------------------------------------------------------    VITAL SIGNS, INS/OUTS (last 24 hours):  --------------------------------------------------------------------------------------  ICU Vital Signs Last 24 Hrs  T(C): 37.7 (09 Jun 2022 20:00), Max: 37.7 (09 Jun 2022 20:00)  T(F): 99.8 (09 Jun 2022 20:00), Max: 99.8 (09 Jun 2022 20:00)  HR: 89 (10 Noe 2022 00:00) (88 - 133)  BP: 114/76 (10 Noe 2022 00:00) (100/62 - 144/87)  BP(mean): 86 (10 Noe 2022 00:00) (74 - 110)  ABP: 123/66 (10 Noe 2022 00:00) (87/45 - 158/86)  ABP(mean): 84 (10 Noe 2022 00:00) (58 - 112)  RR: 14 (10 Noe 2022 00:00) (14 - 35)  SpO2: 98% (09 Jun 2022 22:39) (96% - 100%)    --------------------------------------------------------------------------------------    EXAM  NEUROLOGY  Exam: Intubated and Sedated    HEENT  Exam: Normocephalic, atraumatic.     RESPIRATORY  Exam: Normal expansion/effort.    Mechanical Ventilation: Mode: AC/ CMV (Assist Control/ Continuous Mandatory Ventilation), RR (machine): 18, TV (machine): 500, FiO2: 60, PEEP: 5, MAP: 10, PIP: 26    CARDIOVASCULAR  Exam: S1, S2.  Regular rate and rhythm.      GI/NUTRITION  Exam: Abdomen soft, Non-tender, Non-distended. Midline wound c/d/i with abthera vac in place, ostomy with bowel sweat  Current Diet:  NPO     VASCULAR  Exam: Extremities warm, pink, well-perfused.        METABOLIC/FLUIDS/ELECTROLYTES  lactated ringers. 1000 milliLiter(s) IV Continuous <Continuous>  magnesium sulfate  IVPB 2 Gram(s) IV Intermittent once  potassium chloride  10 mEq/100 mL IVPB 10 milliEquivalent(s) IV Intermittent every 1 hour    [x] VTE Prophylaxis:   Transfusions:	[] PRBC	[] Platelets		[] FFP	[] Cryoprecipitate    INFECTIOUS DISEASE  Antimicrobials/Immunologic Medications:  filgrastim-sndz (ZARXIO) Injectable 480 MICROGram(s) SubCutaneous daily  piperacillin/tazobactam IVPB.. 3.375 Gram(s) IV Intermittent every 8 hours    Tubes/Lines/Drains   [x] Peripheral IV  [] Central Venous Line     	[] R	[] L	[] IJ	[] Fem	[] SC	Date Placed:   [] Arterial Line		[] R	[] L	[] Fem	[] Rad	[] Ax	Date Placed:   [] PICC		[] Midline		[] Mediport  [] Urinary Catheter		Date Placed:   [x] Necessity of urinary, arterial, and venous catheters discussed      OTHER LABORATORY:     IMAGING STUDIES:   CXR:     ASSESSMENT:  52y with a PMHx of HTN, cervical CA (stage III)  on chemo - last session last week - c/b b/l hydronephrosis s/p B/L nephrostomy tube placement, p/w severe sepsis from neutropenic fever. Pt was acutely altered yesterday afternoon without improvement and AXR showed increased free air with concern for perforated viscous. She is now s/p washout, open resection of left colon, with colostomy creation. Recovering in SICU  for close hemodynamic monitoring.     PLAN:  NEUROLOGY:  - Sedated with precedex gtt and Fentanyl gtt as tolerated    RESPIRATORY:  - Intubated  - CPAP trials as tolerated; tolerated overnight 5/5  - Monitor daily chest x-rays and ABG  - Maintain O2 saturation >92%    CARDIOVASCULAR:  - Wean off Levophed gtt off PIV as tolerated  - Keep MAP >65    GI / NUTRITION:  - NPO  - GI prophylaxis with IV Protonix 40mg QD  - IV Reglan and IV Zofran PRN    RENAL / GENITOURINARY:  - LR @100  - Indwelling hilliard catheter  - Bilateral nephrostomy tubes  - Monitor electrolytes and replete PRN  - Continue to monitor strict ins and outs q1 hour    HEMATOLOGIC:  - Received 5 units PRBC, 3 units FFP, 3 units PLT and 1 unit cryo intra-operatively  - Monitor CBC daily  - Transfuse PRN    INFECTIOUS DISEASE:  - Neutropenic fevers with WBC of 3.33  - Continue Zarxio   - Continue with IV Zosyn    ENDOCRINOLOGY:  - Monitor fignersticks q6 hours while NPO    Disposition: SICU       SICU Daily Progress Note  =====================================================  Interval/Overnight Events:       - RTOR tomorrow @1230PM for ex lap, washout, possible closure  - family member, daughter, to be at bedside tomorrow for HCP discussion as well as for consent form signature per discussion with gyn onc fellow  - weaning levophed as tolerated (0.13 --> 0.06 in last from 7pm to 12 am)    HPI:   52y female with recurrent cervical CA (per patient stage III) presenting as transfer from [place] a/w neutropenic fever. Patient reports she had last cycle of chemotherapy 5 days prior.  Patient had VNS care stop by yesterday and was noted to be tachycardic on vital signs and was sent to the ED.  She reports feeling weak + fatigued. + upper abdominal pain that worsens while having chills. She denies any vomiting, chest pain, SOB.     At [facility] she was given Meropenem and Zosyn and was noted to be tachycardic to 140s and febrile to 39.5 and was thus transferred for further management. She became acutely altered yesterday afternoon without improvement and AXR with increased free air concern for perforated viscous. She is now s/p washout, open resection of left colon, colostomy. SICU consulted for close hemodynamic monitoring.       Surgery Information Washout, open resection of left colon, colostomy   Case Duration: 4hr   EBL: 2L 	IV Fluids: 4.5 L crystalloid, 3L albumin	Blood Products:  5u PRBC	Urine Output: 300mL     Allergies: No Known Allergies      MEDICATIONS:   --------------------------------------------------------------------------------------  Neurologic Medications  dexMEDEtomidine Infusion 0.05 MICROgram(s)/kG/Hr IV Continuous <Continuous>  fentaNYL   Infusion. 0.5 MICROgram(s)/kG/Hr IV Continuous <Continuous>  metoclopramide Injectable 10 milliGRAM(s) IV Push every 8 hours PRN nausea/vomiting  ondansetron Injectable 4 milliGRAM(s) IV Push every 6 hours PRN Nausea and/or Vomiting    Respiratory Medications    Cardiovascular Medications  norepinephrine Infusion 0.05 MICROgram(s)/kG/Min IV Continuous <Continuous>    Gastrointestinal Medications  lactated ringers. 1000 milliLiter(s) IV Continuous <Continuous>  pantoprazole  Injectable 40 milliGRAM(s) IV Push two times a day    Genitourinary Medications    Hematologic/Oncologic Medications  filgrastim-sndz (ZARXIO) Injectable 480 MICROGram(s) SubCutaneous daily    Antimicrobial/Immunologic Medications  piperacillin/tazobactam IVPB.. 3.375 Gram(s) IV Intermittent every 8 hours    Endocrine/Metabolic Medications    Topical/Other Medications  chlorhexidine 0.12% Liquid 15 milliLiter(s) Oral Mucosa every 12 hours  chlorhexidine 4% Liquid 1 Application(s) Topical daily    --------------------------------------------------------------------------------------    VITAL SIGNS, INS/OUTS (last 24 hours):  --------------------------------------------------------------------------------------  ICU Vital Signs Last 24 Hrs  T(C): 37.7 (09 Jun 2022 20:00), Max: 37.7 (09 Jun 2022 20:00)  T(F): 99.8 (09 Jun 2022 20:00), Max: 99.8 (09 Jun 2022 20:00)  HR: 89 (10 Noe 2022 00:00) (88 - 133)  BP: 114/76 (10 Noe 2022 00:00) (100/62 - 144/87)  BP(mean): 86 (10 Noe 2022 00:00) (74 - 110)  ABP: 123/66 (10 Noe 2022 00:00) (87/45 - 158/86)  ABP(mean): 84 (10 Noe 2022 00:00) (58 - 112)  RR: 14 (10 Noe 2022 00:00) (14 - 35)  SpO2: 98% (09 Jun 2022 22:39) (96% - 100%)    --------------------------------------------------------------------------------------    EXAM  NEUROLOGY  Exam: Intubated and Sedated    HEENT  Exam: Normocephalic, atraumatic.     RESPIRATORY  Exam: Normal expansion/effort.    Mechanical Ventilation: Mode: AC/ CMV (Assist Control/ Continuous Mandatory Ventilation), RR (machine): 18, TV (machine): 500, FiO2: 60, PEEP: 5, MAP: 10, PIP: 26    CARDIOVASCULAR  Exam: S1, S2.  Regular rate and rhythm.      GI/NUTRITION  Exam: Abdomen soft, Non-tender, Non-distended. Midline wound c/d/i with abthera vac in place, ostomy with bowel sweat  Current Diet:  NPO     VASCULAR  Exam: Extremities warm, pink, well-perfused.        METABOLIC/FLUIDS/ELECTROLYTES  lactated ringers. 1000 milliLiter(s) IV Continuous <Continuous>  magnesium sulfate  IVPB 2 Gram(s) IV Intermittent once  potassium chloride  10 mEq/100 mL IVPB 10 milliEquivalent(s) IV Intermittent every 1 hour    [x] VTE Prophylaxis:   Transfusions:	[] PRBC	[] Platelets		[] FFP	[] Cryoprecipitate    INFECTIOUS DISEASE  Antimicrobials/Immunologic Medications:  filgrastim-sndz (ZARXIO) Injectable 480 MICROGram(s) SubCutaneous daily  piperacillin/tazobactam IVPB.. 3.375 Gram(s) IV Intermittent every 8 hours    Tubes/Lines/Drains   [x] Peripheral IV  [] Central Venous Line     	[] R	[] L	[] IJ	[] Fem	[] SC	Date Placed:   [] Arterial Line		[] R	[] L	[] Fem	[] Rad	[] Ax	Date Placed:   [] PICC		[] Midline		[] Mediport  [] Urinary Catheter		Date Placed:   [x] Necessity of urinary, arterial, and venous catheters discussed      OTHER LABORATORY:     IMAGING STUDIES:   CXR:     ASSESSMENT:  52y with a PMHx of HTN, cervical CA (stage III)  on chemo - last session last week - c/b b/l hydronephrosis s/p B/L nephrostomy tube placement, p/w severe sepsis from neutropenic fever. Pt was acutely altered yesterday afternoon without improvement and AXR showed increased free air with concern for perforated viscous. She is now s/p washout, open resection of left colon, with colostomy creation. Recovering in SICU  for close hemodynamic monitoring.     PLAN:  NEUROLOGY:  - Sedated with precedex gtt and Fentanyl gtt as tolerated    RESPIRATORY:  - Intubated  - CPAP trials as tolerated; tolerated overnight 5/5  - Monitor daily chest x-rays and ABG  - Maintain O2 saturation >92%    CARDIOVASCULAR:  - Wean off Levophed gtt off PIV as tolerated  - Keep MAP >65    GI / NUTRITION:  - NPO  - GI prophylaxis with IV Protonix 40mg QD  - IV Reglan and IV Zofran PRN  - plan for RTOR on 6/10 for ex lap, washout and possible abdominal closure    RENAL / GENITOURINARY:  - LR @100  - Indwelling hilliard catheter  - Bilateral nephrostomy tubes  - Monitor electrolytes and replete PRN  - Continue to monitor strict ins and outs q1 hour    HEMATOLOGIC:  - Received 5 units PRBC, 3 units FFP, 3 units PLT and 1 unit cryo intra-operatively  - Monitor CBC daily  - Transfuse PRN    INFECTIOUS DISEASE:  - Neutropenic fevers with WBC of 3.33  - Continue Zarxio   - Continue with IV Zosyn    ENDOCRINOLOGY:  - Monitor fignersticks q6 hours while NPO    Disposition: SICU       SICU Daily Progress Note  =====================================================  Interval/Overnight Events:       - RTOR tomorrow @1230PM for ex lap, washout, possible closure  - family member, daughter, to be at bedside tomorrow for HCP discussion as well as for OR consent for gyn/surgical oncology per fellow chart note  - weaning levophed as tolerated (0.13 --> 0.06 in last from 7pm to 12 am)    HPI:   52y female with recurrent cervical CA (per patient stage III) presenting as transfer from [place] a/w neutropenic fever. Patient reports she had last cycle of chemotherapy 5 days prior.  Patient had VNS care stop by yesterday and was noted to be tachycardic on vital signs and was sent to the ED.  She reports feeling weak + fatigued. + upper abdominal pain that worsens while having chills. She denies any vomiting, chest pain, SOB.     At [facility] she was given Meropenem and Zosyn and was noted to be tachycardic to 140s and febrile to 39.5 and was thus transferred for further management. She became acutely altered yesterday afternoon without improvement and AXR with increased free air concern for perforated viscous. She is now s/p washout, open resection of left colon, colostomy. SICU consulted for close hemodynamic monitoring.       Surgery Information Washout, open resection of left colon, colostomy   Case Duration: 4hr   EBL: 2L 	IV Fluids: 4.5 L crystalloid, 3L albumin	Blood Products:  5u PRBC	Urine Output: 300mL     Allergies: No Known Allergies      MEDICATIONS:   --------------------------------------------------------------------------------------  Neurologic Medications  dexMEDEtomidine Infusion 0.05 MICROgram(s)/kG/Hr IV Continuous <Continuous>  fentaNYL   Infusion. 0.5 MICROgram(s)/kG/Hr IV Continuous <Continuous>  metoclopramide Injectable 10 milliGRAM(s) IV Push every 8 hours PRN nausea/vomiting  ondansetron Injectable 4 milliGRAM(s) IV Push every 6 hours PRN Nausea and/or Vomiting    Respiratory Medications    Cardiovascular Medications  norepinephrine Infusion 0.05 MICROgram(s)/kG/Min IV Continuous <Continuous>    Gastrointestinal Medications  lactated ringers. 1000 milliLiter(s) IV Continuous <Continuous>  pantoprazole  Injectable 40 milliGRAM(s) IV Push two times a day    Genitourinary Medications    Hematologic/Oncologic Medications  filgrastim-sndz (ZARXIO) Injectable 480 MICROGram(s) SubCutaneous daily    Antimicrobial/Immunologic Medications  piperacillin/tazobactam IVPB.. 3.375 Gram(s) IV Intermittent every 8 hours    Endocrine/Metabolic Medications    Topical/Other Medications  chlorhexidine 0.12% Liquid 15 milliLiter(s) Oral Mucosa every 12 hours  chlorhexidine 4% Liquid 1 Application(s) Topical daily    --------------------------------------------------------------------------------------    VITAL SIGNS, INS/OUTS (last 24 hours):  --------------------------------------------------------------------------------------  ICU Vital Signs Last 24 Hrs  T(C): 37.7 (09 Jun 2022 20:00), Max: 37.7 (09 Jun 2022 20:00)  T(F): 99.8 (09 Jun 2022 20:00), Max: 99.8 (09 Jun 2022 20:00)  HR: 89 (10 Noe 2022 00:00) (88 - 133)  BP: 114/76 (10 Noe 2022 00:00) (100/62 - 144/87)  BP(mean): 86 (10 Noe 2022 00:00) (74 - 110)  ABP: 123/66 (10 Noe 2022 00:00) (87/45 - 158/86)  ABP(mean): 84 (10 One 2022 00:00) (58 - 112)  RR: 14 (10 Noe 2022 00:00) (14 - 35)  SpO2: 98% (09 Jun 2022 22:39) (96% - 100%)    --------------------------------------------------------------------------------------    EXAM  NEUROLOGY  Exam: Intubated and Sedated    HEENT  Exam: Normocephalic, atraumatic.     RESPIRATORY  Exam: Normal expansion/effort.    Mechanical Ventilation: Mode: AC/ CMV (Assist Control/ Continuous Mandatory Ventilation), RR (machine): 18, TV (machine): 500, FiO2: 60, PEEP: 5, MAP: 10, PIP: 26    CARDIOVASCULAR  Exam: S1, S2.  Regular rate and rhythm.      GI/NUTRITION  Exam: Abdomen soft, Non-tender, Non-distended. Midline wound c/d/i with abthera vac in place, ostomy with bowel sweat  Current Diet:  NPO     VASCULAR  Exam: Extremities warm, pink, well-perfused.        METABOLIC/FLUIDS/ELECTROLYTES  lactated ringers. 1000 milliLiter(s) IV Continuous <Continuous>  magnesium sulfate  IVPB 2 Gram(s) IV Intermittent once  potassium chloride  10 mEq/100 mL IVPB 10 milliEquivalent(s) IV Intermittent every 1 hour    [x] VTE Prophylaxis:   Transfusions:	[] PRBC	[] Platelets		[] FFP	[] Cryoprecipitate    INFECTIOUS DISEASE  Antimicrobials/Immunologic Medications:  filgrastim-sndz (ZARXIO) Injectable 480 MICROGram(s) SubCutaneous daily  piperacillin/tazobactam IVPB.. 3.375 Gram(s) IV Intermittent every 8 hours    Tubes/Lines/Drains   [x] Peripheral IV  [] Central Venous Line     	[] R	[] L	[] IJ	[] Fem	[] SC	Date Placed:   [] Arterial Line		[] R	[] L	[] Fem	[] Rad	[] Ax	Date Placed:   [] PICC		[] Midline		[] Mediport  [] Urinary Catheter		Date Placed:   [x] Necessity of urinary, arterial, and venous catheters discussed      OTHER LABORATORY:     IMAGING STUDIES:   CXR:     ASSESSMENT:  52y with a PMHx of HTN, cervical CA (stage III)  on chemo - last session last week - c/b b/l hydronephrosis s/p B/L nephrostomy tube placement, p/w severe sepsis from neutropenic fever. Pt was acutely altered yesterday afternoon without improvement and AXR showed increased free air with concern for perforated viscous. She is now s/p washout, open resection of left colon, with colostomy creation. Recovering in SICU  for close hemodynamic monitoring.     PLAN:  NEUROLOGY:  - Sedated with precedex gtt and Fentanyl gtt as tolerated    RESPIRATORY:  - Intubated  - CPAP trials as tolerated; tolerated overnight 5/5  - Monitor daily chest x-rays and ABG  - Maintain O2 saturation >92%    CARDIOVASCULAR:  - Wean off Levophed gtt off PIV as tolerated  - Keep MAP >65    GI / NUTRITION:  - NPO  - GI prophylaxis with IV Protonix 40mg QD  - IV Reglan and IV Zofran PRN  - plan for RTOR on 6/10 for ex lap, washout and possible abdominal closure    RENAL / GENITOURINARY:  - LR @100  - Indwelling hilliard catheter  - Bilateral nephrostomy tubes  - Monitor electrolytes and replete PRN  - Continue to monitor strict ins and outs q1 hour    HEMATOLOGIC:  - Received 5 units PRBC, 3 units FFP, 3 units PLT and 1 unit cryo intra-operatively  - Monitor CBC daily  - Transfuse PRN    INFECTIOUS DISEASE:  - Neutropenic fevers with WBC of 3.33  - Continue Zarxio   - Continue with IV Zosyn    ENDOCRINOLOGY:  - Monitor fignersticks q6 hours while NPO    Disposition: SICU       SICU Daily Progress Note  =====================================================  Interval/Overnight Events:       - RTOR tomorrow @1230PM for ex lap, washout, possible closure  - family member, daughter, to be at bedside tomorrow for HCP discussion as well as for OR consent for gyn/surgical oncology per fellow chart note  - weaning levophed as tolerated (0.13 --> 0.06 in last from 7pm to 12 am)  - 1/2 amp overnight     HPI:   52y female with recurrent cervical CA (per patient stage III) presenting as transfer from [place] a/w neutropenic fever. Patient reports she had last cycle of chemotherapy 5 days prior.  Patient had VNS care stop by yesterday and was noted to be tachycardic on vital signs and was sent to the ED.  She reports feeling weak + fatigued. + upper abdominal pain that worsens while having chills. She denies any vomiting, chest pain, SOB.     At [facility] she was given Meropenem and Zosyn and was noted to be tachycardic to 140s and febrile to 39.5 and was thus transferred for further management. She became acutely altered yesterday afternoon without improvement and AXR with increased free air concern for perforated viscous. She is now s/p washout, open resection of left colon, colostomy. SICU consulted for close hemodynamic monitoring.       Surgery Information Washout, open resection of left colon, colostomy   Case Duration: 4hr   EBL: 2L 	IV Fluids: 4.5 L crystalloid, 3L albumin	Blood Products:  5u PRBC	Urine Output: 300mL     Allergies: No Known Allergies      MEDICATIONS:   --------------------------------------------------------------------------------------  Neurologic Medications  dexMEDEtomidine Infusion 0.05 MICROgram(s)/kG/Hr IV Continuous <Continuous>  fentaNYL   Infusion. 0.5 MICROgram(s)/kG/Hr IV Continuous <Continuous>  metoclopramide Injectable 10 milliGRAM(s) IV Push every 8 hours PRN nausea/vomiting  ondansetron Injectable 4 milliGRAM(s) IV Push every 6 hours PRN Nausea and/or Vomiting    Respiratory Medications    Cardiovascular Medications  norepinephrine Infusion 0.05 MICROgram(s)/kG/Min IV Continuous <Continuous>    Gastrointestinal Medications  lactated ringers. 1000 milliLiter(s) IV Continuous <Continuous>  pantoprazole  Injectable 40 milliGRAM(s) IV Push two times a day    Genitourinary Medications    Hematologic/Oncologic Medications  filgrastim-sndz (ZARXIO) Injectable 480 MICROGram(s) SubCutaneous daily    Antimicrobial/Immunologic Medications  piperacillin/tazobactam IVPB.. 3.375 Gram(s) IV Intermittent every 8 hours    Endocrine/Metabolic Medications    Topical/Other Medications  chlorhexidine 0.12% Liquid 15 milliLiter(s) Oral Mucosa every 12 hours  chlorhexidine 4% Liquid 1 Application(s) Topical daily    --------------------------------------------------------------------------------------    VITAL SIGNS, INS/OUTS (last 24 hours):  --------------------------------------------------------------------------------------  ICU Vital Signs Last 24 Hrs  T(C): 37.7 (09 Jun 2022 20:00), Max: 37.7 (09 Jun 2022 20:00)  T(F): 99.8 (09 Jun 2022 20:00), Max: 99.8 (09 Jun 2022 20:00)  HR: 89 (10 Noe 2022 00:00) (88 - 133)  BP: 114/76 (10 Noe 2022 00:00) (100/62 - 144/87)  BP(mean): 86 (10 Noe 2022 00:00) (74 - 110)  ABP: 123/66 (10 Noe 2022 00:00) (87/45 - 158/86)  ABP(mean): 84 (10 Noe 2022 00:00) (58 - 112)  RR: 14 (10 Noe 2022 00:00) (14 - 35)  SpO2: 98% (09 Jun 2022 22:39) (96% - 100%)    --------------------------------------------------------------------------------------    EXAM  NEUROLOGY  Exam: Intubated and Sedated    HEENT  Exam: Normocephalic, atraumatic.     RESPIRATORY  Exam: Normal expansion/effort.    Mechanical Ventilation: Mode: AC/ CMV (Assist Control/ Continuous Mandatory Ventilation), RR (machine): 18, TV (machine): 500, FiO2: 60, PEEP: 5, MAP: 10, PIP: 26    CARDIOVASCULAR  Exam: S1, S2.  Regular rate and rhythm.      GI/NUTRITION  Exam: Abdomen soft, Non-tender, Non-distended. Midline wound c/d/i with abthera vac in place, ostomy with bowel sweat  Current Diet:  NPO     VASCULAR  Exam: Extremities warm, pink, well-perfused.        METABOLIC/FLUIDS/ELECTROLYTES  lactated ringers. 1000 milliLiter(s) IV Continuous <Continuous>  magnesium sulfate  IVPB 2 Gram(s) IV Intermittent once  potassium chloride  10 mEq/100 mL IVPB 10 milliEquivalent(s) IV Intermittent every 1 hour    [x] VTE Prophylaxis:   Transfusions:	[] PRBC	[] Platelets		[] FFP	[] Cryoprecipitate    INFECTIOUS DISEASE  Antimicrobials/Immunologic Medications:  filgrastim-sndz (ZARXIO) Injectable 480 MICROGram(s) SubCutaneous daily  piperacillin/tazobactam IVPB.. 3.375 Gram(s) IV Intermittent every 8 hours    Tubes/Lines/Drains   [x] Peripheral IV  [] Central Venous Line     	[] R	[] L	[] IJ	[] Fem	[] SC	Date Placed:   [] Arterial Line		[] R	[] L	[] Fem	[] Rad	[] Ax	Date Placed:   [] PICC		[] Midline		[] Mediport  [] Urinary Catheter		Date Placed:   [x] Necessity of urinary, arterial, and venous catheters discussed      OTHER LABORATORY:     IMAGING STUDIES:   CXR:     ASSESSMENT:  52y with a PMHx of HTN, cervical CA (stage III)  on chemo - last session last week - c/b b/l hydronephrosis s/p B/L nephrostomy tube placement, p/w severe sepsis from neutropenic fever. Pt was acutely altered yesterday afternoon without improvement and AXR showed increased free air with concern for perforated viscous. She is now s/p washout, open resection of left colon, with colostomy creation. Recovering in SICU  for close hemodynamic monitoring.     PLAN:  NEUROLOGY:  - Sedated with precedex gtt and Fentanyl gtt as tolerated    RESPIRATORY:  - Intubated  - CPAP trials as tolerated; tolerated overnight 5/5  - Monitor daily chest x-rays and ABG  - Maintain O2 saturation >92%    CARDIOVASCULAR:  - Wean off Levophed gtt off PIV as tolerated  - Keep MAP >65    GI / NUTRITION:  - NPO  - GI prophylaxis with IV Protonix 40mg QD  - IV Reglan and IV Zofran PRN  - plan for RTOR on 6/10 for ex lap, washout and possible abdominal closure    RENAL / GENITOURINARY:  - LR @100  - Indwelling hilliard catheter  - Bilateral nephrostomy tubes  - Monitor electrolytes and replete PRN  - Continue to monitor strict ins and outs q1 hour    HEMATOLOGIC:  - Received 5 units PRBC, 3 units FFP, 3 units PLT and 1 unit cryo intra-operatively  - Monitor CBC daily  - Transfuse PRN    INFECTIOUS DISEASE:  - Neutropenic fevers with WBC of 3.33  - Continue Zarxio   - Continue with IV Zosyn    ENDOCRINOLOGY:  - Monitor fignersticks q6 hours while NPO    Disposition: SICU       SICU Daily Progress Note  =====================================================  Interval/Overnight Events:         - RTOR tomorrow @1230PM for ex lap, washout, possible closure  - family member, daughter, to be at bedside tomorrow for HCP discussion as well as for OR consent for gyn/surgical oncology per fellow chart note  - weaning levophed as tolerated (0.13 --> 0.06 in last from 7pm to 12 am)  - 1/2 amp overnight; glucose 70s-80s  - platelet count 14- 1 bag platelets given overnight since OR in AM; f/u CBC  - electrolyte repletion       HPI:   52y female with recurrent cervical CA (per patient stage III) presenting as transfer from [place] a/w neutropenic fever. Patient reports she had last cycle of chemotherapy 5 days prior.  Patient had VNS care stop by yesterday and was noted to be tachycardic on vital signs and was sent to the ED.  She reports feeling weak + fatigued. + upper abdominal pain that worsens while having chills. She denies any vomiting, chest pain, SOB.     At [facility] she was given Meropenem and Zosyn and was noted to be tachycardic to 140s and febrile to 39.5 and was thus transferred for further management. She became acutely altered yesterday afternoon without improvement and AXR with increased free air concern for perforated viscous. She is now s/p washout, open resection of left colon, colostomy. SICU consulted for close hemodynamic monitoring.       Surgery Information Washout, open resection of left colon, colostomy   Case Duration: 4hr   EBL: 2L 	IV Fluids: 4.5 L crystalloid, 3L albumin	Blood Products:  5u PRBC	Urine Output: 300mL     Allergies: No Known Allergies      MEDICATIONS:   --------------------------------------------------------------------------------------  Neurologic Medications  dexMEDEtomidine Infusion 0.05 MICROgram(s)/kG/Hr IV Continuous <Continuous>  fentaNYL   Infusion. 0.5 MICROgram(s)/kG/Hr IV Continuous <Continuous>  metoclopramide Injectable 10 milliGRAM(s) IV Push every 8 hours PRN nausea/vomiting  ondansetron Injectable 4 milliGRAM(s) IV Push every 6 hours PRN Nausea and/or Vomiting    Respiratory Medications    Cardiovascular Medications  norepinephrine Infusion 0.05 MICROgram(s)/kG/Min IV Continuous <Continuous>    Gastrointestinal Medications  lactated ringers. 1000 milliLiter(s) IV Continuous <Continuous>  pantoprazole  Injectable 40 milliGRAM(s) IV Push two times a day    Genitourinary Medications    Hematologic/Oncologic Medications  filgrastim-sndz (ZARXIO) Injectable 480 MICROGram(s) SubCutaneous daily    Antimicrobial/Immunologic Medications  piperacillin/tazobactam IVPB.. 3.375 Gram(s) IV Intermittent every 8 hours    Endocrine/Metabolic Medications    Topical/Other Medications  chlorhexidine 0.12% Liquid 15 milliLiter(s) Oral Mucosa every 12 hours  chlorhexidine 4% Liquid 1 Application(s) Topical daily    --------------------------------------------------------------------------------------    VITAL SIGNS, INS/OUTS (last 24 hours):  --------------------------------------------------------------------------------------  ICU Vital Signs Last 24 Hrs  T(C): 37.7 (09 Jun 2022 20:00), Max: 37.7 (09 Jun 2022 20:00)  T(F): 99.8 (09 Jun 2022 20:00), Max: 99.8 (09 Jun 2022 20:00)  HR: 89 (10 Noe 2022 00:00) (88 - 133)  BP: 114/76 (10 Noe 2022 00:00) (100/62 - 144/87)  BP(mean): 86 (10 Noe 2022 00:00) (74 - 110)  ABP: 123/66 (10 Noe 2022 00:00) (87/45 - 158/86)  ABP(mean): 84 (10 Noe 2022 00:00) (58 - 112)  RR: 14 (10 Noe 2022 00:00) (14 - 35)  SpO2: 98% (09 Jun 2022 22:39) (96% - 100%)    --------------------------------------------------------------------------------------    EXAM  NEUROLOGY  Exam: Intubated and Sedated    HEENT  Exam: Normocephalic, atraumatic.     RESPIRATORY  Exam: Normal expansion/effort.    Mechanical Ventilation: Mode: AC/ CMV (Assist Control/ Continuous Mandatory Ventilation), RR (machine): 18, TV (machine): 500, FiO2: 60, PEEP: 5, MAP: 10, PIP: 26    CARDIOVASCULAR  Exam: S1, S2.  Regular rate and rhythm.      GI/NUTRITION  Exam: Abdomen soft, Non-tender, Non-distended. Midline wound c/d/i with abthera vac in place, ostomy with bowel sweat  Current Diet:  NPO     VASCULAR  Exam: Extremities warm, pink, well-perfused.        METABOLIC/FLUIDS/ELECTROLYTES  lactated ringers. 1000 milliLiter(s) IV Continuous <Continuous>  magnesium sulfate  IVPB 2 Gram(s) IV Intermittent once  potassium chloride  10 mEq/100 mL IVPB 10 milliEquivalent(s) IV Intermittent every 1 hour    [x] VTE Prophylaxis:   Transfusions:	[] PRBC	[] Platelets		[] FFP	[] Cryoprecipitate    INFECTIOUS DISEASE  Antimicrobials/Immunologic Medications:  filgrastim-sndz (ZARXIO) Injectable 480 MICROGram(s) SubCutaneous daily  piperacillin/tazobactam IVPB.. 3.375 Gram(s) IV Intermittent every 8 hours    Tubes/Lines/Drains   [x] Peripheral IV  [] Central Venous Line     	[] R	[] L	[] IJ	[] Fem	[] SC	Date Placed:   [] Arterial Line		[] R	[] L	[] Fem	[] Rad	[] Ax	Date Placed:   [] PICC		[] Midline		[] Mediport  [] Urinary Catheter		Date Placed:   [x] Necessity of urinary, arterial, and venous catheters discussed      OTHER LABORATORY:     IMAGING STUDIES:   CXR:     ASSESSMENT:  52y with a PMHx of HTN, cervical CA (stage III)  on chemo - last session last week - c/b b/l hydronephrosis s/p B/L nephrostomy tube placement, p/w severe sepsis from neutropenic fever. Pt was acutely altered yesterday afternoon without improvement and AXR showed increased free air with concern for perforated viscous. She is now s/p washout, open resection of left colon, with colostomy creation. Recovering in SICU  for close hemodynamic monitoring.     PLAN:  NEUROLOGY:  - Sedated with precedex gtt and Fentanyl gtt as tolerated    RESPIRATORY:  - Intubated  - CPAP trials as tolerated; tolerated overnight 5/5  - Monitor daily chest x-rays and ABG  - Maintain O2 saturation >92%    CARDIOVASCULAR:  - Wean off Levophed gtt off PIV as tolerated  - Keep MAP >65    GI / NUTRITION:  - NPO  - GI prophylaxis with IV Protonix 40mg QD  - IV Reglan and IV Zofran PRN  - plan for RTOR on 6/10 for ex lap, washout and possible abdominal closure    RENAL / GENITOURINARY:  - LR @100  - Indwelling hilliard catheter  - Bilateral nephrostomy tubes  - Monitor electrolytes and replete PRN  - Continue to monitor strict ins and outs q1 hour    HEMATOLOGIC:  - Received 5 units PRBC, 3 units FFP, 3 units PLT and 1 unit cryo intra-operatively  - Monitor CBC daily  - Transfuse PRN    INFECTIOUS DISEASE:  - Neutropenic fevers with WBC of 3.33  - Continue Zarxio   - Continue with IV Zosyn    ENDOCRINOLOGY:  - Monitor fignersticks q6 hours while NPO    Disposition: SICU       SICU Daily Progress Note  =====================================================  Interval/Overnight Events:         - RTOR tomorrow @1230PM for ex lap, washout, possible closure  - family member, daughter, to be at bedside tomorrow for HCP discussion as well as for OR consent for gyn/surgical oncology per fellow chart note  - weaning levophed as tolerated (0.13 --> 0.06 --> 0.03)  - 1/2 amp overnight; glucose 70s-80s  - platelet count 14- 1 bag platelets given overnight since OR in AM; f/u CBC  - electrolyte repletion       HPI:   52y female with recurrent cervical CA (per patient stage III) presenting as transfer from [place] a/w neutropenic fever. Patient reports she had last cycle of chemotherapy 5 days prior.  Patient had VNS care stop by yesterday and was noted to be tachycardic on vital signs and was sent to the ED.  She reports feeling weak + fatigued. + upper abdominal pain that worsens while having chills. She denies any vomiting, chest pain, SOB.     At [facility] she was given Meropenem and Zosyn and was noted to be tachycardic to 140s and febrile to 39.5 and was thus transferred for further management. She became acutely altered yesterday afternoon without improvement and AXR with increased free air concern for perforated viscous. She is now s/p washout, open resection of left colon, colostomy. SICU consulted for close hemodynamic monitoring.       Surgery Information Washout, open resection of left colon, colostomy   Case Duration: 4hr   EBL: 2L 	IV Fluids: 4.5 L crystalloid, 3L albumin	Blood Products:  5u PRBC	Urine Output: 300mL     Allergies: No Known Allergies      MEDICATIONS:   --------------------------------------------------------------------------------------  Neurologic Medications  dexMEDEtomidine Infusion 0.05 MICROgram(s)/kG/Hr IV Continuous <Continuous>  fentaNYL   Infusion. 0.5 MICROgram(s)/kG/Hr IV Continuous <Continuous>  metoclopramide Injectable 10 milliGRAM(s) IV Push every 8 hours PRN nausea/vomiting  ondansetron Injectable 4 milliGRAM(s) IV Push every 6 hours PRN Nausea and/or Vomiting    Respiratory Medications    Cardiovascular Medications  norepinephrine Infusion 0.05 MICROgram(s)/kG/Min IV Continuous <Continuous>    Gastrointestinal Medications  lactated ringers. 1000 milliLiter(s) IV Continuous <Continuous>  pantoprazole  Injectable 40 milliGRAM(s) IV Push two times a day    Genitourinary Medications    Hematologic/Oncologic Medications  filgrastim-sndz (ZARXIO) Injectable 480 MICROGram(s) SubCutaneous daily    Antimicrobial/Immunologic Medications  piperacillin/tazobactam IVPB.. 3.375 Gram(s) IV Intermittent every 8 hours    Endocrine/Metabolic Medications    Topical/Other Medications  chlorhexidine 0.12% Liquid 15 milliLiter(s) Oral Mucosa every 12 hours  chlorhexidine 4% Liquid 1 Application(s) Topical daily    --------------------------------------------------------------------------------------    VITAL SIGNS, INS/OUTS (last 24 hours):  --------------------------------------------------------------------------------------  ICU Vital Signs Last 24 Hrs  T(C): 37.7 (09 Jun 2022 20:00), Max: 37.7 (09 Jun 2022 20:00)  T(F): 99.8 (09 Jun 2022 20:00), Max: 99.8 (09 Jun 2022 20:00)  HR: 89 (10 Noe 2022 00:00) (88 - 133)  BP: 114/76 (10 Noe 2022 00:00) (100/62 - 144/87)  BP(mean): 86 (10 Noe 2022 00:00) (74 - 110)  ABP: 123/66 (10 Noe 2022 00:00) (87/45 - 158/86)  ABP(mean): 84 (10 Noe 2022 00:00) (58 - 112)  RR: 14 (10 Noe 2022 00:00) (14 - 35)  SpO2: 98% (09 Jun 2022 22:39) (96% - 100%)    --------------------------------------------------------------------------------------    EXAM  NEUROLOGY  Exam: Intubated and Sedated    HEENT  Exam: Normocephalic, atraumatic.     RESPIRATORY  Exam: Normal expansion/effort.    Mechanical Ventilation: Mode: AC/ CMV (Assist Control/ Continuous Mandatory Ventilation), RR (machine): 18, TV (machine): 500, FiO2: 60, PEEP: 5, MAP: 10, PIP: 26    CARDIOVASCULAR  Exam: S1, S2.  Regular rate and rhythm.      GI/NUTRITION  Exam: Abdomen soft, Non-tender, Non-distended. Midline wound c/d/i with abthera vac in place, ostomy with bowel sweat  Current Diet:  NPO     VASCULAR  Exam: Extremities warm, pink, well-perfused.        METABOLIC/FLUIDS/ELECTROLYTES  lactated ringers. 1000 milliLiter(s) IV Continuous <Continuous>  magnesium sulfate  IVPB 2 Gram(s) IV Intermittent once  potassium chloride  10 mEq/100 mL IVPB 10 milliEquivalent(s) IV Intermittent every 1 hour    [x] VTE Prophylaxis:   Transfusions:	[] PRBC	[] Platelets		[] FFP	[] Cryoprecipitate    INFECTIOUS DISEASE  Antimicrobials/Immunologic Medications:  filgrastim-sndz (ZARXIO) Injectable 480 MICROGram(s) SubCutaneous daily  piperacillin/tazobactam IVPB.. 3.375 Gram(s) IV Intermittent every 8 hours    Tubes/Lines/Drains   [x] Peripheral IV  [] Central Venous Line     	[] R	[] L	[] IJ	[] Fem	[] SC	Date Placed:   [] Arterial Line		[] R	[] L	[] Fem	[] Rad	[] Ax	Date Placed:   [] PICC		[] Midline		[] Mediport  [] Urinary Catheter		Date Placed:   [x] Necessity of urinary, arterial, and venous catheters discussed      OTHER LABORATORY:     IMAGING STUDIES:   CXR:     ASSESSMENT:  52y with a PMHx of HTN, cervical CA (stage III)  on chemo - last session last week - c/b b/l hydronephrosis s/p B/L nephrostomy tube placement, p/w severe sepsis from neutropenic fever. Pt was acutely altered yesterday afternoon without improvement and AXR showed increased free air with concern for perforated viscous. She is now s/p washout, open resection of left colon, with colostomy creation. Recovering in SICU  for close hemodynamic monitoring.     PLAN:  NEUROLOGY:  - Sedated with precedex gtt and Fentanyl gtt as tolerated    RESPIRATORY:  - Intubated  - CPAP trials as tolerated; tolerated overnight 5/5  - Monitor daily chest x-rays and ABG  - Maintain O2 saturation >92%    CARDIOVASCULAR:  - Wean off Levophed gtt off PIV as tolerated  - Keep MAP >65    GI / NUTRITION:  - NPO  - GI prophylaxis with IV Protonix 40mg QD  - IV Reglan and IV Zofran PRN  - plan for RTOR on 6/10 for ex lap, washout and possible abdominal closure    RENAL / GENITOURINARY:  - LR @100  - Indwelling hilliard catheter  - Bilateral nephrostomy tubes  - Monitor electrolytes and replete PRN  - Continue to monitor strict ins and outs q1 hour    HEMATOLOGIC:  - Received 5 units PRBC, 3 units FFP, 3 units PLT and 1 unit cryo intra-operatively  - Monitor CBC daily  - Transfuse PRN    INFECTIOUS DISEASE:  - Neutropenic fevers with WBC of 3.33  - Continue Zarxio   - Continue with IV Zosyn    ENDOCRINOLOGY:  - Monitor fignersticks q6 hours while NPO    Disposition: SICU       SICU Daily Progress Note  =====================================================  Interval/Overnight Events:         - RTOR tomorrow @1230PM for ex lap, washout, possible closure  - family member, daughter, to be at bedside tomorrow for HCP discussion as well as for OR consent for gyn/surgical oncology per fellow chart note  - weaning levophed as tolerated (0.13 --> 0.06 --> 0.03)  - 1/2 amp overnight; glucose 70s-80s  - platelet count 14- 1 bag platelets given overnight since OR in AM; f/u CBC  - electrolyte repletion       HPI:   52y female with recurrent cervical CA (per patient stage III) presenting as transfer from [place] a/w neutropenic fever. Patient reports she had last cycle of chemotherapy 5 days prior.  Patient had VNS care stop by yesterday and was noted to be tachycardic on vital signs and was sent to the ED.  She reports feeling weak + fatigued. + upper abdominal pain that worsens while having chills. She denies any vomiting, chest pain, SOB.     At [facility] she was given Meropenem and Zosyn and was noted to be tachycardic to 140s and febrile to 39.5 and was thus transferred for further management. She became acutely altered yesterday afternoon without improvement and AXR with increased free air concern for perforated viscous. She is now s/p washout, open resection of left colon, colostomy. SICU consulted for close hemodynamic monitoring.       Surgery Information Washout, open resection of left colon, colostomy   Case Duration: 4hr   EBL: 2L 	IV Fluids: 4.5 L crystalloid, 3L albumin	Blood Products:  5u PRBC	Urine Output: 300mL     Allergies: No Known Allergies      MEDICATIONS:   --------------------------------------------------------------------------------------  Neurologic Medications  dexMEDEtomidine Infusion 0.05 MICROgram(s)/kG/Hr IV Continuous <Continuous>  fentaNYL   Infusion. 0.5 MICROgram(s)/kG/Hr IV Continuous <Continuous>  metoclopramide Injectable 10 milliGRAM(s) IV Push every 8 hours PRN nausea/vomiting  ondansetron Injectable 4 milliGRAM(s) IV Push every 6 hours PRN Nausea and/or Vomiting    Respiratory Medications    Cardiovascular Medications  norepinephrine Infusion 0.05 MICROgram(s)/kG/Min IV Continuous <Continuous>    Gastrointestinal Medications  lactated ringers. 1000 milliLiter(s) IV Continuous <Continuous>  pantoprazole  Injectable 40 milliGRAM(s) IV Push two times a day    Genitourinary Medications    Hematologic/Oncologic Medications  filgrastim-sndz (ZARXIO) Injectable 480 MICROGram(s) SubCutaneous daily    Antimicrobial/Immunologic Medications  piperacillin/tazobactam IVPB.. 3.375 Gram(s) IV Intermittent every 8 hours    Endocrine/Metabolic Medications    Topical/Other Medications  chlorhexidine 0.12% Liquid 15 milliLiter(s) Oral Mucosa every 12 hours  chlorhexidine 4% Liquid 1 Application(s) Topical daily    --------------------------------------------------------------------------------------    VITAL SIGNS, INS/OUTS (last 24 hours):  --------------------------------------------------------------------------------------  ICU Vital Signs Last 24 Hrs  T(C): 37.7 (09 Jun 2022 20:00), Max: 37.7 (09 Jun 2022 20:00)  T(F): 99.8 (09 Jun 2022 20:00), Max: 99.8 (09 Jun 2022 20:00)  HR: 89 (10 Noe 2022 00:00) (88 - 133)  BP: 114/76 (10 Noe 2022 00:00) (100/62 - 144/87)  BP(mean): 86 (10 Noe 2022 00:00) (74 - 110)  ABP: 123/66 (10 Noe 2022 00:00) (87/45 - 158/86)  ABP(mean): 84 (10 Noe 2022 00:00) (58 - 112)  RR: 14 (10 Noe 2022 00:00) (14 - 35)  SpO2: 98% (09 Jun 2022 22:39) (96% - 100%)    --------------------------------------------------------------------------------------    EXAM  NEUROLOGY  Exam: Intubated and Sedated    HEENT  Exam: Normocephalic, atraumatic.     RESPIRATORY  Exam: Normal expansion/effort.    Mechanical Ventilation: Mode: AC/ CMV (Assist Control/ Continuous Mandatory Ventilation), RR (machine): 18, TV (machine): 500, FiO2: 60, PEEP: 5, MAP: 10, PIP: 26    CARDIOVASCULAR  Exam: S1, S2.  Regular rate and rhythm.      GI/NUTRITION  Exam: Abdomen soft, Non-tender, Non-distended. Midline wound c/d/i with abthera vac in place, ostomy with bowel sweat  Current Diet:  NPO     VASCULAR  Exam: Extremities warm, pink, well-perfused.        METABOLIC/FLUIDS/ELECTROLYTES  lactated ringers. 1000 milliLiter(s) IV Continuous <Continuous>  magnesium sulfate  IVPB 2 Gram(s) IV Intermittent once  potassium chloride  10 mEq/100 mL IVPB 10 milliEquivalent(s) IV Intermittent every 1 hour    [x] VTE Prophylaxis:   Transfusions:	[] PRBC	[] Platelets		[] FFP	[] Cryoprecipitate    INFECTIOUS DISEASE  Antimicrobials/Immunologic Medications:  filgrastim-sndz (ZARXIO) Injectable 480 MICROGram(s) SubCutaneous daily  piperacillin/tazobactam IVPB.. 3.375 Gram(s) IV Intermittent every 8 hours    Tubes/Lines/Drains   [x] Peripheral IV  [] Central Venous Line     	[] R	[] L	[] IJ	[] Fem	[] SC	Date Placed:   [] Arterial Line		[] R	[] L	[] Fem	[] Rad	[] Ax	Date Placed:   [] PICC		[] Midline		[] Mediport  [] Urinary Catheter		Date Placed:   [x] Necessity of urinary, arterial, and venous catheters discussed      OTHER LABORATORY:     IMAGING STUDIES:   CXR:     ASSESSMENT:  52y with a PMHx of HTN, cervical CA (stage III)  on chemo - last session last week - c/b b/l hydronephrosis s/p B/L nephrostomy tube placement, p/w severe sepsis from neutropenic fever. Pt was acutely altered yesterday afternoon without improvement and AXR showed increased free air with concern for perforated viscous. She is now s/p washout, open resection of left colon, with colostomy creation. Recovering in SICU  for close hemodynamic monitoring.     PLAN:  NEUROLOGY:  - Sedated with precedex gtt and Fentanyl gtt as tolerated    RESPIRATORY:  - Intubated  - CPAP trials as tolerated; tolerated overnight 5/5  - Monitor daily chest x-rays and ABG  - Maintain O2 saturation >92%    CARDIOVASCULAR:  - Wean off Levophed gtt off PIV as tolerated  - Keep MAP >65    GI / NUTRITION:  - NPO  - GI prophylaxis with IV Protonix 40mg QD  - IV Reglan and IV Zofran PRN    RENAL / GENITOURINARY:  - LR @100  - Indwelling hilliard catheter  - Bilateral nephrostomy tubes  - Monitor electrolytes and replete PRN  - Continue to monitor strict ins and outs q1 hour    HEMATOLOGIC:  - Received 5 units PRBC, 3 units FFP, 3 units PLT and 1 unit cryo intra-operatively  - Monitor CBC daily  - Transfuse PRN  - holding chemical DVT ppx due to low platelet count  - s/p 1 platelet on 6/10 AM    INFECTIOUS DISEASE:  - Neutropenic fevers with WBC of 3.33  - Continue Zarxio   - Continue with IV Zosyn    ENDOCRINOLOGY:  - Monitor fignersticks q6 hours while NPO       SICU Daily Progress Note  =====================================================  Overnight Events:       - RTOR @1230PM for ex lap, washout, possible closure  - family member, daughter, to be at bedside tomorrow for HCP discussion as well as for OR consent for gyn/surgical oncology per fellow chart note  - weaning levophed as tolerated   - 1/2 amp overnight; glucose 70s-80s  - platelet count 14- 1 bag platelets given overnight since OR in AM; f/u CBC  - electrolyte repletion           HPI:   52y female with recurrent cervical CA (per patient stage III) presenting as transfer from [place] a/w neutropenic fever. Patient reports she had last cycle of chemotherapy 5 days prior.  Patient had VNS care stop by yesterday and was noted to be tachycardic on vital signs and was sent to the ED.  She reports feeling weak + fatigued. + upper abdominal pain that worsens while having chills. She denies any vomiting, chest pain, SOB.     At [facility] she was given Meropenem and Zosyn and was noted to be tachycardic to 140s and febrile to 39.5 and was thus transferred for further management. She became acutely altered yesterday afternoon without improvement and AXR with increased free air concern for perforated viscous. She is now s/p washout, open resection of left colon, colostomy. SICU consulted for close hemodynamic monitoring.       Surgery Information Washout, open resection of left colon, colostomy   Case Duration: 4hr   EBL: 2L 	IV Fluids: 4.5 L crystalloid, 3L albumin	Blood Products:  5u PRBC	Urine Output: 300mL     Allergies: No Known Allergies      MEDICATIONS:   --------------------------------------------------------------------------------------  Neurologic Medications  dexMEDEtomidine Infusion 0.05 MICROgram(s)/kG/Hr IV Continuous <Continuous>  fentaNYL   Infusion. 0.5 MICROgram(s)/kG/Hr IV Continuous <Continuous>  metoclopramide Injectable 10 milliGRAM(s) IV Push every 8 hours PRN nausea/vomiting  ondansetron Injectable 4 milliGRAM(s) IV Push every 6 hours PRN Nausea and/or Vomiting    Cardiovascular Medications  norepinephrine Infusion 0.05 MICROgram(s)/kG/Min IV Continuous <Continuous>    Gastrointestinal Medications  lactated ringers. 1000 milliLiter(s) IV Continuous <Continuous>  pantoprazole  Injectable 40 milliGRAM(s) IV Push two times a day      Hematologic/Oncologic Medications  filgrastim-sndz (ZARXIO) Injectable 480 MICROGram(s) SubCutaneous daily    Antimicrobial/Immunologic Medications  piperacillin/tazobactam IVPB.. 3.375 Gram(s) IV Intermittent every 8 hours      Topical/Other Medications  chlorhexidine 0.12% Liquid 15 milliLiter(s) Oral Mucosa every 12 hours  chlorhexidine 4% Liquid 1 Application(s) Topical daily    --------------------------------------------------------------------------------------  ICU Vital Signs Last 24 Hrs  ICU Vital Signs Last 24 Hrs  T(C): 37.1 (10 Noe 2022 08:00), Max: 37.7 (09 Jun 2022 20:00)  T(F): 98.7 (10 Noe 2022 08:00), Max: 99.8 (09 Jun 2022 20:00)  HR: 80 (10 Noe 2022 10:00) (80 - 133)  BP: 101/69 (10 Noe 2022 04:00) (101/69 - 144/87)  BP(mean): 80 (10 Noe 2022 04:00) (75 - 96)  ABP: 110/67 (10 Noe 2022 10:00) (92/53 - 158/86)  ABP(mean): 82 (10 Noe 2022 10:00) (65 - 112)  RR: 11 (10 Noe 2022 10:00) (11 - 35)  SpO2: 100% (10 Noe 2022 10:00) (97% - 100%)  --------------------------------------------------------------------------------------    EXAM  NEUROLOGY  Exam: Intubated and Sedated    HEENT  Exam: Normocephalic, atraumatic.     RESPIRATORY  Exam: Normal expansion/effort.    Mechanical Ventilation: Mode: AC/ CMV (Assist Control/ Continuous Mandatory Ventilation), RR (machine): 18, TV (machine): 500, FiO2: 40, PEEP: 5, MAP: 10, PIP: 26    CARDIOVASCULAR  Exam: S1, S2.  Regular rate and rhythm on monitor.     GI/NUTRITION  Exam: Abdomen soft, Non-tender, Non-distended. Midline wound c/d/i with abthera vac in place, ostomy with bowel   Current Diet:  NPO     VASCULAR  Exam: Extremities warm, pink, well-perfused.        METABOLIC/FLUIDS/ELECTROLYTES  lactated ringers. 1000 milliLiter(s) IV Continuous <Continuous>  magnesium sulfate  IVPB 2 Gram(s) IV Intermittent once  potassium chloride  10 mEq/100 mL IVPB 10 milliEquivalent(s) IV Intermittent every 1 hour        INFECTIOUS DISEASE  Antimicrobials/Immunologic Medications:  filgrastim-sndz (ZARXIO) Injectable 480 MICROGram(s) SubCutaneous daily  piperacillin/tazobactam IVPB.. 3.375 Gram(s) IV Intermittent every 8 hours

## 2022-06-10 NOTE — CHART NOTE - NSCHARTNOTEFT_GEN_A_CORE
R2 GYN Onc POST-OP CHECK    S: Patient seen and evaluated at bedside. Pt sedated, intubated, not following commands.     O:   T(C): 37 (06-10-22 @ 14:00), Max: 37 (06-10-22 @ 14:00)  HR: 86 (06-10-22 @ 16:05) (76 - 93)  BP: --  RR: 18 (06-10-22 @ 16:05) (18 - 18)  SpO2: 99% (06-10-22 @ 16:05) (93% - 99%)  Wt(kg): --    I&O's Summary  2022 07:  -  10 Noe 2022 07:00  --------------------------------------------------------  IN: 5694.2 mL / OUT: 2927 mL / NET: 2767.2 mL    10 Noe 2022 07:01  -  10 Noe 2022 17:10  --------------------------------------------------------  IN: 856.2 mL / OUT: 247 mL / NET: 609.2 mL    Gen: NAD, intubated/sedated  Abdomen: soft, Open midline vertical incision with abthera wound vac in place. Abthera draining serosanguinous fluid. Ostomy with bowel sweat; ostomy tissue pink and well appearing.   Nephrostomy tubes: Light yellow urine bilaterally  Infante: Blood tinged colored urine  Extremities: SCD's in place and functional      A/P: 52y  with recurrent cervical CA admitted for mgmt of neutropenic fever now s/p emergent exploratory laparotomy, abdominal washout, rectosigmoid colectomy, diverting colostomy, bronchoscopy and abthera placement on 22 (ebl 2000cc, s/p 5UpRBC, 3L albumin, 3U Plts, 3U FFP, 3U Cryo) for findings of inc. free air on abdominal xray concerning for bowel perforation. Patient now s/p RTOR for abdominal washout and replacement of abthera today (6/10/22).     Neuro: Continue IV pain control per SICU.   CV: Tachycardia improved. Continue titration of pressors per SICU.  Pulm: Intubated on VC. CPAP overnight.   GI: NPO. GI following. Continue IV Protonix/Reglan. NG tube in place (- ).   : b/l nephrostomy tubes in place. Infante in place. Patient has ASHLEY likely 2/2 IV Contrast & Dehydration. Will continue to monitor UOP. Labs per SICU. Replete electrolytes PRN  Heme: Continue Venodynes for DVT ppx.    - s/p 2u Plts for platelets of 14 ->   ID: Afebrile. Continue Zosyn (s/p Cefepime). Continue to trend WBC. Continue Zarixo for neutropenic fever until WBC 10. Trend lactate.     Dispo: Appreciate excellent SICU care. Plan for possible abdominal washout and abdominal closer in the OR on 2022.    NANCY Parker, PGY2 R2 GYN Onc POST-OP CHECK    S: Patient seen and evaluated at bedside. Pt sedated, intubated, not following commands.     O:   T(C): 37 (06-10-22 @ 14:00), Max: 37 (06-10-22 @ 14:00)  HR: 86 (06-10-22 @ 16:05) (76 - 93)  BP: --  RR: 18 (06-10-22 @ 16:05) (18 - 18)  SpO2: 99% (06-10-22 @ 16:05) (93% - 99%)  Wt(kg): --    I&O's Summary  2022 07:  -  10 Noe 2022 07:00  --------------------------------------------------------  IN: 5694.2 mL / OUT: 2927 mL / NET: 2767.2 mL    10 Noe 2022 07:01  -  10 Noe 2022 17:10  --------------------------------------------------------  IN: 856.2 mL / OUT: 247 mL / NET: 609.2 mL    Gen: NAD, intubated/sedated  Abdomen: soft, Open midline vertical incision with abthera wound vac in place. Abthera draining serosanguinous fluid. Ostomy with bowel sweat; ostomy tissue pink and well appearing.   Nephrostomy tubes: Light yellow urine bilaterally  Infante: Blood tinged colored urine  Extremities: SCD's in place and functional      A/P: 52y  with recurrent cervical CA admitted for mgmt of neutropenic fever now s/p emergent exploratory laparotomy, abdominal washout, rectosigmoid colectomy, diverting colostomy, bronchoscopy and abthera placement on 22 (ebl 2000cc, s/p 5UpRBC, 3L albumin, 3U Plts, 3U FFP, 3U Cryo) for findings of inc. free air on abdominal xray concerning for bowel perforation. Patient now s/p RTOR for abdominal washout and replacement of abthera today (6/10/22).     Neuro: Continue IV pain control per SICU.   CV: Tachycardia improved. Continue titration of pressors per SICU.  Pulm: Intubated on VC. CPAP overnight.   GI: NPO. GI following. Continue IV Protonix/Reglan. NG tube in place (- ).   : b/l nephrostomy tubes in place. Infante in place. Patient has ASHLEY likely 2/2 IV Contrast & Dehydration. Will continue to monitor UOP. Labs per SICU. Replete electrolytes PRN  Heme: Continue Venodynes for DVT ppx.    - s/p 2u Plts for platelets of 14 -> s/p additional 1u plt intraop 6/10. Repeat Plt 25  - s/p 5u pRBC intraop on  and 1u pRBC intraop on 6/10  ID: Afebrile. Continue Zosyn (s/p Cefepime). Continue to trend WBC. Continue Zarixo for neutropenic fever until WBC 10. Trend lactate.     Dispo: Appreciate excellent SICU care. Plan for possible abdominal washout and abdominal closer in the OR on 2022.    NANCY Parker, PGY2 R2 GYN Onc POST-OP CHECK    S: Patient seen and evaluated at bedside. Pt sedated, intubated, not following commands.     O:   T(C): 37 (06-10-22 @ 14:00), Max: 37 (06-10-22 @ 14:00)  HR: 86 (06-10-22 @ 16:05) (76 - 93)  BP: --  RR: 18 (06-10-22 @ 16:05) (18 - 18)  SpO2: 99% (06-10-22 @ 16:05) (93% - 99%)  Wt(kg): --    I&O's Summary  2022 07:  -  10 Noe 2022 07:00  --------------------------------------------------------  IN: 5694.2 mL / OUT: 2927 mL / NET: 2767.2 mL    10 Noe 2022 07:01  -  10 Noe 2022 17:10  --------------------------------------------------------  IN: 856.2 mL / OUT: 247 mL / NET: 609.2 mL    Gen: NAD, intubated/sedated  Abdomen: soft, Open midline vertical incision with abthera wound vac in place. Abthera draining serosanguinous fluid. Ostomy with bowel sweat; ostomy tissue pink and well appearing.   Nephrostomy tubes: Light yellow urine bilaterally  Infante: Blood tinged colored urine  Extremities: SCD's in place and functional      A/P: 52y  with recurrent cervical CA admitted for mgmt of neutropenic fever now s/p emergent exploratory laparotomy, abdominal washout, rectosigmoid colectomy, diverting colostomy, bronchoscopy and abthera placement on 22 (ebl 2000cc, s/p 5UpRBC, 3L albumin, 3U Plts, 3U FFP, 3U Cryo) for findings of inc. free air on abdominal xray concerning for bowel perforation. Patient now s/p RTOR for abdominal washout and replacement of abthera today (6/10/22).     Neuro: Continue IV pain control per SICU.   CV: Tachycardia improved. Continue titration of pressors per SICU.  Pulm: Intubated on VC. CPAP overnight.   GI: NPO. GI following. Continue IV Protonix/Reglan. NG tube in place (- ).   : b/l nephrostomy tubes in place. Infante in place. Patient has ASHLEY likely 2/2 IV Contrast & Dehydration. Will continue to monitor UOP. Labs per SICU. Replete electrolytes PRN  Heme: Continue Venodynes for DVT ppx.    - s/p 2u Plts for platelets of 14 -> s/p additional 1u plt intraop 6/10. Repeat Plt 25  - s/p 5u pRBC intraop on  and 1u pRBC intraop on 6/10  ID: Afebrile. Continue Zosyn (s/p Cefepime). Continue to trend WBC. Continue Zarixo for neutropenic fever until WBC 10. Trend lactate.     Dispo: Appreciate excellent SICU care. Plan for possible abdominal washout and abdominal closure in the OR on 2022.    NANCY Parker, PGY2

## 2022-06-10 NOTE — BRIEF OPERATIVE NOTE - OPERATION/FINDINGS
Clean skin/wound edges noted after removal of Abthera. Less free stool/pus noted throughout abdomen. New pocket of pustulant material in RUQ near liver, thoroughly irrigated. Previously noted pelvic tumor, peritoneal carcinomatosis, and omental nodules again visualized. Intact anastomosis from previous bowel resection. New Abthera placed. Clean skin/wound edges noted after removal of Abthera. Less free stool/pus noted throughout abdomen. residual pocket of pustulant material in RUQ near liver, thoroughly irrigated. Previously noted pelvic tumor, Intact suture line from previous bowel resection at level of sigmoid colon. New Abthera placed. Clean skin/wound edges noted after removal of Abthera.   Less pus noted throughout abdomen. Falciform taken down with electrocautery. Residual purulent material in RUQ copiously irrigated. Staple line with small clot.   Abdomen irrigated with 5L NS and 3L antibiotic solution.  New Abthera placed.

## 2022-06-10 NOTE — PROGRESS NOTE ADULT - SUBJECTIVE AND OBJECTIVE BOX
R4 Gyn ONC Progress Note POD#2  HD#6    Subjective:   Pt seen and examined at bedside. She is intubated and sedated. No acute events overnight. Per the SICU team, patient was satting well on CPAP overnight. Her urine from the nephrostomy tubes has been clear and the urine from the hilliard has been a brick color. They have been decreasing pressors as tolerated.     Objective:  T(F): 98.3 (06-10-22 @ 12:00), Max: 99.8 (06-09-22 @ 20:00)  HR: 75 (06-10-22 @ 12:00) (75 - 124)  BP: 106/61 (06-10-22 @ 11:15) (101/69 - 118/77)  RR: 16 (06-10-22 @ 12:00) (11 - 25)  SpO2: 99% (06-10-22 @ 12:00) (97% - 100%)  Wt(kg): --  I&O's Summary    09 Jun 2022 07:01  -  10 Noe 2022 07:00  --------------------------------------------------------  IN: 5694.2 mL / OUT: 2927 mL / NET: 2767.2 mL    10 Noe 2022 07:01  -  10 Noe 2022 14:11  --------------------------------------------------------  IN: 782.2 mL / OUT: 147 mL / NET: 635.2 mL      CAPILLARY BLOOD GLUCOSE      POCT Blood Glucose.: 104 mg/dL (10 Noe 2022 11:30)  POCT Blood Glucose.: 94 mg/dL (10 Noe 2022 05:03)  POCT Blood Glucose.: 102 mg/dL (10 Noe 2022 01:58)  POCT Blood Glucose.: 83 mg/dL (10 Noe 2022 00:42)  POCT Blood Glucose.: 91 mg/dL (09 Jun 2022 19:08)      MEDICATIONS  (STANDING):  chlorhexidine 0.12% Liquid 15 milliLiter(s) Oral Mucosa every 12 hours  chlorhexidine 4% Liquid 1 Application(s) Topical daily  dexMEDEtomidine Infusion 0.05 MICROgram(s)/kG/Hr (1.26 mL/Hr) IV Continuous <Continuous>  fentaNYL   Infusion 0.5 MICROgram(s)/kG/Hr (5.02 mL/Hr) IV Continuous <Continuous>  filgrastim-sndz (ZARXIO) Injectable 480 MICROGram(s) SubCutaneous daily  norepinephrine Infusion 0.05 MICROgram(s)/kG/Min (9.41 mL/Hr) IV Continuous <Continuous>  pantoprazole  Injectable 40 milliGRAM(s) IV Push two times a day  piperacillin/tazobactam IVPB.. 3.375 Gram(s) IV Intermittent every 8 hours    MEDICATIONS  (PRN):      Physical Exam:  Constitutional: NAD, intubated/sedated  Abdomen: soft, Open midline vertical incision with abthera wound vac in place. Abthera draining serosanguinous fluid. Ostomy bag with bowel sweat. Ostomy tissue pink and well appearing.   Nephrostomy tubes: Light yellow urine bilaterally  Hilliard: Brick colored urine  Extremities: venodynes in place    LABS:  06-10    140    |  105    |  34<H>  ----------------------------<  104<H>  3.8     |  25     |  1.22   06-10    140    |  103    |  32<H>  ----------------------------<  78     2.9<LL>   |  24     |  1.11   06-09    139    |  101    |  32<H>  ----------------------------<  104<H>  3.2<L>   |  21<L>  |  0.92     Ca    8.1<L>      10 Noe 2022 11:30  Ca    8.0<L>      10 Noe 2022 00:45  Ca    8.0<L>      09 Jun 2022 05:19  Phos  3.3       06-10  Phos  2.2       06-10  Phos  2.8       06-09  Mg     2.00      06-10  Mg     1.80      06-10  Mg     1.90      06-09          PT/INR - ( 10 Noe 2022 00:45 )   PT: 15.4 sec;   INR: 1.32 ratio         PTT - ( 10 Noe 2022 00:45 )  PTT:34.9 sec

## 2022-06-10 NOTE — BRIEF OPERATIVE NOTE - OPERATION/FINDINGS
19-Jan-2022 19:55 Abthera removed. Purulent ascites encountered. Falciform ligament taken down and pus pocked irrigated in RUQ. Small and large bowel pink and viable. Abdomen copiously irrigated with saline and antibiotic solution. Abthera replaced.

## 2022-06-10 NOTE — PROGRESS NOTE ADULT - ATTENDING COMMENTS
I agree with the history, physical, and plan, which I have reviewed and edited where appropriate.  I agree with notes/assessment of health care providers on my service.  I have personally examined the patient.  I was physically present for the key portions of the evaluation and management (E/M) service provided.  I reviewed data and laboratory tests/x-rays and all pertinent electronic images.  The patient is a critical care patient with life threatening hemodynamic and metabolic instability in SICU.  Risk benefit analyses discussed.    The patient is in SICU with diagnosis mentioned in the note.    The plan is specified below.      ASSESSMENT:  52y with a PMHx of HTN, cervical CA (stage III)  on chemo - 1 week prior to admission - c/b b/l hydronephrosis s/p B/L nephrostomy tube placement, p/w severe sepsis from neutropenic fever and perforated viscous. She is now s/p washout, open resection of left colon, with colostomy creation. Recovering in SICU  for close hemodynamic monitoring.     PLAN:  NEUROLOGY:  - Sedated with precedex and Fentanyl     RESPIRATORY: post op respiratory insufficiency   - Intubated  - CPAP trials as tolerated    CARDIOVASCULAR:  - low dose Levophed gtt     GI / NUTRITION:  - NPO  - GI prophylaxis with IV Protonix 40mg QD  - IV Reglan     RENAL / GENITOURINARY:  - IV lock  - Indwelling hilliard catheter  - Bilateral nephrostomy tubes    HEMATOLOGIC: thrombocytopenia, neutropenia   - holding chemical DVT ppx due to low platelet count  - Continue Filgrastim    INFECTIOUS DISEASE: perforated viscus   - Continue with IV Zosyn    ENDOCRINOLOGY:  - Monitor fignersticks q6 hours while NPO

## 2022-06-10 NOTE — PROGRESS NOTE ADULT - SUBJECTIVE AND OBJECTIVE BOX
SUBJECTIVE:  Sedated, not following commands    OBJECTIVE:  Vital Signs Last 24 Hrs  T(C): 37.4 (10 Noe 2022 04:00), Max: 37.7 (09 Jun 2022 20:00)  T(F): 99.3 (10 Noe 2022 04:00), Max: 99.8 (09 Jun 2022 20:00)  HR: 83 (10 One 2022 08:00) (81 - 133)  BP: 101/69 (10 Noe 2022 04:00) (101/69 - 144/87)  BP(mean): 80 (10 Noe 2022 04:00) (75 - 96)  RR: 14 (10 Noe 2022 08:00) (12 - 35)  SpO2: 97% (10 Noe 2022 08:00) (97% - 100%)      06-09-22 @ 07:01  -  06-10-22 @ 07:00  --------------------------------------------------------  IN: 5694.2 mL / OUT: 2927 mL / NET: 2767.2 mL    06-10-22 @ 07:01  -  06-10-22 @ 09:22  --------------------------------------------------------  IN: 523.2 mL / OUT: 77 mL / NET: 446.2 mL        Physical Examination:  GEN: calm, sedated  PULM: intubated  ABD: edematous, abhthera to suction, right ostomy pink and viable  EXTR: edematous      LABS:                        8.2    10.83 )-----------( 20       ( 10 Noe 2022 05:00 )             24.3       06-10    140  |  103  |  32<H>  ----------------------------<  78  2.9<LL>   |  24  |  1.11    Ca    8.0<L>      10 Noe 2022 00:45  Phos  2.2     06-10  Mg     1.80     06-10

## 2022-06-10 NOTE — CHART NOTE - NSCHARTNOTEFT_GEN_A_CORE
Patient evaluated at bedside this evening. Sedated. No overnight events.     Objective  T(C): 37.7 (06-10-22 @ 00:00), Max: 37.7 (22 @ 20:00)  HR: 83 (06-10-22 @ 03:12) (83 - 122)  BP: 111/73 (06-10-22 @ 01:00) (111/73 - 118/77)  RR: 13 (06-10-22 @ 01:00) (13 - 25)  SpO2: 100% (06-10-22 @ 03:12) (97% - 100%)  Wt(kg): --     @ :  -   @ 07:00  --------------------------------------------------------  IN: 4367.4 mL / OUT: 4170 mL / NET: 197.4 mL     @ 07:01  -  06-10 @ 03:28  --------------------------------------------------------  IN: 3181.6 mL / OUT: 2092 mL / NET: 1089.6 mL      Physical Exam:  Gen: Comfortable, NAD  Psych: sedated   CV: Tachycardic, regular rhythm   Pulm: Mechanical breath sounds 2/2 ventilator   Abd: Soft, non-distended, +BS, abthera in place with surrounding incision edges well perfused; ostomy pink and well perfused, scant serosanguinous output; NG tube in place with bilious output   : b/l nephrostomy tubes draining light yellow urine; catheter with blood tinged urine   Ext: Warm & well perfused.     chlorhexidine 0.12% Liquid 15 milliLiter(s) Oral Mucosa every 12 hours  chlorhexidine 4% Liquid 1 Application(s) Topical daily  dexMEDEtomidine Infusion 0.05 MICROgram(s)/kG/Hr IV Continuous <Continuous>  fentaNYL   Infusion. 0.5 MICROgram(s)/kG/Hr IV Continuous <Continuous>  filgrastim-sndz (ZARXIO) Injectable 480 MICROGram(s) SubCutaneous daily  lactated ringers. 1000 milliLiter(s) IV Continuous <Continuous>  magnesium sulfate  IVPB 2 Gram(s) IV Intermittent once  metoclopramide Injectable 10 milliGRAM(s) IV Push every 8 hours PRN  norepinephrine Infusion 0.05 MICROgram(s)/kG/Min IV Continuous <Continuous>  ondansetron Injectable 4 milliGRAM(s) IV Push every 6 hours PRN  pantoprazole  Injectable 40 milliGRAM(s) IV Push two times a day  piperacillin/tazobactam IVPB.. 3.375 Gram(s) IV Intermittent every 8 hours  potassium chloride  10 mEq/100 mL IVPB 10 milliEquivalent(s) IV Intermittent every 1 hour  potassium chloride  10 mEq/100 mL IVPB 10 milliEquivalent(s) IV Intermittent every 1 hour  potassium phosphate IVPB 30 milliMole(s) IV Intermittent once    A/P: 52y  with recurrent cervical CA who presented to the ED as transfer from [place] admitted for mgmt of neutropenic fever. On presentation, patient was found to be tachycardic to 130s, ANC of 200, and febrile. She received 1uPRBC on admission. She became acutely altered  with new coffee ground emesis and increasing free air on abdominal xray on  increasing concern for bowel perforation. Pt POD#2 s/p emergent exploratory laparotomy, abdominal washout, rectosigmoid colectomy, diverting colostomy, bronchoscopy and abthera placement on  (ebl 2000cc, s/p 5UpRBC, 3L albumin, 3U Plts, 3U FFP, 3U Cryo). Patient overall stable overnight in SICU.     Onc: recurrent cervical cancer. Records from 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 Treated with definitive radiation and weekly cisplatin at [facility] [place]. Final radiation was 10/29/2014.   - Recurrence in  with bilateral ureteral obstruction. Recently started on new chemo regimen (Carbo AUC 5, Paxol 135 mg/m2 and Avastin 15 mg/m2 on q3 week schedule), last cycle on   - CT A/P as above. Demonstrates carcinomatosis   Neuro: Continue Precedex, Tylenol IV and fentanyl per SICU.   CV: baseline tachycardia since admission, now resolved. Pt on Levo gtt. Continue to monitor VS per routine.  - Per hospitalist, tachycardia 2/2 to sepsis. Treat underlying cause.  Pulm: Intubated on CPAP. Extubation per SICU.   GI: NPO. GI following. Continue IV Protonix/Reglan. NG tube in place (- ).   : b/l nephrostomy tubes in place. Infante in place. Patient has ASHLEY likely 2/2 IV Contrast & Dehydration. Will continue to monitor UOP. Labs per SICU. Replete electrolytes PRN  Heme: Continue Venodynes for DVT ppx. Increase OOB.    ID: Afebrile. Continue Zosyn (s/p Cefepime) for presumed sepsis on admission. Continue to trend WBC. Continue Zarixo for neutropenic fever. WBC 0.47, ANC 0.16. Lactate 2.5->1.9->3.4. Labs per SICU  Dispo: continued inpatient care per SICU; plan for possible OR today 6/10 for abdominal washout and possible closure.     Joelle Garcia, PGY-2 Patient evaluated at bedside this evening. Sedated. No overnight events.     Objective  T(C): 37.7 (06-10-22 @ 00:00), Max: 37.7 (22 @ 20:00)  HR: 83 (06-10-22 @ 03:12) (83 - 122)  BP: 111/73 (06-10-22 @ 01:00) (111/73 - 118/77)  RR: 13 (06-10-22 @ 01:00) (13 - 25)  SpO2: 100% (06-10-22 @ 03:12) (97% - 100%)  Wt(kg): --     @ :  -   @ 07:00  --------------------------------------------------------  IN: 4367.4 mL / OUT: 4170 mL / NET: 197.4 mL     @ 07:01  -  06-10 @ 03:28  --------------------------------------------------------  IN: 3181.6 mL / OUT: 2092 mL / NET: 1089.6 mL      Physical Exam:  Gen: Comfortable, NAD  Psych: sedated   CV: Tachycardic, regular rhythm   Pulm: Mechanical breath sounds 2/2 ventilator   Abd: Soft, non-distended, +BS, abthera in place with surrounding incision edges well perfused; ostomy pink and well perfused, scant serosanguinous output; NG tube in place with bilious output   : b/l nephrostomy tubes draining light yellow urine; catheter with blood tinged urine   Ext: Warm & well perfused.     chlorhexidine 0.12% Liquid 15 milliLiter(s) Oral Mucosa every 12 hours  chlorhexidine 4% Liquid 1 Application(s) Topical daily  dexMEDEtomidine Infusion 0.05 MICROgram(s)/kG/Hr IV Continuous <Continuous>  fentaNYL   Infusion. 0.5 MICROgram(s)/kG/Hr IV Continuous <Continuous>  filgrastim-sndz (ZARXIO) Injectable 480 MICROGram(s) SubCutaneous daily  lactated ringers. 1000 milliLiter(s) IV Continuous <Continuous>  magnesium sulfate  IVPB 2 Gram(s) IV Intermittent once  metoclopramide Injectable 10 milliGRAM(s) IV Push every 8 hours PRN  norepinephrine Infusion 0.05 MICROgram(s)/kG/Min IV Continuous <Continuous>  ondansetron Injectable 4 milliGRAM(s) IV Push every 6 hours PRN  pantoprazole  Injectable 40 milliGRAM(s) IV Push two times a day  piperacillin/tazobactam IVPB.. 3.375 Gram(s) IV Intermittent every 8 hours  potassium chloride  10 mEq/100 mL IVPB 10 milliEquivalent(s) IV Intermittent every 1 hour  potassium chloride  10 mEq/100 mL IVPB 10 milliEquivalent(s) IV Intermittent every 1 hour  potassium phosphate IVPB 30 milliMole(s) IV Intermittent once    A/P: 52y  with recurrent cervical CA who presented to the ED as transfer from [place] admitted for mgmt of neutropenic fever. On presentation, patient was found to be tachycardic to 130s, ANC of 200, and febrile. She received 1uPRBC on admission. She became acutely altered  with new coffee ground emesis and increasing free air on abdominal xray on  increasing concern for bowel perforation. Pt POD#2 s/p emergent exploratory laparotomy, abdominal washout, rectosigmoid colectomy, diverting colostomy, bronchoscopy and abthera placement on  (ebl 2000cc, s/p 5UpRBC, 3L albumin, 3U Plts, 3U FFP, 3U Cryo). Patient overall stable overnight in SICU.     Onc: recurrent cervical cancer. 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 Treated with definitive radiation and weekly cisplatin at [facility]. Final radiation was 10/29/2014.   - Recurrence in  with bilateral ureteral obstruction. Recently started on new chemo regimen (Carbo AUC 5, Paxol 135 mg/m2 and Avastin 15 mg/m2 on q3 week schedule), last cycle on   - CT A/P as above. Demonstrates carcinomatosis   Neuro: Continue Precedex, Tylenol IV and fentanyl per SICU.   CV: baseline tachycardia since admission, now resolved. Pt on Levo gtt. Continue to monitor VS per routine.  - Per hospitalist, tachycardia 2/2 to sepsis. Treat underlying cause.  Pulm: Intubated on CPAP. Extubation per SICU.   GI: NPO. GI following. Continue IV Protonix/Reglan. NG tube in place (- ).   : b/l nephrostomy tubes in place. Infante in place. Patient has ASHLEY likely 2/2 IV Contrast & Dehydration. Will continue to monitor UOP. Labs per SICU. Replete electrolytes PRN  Heme: Continue Venodynes for DVT ppx. Increase OOB. Thrombocytopenia possibly consumptive 2/2 postoperative state. Plt transfusion per SICU.  ID: Afebrile. Continue Zosyn (s/p Cefepime) for presumed sepsis on admission. Continue to trend WBC. Continue Zarixo for neutropenic fever. WBC 0.47, ANC 0.16. Lactate 2.5->1.9->3.4. Labs per SICU  Dispo: continued inpatient care per SICU; plan for possible OR today 6/10 for abdominal washout and possible closure.     Joelle Garcia, PGY-2

## 2022-06-10 NOTE — CHART NOTE - NSCHARTNOTEFT_GEN_A_CORE
Patient seen and evaluated, intubated, on pressors  patient planned for the OR now for abd exploration, washout, change of vac  Met with family in the SICU - daughter and daughter's father. Signed HCP paper work with daughter being the HCP  Lydia also signed consent for surgery  we discussed current findings, patient is critically ill, needs surgery to further evaluate the abdomen. Patient is currently on requiring pressors, platelet transfusion, antibiotics  Lydia understands she is critically ill and has cancer, she is aware of poor prognosis, at this time would like to continue all treatment to allow her mother to recover, patient is full code  Prognosis is guarded. She would like to meet again later today when her aunt arrives in town from [place]  All questions answered, Dr. Louis also present for discussion    Betina Merino MD

## 2022-06-10 NOTE — PROGRESS NOTE ADULT - ASSESSMENT
Assessment/Plan: 52y  with recurrent cervical CA who presented to the ED as transfer from [place] admitted for mgmt of neutropenic fever. On presentation, patient was found to be tachycardic to 130s, ANC of 200, and febrile. She received 1uPRBC on admission. She became acutely altered  with new coffee ground emesis and increasing free air on abdominal xray on  increasing concern for bowel perforation. Pt taken for emergent exploratory laparotomy, abdominal washout, rectosigmoid colectomy, diverting colostomy, bronchoscopy and abthera placement on  (ebl 2000cc, s/p 5UpRBC, 3L albumin, 3U Plts, 3U FFP, 3U Cryo). Overnight pt's platelets were 14. She was given 2u Plts.     Neuro: Continue IV pain control per SICU.   CV: Tachycardia improved. Continue titration of pressors per SICU  Pulm: Intubated on VC. CPAP overnight.   GI: NPO. GI following. Continue IV Protonix/Reglan. NG tube in place (- ).   : b/l nephrostomy tubes in place. Infante in place. Patient has ASHLEY likely 2/2 IV Contrast & Dehydration. Will continue to monitor UOP. Labs per SICU. Replete electrolytes PRN  Heme: Continue Venodynes for DVT ppx. Increase OOB.    ID: Afebrile. Continue Zosyn (s/p Cefepime). Continue to trend WBC. Continue Zarixo for neutropenic fever until WBC 10. Trend lactate. Dispo: Plan for Abdominal washout and possible abdominal closer today in the OR.    Eddie Headley PGY-4 Assessment/Plan: 52y  with recurrent cervical CA who presented to the ED as transfer from [place] admitted for mgmt of neutropenic fever. On presentation, patient was found to be tachycardic to 130s, ANC of 200, and febrile. She received 1uPRBC on admission. She became acutely altered  with new coffee ground emesis and increasing free air on abdominal xray on  increasing concern for bowel perforation. Pt taken for emergent exploratory laparotomy, abdominal washout, rectosigmoid colectomy, diverting colostomy, bronchoscopy and abthera placement on  (ebl 2000cc, s/p 5UpRBC, 3L albumin, 3U Plts, 3U FFP, 3U Cryo). Overnight pt's platelets were 14. She was given 2u Plts.     Neuro: Continue IV pain control per SICU.   CV: Tachycardia improved. Continue titration of pressors per SICU  Pulm: Intubated on VC. CPAP overnight.   GI: NPO. GI following. Continue IV Protonix/Reglan. NG tube in place (- ).   : b/l nephrostomy tubes in place. Infante in place. Patient has ASHLEY likely 2/2 IV Contrast & Dehydration. Will continue to monitor UOP. Labs per SICU. Replete electrolytes PRN  Heme: Continue Venodynes for DVT ppx.    ID: Afebrile. Continue Zosyn (s/p Cefepime). Continue to trend WBC. Continue Zarixo for neutropenic fever until WBC 10. Trend lactate.   Dispo: Plan for Abdominal washout and possible abdominal closure today in the OR.    Eddie Headley PGY-4

## 2022-06-10 NOTE — PROGRESS NOTE ADULT - ASSESSMENT
ASSESSMENT:  52y with a PMHx of HTN, cervical CA (stage III)  on chemo - last session last week - c/b b/l hydronephrosis s/p B/L nephrostomy tube placement, p/w severe sepsis from neutropenic fever. Pt was acutely altered yesterday afternoon without improvement and AXR showed increased free air with concern for perforated viscous. She is now s/p washout, open resection of left colon, with colostomy creation. Recovering in SICU  for close hemodynamic monitoring.     PLAN:  NEUROLOGY:  - Sedated with precedex gtt and Fentanyl gtt as tolerated    RESPIRATORY:  - Intubated  - CPAP trials as tolerated; tolerated overnight 5/5  - Monitor daily chest x-rays and ABG  - Maintain O2 saturation >92%    CARDIOVASCULAR:  - Wean off Levophed gtt off PIV as tolerated  - Keep MAP >65    GI / NUTRITION:  - NPO  - GI prophylaxis with IV Protonix 40mg QD    RENAL / GENITOURINARY:  - D5+LR+pot @100  - Indwelling hilliard catheter  - hx of bilateral nephrostomy tubes  - Monitor electrolytes and replete PRN  - Continue to monitor strict ins and outs q1 hour    HEMATOLOGIC:  - Received 5 units PRBC, 3 units FFP, 3 units PLT and 1 unit cryo intra-operatively  - Monitor CBC daily  - Transfuse PRN  - holding chemical DVT ppx due to low platelet count  - s/p 1 platelet on 6/10 AM    INFECTIOUS DISEASE:  - Neutropenic fevers with WBC of 3.33  - Continue Zarxio   - Continue with IV Zosyn    ENDOCRINOLOGY:  - Monitor fingersticks q6 hours while NPO      Disposition: SICU

## 2022-06-10 NOTE — PROGRESS NOTE ADULT - ASSESSMENT
52 Year-Old Lady with recurrent cervical CA on chemo presenting with intraabominal free air found to have bowel perforation now status post 6/8  exploratory laparotomy, abdominal washout, rectosigmoid colectomy, diverting colostomy and ABThera™ placement.    Plan/Recommendations:  - Return to OR today for washout, possible abdominal wall closure    VELIA Carrera, PGY2  D Team Surgery   [id number]

## 2022-06-11 ENCOUNTER — TRANSCRIPTION ENCOUNTER (OUTPATIENT)
Age: 52
End: 2022-06-11

## 2022-06-11 LAB
ALBUMIN SERPL ELPH-MCNC: 2.1 G/DL — LOW (ref 3.3–5)
ALP SERPL-CCNC: 54 U/L — SIGNIFICANT CHANGE UP (ref 40–120)
ALT FLD-CCNC: 16 U/L — SIGNIFICANT CHANGE UP (ref 4–33)
ANION GAP SERPL CALC-SCNC: 14 MMOL/L — SIGNIFICANT CHANGE UP (ref 7–14)
ANISOCYTOSIS BLD QL: SLIGHT — SIGNIFICANT CHANGE UP
APTT BLD: 29.6 SEC — SIGNIFICANT CHANGE UP (ref 27–36.3)
AST SERPL-CCNC: 33 U/L — HIGH (ref 4–32)
BASOPHILS # BLD AUTO: 0 K/UL — SIGNIFICANT CHANGE UP (ref 0–0.2)
BASOPHILS NFR BLD AUTO: 0 % — SIGNIFICANT CHANGE UP (ref 0–2)
BILIRUB DIRECT SERPL-MCNC: 3.2 MG/DL — HIGH (ref 0–0.3)
BILIRUB INDIRECT FLD-MCNC: 0.1 MG/DL — SIGNIFICANT CHANGE UP (ref 0–1)
BILIRUB SERPL-MCNC: 3.3 MG/DL — HIGH (ref 0.2–1.2)
BLOOD GAS ARTERIAL - LYTES,HGB,ICA,LACT RESULT: SIGNIFICANT CHANGE UP
BLOOD GAS ARTERIAL - LYTES,HGB,ICA,LACT RESULT: SIGNIFICANT CHANGE UP
BUN SERPL-MCNC: 35 MG/DL — HIGH (ref 7–23)
CALCIUM SERPL-MCNC: 7.8 MG/DL — LOW (ref 8.4–10.5)
CHLORIDE SERPL-SCNC: 105 MMOL/L — SIGNIFICANT CHANGE UP (ref 98–107)
CO2 SERPL-SCNC: 21 MMOL/L — LOW (ref 22–31)
COPPER SERPL-MCNC: 143 UG/DL — SIGNIFICANT CHANGE UP (ref 80–158)
CREAT SERPL-MCNC: 1.29 MG/DL — SIGNIFICANT CHANGE UP (ref 0.5–1.3)
EGFR: 49 ML/MIN/1.73M2 — LOW
EOSINOPHIL # BLD AUTO: 0 K/UL — SIGNIFICANT CHANGE UP (ref 0–0.5)
EOSINOPHIL NFR BLD AUTO: 0 % — SIGNIFICANT CHANGE UP (ref 0–6)
GIANT PLATELETS BLD QL SMEAR: PRESENT — SIGNIFICANT CHANGE UP
GLUCOSE BLDC GLUCOMTR-MCNC: 105 MG/DL — HIGH (ref 70–99)
GLUCOSE BLDC GLUCOMTR-MCNC: 107 MG/DL — HIGH (ref 70–99)
GLUCOSE SERPL-MCNC: 98 MG/DL — SIGNIFICANT CHANGE UP (ref 70–99)
HCT VFR BLD CALC: 24.1 % — LOW (ref 34.5–45)
HCT VFR BLD CALC: 24.8 % — LOW (ref 34.5–45)
HGB BLD-MCNC: 8.3 G/DL — LOW (ref 11.5–15.5)
HGB BLD-MCNC: 8.3 G/DL — LOW (ref 11.5–15.5)
IANC: 9.19 K/UL — HIGH (ref 1.8–7.4)
INR BLD: 1.22 RATIO — HIGH (ref 0.88–1.16)
LYMPHOCYTES # BLD AUTO: 0.39 K/UL — LOW (ref 1–3.3)
LYMPHOCYTES # BLD AUTO: 3.5 % — LOW (ref 13–44)
MAGNESIUM SERPL-MCNC: 1.9 MG/DL — SIGNIFICANT CHANGE UP (ref 1.6–2.6)
MCHC RBC-ENTMCNC: 29 PG — SIGNIFICANT CHANGE UP (ref 27–34)
MCHC RBC-ENTMCNC: 29.1 PG — SIGNIFICANT CHANGE UP (ref 27–34)
MCHC RBC-ENTMCNC: 33.5 GM/DL — SIGNIFICANT CHANGE UP (ref 32–36)
MCHC RBC-ENTMCNC: 34.4 GM/DL — SIGNIFICANT CHANGE UP (ref 32–36)
MCV RBC AUTO: 84.3 FL — SIGNIFICANT CHANGE UP (ref 80–100)
MCV RBC AUTO: 87 FL — SIGNIFICANT CHANGE UP (ref 80–100)
METAMYELOCYTES # FLD: 2.6 % — HIGH (ref 0–1)
MONOCYTES # BLD AUTO: 0.59 K/UL — SIGNIFICANT CHANGE UP (ref 0–0.9)
MONOCYTES NFR BLD AUTO: 5.2 % — SIGNIFICANT CHANGE UP (ref 2–14)
MYELOCYTES NFR BLD: 2.6 % — HIGH (ref 0–0)
NEUTROPHILS # BLD AUTO: 9.71 K/UL — HIGH (ref 1.8–7.4)
NEUTROPHILS NFR BLD AUTO: 77.4 % — HIGH (ref 43–77)
NEUTS BAND # BLD: 8.7 % — HIGH (ref 0–6)
NRBC # BLD: 2 /100 WBCS — SIGNIFICANT CHANGE UP
NRBC # BLD: 4 /100 — HIGH (ref 0–0)
NRBC # FLD: 0.3 K/UL — HIGH
OVALOCYTES BLD QL SMEAR: SLIGHT — SIGNIFICANT CHANGE UP
PHOSPHATE SERPL-MCNC: 2.6 MG/DL — SIGNIFICANT CHANGE UP (ref 2.5–4.5)
PLAT MORPH BLD: NORMAL — SIGNIFICANT CHANGE UP
PLATELET # BLD AUTO: 10 K/UL — CRITICAL LOW (ref 150–400)
PLATELET # BLD AUTO: 59 K/UL — LOW (ref 150–400)
PLATELET COUNT - ESTIMATE: ABNORMAL
POIKILOCYTOSIS BLD QL AUTO: SLIGHT — SIGNIFICANT CHANGE UP
POLYCHROMASIA BLD QL SMEAR: SLIGHT — SIGNIFICANT CHANGE UP
POTASSIUM SERPL-MCNC: 3.6 MMOL/L — SIGNIFICANT CHANGE UP (ref 3.5–5.3)
POTASSIUM SERPL-SCNC: 3.6 MMOL/L — SIGNIFICANT CHANGE UP (ref 3.5–5.3)
PROT SERPL-MCNC: 4.5 G/DL — LOW (ref 6–8.3)
PROTHROM AB SERPL-ACNC: 14.2 SEC — HIGH (ref 10.5–13.4)
RBC # BLD: 2.85 M/UL — LOW (ref 3.8–5.2)
RBC # BLD: 2.86 M/UL — LOW (ref 3.8–5.2)
RBC # FLD: 18.3 % — HIGH (ref 10.3–14.5)
RBC # FLD: 18.5 % — HIGH (ref 10.3–14.5)
RBC BLD AUTO: ABNORMAL
SARS-COV-2 RNA SPEC QL NAA+PROBE: SIGNIFICANT CHANGE UP
SODIUM SERPL-SCNC: 140 MMOL/L — SIGNIFICANT CHANGE UP (ref 135–145)
WBC # BLD: 11.28 K/UL — HIGH (ref 3.8–10.5)
WBC # BLD: 12.74 K/UL — HIGH (ref 3.8–10.5)
WBC # FLD AUTO: 11.28 K/UL — HIGH (ref 3.8–10.5)
WBC # FLD AUTO: 12.74 K/UL — HIGH (ref 3.8–10.5)
ZINC SERPL-MCNC: 34 UG/DL — LOW (ref 44–115)

## 2022-06-11 PROCEDURE — 71045 X-RAY EXAM CHEST 1 VIEW: CPT | Mod: 26

## 2022-06-11 PROCEDURE — 99291 CRITICAL CARE FIRST HOUR: CPT

## 2022-06-11 RX ORDER — FUROSEMIDE 40 MG
20 TABLET ORAL ONCE
Refills: 0 | Status: COMPLETED | OUTPATIENT
Start: 2022-06-11 | End: 2022-06-11

## 2022-06-11 RX ORDER — HYDROMORPHONE HYDROCHLORIDE 2 MG/ML
1.5 INJECTION INTRAMUSCULAR; INTRAVENOUS; SUBCUTANEOUS EVERY 4 HOURS
Refills: 0 | Status: DISCONTINUED | OUTPATIENT
Start: 2022-06-11 | End: 2022-06-11

## 2022-06-11 RX ORDER — POTASSIUM PHOSPHATE, MONOBASIC POTASSIUM PHOSPHATE, DIBASIC 236; 224 MG/ML; MG/ML
30 INJECTION, SOLUTION INTRAVENOUS ONCE
Refills: 0 | Status: COMPLETED | OUTPATIENT
Start: 2022-06-11 | End: 2022-06-11

## 2022-06-11 RX ORDER — MIDAZOLAM HYDROCHLORIDE 1 MG/ML
1 INJECTION, SOLUTION INTRAMUSCULAR; INTRAVENOUS ONCE
Refills: 0 | Status: DISCONTINUED | OUTPATIENT
Start: 2022-06-11 | End: 2022-06-11

## 2022-06-11 RX ORDER — NOREPINEPHRINE BITARTRATE/D5W 8 MG/250ML
0.05 PLASTIC BAG, INJECTION (ML) INTRAVENOUS
Qty: 16 | Refills: 0 | Status: DISCONTINUED | OUTPATIENT
Start: 2022-06-11 | End: 2022-06-11

## 2022-06-11 RX ORDER — ACETAMINOPHEN 500 MG
1000 TABLET ORAL EVERY 6 HOURS
Refills: 0 | Status: COMPLETED | OUTPATIENT
Start: 2022-06-11 | End: 2022-06-12

## 2022-06-11 RX ORDER — QUETIAPINE FUMARATE 200 MG/1
50 TABLET, FILM COATED ORAL EVERY 12 HOURS
Refills: 0 | Status: DISCONTINUED | OUTPATIENT
Start: 2022-06-11 | End: 2022-06-12

## 2022-06-11 RX ORDER — FOLIC ACID 0.8 MG
1 TABLET ORAL DAILY
Refills: 0 | Status: DISCONTINUED | OUTPATIENT
Start: 2022-06-11 | End: 2022-06-20

## 2022-06-11 RX ORDER — DEXMEDETOMIDINE HYDROCHLORIDE IN 0.9% SODIUM CHLORIDE 4 UG/ML
1 INJECTION INTRAVENOUS
Qty: 400 | Refills: 0 | Status: DISCONTINUED | OUTPATIENT
Start: 2022-06-11 | End: 2022-06-19

## 2022-06-11 RX ORDER — NOREPINEPHRINE BITARTRATE/D5W 8 MG/250ML
0.05 PLASTIC BAG, INJECTION (ML) INTRAVENOUS
Qty: 8 | Refills: 0 | Status: DISCONTINUED | OUTPATIENT
Start: 2022-06-11 | End: 2022-06-13

## 2022-06-11 RX ORDER — HYDROMORPHONE HYDROCHLORIDE 2 MG/ML
1 INJECTION INTRAMUSCULAR; INTRAVENOUS; SUBCUTANEOUS EVERY 4 HOURS
Refills: 0 | Status: DISCONTINUED | OUTPATIENT
Start: 2022-06-11 | End: 2022-06-11

## 2022-06-11 RX ORDER — PROPOFOL 10 MG/ML
16.6 INJECTION, EMULSION INTRAVENOUS
Qty: 1000 | Refills: 0 | Status: DISCONTINUED | OUTPATIENT
Start: 2022-06-11 | End: 2022-06-11

## 2022-06-11 RX ORDER — THIAMINE MONONITRATE (VIT B1) 100 MG
100 TABLET ORAL DAILY
Refills: 0 | Status: DISCONTINUED | OUTPATIENT
Start: 2022-06-11 | End: 2022-06-20

## 2022-06-11 RX ORDER — HYDROMORPHONE HYDROCHLORIDE 2 MG/ML
2 INJECTION INTRAMUSCULAR; INTRAVENOUS; SUBCUTANEOUS EVERY 4 HOURS
Refills: 0 | Status: DISCONTINUED | OUTPATIENT
Start: 2022-06-11 | End: 2022-06-11

## 2022-06-11 RX ORDER — PROPOFOL 10 MG/ML
16.6 INJECTION, EMULSION INTRAVENOUS
Qty: 500 | Refills: 0 | Status: DISCONTINUED | OUTPATIENT
Start: 2022-06-11 | End: 2022-06-11

## 2022-06-11 RX ADMIN — Medication 9.41 MICROGRAM(S)/KG/MIN: at 17:05

## 2022-06-11 RX ADMIN — Medication 1 MILLIGRAM(S): at 14:55

## 2022-06-11 RX ADMIN — Medication 1000 MILLIGRAM(S): at 13:58

## 2022-06-11 RX ADMIN — Medication 1 MILLIGRAM(S): at 12:30

## 2022-06-11 RX ADMIN — HYDROMORPHONE HYDROCHLORIDE 1 MILLIGRAM(S): 2 INJECTION INTRAMUSCULAR; INTRAVENOUS; SUBCUTANEOUS at 02:41

## 2022-06-11 RX ADMIN — Medication 400 MILLIGRAM(S): at 20:31

## 2022-06-11 RX ADMIN — Medication 9.41 MICROGRAM(S)/KG/MIN: at 19:13

## 2022-06-11 RX ADMIN — CHLORHEXIDINE GLUCONATE 15 MILLILITER(S): 213 SOLUTION TOPICAL at 06:28

## 2022-06-11 RX ADMIN — CHLORHEXIDINE GLUCONATE 1 APPLICATION(S): 213 SOLUTION TOPICAL at 12:40

## 2022-06-11 RX ADMIN — Medication 100 MILLIGRAM(S): at 12:30

## 2022-06-11 RX ADMIN — HYDROMORPHONE HYDROCHLORIDE 1 MILLIGRAM(S): 2 INJECTION INTRAMUSCULAR; INTRAVENOUS; SUBCUTANEOUS at 03:11

## 2022-06-11 RX ADMIN — PANTOPRAZOLE SODIUM 40 MILLIGRAM(S): 20 TABLET, DELAYED RELEASE ORAL at 17:04

## 2022-06-11 RX ADMIN — PANTOPRAZOLE SODIUM 40 MILLIGRAM(S): 20 TABLET, DELAYED RELEASE ORAL at 06:29

## 2022-06-11 RX ADMIN — MIDAZOLAM HYDROCHLORIDE 1 MILLIGRAM(S): 1 INJECTION, SOLUTION INTRAMUSCULAR; INTRAVENOUS at 13:16

## 2022-06-11 RX ADMIN — POTASSIUM PHOSPHATE, MONOBASIC POTASSIUM PHOSPHATE, DIBASIC 83.33 MILLIMOLE(S): 236; 224 INJECTION, SOLUTION INTRAVENOUS at 04:49

## 2022-06-11 RX ADMIN — FENTANYL CITRATE 5.02 MICROGRAM(S)/KG/HR: 50 INJECTION INTRAVENOUS at 19:12

## 2022-06-11 RX ADMIN — QUETIAPINE FUMARATE 50 MILLIGRAM(S): 200 TABLET, FILM COATED ORAL at 13:39

## 2022-06-11 RX ADMIN — PIPERACILLIN AND TAZOBACTAM 25 GRAM(S): 4; .5 INJECTION, POWDER, LYOPHILIZED, FOR SOLUTION INTRAVENOUS at 00:28

## 2022-06-11 RX ADMIN — PIPERACILLIN AND TAZOBACTAM 25 GRAM(S): 4; .5 INJECTION, POWDER, LYOPHILIZED, FOR SOLUTION INTRAVENOUS at 17:05

## 2022-06-11 RX ADMIN — PIPERACILLIN AND TAZOBACTAM 25 GRAM(S): 4; .5 INJECTION, POWDER, LYOPHILIZED, FOR SOLUTION INTRAVENOUS at 09:57

## 2022-06-11 RX ADMIN — Medication 400 MILLIGRAM(S): at 13:39

## 2022-06-11 RX ADMIN — DEXMEDETOMIDINE HYDROCHLORIDE IN 0.9% SODIUM CHLORIDE 25.1 MICROGRAM(S)/KG/HR: 4 INJECTION INTRAVENOUS at 17:05

## 2022-06-11 RX ADMIN — Medication 20 MILLIGRAM(S): at 09:57

## 2022-06-11 RX ADMIN — HYDROMORPHONE HYDROCHLORIDE 1.5 MILLIGRAM(S): 2 INJECTION INTRAMUSCULAR; INTRAVENOUS; SUBCUTANEOUS at 10:40

## 2022-06-11 RX ADMIN — CHLORHEXIDINE GLUCONATE 15 MILLILITER(S): 213 SOLUTION TOPICAL at 17:04

## 2022-06-11 RX ADMIN — DEXMEDETOMIDINE HYDROCHLORIDE IN 0.9% SODIUM CHLORIDE 25.1 MICROGRAM(S)/KG/HR: 4 INJECTION INTRAVENOUS at 19:13

## 2022-06-11 RX ADMIN — FENTANYL CITRATE 5.02 MICROGRAM(S)/KG/HR: 50 INJECTION INTRAVENOUS at 07:49

## 2022-06-11 RX ADMIN — PROPOFOL 10 MICROGRAM(S)/KG/MIN: 10 INJECTION, EMULSION INTRAVENOUS at 12:30

## 2022-06-11 RX ADMIN — FENTANYL CITRATE 5.02 MICROGRAM(S)/KG/HR: 50 INJECTION INTRAVENOUS at 17:05

## 2022-06-11 RX ADMIN — HYDROMORPHONE HYDROCHLORIDE 1.5 MILLIGRAM(S): 2 INJECTION INTRAMUSCULAR; INTRAVENOUS; SUBCUTANEOUS at 10:25

## 2022-06-11 RX ADMIN — Medication 100 MILLIEQUIVALENT(S): at 00:29

## 2022-06-11 RX ADMIN — FENTANYL CITRATE 0.5 MICROGRAM(S)/KG/HR: 50 INJECTION INTRAVENOUS at 18:20

## 2022-06-11 RX ADMIN — DEXMEDETOMIDINE HYDROCHLORIDE IN 0.9% SODIUM CHLORIDE 1.26 MICROGRAM(S)/KG/HR: 4 INJECTION INTRAVENOUS at 07:50

## 2022-06-11 NOTE — PROGRESS NOTE ADULT - SUBJECTIVE AND OBJECTIVE BOX
SICU Daily Progress Note  =====================================================  Interval/Overnight Events:         - S/p ex lap and wash out with abthera 06/10. Plan to RTOR Sunday for wash out/possible closure.   - keep platelets >50 per gyn onc. s/p 3 platelets overnight  - weaned of levo   - wean precedex, start low dose propofol  - blood in nephrostomy tube, VSS, CBC stable. CT done, pending official read  - started continuous TF today  - lasix 20mg x1  - elevated t bili; noted scleral icterus. monitor for now        HPI:   52y female with recurrent cervical CA (per patient stage III) presenting as transfer from [place] a/w neutropenic fever. Patient reports she had last cycle of chemotherapy 5 days prior.  Patient had [facility] care stop by yesterday and was noted to be tachycardic on vital signs and was sent to the ED.  She reports feeling weak + fatigued. + upper abdominal pain that worsens while having chills. She denies any vomiting, chest pain, SOB.     At [facility] she was given Meropenem and Zosyn and was noted to be tachycardic to 140s and febrile to 39.5 and was thus transferred for further management. She became acutely altered yesterday afternoon without improvement and AXR with increased free air concern for perforated viscous. She is now s/p washout, open resection of left colon, colostomy. SICU consulted for close hemodynamic monitoring.       Surgery Information Washout, open resection of left colon, colostomy   Case Duration: 4hr   EBL: 2L 	IV Fluids: 4.5 L crystalloid, 3L albumin	Blood Products:  5u PRBC	Urine Output: 300mL       Allergies: No Known Allergies      MEDICATIONS:   --------------------------------------------------------------------------------------  Neurologic Medications  dexMEDEtomidine Infusion 0.05 MICROgram(s)/kG/Hr IV Continuous <Continuous>  fentaNYL   Infusion 0.5 MICROgram(s)/kG/Hr IV Continuous <Continuous>  HYDROmorphone  Injectable 1 milliGRAM(s) IV Push once  HYDROmorphone  Injectable 0.5 milliGRAM(s) IV Push every 4 hours PRN Moderate Pain (4 - 6)  HYDROmorphone  Injectable 1 milliGRAM(s) IV Push every 3 hours PRN Severe Pain (7 - 10)      Cardiovascular Medications  norepinephrine Infusion 0.05 MICROgram(s)/kG/Min IV Continuous <Continuous>    Gastrointestinal Medications  pantoprazole  Injectable 40 milliGRAM(s) IV Push two times a day  potassium chloride  10 mEq/100 mL IVPB 10 milliEquivalent(s) IV Intermittent every 1 hour      Antimicrobial/Immunologic Medications  piperacillin/tazobactam IVPB.. 3.375 Gram(s) IV Intermittent every 8 hours      Topical/Other Medications  chlorhexidine 0.12% Liquid 15 milliLiter(s) Oral Mucosa every 12 hours  chlorhexidine 4% Liquid 1 Application(s) Topical daily    --------------------------------------------------------------------------------------    VITAL SIGNS, INS/OUTS (last 24 hours):  ICU Vital Signs Last 24 Hrs  T(C): 37.3 (11 Jun 2022 08:00), Max: 37.5 (11 Jun 2022 00:00)  T(F): 99.1 (11 Jun 2022 08:00), Max: 99.5 (11 Jun 2022 00:00)  HR: 76 (11 Jun 2022 08:00) (75 - 131)  BP: 106/61 (10 Noe 2022 11:15) (106/61 - 106/61)  ABP: 133/72 (11 Jun 2022 08:00) (93/50 - 145/74)  ABP(mean): 95 (11 Jun 2022 08:00) (64 - 98)  RR: 18 (11 Jun 2022 08:00) (11 - 26)  SpO2: 100% (11 Jun 2022 08:00) (93% - 100%)    --------------------------------------------------------------------------------------  EXAM  NEUROLOGY  Exam: Intubated and Sedated    HEENT  Exam: Normocephalic, atraumatic.     RESPIRATORY  Exam: Normal expansion/effort.    Mechanical Ventilation: Mode: AC/ CMV (Assist Control/ Continuous Mandatory Ventilation), RR (machine): 18, TV (machine): 500, FiO2: 40, PEEP: 5, MAP: 10, PIP: 26    CARDIOVASCULAR  Exam: S1, S2.  Regular rate and rhythm on monitor.     GI/NUTRITION  Exam: Abdomen soft, Non-tender, Non-distended. Midline wound c/d/i with abthera vac in place, ostomy with bowel sweat  Current Diet:  NPO with TFs    VASCULAR  Exam: Extremities warm, pink, well-perfused.        METABOLIC/FLUIDS/ELECTROLYTES  potassium chloride  10 mEq/100 mL IVPB 10 milliEquivalent(s) IV Intermittent every 1 hour        INFECTIOUS DISEASE  Antimicrobials/Immunologic Medications:  piperacillin/tazobactam IVPB.. 3.375 Gram(s) IV Intermittent every 8 hours      LABS  --------------------------------------------------------------------------------------                        8.3    12.74 )-----------( 59       ( 11 Jun 2022 04:23 )             24.8   06-11    140  |  105  |  35<H>  ----------------------------<  98  3.6   |  21<L>  |  1.29    Ca    7.8<L>      11 Jun 2022 00:35  Phos  2.6     06-11  Mg     1.90     06-11    TPro  4.5<L>  /  Alb  2.1<L>  /  TBili  3.3<H>  /  DBili  3.2<H>  /  AST  33<H>  /  ALT  16  /  AlkPhos  54  06-11    --------------------------------------------------------------------------------------      ------        Assessment and Plan:   · Assessment	  ASSESSMENT:  52y with a PMHx of HTN, cervical CA (stage III)  on chemo - last session last week - c/b b/l hydronephrosis s/p B/L nephrostomy tube placement, p/w severe sepsis from neutropenic fever. Pt was acutely altered yesterday afternoon without improvement and AXR showed increased free air with concern for perforated viscous. She is now s/p washout, open resection of left colon, with colostomy creation. Recovering in SICU  for close hemodynamic monitoring.         PLAN:  NEUROLOGY:  - Sedated with precedex gtt   - dilaudid pushes     RESPIRATORY:  - Intubated  - CPAP trials as tolerated; tolerated overnight 5/5  - Monitor daily chest x-rays and ABG  - Maintain O2 saturation >92%    CARDIOVASCULAR:  - Wean off Levophed gtt off PIV as tolerated  - Keep MAP >65    GI / NUTRITION:  - NPO  - GI prophylaxis with IV Protonix 40mg QD  - can start TF on 6/11    RENAL / GENITOURINARY:  - Indwelling hilliard catheter  - hx of bilateral nephrostomy tubes  - CT scan for evaluation of bloody nephrostomy tubes performed, f/u final results   - Monitor electrolytes and replete PRN  - Continue to monitor strict ins and outs q1 hour    HEMATOLOGIC:  - Received 5 units PRBC, 3 units FFP, 3 units PLT and 1 unit cryo intra-operatively  -s/p 3 platelets overnight on 06/10  - Monitor CBC daily  - Transfuse PRN  - holding chemical DVT ppx due to low platelet count    INFECTIOUS DISEASE:  - hx of neutropenic fevers, s/p zarxio tx  - Continue with IV Zosyn  - monitor WBC on daily CBC w/diff    ENDOCRINOLOGY:  - Monitor fingersticks q6 hours while NPO    DISPO: SICU

## 2022-06-11 NOTE — PROGRESS NOTE ADULT - PROBLEM SELECTOR PLAN 1
Fellow Note    Patient seen and examined. Agree with above. Weaned off pressors, but failed CPAP trial. R nephrostomy clearing up.     VS reviewed  Labs reviewed plt 59    Appreciate SICU Care  NPO/NGT  Titrate vent as tolerated  Monitor nephrostomy tube output  Continue antibiotics. Monitor off neupogen.  Plan for OR tomorrow 6/12 for ex lap washout, possible closure    Joaquin Nuñez MD

## 2022-06-11 NOTE — PROGRESS NOTE ADULT - ATTENDING COMMENTS
Respiratory insufficiency  a.  Remains mechanically ventilated  b,  With adequate gas exchange  c.  Obtain ABG in am    Sepsis secondary to peritonitis  a.  s/p Hartmanns procedure +washout  b.  For OR in am  c,  COntinue IV antibiotics    At risk for malnutrition  a.  TF started SICU PM ATTENDING NOTE    Respiratory insufficiency  a.  Remains mechanically ventilated  b,  With adequate gas exchange  c.  Obtain ABG in am  d.  Transition from precedex to propofol  e.  PRN haldol    severe Sepsis secondary to peritonitis  a.  s/p Tiffany's procedure + washout  b.  For OR in am  c,  Continue zosyn    At risk for malnutrition  a.  TF started

## 2022-06-11 NOTE — PROGRESS NOTE ADULT - SUBJECTIVE AND OBJECTIVE BOX
R2 GY[facility] Progress Note    POD#1   HD#7    Interval Events:  s/p RTOR with abdominal washout, abthera placeement yesterday. R nephrostomy tube with louann blood, CTAP done overnight, prelim read "no evidence of contrast extravasation to suggestive active bleed. post surgical changes status post left jodi colectomy with scattered foci of air. there is persistent ill defined fluid and gas collection posterior to the right uterine wall".  Pt weaned off levophed this AM.     Pt is sedated and intubated.   Vital Signs Last 24 Hours  T(C): 37.4 (06-11-22 @ 04:00), Max: 37.5 (06-11-22 @ 00:00)  HR: 80 (06-11-22 @ 07:13) (75 - 131)  BP: 106/61 (06-10-22 @ 11:15) (106/61 - 106/61)  RR: 18 (06-11-22 @ 07:00) (11 - 26)  SpO2: 100% (06-11-22 @ 07:13) (93% - 100%)    I&O's Summary    10 Noe 2022 07:01  -  11 Jun 2022 07:00  --------------------------------------------------------  IN: 2681.9 mL / OUT: 1807 mL / NET: 874.9 mL      Physical Exam:  Gen: Comfortable, NAD  Psych: sedated   CV: RRR   Pulm: Mechanical breath sounds 2/2 ventilator   Abd: Soft, non-distended, +BS, abthera in place with surrounding incision edges well perfused; ostomy pink and well perfused, small area (<0.5cm) of dusky appearing tissue at 2o'clock position, scant serosanguinous output; NG tube in place with bilious output   : L nephrostomy tubes draining light yellow urine; R nephrostomy tube draining blood tinged urine, catheter with blood tinged urine   Ext: Warm & well perfused.                           8.3    12.74 )-----------( 59       ( 11 Jun 2022 04:23 )             24.8       06-11    140  |  105  |  35<H>  ----------------------------<  98  3.6   |  21<L>  |  1.29    Ca    7.8<L>      11 Jun 2022 00:35  Phos  2.6     06-11  Mg     1.90     06-11    TPro  4.5<L>  /  Alb  2.1<L>  /  TBili  3.3<H>  /  DBili  3.2<H>  /  AST  33<H>  /  ALT  16  /  AlkPhos  54  06-11          ABG - ( 11 Jun 2022 00:35 )  pH, Arterial: 7.45  pH, Blood: x     /  pCO2: 32    /  pO2: 118   / HCO3: 22    / Base Excess: -1.4  /  SaO2: 99.3                    PT/INR - ( 11 Jun 2022 00:35 )   PT: 14.2 sec;   INR: 1.22 ratio         PTT - ( 11 Jun 2022 00:35 )  PTT:29.6 sec          CAPILLARY BLOOD GLUCOSE      POCT Blood Glucose.: 107 mg/dL (11 Jun 2022 06:33)                MEDICATIONS  (STANDING):  chlorhexidine 0.12% Liquid 15 milliLiter(s) Oral Mucosa every 12 hours  chlorhexidine 4% Liquid 1 Application(s) Topical daily  dexMEDEtomidine Infusion 0.05 MICROgram(s)/kG/Hr (1.26 mL/Hr) IV Continuous <Continuous>  fentaNYL   Infusion 0.5 MICROgram(s)/kG/Hr (5.02 mL/Hr) IV Continuous <Continuous>  HYDROmorphone  Injectable 1 milliGRAM(s) IV Push once  norepinephrine Infusion 0.05 MICROgram(s)/kG/Min (9.41 mL/Hr) IV Continuous <Continuous>  pantoprazole  Injectable 40 milliGRAM(s) IV Push two times a day  piperacillin/tazobactam IVPB.. 3.375 Gram(s) IV Intermittent every 8 hours    MEDICATIONS  (PRN):  HYDROmorphone  Injectable 0.5 milliGRAM(s) IV Push every 4 hours PRN Moderate Pain (4 - 6)  HYDROmorphone  Injectable 1 milliGRAM(s) IV Push every 3 hours PRN Severe Pain (7 - 10)       R2 [facility] Progress Note    POD#1   HD#7    Interval Events:  s/p RTOR with abdominal washout, abthera placeement yesterday. R nephrostomy tube with louann blood, CTAP done overnight, prelim read "no evidence of contrast extravasation to suggestive active bleed. post surgical changes status post left jodi colectomy with scattered foci of air. there is persistent ill defined fluid and gas collection posterior to the right uterine wall".  Pt weaned off levophed this AM.     Pt is sedated and intubated.     Vital Signs Last 24 Hours  T(C): 37.4 (06-11-22 @ 04:00), Max: 37.5 (06-11-22 @ 00:00)  HR: 80 (06-11-22 @ 07:13) (75 - 131)  BP: 106/61 (06-10-22 @ 11:15) (106/61 - 106/61)  RR: 18 (06-11-22 @ 07:00) (11 - 26)  SpO2: 100% (06-11-22 @ 07:13) (93% - 100%)    I&O's Summary    10 Noe 2022 07:01  -  11 Jun 2022 07:00  --------------------------------------------------------  IN: 2681.9 mL / OUT: 1807 mL / NET: 874.9 mL      Physical Exam:  Gen: NAD, sedated  Psych: sedated   CV: RRR   Pulm: Mechanical breath sounds 2/2 ventilator   Abd: Soft, non-distended, +BS, abthera in place with surrounding incision edges well perfused; ostomy pink and well perfused, small area (<0.5cm) of dusky appearing tissue at 2o'clock position, scant serosanguinous output; NG tube in place with bilious output   : L nephrostomy tubes draining light yellow urine; R nephrostomy tube draining blood tinged urine, catheter with blood tinged urine   Ext: Warm & well perfused.                           8.3    12.74 )-----------( 59       ( 11 Jun 2022 04:23 )             24.8       06-11    140  |  105  |  35<H>  ----------------------------<  98  3.6   |  21<L>  |  1.29    Ca    7.8<L>      11 Jun 2022 00:35  Phos  2.6     06-11  Mg     1.90     06-11    TPro  4.5<L>  /  Alb  2.1<L>  /  TBili  3.3<H>  /  DBili  3.2<H>  /  AST  33<H>  /  ALT  16  /  AlkPhos  54  06-11          ABG - ( 11 Jun 2022 00:35 )  pH, Arterial: 7.45  pH, Blood: x     /  pCO2: 32    /  pO2: 118   / HCO3: 22    / Base Excess: -1.4  /  SaO2: 99.3        PT/INR - ( 11 Jun 2022 00:35 )   PT: 14.2 sec;   INR: 1.22 ratio         PTT - ( 11 Jun 2022 00:35 )  PTT:29.6 sec          CAPILLARY BLOOD GLUCOSE      POCT Blood Glucose.: 107 mg/dL (11 Jun 2022 06:33)      MEDICATIONS  (STANDING):  chlorhexidine 0.12% Liquid 15 milliLiter(s) Oral Mucosa every 12 hours  chlorhexidine 4% Liquid 1 Application(s) Topical daily  dexMEDEtomidine Infusion 0.05 MICROgram(s)/kG/Hr (1.26 mL/Hr) IV Continuous <Continuous>  fentaNYL   Infusion 0.5 MICROgram(s)/kG/Hr (5.02 mL/Hr) IV Continuous <Continuous>  HYDROmorphone  Injectable 1 milliGRAM(s) IV Push once  norepinephrine Infusion 0.05 MICROgram(s)/kG/Min (9.41 mL/Hr) IV Continuous <Continuous>  pantoprazole  Injectable 40 milliGRAM(s) IV Push two times a day  piperacillin/tazobactam IVPB.. 3.375 Gram(s) IV Intermittent every 8 hours    MEDICATIONS  (PRN):  HYDROmorphone  Injectable 0.5 milliGRAM(s) IV Push every 4 hours PRN Moderate Pain (4 - 6)  HYDROmorphone  Injectable 1 milliGRAM(s) IV Push every 3 hours PRN Severe Pain (7 - 10)

## 2022-06-11 NOTE — PROGRESS NOTE ADULT - ATTENDING COMMENTS
I agree with the history, physical, and plan, which I have reviewed and edited where appropriate.  I agree with notes/assessment of health care providers on my service.  I have personally examined the patient.  I was physically present for the key portions of the evaluation and management (E/M) service provided.  I reviewed data and laboratory tests/x-rays and all pertinent electronic images.  The patient is a critical care patient with life threatening hemodynamic and metabolic instability in SICU.  Risk benefit analyses discussed.    The patient is in SICU with diagnosis mentioned in the note.    The plan is specified below.      ASSESSMENT:  52y with a PMHx of HTN, cervical CA (stage III)  on chemo - 1 week prior to admission - c/b b/l hydronephrosis s/p B/L nephrostomy tube placement, p/w severe sepsis from neutropenic fever and perforated viscous. She is now s/p washout, open resection of left colon, with colostomy creation on 6/8, RTOR for washout 6/10. Recovering in SICU  for close hemodynamic monitoring. Plan for RTOR possible closure on 6/12    PLAN:  NEUROLOGY:  - Sedated with precedex and Fentanyl   - transition to low dose propofol and prn dilaudid     RESPIRATORY: post op respiratory insufficiency   - Intubated  - CPAP trials as tolerated    CARDIOVASCULAR:  - low dose Levophed gtt as needed for propofol induced vasoplegia     GI / NUTRITION:  - Start tube feeds, NPO p mn for OR tomorrow   - GI prophylaxis with IV Protonix 40mg BID  - IV Reglan   - thiamine and folate     RENAL / GENITOURINARY:  - IV lock  - Indwelling hilliard catheter  - Bilateral nephrostomy tubes  - lasix 20 diuresis    HEMATOLOGIC: thrombocytopenia, neutropenia   - holding chemical DVT ppx due to low platelet count  - Continue Filgrastim until ANC > 10k    INFECTIOUS DISEASE: perforated viscus   - Continue with IV Zosyn for 7d course from 6/8    ENDOCRINOLOGY:  - Monitor glucose on BMP

## 2022-06-11 NOTE — PROGRESS NOTE ADULT - SUBJECTIVE AND OBJECTIVE BOX
SICU Daily Progress Note  =====================================================  Interval/Overnight Events:       - S/p ex lap and wash out with abthera today. Plan to RTOR Sunday for wash out/possible closure   - s/p 2 platelets in AM, 1 pRBC and 1 platelet given in OR   - 1 platelet so far overnight - f/u CBC; platelet drop to 14 again overnight  - blood in nephrostomy tube, AVSS, CBC stable  - CT done overnight - no acute issues or bleeding noted        HPI:   52y female with recurrent cervical CA (per patient stage III) presenting as transfer from [place] a/w neutropenic fever. Patient reports she had last cycle of chemotherapy 5 days prior.  Patient had VNS care stop by yesterday and was noted to be tachycardic on vital signs and was sent to the ED.  She reports feeling weak + fatigued. + upper abdominal pain that worsens while having chills. She denies any vomiting, chest pain, SOB.     At [facility] she was given Meropenem and Zosyn and was noted to be tachycardic to 140s and febrile to 39.5 and was thus transferred for further management. She became acutely altered yesterday afternoon without improvement and AXR with increased free air concern for perforated viscous. She is now s/p washout, open resection of left colon, colostomy. SICU consulted for close hemodynamic monitoring.       Surgery Information Washout, open resection of left colon, colostomy   Case Duration: 4hr   EBL: 2L 	IV Fluids: 4.5 L crystalloid, 3L albumin	Blood Products:  5u PRBC	Urine Output: 300mL       Allergies: No Known Allergies      MEDICATIONS:   --------------------------------------------------------------------------------------  Neurologic Medications  dexMEDEtomidine Infusion 0.05 MICROgram(s)/kG/Hr IV Continuous <Continuous>  fentaNYL   Infusion 0.5 MICROgram(s)/kG/Hr IV Continuous <Continuous>  HYDROmorphone  Injectable 1 milliGRAM(s) IV Push once  HYDROmorphone  Injectable 0.5 milliGRAM(s) IV Push every 4 hours PRN Moderate Pain (4 - 6)  HYDROmorphone  Injectable 1 milliGRAM(s) IV Push every 3 hours PRN Severe Pain (7 - 10)    Respiratory Medications    Cardiovascular Medications  norepinephrine Infusion 0.05 MICROgram(s)/kG/Min IV Continuous <Continuous>    Gastrointestinal Medications  pantoprazole  Injectable 40 milliGRAM(s) IV Push two times a day  potassium chloride  10 mEq/100 mL IVPB 10 milliEquivalent(s) IV Intermittent every 1 hour    Genitourinary Medications    Hematologic/Oncologic Medications    Antimicrobial/Immunologic Medications  piperacillin/tazobactam IVPB.. 3.375 Gram(s) IV Intermittent every 8 hours    Endocrine/Metabolic Medications    Topical/Other Medications  chlorhexidine 0.12% Liquid 15 milliLiter(s) Oral Mucosa every 12 hours  chlorhexidine 4% Liquid 1 Application(s) Topical daily    --------------------------------------------------------------------------------------    VITAL SIGNS, INS/OUTS (last 24 hours):  --------------------------------------------------------------------------------------  ICU Vital Signs Last 24 Hrs  T(C): 37.3 (10 Noe 2022 20:00), Max: 37.4 (10 Noe 2022 04:00)  T(F): 99.1 (10 Noe 2022 20:00), Max: 99.3 (10 Noe 2022 04:00)  HR: 82 (11 Jun 2022 00:00) (75 - 131)  BP: 106/61 (10 Noe 2022 11:15) (101/69 - 112/75)  BP(mean): 80 (10 Noe 2022 04:00) (75 - 85)  ABP: 108/57 (11 Jun 2022 00:00) (92/53 - 145/74)  ABP(mean): 74 (11 Jun 2022 00:00) (64 - 98)  RR: 18 (11 Jun 2022 00:00) (11 - 26)  SpO2: 100% (10 Noe 2022 21:20) (93% - 100%)    --------------------------------------------------------------------------------------  EXAM  NEUROLOGY  Exam: Intubated and Sedated    HEENT  Exam: Normocephalic, atraumatic.     RESPIRATORY  Exam: Normal expansion/effort.    Mechanical Ventilation: Mode: AC/ CMV (Assist Control/ Continuous Mandatory Ventilation), RR (machine): 18, TV (machine): 500, FiO2: 40, PEEP: 5, MAP: 10, PIP: 26    CARDIOVASCULAR  Exam: S1, S2.  Regular rate and rhythm on monitor.     GI/NUTRITION  Exam: Abdomen soft, Non-tender, Non-distended. Midline wound c/d/i with abthera vac in place, ostomy with bowel   Current Diet:  NPO     VASCULAR  Exam: Extremities warm, pink, well-perfused.        METABOLIC/FLUIDS/ELECTROLYTES  potassium chloride  10 mEq/100 mL IVPB 10 milliEquivalent(s) IV Intermittent every 1 hour      HEMATOLOGIC  [x] VTE Prophylaxis:   Transfusions:	[] PRBC	[] Platelets		[] FFP	[] Cryoprecipitate    INFECTIOUS DISEASE  Antimicrobials/Immunologic Medications:  piperacillin/tazobactam IVPB.. 3.375 Gram(s) IV Intermittent every 8 hours    Day #      of     ***    Tubes/Lines/Drains  ***  [x] Peripheral IV  [] Central Venous Line     	[] R	[] L	[] IJ	[] Fem	[] SC	Date Placed:   [] Arterial Line		[] R	[] L	[] Fem	[] Rad	[] Ax	Date Placed:   [] PICC		[] Midline		[] Mediport  [] Urinary Catheter		Date Placed:   [x] Necessity of urinary, arterial, and venous catheters discussed    LABS  --------------------------------------------------------------------------------------  ((Insert SICU Labs here))***  --------------------------------------------------------------------------------------    OTHER LABORATORY:     IMAGING STUDIES:   CXR:       ------    52y with a PMHx of HTN, cervical CA (stage III)  on chemo - last session last week - c/b b/l hydronephrosis s/p B/L nephrostomy tube placement, p/w severe sepsis from neutropenic fever. Pt was acutely altered yesterday afternoon without improvement and AXR showed increased free air with concern for perforated viscous. She is now s/p washout, open resection of left colon, with colostomy creation. Recovering in SICU  for close hemodynamic monitoring.       PLAN:  NEUROLOGY:  - Sedated with precedex gtt   - dilaudid pushes    RESPIRATORY:  - Intubated  - CPAP trials as tolerated; tolerated overnight 5/5  - Monitor daily chest x-rays and ABG  - Maintain O2 saturation >92%    CARDIOVASCULAR:  - Wean off Levophed gtt off PIV as tolerated  - Keep MAP >65    GI / NUTRITION:  - NPO  - GI prophylaxis with IV Protonix 40mg QD    RENAL / GENITOURINARY:  - not on fluids- will monitor  - Indwelling hilliard catheter  - hx of bilateral nephrostomy tubes  - Monitor electrolytes and replete PRN  - Continue to monitor strict ins and outs q1 hour    HEMATOLOGIC:  - Received 5 units PRBC, 3 units FFP, 3 units PLT and 1 unit cryo intra-operatively  - Monitor CBC daily  - Transfuse PRN  - holding chemical DVT ppx due to low platelet count      INFECTIOUS DISEASE:  - Neutropenic fevers with WBC of 3.33  - Continue Zarxio   - Continue with IV Zosyn    ENDOCRINOLOGY:  - Monitor fingersticks q6 hours while NPO       SICU Daily Progress Note  =====================================================  Interval/Overnight Events:       - S/p ex lap and wash out with abthera today. Plan to RTOR Sunday for wash out/possible closure.   - s/p 2 platelets in AM, 1 pRBC and 1 platelet given in OR   - 1 platelet overnight - f/u CBC  - blood in nephrostomy tube, AVSS, CBC stable  - CT done overnight - no acute issues or bleeding noted  - H/H drop from 9 to 8  - elevated t bili; noted scleral icterus    HPI:   52y female with recurrent cervical CA (per patient stage III) presenting as transfer from [place] a/w neutropenic fever. Patient reports she had last cycle of chemotherapy 5 days prior.  Patient had VNS care stop by yesterday and was noted to be tachycardic on vital signs and was sent to the ED.  She reports feeling weak + fatigued. + upper abdominal pain that worsens while having chills. She denies any vomiting, chest pain, SOB.     At [facility] she was given Meropenem and Zosyn and was noted to be tachycardic to 140s and febrile to 39.5 and was thus transferred for further management. She became acutely altered yesterday afternoon without improvement and AXR with increased free air concern for perforated viscous. She is now s/p washout, open resection of left colon, colostomy. SICU consulted for close hemodynamic monitoring.       Surgery Information Washout, open resection of left colon, colostomy   Case Duration: 4hr   EBL: 2L 	IV Fluids: 4.5 L crystalloid, 3L albumin	Blood Products:  5u PRBC	Urine Output: 300mL       Allergies: No Known Allergies      MEDICATIONS:   --------------------------------------------------------------------------------------  Neurologic Medications  dexMEDEtomidine Infusion 0.05 MICROgram(s)/kG/Hr IV Continuous <Continuous>  fentaNYL   Infusion 0.5 MICROgram(s)/kG/Hr IV Continuous <Continuous>  HYDROmorphone  Injectable 1 milliGRAM(s) IV Push once  HYDROmorphone  Injectable 0.5 milliGRAM(s) IV Push every 4 hours PRN Moderate Pain (4 - 6)  HYDROmorphone  Injectable 1 milliGRAM(s) IV Push every 3 hours PRN Severe Pain (7 - 10)    Respiratory Medications    Cardiovascular Medications  norepinephrine Infusion 0.05 MICROgram(s)/kG/Min IV Continuous <Continuous>    Gastrointestinal Medications  pantoprazole  Injectable 40 milliGRAM(s) IV Push two times a day  potassium chloride  10 mEq/100 mL IVPB 10 milliEquivalent(s) IV Intermittent every 1 hour    Genitourinary Medications    Hematologic/Oncologic Medications    Antimicrobial/Immunologic Medications  piperacillin/tazobactam IVPB.. 3.375 Gram(s) IV Intermittent every 8 hours    Endocrine/Metabolic Medications    Topical/Other Medications  chlorhexidine 0.12% Liquid 15 milliLiter(s) Oral Mucosa every 12 hours  chlorhexidine 4% Liquid 1 Application(s) Topical daily    --------------------------------------------------------------------------------------    VITAL SIGNS, INS/OUTS (last 24 hours):  --------------------------------------------------------------------------------------  ICU Vital Signs Last 24 Hrs  T(C): 37.3 (10 Noe 2022 20:00), Max: 37.4 (10 Noe 2022 04:00)  T(F): 99.1 (10 Noe 2022 20:00), Max: 99.3 (10 Noe 2022 04:00)  HR: 82 (11 Jun 2022 00:00) (75 - 131)  BP: 106/61 (10 Noe 2022 11:15) (101/69 - 112/75)  BP(mean): 80 (10 Noe 2022 04:00) (75 - 85)  ABP: 108/57 (11 Jun 2022 00:00) (92/53 - 145/74)  ABP(mean): 74 (11 Jun 2022 00:00) (64 - 98)  RR: 18 (11 Jun 2022 00:00) (11 - 26)  SpO2: 100% (10 Noe 2022 21:20) (93% - 100%)    --------------------------------------------------------------------------------------  EXAM  NEUROLOGY  Exam: Intubated and Sedated    HEENT  Exam: Normocephalic, atraumatic.     RESPIRATORY  Exam: Normal expansion/effort.    Mechanical Ventilation: Mode: AC/ CMV (Assist Control/ Continuous Mandatory Ventilation), RR (machine): 18, TV (machine): 500, FiO2: 40, PEEP: 5, MAP: 10, PIP: 26    CARDIOVASCULAR  Exam: S1, S2.  Regular rate and rhythm on monitor.     GI/NUTRITION  Exam: Abdomen soft, Non-tender, Non-distended. Midline wound c/d/i with abthera vac in place, ostomy with bowel   Current Diet:  NPO     VASCULAR  Exam: Extremities warm, pink, well-perfused.        METABOLIC/FLUIDS/ELECTROLYTES  potassium chloride  10 mEq/100 mL IVPB 10 milliEquivalent(s) IV Intermittent every 1 hour      HEMATOLOGIC  [x] VTE Prophylaxis:   Transfusions:	[] PRBC	[] Platelets		[] FFP	[] Cryoprecipitate    INFECTIOUS DISEASE  Antimicrobials/Immunologic Medications:  piperacillin/tazobactam IVPB.. 3.375 Gram(s) IV Intermittent every 8 hours    Day #      of     ***    Tubes/Lines/Drains  ***  [x] Peripheral IV  [] Central Venous Line     	[] R	[] L	[] IJ	[] Fem	[] SC	Date Placed:   [] Arterial Line		[] R	[] L	[] Fem	[] Rad	[] Ax	Date Placed:   [] PICC		[] Midline		[] Mediport  [] Urinary Catheter		Date Placed:   [x] Necessity of urinary, arterial, and venous catheters discussed    LABS  --------------------------------------------------------------------------------------  ((Insert SICU Labs here))***  --------------------------------------------------------------------------------------    OTHER LABORATORY:     IMAGING STUDIES:   CXR:       ------    52y with a PMHx of HTN, cervical CA (stage III)  on chemo - last session last week - c/b b/l hydronephrosis s/p B/L nephrostomy tube placement, p/w severe sepsis from neutropenic fever. Pt was acutely altered yesterday afternoon without improvement and AXR showed increased free air with concern for perforated viscous. She is now s/p washout, open resection of left colon, with colostomy creation. Recovering in SICU  for close hemodynamic monitoring.       PLAN:  NEUROLOGY:  - Sedated with precedex gtt   - dilaudid pushes    RESPIRATORY:  - Intubated  - CPAP trials as tolerated; tolerated overnight 5/5  - Monitor daily chest x-rays and ABG  - Maintain O2 saturation >92%    CARDIOVASCULAR:  - Wean off Levophed gtt off PIV as tolerated  - Keep MAP >65    GI / NUTRITION:  - NPO  - GI prophylaxis with IV Protonix 40mg QD    RENAL / GENITOURINARY:  - not on fluids- will monitor  - Indwelling hilliard catheter  - hx of bilateral nephrostomy tubes  - Monitor electrolytes and replete PRN  - Continue to monitor strict ins and outs q1 hour    HEMATOLOGIC:  - Received 5 units PRBC, 3 units FFP, 3 units PLT and 1 unit cryo intra-operatively  - Monitor CBC daily  - Transfuse PRN  - holding chemical DVT ppx due to low platelet count      INFECTIOUS DISEASE:  - Neutropenic fevers with WBC of 3.33  - Continue Zarxio   - Continue with IV Zosyn    ENDOCRINOLOGY:  - Monitor fingersticks q6 hours while NPO       SICU Daily Progress Note  =====================================================  Interval/Overnight Events:         - S/p ex lap and wash out with abthera 06/10. Plan to RTOR Sunday for wash out/possible closure.   - keep platelets >50 per gyn onc. s/p 3 platelets overnight  - weaned of levo   - wean precedex, start low dose propofol  - blood in nephrostomy tube, VSS, CBC stable. CT done, pending official read  - started continuous TF today  - lasix 20mg x1  - elevated t bili; noted scleral icterus. monitor for now        HPI:   52y female with recurrent cervical CA (per patient stage III) presenting as transfer from [place] a/w neutropenic fever. Patient reports she had last cycle of chemotherapy 5 days prior.  Patient had VNS care stop by yesterday and was noted to be tachycardic on vital signs and was sent to the ED.  She reports feeling weak + fatigued. + upper abdominal pain that worsens while having chills. She denies any vomiting, chest pain, SOB.     At [facility] she was given Meropenem and Zosyn and was noted to be tachycardic to 140s and febrile to 39.5 and was thus transferred for further management. She became acutely altered yesterday afternoon without improvement and AXR with increased free air concern for perforated viscous. She is now s/p washout, open resection of left colon, colostomy. SICU consulted for close hemodynamic monitoring.       Surgery Information Washout, open resection of left colon, colostomy   Case Duration: 4hr   EBL: 2L 	IV Fluids: 4.5 L crystalloid, 3L albumin	Blood Products:  5u PRBC	Urine Output: 300mL       Allergies: No Known Allergies      MEDICATIONS:   --------------------------------------------------------------------------------------  Neurologic Medications  dexMEDEtomidine Infusion 0.05 MICROgram(s)/kG/Hr IV Continuous <Continuous>  fentaNYL   Infusion 0.5 MICROgram(s)/kG/Hr IV Continuous <Continuous>  HYDROmorphone  Injectable 1 milliGRAM(s) IV Push once  HYDROmorphone  Injectable 0.5 milliGRAM(s) IV Push every 4 hours PRN Moderate Pain (4 - 6)  HYDROmorphone  Injectable 1 milliGRAM(s) IV Push every 3 hours PRN Severe Pain (7 - 10)      Cardiovascular Medications  norepinephrine Infusion 0.05 MICROgram(s)/kG/Min IV Continuous <Continuous>    Gastrointestinal Medications  pantoprazole  Injectable 40 milliGRAM(s) IV Push two times a day  potassium chloride  10 mEq/100 mL IVPB 10 milliEquivalent(s) IV Intermittent every 1 hour      Antimicrobial/Immunologic Medications  piperacillin/tazobactam IVPB.. 3.375 Gram(s) IV Intermittent every 8 hours      Topical/Other Medications  chlorhexidine 0.12% Liquid 15 milliLiter(s) Oral Mucosa every 12 hours  chlorhexidine 4% Liquid 1 Application(s) Topical daily    --------------------------------------------------------------------------------------    VITAL SIGNS, INS/OUTS (last 24 hours):  ICU Vital Signs Last 24 Hrs  T(C): 37.3 (11 Jun 2022 08:00), Max: 37.5 (11 Jun 2022 00:00)  T(F): 99.1 (11 Jun 2022 08:00), Max: 99.5 (11 Jun 2022 00:00)  HR: 76 (11 Jun 2022 08:00) (75 - 131)  BP: 106/61 (10 Noe 2022 11:15) (106/61 - 106/61)  ABP: 133/72 (11 Jun 2022 08:00) (93/50 - 145/74)  ABP(mean): 95 (11 Jun 2022 08:00) (64 - 98)  RR: 18 (11 Jun 2022 08:00) (11 - 26)  SpO2: 100% (11 Jun 2022 08:00) (93% - 100%)    --------------------------------------------------------------------------------------  EXAM  NEUROLOGY  Exam: Intubated and Sedated    HEENT  Exam: Normocephalic, atraumatic.     RESPIRATORY  Exam: Normal expansion/effort.    Mechanical Ventilation: Mode: AC/ CMV (Assist Control/ Continuous Mandatory Ventilation), RR (machine): 18, TV (machine): 500, FiO2: 40, PEEP: 5, MAP: 10, PIP: 26    CARDIOVASCULAR  Exam: S1, S2.  Regular rate and rhythm on monitor.     GI/NUTRITION  Exam: Abdomen soft, Non-tender, Non-distended. Midline wound c/d/i with abthera vac in place, ostomy with bowel sweat  Current Diet:  NPO with TFs    VASCULAR  Exam: Extremities warm, pink, well-perfused.        METABOLIC/FLUIDS/ELECTROLYTES  potassium chloride  10 mEq/100 mL IVPB 10 milliEquivalent(s) IV Intermittent every 1 hour        INFECTIOUS DISEASE  Antimicrobials/Immunologic Medications:  piperacillin/tazobactam IVPB.. 3.375 Gram(s) IV Intermittent every 8 hours      LABS  --------------------------------------------------------------------------------------                        8.3    12.74 )-----------( 59       ( 11 Jun 2022 04:23 )             24.8   06-11    140  |  105  |  35<H>  ----------------------------<  98  3.6   |  21<L>  |  1.29    Ca    7.8<L>      11 Jun 2022 00:35  Phos  2.6     06-11  Mg     1.90     06-11    TPro  4.5<L>  /  Alb  2.1<L>  /  TBili  3.3<H>  /  DBili  3.2<H>  /  AST  33<H>  /  ALT  16  /  AlkPhos  54  06-11    --------------------------------------------------------------------------------------      ------

## 2022-06-11 NOTE — CHART NOTE - NSCHARTNOTEFT_GEN_A_CORE
Patient evaluated at bedside this afternoon. Sedated and intubated.     Objective  T(C): 37.5 (22 @ 00:00), Max: 37.5 (22 @ 00:00)  HR: 79 (22 @ 01:00) (76 - 131)  BP: --  RR: 18 (22 @ 01:00) (18 - 26)  SpO2: 100% (06-10-22 @ 21:20) (93% - 100%)  Wt(kg): --     @ :  -  06-10 @ 07:00  --------------------------------------------------------  IN: 5694.2 mL / OUT: 2927 mL / NET: 2767.2 mL    06-10 @ 07:01  -   @ 01:34  --------------------------------------------------------  IN: 1872 mL / OUT: 1372 mL / NET: 500 mL        Physical Exam:  Gen: Comfortable, NAD  Psych: sedated   CV: RRR   Pulm: Mechanical breath sounds 2/2 ventilator   Abd: Soft, non-distended, +BS, abthera in place with surrounding incision edges well perfused; ostomy pink and well perfused, small area (<0.5cm) of dusky appearing tissue at 2o'clock position, scant serosanguinous output; NG tube in place with bilious output   : L nephrostomy tubes draining light yellow urine; R nephrostomy tube draining blood tinged urine, catheter with blood tinged urine   Ext: Warm & well perfused.     chlorhexidine 0.12% Liquid 15 milliLiter(s) Oral Mucosa every 12 hours  chlorhexidine 4% Liquid 1 Application(s) Topical daily  dexMEDEtomidine Infusion 0.05 MICROgram(s)/kG/Hr IV Continuous <Continuous>  fentaNYL   Infusion 0.5 MICROgram(s)/kG/Hr IV Continuous <Continuous>  HYDROmorphone  Injectable 1 milliGRAM(s) IV Push once  HYDROmorphone  Injectable 0.5 milliGRAM(s) IV Push every 4 hours PRN  HYDROmorphone  Injectable 1 milliGRAM(s) IV Push every 3 hours PRN  norepinephrine Infusion 0.05 MICROgram(s)/kG/Min IV Continuous <Continuous>  pantoprazole  Injectable 40 milliGRAM(s) IV Push two times a day  piperacillin/tazobactam IVPB.. 3.375 Gram(s) IV Intermittent every 8 hours    A/P: 52y  with recurrent cervical CA admitted for mgmt of neutropenic fever now s/p emergent exploratory laparotomy, abdominal washout, rectosigmoid colectomy, diverting colostomy, bronchoscopy and abthera placement on 22 (ebl 2000cc, s/p 5UpRBC, 3L albumin, 3U Plts, 3U FFP, 3U Cryo) for findings of inc. free air on abdominal xray concerning for bowel perforation. Patient POD#1 s/p RTOR for abdominal washout and replacement of abthera today (6/10/22).     Neuro: Continue IV pain control per SICU.   CV: Tachycardia improved. Continue titration of pressors per SICU.  Pulm: Intubated on VCAC.   GI: NPO. GI following. Continue IV Protonix/Reglan. NG tube in place (- ).   : b/l nephrostomy tubes in place. Right PCN draining louann blood. IR following, rec CT. CT prelim with no suggestion of active bleed. Infante in place. Patient has ASHLEY likely 2/2 IV Contrast & Dehydration. Will continue to monitor UOP. Labs per SICU. Replete electrolytes PRN  Heme: Continue Venodynes for DVT ppx.    - s/p 2u Plts for platelets of 14 -> s/p additional 1u plt intraop 6/10 -> 1u Plts in PM of 6/10  - s/p 5u pRBC intraop on  and 1u pRBC intraop on 6/10  ID: Afebrile. Continue Zosyn (s/p Cefepime). Continue to trend WBC. Continue Zarixo for neutropenic fever until WBC 10. Trend lactate.     Dispo: Appreciate excellent SICU care. Plan for possible abdominal washout and abdominal closure in the OR on 2022.    Joelle Garcia, PGY-2 Patient evaluated at bedside this afternoon. Sedated and intubated.     Objective  T(C): 37.5 (22 @ 00:00), Max: 37.5 (22 @ 00:00)  HR: 79 (22 @ 01:00) (76 - 131)  BP: --  RR: 18 (22 @ 01:00) (18 - 26)  SpO2: 100% (06-10-22 @ 21:20) (93% - 100%)  Wt(kg): --     @ :  -  06-10 @ 07:00  --------------------------------------------------------  IN: 5694.2 mL / OUT: 2927 mL / NET: 2767.2 mL    06-10 @ 07:01  -   @ 01:34  --------------------------------------------------------  IN: 1872 mL / OUT: 1372 mL / NET: 500 mL        Physical Exam:  Gen: Comfortable, NAD  Psych: sedated   CV: RRR   Pulm: Mechanical breath sounds 2/2 ventilator   Abd: Soft, non-distended, +BS, abthera in place with surrounding incision edges well perfused; ostomy pink and well perfused, small area (<0.5cm) of dusky appearing tissue at 2o'clock position, scant serosanguinous output; NG tube in place with bilious output   : L nephrostomy tubes draining light yellow urine; R nephrostomy tube draining blood tinged urine, catheter with blood tinged urine   Ext: Warm & well perfused.     chlorhexidine 0.12% Liquid 15 milliLiter(s) Oral Mucosa every 12 hours  chlorhexidine 4% Liquid 1 Application(s) Topical daily  dexMEDEtomidine Infusion 0.05 MICROgram(s)/kG/Hr IV Continuous <Continuous>  fentaNYL   Infusion 0.5 MICROgram(s)/kG/Hr IV Continuous <Continuous>  HYDROmorphone  Injectable 1 milliGRAM(s) IV Push once  HYDROmorphone  Injectable 0.5 milliGRAM(s) IV Push every 4 hours PRN  HYDROmorphone  Injectable 1 milliGRAM(s) IV Push every 3 hours PRN  norepinephrine Infusion 0.05 MICROgram(s)/kG/Min IV Continuous <Continuous>  pantoprazole  Injectable 40 milliGRAM(s) IV Push two times a day  piperacillin/tazobactam IVPB.. 3.375 Gram(s) IV Intermittent every 8 hours    A/P: 52y  with recurrent cervical CA admitted for mgmt of neutropenic fever now s/p emergent exploratory laparotomy, abdominal washout, rectosigmoid colectomy, diverting colostomy, bronchoscopy and abthera placement on 22 (ebl 2000cc, s/p 5UpRBC, 3L albumin, 3U Plts, 3U FFP, 3U Cryo) for findings of inc. free air on abdominal xray concerning for bowel perforation. Patient POD#1 s/p RTOR for abdominal washout and replacement of abthera today (6/10/22).     Neuro: Continue IV pain control per SICU.   CV: Tachycardia improved. Continue titration of pressors per SICU.  Pulm: Intubated on VCAC.   GI: NPO. GI following. Continue IV Protonix/Reglan. NG tube in place (- ).   : b/l nephrostomy tubes in place. Right PCN draining louann blood. IR following, rec CT. CT prelim with no suggestion of active bleed. Infante in place. Patient has ASHLEY likely 2/2 IV Contrast & Dehydration. Will continue to monitor UOP. Labs per SICU. Replete electrolytes PRN  Heme: Continue Venodynes for DVT ppx.    - s/p 2u Plts for platelets of 14 -> s/p additional 1u plt intraop 6/10 -> 1u Plts in PM of 6/10. Inappropriate rise, suggest transfusing additional plts.   - s/p 5u pRBC intraop on  and 1u pRBC intraop on 6/10  ID: Afebrile. Continue Zosyn (s/p Cefepime). Continue to trend WBC. Continue Zarixo for neutropenic fever until WBC 10. Trend lactate.     Dispo: Appreciate excellent SICU care. Plan for possible abdominal washout and abdominal closure in the OR on 2022.    Joelle Garcia, PGY-2

## 2022-06-11 NOTE — PROGRESS NOTE ADULT - ATTENDING COMMENTS
Pt seen and examined, agree with gyn onc resident and fellow  Plan for OR tomorrow for washout  Appreciate SICU care

## 2022-06-11 NOTE — PROGRESS NOTE ADULT - SUBJECTIVE AND OBJECTIVE BOX
SUBJECTIVE:  Sedated, not responding to commands    OBJECTIVE:  Vital Signs Last 24 Hrs  T(C): 37.3 (11 Jun 2022 08:00), Max: 37.5 (11 Jun 2022 00:00)  T(F): 99.1 (11 Jun 2022 08:00), Max: 99.5 (11 Jun 2022 00:00)  HR: 76 (11 Jun 2022 08:00) (75 - 131)  BP: 106/61 (10 Noe 2022 11:15) (106/61 - 106/61)  BP(mean): --  RR: 18 (11 Jun 2022 08:00) (11 - 26)  SpO2: 100% (11 Jun 2022 08:00) (93% - 100%)      06-10-22 @ 07:01  -  06-11-22 @ 07:00  --------------------------------------------------------  IN: 2681.9 mL / OUT: 1807 mL / NET: 874.9 mL        Physical Examination:  GEN: NAD, resting quietly  PULM: symmetric chest rise bilaterally, no increased WOB  ABD: soft, abthera to suction, ostomy pink and viable with no gas or stool in bag   EXTR: no LE erythema, moving all extremities      LABS:                        8.3    12.74 )-----------( 59       ( 11 Jun 2022 04:23 )             24.8       06-11    140  |  105  |  35<H>  ----------------------------<  98  3.6   |  21<L>  |  1.29    Ca    7.8<L>      11 Jun 2022 00:35  Phos  2.6     06-11  Mg     1.90     06-11    TPro  4.5<L>  /  Alb  2.1<L>  /  TBili  3.3<H>  /  DBili  3.2<H>  /  AST  33<H>  /  ALT  16  /  AlkPhos  54  06-11

## 2022-06-11 NOTE — PROGRESS NOTE ADULT - ASSESSMENT
ASSESSMENT:  52y with a PMHx of HTN, cervical CA (stage III)  on chemo - last session last week - c/b b/l hydronephrosis s/p B/L nephrostomy tube placement, p/w severe sepsis from neutropenic fever. Pt was acutely altered yesterday afternoon without improvement and AXR showed increased free air with concern for perforated viscous. She is now s/p washout, open resection of left colon, with colostomy creation. Recovering in SICU  for close hemodynamic monitoring.         PLAN:  NEUROLOGY:  - Sedated with precedex gtt   - dilaudid pushes     RESPIRATORY:  - Intubated  - CPAP trials as tolerated; tolerated overnight 5/5  - Monitor daily chest x-rays and ABG  - Maintain O2 saturation >92%    CARDIOVASCULAR:  - Wean off Levophed gtt off PIV as tolerated  - Keep MAP >65    GI / NUTRITION:  - NPO  - GI prophylaxis with IV Protonix 40mg QD  - can start TF on 6/11    RENAL / GENITOURINARY:  - Indwelling hilliard catheter  - hx of bilateral nephrostomy tubes  - CT scan for evaluation of bloody nephrostomy tubes performed, f/u final results   - Monitor electrolytes and replete PRN  - Continue to monitor strict ins and outs q1 hour    HEMATOLOGIC:  - Received 5 units PRBC, 3 units FFP, 3 units PLT and 1 unit cryo intra-operatively  -s/p 3 platelets overnight on 06/10  - Monitor CBC daily  - Transfuse PRN  - holding chemical DVT ppx due to low platelet count    INFECTIOUS DISEASE:  - hx of neutropenic fevers, s/p zarxio tx  - Continue with IV Zosyn  - monitor WBC on daily CBC w/diff    ENDOCRINOLOGY:  - Monitor fingersticks q6 hours while NPO    DISPO: SICU

## 2022-06-11 NOTE — PROGRESS NOTE ADULT - ASSESSMENT
52 Year-Old Lady with recurrent cervical CA on chemo presenting with intraabominal free air found to have bowel perforation now status post 6/8 exploratory laparotomy, abdominal washout, rectosigmoid colectomy, diverting colostomy and ABThera™ placement, RTOR (6/10) for re-exploration, washout and replacement of abthera.    Plan/Recommendations:  - Return to OR tomorrow for washout and possible closure  - Consent to be obtained from family today  - NPO@[place], repeat COVID swab    VELIA Carrera, PGY2  D Team Surgery   [id number]

## 2022-06-11 NOTE — PROGRESS NOTE ADULT - ASSESSMENT
A/P: 52y  with recurrent cervical CA admitted for mgmt of neutropenic fever now s/p emergent exploratory laparotomy, abdominal washout, rectosigmoid colectomy, diverting colostomy, bronchoscopy and abthera placement on 22 (ebl 2000cc, s/p 5UpRBC, 3L albumin, 3U Plts, 3U FFP, 3U Cryo) for findings of inc. free air on abdominal xray concerning for bowel perforation. Patient POD#1 s/p RTOR for abdominal washout and replacement of abthera today (6/10/22).     Neuro: Continue IV pain control per SICU.   CV: Tachycardia improved. Continue titration of pressors per SICU.  Pulm: Intubated on VCAC.   GI: NPO. GI following. Continue IV Protonix/Reglan. NG tube in place (- ).   : b/l nephrostomy tubes in place. Right PCN draining louann blood. IR following, rec CT. CT prelim with no suggestion of active bleed. Infante in place. Patient has ASHLEY likely 2/2 IV Contrast & Dehydration. Will continue to monitor UOP. Labs per SICU. Replete electrolytes PRN  Heme: Continue Venodynes for DVT ppx.    - s/p 2u Plts for platelets of 14 -> s/p additional 1u plt intraop 6/10 -> 1u Plts in PM of 6/10->plts uptrended to 59  - s/p 5u pRBC intraop on  and 1u pRBC intraop on 6/10  ID: Afebrile. Continue Zosyn (s/p Cefepime). Continue to trend WBC. Continue Zarixo for neutropenic fever until WBC 10. Trend lactate.     Dispo: Appreciate excellent SICU care. Plan for possible abdominal washout and abdominal closure in the OR on 2022.    Rachel Javier, PGY-2 A/P: 52y  with recurrent cervical CA admitted for mgmt of neutropenic fever now s/p emergent exploratory laparotomy, abdominal washout, rectosigmoid colectomy, diverting colostomy, bronchoscopy and abthera placement on 22 (ebl 2000cc, s/p 5UpRBC, 3L albumin, 3U Plts, 3U FFP, 3U Cryo) for findings of inc. free air on abdominal xray concerning for bowel perforation. Patient POD#1 s/p RTOR for abdominal washout and replacement of abthera today (6/10/22).     Neuro: Continue IV pain control per SICU.   CV: Tachycardia improved. Continue titration of pressors per SICU, currently without pressor requirements.  Pulm: Intubated on VCAC.   GI: NPO. GI following. Continue IV Protonix/Reglan. NG tube in place (- ).   : b/l nephrostomy tubes in place. Right PCN draining louann blood. IR following, rec CT. CT prelim with no suggestion of active bleed. Infante in place. Patient has ASHLEY likely 2/2 IV Contrast & Dehydration. Will continue to monitor UOP. Labs per SICU. Replete electrolytes PRN  Heme: Continue Venodynes for DVT ppx.    - s/p 2u Plts for platelets of 14 -> s/p additional 1u plt intraop 6/10 -> 1u Plts in PM of 6/10->10->2u PLTS's ->plts uptrended to 59  - s/p 5u pRBC intraop on  and 1u pRBC intraop on 6/10  ID: Afebrile. Continue Zosyn (s/p Cefepime). Continue to trend WBC. Continue Zarixo for neutropenic fever until WBC 10. Trend lactate.     Dispo: Appreciate excellent SICU care. Plan for possible abdominal washout and abdominal closure in the OR on 2022.    Rachel Javier, PGY-2

## 2022-06-12 ENCOUNTER — TRANSCRIPTION ENCOUNTER (OUTPATIENT)
Age: 52
End: 2022-06-12

## 2022-06-12 LAB
ANION GAP SERPL CALC-SCNC: 14 MMOL/L — SIGNIFICANT CHANGE UP (ref 7–14)
ANION GAP SERPL CALC-SCNC: 15 MMOL/L — HIGH (ref 7–14)
ANISOCYTOSIS BLD QL: SIGNIFICANT CHANGE UP
APTT BLD: 30.2 SEC — SIGNIFICANT CHANGE UP (ref 27–36.3)
BASOPHILS # BLD AUTO: 0 K/UL — SIGNIFICANT CHANGE UP (ref 0–0.2)
BASOPHILS NFR BLD AUTO: 0 % — SIGNIFICANT CHANGE UP (ref 0–2)
BLD GP AB SCN SERPL QL: NEGATIVE — SIGNIFICANT CHANGE UP
BLOOD GAS ARTERIAL - LYTES,HGB,ICA,LACT RESULT: SIGNIFICANT CHANGE UP
BLOOD GAS ARTERIAL COMPREHENSIVE RESULT: SIGNIFICANT CHANGE UP
BUN SERPL-MCNC: 33 MG/DL — HIGH (ref 7–23)
BUN SERPL-MCNC: 36 MG/DL — HIGH (ref 7–23)
CALCIUM SERPL-MCNC: 7.8 MG/DL — LOW (ref 8.4–10.5)
CALCIUM SERPL-MCNC: 8 MG/DL — LOW (ref 8.4–10.5)
CHLORIDE SERPL-SCNC: 105 MMOL/L — SIGNIFICANT CHANGE UP (ref 98–107)
CHLORIDE SERPL-SCNC: 109 MMOL/L — HIGH (ref 98–107)
CO2 SERPL-SCNC: 21 MMOL/L — LOW (ref 22–31)
CO2 SERPL-SCNC: 21 MMOL/L — LOW (ref 22–31)
CREAT SERPL-MCNC: 1.23 MG/DL — SIGNIFICANT CHANGE UP (ref 0.5–1.3)
CREAT SERPL-MCNC: 1.31 MG/DL — HIGH (ref 0.5–1.3)
EGFR: 48 ML/MIN/1.73M2 — LOW
EGFR: 52 ML/MIN/1.73M2 — LOW
EOSINOPHIL # BLD AUTO: 0 K/UL — SIGNIFICANT CHANGE UP (ref 0–0.5)
EOSINOPHIL NFR BLD AUTO: 0 % — SIGNIFICANT CHANGE UP (ref 0–6)
FIBRINOGEN PPP-MCNC: 778 MG/DL — HIGH (ref 330–520)
GIANT PLATELETS BLD QL SMEAR: PRESENT — SIGNIFICANT CHANGE UP
GLUCOSE BLDC GLUCOMTR-MCNC: 132 MG/DL — HIGH (ref 70–99)
GLUCOSE BLDC GLUCOMTR-MCNC: 133 MG/DL — HIGH (ref 70–99)
GLUCOSE SERPL-MCNC: 103 MG/DL — HIGH (ref 70–99)
GLUCOSE SERPL-MCNC: 123 MG/DL — HIGH (ref 70–99)
HCT VFR BLD CALC: 21.3 % — LOW (ref 34.5–45)
HCT VFR BLD CALC: 22 % — LOW (ref 34.5–45)
HCT VFR BLD CALC: 22.8 % — LOW (ref 34.5–45)
HCT VFR BLD CALC: 23.3 % — LOW (ref 34.5–45)
HCT VFR BLD CALC: 23.7 % — LOW (ref 34.5–45)
HCT VFR BLD CALC: 24 % — LOW (ref 34.5–45)
HGB BLD-MCNC: 6.8 G/DL — CRITICAL LOW (ref 11.5–15.5)
HGB BLD-MCNC: 7.1 G/DL — LOW (ref 11.5–15.5)
HGB BLD-MCNC: 7.6 G/DL — LOW (ref 11.5–15.5)
HGB BLD-MCNC: 7.8 G/DL — LOW (ref 11.5–15.5)
HGB BLD-MCNC: 7.9 G/DL — LOW (ref 11.5–15.5)
HGB BLD-MCNC: 8 G/DL — LOW (ref 11.5–15.5)
IANC: 8.49 K/UL — HIGH (ref 1.8–7.4)
INR BLD: 1.21 RATIO — HIGH (ref 0.88–1.16)
LYMPHOCYTES # BLD AUTO: 0.58 K/UL — LOW (ref 1–3.3)
LYMPHOCYTES # BLD AUTO: 6.1 % — LOW (ref 13–44)
MAGNESIUM SERPL-MCNC: 1.7 MG/DL — SIGNIFICANT CHANGE UP (ref 1.6–2.6)
MAGNESIUM SERPL-MCNC: 1.8 MG/DL — SIGNIFICANT CHANGE UP (ref 1.6–2.6)
MANUAL SMEAR VERIFICATION: SIGNIFICANT CHANGE UP
MCHC RBC-ENTMCNC: 28.3 PG — SIGNIFICANT CHANGE UP (ref 27–34)
MCHC RBC-ENTMCNC: 28.6 PG — SIGNIFICANT CHANGE UP (ref 27–34)
MCHC RBC-ENTMCNC: 28.7 PG — SIGNIFICANT CHANGE UP (ref 27–34)
MCHC RBC-ENTMCNC: 28.9 PG — SIGNIFICANT CHANGE UP (ref 27–34)
MCHC RBC-ENTMCNC: 29 PG — SIGNIFICANT CHANGE UP (ref 27–34)
MCHC RBC-ENTMCNC: 29.2 PG — SIGNIFICANT CHANGE UP (ref 27–34)
MCHC RBC-ENTMCNC: 31.9 GM/DL — LOW (ref 32–36)
MCHC RBC-ENTMCNC: 32.3 GM/DL — SIGNIFICANT CHANGE UP (ref 32–36)
MCHC RBC-ENTMCNC: 32.9 GM/DL — SIGNIFICANT CHANGE UP (ref 32–36)
MCHC RBC-ENTMCNC: 33.3 GM/DL — SIGNIFICANT CHANGE UP (ref 32–36)
MCHC RBC-ENTMCNC: 33.5 GM/DL — SIGNIFICANT CHANGE UP (ref 32–36)
MCHC RBC-ENTMCNC: 33.8 GM/DL — SIGNIFICANT CHANGE UP (ref 32–36)
MCV RBC AUTO: 85.9 FL — SIGNIFICANT CHANGE UP (ref 80–100)
MCV RBC AUTO: 86.7 FL — SIGNIFICANT CHANGE UP (ref 80–100)
MCV RBC AUTO: 87 FL — SIGNIFICANT CHANGE UP (ref 80–100)
MCV RBC AUTO: 87.3 FL — SIGNIFICANT CHANGE UP (ref 80–100)
MCV RBC AUTO: 88.8 FL — SIGNIFICANT CHANGE UP (ref 80–100)
MCV RBC AUTO: 89.1 FL — SIGNIFICANT CHANGE UP (ref 80–100)
METAMYELOCYTES # FLD: 1.8 % — HIGH (ref 0–1)
MONOCYTES # BLD AUTO: 0.67 K/UL — SIGNIFICANT CHANGE UP (ref 0–0.9)
MONOCYTES NFR BLD AUTO: 7 % — SIGNIFICANT CHANGE UP (ref 2–14)
NEUTROPHILS # BLD AUTO: 8.14 K/UL — HIGH (ref 1.8–7.4)
NEUTROPHILS NFR BLD AUTO: 81.6 % — HIGH (ref 43–77)
NEUTS BAND # BLD: 3.5 % — SIGNIFICANT CHANGE UP (ref 0–6)
NRBC # BLD: 1 /100 WBCS — SIGNIFICANT CHANGE UP
NRBC # BLD: 2 /100 WBCS — SIGNIFICANT CHANGE UP
NRBC # BLD: 8 /100 — HIGH (ref 0–0)
NRBC # FLD: 0.11 K/UL — HIGH
NRBC # FLD: 0.15 K/UL — HIGH
NRBC # FLD: 0.15 K/UL — HIGH
NRBC # FLD: 0.21 K/UL — HIGH
NRBC # FLD: 0.23 K/UL — HIGH
PHOSPHATE SERPL-MCNC: 2.8 MG/DL — SIGNIFICANT CHANGE UP (ref 2.5–4.5)
PHOSPHATE SERPL-MCNC: 3.5 MG/DL — SIGNIFICANT CHANGE UP (ref 2.5–4.5)
PLAT MORPH BLD: NORMAL — SIGNIFICANT CHANGE UP
PLATELET # BLD AUTO: 13 K/UL — CRITICAL LOW (ref 150–400)
PLATELET # BLD AUTO: 19 K/UL — CRITICAL LOW (ref 150–400)
PLATELET # BLD AUTO: 20 K/UL — CRITICAL LOW (ref 150–400)
PLATELET # BLD AUTO: 34 K/UL — LOW (ref 150–400)
PLATELET # BLD AUTO: 37 K/UL — LOW (ref 150–400)
PLATELET # BLD AUTO: 46 K/UL — LOW (ref 150–400)
PLATELET COUNT - ESTIMATE: ABNORMAL
POLYCHROMASIA BLD QL SMEAR: SLIGHT — SIGNIFICANT CHANGE UP
POTASSIUM SERPL-MCNC: 3.2 MMOL/L — LOW (ref 3.5–5.3)
POTASSIUM SERPL-MCNC: 3.9 MMOL/L — SIGNIFICANT CHANGE UP (ref 3.5–5.3)
POTASSIUM SERPL-SCNC: 3.2 MMOL/L — LOW (ref 3.5–5.3)
POTASSIUM SERPL-SCNC: 3.9 MMOL/L — SIGNIFICANT CHANGE UP (ref 3.5–5.3)
PROTHROM AB SERPL-ACNC: 14.1 SEC — HIGH (ref 10.5–13.4)
RBC # BLD: 2.4 M/UL — LOW (ref 3.8–5.2)
RBC # BLD: 2.47 M/UL — LOW (ref 3.8–5.2)
RBC # BLD: 2.63 M/UL — LOW (ref 3.8–5.2)
RBC # BLD: 2.67 M/UL — LOW (ref 3.8–5.2)
RBC # BLD: 2.76 M/UL — LOW (ref 3.8–5.2)
RBC # BLD: 2.76 M/UL — LOW (ref 3.8–5.2)
RBC # FLD: 18.1 % — HIGH (ref 10.3–14.5)
RBC # FLD: 18.1 % — HIGH (ref 10.3–14.5)
RBC # FLD: 18.2 % — HIGH (ref 10.3–14.5)
RBC # FLD: 18.2 % — HIGH (ref 10.3–14.5)
RBC # FLD: 18.3 % — HIGH (ref 10.3–14.5)
RBC # FLD: 18.6 % — HIGH (ref 10.3–14.5)
RBC BLD AUTO: ABNORMAL
RH IG SCN BLD-IMP: POSITIVE — SIGNIFICANT CHANGE UP
SODIUM SERPL-SCNC: 141 MMOL/L — SIGNIFICANT CHANGE UP (ref 135–145)
SODIUM SERPL-SCNC: 144 MMOL/L — SIGNIFICANT CHANGE UP (ref 135–145)
WBC # BLD: 8.46 K/UL — SIGNIFICANT CHANGE UP (ref 3.8–10.5)
WBC # BLD: 8.52 K/UL — SIGNIFICANT CHANGE UP (ref 3.8–10.5)
WBC # BLD: 9.3 K/UL — SIGNIFICANT CHANGE UP (ref 3.8–10.5)
WBC # BLD: 9.49 K/UL — SIGNIFICANT CHANGE UP (ref 3.8–10.5)
WBC # BLD: 9.54 K/UL — SIGNIFICANT CHANGE UP (ref 3.8–10.5)
WBC # BLD: 9.56 K/UL — SIGNIFICANT CHANGE UP (ref 3.8–10.5)
WBC # FLD AUTO: 8.46 K/UL — SIGNIFICANT CHANGE UP (ref 3.8–10.5)
WBC # FLD AUTO: 8.52 K/UL — SIGNIFICANT CHANGE UP (ref 3.8–10.5)
WBC # FLD AUTO: 9.3 K/UL — SIGNIFICANT CHANGE UP (ref 3.8–10.5)
WBC # FLD AUTO: 9.49 K/UL — SIGNIFICANT CHANGE UP (ref 3.8–10.5)
WBC # FLD AUTO: 9.54 K/UL — SIGNIFICANT CHANGE UP (ref 3.8–10.5)
WBC # FLD AUTO: 9.56 K/UL — SIGNIFICANT CHANGE UP (ref 3.8–10.5)

## 2022-06-12 PROCEDURE — 49002 REOPENING OF ABDOMEN: CPT | Mod: 79

## 2022-06-12 PROCEDURE — 99291 CRITICAL CARE FIRST HOUR: CPT | Mod: GC

## 2022-06-12 PROCEDURE — 49002 REOPENING OF ABDOMEN: CPT

## 2022-06-12 PROCEDURE — 49020 DRAINAGE ABDOM ABSCESS OPEN: CPT

## 2022-06-12 PROCEDURE — 49040 DRAIN OPEN ABDOM ABSCESS: CPT | Mod: 79

## 2022-06-12 PROCEDURE — 49000 EXPLORATION OF ABDOMEN: CPT

## 2022-06-12 PROCEDURE — 93010 ELECTROCARDIOGRAM REPORT: CPT

## 2022-06-12 PROCEDURE — 97608 NEG PRS WND THER NDME>50SQCM: CPT | Mod: 79

## 2022-06-12 PROCEDURE — 71045 X-RAY EXAM CHEST 1 VIEW: CPT | Mod: 26

## 2022-06-12 RX ORDER — HYDROMORPHONE HYDROCHLORIDE 2 MG/ML
0.5 INJECTION INTRAMUSCULAR; INTRAVENOUS; SUBCUTANEOUS EVERY 4 HOURS
Refills: 0 | Status: DISCONTINUED | OUTPATIENT
Start: 2022-06-12 | End: 2022-06-16

## 2022-06-12 RX ORDER — POTASSIUM CHLORIDE 20 MEQ
40 PACKET (EA) ORAL ONCE
Refills: 0 | Status: COMPLETED | OUTPATIENT
Start: 2022-06-12 | End: 2022-06-12

## 2022-06-12 RX ORDER — QUETIAPINE FUMARATE 200 MG/1
100 TABLET, FILM COATED ORAL EVERY 12 HOURS
Refills: 0 | Status: DISCONTINUED | OUTPATIENT
Start: 2022-06-12 | End: 2022-06-20

## 2022-06-12 RX ORDER — POTASSIUM CHLORIDE 20 MEQ
10 PACKET (EA) ORAL
Refills: 0 | Status: COMPLETED | OUTPATIENT
Start: 2022-06-12 | End: 2022-06-12

## 2022-06-12 RX ORDER — HYDROCORTISONE 20 MG
150 TABLET ORAL ONCE
Refills: 0 | Status: COMPLETED | OUTPATIENT
Start: 2022-06-12 | End: 2022-06-12

## 2022-06-12 RX ORDER — MAGNESIUM SULFATE 500 MG/ML
1 VIAL (ML) INJECTION ONCE
Refills: 0 | Status: COMPLETED | OUTPATIENT
Start: 2022-06-12 | End: 2022-06-12

## 2022-06-12 RX ADMIN — QUETIAPINE FUMARATE 50 MILLIGRAM(S): 200 TABLET, FILM COATED ORAL at 05:34

## 2022-06-12 RX ADMIN — FENTANYL CITRATE 0.5 MICROGRAM(S)/KG/HR: 50 INJECTION INTRAVENOUS at 20:00

## 2022-06-12 RX ADMIN — DEXMEDETOMIDINE HYDROCHLORIDE IN 0.9% SODIUM CHLORIDE 25.1 MICROGRAM(S)/KG/HR: 4 INJECTION INTRAVENOUS at 07:43

## 2022-06-12 RX ADMIN — PIPERACILLIN AND TAZOBACTAM 25 GRAM(S): 4; .5 INJECTION, POWDER, LYOPHILIZED, FOR SOLUTION INTRAVENOUS at 17:11

## 2022-06-12 RX ADMIN — Medication 1000 MILLIGRAM(S): at 08:14

## 2022-06-12 RX ADMIN — Medication 100 GRAM(S): at 04:42

## 2022-06-12 RX ADMIN — QUETIAPINE FUMARATE 100 MILLIGRAM(S): 200 TABLET, FILM COATED ORAL at 17:11

## 2022-06-12 RX ADMIN — FENTANYL CITRATE 5.02 MICROGRAM(S)/KG/HR: 50 INJECTION INTRAVENOUS at 19:24

## 2022-06-12 RX ADMIN — Medication 150 MILLIGRAM(S): at 06:10

## 2022-06-12 RX ADMIN — FENTANYL CITRATE 5.02 MICROGRAM(S)/KG/HR: 50 INJECTION INTRAVENOUS at 07:43

## 2022-06-12 RX ADMIN — Medication 1 MILLIGRAM(S): at 12:47

## 2022-06-12 RX ADMIN — PANTOPRAZOLE SODIUM 40 MILLIGRAM(S): 20 TABLET, DELAYED RELEASE ORAL at 17:11

## 2022-06-12 RX ADMIN — HYDROMORPHONE HYDROCHLORIDE 0.5 MILLIGRAM(S): 2 INJECTION INTRAMUSCULAR; INTRAVENOUS; SUBCUTANEOUS at 00:30

## 2022-06-12 RX ADMIN — Medication 40 MILLIEQUIVALENT(S): at 05:34

## 2022-06-12 RX ADMIN — CHLORHEXIDINE GLUCONATE 15 MILLILITER(S): 213 SOLUTION TOPICAL at 05:33

## 2022-06-12 RX ADMIN — PIPERACILLIN AND TAZOBACTAM 25 GRAM(S): 4; .5 INJECTION, POWDER, LYOPHILIZED, FOR SOLUTION INTRAVENOUS at 01:29

## 2022-06-12 RX ADMIN — Medication 9.41 MICROGRAM(S)/KG/MIN: at 07:42

## 2022-06-12 RX ADMIN — HYDROMORPHONE HYDROCHLORIDE 0.5 MILLIGRAM(S): 2 INJECTION INTRAMUSCULAR; INTRAVENOUS; SUBCUTANEOUS at 05:05

## 2022-06-12 RX ADMIN — CHLORHEXIDINE GLUCONATE 1 APPLICATION(S): 213 SOLUTION TOPICAL at 12:47

## 2022-06-12 RX ADMIN — Medication 100 MILLIEQUIVALENT(S): at 03:26

## 2022-06-12 RX ADMIN — Medication 400 MILLIGRAM(S): at 07:44

## 2022-06-12 RX ADMIN — Medication 100 MILLIEQUIVALENT(S): at 02:20

## 2022-06-12 RX ADMIN — FENTANYL CITRATE 5.02 MICROGRAM(S)/KG/HR: 50 INJECTION INTRAVENOUS at 17:10

## 2022-06-12 RX ADMIN — PIPERACILLIN AND TAZOBACTAM 25 GRAM(S): 4; .5 INJECTION, POWDER, LYOPHILIZED, FOR SOLUTION INTRAVENOUS at 09:00

## 2022-06-12 RX ADMIN — Medication 100 MILLIEQUIVALENT(S): at 04:41

## 2022-06-12 RX ADMIN — PANTOPRAZOLE SODIUM 40 MILLIGRAM(S): 20 TABLET, DELAYED RELEASE ORAL at 05:33

## 2022-06-12 RX ADMIN — HYDROMORPHONE HYDROCHLORIDE 0.5 MILLIGRAM(S): 2 INJECTION INTRAMUSCULAR; INTRAVENOUS; SUBCUTANEOUS at 00:40

## 2022-06-12 RX ADMIN — FENTANYL CITRATE 5.02 MICROGRAM(S)/KG/HR: 50 INJECTION INTRAVENOUS at 01:29

## 2022-06-12 RX ADMIN — HYDROMORPHONE HYDROCHLORIDE 0.5 MILLIGRAM(S): 2 INJECTION INTRAMUSCULAR; INTRAVENOUS; SUBCUTANEOUS at 04:51

## 2022-06-12 RX ADMIN — DEXMEDETOMIDINE HYDROCHLORIDE IN 0.9% SODIUM CHLORIDE 25.1 MICROGRAM(S)/KG/HR: 4 INJECTION INTRAVENOUS at 19:24

## 2022-06-12 RX ADMIN — CHLORHEXIDINE GLUCONATE 15 MILLILITER(S): 213 SOLUTION TOPICAL at 17:11

## 2022-06-12 RX ADMIN — Medication 400 MILLIGRAM(S): at 01:29

## 2022-06-12 RX ADMIN — Medication 100 MILLIGRAM(S): at 12:47

## 2022-06-12 NOTE — CHART NOTE - NSCHARTNOTEFT_GEN_A_CORE
Surgery Post op Note    Procedure     SUBJECTIVE: Pt seen and examined at bedside several hours after surgery. Patient is intubated, not on pressor.       Physical exam  GEN: NAD, resting quietly  PULM: symmetric chest rise bilaterally, no increased WOB  ABD: soft, abthera to suction, ostomy pink and viable with no gas or stool in bag   EXTR: no LE erythema, moving all extremities    Vital Signs Last 24 Hrs  T(C): 36.8 (12 Jun 2022 16:00), Max: 37.8 (11 Jun 2022 20:00)  T(F): 98.3 (12 Jun 2022 16:00), Max: 100 (11 Jun 2022 20:00)  HR: 72 (12 Jun 2022 17:00) (68 - 130)  BP: 114/67 (12 Jun 2022 06:33) (114/67 - 114/67)  BP(mean): --  RR: 18 (12 Jun 2022 17:00) (14 - 24)  SpO2: 100% (12 Jun 2022 16:00) (90% - 100%)  I&O's Summary    11 Jun 2022 07:01  -  12 Jun 2022 07:00  --------------------------------------------------------  IN: 2832.1 mL / OUT: 2810 mL / NET: 22.1 mL    12 Jun 2022 07:01  -  12 Jun 2022 17:59  --------------------------------------------------------  IN: 805.2 mL / OUT: 565 mL / NET: 240.2 mL      I&O's Detail    11 Jun 2022 07:01  -  12 Jun 2022 07:00  --------------------------------------------------------  IN:    Dexmedetomidine: 434.5 mL    FentaNYL: 296.2 mL    IV PiggyBack: 750 mL    Jevity 1.2: 100 mL    Norepinephrine: 146.3 mL    Platelets - Single Donor: 1023 mL    Propofol: 24 mL    Propofol: 58.1 mL  Total IN: 2832.1 mL    OUT:    Indwelling Catheter - Urethral (mL): 290 mL    Nephrostomy Tube (mL): 1025 mL    Nephrostomy Tube (mL): 695 mL    Stool (mL): 0 mL    VAC (Vacuum Assisted Closure) System (mL): 800 mL  Total OUT: 2810 mL    Total NET: 22.1 mL      12 Jun 2022 07:01  -  12 Jun 2022 17:59  --------------------------------------------------------  IN:    Dexmedetomidine: 264.3 mL    FentaNYL: 141.3 mL    IV PiggyBack: 100 mL    Jevity 1.2: 75 mL    Norepinephrine: 5.6 mL    Platelets - Single Donor: 219 mL  Total IN: 805.2 mL    OUT:    Indwelling Catheter - Urethral (mL): 65 mL    Nephrostomy Tube (mL): 275 mL    Nephrostomy Tube (mL): 225 mL  Total OUT: 565 mL    Total NET: 240.2 mL          MEDICATIONS  (STANDING):  chlorhexidine 0.12% Liquid 15 milliLiter(s) Oral Mucosa every 12 hours  chlorhexidine 4% Liquid 1 Application(s) Topical daily  dexMEDEtomidine Infusion 1 MICROgram(s)/kG/Hr (25.1 mL/Hr) IV Continuous <Continuous>  fentaNYL   Infusion 0.5 MICROgram(s)/kG/Hr (5.02 mL/Hr) IV Continuous <Continuous>  folic acid 1 milliGRAM(s) Oral daily  norepinephrine Infusion 0.05 MICROgram(s)/kG/Min (9.41 mL/Hr) IV Continuous <Continuous>  pantoprazole  Injectable 40 milliGRAM(s) IV Push two times a day  piperacillin/tazobactam IVPB.. 3.375 Gram(s) IV Intermittent every 8 hours  QUEtiapine 100 milliGRAM(s) Oral every 12 hours  thiamine Injectable 100 milliGRAM(s) IV Push daily    MEDICATIONS  (PRN):  HYDROmorphone  Injectable 0.5 milliGRAM(s) IV Push every 4 hours PRN Severe Pain (7 - 10)      LABS:                        7.8    8.46  )-----------( 20       ( 12 Jun 2022 12:02 )             23.3     06-12    144  |  109<H>  |  33<H>  ----------------------------<  123<H>  3.9   |  21<L>  |  1.23    Ca    7.8<L>      12 Jun 2022 12:00  Phos  3.5     06-12  Mg     1.70     06-12    TPro  4.5<L>  /  Alb  2.1<L>  /  TBili  3.3<H>  /  DBili  3.2<H>  /  AST  33<H>  /  ALT  16  /  AlkPhos  54  06-11    PT/INR - ( 12 Jun 2022 01:05 )   PT: 14.1 sec;   INR: 1.21 ratio         PTT - ( 12 Jun 2022 01:05 )  PTT:30.2 sec    Accessment: 52yr F hours s/p abdominal washout, abthera  for closure.    Plan:   Return to OR tentatively Tuesday.  Care per SICU.

## 2022-06-12 NOTE — BRIEF OPERATIVE NOTE - OPERATION/FINDINGS
Re-exploration of open abdomen, washout and temporary abdominal closure    purulent exudate in left gutter and RUQ evacuated, fibrinous exudate over small bowel

## 2022-06-12 NOTE — PROGRESS NOTE ADULT - ATTENDING COMMENTS
I agree with the history, physical, and plan, which I have reviewed and edited where appropriate.  I agree with notes/assessment of health care providers on my service.  I have personally examined the patient.  I was physically present for the key portions of the evaluation and management (E/M) service provided.  I reviewed data and laboratory tests/x-rays and all pertinent electronic images.  The patient is a critical care patient with life threatening hemodynamic and metabolic instability in SICU.  Risk benefit analyses discussed.    The patient is in SICU with diagnosis mentioned in the note.    The plan is specified below.      ASSESSMENT:  52y with a PMHx of HTN, cervical CA (stage III)  on chemo - 1 week prior to admission - c/b b/l hydronephrosis s/p B/L nephrostomy tube placement, p/w severe sepsis from neutropenic fever and perforated viscous. She is now s/p washout, open resection of left colon, with colostomy creation on 6/8, RTOR for washout 6/10. Recovering in SICU  for close hemodynamic monitoring. Plan for RTOR washout on 6/12    PLAN:  NEUROLOGY:  - Sedated with precedex and Fentanyl   - Increase seroquel  - Will likely need slow wean off precedex as did not tolerate transition to propofol     RESPIRATORY: post op respiratory insufficiency   - Intubated  - CPAP trials as tolerated    CARDIOVASCULAR:  - low dose Levophed gtt as needed    GI / NUTRITION:  - Resume tube feeds post-op    - GI prophylaxis with IV Protonix 40mg BID  - IV Reglan   - thiamine and folate     RENAL / GENITOURINARY:  - IV lock  - Indwelling hilliard catheter  - Bilateral nephrostomy tubes  - Hold diuresis today, since returning to OR    HEMATOLOGIC: thrombocytopenia, neutropenia   - holding chemical DVT ppx due to low platelet count  - Off Filgrastim since 6/10    INFECTIOUS DISEASE: perforated viscus   - Continue with IV Zosyn for 7d course from 6/8    ENDOCRINOLOGY:  - Monitor glucose on BMP .

## 2022-06-12 NOTE — PROGRESS NOTE ADULT - ATTENDING COMMENTS
Patient seen and examined, agree with gyn onc resident and fellow  Plan for OR for washout  Appreciate SICU care

## 2022-06-12 NOTE — BRIEF OPERATIVE NOTE - OPERATION/FINDINGS
Re-exploration of open abdomen, washout and temporary abdominal closure    purulent exudate in left gutter and RUQ evacuated, fibrinous exudate over small bowel Re-exploration of open abdomen, washout and temporary abdominal closure.    Purulent exudate in left gutter and RUQ evacuated, fibrinous exudate over small bowel. Washout with 5L NS and 3L antibiotic solution.

## 2022-06-12 NOTE — PROGRESS NOTE ADULT - SUBJECTIVE AND OBJECTIVE BOX
SICU Daily Progress Note  =====================================================  Interval/Overnight Events:      - S/p ex lap and wash out with abthera 06/10. Plan to RTOR tomorrow for wash out/possible closure.   - on levo, precedex, fentanyl   - blood in nephrostomy tube yesterday -- CT showed no active bleed  - started continuous TF today at 25 cc /hour - hold at MN for RTOR Sunday  - lasix 20mg x1  - seroquel started  - f/u platelets in AM- goal for greater than 50     HPI:    52y female with recurrent cervical CA (per patient stage III) presenting as transfer from [place] a/w neutropenic fever. Patient reports she had last cycle of chemotherapy 5 days prior.  Patient had VNS care stop by yesterday and was noted to be tachycardic on vital signs and was sent to the ED.  She reports feeling weak + fatigued. + upper abdominal pain that worsens while having chills. She denies any vomiting, chest pain, SOB.     At [facility] she was given Meropenem and Zosyn and was noted to be tachycardic to 140s and febrile to 39.5 and was thus transferred for further management. She became acutely altered yesterday afternoon without improvement and AXR with increased free air concern for perforated viscous. She is now s/p washout, open resection of left colon, colostomy. SICU consulted for close hemodynamic monitoring.       Surgery Information Washout, open resection of left colon, colostomy   Case Duration: 4hr   EBL: 2L 	IV Fluids: 4.5 L crystalloid, 3L albumin	Blood Products:  5u PRBC	Urine Output: 300mL         Allergies: No Known Allergies      MEDICATIONS:   --------------------------------------------------------------------------------------  Neurologic Medications  acetaminophen   IVPB .. 1000 milliGRAM(s) IV Intermittent every 6 hours  dexMEDEtomidine Infusion 1 MICROgram(s)/kG/Hr IV Continuous <Continuous>  fentaNYL   Infusion 0.5 MICROgram(s)/kG/Hr IV Continuous <Continuous>  QUEtiapine 50 milliGRAM(s) Oral every 12 hours    Respiratory Medications    Cardiovascular Medications  norepinephrine Infusion 0.05 MICROgram(s)/kG/Min IV Continuous <Continuous>    Gastrointestinal Medications  folic acid 1 milliGRAM(s) Oral daily  pantoprazole  Injectable 40 milliGRAM(s) IV Push two times a day  thiamine Injectable 100 milliGRAM(s) IV Push daily    Genitourinary Medications    Hematologic/Oncologic Medications    Antimicrobial/Immunologic Medications  piperacillin/tazobactam IVPB.. 3.375 Gram(s) IV Intermittent every 8 hours    Endocrine/Metabolic Medications    Topical/Other Medications  chlorhexidine 0.12% Liquid 15 milliLiter(s) Oral Mucosa every 12 hours  chlorhexidine 4% Liquid 1 Application(s) Topical daily    --------------------------------------------------------------------------------------    VITAL SIGNS, INS/OUTS (last 24 hours):  --------------------------------------------------------------------------------------  ICU Vital Signs Last 24 Hrs  T(C): 37.6 (12 Jun 2022 00:00), Max: 37.8 (11 Jun 2022 20:00)  T(F): 99.7 (12 Jun 2022 00:00), Max: 100 (11 Jun 2022 20:00)  HR: 130 (12 Jun 2022 00:00) (76 - 143)  BP: --  BP(mean): --  ABP: 122/71 (12 Jun 2022 00:00) (82/41 - 143/71)  ABP(mean): 88 (12 Jun 2022 00:00) (53 - 96)  RR: 23 (12 Jun 2022 00:00) (18 - 25)  SpO2: 99% (12 Jun 2022 00:00) (78% - 100%)    --------------------------------------------------------------------------------------  EXAM  NEUROLOGY  Exam: Intubated and Sedated    HEENT  Exam: Normocephalic, atraumatic.     RESPIRATORY  Exam: Normal expansion/effort.    Mechanical Ventilation: Mode: AC/ CMV (Assist Control/ Continuous Mandatory Ventilation), RR (machine): 18, TV (machine): 500, FiO2: 40, PEEP: 5, MAP: 10, PIP: 26    CARDIOVASCULAR  Exam: S1, S2.  Regular rate and rhythm on monitor.     GI/NUTRITION  Exam: Abdomen soft, Non-tender, Non-distended. Midline wound c/d/i with abthera vac in place, ostomy with bowel sweat  Current Diet:  NPO with TFs    VASCULAR  Exam: Extremities warm, pink, well-perfused.        METABOLIC/FLUIDS/ELECTROLYTES  potassium chloride  10 mEq/100 mL IVPB 10 milliEquivalent(s) IV Intermittent every 1 hour        INFECTIOUS DISEASE  Antimicrobials/Immunologic Medications:  piperacillin/tazobactam IVPB.. 3.375 Gram(s) IV Intermittent every 8 hours  METABOLIC/FLUIDS/ELECTROLYTES  folic acid 1 milliGRAM(s) Oral daily  thiamine Injectable 100 milliGRAM(s) IV Push daily      HEMATOLOGIC  [x] VTE Prophylaxis:   Transfusions:	[] PRBC	[] Platelets		[] FFP	[] Cryoprecipitate    INFECTIOUS DISEASE  Antimicrobials/Immunologic Medications:  piperacillin/tazobactam IVPB.. 3.375 Gram(s) IV Intermittent every 8 hours    Day #      of     ***    Tubes/Lines/Drains  ***  [x] Peripheral IV  [] Central Venous Line     	[] R	[] L	[] IJ	[] Fem	[] SC	Date Placed:   [] Arterial Line		[] R	[] L	[] Fem	[] Rad	[] Ax	Date Placed:   [] PICC		[] Midline		[] Mediport  [] Urinary Catheter		Date Placed:   [x] Necessity of urinary, arterial, and venous catheters discussed    LABS  --------------------------------------------------------------------------------------  ((Insert SICU Labs here))***  --------------------------------------------------------------------------------------    OTHER LABORATORY:     IMAGING STUDIES:   CXR:     ASSESSMENT:  52y with a PMHx of HTN, cervical CA (stage III)  on chemo - last session last week - c/b b/l hydronephrosis s/p B/L nephrostomy tube placement, p/w severe sepsis from neutropenic fever. Pt was acutely altered yesterday afternoon without improvement and AXR showed increased free air with concern for perforated viscous. She is now s/p washout, open resection of left colon, with colostomy creation. Recovering in SICU  for close hemodynamic monitoring.       PLAN:  NEUROLOGY:  - Sedated with precedex gtt   - dilaudid pushes   - seroquel started    RESPIRATORY:  - Intubated  - CPAP trials as tolerated; tolerated overnight 5/5  - Monitor daily chest x-rays and ABG  - Maintain O2 saturation >92%    CARDIOVASCULAR:  - Wean off Levophed gtt off PIV as tolerated  - Keep MAP >65    GI / NUTRITION:  - NPO  - GI prophylaxis with IV Protonix 40mg QD  - can start TF on 6/11    RENAL / GENITOURINARY:  - Indwelling hilliard catheter  - hx of bilateral nephrostomy tubes  - CT scan for evaluation of bloody nephrostomy tubes performed, f/u final results   - Monitor electrolytes and replete PRN  - Continue to monitor strict ins and outs q1 hour    HEMATOLOGIC:  - Received 5 units PRBC, 3 units FFP, 3 units PLT and 1 unit cryo intra-operatively  -s/p 3 platelets overnight on 06/10  - Monitor CBC daily  - Transfuse PRN  - holding chemical DVT ppx due to low platelet count    INFECTIOUS DISEASE:  - hx of neutropenic fevers, s/p zarxio tx  - Continue with IV Zosyn  - monitor WBC on daily CBC w/diff    ENDOCRINOLOGY:  - Monitor fingersticks q6 hours while NPO        Critical Care Diagnoses:

## 2022-06-12 NOTE — PROGRESS NOTE ADULT - PROBLEM SELECTOR PLAN 1
Fellow Note    Patient seen and examined. Agree with above. Required plt transfusion overnight. On low dose levo.     VS reviewed  Labs reviewed    Appreciate SICU care  NPO/NGT  Wean pressors as tolerated  Plt transfusion, maintain >50k perioperatively  Continue abx  VTE ppx  RTOR today (see brief op note), continue abthera  Daughter Lydia updated on OR findings and current plan    Joaquin MCKEON

## 2022-06-12 NOTE — PROGRESS NOTE ADULT - ASSESSMENT
52 Year-Old Lady with recurrent cervical CA on chemo presenting with intraabominal free air found to have bowel perforation now status post 6/8 exploratory laparotomy, abdominal washout, rectosigmoid colectomy, diverting colostomy and ABThera™ placement, RTOR (6/10) for re-exploration, washout and replacement of abthera.    Plan/Recommendations:  - Return to OR today for washout and possible closure  - Consent in chart  - care per sicu    D Team Surgery   p00

## 2022-06-12 NOTE — PROGRESS NOTE ADULT - SUBJECTIVE AND OBJECTIVE BOX
R2 GYNONC Progress Note  POD#2  HD#8    Interval Events  - sp Lasix 20 yesterday  - R PCN tube output less bloody  - PLT 13->2u PRBC ->19  - NPO since midnight    S: Pt intubated and sedated    Vital Signs Last 24 Hours  T(C): 37.1 (06-12-22 @ 06:33), Max: 37.8 (06-11-22 @ 20:00)  HR: 73 (06-12-22 @ 07:00) (73 - 143)  BP: 114/67 (06-12-22 @ 06:33) (114/67 - 114/67)  RR: 18 (06-12-22 @ 07:00) (18 - 25)  SpO2: 100% (06-12-22 @ 07:00) (78% - 100%)    I&O's Detail    11 Jun 2022 07:01  -  12 Jun 2022 07:00  --------------------------------------------------------  IN:    Dexmedetomidine: 434.5 mL    FentaNYL: 296.2 mL    IV PiggyBack: 750 mL    Jevity 1.2: 100 mL    Norepinephrine: 146.3 mL    Platelets - Single Donor: 1023 mL    Propofol: 24 mL    Propofol: 58.1 mL  Total IN: 2832.1 mL    OUT:    Indwelling Catheter - Urethral (mL): 290 mL    Nephrostomy Tube (mL): 695 mL    Nephrostomy Tube (mL): 1025 mL    Stool (mL): 0 mL    VAC (Vacuum Assisted Closure) System (mL): 800 mL  Total OUT: 2810 mL    Total NET: 22.1 mL    Mode: AC/ CMV (Assist Control/ Continuous Mandatory Ventilation)  RR (machine): 18  TV (machine): 500  FiO2: 40  PEEP: 5  ITime: 0.8  MAP: 11  PIP: 28      Physical Exam:  Gen: NAD, sedated  CV: RRR   Pulm: Mechanical breath sounds 2/2 ventilator   Abd: Soft, non-distended, +BS, Abthera in place with surrounding incision edges well perfused, 600cc brown fluid in wound vac cannister; ostomy pink and well perfused with air in ostomy bag; NGT in place  : L nephrostomy tube draining light yellow urine; R nephrostomy tube draining blood-tinged urine, hilliard in place draining dark red-la nena urine  Ext: Warm & well perfused      Labs:                        7.9    9.30  )-----------( 19       ( 12 Jun 2022 05:31 )             24.0       06-12    141  |  105  |  36<H>  ----------------------------<  103<H>  3.2<L>   |  21<L>  |  1.31<H>    Ca    8.0<L>      12 Jun 2022 01:05  Phos  2.8     06-12  Mg     1.80     06-12    TPro  4.5<L>  /  Alb  2.1<L>  /  TBili  3.3<H>  /  DBili  3.2<H>  /  AST  33<H>  /  ALT  16  /  AlkPhos  54  06-11          ABG - ( 12 Jun 2022 01:05 )  pH, Arterial: 7.46  pH, Blood: x     /  pCO2: 33    /  pO2: 68    / HCO3: 24    / Base Excess: -0.2  /  SaO2: 95.5        PT/INR - ( 12 Jun 2022 01:05 )   PT: 14.1 sec;   INR: 1.21 ratio         PTT - ( 12 Jun 2022 01:05 )  PTT:30.2 sec    CAPILLARY BLOOD GLUCOSE      POCT Blood Glucose.: 107 mg/dL (11 Jun 2022 06:33)        MEDICATIONS  (STANDING):  acetaminophen   IVPB .. 1000 milliGRAM(s) IV Intermittent every 6 hours  chlorhexidine 0.12% Liquid 15 milliLiter(s) Oral Mucosa every 12 hours  chlorhexidine 4% Liquid 1 Application(s) Topical daily  dexMEDEtomidine Infusion 1 MICROgram(s)/kG/Hr (25.1 mL/Hr) IV Continuous <Continuous>  fentaNYL   Infusion 0.5 MICROgram(s)/kG/Hr (5.02 mL/Hr) IV Continuous <Continuous>  folic acid 1 milliGRAM(s) Oral daily  norepinephrine Infusion 0.05 MICROgram(s)/kG/Min (9.41 mL/Hr) IV Continuous <Continuous>  pantoprazole  Injectable 40 milliGRAM(s) IV Push two times a day  piperacillin/tazobactam IVPB.. 3.375 Gram(s) IV Intermittent every 8 hours  QUEtiapine 50 milliGRAM(s) Oral every 12 hours  thiamine Injectable 100 milliGRAM(s) IV Push daily    MEDICATIONS  (PRN):  HYDROmorphone  Injectable 0.5 milliGRAM(s) IV Push every 4 hours PRN Severe Pain (7 - 10)

## 2022-06-12 NOTE — CHART NOTE - NSCHARTNOTEFT_GEN_A_CORE
Will repeat FT4 on 6/13.   Last TSH 2.89 and FT4 0.8 on 6/6    Hazel Starr MD  Endocrine Fellow  Can be reached via teams. For follow up questions, discharge recommendations, or new consults, please call answering service at [phone] (weekdays); [phone] (nights/weekends)

## 2022-06-12 NOTE — PROGRESS NOTE ADULT - ASSESSMENT
52y  with recurrent cervical CA admitted for management of neutropenic fever, now s/p emergent exploratory laparotomy, abdominal washout, rectosigmoid colectomy, diverting colostomy, bronchoscopy and Abthera placement on 22 (ebl 2000cc, s/p 5UpRBC, 3L albumin, 3U Plts, 3U FFP, 3U Cryo) for findings of increased free air on abdominal xray concerning for bowel perforation. Patient POD#2 s/p RTOR for abdominal washout and replacement of Abthera. Overall stable but in critical condition.     Neuro: Continue IV analgesics regimen per SICU. Sedated; continue Precedex, Levophed, Fentanyl, Seroquel, per SICU.  CV: Continue titration of pressors per SICU.  Pulm: Intubated on AC.   GI: NPO. GI following. Continue IV Protonix/Reglan. NG tube in place (- ). TF held in anticipation of RTOR today  : pt scheduled for RTOR today for abdominal washout and possible closure. b/l nephrostomy tubes in place. Infante in place. Patient has ASHLEY likely 2/2 IV Contrast & Dehydration, Cr 1.29->1.31. Continue to monitor UOP. Replete electrolytes PRN  Heme: Continue Venodynes for DVT ppx.    - s/p 2u Plts for platelets of 14 -> s/p additional 1u plt intraop 6/10 -> 1u Plts in PM of 6/10->10->2u PLTS's ->plts uptrended to 59. Downtrended again to 13 overnight->2u PLT-> 19.   - Will transfuse additional 2u PLT, Solu-cortef administered  - s/p 5u pRBC intraop on  and 1u pRBC intraop on 6/10  ID: Afebrile. Continue Zosyn (s/p Cefepime). Continue to trend WBC. s/p Zarixo (-6/10) for neutropenic fever. WBC 9.56. Lactate 1.1.    Dispo: Appreciate excellent SICU care. Plan for RTOR in AM for abdominal washout and possible abdominal closure.    Rachel Javier, PGY-2

## 2022-06-12 NOTE — CONSULT NOTE ADULT - SUBJECTIVE AND OBJECTIVE BOX
Interventional Radiology    Evaluate for Procedure:     HPI: 52 Year-Old Lady with recurrent cervical CA on chemo presenting with intraabominal free air found to have bowel perforation now status post 6/8 exploratory laparotomy, abdominal washout, rectosigmoid colectomy, diverting colostomy and ABThera™ placement, RTOR (6/10) for re-exploration, washout and replacement of abthera.    Patient had CT A & P showing slight retraction of b/l PCN's, which were last exchanged by IR on 4/2022.     Allergies:   Medications (Abx/Cardiac/Anticoagulation/Blood Products)    furosemide   Injectable: 20 milliGRAM(s) IV Push (06-11 @ 09:57)  norepinephrine Infusion: 9.41 mL/Hr IV Continuous (06-11 @ 19:14)  piperacillin/tazobactam IVPB..: 25 mL/Hr IV Intermittent (06-12 @ 17:11)    Data:  167.6  100.4  T(C): 36.4  HR: 72  BP: 114/67  RR: 18  SpO2: 100%    -WBC 8.46 / HgB 7.8 / Hct 23.3 / Plt 20  -Na 144 / Cl 109 / BUN 33 / Glucose 123  -K 3.9 / CO2 21 / Cr 1.23  -ALT -- / Alk Phos -- / T.Bili --  -INR 1.21 / PTT 30.2      Radiology:     Assessment/Plan:   Patient had CT A & P showing slight retraction of b/l PCN's, which were last exchanged by IR on 4/2022.   -- IR will plan to perform bilateral nephrostomy tube exchange tentatively on Tuesday 6/14  -- patient does NOT need to be NPO  -- please maintain COVID PCR within 72 hours of planned procedure   -- please place IR procedure request order under Dr. Monet   Interventional Radiology    Evaluate for Procedure:     HPI: 52 Year-Old Lady with recurrent cervical CA on chemo presenting with intraabominal free air found to have bowel perforation now status post 6/8 exploratory laparotomy, abdominal washout, rectosigmoid colectomy, diverting colostomy and ABThera™ placement, RTOR (6/10) for re-exploration, washout and replacement of abthera.    Patient had CT A & P showing slight retraction of b/l PCN's, which were last exchanged by IR on 4/2022.     Allergies:   Medications (Abx/Cardiac/Anticoagulation/Blood Products)    furosemide   Injectable: 20 milliGRAM(s) IV Push (06-11 @ 09:57)  norepinephrine Infusion: 9.41 mL/Hr IV Continuous (06-11 @ 19:14)  piperacillin/tazobactam IVPB..: 25 mL/Hr IV Intermittent (06-12 @ 17:11)    Data:  167.6  100.4  T(C): 36.4  HR: 72  BP: 114/67  RR: 18  SpO2: 100%    -WBC 8.46 / HgB 7.8 / Hct 23.3 / Plt 20  -Na 144 / Cl 109 / BUN 33 / Glucose 123  -K 3.9 / CO2 21 / Cr 1.23  -ALT -- / Alk Phos -- / T.Bili --  -INR 1.21 / PTT 30.2      Radiology:     Assessment/Plan:   Patient had CT A & P showing slight retraction of b/l PCN's, which were last exchanged by IR on 4/2022.  Both PCNs continue to have good output despite slight retraction.   -- IR will plan to perform bilateral nephrostomy tube exchange tentatively on Tuesday 6/14  -- patient does NOT need to be NPO  -- please maintain COVID PCR within 72 hours of planned procedure   -- please place IR procedure request order under Dr. Monet

## 2022-06-13 ENCOUNTER — TRANSCRIPTION ENCOUNTER (OUTPATIENT)
Age: 52
End: 2022-06-13

## 2022-06-13 LAB
ALBUMIN SERPL ELPH-MCNC: 2.5 G/DL — LOW (ref 3.3–5)
ALP SERPL-CCNC: 71 U/L — SIGNIFICANT CHANGE UP (ref 40–120)
ALT FLD-CCNC: 23 U/L — SIGNIFICANT CHANGE UP (ref 4–33)
ANION GAP SERPL CALC-SCNC: 14 MMOL/L — SIGNIFICANT CHANGE UP (ref 7–14)
APTT BLD: 31.8 SEC — SIGNIFICANT CHANGE UP (ref 27–36.3)
AST SERPL-CCNC: 42 U/L — HIGH (ref 4–32)
BASOPHILS # BLD AUTO: 0.08 K/UL — SIGNIFICANT CHANGE UP (ref 0–0.2)
BASOPHILS NFR BLD AUTO: 0.8 % — SIGNIFICANT CHANGE UP (ref 0–2)
BILIRUB DIRECT SERPL-MCNC: 2.5 MG/DL — HIGH (ref 0–0.3)
BILIRUB INDIRECT FLD-MCNC: 0.5 MG/DL — SIGNIFICANT CHANGE UP (ref 0–1)
BILIRUB SERPL-MCNC: 3 MG/DL — HIGH (ref 0.2–1.2)
BLD GP AB SCN SERPL QL: NEGATIVE — SIGNIFICANT CHANGE UP
BLOOD GAS ARTERIAL - LYTES,HGB,ICA,LACT RESULT: SIGNIFICANT CHANGE UP
BUN SERPL-MCNC: 38 MG/DL — HIGH (ref 7–23)
CALCIUM SERPL-MCNC: 8.1 MG/DL — LOW (ref 8.4–10.5)
CHLORIDE SERPL-SCNC: 107 MMOL/L — SIGNIFICANT CHANGE UP (ref 98–107)
CLOSURE TME COLL+EPINEP BLD: 25 K/UL — SIGNIFICANT CHANGE UP (ref 150–400)
CO2 SERPL-SCNC: 22 MMOL/L — SIGNIFICANT CHANGE UP (ref 22–31)
CREAT SERPL-MCNC: 1.23 MG/DL — SIGNIFICANT CHANGE UP (ref 0.5–1.3)
EGFR: 52 ML/MIN/1.73M2 — LOW
EOSINOPHIL # BLD AUTO: 0 K/UL — SIGNIFICANT CHANGE UP (ref 0–0.5)
EOSINOPHIL NFR BLD AUTO: 0 % — SIGNIFICANT CHANGE UP (ref 0–6)
FSP PPP-MCNC: >=5 <20 UG/ML
GLUCOSE BLDC GLUCOMTR-MCNC: 109 MG/DL — HIGH (ref 70–99)
GLUCOSE BLDC GLUCOMTR-MCNC: 119 MG/DL — HIGH (ref 70–99)
GLUCOSE BLDC GLUCOMTR-MCNC: 134 MG/DL — HIGH (ref 70–99)
GLUCOSE BLDC GLUCOMTR-MCNC: 139 MG/DL — HIGH (ref 70–99)
GLUCOSE SERPL-MCNC: 133 MG/DL — HIGH (ref 70–99)
HCG SERPL-ACNC: <5 MIU/ML — SIGNIFICANT CHANGE UP
HCT VFR BLD CALC: 20 % — CRITICAL LOW (ref 34.5–45)
HCT VFR BLD CALC: 21.2 % — LOW (ref 34.5–45)
HCT VFR BLD CALC: 21.4 % — LOW (ref 34.5–45)
HCT VFR BLD CALC: 21.6 % — LOW (ref 34.5–45)
HCT VFR BLD CALC: 21.9 % — LOW (ref 34.5–45)
HGB BLD-MCNC: 6.6 G/DL — CRITICAL LOW (ref 11.5–15.5)
HGB BLD-MCNC: 7 G/DL — CRITICAL LOW (ref 11.5–15.5)
HGB BLD-MCNC: 7.2 G/DL — LOW (ref 11.5–15.5)
HGB BLD-MCNC: 7.2 G/DL — LOW (ref 11.5–15.5)
HGB BLD-MCNC: 7.5 G/DL — LOW (ref 11.5–15.5)
IANC: 8.08 K/UL — HIGH (ref 1.8–7.4)
IMM GRANULOCYTES NFR BLD AUTO: 4.4 % — HIGH (ref 0–1.5)
INR BLD: 1.2 RATIO — HIGH (ref 0.88–1.16)
LYMPHOCYTES # BLD AUTO: 0.56 K/UL — LOW (ref 1–3.3)
LYMPHOCYTES # BLD AUTO: 5.8 % — LOW (ref 13–44)
MAGNESIUM SERPL-MCNC: 1.8 MG/DL — SIGNIFICANT CHANGE UP (ref 1.6–2.6)
MCHC RBC-ENTMCNC: 28.1 PG — SIGNIFICANT CHANGE UP (ref 27–34)
MCHC RBC-ENTMCNC: 29.2 PG — SIGNIFICANT CHANGE UP (ref 27–34)
MCHC RBC-ENTMCNC: 29.3 PG — SIGNIFICANT CHANGE UP (ref 27–34)
MCHC RBC-ENTMCNC: 29.5 PG — SIGNIFICANT CHANGE UP (ref 27–34)
MCHC RBC-ENTMCNC: 29.6 PG — SIGNIFICANT CHANGE UP (ref 27–34)
MCHC RBC-ENTMCNC: 33 GM/DL — SIGNIFICANT CHANGE UP (ref 32–36)
MCHC RBC-ENTMCNC: 33 GM/DL — SIGNIFICANT CHANGE UP (ref 32–36)
MCHC RBC-ENTMCNC: 33.3 GM/DL — SIGNIFICANT CHANGE UP (ref 32–36)
MCHC RBC-ENTMCNC: 33.6 GM/DL — SIGNIFICANT CHANGE UP (ref 32–36)
MCHC RBC-ENTMCNC: 34.2 GM/DL — SIGNIFICANT CHANGE UP (ref 32–36)
MCV RBC AUTO: 85.1 FL — SIGNIFICANT CHANGE UP (ref 80–100)
MCV RBC AUTO: 85.2 FL — SIGNIFICANT CHANGE UP (ref 80–100)
MCV RBC AUTO: 87.7 FL — SIGNIFICANT CHANGE UP (ref 80–100)
MCV RBC AUTO: 88.7 FL — SIGNIFICANT CHANGE UP (ref 80–100)
MCV RBC AUTO: 88.9 FL — SIGNIFICANT CHANGE UP (ref 80–100)
MONOCYTES # BLD AUTO: 0.54 K/UL — SIGNIFICANT CHANGE UP (ref 0–0.9)
MONOCYTES NFR BLD AUTO: 5.6 % — SIGNIFICANT CHANGE UP (ref 2–14)
NEUTROPHILS # BLD AUTO: 8.08 K/UL — HIGH (ref 1.8–7.4)
NEUTROPHILS NFR BLD AUTO: 83.4 % — HIGH (ref 43–77)
NRBC # BLD: 1 /100 WBCS — SIGNIFICANT CHANGE UP
NRBC # BLD: 2 /100 WBCS — SIGNIFICANT CHANGE UP
NRBC # BLD: 3 /100 WBCS — SIGNIFICANT CHANGE UP
NRBC # FLD: 0.13 K/UL — HIGH
NRBC # FLD: 0.15 K/UL — HIGH
NRBC # FLD: 0.15 K/UL — HIGH
NRBC # FLD: 0.18 K/UL — HIGH
NRBC # FLD: 0.21 K/UL — HIGH
PHOSPHATE SERPL-MCNC: 3.8 MG/DL — SIGNIFICANT CHANGE UP (ref 2.5–4.5)
PLATELET # BLD AUTO: 15 K/UL — CRITICAL LOW (ref 150–400)
PLATELET # BLD AUTO: 16 K/UL — CRITICAL LOW (ref 150–400)
PLATELET # BLD AUTO: 18 K/UL — CRITICAL LOW (ref 150–400)
PLATELET # BLD AUTO: 23 K/UL — LOW (ref 150–400)
PLATELET # BLD AUTO: 27 K/UL — LOW (ref 150–400)
POTASSIUM SERPL-MCNC: 3.8 MMOL/L — SIGNIFICANT CHANGE UP (ref 3.5–5.3)
POTASSIUM SERPL-SCNC: 3.8 MMOL/L — SIGNIFICANT CHANGE UP (ref 3.5–5.3)
PROT SERPL-MCNC: 5.7 G/DL — LOW (ref 6–8.3)
PROTHROM AB SERPL-ACNC: 14 SEC — HIGH (ref 10.5–13.4)
RBC # BLD: 2.35 M/UL — LOW (ref 3.8–5.2)
RBC # BLD: 2.39 M/UL — LOW (ref 3.8–5.2)
RBC # BLD: 2.43 M/UL — LOW (ref 3.8–5.2)
RBC # BLD: 2.44 M/UL — LOW (ref 3.8–5.2)
RBC # BLD: 2.57 M/UL — LOW (ref 3.8–5.2)
RBC # FLD: 16.7 % — HIGH (ref 10.3–14.5)
RBC # FLD: 17 % — HIGH (ref 10.3–14.5)
RBC # FLD: 17.2 % — HIGH (ref 10.3–14.5)
RBC # FLD: 17.4 % — HIGH (ref 10.3–14.5)
RBC # FLD: 17.7 % — HIGH (ref 10.3–14.5)
RH IG SCN BLD-IMP: POSITIVE — SIGNIFICANT CHANGE UP
SODIUM SERPL-SCNC: 143 MMOL/L — SIGNIFICANT CHANGE UP (ref 135–145)
T3 SERPL-MCNC: 34 NG/DL — LOW (ref 80–200)
T4 FREE SERPL-MCNC: 0.6 NG/DL — LOW (ref 0.9–1.8)
TSH SERPL-MCNC: 1.36 UIU/ML — SIGNIFICANT CHANGE UP (ref 0.27–4.2)
WBC # BLD: 10.1 K/UL — SIGNIFICANT CHANGE UP (ref 3.8–10.5)
WBC # BLD: 7.83 K/UL — SIGNIFICANT CHANGE UP (ref 3.8–10.5)
WBC # BLD: 9.3 K/UL — SIGNIFICANT CHANGE UP (ref 3.8–10.5)
WBC # BLD: 9.65 K/UL — SIGNIFICANT CHANGE UP (ref 3.8–10.5)
WBC # BLD: 9.69 K/UL — SIGNIFICANT CHANGE UP (ref 3.8–10.5)
WBC # FLD AUTO: 10.1 K/UL — SIGNIFICANT CHANGE UP (ref 3.8–10.5)
WBC # FLD AUTO: 7.83 K/UL — SIGNIFICANT CHANGE UP (ref 3.8–10.5)
WBC # FLD AUTO: 9.3 K/UL — SIGNIFICANT CHANGE UP (ref 3.8–10.5)
WBC # FLD AUTO: 9.65 K/UL — SIGNIFICANT CHANGE UP (ref 3.8–10.5)
WBC # FLD AUTO: 9.69 K/UL — SIGNIFICANT CHANGE UP (ref 3.8–10.5)

## 2022-06-13 PROCEDURE — 99232 SBSQ HOSP IP/OBS MODERATE 35: CPT | Mod: GC

## 2022-06-13 PROCEDURE — 99292 CRITICAL CARE ADDL 30 MIN: CPT

## 2022-06-13 PROCEDURE — 99291 CRITICAL CARE FIRST HOUR: CPT

## 2022-06-13 RX ORDER — POTASSIUM CHLORIDE 20 MEQ
20 PACKET (EA) ORAL ONCE
Refills: 0 | Status: COMPLETED | OUTPATIENT
Start: 2022-06-13 | End: 2022-06-13

## 2022-06-13 RX ORDER — ONDANSETRON 8 MG/1
4 TABLET, FILM COATED ORAL ONCE
Refills: 0 | Status: DISCONTINUED | OUTPATIENT
Start: 2022-06-13 | End: 2022-06-14

## 2022-06-13 RX ORDER — CASPOFUNGIN ACETATE 7 MG/ML
50 INJECTION, POWDER, LYOPHILIZED, FOR SOLUTION INTRAVENOUS EVERY 24 HOURS
Refills: 0 | Status: DISCONTINUED | OUTPATIENT
Start: 2022-06-14 | End: 2022-06-20

## 2022-06-13 RX ORDER — CASPOFUNGIN ACETATE 7 MG/ML
70 INJECTION, POWDER, LYOPHILIZED, FOR SOLUTION INTRAVENOUS ONCE
Refills: 0 | Status: COMPLETED | OUTPATIENT
Start: 2022-06-13 | End: 2022-06-13

## 2022-06-13 RX ORDER — MAGNESIUM SULFATE 500 MG/ML
2 VIAL (ML) INJECTION ONCE
Refills: 0 | Status: COMPLETED | OUTPATIENT
Start: 2022-06-13 | End: 2022-06-13

## 2022-06-13 RX ORDER — CASPOFUNGIN ACETATE 7 MG/ML
INJECTION, POWDER, LYOPHILIZED, FOR SOLUTION INTRAVENOUS
Refills: 0 | Status: DISCONTINUED | OUTPATIENT
Start: 2022-06-13 | End: 2022-06-20

## 2022-06-13 RX ADMIN — PANTOPRAZOLE SODIUM 40 MILLIGRAM(S): 20 TABLET, DELAYED RELEASE ORAL at 05:06

## 2022-06-13 RX ADMIN — Medication 25 GRAM(S): at 05:07

## 2022-06-13 RX ADMIN — DEXMEDETOMIDINE HYDROCHLORIDE IN 0.9% SODIUM CHLORIDE 25.1 MICROGRAM(S)/KG/HR: 4 INJECTION INTRAVENOUS at 07:39

## 2022-06-13 RX ADMIN — DEXMEDETOMIDINE HYDROCHLORIDE IN 0.9% SODIUM CHLORIDE 25.1 MICROGRAM(S)/KG/HR: 4 INJECTION INTRAVENOUS at 19:41

## 2022-06-13 RX ADMIN — Medication 100 MILLIGRAM(S): at 12:46

## 2022-06-13 RX ADMIN — FENTANYL CITRATE 5.02 MICROGRAM(S)/KG/HR: 50 INJECTION INTRAVENOUS at 07:39

## 2022-06-13 RX ADMIN — QUETIAPINE FUMARATE 100 MILLIGRAM(S): 200 TABLET, FILM COATED ORAL at 05:07

## 2022-06-13 RX ADMIN — CHLORHEXIDINE GLUCONATE 15 MILLILITER(S): 213 SOLUTION TOPICAL at 17:46

## 2022-06-13 RX ADMIN — PIPERACILLIN AND TAZOBACTAM 25 GRAM(S): 4; .5 INJECTION, POWDER, LYOPHILIZED, FOR SOLUTION INTRAVENOUS at 01:52

## 2022-06-13 RX ADMIN — FENTANYL CITRATE 5.02 MICROGRAM(S)/KG/HR: 50 INJECTION INTRAVENOUS at 19:36

## 2022-06-13 RX ADMIN — PANTOPRAZOLE SODIUM 40 MILLIGRAM(S): 20 TABLET, DELAYED RELEASE ORAL at 17:46

## 2022-06-13 RX ADMIN — DEXMEDETOMIDINE HYDROCHLORIDE IN 0.9% SODIUM CHLORIDE 25.1 MICROGRAM(S)/KG/HR: 4 INJECTION INTRAVENOUS at 19:37

## 2022-06-13 RX ADMIN — Medication 20 MILLIEQUIVALENT(S): at 05:07

## 2022-06-13 RX ADMIN — FENTANYL CITRATE 0.5 MICROGRAM(S)/KG/HR: 50 INJECTION INTRAVENOUS at 23:01

## 2022-06-13 RX ADMIN — CASPOFUNGIN ACETATE 260 MILLIGRAM(S): 7 INJECTION, POWDER, LYOPHILIZED, FOR SOLUTION INTRAVENOUS at 12:46

## 2022-06-13 RX ADMIN — CHLORHEXIDINE GLUCONATE 15 MILLILITER(S): 213 SOLUTION TOPICAL at 05:06

## 2022-06-13 RX ADMIN — CHLORHEXIDINE GLUCONATE 1 APPLICATION(S): 213 SOLUTION TOPICAL at 12:47

## 2022-06-13 RX ADMIN — PIPERACILLIN AND TAZOBACTAM 25 GRAM(S): 4; .5 INJECTION, POWDER, LYOPHILIZED, FOR SOLUTION INTRAVENOUS at 10:05

## 2022-06-13 RX ADMIN — PIPERACILLIN AND TAZOBACTAM 25 GRAM(S): 4; .5 INJECTION, POWDER, LYOPHILIZED, FOR SOLUTION INTRAVENOUS at 17:46

## 2022-06-13 NOTE — PROGRESS NOTE ADULT - ATTENDING COMMENTS
Low Free T4, low T3 and normal TSH without clear signs of hypothyroidism, suggestive of euthyroid sick syndrome. Check Free T4 by equilibrium dialysis. Will follow. Patient is high risk and high level decision making, requiring ICU level of care.    Dylan Talbot MD  Division of Endocrinology  Pager: [phone]    If after 6PM or before 9AM, or on weekends/holidays, please call endocrine answering service for assistance ([phone]).  For nonurgent matters email [email or web address] for assistance.

## 2022-06-13 NOTE — PROGRESS NOTE ADULT - ASSESSMENT
Assessment and Plan:   · Assessment	  52 Year-Old Lady with recurrent cervical CA on chemo presenting with intraabominal free air found to have bowel perforation now status post 6/8 exploratory laparotomy, abdominal washout, rectosigmoid colectomy, diverting colostomy and ABThera™ placement, RTOR (6/10) for re-exploration, washout and replacement of abthera.    Plan/Recommendations:  - Return to OR tomorrow for ex lap, washout, and possible closure with mesh  - NPO/NGT  - TF  - Continue abx  - Rest of care per excellent SICU team.     D Team Surgery   [id number]

## 2022-06-13 NOTE — PROGRESS NOTE ADULT - ASSESSMENT
52y with a PMHx of HTN, cervical CA (stage III)  on chemo - last session last week - c/b b/l hydronephrosis s/p B/L nephrostomy tube placement, p/w severe sepsis from neutropenic fever. Pt was acutely altered yesterday afternoon without improvement and AXR showed increased free air with concern for perforated viscous. She is now s/p washout, open resection of left colon, with colostomy creation. Recovering in SICU  for close hemodynamic monitoring.     PLAN:  NEUROLOGY:  - Sedation: Precedex and Fentanyl gtt  - Pain control: IV Dilaudid 0.5mg q4hr PRN   - Seroquel 100mg PO BID  - Folic acid and thiamine    RESPIRATORY:  - MV: Volume /18/5/50  - CPAP trials as tolerated; tolerated overnight 5/5  - Monitor daily chest x-rays and ABG  - Maintain O2 saturation >92%    CARDIOVASCULAR:  - Off pressors  - Monitor hemodynamics  - Keep MAP >65    GI / NUTRITION:  - Advance tube  -tube feeds held  -diet: NPO for OR   - GI ppx: IV Protonix 40mg daily  - Abthera VAC in place, SS output    RENAL / GENITOURINARY: hx of bilateral nephrostomy tubes  - Indwelling hilliard catheter, bilateral nephrostomy tubes  - IR nephrostomy tube exchange on 6/14  - Monitor electrolytes and replete PRN  - Continue to monitor strict ins and outs q1 hour    HEMATOLOGIC:  - VTE ppx: holding chemical DVT ppx due to low platelet count  - 1u pRBC  - ESTHER  - Trend platelet count, transfuse as needed  - Monitor H/H and platelet count on CBC daily    INFECTIOUS DISEASE: hx of neutropenic fevers, s/p zarxio tx  - Abx: Zosyn  - Add caspofungin  - Monitor fever curve and WBC on daily CBC w/diff    ENDOCRINOLOGY:  - Monitor fingersticks q6 hours while NPO    DISPO:  - Full code  - SICU

## 2022-06-13 NOTE — PROGRESS NOTE ADULT - SUBJECTIVE AND OBJECTIVE BOX
[facility] Progress Note  POD#3  HD#9    Interval Events  - return to OR on 6/12 w/ re-exploration, washout, and temporary closure    S: Pt intubated and sedated    Vital Signs Last 24 Hrs  T(C): 36.6 (13 Jun 2022 04:00), Max: 37.1 (12 Jun 2022 06:33)  T(F): 97.8 (13 Jun 2022 04:00), Max: 98.8 (12 Jun 2022 06:33)  HR: 71 (13 Jun 2022 05:00) (62 - 123)  BP: 114/67 (12 Jun 2022 06:33) (114/67 - 114/67)  BP(mean): --  RR: 23 (13 Jun 2022 05:00) (14 - 23)  SpO2: 100% (13 Jun 2022 05:00) (90% - 100%)    I&O's Detail    11 Jun 2022 07:01  -  12 Jun 2022 07:00  --------------------------------------------------------  IN:    Dexmedetomidine: 434.5 mL    FentaNYL: 296.2 mL    IV PiggyBack: 750 mL    Jevity 1.2: 100 mL    Norepinephrine: 146.3 mL    Platelets - Single Donor: 1023 mL    Propofol: 24 mL    Propofol: 58.1 mL  Total IN: 2832.1 mL    OUT:    Indwelling Catheter - Urethral (mL): 290 mL    Nephrostomy Tube (mL): 695 mL    Nephrostomy Tube (mL): 1025 mL    Stool (mL): 0 mL    VAC (Vacuum Assisted Closure) System (mL): 800 mL  Total OUT: 2810 mL    Total NET: 22.1 mL    12 Jun 2022 07:01  -  13 Jun 2022 05:52  --------------------------------------------------------  IN:    Dexmedetomidine: 565.5 mL    FentaNYL: 281.6 mL    IV PiggyBack: 450 mL    Jevity 1.2: 425 mL    Norepinephrine: 5.6 mL    Platelets - Single Donor: 872 mL    PRBCs (Packed Red Blood Cells): 300 mL  Total IN: 2899.7 mL    OUT:    Indwelling Catheter - Urethral (mL): 155 mL    Nephrostomy Tube (mL): 525 mL    Nephrostomy Tube (mL): 550 mL    VAC (Vacuum Assisted Closure) System (mL): 600 mL  Total OUT: 1830 mL    Total NET: 1069.7 mL    Mode: AC/ CMV (Assist Control/ Continuous Mandatory Ventilation)  RR (machine): 18  TV (machine): 500  FiO2: 50  PEEP: 5  ITime: 0.94  MAP: 11  PIP: 25    Physical Exam:  Gen: NAD, sedated  CV: RRR   Pulm: Mechanical breath sounds 2/2 ventilator   Abd: Soft, non-distended, +BS, Abthera in place with surrounding incision edges well perfused, 600cc brown fluid in wound vac cannister; ostomy pink and well perfused with air in ostomy bag; NGT in place  : L nephrostomy tube draining light yellow urine; R nephrostomy tube draining blood-tinged urine, hilliard in place draining dark red-la nena urine  Ext: Warm & well perfused      Labs:             7.2    9.69  )-----------( 27       ( 06-13 @ 01:26 )             21.4     06-13 @ 01:26    143  |  107  |  38  ----------------------------<  133  3.8   |  22  |  1.23    Ca    8.1      06-13 @ 01:26  Phos  3.8     06-13 @ 01:26  Mg     1.80     06-13 @ 01:26    TPro  5.7  /  Alb  2.5  /  TBili  3.0  /  DBili  2.5  /  AST  42  /  ALT  23  /  AlkPhos  71  06-13 @ 01:26    PT/INR - ( 06-12 @ 01:05 )   PT: 14.1 sec;   INR: 1.21 ratio    PTT - ( 06-12 @ 01:05 )  PTT:30.2 sec    Uric Acid: (06-12 @ 06:19)  --       Fibrinogen: (06-12 @ 06:19)  778      LDH: (06-12 @ 06:19)  --       CAPILLARY BLOOD GLUCOSE    POCT Blood Glucose.: 139 mg/dL (13 Jun 2022 05:44)  POCT Blood Glucose.: 134 mg/dL (13 Jun 2022 02:25)  POCT Blood Glucose.: 132 mg/dL (12 Jun 2022 17:29)  POCT Blood Glucose.: 133 mg/dL (12 Jun 2022 11:53)    MEDICATIONS  (STANDING):  chlorhexidine 0.12% Liquid 15 milliLiter(s) Oral Mucosa every 12 hours  chlorhexidine 4% Liquid 1 Application(s) Topical daily  dexMEDEtomidine Infusion 1 MICROgram(s)/kG/Hr (25.1 mL/Hr) IV Continuous <Continuous>  fentaNYL   Infusion 0.5 MICROgram(s)/kG/Hr (5.02 mL/Hr) IV Continuous <Continuous>  folic acid 1 milliGRAM(s) Oral daily  pantoprazole  Injectable 40 milliGRAM(s) IV Push two times a day  piperacillin/tazobactam IVPB.. 3.375 Gram(s) IV Intermittent every 8 hours  QUEtiapine 100 milliGRAM(s) Oral every 12 hours  thiamine Injectable 100 milliGRAM(s) IV Push daily    MEDICATIONS  (PRN):  HYDROmorphone  Injectable 0.5 milliGRAM(s) IV Push every 4 hours PRN Severe Pain (7 - 10) [facility] Progress Note  POD#3  HD#9    Interval Events  - return to OR on 6/12 w/ re-exploration, washout, and temporary closure, plan for next re-op 6/14  -6u plts tx 6/12 in day shift, additional 2u overnight, w/ additional 1u being hung at bedside this am.  Most recent plt count 16, q6h labs  -1u PRBC overnight  -louann blood output from hilliard    S: Pt intubated and sedated    Vital Signs Last 24 Hrs  T(C): 36.6 (13 Jun 2022 04:00), Max: 37.1 (12 Jun 2022 06:33)  T(F): 97.8 (13 Jun 2022 04:00), Max: 98.8 (12 Jun 2022 06:33)  HR: 71 (13 Jun 2022 05:00) (62 - 123)  BP: 114/67 (12 Jun 2022 06:33) (114/67 - 114/67)  BP(mean): --  RR: 23 (13 Jun 2022 05:00) (14 - 23)  SpO2: 100% (13 Jun 2022 05:00) (90% - 100%)    I&O's Detail    11 Jun 2022 07:01  -  12 Jun 2022 07:00  --------------------------------------------------------  IN:    Dexmedetomidine: 434.5 mL    FentaNYL: 296.2 mL    IV PiggyBack: 750 mL    Jevity 1.2: 100 mL    Norepinephrine: 146.3 mL    Platelets - Single Donor: 1023 mL    Propofol: 24 mL    Propofol: 58.1 mL  Total IN: 2832.1 mL    OUT:    Indwelling Catheter - Urethral (mL): 290 mL    Nephrostomy Tube (mL): 695 mL    Nephrostomy Tube (mL): 1025 mL    Stool (mL): 0 mL    VAC (Vacuum Assisted Closure) System (mL): 800 mL  Total OUT: 2810 mL    Total NET: 22.1 mL    12 Jun 2022 07:01  -  13 Jun 2022 05:52  --------------------------------------------------------  IN:    Dexmedetomidine: 565.5 mL    FentaNYL: 281.6 mL    IV PiggyBack: 450 mL    Jevity 1.2: 425 mL    Norepinephrine: 5.6 mL    Platelets - Single Donor: 872 mL    PRBCs (Packed Red Blood Cells): 300 mL  Total IN: 2899.7 mL    OUT:    Indwelling Catheter - Urethral (mL): 155 mL    Nephrostomy Tube (mL): 525 mL    Nephrostomy Tube (mL): 550 mL    VAC (Vacuum Assisted Closure) System (mL): 600 mL  Total OUT: 1830 mL    Total NET: 1069.7 mL    Mode: AC/ CMV (Assist Control/ Continuous Mandatory Ventilation)  RR (machine): 18  TV (machine): 500  FiO2: 50  PEEP: 5  ITime: 0.94  MAP: 11  PIP: 25    Physical Exam:  Gen: NAD, sedated  CV: RRR   Pulm: Mechanical breath sounds 2/2 ventilator   Abd: Soft, non-distended, +BS, Abthera in place with surrounding incision edges well perfused, 600cc brown fluid in wound vac cannister; ostomy pink and well perfused with air in ostomy bag; NGT in place  : L nephrostomy tube draining light yellow urine; R nephrostomy tube draining blood-tinged urine, hilliard in place draining dark red-la nena urine  Ext: Warm & well perfused      Labs:             7.2    9.69  )-----------( 27       ( 06-13 @ 01:26 )             21.4     06-13 @ 01:26    143  |  107  |  38  ----------------------------<  133  3.8   |  22  |  1.23    Ca    8.1      06-13 @ 01:26  Phos  3.8     06-13 @ 01:26  Mg     1.80     06-13 @ 01:26    TPro  5.7  /  Alb  2.5  /  TBili  3.0  /  DBili  2.5  /  AST  42  /  ALT  23  /  AlkPhos  71  06-13 @ 01:26    PT/INR - ( 06-12 @ 01:05 )   PT: 14.1 sec;   INR: 1.21 ratio    PTT - ( 06-12 @ 01:05 )  PTT:30.2 sec    Uric Acid: (06-12 @ 06:19)  --       Fibrinogen: (06-12 @ 06:19)  778      LDH: (06-12 @ 06:19)  --       CAPILLARY BLOOD GLUCOSE    POCT Blood Glucose.: 139 mg/dL (13 Jun 2022 05:44)  POCT Blood Glucose.: 134 mg/dL (13 Jun 2022 02:25)  POCT Blood Glucose.: 132 mg/dL (12 Jun 2022 17:29)  POCT Blood Glucose.: 133 mg/dL (12 Jun 2022 11:53)    MEDICATIONS  (STANDING):  chlorhexidine 0.12% Liquid 15 milliLiter(s) Oral Mucosa every 12 hours  chlorhexidine 4% Liquid 1 Application(s) Topical daily  dexMEDEtomidine Infusion 1 MICROgram(s)/kG/Hr (25.1 mL/Hr) IV Continuous <Continuous>  fentaNYL   Infusion 0.5 MICROgram(s)/kG/Hr (5.02 mL/Hr) IV Continuous <Continuous>  folic acid 1 milliGRAM(s) Oral daily  pantoprazole  Injectable 40 milliGRAM(s) IV Push two times a day  piperacillin/tazobactam IVPB.. 3.375 Gram(s) IV Intermittent every 8 hours  QUEtiapine 100 milliGRAM(s) Oral every 12 hours  thiamine Injectable 100 milliGRAM(s) IV Push daily    MEDICATIONS  (PRN):  HYDROmorphone  Injectable 0.5 milliGRAM(s) IV Push every 4 hours PRN Severe Pain (7 - 10) [facility] Progress Note  POD#3  HD#9    Interval Events  - return to OR on 6/12 w/ re-exploration, washout, and temporary closure, plan for next re-op 6/14  -6u plts tx 6/12 in day shift, additional 2u overnight, w/ additional 1u being hung at bedside this am.  Most recent plt count 16, q6h labs  -1u PRBC overnight  -louann blood output from hilliard    S: Pt intubated and sedated    Vital Signs Last 24 Hrs  T(C): 36.6 (13 Jun 2022 04:00), Max: 37.1 (12 Jun 2022 06:33)  T(F): 97.8 (13 Jun 2022 04:00), Max: 98.8 (12 Jun 2022 06:33)  HR: 71 (13 Jun 2022 05:00) (62 - 123)  BP: 114/67 (12 Jun 2022 06:33) (114/67 - 114/67)  BP(mean): --  RR: 23 (13 Jun 2022 05:00) (14 - 23)  SpO2: 100% (13 Jun 2022 05:00) (90% - 100%)    I&O's Detail    11 Jun 2022 07:01  -  12 Jun 2022 07:00  --------------------------------------------------------  IN:    Dexmedetomidine: 434.5 mL    FentaNYL: 296.2 mL    IV PiggyBack: 750 mL    Jevity 1.2: 100 mL    Norepinephrine: 146.3 mL    Platelets - Single Donor: 1023 mL    Propofol: 24 mL    Propofol: 58.1 mL  Total IN: 2832.1 mL    OUT:    Indwelling Catheter - Urethral (mL): 290 mL    Nephrostomy Tube (mL): 695 mL    Nephrostomy Tube (mL): 1025 mL    Stool (mL): 0 mL    VAC (Vacuum Assisted Closure) System (mL): 800 mL  Total OUT: 2810 mL    Total NET: 22.1 mL    12 Jun 2022 07:01  -  13 Jun 2022 05:52  --------------------------------------------------------  IN:    Dexmedetomidine: 565.5 mL    FentaNYL: 281.6 mL    IV PiggyBack: 450 mL    Jevity 1.2: 425 mL    Norepinephrine: 5.6 mL    Platelets - Single Donor: 872 mL    PRBCs (Packed Red Blood Cells): 300 mL  Total IN: 2899.7 mL    OUT:    Indwelling Catheter - Urethral (mL): 155 mL    Nephrostomy Tube (mL): 525 mL    Nephrostomy Tube (mL): 550 mL    VAC (Vacuum Assisted Closure) System (mL): 600 mL  Total OUT: 1830 mL    Total NET: 1069.7 mL    Mode: AC/ CMV (Assist Control/ Continuous Mandatory Ventilation)  RR (machine): 18  TV (machine): 500  FiO2: 50  PEEP: 5  ITime: 0.94  MAP: 11  PIP: 25    Physical Exam:  Gen: NAD, sedated  CV: RRR   Pulm: Mechanical breath sounds 2/2 ventilator   Abd: Soft, non-distended, +BS, Abthera in place with surrounding incision edges well perfused, 700cc brown fluid in wound vac cannister; ostomy pink and well perfused with air in ostomy bag; NGT in place  : L nephrostomy tube draining cloudy yellow urine; R nephrostomy tube draining cloudy urine, hilliard in place draining dark red-la nena urine  Ext: Warm & well perfused      Labs:             7.2    9.69  )-----------( 27       ( 06-13 @ 01:26 )             21.4     06-13 @ 01:26    143  |  107  |  38  ----------------------------<  133  3.8   |  22  |  1.23    Ca    8.1      06-13 @ 01:26  Phos  3.8     06-13 @ 01:26  Mg     1.80     06-13 @ 01:26    TPro  5.7  /  Alb  2.5  /  TBili  3.0  /  DBili  2.5  /  AST  42  /  ALT  23  /  AlkPhos  71  06-13 @ 01:26    PT/INR - ( 06-12 @ 01:05 )   PT: 14.1 sec;   INR: 1.21 ratio    PTT - ( 06-12 @ 01:05 )  PTT:30.2 sec    Uric Acid: (06-12 @ 06:19)  --       Fibrinogen: (06-12 @ 06:19)  778      LDH: (06-12 @ 06:19)  --       CAPILLARY BLOOD GLUCOSE    POCT Blood Glucose.: 139 mg/dL (13 Jun 2022 05:44)  POCT Blood Glucose.: 134 mg/dL (13 Jun 2022 02:25)  POCT Blood Glucose.: 132 mg/dL (12 Jun 2022 17:29)  POCT Blood Glucose.: 133 mg/dL (12 Jun 2022 11:53)    MEDICATIONS  (STANDING):  chlorhexidine 0.12% Liquid 15 milliLiter(s) Oral Mucosa every 12 hours  chlorhexidine 4% Liquid 1 Application(s) Topical daily  dexMEDEtomidine Infusion 1 MICROgram(s)/kG/Hr (25.1 mL/Hr) IV Continuous <Continuous>  fentaNYL   Infusion 0.5 MICROgram(s)/kG/Hr (5.02 mL/Hr) IV Continuous <Continuous>  folic acid 1 milliGRAM(s) Oral daily  pantoprazole  Injectable 40 milliGRAM(s) IV Push two times a day  piperacillin/tazobactam IVPB.. 3.375 Gram(s) IV Intermittent every 8 hours  QUEtiapine 100 milliGRAM(s) Oral every 12 hours  thiamine Injectable 100 milliGRAM(s) IV Push daily    MEDICATIONS  (PRN):  HYDROmorphone  Injectable 0.5 milliGRAM(s) IV Push every 4 hours PRN Severe Pain (7 - 10)

## 2022-06-13 NOTE — CHART NOTE - NSCHARTNOTEFT_GEN_A_CORE
Patient assessed by RDN on 6/9, now for malnutrition follow up. Spoke with SICU Team and obtained subjective information from extensive chart review.     Current Diet : Diet, NPO:   Except Medications (06-13-22 @ 10:54)    Current Weight: 117.7 kg (6/13), 115.6 kg (6/11), 115.1 kg (6/10), 108.7 kg (6/9)  Height (cm): 167.6   Dosing Weight (kg): 100.4   BMI (kg/m2): 35.7   IBW (kg): 59.3 kg    Nutrition Interval Events: Pt s/p ex-lap with washout, L colon resection and colostomy creation 6/12 with tentative RTOR 6/14. Pt being maintained NPO for surgery. Pt remains intubated and sedated on precedex and fentanyl. Hypoactive bowel sounds noted presently. Pt had been on TF of Jevity 1.2. Discussed with Team and suggested change of TF to Vital HP once TF is restarted after OR procedure. Vital HP is lower in calories and higher in protein which will better align with pt's estimated nutrition needs. Recommend a goal rate of 55 mL/hr x 24 hrs which will offer pt 1320 kcals, 116 gms protein, 1103 mL free H2O in 1320 mL total volume/day. To better meet estimated protein need, suggest adding No Carb Prosource x 1/day which will provide a daily protein amount of 131 gms. Enteral recommendation will give pt 13 kcals/kg and 1.3 gms protein/kg of dosing weight. Weight trend has increased due to worsening of edema, now 3+ generalized. Pt with NGT of LCWS with 400 mL output 6/13. RDN services to remain available as needed.     __________________ Pertinent Medications__________________   MEDICATIONS  (STANDING):  caspofungin IVPB      chlorhexidine 0.12% Liquid 15 milliLiter(s) Oral Mucosa every 12 hours  chlorhexidine 4% Liquid 1 Application(s) Topical daily  dexMEDEtomidine Infusion 1 MICROgram(s)/kG/Hr (25.1 mL/Hr) IV Continuous <Continuous>  fentaNYL   Infusion 0.5 MICROgram(s)/kG/Hr (5.02 mL/Hr) IV Continuous <Continuous>  folic acid 1 milliGRAM(s) Oral daily  ondansetron Injectable 4 milliGRAM(s) IV Push once  pantoprazole  Injectable 40 milliGRAM(s) IV Push two times a day  piperacillin/tazobactam IVPB.. 3.375 Gram(s) IV Intermittent every 8 hours  QUEtiapine 100 milliGRAM(s) Oral every 12 hours  thiamine Injectable 100 milliGRAM(s) IV Push daily      __________________ Pertinent Labs__________________   06-13 Na143 mmol/L Glu 133 mg/dL<H> K+ 3.8 mmol/L Cr  1.23 mg/dL BUN 38 mg/dL<H> 06-13 Phos 3.8 mg/dL 06-13 Alb 2.5 g/dL<L>        Skin: Intact    Estimated Needs:     1104 - 1405 kcals/day (11-14 kcals/kg of dosing weight)  77 - 88 gms protein/day (1.3-1.5 gms protein/kg of IBW)      Nutrition Diagnosis: Severe malnutrition  [x] ongoing    Goal(s):  1. Patient to meet > 75% estimated energy needs    Recommendations:   1. Once TF is restarted, suggest Vital HP @ goal rate of 55 mL/hr x 24 hrs.    Monitoring and Evaluation:   1. Monitor weights, labs, BMs, skin integrity, enteral tolerance and edema.  2. RD services to remain available.

## 2022-06-13 NOTE — PROGRESS NOTE ADULT - SUBJECTIVE AND OBJECTIVE BOX
SICU Daily Progress Note  =====================================================  Interval/Overnight Events:     - S/p ex lap and wash out with abthera 06/12, plans to RTOR on 6/14  - IR plans for b/l nephrostomy tube exchange on 6/14  - Seroquel increased 100mg  - C/w thrombocytopenia s/p 6U platelets in AM- goal for greater than 50   - 1prBC for hgb of 6.8  - 3 platelets for thrombocytopenia <50k  -RTOR for active bleeding (6/13) hgb: 6.6    HPI: 52y female with recurrent cervical CA (per patient stage III) presenting as transfer from [place] a/w neutropenic fever. Patient reports she had last cycle of chemotherapy 5 days prior.  Patient had VNS care stop by yesterday and was noted to be tachycardic on vital signs and was sent to the ED.  She reports feeling weak + fatigued. + upper abdominal pain that worsens while having chills. She denies any vomiting, chest pain, SOB.     At [facility] she was given Meropenem and Zosyn and was noted to be tachycardic to 140s and febrile to 39.5 and was thus transferred for further management. She became acutely altered yesterday afternoon without improvement and AXR with increased free air concern for perforated viscous. She is now s/p washout, open resection of left colon, colostomy. SICU consulted for close hemodynamic monitoring.     Allergies:   No Known Allergies    MEDICATIONS:   --------------------------------------------------------------------------------------  Neurologic Medications  dexMEDEtomidine Infusion 1 MICROgram(s)/kG/Hr IV Continuous <Continuous>  fentaNYL   Infusion 0.5 MICROgram(s)/kG/Hr IV Continuous <Continuous>  HYDROmorphone  Injectable 0.5 milliGRAM(s) IV Push every 4 hours PRN Severe Pain (7 - 10)  QUEtiapine 100 milliGRAM(s) Oral every 12 hours    Respiratory Medications    Cardiovascular Medications  norepinephrine Infusion 0.05 MICROgram(s)/kG/Min IV Continuous <Continuous>    Gastrointestinal Medications  folic acid 1 milliGRAM(s) Oral daily  pantoprazole  Injectable 40 milliGRAM(s) IV Push two times a day  thiamine Injectable 100 milliGRAM(s) IV Push daily    Genitourinary Medications    Hematologic/Oncologic Medications    Antimicrobial/Immunologic Medications  piperacillin/tazobactam IVPB.. 3.375 Gram(s) IV Intermittent every 8 hours    Endocrine/Metabolic Medications    Topical/Other Medications  chlorhexidine 0.12% Liquid 15 milliLiter(s) Oral Mucosa every 12 hours  chlorhexidine 4% Liquid 1 Application(s) Topical daily    --------------------------------------------------------------------------------------  VITAL SIGNS, INS/OUTS (last 24 hours):  --------------------------------------------------------------------------------------  T(C): 37.1 (06-12-22 @ 20:00), Max: 37.7 (06-12-22 @ 04:00)  HR: 69 (06-12-22 @ 23:18) (62 - 123)  BP: 114/67 (06-12-22 @ 06:33) (114/67 - 114/67)  RR: 18 (06-12-22 @ 23:00) (14 - 21)  SpO2: 100% (06-12-22 @ 23:18) (90% - 100%)    06-11-22 @ 07:01  -  06-12-22 @ 07:00  --------------------------------------------------------  IN: 2832.1 mL / OUT: 2810 mL / NET: 22.1 mL    06-12-22 @ 07:01  -  06-13-22 @ 00:23  --------------------------------------------------------  IN: 1723.9 mL / OUT: 1480 mL / NET: 243.9 mL    --------------------------------------------------------------------------------------  EXAM  NEUROLOGY  Exam: Normal, NAD, alert, oriented x3, no focal deficits.      RESPIRATORY  Exam: Normal expansion/effort.  Mechanical Ventilation: Mode: AC/ CMV (Assist Control/ Continuous Mandatory Ventilation), RR (machine): 18, TV (machine): 500, FiO2: 50, PEEP: 5, ITime: 1, MAP: 11, PIP: 30    CARDIOVASCULAR  Exam: Regular rate and rhythm.       GI/NUTRITION  Exam: Abdomen slight distended, slightly more firm.  Abthera vac in place with sanguinous fluid. Ostomy bag with minimal output stoma is pink.     VASCULAR  Exam: Extremities warm, pink, well-perfused.           LABS  --------------------------------------------------------------------------------------                        6.8    9.49  )-----------( 37       ( 12 Jun 2022 21:32 )             21.3   06-12    144  |  109<H>  |  33<H>  ----------------------------<  123<H>  3.9   |  21<L>  |  1.23    Ca    7.8<L>      12 Jun 2022 12:00  Phos  3.5     06-12  Mg     1.70     06-12    TPro  4.5<L>  /  Alb  2.1<L>  /  TBili  3.3<H>  /  DBili  3.2<H>  /  AST  33<H>  /  ALT  16  /  AlkPhos  54  06-11  ABG - ( 12 Jun 2022 11:55 )  pH, Arterial: 7.44  pH, Blood: x     /  pCO2: 35    /  pO2: 101   / HCO3: 24    / Base Excess: -0.2  /  SaO2: 99.0        PT/INR - ( 12 Jun 2022 01:05 )   PT: 14.1 sec;   INR: 1.21 ratio    PTT - ( 12 Jun 2022 01:05 )  PTT:30.2 sec  --------------------------------------------------------------------------------------

## 2022-06-13 NOTE — PROGRESS NOTE ADULT - ATTENDING COMMENTS
I agree with the detailed interval history, physical, and plan, which I have reviewed and edited where appropriate'; also agree with notes/assessment with my team on service.  I have personally examined the patient.  I was physically present for the key portions of the evaluation and management (E/M) service provided.  I reviewed all the pertinent data.  The patient is a critical care patient with life threatening hemodynamic and metabolic instability in SICU.  The SICU team has a constant risk benefit analyzes discussion and coordinating care with the primary team and all consultants.   The patient is in SICU with the chief complaint and diagnosis mentioned in the note.   The plan will be specified in the note.  52y with s/p B/L nephrostomy tube placement, p/w severe sepsis from neutropenic fever; s/p washout, open resection of left colon, with colostomy creation in SICU.  NEUROLOGY  Exam: no focal deficits.  RESPIRATORY  Exam: coarse BS  Mechanical Ventilation: Mode: AC/ CMV   CARDIOVASCULAR  Exam: Regular rate   GI/NUTRITION  Exam: Abdomen soft, Non-tender, Non-distended.   Abthera vac   VASCULAR  Exam: Extremities warm,    PLAN:  NEUROLOGY: delirium  - Sedation: Precedex and Fentanyl gtt  - Pain control: IV Dilaudid 0.5mg q4hr PRN   - Seroquel 100mg PO BID  RESPIRATORY:  respiratory failuire  - MV: Volume /18/5/50  - CPAP trials as tolerated; tolerated overnight 5/5  -CARDIOVASCULAR: hypotension  - Keep MAP >65  GI / NUTRITION:   NPO w/ TF Jevity 1.2 @ 25cc/hr  - GI ppx: IV Protonix   - Abthera VAC in place, SS output  RENAL / GENITOURINARY: hx of bilateral nephrostomy tube.  IR nephrostomy tube exchange   - Monitor electrolytes and replete PRN  HEMATOLOGIC:  - VTE ppx: holding chemical DVT ppx due to low platelet count  -ESTHER  -INFECTIOUS DISEASE: hx of neutropenic fevers,   - Abx: Zosyn  ENDOCRINOLOGY:  - Monitor fingersticks q6   DISPO:  - Full code  - SICU

## 2022-06-13 NOTE — PROGRESS NOTE ADULT - ASSESSMENT
52y  with recurrent cervical CA (per patient stage III) presenting to the ED as transfer from [place] a/w neutropenic fever.     Consulted for: Hypothyroidism r/o    #Hypothyroidism  TSH 2.89, TT3 41, TT4 3.17.   Repeat FT4 0.6 with TSH 1.39 (received  mg x 1 the day prior which can possibly lower TSH)  This may be sick euthyroid syndrome given low TT3 and low FT4, with normal TSH. Patient is clinically stable (no hypothermia, bradycardia, hyponatremia)  -Can repeat FT4 by equilibrium dialysis in the AM  -Repeat TSH, FT4, and TT3 on     Hazel Starr MD  Endocrine Fellow  Can be reached via teams. For follow up questions, discharge recommendations, or new consults, please call answering service at [phone] (weekdays); [phone] (nights/weekends)

## 2022-06-13 NOTE — CHART NOTE - NSCHARTNOTEFT_GEN_A_CORE
Gyn ONC Progress Note POD #1 HD#9    Interval Events:   - s/p return to OR with re-exploration of open abdomen, washout, and temporary abdominal closure   - Plt 46->OR->20->2u Plt ->34->1u Plt->37  - Hgb 7.6->OR->7.8->7.1->6.8->1u pRBC  - TF restarted   - FiO2 increased 40 ->50%   - Off pressor support since 6/12 @11am      Pt evaluated at bedside. Sedated and intubated.     Objective:  T(F): 97.8 (06-13-22 @ 00:00), Max: 99.9 (06-12-22 @ 04:00)  HR: 68 (06-13-22 @ 00:00) (62 - 123)  BP: 114/67 (06-12-22 @ 06:33) (114/67 - 114/67)  RR: 18 (06-13-22 @ 00:00) (14 - 18)  SpO2: 100% (06-13-22 @ 00:00) (90% - 100%)  Wt(kg): --  I&O's Summary    11 Jun 2022 07:01  -  12 Jun 2022 07:00  --------------------------------------------------------  IN: 2832.1 mL / OUT: 2810 mL / NET: 22.1 mL    12 Jun 2022 07:01  -  13 Jun 2022 00:35  --------------------------------------------------------  IN: 2094.1 mL / OUT: 1545 mL / NET: 549.1 mL      CAPILLARY BLOOD GLUCOSE      POCT Blood Glucose.: 132 mg/dL (12 Jun 2022 17:29)  POCT Blood Glucose.: 133 mg/dL (12 Jun 2022 11:53)      MEDICATIONS  (STANDING):  chlorhexidine 0.12% Liquid 15 milliLiter(s) Oral Mucosa every 12 hours  chlorhexidine 4% Liquid 1 Application(s) Topical daily  dexMEDEtomidine Infusion 1 MICROgram(s)/kG/Hr (25.1 mL/Hr) IV Continuous <Continuous>  fentaNYL   Infusion 0.5 MICROgram(s)/kG/Hr (5.02 mL/Hr) IV Continuous <Continuous>  folic acid 1 milliGRAM(s) Oral daily  pantoprazole  Injectable 40 milliGRAM(s) IV Push two times a day  piperacillin/tazobactam IVPB.. 3.375 Gram(s) IV Intermittent every 8 hours  QUEtiapine 100 milliGRAM(s) Oral every 12 hours  thiamine Injectable 100 milliGRAM(s) IV Push daily    MEDICATIONS  (PRN):  HYDROmorphone  Injectable 0.5 milliGRAM(s) IV Push every 4 hours PRN Severe Pain (7 - 10)      Physical Exam:  Gen: NAD, sedated  CV: RRR   Pulm: Mechanical breath sounds 2/2 ventilator   Abd: Soft, non-distended, +BS, Abthera in place with surrounding incision edges well perfused, serosanguinous fluid in wound vac cannister; ostomy salmon-pink and well perfused with no air in ostomy bag; NGT in place  : L nephrostomy tube draining light yellow urine; R nephrostomy tube draining blood-tinged urine, hilliard in place draining blood-tinged urine   Ext: Warm & well perfused  Ventilator: AC 18/500/5/50%      LABS:    06-12    144    |  109<H>  |  33<H>  ----------------------------<  123<H>  3.9     |  21<L>  |  1.23   06-12    141    |  105    |  36<H>  ----------------------------<  103<H>  3.2<L>   |  21<L>  |  1.31<H>    Ca    7.8<L>      12 Jun 2022 12:00  Ca    8.0<L>      12 Jun 2022 01:05  Phos  3.5       06-12  Phos  2.8       06-12  Mg     1.70      06-12  Mg     1.80      06-12          PT/INR - ( 12 Jun 2022 01:05 )   PT: 14.1 sec;   INR: 1.21 ratio         PTT - ( 12 Jun 2022 01:05 )  PTT:30.2 sec        A/P: 52y with recurrent cervical CA admitted for management of neutropenic fever c/b AMS and c/f bowel perforation 2/2 increased free air on abdominal X-ray s/p emergent exploratory laparotomy, abdominal washout, rectosigmoid colectomy, diverting colostomy, bronchoscopy and Abthera placement on 6/8/22 (EBL 2000cc, s/p 5UpRBC, 3L albumin, 3U Plts, 3U FFP, 3U Cryo) with intraoperative findings notable for perforation in distal sigmoid colon. Patient POD#1 s/p RTOR for second abdominal washout, reexploration of opened abdomen and temporary abdominal closure.    - Sedated; continue Precedex, Fentanyl, Seroquel; IV analgesia per SICU  - Currently off pressor support; Continue to monitor closely   - Intubated on AC   - NPO/NGT in place (6/8-), c/w tube feeds. Continue IV Protonix/Reglan. GI following  - b/l PCN, 25-30cc/hour clear urine bilaterally. Hilliard in place. ASHLEY likely 2/2 IV contrast, dehydration, Cr 1.29->1.31->1.23. Continue to monitor UOP. Replete electrolytes PRN  - IR to perform b/l PCN exchange tentatively 6/14   - VTE ppx: Venodynes  - Maintain Plt >50K ariel-operatively, plan for additional 1u Plt after completion of pRBC transfusion; f/u post-transfusion platelets   - Maintain Hgb >7; pRBCs administered: 5u pRBC intraop 6/8, 1u pRBC intraop 6/10; completing 1u pRBC currently, f/u post-transfusion hgb   - Afebrile. WBC 9.49, trend WBC; Continue Zosyn (6/8-) (s/p Cefepime), s/p Zarixo (6/5-6/10) for neutropenic fever     Dispo: Appreciate excellent SICU care; tentative RTOR 6/14     seen and evaluated with YANI Brito, PGY-4  SADIE Oconnor, PGY-2

## 2022-06-13 NOTE — PROGRESS NOTE ADULT - SUBJECTIVE AND OBJECTIVE BOX
TEAM SURGERY PROGRESS NOTE    SUBJECTIVE: Patient seen and examined at bedside on AM rounds, sedated and intubated.    Objective: Received 1u prbc, 3u plts.    Vital Signs Last 24 Hrs  T(C): 36 (13 Jun 2022 08:00), Max: 37.1 (12 Jun 2022 20:00)  T(F): 96.8 (13 Jun 2022 08:00), Max: 98.8 (12 Jun 2022 20:00)  HR: 90 (13 Jun 2022 10:00) (62 - 90)  BP: --  BP(mean): --  RR: 23 (13 Jun 2022 10:00) (17 - 23)  SpO2: 100% (13 Jun 2022 10:00) (97% - 100%)  I&O's Detail    12 Jun 2022 07:01  -  13 Jun 2022 07:00  --------------------------------------------------------  IN:    Dexmedetomidine: 615.7 mL    FentaNYL: 301.6 mL    IV PiggyBack: 450 mL    Jevity 1.2: 475 mL    Norepinephrine: 5.6 mL    Platelets - Single Donor: 872 mL    PRBCs (Packed Red Blood Cells): 300 mL  Total IN: 3019.9 mL    OUT:    Indwelling Catheter - Urethral (mL): 180 mL    Nasogastric/Oral tube (mL): 400 mL    Nephrostomy Tube (mL): 560 mL    Nephrostomy Tube (mL): 590 mL    VAC (Vacuum Assisted Closure) System (mL): 1300 mL  Total OUT: 3030 mL    Total NET: -10.1 mL      13 Jun 2022 07:01  -  13 Jun 2022 10:53  --------------------------------------------------------  IN:    Dexmedetomidine: 72.8 mL    FentaNYL: 15 mL    IV PiggyBack: 50 mL  Total IN: 137.8 mL    OUT:    Indwelling Catheter - Urethral (mL): 35 mL    Nephrostomy Tube (mL): 40 mL    Nephrostomy Tube (mL): 45 mL  Total OUT: 120 mL    Total NET: 17.8 mL        MEDICATIONS  (STANDING):  chlorhexidine 0.12% Liquid 15 milliLiter(s) Oral Mucosa every 12 hours  chlorhexidine 4% Liquid 1 Application(s) Topical daily  dexMEDEtomidine Infusion 1 MICROgram(s)/kG/Hr (25.1 mL/Hr) IV Continuous <Continuous>  fentaNYL   Infusion 0.5 MICROgram(s)/kG/Hr (5.02 mL/Hr) IV Continuous <Continuous>  folic acid 1 milliGRAM(s) Oral daily  ondansetron Injectable 4 milliGRAM(s) IV Push once  pantoprazole  Injectable 40 milliGRAM(s) IV Push two times a day  piperacillin/tazobactam IVPB.. 3.375 Gram(s) IV Intermittent every 8 hours  QUEtiapine 100 milliGRAM(s) Oral every 12 hours  thiamine Injectable 100 milliGRAM(s) IV Push daily    MEDICATIONS  (PRN):  HYDROmorphone  Injectable 0.5 milliGRAM(s) IV Push every 4 hours PRN Severe Pain (7 - 10)      Physical Exam  General: A&Ox3, NAD  Respiratory: Clear bilaterally, equal bilateral expansion  Cardiovascular: Regular rate & rhythm  Abdominal: Soft. NTND. Abthera vac in place w/ sanguinous fluid. Nephrostomy tube x2.       LABS:                        7.2    10.10 )-----------( 16       ( 13 Jun 2022 05:45 )             21.6     06-13    143  |  107  |  38<H>  ----------------------------<  133<H>  3.8   |  22  |  1.23    Ca    8.1<L>      13 Jun 2022 01:26  Phos  3.8     06-13  Mg     1.80     06-13    TPro  5.7<L>  /  Alb  2.5<L>  /  TBili  3.0<H>  /  DBili  2.5<H>  /  AST  42<H>  /  ALT  23  /  AlkPhos  71  06-13    PT/INR - ( 12 Jun 2022 01:05 )   PT: 14.1 sec;   INR: 1.21 ratio         PTT - ( 12 Jun 2022 01:05 )  PTT:30.2 sec    ABO Interpretation: A (06-13-22 @ 02:36)      Patient is a 52y Female

## 2022-06-13 NOTE — PROGRESS NOTE ADULT - SUBJECTIVE AND OBJECTIVE BOX
Admitted for: Other specified disorder of peritoneum     Following for: Hypothyroidism concern    Subjective: Patient is intubated/sedated      MEDICATIONS  (STANDING):  caspofungin IVPB      chlorhexidine 0.12% Liquid 15 milliLiter(s) Oral Mucosa every 12 hours  chlorhexidine 4% Liquid 1 Application(s) Topical daily  dexMEDEtomidine Infusion 1 MICROgram(s)/kG/Hr (25.1 mL/Hr) IV Continuous <Continuous>  fentaNYL   Infusion 0.5 MICROgram(s)/kG/Hr (5.02 mL/Hr) IV Continuous <Continuous>  folic acid 1 milliGRAM(s) Oral daily  ondansetron Injectable 4 milliGRAM(s) IV Push once  pantoprazole  Injectable 40 milliGRAM(s) IV Push two times a day  piperacillin/tazobactam IVPB.. 3.375 Gram(s) IV Intermittent every 8 hours  QUEtiapine 100 milliGRAM(s) Oral every 12 hours  thiamine Injectable 100 milliGRAM(s) IV Push daily    MEDICATIONS  (PRN):  HYDROmorphone  Injectable 0.5 milliGRAM(s) IV Push every 4 hours PRN Severe Pain (7 - 10)      Allergies    No Known Allergies    Intolerances          PHYSICAL EXAM:  VITALS: T(C): 36.6 (06-13-22 @ 16:00)  T(F): 97.8 (06-13-22 @ 16:00), Max: 98.8 (06-12-22 @ 20:00)  HR: 65 (06-13-22 @ 17:00) (62 - 90)  BP: --  RR:  (17 - 23)  SpO2:  (97% - 100%)  Wt(kg): --  GENERAL: NAD  EYES: No proptosis, no lid lag, anicteric  RESPIRATORY: Clear to auscultation bilaterally  CARDIOVASCULAR: Regular rate and rhythm  GI: Soft, nontender, non distended  EXT: b/l feet without wounds, 2+ pulses        A1C with Estimated Average Glucose Result: 5.5 % (06-08-22 @ 06:00)  A1C with Estimated Average Glucose Result: 5.6 % (12-22-21 @ 14:09)      POCT Blood Glucose.: 119 mg/dL (06-13-22 @ 15:29)  POCT Blood Glucose.: 139 mg/dL (06-13-22 @ 05:44)  POCT Blood Glucose.: 134 mg/dL (06-13-22 @ 02:25)  POCT Blood Glucose.: 132 mg/dL (06-12-22 @ 17:29)  POCT Blood Glucose.: 133 mg/dL (06-12-22 @ 11:53)  POCT Blood Glucose.: 107 mg/dL (06-11-22 @ 06:33)  POCT Blood Glucose.: 105 mg/dL (06-11-22 @ 00:32)  POCT Blood Glucose.: 119 mg/dL (06-10-22 @ 19:08)      06-13    143  |  107  |  38<H>  ----------------------------<  133<H>  3.8   |  22  |  1.23    eGFR: 52<L>    Ca    8.1<L>      06-13  Mg     1.80     06-13  Phos  3.8     06-13    TPro  5.7<L>  /  Alb  2.5<L>  /  TBili  3.0<H>  /  DBili  2.5<H>  /  AST  42<H>  /  ALT  23  /  AlkPhos  71  06-13      Thyroid Function Tests:  06-13 @ 01:26 TSH 1.36 FreeT4 0.6 T3 34 Anti TPO -- Anti Thyroglobulin Ab -- TSI --  06-08 @ 06:00 TSH -- FreeT4 0.8 T3 41 Anti TPO -- Anti Thyroglobulin Ab -- TSI --                         Admitted for: Other specified disorder of peritoneum     Following for: Hypothyroidism concern    Subjective: Patient is intubated/sedated  Unable to obtain ROS      MEDICATIONS  (STANDING):  caspofungin IVPB      chlorhexidine 0.12% Liquid 15 milliLiter(s) Oral Mucosa every 12 hours  chlorhexidine 4% Liquid 1 Application(s) Topical daily  dexMEDEtomidine Infusion 1 MICROgram(s)/kG/Hr (25.1 mL/Hr) IV Continuous <Continuous>  fentaNYL   Infusion 0.5 MICROgram(s)/kG/Hr (5.02 mL/Hr) IV Continuous <Continuous>  folic acid 1 milliGRAM(s) Oral daily  ondansetron Injectable 4 milliGRAM(s) IV Push once  pantoprazole  Injectable 40 milliGRAM(s) IV Push two times a day  piperacillin/tazobactam IVPB.. 3.375 Gram(s) IV Intermittent every 8 hours  QUEtiapine 100 milliGRAM(s) Oral every 12 hours  thiamine Injectable 100 milliGRAM(s) IV Push daily    MEDICATIONS  (PRN):  HYDROmorphone  Injectable 0.5 milliGRAM(s) IV Push every 4 hours PRN Severe Pain (7 - 10)      Allergies    No Known Allergies    Intolerances          PHYSICAL EXAM:  VITALS: T(C): 36.6 (06-13-22 @ 16:00)  T(F): 97.8 (06-13-22 @ 16:00), Max: 98.8 (06-12-22 @ 20:00)  HR: 65 (06-13-22 @ 17:00) (62 - 90)  BP: --  RR:  (17 - 23)  SpO2:  (97% - 100%)  Wt(kg): --  GENERAL: NAD  EYES: No proptosis, no lid lag, anicteric  RESPIRATORY: Clear to auscultation bilaterally  CARDIOVASCULAR: Regular rate and rhythm  GI: Soft, nontender, non distended  EXT: b/l feet without wounds, 2+ pulses        A1C with Estimated Average Glucose Result: 5.5 % (06-08-22 @ 06:00)  A1C with Estimated Average Glucose Result: 5.6 % (12-22-21 @ 14:09)      POCT Blood Glucose.: 119 mg/dL (06-13-22 @ 15:29)  POCT Blood Glucose.: 139 mg/dL (06-13-22 @ 05:44)  POCT Blood Glucose.: 134 mg/dL (06-13-22 @ 02:25)  POCT Blood Glucose.: 132 mg/dL (06-12-22 @ 17:29)  POCT Blood Glucose.: 133 mg/dL (06-12-22 @ 11:53)  POCT Blood Glucose.: 107 mg/dL (06-11-22 @ 06:33)  POCT Blood Glucose.: 105 mg/dL (06-11-22 @ 00:32)  POCT Blood Glucose.: 119 mg/dL (06-10-22 @ 19:08)      06-13    143  |  107  |  38<H>  ----------------------------<  133<H>  3.8   |  22  |  1.23    eGFR: 52<L>    Ca    8.1<L>      06-13  Mg     1.80     06-13  Phos  3.8     06-13    TPro  5.7<L>  /  Alb  2.5<L>  /  TBili  3.0<H>  /  DBili  2.5<H>  /  AST  42<H>  /  ALT  23  /  AlkPhos  71  06-13      Thyroid Function Tests:  06-13 @ 01:26 TSH 1.36 FreeT4 0.6 T3 34 Anti TPO -- Anti Thyroglobulin Ab -- TSI --  06-08 @ 06:00 TSH -- FreeT4 0.8 T3 41 Anti TPO -- Anti Thyroglobulin Ab -- TSI --

## 2022-06-13 NOTE — PROGRESS NOTE ADULT - ATTENDING COMMENTS
I agree with the detailed interval history, physical, and plan, which I have reviewed and edited where appropriate'; also agree with notes/assessment with my team on service.  I have personally examined the patient.  I was physically present for the key portions of the evaluation and management (E/M) service provided.  I reviewed all the pertinent data.  The patient is a critical care patient with life threatening hemodynamic and metabolic instability in SICU.  The SICU team has a constant risk benefit analyzes discussion and coordinating care with the primary team and all consultants.   The patient is in SICU with the chief complaint and diagnosis mentioned in the note.   The plan will be specified in the note.  52y with a PMHx of HTN, cervical CA; s/p B/L nephrostomy tubes with severe sepsis from neutropenic fever and s/p washout, open resection of left colon, with colostomy creation in SICU  for close hemodynamic monitoring.   EXAM  NEUROLOGY  Exam:  no focal deficits.  RESPIRATORY  Exam:  coarse Bs  Mechanical Ventilation: Mode: AC/ CMV   CARDIOVASCULAR  Exam: Regular rate   GI/NUTRITION  Exam: Abdomen slight distended, slightly more firm.    Abthera vac   VASCULAR  Exam: Extremities warm,    PLAN:  NEUROLOGY: sedation  - Precedex and Fentanyl gtt  - IV Dilaudid 0.5mg q4hr PRN   - Seroquel 100mg PO BID  RESPIRATORY: respiratory failure  - MV: Volume /18/5/50  - CPAP/PSV trials   CARDIOVASCULAR:   Keep MAP >65  GI / NUTRITION:  -tube feeds held  -diet: NPO for OR   - GI ppx: IV Protonix 40mg daily  RENAL / GENITOURINARY: bilateral nephrostomy tubes  - Indwelling hilliard catheter, bilateral nephrostomy tubes  HEMATOLOGIC:  - ESTHER  INFECTIOUS DISEASE: hx of neutropenic fevers,   - Abx: Zosyn  - Add caspofungin  ENDOCRINOLOGY:  - Monitor fingersticks q6 hours

## 2022-06-13 NOTE — CHART NOTE - NSCHARTNOTEFT_GEN_A_CORE
GYN ONC Fellow    Pt seen and evaluated. Intubated and sedated. Off pressors. Bloody output from abthera, 450 mL today. Hgb rise from 6.6 to 7.5 after 1 unit PRBCs today. Platelets 15 from 16 this AM after 1 pack platelets. Another pack to run now.     - Check Coags  - Trend CBC  - Plan for OR today for exploration and washout    GRIS Mckeon, PGY6 GYN ONC Fellow    Pt seen and evaluated. Intubated and sedated. Off pressors. Bloody output from abthera, 450 mL today. Hgb rise from 6.6 to 7.5 after 1 unit PRBCs today. Platelets 15 from 16 this AM after 1 pack platelets. Another pack to run now.     - Check Coags  - Trend CBC  - Plan for possible OR today for exploration and washout    GRIS Mckeon, PGY6

## 2022-06-13 NOTE — PROGRESS NOTE ADULT - SUBJECTIVE AND OBJECTIVE BOX
POST ANESTHESIA EVALUATION    52y Female POSTOP DAY 1 S/P abdominal washout    Vital Signs Last 24 Hrs  T(C): 36.7 (13 Jun 2022 12:00), Max: 37.1 (12 Jun 2022 20:00)  T(F): 98.1 (13 Jun 2022 12:00), Max: 98.8 (12 Jun 2022 20:00)  HR: 71 (13 Jun 2022 12:00) (62 - 90)  BP: --  BP(mean): --  RR: 18 (13 Jun 2022 12:00) (17 - 23)  SpO2: 100% (13 Jun 2022 12:00) (97% - 100%)  I&O's Summary    12 Jun 2022 07:01  -  13 Jun 2022 07:00  --------------------------------------------------------  IN: 3019.9 mL / OUT: 3030 mL / NET: -10.1 mL    13 Jun 2022 07:01  -  13 Jun 2022 12:05  --------------------------------------------------------  IN: 190.4 mL / OUT: 960 mL / NET: -769.6 mL        AIRWAY PATENCY:   intubated    MENTAL STATUS: sedated    HYDRATION STATUS : adequaTE    NAUSEA/ VOMITTING:  [ X] NONE  [ ] CONTROLLED [ ] OTHER     PAIN: [X ] CONTROLLED WITH CURRENT REGIMEN  [ ] OTHER    X ] NO APPARENT ANESTHESIA COMPLICATIONS      Comments:

## 2022-06-13 NOTE — CHART NOTE - NSCHARTNOTEFT_GEN_A_CORE
pt seen 6/13/2022 in the evening.  Pt hgb had responded to PRBC transfusion.  Remained off pressors.  VAC output more sero-sang this evening    D/w ICU, Gyn Onc, and Surgical team -- will cont to observe overnight.  Plan to goto OR emergently if HD instability.  Plan OR 6/14/2022  Heme Onc consult - any options to improve thrombocytopenia -- likely due to sepsis and ravin due to recent chemo

## 2022-06-13 NOTE — PROGRESS NOTE ADULT - ATTENDING COMMENTS
Patient seen and evaluated in SICU.   Plan of care discussed with SICU team.   Concern for intraabdominal bleeding as hgb dropping despite transfusion.   Remains severely thrombocytopenic.   To return to OR today for exploration and washout.

## 2022-06-13 NOTE — PROGRESS NOTE ADULT - ASSESSMENT
52y  with recurrent cervical CA admitted for management of neutropenic fever, now s/p emergent exploratory laparotomy, abdominal washout, rectosigmoid colectomy, diverting colostomy, bronchoscopy and Abthera placement on 22 (ebl 2000cc, s/p 5UpRBC, 3L albumin, 3U Plts, 3U FFP, 3U Cryo) for findings of increased free air on abdominal xray concerning for bowel perforation. Patient POD#1 s/p second RTOR for abdominal washout and replacement of Abthera. Overall stable but in critical condition.     Neuro: Continue IV analgesics regimen per SICU. Sedated; continue Precedex, Levophed, Fentanyl, Seroquel, per SICU.  CV: Continue titration of pressors per SICU.  Pulm: Intubated on AC.   GI: NPO. GI following. Continue IV Protonix/Reglan. NG tube in place (- ). TF held in anticipation of RTOR today  : b/l nephrostomy tubes in place. Infante in place. Patient has ASHLEY likely 2/2 IV Contrast & Dehydration, Cr 1.29->1.31. Continue to monitor UOP. Replete electrolytes PRN  Heme: Continue Venodynes for DVT ppx.    - s/p 2u Plts for platelets of 14 -> s/p additional 1u plt intraop 6/10 -> 1u Plts in PM of 6/10->10->2u PLTS's ->plts uptrended to 59. Downtrended again to 13 overnight->2u PLT-> 19.   - Will transfuse additional 2u PLT, Solu-cortef administered  - s/p 5u pRBC intraop on  and 1u pRBC intraop on 6/10  ID: Afebrile. Continue Zosyn (s/p Cefepime). Continue to trend WBC. s/p Zarixo (-6/10) for neutropenic fever. WBC 9.69.    52y  with recurrent cervical CA admitted for management of neutropenic fever, now s/p emergent exploratory laparotomy, abdominal washout, rectosigmoid colectomy, diverting colostomy, bronchoscopy and Abthera placement on 22 (ebl 2000cc, s/p 5UpRBC, 3L albumin, 3U Plts, 3U FFP, 3U Cryo) for findings of increased free air on abdominal xray concerning for bowel perforation. Patient POD#1 s/p second RTOR for abdominal washout and replacement of Abthera. Overall stable but in critical condition.     Neuro: Continue IV analgesics regimen per SICU. Sedated; continue Precedex, Levophed, Fentanyl, Seroquel, per SICU.  CV: Continue titration of pressors per SICU.  Pulm: Intubated  GI: NPO. GI following. Continue IV Protonix/Reglan. NG tube in place (- )  : b/l nephrostomy tubes in place. Infante in place w/ louann bloody output. Patient has ASHLEY likely 2/2 IV Contrast & Dehydration, Cr 1.23 Continue to monitor UOP. Replete electrolytes PRN, currently receiving magnesium sulfate  Heme: Continue Venodynes for DVT ppx.    - s/p 2u Plts for platelets of 14 -> s/p additional 1u plt intraop 6/10 -> 1u Plts in PM of 6/10->10->2u PLTS's ->plts uptrended to 59. Downtrended again to 13 overnight->2u PLT-> 19.  6u plt transfusion->37->27->2u plt overnight ->16->1u plt  - s/p 5u pRBC intraop on  and 1u pRBC intraop on 6/10, additional 1u PRBC  post-op following RTOR  ID: Afebrile. Continue Zosyn (s/p Cefepime). Continue to trend WBC. s/p Zarixo (-6/10) for neutropenic fever. WBC 9.69.     MGreenman PGY3

## 2022-06-13 NOTE — PROGRESS NOTE ADULT - SUBJECTIVE AND OBJECTIVE BOX
SICU Daily Progress Note  =====================================================  Interval/Overnight Events:     - S/p ex lap and wash out with abthera 06/12, plans to RTOR on 6/14  - Seroquel increased 100mg  - C/w thrombocytopenia s/p 6U platelets in AM- goal for greater than 50   - 1prBC for hgb of 6.8  - 2 platelets for thrombocytopenia <50k    HPI: 52y female with recurrent cervical CA (per patient stage III) presenting as transfer from [place] a/w neutropenic fever. Patient reports she had last cycle of chemotherapy 5 days prior.  Patient had VNS care stop by yesterday and was noted to be tachycardic on vital signs and was sent to the ED.  She reports feeling weak + fatigued. + upper abdominal pain that worsens while having chills. She denies any vomiting, chest pain, SOB.     At [facility] she was given Meropenem and Zosyn and was noted to be tachycardic to 140s and febrile to 39.5 and was thus transferred for further management. She became acutely altered yesterday afternoon without improvement and AXR with increased free air concern for perforated viscous. She is now s/p washout, open resection of left colon, colostomy. SICU consulted for close hemodynamic monitoring.     Allergies:   No Known Allergies    MEDICATIONS:   --------------------------------------------------------------------------------------  Neurologic Medications  dexMEDEtomidine Infusion 1 MICROgram(s)/kG/Hr IV Continuous <Continuous>  fentaNYL   Infusion 0.5 MICROgram(s)/kG/Hr IV Continuous <Continuous>  HYDROmorphone  Injectable 0.5 milliGRAM(s) IV Push every 4 hours PRN Severe Pain (7 - 10)  QUEtiapine 100 milliGRAM(s) Oral every 12 hours    Respiratory Medications    Cardiovascular Medications  norepinephrine Infusion 0.05 MICROgram(s)/kG/Min IV Continuous <Continuous>    Gastrointestinal Medications  folic acid 1 milliGRAM(s) Oral daily  pantoprazole  Injectable 40 milliGRAM(s) IV Push two times a day  thiamine Injectable 100 milliGRAM(s) IV Push daily    Genitourinary Medications    Hematologic/Oncologic Medications    Antimicrobial/Immunologic Medications  piperacillin/tazobactam IVPB.. 3.375 Gram(s) IV Intermittent every 8 hours    Endocrine/Metabolic Medications    Topical/Other Medications  chlorhexidine 0.12% Liquid 15 milliLiter(s) Oral Mucosa every 12 hours  chlorhexidine 4% Liquid 1 Application(s) Topical daily    --------------------------------------------------------------------------------------    VITAL SIGNS, INS/OUTS (last 24 hours):  --------------------------------------------------------------------------------------  T(C): 37.1 (06-12-22 @ 20:00), Max: 37.7 (06-12-22 @ 04:00)  HR: 69 (06-12-22 @ 23:18) (62 - 123)  BP: 114/67 (06-12-22 @ 06:33) (114/67 - 114/67)  RR: 18 (06-12-22 @ 23:00) (14 - 21)  SpO2: 100% (06-12-22 @ 23:18) (90% - 100%)    06-11-22 @ 07:01  -  06-12-22 @ 07:00  --------------------------------------------------------  IN: 2832.1 mL / OUT: 2810 mL / NET: 22.1 mL    06-12-22 @ 07:01  -  06-13-22 @ 00:23  --------------------------------------------------------  IN: 1723.9 mL / OUT: 1480 mL / NET: 243.9 mL      --------------------------------------------------------------------------------------  EXAM  NEUROLOGY  Exam: Normal, NAD, alert, oriented x3, no focal deficits.    HEENT  Exam: Normocephalic, atraumatic, EOMI.     RESPIRATORY  Exam: Normal expansion/effort.  Mechanical Ventilation: Mode: AC/ CMV (Assist Control/ Continuous Mandatory Ventilation), RR (machine): 18, TV (machine): 500, FiO2: 50, PEEP: 5, ITime: 1, MAP: 11, PIP: 30    CARDIOVASCULAR  Exam: Regular rate and rhythm.       GI/NUTRITION  Exam: Abdomen soft, Non-tender, Non-distended. Abthera vac in place with serosanguinous fluid.    VASCULAR  Exam: Extremities warm, pink, well-perfused.     MUSCULOSKELETAL  Exam: All extremities moving spontaneously without limitations.     SKIN  Exam: Good skin turgor, no skin breakdown.     LABS  --------------------------------------------------------------------------------------                        6.8    9.49  )-----------( 37       ( 12 Jun 2022 21:32 )             21.3   06-12    144  |  109<H>  |  33<H>  ----------------------------<  123<H>  3.9   |  21<L>  |  1.23    Ca    7.8<L>      12 Jun 2022 12:00  Phos  3.5     06-12  Mg     1.70     06-12    TPro  4.5<L>  /  Alb  2.1<L>  /  TBili  3.3<H>  /  DBili  3.2<H>  /  AST  33<H>  /  ALT  16  /  AlkPhos  54  06-11  ABG - ( 12 Jun 2022 11:55 )  pH, Arterial: 7.44  pH, Blood: x     /  pCO2: 35    /  pO2: 101   / HCO3: 24    / Base Excess: -0.2  /  SaO2: 99.0        PT/INR - ( 12 Jun 2022 01:05 )   PT: 14.1 sec;   INR: 1.21 ratio    PTT - ( 12 Jun 2022 01:05 )  PTT:30.2 sec  --------------------------------------------------------------------------------------     SICU Daily Progress Note  =====================================================  Interval/Overnight Events:     - S/p ex lap and wash out with abthera 06/12, plans to RTOR on 6/14  - IR plans for b/l nephrostomy tube exchange on 6/14  - Seroquel increased 100mg  - C/w thrombocytopenia s/p 6U platelets in AM- goal for greater than 50   - 1prBC for hgb of 6.8  - 2 platelets for thrombocytopenia <50k    HPI: 52y female with recurrent cervical CA (per patient stage III) presenting as transfer from [place] a/w neutropenic fever. Patient reports she had last cycle of chemotherapy 5 days prior.  Patient had VNS care stop by yesterday and was noted to be tachycardic on vital signs and was sent to the ED.  She reports feeling weak + fatigued. + upper abdominal pain that worsens while having chills. She denies any vomiting, chest pain, SOB.     At [facility] she was given Meropenem and Zosyn and was noted to be tachycardic to 140s and febrile to 39.5 and was thus transferred for further management. She became acutely altered yesterday afternoon without improvement and AXR with increased free air concern for perforated viscous. She is now s/p washout, open resection of left colon, colostomy. SICU consulted for close hemodynamic monitoring.     Allergies:   No Known Allergies    MEDICATIONS:   --------------------------------------------------------------------------------------  Neurologic Medications  dexMEDEtomidine Infusion 1 MICROgram(s)/kG/Hr IV Continuous <Continuous>  fentaNYL   Infusion 0.5 MICROgram(s)/kG/Hr IV Continuous <Continuous>  HYDROmorphone  Injectable 0.5 milliGRAM(s) IV Push every 4 hours PRN Severe Pain (7 - 10)  QUEtiapine 100 milliGRAM(s) Oral every 12 hours    Respiratory Medications    Cardiovascular Medications  norepinephrine Infusion 0.05 MICROgram(s)/kG/Min IV Continuous <Continuous>    Gastrointestinal Medications  folic acid 1 milliGRAM(s) Oral daily  pantoprazole  Injectable 40 milliGRAM(s) IV Push two times a day  thiamine Injectable 100 milliGRAM(s) IV Push daily    Genitourinary Medications    Hematologic/Oncologic Medications    Antimicrobial/Immunologic Medications  piperacillin/tazobactam IVPB.. 3.375 Gram(s) IV Intermittent every 8 hours    Endocrine/Metabolic Medications    Topical/Other Medications  chlorhexidine 0.12% Liquid 15 milliLiter(s) Oral Mucosa every 12 hours  chlorhexidine 4% Liquid 1 Application(s) Topical daily    --------------------------------------------------------------------------------------  VITAL SIGNS, INS/OUTS (last 24 hours):  --------------------------------------------------------------------------------------  T(C): 37.1 (06-12-22 @ 20:00), Max: 37.7 (06-12-22 @ 04:00)  HR: 69 (06-12-22 @ 23:18) (62 - 123)  BP: 114/67 (06-12-22 @ 06:33) (114/67 - 114/67)  RR: 18 (06-12-22 @ 23:00) (14 - 21)  SpO2: 100% (06-12-22 @ 23:18) (90% - 100%)    06-11-22 @ 07:01  -  06-12-22 @ 07:00  --------------------------------------------------------  IN: 2832.1 mL / OUT: 2810 mL / NET: 22.1 mL    06-12-22 @ 07:01  -  06-13-22 @ 00:23  --------------------------------------------------------  IN: 1723.9 mL / OUT: 1480 mL / NET: 243.9 mL    --------------------------------------------------------------------------------------  EXAM  NEUROLOGY  Exam: Normal, NAD, alert, oriented x3, no focal deficits.    HEENT  Exam: Normocephalic, atraumatic, EOMI.     RESPIRATORY  Exam: Normal expansion/effort.  Mechanical Ventilation: Mode: AC/ CMV (Assist Control/ Continuous Mandatory Ventilation), RR (machine): 18, TV (machine): 500, FiO2: 50, PEEP: 5, ITime: 1, MAP: 11, PIP: 30    CARDIOVASCULAR  Exam: Regular rate and rhythm.       GI/NUTRITION  Exam: Abdomen soft, Non-tender, Non-distended. Abthera vac in place with serosanguinous fluid.    VASCULAR  Exam: Extremities warm, pink, well-perfused.     MUSCULOSKELETAL  Exam: All extremities moving spontaneously without limitations.     SKIN  Exam: Good skin turgor, no skin breakdown.     LABS  --------------------------------------------------------------------------------------                        6.8    9.49  )-----------( 37       ( 12 Jun 2022 21:32 )             21.3   06-12    144  |  109<H>  |  33<H>  ----------------------------<  123<H>  3.9   |  21<L>  |  1.23    Ca    7.8<L>      12 Jun 2022 12:00  Phos  3.5     06-12  Mg     1.70     06-12    TPro  4.5<L>  /  Alb  2.1<L>  /  TBili  3.3<H>  /  DBili  3.2<H>  /  AST  33<H>  /  ALT  16  /  AlkPhos  54  06-11  ABG - ( 12 Jun 2022 11:55 )  pH, Arterial: 7.44  pH, Blood: x     /  pCO2: 35    /  pO2: 101   / HCO3: 24    / Base Excess: -0.2  /  SaO2: 99.0        PT/INR - ( 12 Jun 2022 01:05 )   PT: 14.1 sec;   INR: 1.21 ratio    PTT - ( 12 Jun 2022 01:05 )  PTT:30.2 sec  --------------------------------------------------------------------------------------     SICU Daily Progress Note  =====================================================  Interval/Overnight Events:     - S/p ex lap and wash out with abthera 06/12, plans to RTOR on 6/14  - IR plans for b/l nephrostomy tube exchange on 6/14  - Seroquel increased 100mg  - C/w thrombocytopenia s/p 6U platelets in AM- goal for greater than 50   - 1prBC for hgb of 6.8  - 3 platelets for thrombocytopenia <50k    HPI: 52y female with recurrent cervical CA (per patient stage III) presenting as transfer from [place] a/w neutropenic fever. Patient reports she had last cycle of chemotherapy 5 days prior.  Patient had VNS care stop by yesterday and was noted to be tachycardic on vital signs and was sent to the ED.  She reports feeling weak + fatigued. + upper abdominal pain that worsens while having chills. She denies any vomiting, chest pain, SOB.     At [facility] she was given Meropenem and Zosyn and was noted to be tachycardic to 140s and febrile to 39.5 and was thus transferred for further management. She became acutely altered yesterday afternoon without improvement and AXR with increased free air concern for perforated viscous. She is now s/p washout, open resection of left colon, colostomy. SICU consulted for close hemodynamic monitoring.     Allergies:   No Known Allergies    MEDICATIONS:   --------------------------------------------------------------------------------------  Neurologic Medications  dexMEDEtomidine Infusion 1 MICROgram(s)/kG/Hr IV Continuous <Continuous>  fentaNYL   Infusion 0.5 MICROgram(s)/kG/Hr IV Continuous <Continuous>  HYDROmorphone  Injectable 0.5 milliGRAM(s) IV Push every 4 hours PRN Severe Pain (7 - 10)  QUEtiapine 100 milliGRAM(s) Oral every 12 hours    Respiratory Medications    Cardiovascular Medications  norepinephrine Infusion 0.05 MICROgram(s)/kG/Min IV Continuous <Continuous>    Gastrointestinal Medications  folic acid 1 milliGRAM(s) Oral daily  pantoprazole  Injectable 40 milliGRAM(s) IV Push two times a day  thiamine Injectable 100 milliGRAM(s) IV Push daily    Genitourinary Medications    Hematologic/Oncologic Medications    Antimicrobial/Immunologic Medications  piperacillin/tazobactam IVPB.. 3.375 Gram(s) IV Intermittent every 8 hours    Endocrine/Metabolic Medications    Topical/Other Medications  chlorhexidine 0.12% Liquid 15 milliLiter(s) Oral Mucosa every 12 hours  chlorhexidine 4% Liquid 1 Application(s) Topical daily    --------------------------------------------------------------------------------------  VITAL SIGNS, INS/OUTS (last 24 hours):  --------------------------------------------------------------------------------------  T(C): 37.1 (06-12-22 @ 20:00), Max: 37.7 (06-12-22 @ 04:00)  HR: 69 (06-12-22 @ 23:18) (62 - 123)  BP: 114/67 (06-12-22 @ 06:33) (114/67 - 114/67)  RR: 18 (06-12-22 @ 23:00) (14 - 21)  SpO2: 100% (06-12-22 @ 23:18) (90% - 100%)    06-11-22 @ 07:01  -  06-12-22 @ 07:00  --------------------------------------------------------  IN: 2832.1 mL / OUT: 2810 mL / NET: 22.1 mL    06-12-22 @ 07:01  -  06-13-22 @ 00:23  --------------------------------------------------------  IN: 1723.9 mL / OUT: 1480 mL / NET: 243.9 mL    --------------------------------------------------------------------------------------  EXAM  NEUROLOGY  Exam: Normal, NAD, alert, oriented x3, no focal deficits.    HEENT  Exam: Normocephalic, atraumatic, EOMI.     RESPIRATORY  Exam: Normal expansion/effort.  Mechanical Ventilation: Mode: AC/ CMV (Assist Control/ Continuous Mandatory Ventilation), RR (machine): 18, TV (machine): 500, FiO2: 50, PEEP: 5, ITime: 1, MAP: 11, PIP: 30    CARDIOVASCULAR  Exam: Regular rate and rhythm.       GI/NUTRITION  Exam: Abdomen soft, Non-tender, Non-distended. Abthera vac in place with serosanguinous fluid.    VASCULAR  Exam: Extremities warm, pink, well-perfused.     MUSCULOSKELETAL  Exam: All extremities moving spontaneously without limitations.     SKIN  Exam: Good skin turgor, no skin breakdown.     LABS  --------------------------------------------------------------------------------------                        6.8    9.49  )-----------( 37       ( 12 Jun 2022 21:32 )             21.3   06-12    144  |  109<H>  |  33<H>  ----------------------------<  123<H>  3.9   |  21<L>  |  1.23    Ca    7.8<L>      12 Jun 2022 12:00  Phos  3.5     06-12  Mg     1.70     06-12    TPro  4.5<L>  /  Alb  2.1<L>  /  TBili  3.3<H>  /  DBili  3.2<H>  /  AST  33<H>  /  ALT  16  /  AlkPhos  54  06-11  ABG - ( 12 Jun 2022 11:55 )  pH, Arterial: 7.44  pH, Blood: x     /  pCO2: 35    /  pO2: 101   / HCO3: 24    / Base Excess: -0.2  /  SaO2: 99.0        PT/INR - ( 12 Jun 2022 01:05 )   PT: 14.1 sec;   INR: 1.21 ratio    PTT - ( 12 Jun 2022 01:05 )  PTT:30.2 sec  --------------------------------------------------------------------------------------

## 2022-06-13 NOTE — PROGRESS NOTE ADULT - ASSESSMENT
52y with a PMHx of HTN, cervical CA (stage III)  on chemo - last session last week - c/b b/l hydronephrosis s/p B/L nephrostomy tube placement, p/w severe sepsis from neutropenic fever. Pt was acutely altered yesterday afternoon without improvement and AXR showed increased free air with concern for perforated viscous. She is now s/p washout, open resection of left colon, with colostomy creation. Recovering in SICU  for close hemodynamic monitoring.     PLAN:  NEUROLOGY:  - Sedation: Precedex and Fentanyl gtt  - Pain control: IV Dilaudid 0.5mg q4hr PRN   - Seroquel 100mg PO BID  - Folic acid and thiamine    RESPIRATORY:  - MV: Volume AC   - CPAP trials as tolerated; tolerated overnight 5/5  - Monitor daily chest x-rays and ABG  - Maintain O2 saturation >92%    CARDIOVASCULAR:  - Off pressors  - Monitor hemodynamics  - Keep MAP >65    GI / NUTRITION:  - NPO w/ TF Jevity 1.2 @ 25cc/hr  - GI ppx: IV Protonix 40mg daily  - Abthera    RENAL / GENITOURINARY:  - Indwelling hilliard catheter  - hx of bilateral nephrostomy tubes  - CT scan for evaluation of bloody nephrostomy tubes performed, f/u final results   - Monitor electrolytes and replete PRN  - Continue to monitor strict ins and outs q1 hour    HEMATOLOGIC:  - Received 5 units PRBC, 3 units FFP, 3 units PLT and 1 unit cryo intra-operatively  -s/p 3 platelets overnight on 06/10  - Monitor CBC daily  - Transfuse PRN  - holding chemical DVT ppx due to low platelet count    INFECTIOUS DISEASE: hx of neutropenic fevers, s/p zarxio tx  - Abx: Zosyn  - monitor WBC on daily CBC w/diff    ENDOCRINOLOGY:  - Monitor fingersticks q6 hours while NPO    DISPO:  - Full code  - SICU   52y with a PMHx of HTN, cervical CA (stage III)  on chemo - last session last week - c/b b/l hydronephrosis s/p B/L nephrostomy tube placement, p/w severe sepsis from neutropenic fever. Pt was acutely altered yesterday afternoon without improvement and AXR showed increased free air with concern for perforated viscous. She is now s/p washout, open resection of left colon, with colostomy creation. Recovering in SICU  for close hemodynamic monitoring.     PLAN:  NEUROLOGY:  - Sedation: Precedex and Fentanyl gtt  - Pain control: IV Dilaudid 0.5mg q4hr PRN   - Seroquel 100mg PO BID  - Folic acid and thiamine    RESPIRATORY:  - MV: Volume /18/5/50  - CPAP trials as tolerated; tolerated overnight 5/5  - Monitor daily chest x-rays and ABG  - Maintain O2 saturation >92%    CARDIOVASCULAR:  - Off pressors  - Monitor hemodynamics  - Keep MAP >65    GI / NUTRITION:  - NPO w/ TF Jevity 1.2 @ 25cc/hr  - GI ppx: IV Protonix 40mg daily  - Abthera VAC in place, SS output    RENAL / GENITOURINARY: hx of bilateral nephrostomy tubes  - Indwelling hilliard catheter, bilateral nephrostomy tubes  - IR nephrostomy tube exchange on 6/14  - Monitor electrolytes and replete PRN  - Continue to monitor strict ins and outs q1 hour    HEMATOLOGIC:  - VTE ppx: holding chemical DVT ppx due to low platelet count  - Trend platelet count, transfuse as needed  - Monitor H/H and platelet count on CBC daily    INFECTIOUS DISEASE: hx of neutropenic fevers, s/p zarxio tx  - Abx: Zosyn  - Monitor fever curve and WBC on daily CBC w/diff    ENDOCRINOLOGY:  - Monitor fingersticks q6 hours while NPO    DISPO:  - Full code  - SICU   52y with a PMHx of HTN, cervical CA (stage III)  on chemo - last session last week - c/b b/l hydronephrosis s/p B/L nephrostomy tube placement, p/w severe sepsis from neutropenic fever. Pt was acutely altered yesterday afternoon without improvement and AXR showed increased free air with concern for perforated viscous. She is now s/p washout, open resection of left colon, with colostomy creation. Recovering in SICU  for close hemodynamic monitoring.     PLAN:  NEUROLOGY:  - Sedation: Precedex and Fentanyl gtt  - Pain control: IV Dilaudid 0.5mg q4hr PRN   - Seroquel 100mg PO BID  - Folic acid and thiamine    RESPIRATORY:  - MV: Volume /18/5/50  - CPAP trials as tolerated; tolerated overnight 5/5  - Monitor daily chest x-rays and ABG  - Maintain O2 saturation >92%    CARDIOVASCULAR:  - Off pressors  - Monitor hemodynamics  - Keep MAP >65    GI / NUTRITION:  - Advance tube  - NPO w/ TF Jevity 1.2 @ 25cc/hr  - GI ppx: IV Protonix 40mg daily  - Abthera VAC in place, SS output    RENAL / GENITOURINARY: hx of bilateral nephrostomy tubes  - Indwelling hilliard catheter, bilateral nephrostomy tubes  - IR nephrostomy tube exchange on 6/14  - Monitor electrolytes and replete PRN  - Continue to monitor strict ins and outs q1 hour    HEMATOLOGIC:  - VTE ppx: holding chemical DVT ppx due to low platelet count  - Trend platelet count, transfuse as needed  - Monitor H/H and platelet count on CBC daily    INFECTIOUS DISEASE: hx of neutropenic fevers, s/p zarxio tx  - Abx: Zosyn  - Add caspofungin  - Monitor fever curve and WBC on daily CBC w/diff    ENDOCRINOLOGY:  - Monitor fingersticks q6 hours while NPO    DISPO:  - Full code  - SICU   52y with a PMHx of HTN, cervical CA (stage III)  on chemo - last session last week - c/b b/l hydronephrosis s/p B/L nephrostomy tube placement, p/w severe sepsis from neutropenic fever. Pt was acutely altered yesterday afternoon without improvement and AXR showed increased free air with concern for perforated viscous. She is now s/p washout, open resection of left colon, with colostomy creation. Recovering in SICU  for close hemodynamic monitoring.     PLAN:  NEUROLOGY:  - Sedation: Precedex and Fentanyl gtt  - Pain control: IV Dilaudid 0.5mg q4hr PRN   - Seroquel 100mg PO BID  - Folic acid and thiamine    RESPIRATORY:  - MV: Volume /18/5/50  - CPAP trials as tolerated; tolerated overnight 5/5  - Monitor daily chest x-rays and ABG  - Maintain O2 saturation >92%    CARDIOVASCULAR:  - Off pressors  - Monitor hemodynamics  - Keep MAP >65    GI / NUTRITION:  - Advance tube  - NPO w/ TF Jevity 1.2 @ 25cc/hr  - GI ppx: IV Protonix 40mg daily  - Abthera VAC in place, SS output    RENAL / GENITOURINARY: hx of bilateral nephrostomy tubes  - Indwelling hilliard catheter, bilateral nephrostomy tubes  - IR nephrostomy tube exchange on 6/14  - Monitor electrolytes and replete PRN  - Continue to monitor strict ins and outs q1 hour    HEMATOLOGIC:  - VTE ppx: holding chemical DVT ppx due to low platelet count  - 1u pRBC  - ESTHER  - Trend platelet count, transfuse as needed  - Monitor H/H and platelet count on CBC daily    INFECTIOUS DISEASE: hx of neutropenic fevers, s/p zarxio tx  - Abx: Zosyn  - Add caspofungin  - Monitor fever curve and WBC on daily CBC w/diff    ENDOCRINOLOGY:  - Monitor fingersticks q6 hours while NPO    DISPO:  - Full code  - SICU

## 2022-06-14 ENCOUNTER — TRANSCRIPTION ENCOUNTER (OUTPATIENT)
Age: 52
End: 2022-06-14

## 2022-06-14 ENCOUNTER — APPOINTMENT (OUTPATIENT)
Dept: SURGICAL ONCOLOGY | Facility: HOSPITAL | Age: 52
End: 2022-06-14

## 2022-06-14 LAB
% ALBUMIN: 35 % — SIGNIFICANT CHANGE UP
% ALPHA 1: 14 % — SIGNIFICANT CHANGE UP
% ALPHA 2: 21.1 % — SIGNIFICANT CHANGE UP
% BETA: 14.4 % — SIGNIFICANT CHANGE UP
% GAMMA: 15.5 % — SIGNIFICANT CHANGE UP
ALBUMIN SERPL ELPH-MCNC: 2.2 G/DL — LOW (ref 3.3–4.4)
ALBUMIN SERPL ELPH-MCNC: 2.2 G/DL — LOW (ref 3.3–5)
ALBUMIN/GLOB SERPL ELPH: 0.5 RATIO — SIGNIFICANT CHANGE UP
ALP SERPL-CCNC: 118 U/L — SIGNIFICANT CHANGE UP (ref 40–120)
ALPHA1 GLOB SERPL ELPH-MCNC: 0.88 G/DL — HIGH (ref 0.1–0.3)
ALPHA2 GLOB SERPL ELPH-MCNC: 1.3 G/DL — HIGH (ref 0.6–1)
ALT FLD-CCNC: 29 U/L — SIGNIFICANT CHANGE UP (ref 4–33)
ANION GAP SERPL CALC-SCNC: 13 MMOL/L — SIGNIFICANT CHANGE UP (ref 7–14)
ANION GAP SERPL CALC-SCNC: 13 MMOL/L — SIGNIFICANT CHANGE UP (ref 7–14)
APTT BLD: 32.9 SEC — SIGNIFICANT CHANGE UP (ref 27–36.3)
AST SERPL-CCNC: 61 U/L — HIGH (ref 4–32)
B-GLOBULIN SERPL ELPH-MCNC: 0.91 G/DL — SIGNIFICANT CHANGE UP (ref 0.6–1.1)
BILIRUB SERPL-MCNC: 4.1 MG/DL — HIGH (ref 0.2–1.2)
BLOOD GAS ARTERIAL - LYTES,HGB,ICA,LACT RESULT: SIGNIFICANT CHANGE UP
BLOOD GAS ARTERIAL - LYTES,HGB,ICA,LACT RESULT: SIGNIFICANT CHANGE UP
BUN SERPL-MCNC: 30 MG/DL — HIGH (ref 7–23)
BUN SERPL-MCNC: 37 MG/DL — HIGH (ref 7–23)
CALCIUM SERPL-MCNC: 7.4 MG/DL — LOW (ref 8.4–10.5)
CALCIUM SERPL-MCNC: 7.7 MG/DL — LOW (ref 8.4–10.5)
CHLORIDE SERPL-SCNC: 110 MMOL/L — HIGH (ref 98–107)
CHLORIDE SERPL-SCNC: 112 MMOL/L — HIGH (ref 98–107)
CO2 SERPL-SCNC: 21 MMOL/L — LOW (ref 22–31)
CO2 SERPL-SCNC: 23 MMOL/L — SIGNIFICANT CHANGE UP (ref 22–31)
CREAT SERPL-MCNC: 1.12 MG/DL — SIGNIFICANT CHANGE UP (ref 0.5–1.3)
CREAT SERPL-MCNC: 1.19 MG/DL — SIGNIFICANT CHANGE UP (ref 0.5–1.3)
EGFR: 54 ML/MIN/1.73M2 — LOW
EGFR: 58 ML/MIN/1.73M2 — LOW
GAMMA GLOBULIN: 0.98 G/DL — SIGNIFICANT CHANGE UP (ref 0.7–1.7)
GLUCOSE BLDC GLUCOMTR-MCNC: 102 MG/DL — HIGH (ref 70–99)
GLUCOSE BLDC GLUCOMTR-MCNC: 107 MG/DL — HIGH (ref 70–99)
GLUCOSE BLDC GLUCOMTR-MCNC: 98 MG/DL — SIGNIFICANT CHANGE UP (ref 70–99)
GLUCOSE SERPL-MCNC: 102 MG/DL — HIGH (ref 70–99)
GLUCOSE SERPL-MCNC: 121 MG/DL — HIGH (ref 70–99)
HAPTOGLOB SERPL-MCNC: 234 MG/DL — HIGH (ref 34–200)
HCT VFR BLD CALC: 20.8 % — CRITICAL LOW (ref 34.5–45)
HCT VFR BLD CALC: 22.3 % — LOW (ref 34.5–45)
HCT VFR BLD CALC: 27.6 % — LOW (ref 34.5–45)
HEPARIN-PF4 AB RESULT: <0.6 U/ML — SIGNIFICANT CHANGE UP (ref 0–0.9)
HGB BLD-MCNC: 7.1 G/DL — LOW (ref 11.5–15.5)
HGB BLD-MCNC: 7.7 G/DL — LOW (ref 11.5–15.5)
HGB BLD-MCNC: 9.3 G/DL — LOW (ref 11.5–15.5)
INR BLD: 1.32 RATIO — HIGH (ref 0.88–1.16)
LDH SERPL L TO P-CCNC: 210 U/L — SIGNIFICANT CHANGE UP (ref 135–225)
MAGNESIUM SERPL-MCNC: 1.7 MG/DL — SIGNIFICANT CHANGE UP (ref 1.6–2.6)
MAGNESIUM SERPL-MCNC: 1.8 MG/DL — SIGNIFICANT CHANGE UP (ref 1.6–2.6)
MCHC RBC-ENTMCNC: 29.3 PG — SIGNIFICANT CHANGE UP (ref 27–34)
MCHC RBC-ENTMCNC: 29.3 PG — SIGNIFICANT CHANGE UP (ref 27–34)
MCHC RBC-ENTMCNC: 29.8 PG — SIGNIFICANT CHANGE UP (ref 27–34)
MCHC RBC-ENTMCNC: 33.7 GM/DL — SIGNIFICANT CHANGE UP (ref 32–36)
MCHC RBC-ENTMCNC: 34.1 GM/DL — SIGNIFICANT CHANGE UP (ref 32–36)
MCHC RBC-ENTMCNC: 34.5 GM/DL — SIGNIFICANT CHANGE UP (ref 32–36)
MCV RBC AUTO: 84.8 FL — SIGNIFICANT CHANGE UP (ref 80–100)
MCV RBC AUTO: 87.1 FL — SIGNIFICANT CHANGE UP (ref 80–100)
MCV RBC AUTO: 87.4 FL — SIGNIFICANT CHANGE UP (ref 80–100)
NRBC # BLD: 2 /100 WBCS — SIGNIFICANT CHANGE UP
NRBC # BLD: 2 /100 WBCS — SIGNIFICANT CHANGE UP
NRBC # BLD: 3 /100 WBCS — SIGNIFICANT CHANGE UP
NRBC # FLD: 0.13 K/UL — HIGH
NRBC # FLD: 0.17 K/UL — HIGH
NRBC # FLD: 0.18 K/UL — HIGH
PF4 HEPARIN CMPLX AB SER-ACNC: NEGATIVE — SIGNIFICANT CHANGE UP
PHOSPHATE SERPL-MCNC: 2.6 MG/DL — SIGNIFICANT CHANGE UP (ref 2.5–4.5)
PHOSPHATE SERPL-MCNC: 4.2 MG/DL — SIGNIFICANT CHANGE UP (ref 2.5–4.5)
PLATELET # BLD AUTO: 14 K/UL — CRITICAL LOW (ref 150–400)
PLATELET # BLD AUTO: 14 K/UL — CRITICAL LOW (ref 150–400)
PLATELET # BLD AUTO: 16 K/UL — CRITICAL LOW (ref 150–400)
POTASSIUM SERPL-MCNC: 3.2 MMOL/L — LOW (ref 3.5–5.3)
POTASSIUM SERPL-MCNC: 3.3 MMOL/L — LOW (ref 3.5–5.3)
POTASSIUM SERPL-SCNC: 3.2 MMOL/L — LOW (ref 3.5–5.3)
POTASSIUM SERPL-SCNC: 3.3 MMOL/L — LOW (ref 3.5–5.3)
PROT PATTERN SERPL ELPH-IMP: SIGNIFICANT CHANGE UP
PROT SERPL-MCNC: 5.2 G/DL — LOW (ref 6–8.3)
PROT SERPL-MCNC: 6.3 G/DL — SIGNIFICANT CHANGE UP
PROTHROM AB SERPL-ACNC: 15.3 SEC — HIGH (ref 10.5–13.4)
RBC # BLD: 2.38 M/UL — LOW (ref 3.8–5.2)
RBC # BLD: 2.63 M/UL — LOW (ref 3.8–5.2)
RBC # BLD: 2.63 M/UL — LOW (ref 3.8–5.2)
RBC # BLD: 3.17 M/UL — LOW (ref 3.8–5.2)
RBC # FLD: 15 % — HIGH (ref 10.3–14.5)
RBC # FLD: 16.1 % — HIGH (ref 10.3–14.5)
RBC # FLD: 16.3 % — HIGH (ref 10.3–14.5)
RETICS #: 20.3 K/UL — LOW (ref 25–125)
RETICS/RBC NFR: 0.8 % — SIGNIFICANT CHANGE UP (ref 0.5–2.5)
SODIUM SERPL-SCNC: 146 MMOL/L — HIGH (ref 135–145)
SODIUM SERPL-SCNC: 146 MMOL/L — HIGH (ref 135–145)
WBC # BLD: 6.31 K/UL — SIGNIFICANT CHANGE UP (ref 3.8–10.5)
WBC # BLD: 6.33 K/UL — SIGNIFICANT CHANGE UP (ref 3.8–10.5)
WBC # BLD: 7.66 K/UL — SIGNIFICANT CHANGE UP (ref 3.8–10.5)
WBC # FLD AUTO: 6.31 K/UL — SIGNIFICANT CHANGE UP (ref 3.8–10.5)
WBC # FLD AUTO: 6.33 K/UL — SIGNIFICANT CHANGE UP (ref 3.8–10.5)
WBC # FLD AUTO: 7.66 K/UL — SIGNIFICANT CHANGE UP (ref 3.8–10.5)

## 2022-06-14 PROCEDURE — 49002 REOPENING OF ABDOMEN: CPT | Mod: 79

## 2022-06-14 PROCEDURE — 97605 NEG PRS WND THER DME<=50SQCM: CPT | Mod: 79

## 2022-06-14 PROCEDURE — 0437T IMPLTJ SYNTH RNFCMT ABDL WAL: CPT | Mod: 79

## 2022-06-14 PROCEDURE — 49020 DRAINAGE ABDOM ABSCESS OPEN: CPT | Mod: 79

## 2022-06-14 PROCEDURE — 99291 CRITICAL CARE FIRST HOUR: CPT

## 2022-06-14 PROCEDURE — 71045 X-RAY EXAM CHEST 1 VIEW: CPT | Mod: 26

## 2022-06-14 DEVICE — MESH HERNIA VICRYL 12X12IN: Type: IMPLANTABLE DEVICE | Status: FUNCTIONAL

## 2022-06-14 RX ORDER — CALCIUM GLUCONATE 100 MG/ML
2 VIAL (ML) INTRAVENOUS ONCE
Refills: 0 | Status: COMPLETED | OUTPATIENT
Start: 2022-06-14 | End: 2022-06-15

## 2022-06-14 RX ORDER — POTASSIUM PHOSPHATE, MONOBASIC POTASSIUM PHOSPHATE, DIBASIC 236; 224 MG/ML; MG/ML
15 INJECTION, SOLUTION INTRAVENOUS ONCE
Refills: 0 | Status: COMPLETED | OUTPATIENT
Start: 2022-06-14 | End: 2022-06-14

## 2022-06-14 RX ORDER — POTASSIUM CHLORIDE 20 MEQ
40 PACKET (EA) ORAL ONCE
Refills: 0 | Status: COMPLETED | OUTPATIENT
Start: 2022-06-14 | End: 2022-06-15

## 2022-06-14 RX ORDER — POTASSIUM CHLORIDE 20 MEQ
10 PACKET (EA) ORAL
Refills: 0 | Status: COMPLETED | OUTPATIENT
Start: 2022-06-14 | End: 2022-06-14

## 2022-06-14 RX ORDER — FUROSEMIDE 40 MG
40 TABLET ORAL ONCE
Refills: 0 | Status: COMPLETED | OUTPATIENT
Start: 2022-06-14 | End: 2022-06-14

## 2022-06-14 RX ORDER — MAGNESIUM SULFATE 500 MG/ML
2 VIAL (ML) INJECTION ONCE
Refills: 0 | Status: COMPLETED | OUTPATIENT
Start: 2022-06-14 | End: 2022-06-14

## 2022-06-14 RX ORDER — MAGNESIUM SULFATE 500 MG/ML
2 VIAL (ML) INJECTION ONCE
Refills: 0 | Status: DISCONTINUED | OUTPATIENT
Start: 2022-06-14 | End: 2022-06-14

## 2022-06-14 RX ORDER — HEPARIN SODIUM 5000 [USP'U]/ML
5000 INJECTION INTRAVENOUS; SUBCUTANEOUS EVERY 8 HOURS
Refills: 0 | Status: DISCONTINUED | OUTPATIENT
Start: 2022-06-14 | End: 2022-06-20

## 2022-06-14 RX ORDER — POTASSIUM CHLORIDE 20 MEQ
10 PACKET (EA) ORAL
Refills: 0 | Status: DISCONTINUED | OUTPATIENT
Start: 2022-06-14 | End: 2022-06-14

## 2022-06-14 RX ORDER — POTASSIUM CHLORIDE 20 MEQ
10 PACKET (EA) ORAL
Refills: 0 | Status: COMPLETED | OUTPATIENT
Start: 2022-06-14 | End: 2022-06-15

## 2022-06-14 RX ADMIN — CASPOFUNGIN ACETATE 260 MILLIGRAM(S): 7 INJECTION, POWDER, LYOPHILIZED, FOR SOLUTION INTRAVENOUS at 11:09

## 2022-06-14 RX ADMIN — PIPERACILLIN AND TAZOBACTAM 25 GRAM(S): 4; .5 INJECTION, POWDER, LYOPHILIZED, FOR SOLUTION INTRAVENOUS at 17:04

## 2022-06-14 RX ADMIN — Medication 25 GRAM(S): at 02:47

## 2022-06-14 RX ADMIN — CHLORHEXIDINE GLUCONATE 1 APPLICATION(S): 213 SOLUTION TOPICAL at 11:24

## 2022-06-14 RX ADMIN — CHLORHEXIDINE GLUCONATE 15 MILLILITER(S): 213 SOLUTION TOPICAL at 17:04

## 2022-06-14 RX ADMIN — Medication 100 MILLIGRAM(S): at 11:08

## 2022-06-14 RX ADMIN — Medication 100 MILLIEQUIVALENT(S): at 05:22

## 2022-06-14 RX ADMIN — DEXMEDETOMIDINE HYDROCHLORIDE IN 0.9% SODIUM CHLORIDE 25.1 MICROGRAM(S)/KG/HR: 4 INJECTION INTRAVENOUS at 08:48

## 2022-06-14 RX ADMIN — PANTOPRAZOLE SODIUM 40 MILLIGRAM(S): 20 TABLET, DELAYED RELEASE ORAL at 17:04

## 2022-06-14 RX ADMIN — Medication 100 MILLIEQUIVALENT(S): at 04:04

## 2022-06-14 RX ADMIN — DEXMEDETOMIDINE HYDROCHLORIDE IN 0.9% SODIUM CHLORIDE 25.1 MICROGRAM(S)/KG/HR: 4 INJECTION INTRAVENOUS at 22:53

## 2022-06-14 RX ADMIN — HEPARIN SODIUM 5000 UNIT(S): 5000 INJECTION INTRAVENOUS; SUBCUTANEOUS at 13:56

## 2022-06-14 RX ADMIN — CHLORHEXIDINE GLUCONATE 15 MILLILITER(S): 213 SOLUTION TOPICAL at 06:25

## 2022-06-14 RX ADMIN — PANTOPRAZOLE SODIUM 40 MILLIGRAM(S): 20 TABLET, DELAYED RELEASE ORAL at 06:18

## 2022-06-14 RX ADMIN — Medication 100 MILLIEQUIVALENT(S): at 06:15

## 2022-06-14 RX ADMIN — PIPERACILLIN AND TAZOBACTAM 25 GRAM(S): 4; .5 INJECTION, POWDER, LYOPHILIZED, FOR SOLUTION INTRAVENOUS at 10:05

## 2022-06-14 RX ADMIN — Medication 25 GRAM(S): at 23:59

## 2022-06-14 RX ADMIN — Medication 1 MILLIGRAM(S): at 11:09

## 2022-06-14 RX ADMIN — FENTANYL CITRATE 5.02 MICROGRAM(S)/KG/HR: 50 INJECTION INTRAVENOUS at 22:53

## 2022-06-14 RX ADMIN — POTASSIUM PHOSPHATE, MONOBASIC POTASSIUM PHOSPHATE, DIBASIC 62.5 MILLIMOLE(S): 236; 224 INJECTION, SOLUTION INTRAVENOUS at 10:08

## 2022-06-14 RX ADMIN — Medication 40 MILLIGRAM(S): at 10:35

## 2022-06-14 RX ADMIN — HEPARIN SODIUM 5000 UNIT(S): 5000 INJECTION INTRAVENOUS; SUBCUTANEOUS at 22:54

## 2022-06-14 RX ADMIN — PIPERACILLIN AND TAZOBACTAM 25 GRAM(S): 4; .5 INJECTION, POWDER, LYOPHILIZED, FOR SOLUTION INTRAVENOUS at 01:39

## 2022-06-14 RX ADMIN — FENTANYL CITRATE 5.02 MICROGRAM(S)/KG/HR: 50 INJECTION INTRAVENOUS at 08:47

## 2022-06-14 NOTE — CONSULT NOTE ADULT - SUBJECTIVE AND OBJECTIVE BOX
Plastic Surgery Consult Note  (pg [facility]: [id number], [place]: [phone])    HPI: 52y female with recurrent cervical CA (per patient stage III) presenting as transfer from [place] a/w neutropenic fever. Patient reports she had last cycle of chemotherapy 5 days prior.  Patient had VNS care stop by yesterday and was noted to be tachycardic on vital signs and was sent to the ED.  She reports feeling weak + fatigued. + upper abdominal pain that worsens while having chills. She denies any vomiting, chest pain, SOB.    At [facility] VS she was given Meropenem and Zosyn and was noted to be tachycardic to 140s and febrile to 39.5 and was thus transferred for further management. She became acutely altered without improvement and AXR with increased free air concern for perforated viscous. She is now s/p emergent exploratory laparotomy, abdominal washout, rectosigmoid colectomy, diverting colostomy, bronchoscopy and Abthera placement on 6/8/22, RTOR 6/10, 6/12, 6/14.     PRS consulted for possible abdominal wound closure.       PAST MEDICAL & SURGICAL HISTORY:  HTN (hypertension)  Cervical cancer  Nephrostomy status      Allergies: No Known Allergies      MEDICATIONS  (STANDING):  caspofungin IVPB      caspofungin IVPB 50 milliGRAM(s) IV Intermittent every 24 hours  chlorhexidine 0.12% Liquid 15 milliLiter(s) Oral Mucosa every 12 hours  chlorhexidine 4% Liquid 1 Application(s) Topical daily  dexMEDEtomidine Infusion 1 MICROgram(s)/kG/Hr (25.1 mL/Hr) IV Continuous <Continuous>  fentaNYL   Infusion 0.5 MICROgram(s)/kG/Hr (5.02 mL/Hr) IV Continuous <Continuous>  folic acid 1 milliGRAM(s) Oral daily  heparin   Injectable 5000 Unit(s) SubCutaneous every 8 hours  pantoprazole  Injectable 40 milliGRAM(s) IV Push two times a day  piperacillin/tazobactam IVPB.. 3.375 Gram(s) IV Intermittent every 8 hours  QUEtiapine 100 milliGRAM(s) Oral every 12 hours  thiamine Injectable 100 milliGRAM(s) IV Push daily    SOCIAL HISTORY:  FAMILY HISTORY:  FH: liver cancer (Mother)    ___________________________________________  OBJECTIVE:  Vital Signs Last 24 Hrs  T(C): 37.4 (14 Jun 2022 16:00), Max: 37.4 (14 Jun 2022 16:00)  T(F): 99.3 (14 Jun 2022 16:00), Max: 99.3 (14 Jun 2022 16:00)  HR: 72 (14 Jun 2022 17:00) (67 - 89)  BP: 117/73 (14 Jun 2022 15:09) (100/48 - 117/73)  BP(mean): 86 (14 Jun 2022 15:09) (86 - 86)  RR: 18 (14 Jun 2022 17:00) (18 - 18)  SpO2: 100% (14 Jun 2022 17:00) (95% - 100%)CAPILLARY BLOOD GLUCOSE      POCT Blood Glucose.: 107 mg/dL (14 Jun 2022 17:33)    I&O's Detail    13 Jun 2022 07:01  -  14 Jun 2022 07:00  --------------------------------------------------------  IN:    Dexmedetomidine: 568.1 mL    FentaNYL: 150 mL    IV PiggyBack: 650 mL    Platelets - Single Donor: 590 mL    PRBCs (Packed Red Blood Cells): 600 mL  Total IN: 2558.1 mL    OUT:    Indwelling Catheter - Urethral (mL): 355 mL    Nasogastric/Oral tube (mL): 1700 mL    Nephrostomy Tube (mL): 302 mL    Nephrostomy Tube (mL): 325 mL    VAC (Vacuum Assisted Closure) System (mL): 1250 mL  Total OUT: 3932 mL    Total NET: -1373.9 mL      14 Jun 2022 07:01  -  14 Jun 2022 17:58  --------------------------------------------------------  IN:    Dexmedetomidine: 238 mL    FentaNYL: 50 mL    IV PiggyBack: 460 mL    Platelets - Single Donor: 265 mL    PRBCs (Packed Red Blood Cells): 300 mL  Total IN: 1313 mL    OUT:    Indwelling Catheter - Urethral (mL): 128 mL    Nasogastric/Oral tube (mL): 625 mL    Nephrostomy Tube (mL): 770 mL    Nephrostomy Tube (mL): 1130 mL    VAC (Vacuum Assisted Closure) System (mL): 300 mL  Total OUT: 2953 mL    Total NET: -1640 mL      General: NAD, intubated  Neuro: intubated and sedated  Respiratory: intubated, equal chest rise bilaterally  Abdomen: abthera in place, holding suction    ____________________________________________  LABS:  CBC Full  -  ( 14 Jun 2022 12:00 )  WBC Count : 6.33 K/uL  RBC Count : 2.38 M/uL  Hemoglobin : 7.1 g/dL  Hematocrit : 20.8 %  Platelet Count - Automated : 14 K/uL  Mean Cell Volume : 87.4 fL  Mean Cell Hemoglobin : 29.8 pg  Mean Cell Hemoglobin Concentration : 34.1 gm/dL  Auto Neutrophil # : x  Auto Lymphocyte # : x  Auto Monocyte # : x  Auto Eosinophil # : x  Auto Basophil # : x  Auto Neutrophil % : x  Auto Lymphocyte % : x  Auto Monocyte % : x  Auto Eosinophil % : x  Auto Basophil % : x    06-14    146<H>  |  112<H>  |  37<H>  ----------------------------<  102<H>  3.3<L>   |  21<L>  |  1.19    Ca    7.7<L>      14 Jun 2022 01:02  Phos  2.6     06-14  Mg     1.80     06-14    TPro  5.7<L>  /  Alb  2.5<L>  /  TBili  3.0<H>  /  DBili  2.5<H>  /  AST  42<H>  /  ALT  23  /  AlkPhos  71  06-13    LIVER FUNCTIONS - ( 13 Jun 2022 01:26 )  Alb: 2.5 g/dL / Pro: 5.7 g/dL / ALK PHOS: 71 U/L / ALT: 23 U/L / AST: 42 U/L / GGT: x           PT/INR - ( 13 Jun 2022 17:47 )   PT: 14.0 sec;   INR: 1.20 ratio         PTT - ( 13 Jun 2022 17:47 )  PTT:31.8 sec

## 2022-06-14 NOTE — PRE-OP CHECKLIST - SELECT TESTS ORDERED
CBC/CMP/PT/PTT/Type and Screen/Results in MD note
CBC/CMP/PT/PTT/INR/POCT Blood Glucose
BMP/CBC/PT/PTT/INR/Type and Screen/COVID-19

## 2022-06-14 NOTE — PROGRESS NOTE ADULT - ASSESSMENT
Assessment and Plan:   · Assessment	  52 Year-Old Lady with recurrent cervical CA on chemo presenting with intraabominal free air found to have bowel perforation now status post 6/8 exploratory laparotomy, abdominal washout, rectosigmoid colectomy, diverting colostomy and ABThera™ placement, RTOR (6/10) for re-exploration, washout and replacement of abthera.    Plan/Recommendations:  - Return to OR today for ex lap, washout, and possible closure with mesh. Consented.  - NPO/NGT  - TF  - Continue abx  - Monitor H/H, plt.   - Rest of care per excellent SICU team.     D Team Surgery   [id number]

## 2022-06-14 NOTE — CONSULT NOTE ADULT - ASSESSMENT
ASSESSMENT/PLAN:   MADELYN MAN is a 52yFemale with recurrent cervical CA admitted for management of neutropenic fever, now s/p emergent exploratory laparotomy, abdominal washout, rectosigmoid colectomy, diverting colostomy, bronchoscopy and Abthera placement on 6/8/22, RTOR 6/10, 6/12. PRS consulted for possible abdominal wound closure in the future     - Possible OR friday for abdominal closure, pending patient overall status   - Rest of care per SICU and Gyn Onc    Yesi Saldaña MD PGY2  Plastic Surgery   [facility]: [id number]  [facility]: [phone]

## 2022-06-14 NOTE — BRIEF OPERATIVE NOTE - ASSISTANT(S)
Cathie
Dr. GRIS Mckeon pgy6, Lea pgy4
Dione Nuñez, Francie
Dione Nuñez, Francie Javier
Wing, PGY2; Frankel, PGY3 (gyn onc); Grant (gyn onc fellow)
Nizam, Frankel

## 2022-06-14 NOTE — PROGRESS NOTE ADULT - ATTENDING COMMENTS
I agree with the detailed interval history, physical, and plan, which I have reviewed and edited where appropriate'; also agree with notes/assessment with my team on service.  I have personally examined the patient.  I was physically present for the key portions of the evaluation and management (E/M) service provided.  I reviewed all the pertinent data.  The patient is a critical care patient with life threatening hemodynamic and metabolic instability in SICU.  The SICU team has a constant risk benefit analyzes discussion and coordinating care with the primary team and all consultants.   The patient is in SICU with the chief complaint and diagnosis mentioned in the note.   The plan will be specified in the note.  52y s/p B/L nephrostomy tube placement, p/w severe sepsis from neutropenic fever; s/p washout, open resection of left colon, with colostomy in SICU for hemodynamic monitoring.   no focal deficits.  RESPIRATORY  Exam: coarse BS  Mechanical Ventilation: Mode: AC/ CMV   CARDIOVASCULAR  Exam: Regular rate   GI/NUTRITION  Exam: Abdomen soft, Non-tender  VASCULAR  Exam: Extremities warm    PLAN:  PLAN:  NEUROLOGY: delirium  - Precedex and Fentanyl gtt  - Dilaudid 0.5mg q4hr PRN   - Seroquel 100mg PO BID  RESPIRATORY: respiratory failure  - MV: Volume /18/5/50  - CPAP/PSV trials   CARDIOVASCULAR:  - Keep MAP >65  GI / NUTRITION:  - NPO w/ TF Jevity 1.2 @ 25cc/hr  - GI ppx: IV Protonix 40mg daily  RENAL / GENITOURINARY: hx of bilateral nephrostomy tubes  - Continue to monitor strict ins and outs q1 hour  HEMATOLOGIC:  - SQH   INFECTIOUS DISEASE: hx of neutropenic fevers,   - Cont ABx  ENDOCRINOLOGY:  - Monitor fingersticks q6   H  DISPO:  - Full code  - SICU

## 2022-06-14 NOTE — BRIEF OPERATIVE NOTE - NSICDXBRIEFPOSTOP_GEN_ALL_CORE_FT
POST-OP DIAGNOSIS:  Perforated sigmoid colon 08-Jun-2022 23:09:11  Eddie Headley  

## 2022-06-14 NOTE — PROGRESS NOTE ADULT - ASSESSMENT
52y with a PMHx of HTN, cervical CA (stage III)  on chemo - last session last week - c/b b/l hydronephrosis s/p B/L nephrostomy tube placement, p/w severe sepsis from neutropenic fever. Pt was acutely altered yesterday afternoon without improvement and AXR showed increased free air with concern for perforated viscous. She is now s/p washout, open resection of left colon, with colostomy creation. Recovering in SICU for close hemodynamic monitoring.     PLAN:  NEUROLOGY:  - Sedation: Precedex and Fentanyl gtt  - Pain control: IV Dilaudid 0.5mg q4hr PRN   - Seroquel 100mg PO BID  - Folic acid and thiamine    RESPIRATORY:  - MV: Volume /18/5/50  - CPAP trials as tolerated; tolerated overnight 5/5  - Monitor daily chest x-rays and ABG  - Maintain O2 saturation >92%    CARDIOVASCULAR:  - Off pressors  - Monitor hemodynamics  - Keep MAP >65    GI / NUTRITION:  - NPO w/ TF Jevity 1.2 @ 25cc/hr  - GI ppx: IV Protonix 40mg daily  - Abthera VAC in place, SS output    RENAL / GENITOURINARY: hx of bilateral nephrostomy tubes  - Indwelling hilliard catheter, bilateral nephrostomy tubes  - IR nephrostomy tube exchange postponed  - Monitor electrolytes and replete PRN  - Continue to monitor strict ins and outs q1 hour    HEMATOLOGIC:  - VTE ppx: holding chemical DVT ppx due to low platelet count  - Trend platelet count, transfuse as needed  - Monitor H/H and platelet count on CBC daily    INFECTIOUS DISEASE: hx of neutropenic fevers, s/p zarxio tx  - Zosyn and Caspofungin  - Monitor fever curve and WBC on daily CBC w/diff    ENDOCRINOLOGY:  - Monitor fingersticks q6 hours while NPO    DISPO:  - Full code  - SICU 52y with a PMHx of HTN, cervical CA (stage III)  on chemo - last session last week - c/b b/l hydronephrosis s/p B/L nephrostomy tube placement, p/w severe sepsis from neutropenic fever. Pt was acutely altered yesterday afternoon without improvement and AXR showed increased free air with concern for perforated viscous. She is now s/p washout, open resection of left colon, with colostomy creation. Recovering in SICU for close hemodynamic monitoring.     PLAN:  Interval Events  - 1 unit platelets, 1u PRBC, 2nd unit ordered for en route to OR  - SQH restarted    PLAN:  NEUROLOGY:  - Sedation: Precedex and Fentanyl gtt  - Pain control: IV Dilaudid 0.5mg q4hr PRN   - Seroquel 100mg PO BID  - Folic acid and thiamine    RESPIRATORY:  - MV: Volume /18/5/50  - CPAP trials as tolerated; tolerated overnight 5/5  - Monitor daily chest x-rays and ABG  - Maintain O2 saturation >92%    CARDIOVASCULAR:  - Off pressors  - Monitor hemodynamics  - Keep MAP >65    GI / NUTRITION:  - NPO w/ TF Jevity 1.2 @ 25cc/hr  - GI ppx: IV Protonix 40mg daily  - Abthera VAC in place, SS output    RENAL / GENITOURINARY: hx of bilateral nephrostomy tubes  - Indwelling hilliard catheter, bilateral nephrostomy tubes  - IR nephrostomy tube exchange postponed  - Monitor electrolytes and replete PRN  - Continue to monitor strict ins and outs q1 hour    HEMATOLOGIC:  - SQH restarted 6/14  - Transfuse prbc and plt as needed  - Monitor H/H and platelet count on CBC daily    INFECTIOUS DISEASE: hx of neutropenic fevers, s/p zarxio tx  - Zosyn and Caspofungin  - Monitor fever curve and WBC on daily CBC w/diff    ENDOCRINOLOGY:  - Monitor fingersticks q6 hours while NPO    DISPO:  - Full code  - SICU

## 2022-06-14 NOTE — PRE-OP CHECKLIST - NS PREOP CHK MONITOR ANESTHESIA CONSENT
done
done
Albendazole Counseling:  I discussed with the patient the risks of albendazole including but not limited to cytopenia, kidney damage, nausea/vomiting and severe allergy.  The patient understands that this medication is being used in an off-label manner.

## 2022-06-14 NOTE — PROGRESS NOTE ADULT - SUBJECTIVE AND OBJECTIVE BOX
R2 GYN ONC Progress Note     Interval events  -Patient received 2uPRBC and 2uPlt during the past 24 hours. Receiving an additional unit of Plt this morning.   -Continues to be off pressor support.  - Caspo added for GI perf and neutropenia  -Pt continues to be more alert and responsive  -Plan for ROR today    Vital Signs Last 24 Hours  T(C): 36.7 (06-14-22 @ 04:00), Max: 36.9 (06-13-22 @ 22:45)  HR: 69 (06-14-22 @ 06:00) (65 - 90)  BP: --  RR: 18 (06-14-22 @ 06:00) (18 - 23)  SpO2: 100% (06-14-22 @ 06:00) (95% - 100%)    I&O's Summary    12 Jun 2022 07:01  -  13 Jun 2022 07:00  --------------------------------------------------------  IN: 3019.9 mL / OUT: 3030 mL / NET: -10.1 mL    13 Jun 2022 07:01  -  14 Jun 2022 06:36  --------------------------------------------------------  IN: 2530.1 mL / OUT: 2847 mL / NET: -316.9 mL        Physical Exam:  Gen: NAD, sedated  CV: RRR   Pulm: Mechanical breath sounds 2/2 ventilator   Abd: Soft, non-distended, +BS, Abthera in place with surrounding incision edges well perfused, 450cc redish fluid in wound vac cannister; ostomy pink and well perfused with air in ostomy bag; NGT in place  : L nephrostomy tube draining cloudy yellow urine; R nephrostomy tube with minimal drain output, hilliard in place draining clear red-la nena urine  Ext: Warm & well perfused    Labs:                        7.7    7.66  )-----------( 14       ( 14 Jun 2022 01:02 )             22.3   baso x      eos x      imm gran x      lymph x      mono x      poly x                            7.0    7.83  )-----------( 18       ( 13 Jun 2022 21:30 )             21.2   baso x      eos x      imm gran x      lymph x      mono x      poly x                            7.5    9.30  )-----------( 15       ( 13 Jun 2022 15:00 )             21.9   baso x      eos x      imm gran x      lymph x      mono x      poly x                            6.6    9.65  )-----------( 23       ( 13 Jun 2022 09:40 )             20.0   baso x      eos x      imm gran x      lymph x      mono x      poly x                            7.2    10.10 )-----------( 16       ( 13 Jun 2022 05:45 )             21.6   baso x      eos x      imm gran x      lymph x      mono x      poly x                            7.2    9.69  )-----------( 27       ( 13 Jun 2022 01:26 )             21.4   baso 0.8    eos 0.0    imm gran 4.4    lymph 5.8    mono 5.6    poly 83.4                         6.8    9.49  )-----------( 37       ( 12 Jun 2022 21:32 )             21.3   baso x      eos x      imm gran x      lymph x      mono x      poly x                            7.1    9.54  )-----------( 34       ( 12 Jun 2022 18:46 )             22.0   baso x      eos x      imm gran x      lymph x      mono x      poly x                            7.8    8.46  )-----------( 20       ( 12 Jun 2022 12:02 )             23.3   baso x      eos x      imm gran x      lymph x      mono x      poly x                            7.6    8.52  )-----------( 46       ( 12 Jun 2022 07:08 )             22.8   baso x      eos x      imm gran x      lymph x      mono x      poly x                            7.9    9.30  )-----------( 19       ( 12 Jun 2022 05:31 )             24.0   baso x      eos x      imm gran x      lymph x      mono x      poly x                            8.0    9.56  )-----------( 13 ( 12 Jun 2022 01:05 )             23.7   baso 0.0    eos 0.0    imm gran x      lymph 6.1    mono 7.0    poly 81.6       MEDICATIONS  (STANDING):  caspofungin IVPB      caspofungin IVPB 50 milliGRAM(s) IV Intermittent every 24 hours  chlorhexidine 0.12% Liquid 15 milliLiter(s) Oral Mucosa every 12 hours  chlorhexidine 4% Liquid 1 Application(s) Topical daily  dexMEDEtomidine Infusion 1 MICROgram(s)/kG/Hr (25.1 mL/Hr) IV Continuous <Continuous>  fentaNYL   Infusion 0.5 MICROgram(s)/kG/Hr (5.02 mL/Hr) IV Continuous <Continuous>  folic acid 1 milliGRAM(s) Oral daily  pantoprazole  Injectable 40 milliGRAM(s) IV Push two times a day  piperacillin/tazobactam IVPB.. 3.375 Gram(s) IV Intermittent every 8 hours  potassium phosphate IVPB 15 milliMole(s) IV Intermittent once  QUEtiapine 100 milliGRAM(s) Oral every 12 hours  thiamine Injectable 100 milliGRAM(s) IV Push daily    MEDICATIONS  (PRN):  HYDROmorphone  Injectable 0.5 milliGRAM(s) IV Push every 4 hours PRN Severe Pain (7 - 10)

## 2022-06-14 NOTE — BRIEF OPERATIVE NOTE - NSICDXBRIEFPROCEDURE_GEN_ALL_CORE_FT
PROCEDURES:  Exploratory laparotomy 10-Noe-2022 14:22:56  Frankel, Robyn  Abdominal washout 08-Jun-2022 23:08:42  Eddie Headley  Incisional hernia repair with mesh 14-Jun-2022 21:52:32  Ignacio Bradn  
PROCEDURES:  Abdominal washout 08-Jun-2022 23:08:42  Eddie Headley  
PROCEDURES:  Abdominal washout 08-Jun-2022 23:08:42  Eddie Headley  Exploratory laparotomy 10-Noe-2022 14:22:56  Frankel, Robyn  
PROCEDURES:  Exploratory laparotomy 10-Noe-2022 14:22:56  Frankel, Robyn  Abdominal washout 08-Jun-2022 23:08:42  Eddie Headley  
PROCEDURES:  Open resection of left colon 08-Jun-2022 23:07:59  Eddie Headley  Colostomy 08-Jun-2022 23:08:27  Eddie Headley  Abdominal washout 08-Jun-2022 23:08:42  Eddie Headley  
PROCEDURES:  Abdominal washout 08-Jun-2022 23:08:42  Eddie Healdey  Exploratory laparotomy 10-Noe-2022 14:22:56  Frankel, Robyn

## 2022-06-14 NOTE — PROGRESS NOTE ADULT - ATTENDING COMMENTS
52 Year-Old Lady with recurrent cervical CA on chemo presenting with intraabominal free air found to have bowel perforation now status post 6/8 exploratory laparotomy, abdominal washout, rectosigmoid colectomy, diverting colostomy and ABThera™ placement, RTOR (6/10) for re-exploration, washout and replacement of abthera.    Plan/Recommendations:  - Return to OR today for ex lap, washout, and possible closure with mesh. Consented.  - NPO/NGT  - TF  - Continue abx  - Monitor H/H, plt.   - Rest of care per excellent SICU team.       ------  Updated family at bedside - plan for RTOR for poss closure.  Plastics for soft tissue coverage later this week.

## 2022-06-14 NOTE — PROGRESS NOTE ADULT - ASSESSMENT
52y  with recurrent cervical CA admitted for management of neutropenic fever, now s/p emergent exploratory laparotomy, abdominal washout, rectosigmoid colectomy, diverting colostomy, bronchoscopy and Abthera placement on 22 (ebl 2000cc, s/p 5UpRBC, 3L albumin, 3U Plts, 3U FFP, 3U Cryo) for findings of increased free air on abdominal xray concerning for bowel perforation. Patient POD#2 s/p second RTOR for abdominal washout and replacement of Abthera. Plan for ROR today.      Neuro: Continue IV analgesics regimen per SICU. Sedated; continue Precedex,  Seroquel, per SICU.  CV: Off pressor support. Continue titration of pressors per SICU.  Pulm: Intubated  GI: NPO. GI following. Continue IV Protonix/Reglan. NG tube in place (- ). Caspo () for GI perf and neutropenia  : b/l nephrostomy tubes in place. Infante in place w clear redish output. Patient has resolving ASHLEY likely 2/2 IV Contrast & Dehydration, Cr 1.19 Continue to monitor UOP. Replete electrolytes PRN, currently receiving magnesium sulfate  Heme: Continue Venodynes for DVT ppx.    - Maintain Plt >50K ariel-operatively, overnight 1u administered; plan for additional 2u plt transfusion   - Maintain Hgb >7; pRBCs administered: 5u pRBC intraop , 1u pRBC intraop 6/10; 1u pRBC post-op , 2u pRBC on   ID: Afebrile. Continue Zosyn (s/p Cefepime). Continue to trend WBC. s/p Zarixo (-6/10) for neutropenic fever. WBC 7.66.   Dispo: Appreciate excellent SICU care; tentative RTOR   52y  with recurrent cervical CA admitted for management of neutropenic fever, now s/p emergent exploratory laparotomy, abdominal washout, rectosigmoid colectomy, diverting colostomy, bronchoscopy and Abthera placement on 22 (ebl 2000cc, s/p 5UpRBC, 3L albumin, 3U Plts, 3U FFP, 3U Cryo) for findings of increased free air on abdominal xray concerning for bowel perforation. Patient POD#2 s/p second RTOR for abdominal washout and replacement of Abthera. Plan for ROR today.      Neuro: Continue IV analgesics regimen per SICU. Sedated; continue Precedex,  Seroquel, per SICU.  CV: Off pressor support. Continue titration of pressors per SICU.  Pulm: Intubated  GI: NPO. GI following. Continue IV Protonix/Reglan. NG tube in place (- ). Caspo () for GI perf and neutropenia  : b/l nephrostomy tubes in place. Infante in place w clear redish output. Patient has resolving ASHLEY likely 2/2 IV Contrast & Dehydration, Cr 1.19 Continue to monitor UOP. Replete electrolytes PRN, currently receiving patassium  Heme: Continue Venodynes for DVT ppx.    - Maintain Plt >50K ariel-operatively, overnight 1u administered; plan for additional 2u plt transfusion   - Maintain Hgb >7; pRBCs administered: 5u pRBC intraop , 1u pRBC intraop 6/10; 1u pRBC post-op , 2u pRBC on   ID: Afebrile. Continue Zosyn (s/p Cefepime). Continue to trend WBC. s/p Zarixo (-6/10) for neutropenic fever. WBC 7.66.   Dispo: Appreciate excellent SICU care; tentative RTOR

## 2022-06-14 NOTE — BRIEF OPERATIVE NOTE - NSICDXBRIEFPREOP_GEN_ALL_CORE_FT
PRE-OP DIAGNOSIS:  Perforated viscus 08-Jun-2022 23:09:00  Eddie Headley  

## 2022-06-14 NOTE — CHART NOTE - NSCHARTNOTEFT_GEN_A_CORE
GYN ONC Progress Note POD#2 HD#10     Interval Events:   - Hgb 7.2->>6.6->7.0->1u pRBC ->7.7  - Plt 27->16->1u Plt->23->15->18->14  - TF held   - FiO2 decreased 50% -> 30%   - Pt more alert/responsive     Pt evaluated at bedside. Intubated. Opens eyes when name called    Vital Signs Last 24 Hours  T(C): 36.7 (06-14-22 @ 00:00), Max: 36.9 (06-13-22 @ 22:45)  HR: 69 (06-14-22 @ 03:09) (65 - 90)  BP: --  RR: 18 (06-14-22 @ 03:00) (18 - 23)  SpO2: 100% (06-14-22 @ 03:09) (95% - 100%)    I&O's Summary    12 Jun 2022 07:01  -  13 Jun 2022 07:00  --------------------------------------------------------  IN: 3019.9 mL / OUT: 3030 mL / NET: -10.1 mL    13 Jun 2022 07:01  -  14 Jun 2022 04:10  --------------------------------------------------------  IN: 2002.1 mL / OUT: 2797 mL / NET: -794.9 mL        Physical Exam:  Gen: NAD, sedated  CV: RRR   Pulm: Mechanical breath sounds 2/2 ventilator   Abd: Soft, non-distended, +BS, Abthera in place with surrounding incision edges well perfused, 400cc serosanguinous fluid in wound vac cannister; ostomy salmon-pink and well perfused with no air in ostomy bag; NGT in place  : L nephrostomy tube draining concentrated urine; R nephrostomy tube draining concentrated urine, hilliard in place draining blood-tinged urine   Ext: Warm & well perfused  Ventilator: AC 18/500/5/30%    Labs:                        7.7    7.66  )-----------( 14       ( 14 Jun 2022 01:02 )             22.3   baso x      eos x      imm gran x      lymph x      mono x      poly x                            7.0    7.83  )-----------( 18       ( 13 Jun 2022 21:30 )             21.2   baso x      eos x      imm gran x      lymph x      mono x      poly x                            7.5    9.30  )-----------( 15       ( 13 Jun 2022 15:00 )             21.9   baso x      eos x      imm gran x      lymph x      mono x      poly x                            6.6    9.65  )-----------( 23       ( 13 Jun 2022 09:40 )             20.0   baso x      eos x      imm gran x      lymph x      mono x      poly x                            7.2    10.10 )-----------( 16       ( 13 Jun 2022 05:45 )             21.6   baso x      eos x      imm gran x      lymph x      mono x      poly x                            7.2    9.69  )-----------( 27       ( 13 Jun 2022 01:26 )             21.4   baso 0.8    eos 0.0    imm gran 4.4    lymph 5.8    mono 5.6    poly 83.4                         6.8    9.49  )-----------( 37       ( 12 Jun 2022 21:32 )             21.3   baso x      eos x      imm gran x      lymph x      mono x      poly x                            7.1    9.54  )-----------( 34       ( 12 Jun 2022 18:46 )             22.0   baso x      eos x      imm gran x      lymph x      mono x      poly x                            7.8    8.46  )-----------( 20       ( 12 Jun 2022 12:02 )             23.3   baso x      eos x      imm gran x      lymph x      mono x      poly x                            7.6    8.52  )-----------( 46       ( 12 Jun 2022 07:08 )             22.8   baso x      eos x      imm gran x      lymph x      mono x      poly x                            7.9    9.30  )-----------( 19       ( 12 Jun 2022 05:31 )             24.0   baso x      eos x      imm gran x      lymph x      mono x      poly x                            8.0    9.56  )-----------( 13       ( 12 Jun 2022 01:05 )             23.7   baso 0.0    eos 0.0    imm gran x      lymph 6.1    mono 7.0    poly 81.6                         8.3    12.74 )-----------( 59       ( 11 Jun 2022 04:23 )             24.8   baso x      eos x      imm gran x      lymph x      mono x      poly x          MEDICATIONS  (STANDING):  caspofungin IVPB      caspofungin IVPB 50 milliGRAM(s) IV Intermittent every 24 hours  chlorhexidine 0.12% Liquid 15 milliLiter(s) Oral Mucosa every 12 hours  chlorhexidine 4% Liquid 1 Application(s) Topical daily  dexMEDEtomidine Infusion 1 MICROgram(s)/kG/Hr (25.1 mL/Hr) IV Continuous <Continuous>  fentaNYL   Infusion 0.5 MICROgram(s)/kG/Hr (5.02 mL/Hr) IV Continuous <Continuous>  folic acid 1 milliGRAM(s) Oral daily  pantoprazole  Injectable 40 milliGRAM(s) IV Push two times a day  piperacillin/tazobactam IVPB.. 3.375 Gram(s) IV Intermittent every 8 hours  potassium chloride  10 mEq/100 mL IVPB 10 milliEquivalent(s) IV Intermittent every 1 hour  potassium phosphate IVPB 15 milliMole(s) IV Intermittent once  QUEtiapine 100 milliGRAM(s) Oral every 12 hours  thiamine Injectable 100 milliGRAM(s) IV Push daily    MEDICATIONS  (PRN):  HYDROmorphone  Injectable 0.5 milliGRAM(s) IV Push every 4 hours PRN Severe Pain (7 - 10)      A/P: 52y with recurrent cervical CA admitted for management of neutropenic fever c/b AMS and c/f bowel perforation 2/2 increased free air on abdominal X-ray s/p emergent exploratory laparotomy, abdominal washout, rectosigmoid colectomy, diverting colostomy, bronchoscopy and Abthera placement on 6/8/22 (EBL 2000cc, s/p 5UpRBC, 3L albumin, 3U Plts, 3U FFP, 3U Cryo) with intraoperative findings notable for perforation in distal sigmoid colon. Patient POD#2 s/p RTOR for second abdominal washout, reexploration of opened abdomen and temporary abdominal closure.    - Sedated; continue Precedex, Fentanyl, Seroquel; IV analgesia per SICU  - Continues to be off pressor support; Continue to monitor closely   - Intubated on AC   - NPO/NGT in place (6/8-), tube feeds held for planned return to OR today. Continue IV Protonix/Reglan. GI following  - b/l PCN, 10-15cc/hour concentrated urine bilaterally. Hilliard in place. ASHLEY likely 2/2 IV contrast, dehydration, Cr 1.29->1.31->1.23->1.19. Continue to monitor UOP. Replete electrolytes PRN  - IR to perform b/l PCN exchange tentatively 6/14   - VTE ppx: Venodynes  - Maintain Plt >50K ariel-operatively, plan for additional 2u plt transfusion pre-op today   - Maintain Hgb >7; pRBCs administered: 5u pRBC intraop 6/8, 1u pRBC intraop 6/10; 1u pRBC post-op 6/12, 2u pRBC on 6/13  - Afebrile. WBC 7.66, trend WBC; Continue Zosyn (6/8-) (s/p Cefepime), s/p Zarixo (6/5-6/10) for neutropenic fever     Dispo: Appreciate excellent SICU care; tentative RTOR 6/14     seen and evaluated with YANI Brito, PGY-4  SADIE Oconnor, PGY-2. R2 GYN ONC CHART NOTE    Delayed note entry; pt evaluated at 0100.     Interval Events:   - Hgb 7.2->>6.6->1u PRBC-> 7.5->7.0->1u pRBC ->7.7  - Plt 27->16->1u Plt->23->15->1u Plt->18->14; SICU to administer 2u PLT  - TF held during day shift 6/13  - FiO2 decreased 50% -> 30%   - Pt more alert/responsive   - Plan for OR today    Pt evaluated at bedside. Intubated. Opens eyes when name called    Vital Signs Last 24 Hours  T(C): 36.7 (06-14-22 @ 00:00), Max: 36.9 (06-13-22 @ 22:45)  HR: 69 (06-14-22 @ 03:09) (65 - 90)  BP: --  RR: 18 (06-14-22 @ 03:00) (18 - 23)  SpO2: 100% (06-14-22 @ 03:09) (95% - 100%)    I&O's Summary    12 Jun 2022 07:01  -  13 Jun 2022 07:00  --------------------------------------------------------  IN: 3019.9 mL / OUT: 3030 mL / NET: -10.1 mL    13 Jun 2022 07:01  -  14 Jun 2022 04:10  --------------------------------------------------------  IN: 2002.1 mL / OUT: 2797 mL / NET: -794.9 mL        Physical Exam:  Gen: NAD, sedated, opens eyes to name, nods head in acknowledgment   CV: RRR   Pulm: Mechanical breath sounds 2/2 ventilator   Abd: Soft, non-distended, +BS, Abthera in place with surrounding incision edges well perfused, 400cc serosanguinous fluid in wound vac cannister; ostomy salmon-pink and well perfused with no air in ostomy bag; NGT in place with 200cc bilious output  : L nephrostomy tube draining concentrated urine; R nephrostomy tube draining concentrated urine, hilliard in place draining blood-tinged urine   Ext: Warm & well perfused, +3 edema  Ventilator: AC 18/500/5/30%    Labs:                        7.7    7.66  )-----------( 14       ( 14 Jun 2022 01:02 )             22.3   baso x      eos x      imm gran x      lymph x      mono x      poly x                            7.0    7.83  )-----------( 18       ( 13 Jun 2022 21:30 )             21.2   baso x      eos x      imm gran x      lymph x      mono x      poly x                            7.5    9.30  )-----------( 15       ( 13 Jun 2022 15:00 )             21.9   baso x      eos x      imm gran x      lymph x      mono x      poly x                            6.6    9.65  )-----------( 23       ( 13 Jun 2022 09:40 )             20.0   baso x      eos x      imm gran x      lymph x      mono x      poly x                            7.2    10.10 )-----------( 16       ( 13 Jun 2022 05:45 )             21.6   baso x      eos x      imm gran x      lymph x      mono x      poly x                            7.2    9.69  )-----------( 27       ( 13 Jun 2022 01:26 )             21.4   baso 0.8    eos 0.0    imm gran 4.4    lymph 5.8    mono 5.6    poly 83.4                         6.8    9.49  )-----------( 37       ( 12 Jun 2022 21:32 )             21.3   baso x      eos x      imm gran x      lymph x      mono x      poly x                            7.1    9.54  )-----------( 34       ( 12 Jun 2022 18:46 )             22.0   baso x      eos x      imm gran x      lymph x      mono x      poly x                            7.8    8.46  )-----------( 20       ( 12 Jun 2022 12:02 )             23.3   baso x      eos x      imm gran x      lymph x      mono x      poly x                            7.6    8.52  )-----------( 46       ( 12 Jun 2022 07:08 )             22.8   baso x      eos x      imm gran x      lymph x      mono x      poly x                            7.9    9.30  )-----------( 19       ( 12 Jun 2022 05:31 )             24.0   baso x      eos x      imm gran x      lymph x      mono x      poly x                            8.0    9.56  )-----------( 13       ( 12 Jun 2022 01:05 )             23.7   baso 0.0    eos 0.0    imm gran x      lymph 6.1    mono 7.0    poly 81.6                         8.3    12.74 )-----------( 59       ( 11 Jun 2022 04:23 )             24.8   baso x      eos x      imm gran x      lymph x      mono x      poly x          MEDICATIONS  (STANDING):  caspofungin IVPB      caspofungin IVPB 50 milliGRAM(s) IV Intermittent every 24 hours  chlorhexidine 0.12% Liquid 15 milliLiter(s) Oral Mucosa every 12 hours  chlorhexidine 4% Liquid 1 Application(s) Topical daily  dexMEDEtomidine Infusion 1 MICROgram(s)/kG/Hr (25.1 mL/Hr) IV Continuous <Continuous>  fentaNYL   Infusion 0.5 MICROgram(s)/kG/Hr (5.02 mL/Hr) IV Continuous <Continuous>  folic acid 1 milliGRAM(s) Oral daily  pantoprazole  Injectable 40 milliGRAM(s) IV Push two times a day  piperacillin/tazobactam IVPB.. 3.375 Gram(s) IV Intermittent every 8 hours  potassium chloride  10 mEq/100 mL IVPB 10 milliEquivalent(s) IV Intermittent every 1 hour  potassium phosphate IVPB 15 milliMole(s) IV Intermittent once  QUEtiapine 100 milliGRAM(s) Oral every 12 hours  thiamine Injectable 100 milliGRAM(s) IV Push daily    MEDICATIONS  (PRN):  HYDROmorphone  Injectable 0.5 milliGRAM(s) IV Push every 4 hours PRN Severe Pain (7 - 10)      A/P: 52y with recurrent cervical CA admitted for management of neutropenic fever c/b AMS and c/f bowel perforation 2/2 increased free air on abdominal X-ray s/p emergent exploratory laparotomy, abdominal washout, rectosigmoid colectomy, diverting colostomy, bronchoscopy and Abthera placement on 6/8/22 (EBL 2000cc, s/p 5UpRBC, 3L albumin, 3U Plts, 3U FFP, 3U Cryo) with intraoperative findings notable for perforation in distal sigmoid colon. Patient POD#2 s/p RTOR for second abdominal washout, reexploration of opened abdomen and temporary abdominal closure.    - Sedated; continue Precedex, Fentanyl, Seroquel; IV analgesia per SICU  - Continues to be off pressor support; Continue to monitor closely   - Intubated on AC   - NPO/NGT in place (6/8-), tube feeds held for planned return to OR today. Continue IV Protonix/Reglan. GI following  - b/l PCN, 10-15cc/hour concentrated urine bilaterally. Hilliard in place. ASHLEY likely 2/2 IV contrast, dehydration, Cr 1.29->1.31->1.23->1.19. Continue to monitor UOP. Replete electrolytes PRN  - IR to perform b/l PCN exchange tentatively 6/14   - VTE ppx: Venodynes  - Maintain Plt >50K ariel-operatively, overnight 1u administered; plan for additional 2u plt transfusion now   - Maintain Hgb >7; pRBCs administered: 5u pRBC intraop 6/8, 1u pRBC intraop 6/10; 1u pRBC post-op 6/12, 2u pRBC on 6/13  - Afebrile. WBC 7.66, trend WBC; Continue Zosyn (6/8-) (s/p Cefepime), s/p Zarixo (6/5-6/10) for neutropenic fever     Dispo: Appreciate excellent SICU care; tentative RTOR 6/14     seen and evaluated with YANI Brito, PGY-4  SADIE Oconnor, PGY-2.

## 2022-06-14 NOTE — BRIEF OPERATIVE NOTE - OPERATION/FINDINGS
Re-exploratory laparotomy and washout, purulence within LUQ.   Fascia bridged with 12x12 cm vicryl mesh custom trimmed.   Xeroform and wound vac applied over incision.

## 2022-06-14 NOTE — PROGRESS NOTE ADULT - SUBJECTIVE AND OBJECTIVE BOX
SICU Daily Progress Note  =====================================================  Interval/Overnight Events:     - Received 2plt and 1PRBC during day shift  - Plans to RTOR on 6/14   - Caspo added for GI perf and neutropenia  - Hematology consulted for thrombocytopenia  - Received 1prbc overnight    HPI: 52y female with recurrent cervical CA (per patient stage III) presenting as transfer from [place] a/w neutropenic fever. Patient reports she had last cycle of chemotherapy 5 days prior.  Patient had [facility] care stop by yesterday and was noted to be tachycardic on vital signs and was sent to the ED.  She reports feeling weak + fatigued. + upper abdominal pain that worsens while having chills. She denies any vomiting, chest pain, SOB.     At [facility] she was given Meropenem and Zosyn and was noted to be tachycardic to 140s and febrile to 39.5 and was thus transferred for further management. She became acutely altered yesterday afternoon without improvement and AXR with increased free air concern for perforated viscous. She is now s/p washout, open resection of left colon, colostomy. SICU consulted for close hemodynamic monitoring.     Allergies:   No Known Allergies    MEDICATIONS:   --------------------------------------------------------------------------------------  Neurologic Medications  dexMEDEtomidine Infusion 1 MICROgram(s)/kG/Hr IV Continuous <Continuous>  fentaNYL   Infusion 0.5 MICROgram(s)/kG/Hr IV Continuous <Continuous>  HYDROmorphone  Injectable 0.5 milliGRAM(s) IV Push every 4 hours PRN Severe Pain (7 - 10)  ondansetron Injectable 4 milliGRAM(s) IV Push once  QUEtiapine 100 milliGRAM(s) Oral every 12 hours    Respiratory Medications    Cardiovascular Medications    Gastrointestinal Medications  folic acid 1 milliGRAM(s) Oral daily  pantoprazole  Injectable 40 milliGRAM(s) IV Push two times a day  thiamine Injectable 100 milliGRAM(s) IV Push daily    Genitourinary Medications    Hematologic/Oncologic Medications    Antimicrobial/Immunologic Medications  caspofungin IVPB      caspofungin IVPB 50 milliGRAM(s) IV Intermittent every 24 hours  piperacillin/tazobactam IVPB.. 3.375 Gram(s) IV Intermittent every 8 hours    Endocrine/Metabolic Medications    Topical/Other Medications  chlorhexidine 0.12% Liquid 15 milliLiter(s) Oral Mucosa every 12 hours  chlorhexidine 4% Liquid 1 Application(s) Topical daily    --------------------------------------------------------------------------------------  VITAL SIGNS, INS/OUTS (last 24 hours):  --------------------------------------------------------------------------------------  T(C): 36.7 (06-14-22 @ 00:00), Max: 36.9 (06-13-22 @ 22:45)  HR: 70 (06-14-22 @ 00:00) (65 - 90)  BP: --  RR: 18 (06-14-22 @ 00:00) (18 - 23)  SpO2: 100% (06-14-22 @ 00:00) (95% - 100%)    06-12-22 @ 07:01  -  06-13-22 @ 07:00  --------------------------------------------------------  IN: 3019.9 mL / OUT: 3030 mL / NET: -10.1 mL    06-13-22 @ 07:01  -  06-14-22 @ 00:06  --------------------------------------------------------  IN: 1476.6 mL / OUT: 2650 mL / NET: -1173.4 mL    --------------------------------------------------------------------------------------  EXAM  NEUROLOGY  Exam: Normal, NAD, alert, oriented x3, no focal deficits.    HEENT  Exam: Normocephalic, atraumatic, EOMI.     RESPIRATORY  Exam: Normal expansion/effort.  Mechanical Ventilation: Mode: AC/ CMV (Assist Control/ Continuous Mandatory Ventilation), RR (machine): 18, TV (machine): 500, FiO2: 30, PEEP: 5, ITime: 0.92, MAP: 10, PIP: 23    CARDIOVASCULAR  Exam: Regular rate and rhythm.       GI/NUTRITION  Exam: Abdomen soft, Non-tender, Non-distended.     VASCULAR  Exam: Extremities warm, pink, well-perfused.     MUSCULOSKELETAL  Exam: All extremities moving spontaneously without limitations.     SKIN  Exam: Good skin turgor, no skin breakdown.       LABS  --------------------------------------------------------------------------------------                        7.0    7.83  )-----------( 18 ( 13 Jun 2022 21:30 )             21.2   06-13    143  |  107  |  38<H>  ----------------------------<  133<H>  3.8   |  22  |  1.23    Ca    8.1<L>      13 Jun 2022 01:26  Phos  3.8     06-13  Mg     1.80     06-13    TPro  5.7<L>  /  Alb  2.5<L>  /  TBili  3.0<H>  /  DBili  2.5<H>  /  AST  42<H>  /  ALT  23  /  AlkPhos  71  06-13  ABG - ( 13 Jun 2022 02:27 )  pH, Arterial: 7.46  pH, Blood: x     /  pCO2: 33    /  pO2: 147   / HCO3: 24    / Base Excess: -0.2  /  SaO2: 98.7      PT/INR - ( 13 Jun 2022 17:47 )   PT: 14.0 sec;   INR: 1.20 ratio    PTT - ( 13 Jun 2022 17:47 )  PTT:31.8 sec  --------------------------------------------------------------------------------------

## 2022-06-14 NOTE — PROGRESS NOTE ADULT - PROBLEM SELECTOR PLAN 1
GYN ONC Fellow Addendum:    Pt seen and examined at bedside. Agree with above. Pt intubated but responsive. Pain controlled. Received 1 unit PRBCs and platelets overnight.    VS reviewed  Labs reviewed    - Continue current pain regimen  - Thrombocytopenia: HIT neg, ppx AC held  - NPO, NGT, IVF  - Replete lytes prn  - DVT ppx: SCDs, chemical held due to low platelets  - Plan for OR today for washout and fascial closure  - Appreciate SICU care    GRIS Mckeon, PGY6

## 2022-06-14 NOTE — PROGRESS NOTE ADULT - ATTENDING COMMENTS
Respiratory insufficiency  a.  Return from OR intubated  b.  Obtain ABG  c.  With adequate oxygenation  d.  On precedex, fentanyl  e.  Possible CPAP trial in am    Sepsis secondary to peritonitis  a.  S/P Tiffany's procedure with wash-out and eventual bridging mesh closure  b.  On caspofungin    Malnutrition  a Currently NPO  b.  Restart TF in am    Thrombocytopenia  a.  Likely multifactorial  b.  Trend and transfuse prn

## 2022-06-14 NOTE — PROGRESS NOTE ADULT - SUBJECTIVE AND OBJECTIVE BOX
TEAM SURGERY PROGRESS NOTE    SUBJECTIVE: Patient seen and examined at bedside on AM rounds, remains sedated and intubated.   Objective: Received 2 plt, 1u prbc. Started caspo.     Vital Signs Last 24 Hrs  T(C): 36.8 (14 Jun 2022 08:00), Max: 36.9 (13 Jun 2022 22:45)  T(F): 98.3 (14 Jun 2022 08:00), Max: 98.4 (13 Jun 2022 22:45)  HR: 89 (14 Jun 2022 10:00) (65 - 89)  BP: --  BP(mean): --  RR: 18 (14 Jun 2022 10:00) (18 - 18)  SpO2: 100% (14 Jun 2022 09:00) (95% - 100%)  I&O's Detail    13 Jun 2022 07:01  -  14 Jun 2022 07:00  --------------------------------------------------------  IN:    Dexmedetomidine: 568.1 mL    FentaNYL: 150 mL    IV PiggyBack: 650 mL    Platelets - Single Donor: 590 mL    PRBCs (Packed Red Blood Cells): 600 mL  Total IN: 2558.1 mL    OUT:    Indwelling Catheter - Urethral (mL): 355 mL    Nasogastric/Oral tube (mL): 1700 mL    Nephrostomy Tube (mL): 302 mL    Nephrostomy Tube (mL): 325 mL    VAC (Vacuum Assisted Closure) System (mL): 1250 mL  Total OUT: 3932 mL    Total NET: -1373.9 mL      14 Jun 2022 07:01  -  14 Jun 2022 10:45  --------------------------------------------------------  IN:    Dexmedetomidine: 71.4 mL    FentaNYL: 15 mL    IV PiggyBack: 100 mL  Total IN: 186.4 mL    OUT:    Indwelling Catheter - Urethral (mL): 48 mL    Nephrostomy Tube (mL): 50 mL    Nephrostomy Tube (mL): 45 mL  Total OUT: 143 mL    Total NET: 43.4 mL        MEDICATIONS  (STANDING):  caspofungin IVPB      caspofungin IVPB 50 milliGRAM(s) IV Intermittent every 24 hours  chlorhexidine 0.12% Liquid 15 milliLiter(s) Oral Mucosa every 12 hours  chlorhexidine 4% Liquid 1 Application(s) Topical daily  dexMEDEtomidine Infusion 1 MICROgram(s)/kG/Hr (25.1 mL/Hr) IV Continuous <Continuous>  fentaNYL   Infusion 0.5 MICROgram(s)/kG/Hr (5.02 mL/Hr) IV Continuous <Continuous>  folic acid 1 milliGRAM(s) Oral daily  heparin   Injectable 5000 Unit(s) SubCutaneous every 8 hours  pantoprazole  Injectable 40 milliGRAM(s) IV Push two times a day  piperacillin/tazobactam IVPB.. 3.375 Gram(s) IV Intermittent every 8 hours  QUEtiapine 100 milliGRAM(s) Oral every 12 hours  thiamine Injectable 100 milliGRAM(s) IV Push daily    MEDICATIONS  (PRN):  HYDROmorphone  Injectable 0.5 milliGRAM(s) IV Push every 4 hours PRN Severe Pain (7 - 10)      Physical Exam  General: A&Ox3, NAD  Respiratory: Clear bilaterally, equal bilateral expansion  Cardiovascular: Regular rate & rhythm  Abdominal: Soft. NTND. Abthera vac in place w/ serosanguinous fluid. Nephrostomy tube x2 w/ urine.     LABS:                        7.7    7.66  )-----------( 14       ( 14 Jun 2022 01:02 )             22.3     06-14    146<H>  |  112<H>  |  37<H>  ----------------------------<  102<H>  3.3<L>   |  21<L>  |  1.19    Ca    7.7<L>      14 Jun 2022 01:02  Phos  2.6     06-14  Mg     1.80     06-14    TPro  5.7<L>  /  Alb  2.5<L>  /  TBili  3.0<H>  /  DBili  2.5<H>  /  AST  42<H>  /  ALT  23  /  AlkPhos  71  06-13    PT/INR - ( 13 Jun 2022 17:47 )   PT: 14.0 sec;   INR: 1.20 ratio         PTT - ( 13 Jun 2022 17:47 )  PTT:31.8 sec        Patient is a 52y Female

## 2022-06-15 LAB
ANION GAP SERPL CALC-SCNC: 12 MMOL/L — SIGNIFICANT CHANGE UP (ref 7–14)
BLOOD GAS ARTERIAL COMPREHENSIVE RESULT: SIGNIFICANT CHANGE UP
BLOOD GAS ARTERIAL COMPREHENSIVE RESULT: SIGNIFICANT CHANGE UP
BUN SERPL-MCNC: 32 MG/DL — HIGH (ref 7–23)
CA-I BLD-SCNC: 1.03 MMOL/L — LOW (ref 1.15–1.29)
CALCIUM SERPL-MCNC: 7.5 MG/DL — LOW (ref 8.4–10.5)
CHLORIDE SERPL-SCNC: 110 MMOL/L — HIGH (ref 98–107)
CK MB BLD-MCNC: <6.7 % — HIGH (ref 0–2.5)
CK MB CFR SERPL CALC: <1 NG/ML — SIGNIFICANT CHANGE UP
CK SERPL-CCNC: 15 U/L — LOW (ref 25–170)
CO2 SERPL-SCNC: 23 MMOL/L — SIGNIFICANT CHANGE UP (ref 22–31)
CREAT SERPL-MCNC: 1.11 MG/DL — SIGNIFICANT CHANGE UP (ref 0.5–1.3)
EGFR: 59 ML/MIN/1.73M2 — LOW
GLUCOSE BLDC GLUCOMTR-MCNC: 110 MG/DL — HIGH (ref 70–99)
GLUCOSE BLDC GLUCOMTR-MCNC: 119 MG/DL — HIGH (ref 70–99)
GLUCOSE SERPL-MCNC: 116 MG/DL — HIGH (ref 70–99)
HCT VFR BLD CALC: 25.6 % — LOW (ref 34.5–45)
HCT VFR BLD CALC: 27.1 % — LOW (ref 34.5–45)
HGB BLD-MCNC: 8.8 G/DL — LOW (ref 11.5–15.5)
HGB BLD-MCNC: 8.9 G/DL — LOW (ref 11.5–15.5)
MAGNESIUM SERPL-MCNC: 1.9 MG/DL — SIGNIFICANT CHANGE UP (ref 1.6–2.6)
MCHC RBC-ENTMCNC: 29.2 PG — SIGNIFICANT CHANGE UP (ref 27–34)
MCHC RBC-ENTMCNC: 29.4 PG — SIGNIFICANT CHANGE UP (ref 27–34)
MCHC RBC-ENTMCNC: 32.8 GM/DL — SIGNIFICANT CHANGE UP (ref 32–36)
MCHC RBC-ENTMCNC: 34.4 GM/DL — SIGNIFICANT CHANGE UP (ref 32–36)
MCV RBC AUTO: 85.6 FL — SIGNIFICANT CHANGE UP (ref 80–100)
MCV RBC AUTO: 88.9 FL — SIGNIFICANT CHANGE UP (ref 80–100)
NRBC # BLD: 1 /100 WBCS — SIGNIFICANT CHANGE UP
NRBC # BLD: 2 /100 WBCS — SIGNIFICANT CHANGE UP
NRBC # FLD: 0.08 K/UL — HIGH
NRBC # FLD: 0.13 K/UL — HIGH
PHOSPHATE SERPL-MCNC: 4.4 MG/DL — SIGNIFICANT CHANGE UP (ref 2.5–4.5)
PLATELET # BLD AUTO: 12 K/UL — CRITICAL LOW (ref 150–400)
PLATELET # BLD AUTO: 16 K/UL — CRITICAL LOW (ref 150–400)
POTASSIUM SERPL-MCNC: 3.2 MMOL/L — LOW (ref 3.5–5.3)
POTASSIUM SERPL-SCNC: 3.2 MMOL/L — LOW (ref 3.5–5.3)
RBC # BLD: 2.99 M/UL — LOW (ref 3.8–5.2)
RBC # BLD: 3.05 M/UL — LOW (ref 3.8–5.2)
RBC # FLD: 15.3 % — HIGH (ref 10.3–14.5)
RBC # FLD: 15.6 % — HIGH (ref 10.3–14.5)
SODIUM SERPL-SCNC: 145 MMOL/L — SIGNIFICANT CHANGE UP (ref 135–145)
TROPONIN T, HIGH SENSITIVITY RESULT: 57 NG/L — CRITICAL HIGH
TROPONIN T, HIGH SENSITIVITY RESULT: 58 NG/L — CRITICAL HIGH
WBC # BLD: 6.27 K/UL — SIGNIFICANT CHANGE UP (ref 3.8–10.5)
WBC # BLD: 6.63 K/UL — SIGNIFICANT CHANGE UP (ref 3.8–10.5)
WBC # FLD AUTO: 6.27 K/UL — SIGNIFICANT CHANGE UP (ref 3.8–10.5)
WBC # FLD AUTO: 6.63 K/UL — SIGNIFICANT CHANGE UP (ref 3.8–10.5)

## 2022-06-15 PROCEDURE — 99291 CRITICAL CARE FIRST HOUR: CPT

## 2022-06-15 PROCEDURE — 99223 1ST HOSP IP/OBS HIGH 75: CPT | Mod: GC

## 2022-06-15 PROCEDURE — 93010 ELECTROCARDIOGRAM REPORT: CPT

## 2022-06-15 PROCEDURE — 71045 X-RAY EXAM CHEST 1 VIEW: CPT | Mod: 26

## 2022-06-15 RX ORDER — POTASSIUM CHLORIDE 20 MEQ
10 PACKET (EA) ORAL
Refills: 0 | Status: COMPLETED | OUTPATIENT
Start: 2022-06-15 | End: 2022-06-15

## 2022-06-15 RX ORDER — SODIUM CHLORIDE 9 MG/ML
1000 INJECTION, SOLUTION INTRAVENOUS ONCE
Refills: 0 | Status: COMPLETED | OUTPATIENT
Start: 2022-06-15 | End: 2022-06-15

## 2022-06-15 RX ORDER — POTASSIUM CHLORIDE 20 MEQ
20 PACKET (EA) ORAL
Refills: 0 | Status: DISCONTINUED | OUTPATIENT
Start: 2022-06-15 | End: 2022-06-15

## 2022-06-15 RX ORDER — CALCIUM GLUCONATE 100 MG/ML
2 VIAL (ML) INTRAVENOUS ONCE
Refills: 0 | Status: COMPLETED | OUTPATIENT
Start: 2022-06-15 | End: 2022-06-15

## 2022-06-15 RX ORDER — FUROSEMIDE 40 MG
60 TABLET ORAL ONCE
Refills: 0 | Status: COMPLETED | OUTPATIENT
Start: 2022-06-15 | End: 2022-06-15

## 2022-06-15 RX ADMIN — CHLORHEXIDINE GLUCONATE 1 APPLICATION(S): 213 SOLUTION TOPICAL at 11:17

## 2022-06-15 RX ADMIN — PIPERACILLIN AND TAZOBACTAM 25 GRAM(S): 4; .5 INJECTION, POWDER, LYOPHILIZED, FOR SOLUTION INTRAVENOUS at 18:05

## 2022-06-15 RX ADMIN — Medication 40 MILLIEQUIVALENT(S): at 00:02

## 2022-06-15 RX ADMIN — CHLORHEXIDINE GLUCONATE 15 MILLILITER(S): 213 SOLUTION TOPICAL at 05:59

## 2022-06-15 RX ADMIN — FENTANYL CITRATE 0.5 MICROGRAM(S)/KG/HR: 50 INJECTION INTRAVENOUS at 08:45

## 2022-06-15 RX ADMIN — HEPARIN SODIUM 5000 UNIT(S): 5000 INJECTION INTRAVENOUS; SUBCUTANEOUS at 22:54

## 2022-06-15 RX ADMIN — Medication 100 MILLIEQUIVALENT(S): at 09:20

## 2022-06-15 RX ADMIN — Medication 200 GRAM(S): at 02:02

## 2022-06-15 RX ADMIN — HEPARIN SODIUM 5000 UNIT(S): 5000 INJECTION INTRAVENOUS; SUBCUTANEOUS at 14:12

## 2022-06-15 RX ADMIN — SODIUM CHLORIDE 1000 MILLILITER(S): 9 INJECTION, SOLUTION INTRAVENOUS at 02:47

## 2022-06-15 RX ADMIN — Medication 200 GRAM(S): at 02:42

## 2022-06-15 RX ADMIN — Medication 100 MILLIGRAM(S): at 11:10

## 2022-06-15 RX ADMIN — FENTANYL CITRATE 5.02 MICROGRAM(S)/KG/HR: 50 INJECTION INTRAVENOUS at 08:25

## 2022-06-15 RX ADMIN — PANTOPRAZOLE SODIUM 40 MILLIGRAM(S): 20 TABLET, DELAYED RELEASE ORAL at 06:00

## 2022-06-15 RX ADMIN — CASPOFUNGIN ACETATE 260 MILLIGRAM(S): 7 INJECTION, POWDER, LYOPHILIZED, FOR SOLUTION INTRAVENOUS at 11:17

## 2022-06-15 RX ADMIN — DEXMEDETOMIDINE HYDROCHLORIDE IN 0.9% SODIUM CHLORIDE 25.1 MICROGRAM(S)/KG/HR: 4 INJECTION INTRAVENOUS at 19:45

## 2022-06-15 RX ADMIN — Medication 1 MILLIGRAM(S): at 11:19

## 2022-06-15 RX ADMIN — DEXMEDETOMIDINE HYDROCHLORIDE IN 0.9% SODIUM CHLORIDE 25.1 MICROGRAM(S)/KG/HR: 4 INJECTION INTRAVENOUS at 08:26

## 2022-06-15 RX ADMIN — HEPARIN SODIUM 5000 UNIT(S): 5000 INJECTION INTRAVENOUS; SUBCUTANEOUS at 06:00

## 2022-06-15 RX ADMIN — FENTANYL CITRATE 0.5 MICROGRAM(S)/KG/HR: 50 INJECTION INTRAVENOUS at 11:05

## 2022-06-15 RX ADMIN — FENTANYL CITRATE 5.02 MICROGRAM(S)/KG/HR: 50 INJECTION INTRAVENOUS at 10:50

## 2022-06-15 RX ADMIN — PIPERACILLIN AND TAZOBACTAM 25 GRAM(S): 4; .5 INJECTION, POWDER, LYOPHILIZED, FOR SOLUTION INTRAVENOUS at 09:22

## 2022-06-15 RX ADMIN — PANTOPRAZOLE SODIUM 40 MILLIGRAM(S): 20 TABLET, DELAYED RELEASE ORAL at 18:06

## 2022-06-15 RX ADMIN — Medication 60 MILLIGRAM(S): at 18:06

## 2022-06-15 RX ADMIN — FENTANYL CITRATE 5.02 MICROGRAM(S)/KG/HR: 50 INJECTION INTRAVENOUS at 19:45

## 2022-06-15 RX ADMIN — CHLORHEXIDINE GLUCONATE 15 MILLILITER(S): 213 SOLUTION TOPICAL at 18:05

## 2022-06-15 RX ADMIN — Medication 100 MILLIEQUIVALENT(S): at 02:33

## 2022-06-15 RX ADMIN — PIPERACILLIN AND TAZOBACTAM 25 GRAM(S): 4; .5 INJECTION, POWDER, LYOPHILIZED, FOR SOLUTION INTRAVENOUS at 03:13

## 2022-06-15 RX ADMIN — Medication 100 MILLIEQUIVALENT(S): at 10:46

## 2022-06-15 RX ADMIN — Medication 100 MILLIEQUIVALENT(S): at 12:38

## 2022-06-15 RX ADMIN — Medication 100 MILLIEQUIVALENT(S): at 01:15

## 2022-06-15 RX ADMIN — Medication 100 MILLIEQUIVALENT(S): at 04:23

## 2022-06-15 NOTE — CHART NOTE - NSCHARTNOTEFT_GEN_A_CORE
R2 Gyn Onc Chart Note    Interval events:   - s/p re-exploratory laparotomy and washout on 6/14. Notable for LUQ purulence    - 19 Fr Ric drains x3 placed - LLQ pelvic, LUQ abdominal, and R. abdominal   - Hgb 7.5->7.0->1u pRBC ->7.7->7.1->1u pRBC->9.3->8.8  - per RN, pt received 1u plt prior to OR, 1u plt intra-op, and receiving 1u plt currently   - post-op tachycardia with PVCs, receiving 1L bolus   - TF held   - bilious output from ET tube, c/f ? aspiration      Pt evaluated at bedside. Intubated. Eyes open and nods when name called       Vital Signs Last 24 Hours  T(C): 36.6 (06-15-22 @ 00:00), Max: 37.4 (06-14-22 @ 16:00)  HR: 105 (06-15-22 @ 03:15) (69 - 133)  BP: 117/73 (06-14-22 @ 15:09) (100/48 - 117/73)  RR: 16 (06-15-22 @ 03:00) (14 - 23)  SpO2: 100% (06-15-22 @ 03:15) (99% - 100%)    I&O's Summary    13 Jun 2022 07:01  -  14 Jun 2022 07:00  --------------------------------------------------------  IN: 2558.1 mL / OUT: 3932 mL / NET: -1373.9 mL    14 Jun 2022 07:01  -  15 Noe 2022 04:13  --------------------------------------------------------  IN: 3751.2 mL / OUT: 4313 mL / NET: -561.8 mL        Physical Exam:  Gen: NAD, sedated, opens eyes to name, nods head in acknowledgment   CV: tachycardic, irregular rhythm   Pulm: Mechanical breath sounds 2/2 ventilator   Abd: Soft, non-distended, +BS, Abthera in place with surrounding incision edges well perfused, wound vac draining serosanguinous fluid. 3x Ric drains - LLQ, LUQ, and R. abdominal - also draining serosanguinous fluid. ostomy salmon-pink and well perfused with no air in ostomy bag; ET tube in place   : L nephrostomy tube draining concentrated urine; R nephrostomy tube draining concentrated urine, hilliard in place draining blood-tinged urine   Ext: Warm & well perfused, +3 edema. +b/l pedal pulses   Ventilator: AC 14/500/5/30%    Labs:                        8.8    6.63  )-----------( 16       ( 15 Noe 2022 01:20 )             25.6   baso x      eos x      imm gran x      lymph x      mono x      poly x                            9.3    6.31  )-----------( 16       ( 14 Jun 2022 22:07 )             27.6   baso x      eos x      imm gran x      lymph x      mono x      poly x                            7.1    6.33  )-----------( 14       ( 14 Jun 2022 12:00 )             20.8   baso x      eos x      imm gran x      lymph x      mono x      poly x                            7.7    7.66  )-----------( 14       ( 14 Jun 2022 01:02 )             22.3   baso x      eos x      imm gran x      lymph x      mono x      poly x                            7.0    7.83  )-----------( 18       ( 13 Jun 2022 21:30 )             21.2   baso x      eos x      imm gran x      lymph x      mono x      poly x                            7.5    9.30  )-----------( 15       ( 13 Jun 2022 15:00 )             21.9   baso x      eos x      imm gran x      lymph x      mono x      poly x                            6.6    9.65  )-----------( 23       ( 13 Jun 2022 09:40 )             20.0   baso x      eos x      imm gran x      lymph x      mono x      poly x                            7.2    10.10 )-----------( 16       ( 13 Jun 2022 05:45 )             21.6   baso x      eos x      imm gran x      lymph x      mono x      poly x                            7.2    9.69  )-----------( 27       ( 13 Jun 2022 01:26 )             21.4   baso 0.8    eos 0.0    imm gran 4.4    lymph 5.8    mono 5.6    poly 83.4                         6.8    9.49  )-----------( 37       ( 12 Jun 2022 21:32 )             21.3   baso x      eos x      imm gran x      lymph x      mono x      poly x                            7.1    9.54  )-----------( 34       ( 12 Jun 2022 18:46 )             22.0   baso x      eos x      imm gran x      lymph x      mono x      poly x                            7.8    8.46  )-----------( 20       ( 12 Jun 2022 12:02 )             23.3   baso x      eos x      imm gran x      lymph x      mono x      poly x                            7.6    8.52  )-----------( 46       ( 12 Jun 2022 07:08 )             22.8   baso x      eos x      imm gran x      lymph x      mono x      poly x                            7.9    9.30  )-----------( 19       ( 12 Jun 2022 05:31 )             24.0   baso x      eos x      imm gran x      lymph x      mono x      poly x          MEDICATIONS  (STANDING):  caspofungin IVPB      caspofungin IVPB 50 milliGRAM(s) IV Intermittent every 24 hours  chlorhexidine 0.12% Liquid 15 milliLiter(s) Oral Mucosa every 12 hours  chlorhexidine 4% Liquid 1 Application(s) Topical daily  dexMEDEtomidine Infusion 1 MICROgram(s)/kG/Hr (25.1 mL/Hr) IV Continuous <Continuous>  fentaNYL   Infusion 0.5 MICROgram(s)/kG/Hr (5.02 mL/Hr) IV Continuous <Continuous>  folic acid 1 milliGRAM(s) Oral daily  heparin   Injectable 5000 Unit(s) SubCutaneous every 8 hours  pantoprazole  Injectable 40 milliGRAM(s) IV Push two times a day  piperacillin/tazobactam IVPB.. 3.375 Gram(s) IV Intermittent every 8 hours  potassium chloride  10 mEq/100 mL IVPB 10 milliEquivalent(s) IV Intermittent every 1 hour  QUEtiapine 100 milliGRAM(s) Oral every 12 hours  thiamine Injectable 100 milliGRAM(s) IV Push daily    MEDICATIONS  (PRN):  HYDROmorphone  Injectable 0.5 milliGRAM(s) IV Push every 4 hours PRN Severe Pain (7 - 10)      A/P: 52y with recurrent cervical CA admitted for management of neutropenic fever c/b AMS and c/f bowel perforation 2/2 increased free air on abdominal X-ray s/p emergent exploratory laparotomy, abdominal washout, rectosigmoid colectomy, diverting colostomy, bronchoscopy and Abthera placement on 6/8/22 (EBL 2000cc, s/p 5UpRBC, 3L albumin, 3U Plts, 3U FFP, 3U Cryo) with intraoperative findings notable for perforation in distal sigmoid colon. Patient POD#1 s/p 3rd re-exploratory laparotomy and washout    - Sedated; continue Precedex, Fentanyl, Seroquel; IV analgesia per SICU  - Continues to be off pressor support; Continue to monitor closely   - 3x 19 Fr Ric drains placed during washout today   - Intubated on AC   - NPO/NGT in place (6/8-). Tube feeds held. Continue IV Protonix/Reglan. GI following  - b/l PCN, 10-30cc/hour concentrated urine bilaterally. Hilliard in place. ASHLEY likely 2/2 IV contrast, dehydration, Cr 1.29->1.31->1.23->1.19->>1.1. Continue to monitor UOP. Replete electrolytes PRN  - VTE ppx: Venodynes  - Maintain Plt >50K ariel-operatively, total 4u plts transfused on 6/14, last unit being given now  - Maintain Hgb >7; pRBCs administered: 5u pRBC intraop 6/8, 1u pRBC intraop 6/10; 1u pRBC post-op 6/12, 2u pRBC on 6/13, 2u pRBC on 6/14  - Afebrile. WBC 6.63, trend WBC; Continue Zosyn (6/8-) (s/p Cefepime), s/p Zarixo (6/5-6/10) for neutropenic fever     Dispo: Appreciate excellent SICU care    seen and evaluated with YANI Brito, PGY-4  SADIE Oconnor, PGY-2 R2 Gyn Onc Chart Note    Interval events:   - s/p re-exploratory laparotomy and washout on 6/14. Bridging vicryl mesh to close fascial gap, wound vac placed on top.   - 19 Fr Ric drains x3 placed - LLQ pelvic, LUQ abdominal, and R. abdominal   - Hgb 7.5->7.0->1u pRBC ->7.7->7.1->1u pRBC->9.3->8.8  - 1 unit plts en route to OR, 1 unit platelet in OR, and 1 unit platelet post-op  - post-op tachycardia with PVCs, tachycardia on EKG. receiving 1L bolus   - TF held   - bilious output from ET tube, c/f aspiration      Pt evaluated at bedside. Intubated. Eyes open and nods when name called       Vital Signs Last 24 Hours  T(C): 36.6 (06-15-22 @ 00:00), Max: 37.4 (06-14-22 @ 16:00)  HR: 105 (06-15-22 @ 03:15) (69 - 133)  BP: 117/73 (06-14-22 @ 15:09) (100/48 - 117/73)  RR: 16 (06-15-22 @ 03:00) (14 - 23)  SpO2: 100% (06-15-22 @ 03:15) (99% - 100%)    I&O's Summary    13 Jun 2022 07:01  -  14 Jun 2022 07:00  --------------------------------------------------------  IN: 2558.1 mL / OUT: 3932 mL / NET: -1373.9 mL    14 Jun 2022 07:01  -  15 Noe 2022 04:13  --------------------------------------------------------  IN: 3751.2 mL / OUT: 4313 mL / NET: -561.8 mL        Physical Exam:  Gen: NAD, sedated, opens eyes to name, nods head in acknowledgment   CV: tachycardic, irregular rhythm   Pulm: Mechanical breath sounds 2/2 ventilator   Abd: Soft, non-distended, +BS, Abthera in place with surrounding incision edges well perfused, wound vac draining serosanguinous fluid. 3x Ric drains - LLQ, LUQ, and R. abdominal - also draining serosanguinous fluid. ostomy salmon-pink and well perfused with no air in ostomy bag; ET tube in place   : L nephrostomy tube draining concentrated urine; R nephrostomy tube draining concentrated urine, hilliard in place draining blood-tinged urine   Ext: Warm & well perfused, +3 edema. +b/l pedal pulses   Ventilator: AC 14/500/5/30%    Labs:                        8.8    6.63  )-----------( 16       ( 15 Noe 2022 01:20 )             25.6   baso x      eos x      imm gran x      lymph x      mono x      poly x                            9.3    6.31  )-----------( 16       ( 14 Jun 2022 22:07 )             27.6   baso x      eos x      imm gran x      lymph x      mono x      poly x                            7.1    6.33  )-----------( 14       ( 14 Jun 2022 12:00 )             20.8   baso x      eos x      imm gran x      lymph x      mono x      poly x                            7.7    7.66  )-----------( 14       ( 14 Jun 2022 01:02 )             22.3   baso x      eos x      imm gran x      lymph x      mono x      poly x                            7.0    7.83  )-----------( 18       ( 13 Jun 2022 21:30 )             21.2   baso x      eos x      imm gran x      lymph x      mono x      poly x                            7.5    9.30  )-----------( 15       ( 13 Jun 2022 15:00 )             21.9   baso x      eos x      imm gran x      lymph x      mono x      poly x                            6.6    9.65  )-----------( 23       ( 13 Jun 2022 09:40 )             20.0   baso x      eos x      imm gran x      lymph x      mono x      poly x                            7.2    10.10 )-----------( 16       ( 13 Jun 2022 05:45 )             21.6   baso x      eos x      imm gran x      lymph x      mono x      poly x                            7.2    9.69  )-----------( 27       ( 13 Jun 2022 01:26 )             21.4   baso 0.8    eos 0.0    imm gran 4.4    lymph 5.8    mono 5.6    poly 83.4                         6.8    9.49  )-----------( 37       ( 12 Jun 2022 21:32 )             21.3   baso x      eos x      imm gran x      lymph x      mono x      poly x                            7.1    9.54  )-----------( 34       ( 12 Jun 2022 18:46 )             22.0   baso x      eos x      imm gran x      lymph x      mono x      poly x                            7.8    8.46  )-----------( 20       ( 12 Jun 2022 12:02 )             23.3   baso x      eos x      imm gran x      lymph x      mono x      poly x                            7.6    8.52  )-----------( 46       ( 12 Jun 2022 07:08 )             22.8   baso x      eos x      imm gran x      lymph x      mono x      poly x                            7.9    9.30  )-----------( 19       ( 12 Jun 2022 05:31 )             24.0   baso x      eos x      imm gran x      lymph x      mono x      poly x          MEDICATIONS  (STANDING):  caspofungin IVPB      caspofungin IVPB 50 milliGRAM(s) IV Intermittent every 24 hours  chlorhexidine 0.12% Liquid 15 milliLiter(s) Oral Mucosa every 12 hours  chlorhexidine 4% Liquid 1 Application(s) Topical daily  dexMEDEtomidine Infusion 1 MICROgram(s)/kG/Hr (25.1 mL/Hr) IV Continuous <Continuous>  fentaNYL   Infusion 0.5 MICROgram(s)/kG/Hr (5.02 mL/Hr) IV Continuous <Continuous>  folic acid 1 milliGRAM(s) Oral daily  heparin   Injectable 5000 Unit(s) SubCutaneous every 8 hours  pantoprazole  Injectable 40 milliGRAM(s) IV Push two times a day  piperacillin/tazobactam IVPB.. 3.375 Gram(s) IV Intermittent every 8 hours  potassium chloride  10 mEq/100 mL IVPB 10 milliEquivalent(s) IV Intermittent every 1 hour  QUEtiapine 100 milliGRAM(s) Oral every 12 hours  thiamine Injectable 100 milliGRAM(s) IV Push daily    MEDICATIONS  (PRN):  HYDROmorphone  Injectable 0.5 milliGRAM(s) IV Push every 4 hours PRN Severe Pain (7 - 10)      A/P: 52y with recurrent cervical CA admitted for management of neutropenic fever c/b AMS and c/f bowel perforation 2/2 increased free air on abdominal X-ray s/p emergent exploratory laparotomy, abdominal washout, rectosigmoid colectomy, diverting colostomy, bronchoscopy and Abthera placement on 6/8/22 (EBL 2000cc, s/p 5UpRBC, 3L albumin, 3U Plts, 3U FFP, 3U Cryo) with intraoperative findings notable for perforation in distal sigmoid colon. Patient POD#1 s/p 3rd re-exploratory laparotomy and washout    - Sedated; continue Precedex, Fentanyl, Seroquel; IV analgesia per SICU  - Continues to be off pressor support; Continue to monitor closely   - 3x 19 Fr Ric drains placed during washout today   - Intubated on AC   - NPO/NGT in place (6/8-). Tube feeds held. Continue IV Protonix/Reglan. GI following  - b/l PCN, 10-30cc/hour concentrated urine bilaterally. Hilliard in place. ASHLEY likely 2/2 IV contrast, dehydration, Cr 1.29->1.31->1.23->1.19->>1.1. Continue to monitor UOP. Replete electrolytes PRN  - VTE ppx: Venodynes  - Maintain Plt >50K ariel-operatively, total 5u plts transfused on 6/14, last unit being given now  - Maintain Hgb >7; pRBCs administered: 5u pRBC intraop 6/8, 1u pRBC intraop 6/10; 1u pRBC post-op 6/12, 2u pRBC on 6/13, 2u pRBC on 6/14  - Afebrile. WBC 6.63, trend WBC; Continue Zosyn (6/8-) (s/p Cefepime), s/p Zarixo (6/5-6/10) for neutropenic fever     Dispo: Appreciate excellent SICU care. Tentative RTOR w/ PRS for skin closure.    seen and evaluated with YANI Brito, PGY-4  SADIE Oconnor, PGY-2 R2 Gyn Onc Chart Note    Interval events:   - s/p re-exploratory laparotomy and washout on 6/14. Bridging vicryl mesh to close fascial gap, wound vac placed on top.   - 19 Fr Ric drains x3 placed - LLQ pelvic, LUQ abdominal, and R. abdominal   - Hgb 7.5->7.0->1u pRBC ->7.7->7.1->1u pRBC->9.3->8.8  - 1 unit plts en route to OR, 1 unit platelet in OR, and 1 unit platelet post-op  - post-op tachycardia with PVCs, tachycardia on EKG. receiving 1L bolus   - TF held   - bilious output from ET tube suctioning, c/f aspiration      Pt evaluated at bedside. Intubated. Eyes open and nods when name called       Vital Signs Last 24 Hours  T(C): 36.6 (06-15-22 @ 00:00), Max: 37.4 (06-14-22 @ 16:00)  HR: 105 (06-15-22 @ 03:15) (69 - 133)  BP: 117/73 (06-14-22 @ 15:09) (100/48 - 117/73)  RR: 16 (06-15-22 @ 03:00) (14 - 23)  SpO2: 100% (06-15-22 @ 03:15) (99% - 100%)    I&O's Summary    13 Jun 2022 07:01  -  14 Jun 2022 07:00  --------------------------------------------------------  IN: 2558.1 mL / OUT: 3932 mL / NET: -1373.9 mL    14 Jun 2022 07:01  -  15 Noe 2022 04:13  --------------------------------------------------------  IN: 3751.2 mL / OUT: 4313 mL / NET: -561.8 mL        Physical Exam:  Gen: NAD, sedated, opens eyes to name, nods head in acknowledgment   CV: tachycardic   Pulm: Mechanical breath sounds 2/2 ventilator   Abd: Soft, non-distended, +BS, wound vac in place holding suction; 3x Ric drains - LLQ, LUQ, and R. abdominal w/serosanguinous fluid. ostomy salmon-pink and well perfused with no air in ostomy bag  : L nephrostomy tube draining clear urine; R nephrostomy tube draining clear urine, hilliard in place draining la n[place] urine   Ext: Warm & well perfused, +3 edema. +b/l pedal pulses   Ventilator: AC 14/500/5/30%    Labs:                        8.8    6.63  )-----------( 16       ( 06-15 @ 01:20 )             25.6                9.3    6.31  )-----------( 16       ( 06-14 @ 22:07 )             27.6                7.1    6.33  )-----------( 14       ( 06-14 @ 12:00 )             20.8                7.7    7.66  )-----------( 14       ( 06-14 @ 01:02 )             22.3                7.0    7.83  )-----------( 18       ( 06-13 @ 21:30 )             21.2                7.5    9.30  )-----------( 15       ( 06-13 @ 15:00 )             21.9                6.6    9.65  )-----------( 23       ( 06-13 @ 09:40 )             20.0                7.2    10.10 )-----------( 16       ( 06-13 @ 05:45 )             21.6                7.2    9.69  )-----------( 27       ( 06-13 @ 01:26 )             21.4                6.8    9.49  )-----------( 37       ( 06-12 @ 21:32 )             21.3                7.1    9.54  )-----------( 34       ( 06-12 @ 18:46 )             22.0                7.8    8.46  )-----------( 20       ( 06-12 @ 12:02 )             23.3           MEDICATIONS  (STANDING):  caspofungin IVPB      caspofungin IVPB 50 milliGRAM(s) IV Intermittent every 24 hours  chlorhexidine 0.12% Liquid 15 milliLiter(s) Oral Mucosa every 12 hours  chlorhexidine 4% Liquid 1 Application(s) Topical daily  dexMEDEtomidine Infusion 1 MICROgram(s)/kG/Hr (25.1 mL/Hr) IV Continuous <Continuous>  fentaNYL   Infusion 0.5 MICROgram(s)/kG/Hr (5.02 mL/Hr) IV Continuous <Continuous>  folic acid 1 milliGRAM(s) Oral daily  heparin   Injectable 5000 Unit(s) SubCutaneous every 8 hours  pantoprazole  Injectable 40 milliGRAM(s) IV Push two times a day  piperacillin/tazobactam IVPB.. 3.375 Gram(s) IV Intermittent every 8 hours  potassium chloride  10 mEq/100 mL IVPB 10 milliEquivalent(s) IV Intermittent every 1 hour  QUEtiapine 100 milliGRAM(s) Oral every 12 hours  thiamine Injectable 100 milliGRAM(s) IV Push daily    MEDICATIONS  (PRN):  HYDROmorphone  Injectable 0.5 milliGRAM(s) IV Push every 4 hours PRN Severe Pain (7 - 10)      A/P: 52y with recurrent cervical CA admitted for management of neutropenic fever c/b AMS and c/f bowel perforation 2/2 increased free air on abdominal X-ray s/p emergent exploratory laparotomy, abdominal washout, rectosigmoid colectomy, diverting colostomy, bronchoscopy and Abthera placement on 6/8/22 (EBL 2000cc, s/p 5UpRBC, 3L albumin, 3U Plts, 3U FFP, 3U Cryo) with intraoperative findings notable for perforation in distal sigmoid colon. Patient POD#1 s/p 3rd re-exploratory laparotomy, abdominal washout, fascia bridge with [id number] vicryl mesh.     - Tentative RTOR w/ PRS for skin closure.  - Sedated; continue Precedex, Fentanyl, Seroquel; IV analgesia per SICU  - Continues to be off pressor support; Continue to monitor closely   - Intubated on AC   - NPO/NGT in place (6/8-). Tube feeds held. Continue IV Protonix/Reglan. GI following  - b/l PCN, 10-30cc/hour concentrated urine bilaterally. Hilliard in place. ASHLEY likely 2/2 IV contrast, dehydration, Cr 1.29->1.31->1.23->1.19->>1.1. Continue to monitor UOP. Replete electrolytes PRN  - VTE ppx: Venodynes, HSQ 5K q8h  - Maintain Plt >50K ariel-operatively, total 5u plts transfused on 6/14, last unit being given now  - Maintain Hgb >7; pRBCs administered: 5u pRBC intraop 6/8, 1u pRBC intraop 6/10; 1u pRBC post-op 6/12, 2u pRBC on 6/13, 2u pRBC on 6/14  - Afebrile. WBC 6.63, trend WBC; Continue Zosyn (6/8-), Caspofungin (6/13-), (s/p Cefepime), s/p Zarixo (6/5-6/10) for neutropenic fever     Dispo: Appreciate excellent SICU care.     seen and evaluated with YANI Brito, PGY-4  SADIE Oconnor, PGY-2

## 2022-06-15 NOTE — CHART NOTE - NSCHARTNOTEFT_GEN_A_CORE
POST-OPERATIVE CHECK    SUBJECTIVE  Patient is s/p 3rd Re-exploratory laparotomy and washout, purulence within LUQ.  Fascia bridged with 12x12 cm vicryl mesh custom trimmed.  Xeroform and wound vac applied over inc. Pt remains inbutabed  Denies nausea, vomiting, chest pain.    Vital Signs Last 24 Hrs  T(C): 36.6 (15 Noe 2022 00:00), Max: 37.4 (14 Jun 2022 16:00)  T(F): 97.9 (15 Noe 2022 00:00), Max: 99.3 (14 Jun 2022 16:00)  HR: 133 (15 Noe 2022 01:45) (69 - 133)  BP: 117/73 (14 Jun 2022 15:09) (100/48 - 117/73)  BP(mean): 86 (14 Jun 2022 15:09) (86 - 86)  RR: 19 (15 Noe 2022 01:45) (18 - 23)  SpO2: 100% (15 Noe 2022 01:45) (99% - 100%)  I&O's Detail    13 Jun 2022 07:01  -  14 Jun 2022 07:00  --------------------------------------------------------  IN:    Dexmedetomidine: 568.1 mL    FentaNYL: 150 mL    IV PiggyBack: 650 mL    Platelets - Single Donor: 590 mL    PRBCs (Packed Red Blood Cells): 600 mL  Total IN: 2558.1 mL    OUT:    Indwelling Catheter - Urethral (mL): 355 mL    Nasogastric/Oral tube (mL): 1700 mL    Nephrostomy Tube (mL): 302 mL    Nephrostomy Tube (mL): 325 mL    VAC (Vacuum Assisted Closure) System (mL): 1250 mL  Total OUT: 3932 mL    Total NET: -1373.9 mL      14 Jun 2022 07:01  -  15 Noe 2022 02:55  --------------------------------------------------------  IN:    Dexmedetomidine: 436.2 mL    FentaNYL: 90 mL    IV PiggyBack: 960 mL    Lactated Ringers Bolus: 1000 mL    Platelets - Single Donor: 865 mL    PRBCs (Packed Red Blood Cells): 300 mL  Total IN: 3651.2 mL    OUT:    Drain (mL): 40 mL    Drain (mL): 25 mL    Drain (mL): 20 mL    Indwelling Catheter - Urethral (mL): 213 mL    Nasogastric/Oral tube (mL): 1050 mL    Nephrostomy Tube (mL): 1465 mL    Nephrostomy Tube (mL): 965 mL    VAC (Vacuum Assisted Closure) System (mL): 450 mL  Total OUT: 4228 mL    Total NET: -576.8 mL        caspofungin IVPB   caspofungin IVPB 50  piperacillin/tazobactam IVPB.. 3.375  caspofungin IVPB   caspofungin IVPB 50  heparin   Injectable 5000  piperacillin/tazobactam IVPB.. 3.375    PAST MEDICAL & SURGICAL HISTORY:  HTN (hypertension)      Cervical cancer      Nephrostomy status          PHYSICAL EXAM  General: A&Ox3, NAD  Respiratory: Clear bilaterally, equal bilateral expansion  Cardiovascular: Regular rate & rhythm  Abdominal: Soft. NTND. Abthera vac in place w/ serosanguinous fluid. Nephrostomy tube x2 w/ urine. Two Jps with SS fluid    LABS                        8.8    6.63  )-----------( 16       ( 15 Noe 2022 01:20 )             25.6     06-15    145  |  110<H>  |  32<H>  ----------------------------<  116<H>  3.2<L>   |  23  |  1.11    Ca    7.5<L>      15 Noe 2022 01:20  Phos  4.4     06-15  Mg     1.90     06-15    TPro  5.2<L>  /  Alb  2.2<L>  /  TBili  4.1<H>  /  DBili  x   /  AST  61<H>  /  ALT  29  /  AlkPhos  118  06-14    PT/INR - ( 14 Jun 2022 22:07 )   PT: 15.3 sec;   INR: 1.32 ratio         PTT - ( 14 Jun 2022 22:07 )  PTT:32.9 sec  CAPILLARY BLOOD GLUCOSE      POCT Blood Glucose.: 110 mg/dL (15 Noe 2022 00:49)  POCT Blood Glucose.: 107 mg/dL (14 Jun 2022 17:33)  POCT Blood Glucose.: 98 mg/dL (14 Jun 2022 12:32)      IMAGING    ASSESSMENT  52 Year-Old Lady with recurrent cervical CA on chemo presenting with intraabominal free air found to have bowel perforation now status post 6/8 exploratory laparotomy, abdominal washout, rectosigmoid colectomy, diverting colostomy and ABThera™ placement, RTOR (6/10) for re-exploration, washout and replacement of abthera. Fascial closure 6/14.    Plan/Recommendations:  - NPO/NGT  - TF  - Continue abx  - Monitor H/H, plt.   - Rest of care per excellent SICU team.     D Team Surgery   [id number] POST-OPERATIVE CHECK    SUBJECTIVE  Patient is s/p 3rd Re-exploratory laparotomy and washout, purulence within LUQ.  Fascia bridged with 12x12 cm vicryl mesh custom trimmed.  Xeroform and wound vac applied over inc. Pt remains inbutabed      Vital Signs Last 24 Hrs  T(C): 36.6 (15 Noe 2022 00:00), Max: 37.4 (14 Jun 2022 16:00)  T(F): 97.9 (15 Noe 2022 00:00), Max: 99.3 (14 Jun 2022 16:00)  HR: 133 (15 Noe 2022 01:45) (69 - 133)  BP: 117/73 (14 Jun 2022 15:09) (100/48 - 117/73)  BP(mean): 86 (14 Jun 2022 15:09) (86 - 86)  RR: 19 (15 Noe 2022 01:45) (18 - 23)  SpO2: 100% (15 Noe 2022 01:45) (99% - 100%)  I&O's Detail    13 Jun 2022 07:01  -  14 Jun 2022 07:00  --------------------------------------------------------  IN:    Dexmedetomidine: 568.1 mL    FentaNYL: 150 mL    IV PiggyBack: 650 mL    Platelets - Single Donor: 590 mL    PRBCs (Packed Red Blood Cells): 600 mL  Total IN: 2558.1 mL    OUT:    Indwelling Catheter - Urethral (mL): 355 mL    Nasogastric/Oral tube (mL): 1700 mL    Nephrostomy Tube (mL): 302 mL    Nephrostomy Tube (mL): 325 mL    VAC (Vacuum Assisted Closure) System (mL): 1250 mL  Total OUT: 3932 mL    Total NET: -1373.9 mL      14 Jun 2022 07:01  -  15 Noe 2022 02:55  --------------------------------------------------------  IN:    Dexmedetomidine: 436.2 mL    FentaNYL: 90 mL    IV PiggyBack: 960 mL    Lactated Ringers Bolus: 1000 mL    Platelets - Single Donor: 865 mL    PRBCs (Packed Red Blood Cells): 300 mL  Total IN: 3651.2 mL    OUT:    Drain (mL): 40 mL    Drain (mL): 25 mL    Drain (mL): 20 mL    Indwelling Catheter - Urethral (mL): 213 mL    Nasogastric/Oral tube (mL): 1050 mL    Nephrostomy Tube (mL): 1465 mL    Nephrostomy Tube (mL): 965 mL    VAC (Vacuum Assisted Closure) System (mL): 450 mL  Total OUT: 4228 mL    Total NET: -576.8 mL        caspofungin IVPB   caspofungin IVPB 50  piperacillin/tazobactam IVPB.. 3.375  caspofungin IVPB   caspofungin IVPB 50  heparin   Injectable 5000  piperacillin/tazobactam IVPB.. 3.375    PAST MEDICAL & SURGICAL HISTORY:  HTN (hypertension)      Cervical cancer      Nephrostomy status          PHYSICAL EXAM  General: A&Ox3, NAD  Respiratory: Clear bilaterally, equal bilateral expansion  Cardiovascular: Regular rate & rhythm  Abdominal: Soft. NTND. Abthera vac in place w/ serosanguinous fluid. Nephrostomy tube x2 w/ urine. Two Jps with SS fluid    LABS                        8.8    6.63  )-----------( 16       ( 15 Noe 2022 01:20 )             25.6     06-15    145  |  110<H>  |  32<H>  ----------------------------<  116<H>  3.2<L>   |  23  |  1.11    Ca    7.5<L>      15 Noe 2022 01:20  Phos  4.4     06-15  Mg     1.90     06-15    TPro  5.2<L>  /  Alb  2.2<L>  /  TBili  4.1<H>  /  DBili  x   /  AST  61<H>  /  ALT  29  /  AlkPhos  118  06-14    PT/INR - ( 14 Jun 2022 22:07 )   PT: 15.3 sec;   INR: 1.32 ratio         PTT - ( 14 Jun 2022 22:07 )  PTT:32.9 sec  CAPILLARY BLOOD GLUCOSE      POCT Blood Glucose.: 110 mg/dL (15 Noe 2022 00:49)  POCT Blood Glucose.: 107 mg/dL (14 Jun 2022 17:33)  POCT Blood Glucose.: 98 mg/dL (14 Jun 2022 12:32)      IMAGING    ASSESSMENT  52 Year-Old Lady with recurrent cervical CA on chemo presenting with intraabominal free air found to have bowel perforation now status post 6/8 exploratory laparotomy, abdominal washout, rectosigmoid colectomy, diverting colostomy and ABThera™ placement, RTOR (6/10) for re-exploration, washout and replacement of abthera. Fascial closure 6/14.    Plan/Recommendations:  - NPO/NGT  - TF  - Continue abx  - Monitor H/H, plt.   - Rest of care per excellent SICU team.     D Team Surgery   [id number] POST-OPERATIVE CHECK    SUBJECTIVE  Patient is s/p 3rd Re-exploratory laparotomy and washout, purulence within LUQ.  Fascia bridged with 12x12 cm vicryl mesh custom trimmed.  Xeroform and wound vac applied over inc. Pt remains inbutabed      Vital Signs Last 24 Hrs  T(C): 36.6 (15 Noe 2022 00:00), Max: 37.4 (14 Jun 2022 16:00)  T(F): 97.9 (15 Noe 2022 00:00), Max: 99.3 (14 Jun 2022 16:00)  HR: 133 (15 Noe 2022 01:45) (69 - 133)  BP: 117/73 (14 Jun 2022 15:09) (100/48 - 117/73)  BP(mean): 86 (14 Jun 2022 15:09) (86 - 86)  RR: 19 (15 Noe 2022 01:45) (18 - 23)  SpO2: 100% (15 Noe 2022 01:45) (99% - 100%)  I&O's Detail    13 Jun 2022 07:01  -  14 Jun 2022 07:00  --------------------------------------------------------  IN:    Dexmedetomidine: 568.1 mL    FentaNYL: 150 mL    IV PiggyBack: 650 mL    Platelets - Single Donor: 590 mL    PRBCs (Packed Red Blood Cells): 600 mL  Total IN: 2558.1 mL    OUT:    Indwelling Catheter - Urethral (mL): 355 mL    Nasogastric/Oral tube (mL): 1700 mL    Nephrostomy Tube (mL): 302 mL    Nephrostomy Tube (mL): 325 mL    VAC (Vacuum Assisted Closure) System (mL): 1250 mL  Total OUT: 3932 mL    Total NET: -1373.9 mL      14 Jun 2022 07:01  -  15 Noe 2022 02:55  --------------------------------------------------------  IN:    Dexmedetomidine: 436.2 mL    FentaNYL: 90 mL    IV PiggyBack: 960 mL    Lactated Ringers Bolus: 1000 mL    Platelets - Single Donor: 865 mL    PRBCs (Packed Red Blood Cells): 300 mL  Total IN: 3651.2 mL    OUT:    Drain (mL): 40 mL    Drain (mL): 25 mL    Drain (mL): 20 mL    Indwelling Catheter - Urethral (mL): 213 mL    Nasogastric/Oral tube (mL): 1050 mL    Nephrostomy Tube (mL): 1465 mL    Nephrostomy Tube (mL): 965 mL    VAC (Vacuum Assisted Closure) System (mL): 450 mL  Total OUT: 4228 mL    Total NET: -576.8 mL        caspofungin IVPB   caspofungin IVPB 50  piperacillin/tazobactam IVPB.. 3.375  caspofungin IVPB   caspofungin IVPB 50  heparin   Injectable 5000  piperacillin/tazobactam IVPB.. 3.375    PAST MEDICAL & SURGICAL HISTORY:  HTN (hypertension)      Cervical cancer      Nephrostomy status          PHYSICAL EXAM  General: responds to questions  Respiratory: Clear bilaterally, equal bilateral expansion  Cardiovascular: Regular rate & rhythm  Abdominal: Soft. NTND. Abthera vac in place w/ serosanguinous fluid. Nephrostomy tube x2 w/ urine. Two Jps with SS fluid    LABS                        8.8    6.63  )-----------( 16       ( 15 Noe 2022 01:20 )             25.6     06-15    145  |  110<H>  |  32<H>  ----------------------------<  116<H>  3.2<L>   |  23  |  1.11    Ca    7.5<L>      15 Noe 2022 01:20  Phos  4.4     06-15  Mg     1.90     06-15    TPro  5.2<L>  /  Alb  2.2<L>  /  TBili  4.1<H>  /  DBili  x   /  AST  61<H>  /  ALT  29  /  AlkPhos  118  06-14    PT/INR - ( 14 Jun 2022 22:07 )   PT: 15.3 sec;   INR: 1.32 ratio         PTT - ( 14 Jun 2022 22:07 )  PTT:32.9 sec  CAPILLARY BLOOD GLUCOSE      POCT Blood Glucose.: 110 mg/dL (15 Noe 2022 00:49)  POCT Blood Glucose.: 107 mg/dL (14 Jun 2022 17:33)  POCT Blood Glucose.: 98 mg/dL (14 Jun 2022 12:32)      IMAGING    ASSESSMENT  52 Year-Old Lady with recurrent cervical CA on chemo presenting with intraabominal free air found to have bowel perforation now status post 6/8 exploratory laparotomy, abdominal washout, rectosigmoid colectomy, diverting colostomy and ABThera™ placement, RTOR (6/10) for re-exploration, washout and replacement of abthera. Fascial closure 6/14.    Plan/Recommendations:  - NPO/NGT  - TF  - Continue abx  - Monitor H/H, plt.   - Rest of care per excellent SICU team.     D Team Surgery   [id number] POST-OPERATIVE CHECK    SUBJECTIVE  Patient is s/p 3rd Re-exploratory laparotomy and washout, purulence within LUQ.  Fascia bridged with 12x12 cm vicryl mesh custom trimmed.  Xeroform and wound vac applied over inc. Pt remains intubated      Vital Signs Last 24 Hrs  T(C): 36.6 (15 Noe 2022 00:00), Max: 37.4 (14 Jun 2022 16:00)  T(F): 97.9 (15 Noe 2022 00:00), Max: 99.3 (14 Jun 2022 16:00)  HR: 133 (15 Noe 2022 01:45) (69 - 133)  BP: 117/73 (14 Jun 2022 15:09) (100/48 - 117/73)  BP(mean): 86 (14 Jun 2022 15:09) (86 - 86)  RR: 19 (15 Noe 2022 01:45) (18 - 23)  SpO2: 100% (15 Noe 2022 01:45) (99% - 100%)  I&O's Detail    13 Jun 2022 07:01  -  14 Jun 2022 07:00  --------------------------------------------------------  IN:    Dexmedetomidine: 568.1 mL    FentaNYL: 150 mL    IV PiggyBack: 650 mL    Platelets - Single Donor: 590 mL    PRBCs (Packed Red Blood Cells): 600 mL  Total IN: 2558.1 mL    OUT:    Indwelling Catheter - Urethral (mL): 355 mL    Nasogastric/Oral tube (mL): 1700 mL    Nephrostomy Tube (mL): 302 mL    Nephrostomy Tube (mL): 325 mL    VAC (Vacuum Assisted Closure) System (mL): 1250 mL  Total OUT: 3932 mL    Total NET: -1373.9 mL      14 Jun 2022 07:01  -  15 Noe 2022 02:55  --------------------------------------------------------  IN:    Dexmedetomidine: 436.2 mL    FentaNYL: 90 mL    IV PiggyBack: 960 mL    Lactated Ringers Bolus: 1000 mL    Platelets - Single Donor: 865 mL    PRBCs (Packed Red Blood Cells): 300 mL  Total IN: 3651.2 mL    OUT:    Drain (mL): 40 mL    Drain (mL): 25 mL    Drain (mL): 20 mL    Indwelling Catheter - Urethral (mL): 213 mL    Nasogastric/Oral tube (mL): 1050 mL    Nephrostomy Tube (mL): 1465 mL    Nephrostomy Tube (mL): 965 mL    VAC (Vacuum Assisted Closure) System (mL): 450 mL  Total OUT: 4228 mL    Total NET: -576.8 mL        caspofungin IVPB   caspofungin IVPB 50  piperacillin/tazobactam IVPB.. 3.375  caspofungin IVPB   caspofungin IVPB 50  heparin   Injectable 5000  piperacillin/tazobactam IVPB.. 3.375    PAST MEDICAL & SURGICAL HISTORY:  HTN (hypertension)      Cervical cancer      Nephrostomy status          PHYSICAL EXAM  General: responds to questions  Respiratory: Clear bilaterally, equal bilateral expansion  Cardiovascular: Regular rate & rhythm  Abdominal: Soft. NTND. Abthera vac in place w/ serosanguinous fluid. Nephrostomy tube x2 w/ urine. Two Jps with SS fluid    LABS                        8.8    6.63  )-----------( 16       ( 15 Noe 2022 01:20 )             25.6     06-15    145  |  110<H>  |  32<H>  ----------------------------<  116<H>  3.2<L>   |  23  |  1.11    Ca    7.5<L>      15 Noe 2022 01:20  Phos  4.4     06-15  Mg     1.90     06-15    TPro  5.2<L>  /  Alb  2.2<L>  /  TBili  4.1<H>  /  DBili  x   /  AST  61<H>  /  ALT  29  /  AlkPhos  118  06-14    PT/INR - ( 14 Jun 2022 22:07 )   PT: 15.3 sec;   INR: 1.32 ratio         PTT - ( 14 Jun 2022 22:07 )  PTT:32.9 sec  CAPILLARY BLOOD GLUCOSE      POCT Blood Glucose.: 110 mg/dL (15 Noe 2022 00:49)  POCT Blood Glucose.: 107 mg/dL (14 Jun 2022 17:33)  POCT Blood Glucose.: 98 mg/dL (14 Jun 2022 12:32)      IMAGING    ASSESSMENT  52 Year-Old Lady with recurrent cervical CA on chemo presenting with intraabominal free air found to have bowel perforation now status post 6/8 exploratory laparotomy, abdominal washout, rectosigmoid colectomy, diverting colostomy and ABThera™ placement, RTOR (6/10) for re-exploration, washout and replacement of abthera. Fascial closure 6/14.    Plan/Recommendations:  - Source of tachycardia  - NPO/NGT  - TF  - Continue abx  - Monitor H/H, plt.   - Rest of care per excellent SICU team    D Team Surgery   [id number]

## 2022-06-15 NOTE — CONSULT NOTE ADULT - SUBJECTIVE AND OBJECTIVE BOX
HPI:    52y  with recurrent cervical CA (stage III?) presented to the ED on  as transfer from [place] due to neutropenic fever. Patient reports she had last cycle of chemotherapy 5 days prior to this admission.  Patient had Visiting nurse care service stop by 22 and was noted to be tachycardic, thereafter pt was sent to the [place] ED.  Reported weakness, fatigue, upper abdominal pain; denied vomiting, chest pain, SOB. Last BM yesterday morning which was normal.     At [facility] she was given Meropenem and Zosyn and was noted to be tachycardic to 140s and febrile to 39.5 and was thus transferred for further management.     MICU was consulted on patient with recommendation to continue with broad spectrum antibiotics. Patient receives Gyn Oncology care at [facility]). She reports that she initially was diagnosed with stage III cervical CA in  s/p VBRT + chemo but never had surgical intervention. She reports now being s/p 5 cycles of chemo, unsure of regimen but next due .    OB/GYN HISTORY:  x 1, sAB x 2. Denies any ovarian cysts, fibroids, STIs   PSHx: b/l PCN  and exchange  , hernia repair  PMHx:  HTN   All: No Known Allergies  Meds: Metoprolol 100mg qd, unsure of other medications  Psych: denies any anxiety or depression   Social: Denies any tobacco, alcohol, or illicit substance use              (2022 03:21)      PAST MEDICAL & SURGICAL HISTORY:  HTN (hypertension)      Cervical cancer      Nephrostomy status          Allergies    No Known Allergies    Intolerances        MEDICATIONS  (STANDING):  caspofungin IVPB      caspofungin IVPB 50 milliGRAM(s) IV Intermittent every 24 hours  chlorhexidine 0.12% Liquid 15 milliLiter(s) Oral Mucosa every 12 hours  chlorhexidine 4% Liquid 1 Application(s) Topical daily  dexMEDEtomidine Infusion 1 MICROgram(s)/kG/Hr (25.1 mL/Hr) IV Continuous <Continuous>  fentaNYL   Infusion 0.5 MICROgram(s)/kG/Hr (5.02 mL/Hr) IV Continuous <Continuous>  folic acid 1 milliGRAM(s) Oral daily  heparin   Injectable 5000 Unit(s) SubCutaneous every 8 hours  pantoprazole  Injectable 40 milliGRAM(s) IV Push two times a day  piperacillin/tazobactam IVPB.. 3.375 Gram(s) IV Intermittent every 8 hours  QUEtiapine 100 milliGRAM(s) Oral every 12 hours  thiamine Injectable 100 milliGRAM(s) IV Push daily    MEDICATIONS  (PRN):  HYDROmorphone  Injectable 0.5 milliGRAM(s) IV Push every 4 hours PRN Severe Pain (7 - 10)      FAMILY HISTORY:  FH: liver cancer (Mother)        SOCIAL HISTORY: No EtOH, no tobacco    REVIEW OF SYSTEMS:    CONSTITUTIONAL: No weakness, fevers or chills  EYES/ENT: No visual changes;  No vertigo or throat pain   NECK: No pain or stiffness  RESPIRATORY: No cough, wheezing, hemoptysis; No shortness of breath  CARDIOVASCULAR: No chest pain or palpitations  GASTROINTESTINAL: No abdominal or epigastric pain. No nausea, vomiting, or hematemesis; No diarrhea or constipation. No melena or hematochezia.  GENITOURINARY: No dysuria, frequency or hematuria  NEUROLOGICAL: No numbness or weakness  SKIN: No itching, burning, rashes, or lesions   All other review of systems is negative unless indicated above.        T(F): 98.9 (06-15-22 @ 12:00), Max: 98.9 (06-15-22 @ 12:00)  HR: 84 (06-15-22 @ 16:00)  BP: --  RR: 16 (06-15-22 @ 16:00)  SpO2: 100% (06-15-22 @ 16:00)  Wt(kg): --    GENERAL: NAD, well-developed  HEAD:  Atraumatic, Normocephalic  EYES: EOMI, PERRLA, conjunctiva and sclera clear  NECK: Supple, No JVD  CHEST/LUNG: Clear to auscultation bilaterally; No wheeze  HEART: Regular rate and rhythm; No murmurs, rubs, or gallops  ABDOMEN: Soft, Nontender, Nondistended; Bowel sounds present  EXTREMITIES:  2+ Peripheral Pulses, No clubbing, cyanosis, or edema  NEUROLOGY: non-focal  SKIN: No rashes or lesions                          8.9    6.27  )-----------( 12       ( 15 Noe 2022 10:05 )             27.1       06-15    145  |  110<H>  |  32<H>  ----------------------------<  116<H>  3.2<L>   |  23  |  1.11    Ca    7.5<L>      15 Noe 2022 01:20  Phos  4.4     06-15  Mg     1.90     06-15    TPro  5.2<L>  /  Alb  2.2<L>  /  TBili  4.1<H>  /  DBili  x   /  AST  61<H>  /  ALT  29  /  AlkPhos  118        Phosphorus Level, Serum: 4.4 mg/dL (06-15 @ 01:20)  Magnesium, Serum: 1.90 mg/dL (06-15 @ 01:20)  Magnesium, Serum: 1.70 mg/dL ( @ 22:07)  Phosphorus Level, Serum: 4.2 mg/dL ( @ 22:07)      PT/INR - ( 2022 22:07 )   PT: 15.3 sec;   INR: 1.32 ratio         PTT - ( 2022 22:07 )  PTT:32.9 sec    Kidney Nephrostomy - Left   @ 14:26   Few Serratia marcescens  Few Enterococcus faecalis  --  Serratia marcescens  Enterococcus faecalis      .Blood Blood-Peripheral   @ 01:30   No Growth Final  --  --                 HPI:    52y  with recurrent cervical CA (stage III?) presented to the ED on  as transfer from [place] due to neutropenic fever. Patient reports she had last cycle of chemotherapy 5 days prior to this admission.  Patient had Visiting nurse care service stop by 22 and was noted to be tachycardic, thereafter pt was sent to the [place] ED.  Reported weakness, fatigue, upper abdominal pain; denied vomiting, chest pain, SOB, abnormal BMs.  At [facility] she was given Meropenem and Zosyn and was noted to be tachycardic to 140s and febrile to 39.5 and was thus transferred for further management. After admission, she was found having free air on abd x ray concerning for bowel perforation s/p emergent exploratory laparotomy, abdominal washout, rectosigmoid colectomy, diverting colostomy, bronchoscopy and Abthera placement on 22;  s/p a  second RTOR for abdominal washout and replacement of Abthera on 22. Currently the Plan is for eventual return to OR with plastics for closure.    As per chart,  [facility] she initially was diagnosed with stage III cervical CA in  s/p VBRT + chemo but never had surgical intervention. Reported now being s/p 5 cycles of chemo, unsure of regimen but next due 22. Detailed info unavailable.     Hematology consulted for thrombocytopenia and anemia. Lab Hb 8.9; Plt 12 (on admission PLT WNL). To be noted neutropenia resolved after zarxio (-6/10)            PAST MEDICAL & SURGICAL HISTORY:  HTN (hypertension)      Cervical cancer      Nephrostomy status          Allergies    No Known Allergies    Intolerances        MEDICATIONS  (STANDING):  caspofungin IVPB      caspofungin IVPB 50 milliGRAM(s) IV Intermittent every 24 hours  chlorhexidine 0.12% Liquid 15 milliLiter(s) Oral Mucosa every 12 hours  chlorhexidine 4% Liquid 1 Application(s) Topical daily  dexMEDEtomidine Infusion 1 MICROgram(s)/kG/Hr (25.1 mL/Hr) IV Continuous <Continuous>  fentaNYL   Infusion 0.5 MICROgram(s)/kG/Hr (5.02 mL/Hr) IV Continuous <Continuous>  folic acid 1 milliGRAM(s) Oral daily  heparin   Injectable 5000 Unit(s) SubCutaneous every 8 hours  pantoprazole  Injectable 40 milliGRAM(s) IV Push two times a day  piperacillin/tazobactam IVPB.. 3.375 Gram(s) IV Intermittent every 8 hours  QUEtiapine 100 milliGRAM(s) Oral every 12 hours  thiamine Injectable 100 milliGRAM(s) IV Push daily    MEDICATIONS  (PRN):  HYDROmorphone  Injectable 0.5 milliGRAM(s) IV Push every 4 hours PRN Severe Pain (7 - 10)      FAMILY HISTORY:  FH: liver cancer (Mother)        SOCIAL HISTORY: No EtOH, no tobacco    REVIEW OF SYSTEMS:  unable to review due to intubation       T(F): 98.9 (06-15-22 @ 12:00), Max: 98.9 (06-15-22 @ 12:00)  HR: 84 (06-15-22 @ 16:00)  BP: --  RR: 16 (06-15-22 @ 16:00)  SpO2: 100% (06-15-22 @ 16:00)  Wt(kg): --    GENERAL: intubated, on low dose of sedation, well-developed, awake and following instruction; responded to questions with motions of head/UEx/Serafin  HEAD:  Atraumatic, Normocephalic  EYES: EOMI, PERRLA, conjunctiva and sclera clear  NECK: Supple, No JVD  CHEST/LUNG: Clear to auscultation bilaterally; No wheeze  HEART: Regular rate and rhythm; No murmurs, rubs, or gallops  ABDOMEN: Soft, Nontender, Nondistended; Bowel sounds present. NG tube in place.   EXTREMITIES:  2+ Peripheral Pulses, No clubbing, cyanosis, or edema  NEUROLOGY: on low dose of sedation; moving UEx and Serafin on the bed.   SKIN: No rashes or lesions                          8.9    6.27  )-----------( 12       ( 15 Noe 2022 10:05 )             27.1       06-15    145  |  110<H>  |  32<H>  ----------------------------<  116<H>  3.2<L>   |  23  |  1.11    Ca    7.5<L>      15 Noe 2022 01:20  Phos  4.4     06-15  Mg     1.90     06-15    TPro  5.2<L>  /  Alb  2.2<L>  /  TBili  4.1<H>  /  DBili  x   /  AST  61<H>  /  ALT  29  /  AlkPhos  118        Phosphorus Level, Serum: 4.4 mg/dL (06-15 @ 01:20)  Magnesium, Serum: 1.90 mg/dL (06-15 @ 01:20)  Magnesium, Serum: 1.70 mg/dL ( @ 22:07)  Phosphorus Level, Serum: 4.2 mg/dL ( @ 22:07)      PT/INR - ( 2022 22:07 )   PT: 15.3 sec;   INR: 1.32 ratio         PTT - ( 2022 22:07 )  PTT:32.9 sec    Kidney Nephrostomy - Left   @ 14:26   Few Serratia marcescens  Few Enterococcus faecalis  --  Serratia marcescens  Enterococcus faecalis      .Blood Blood-Peripheral   @ 01:30   No Growth Final  --  --

## 2022-06-15 NOTE — PROGRESS NOTE ADULT - ATTENDING COMMENTS
I agree with the detailed interval history, physical, and plan, which I have reviewed and edited where appropriate'; also agree with notes/assessment with my team on service.  I have personally examined the patient.  I was physically present for the key portions of the evaluation and management (E/M) service provided.  I reviewed all the pertinent data.  The patient is a critical care patient with life threatening hemodynamic and metabolic instability in SICU.  The SICU team has a constant risk benefit analyzes discussion and coordinating care with the primary team and all consultants.   The patient is in SICU with the chief complaint and diagnosis mentioned in the note.   The plan will be specified in the note.  52y with a PMHx of HTN, cervical CA with b/l hydronephrosis s/p B/L nephrostomy and severe sepsis; s/p washout, open resection of left colon, with colostomy in SICU for close hemodynamic monitoring.   alert, no focal deficits.  RESPIRATORY  Exam: Normal expansion/effort.  Mechanical Ventilation: Mode: AC/ CMV   Coarse bs  CARDIOVASCULAR  Exam: Sinus tachycardia   GI/NUTRITION  Exam: Abdomen soft, Non-tender, Non-distended.   VASCULAR  Exam: Extremities warm    PLAN:  NEUROLOGY:arousable  - Sedation: Precedex and Fentanyl gtt  - Pain control: IV Dilaudid 0.5mg q4hr PRN   RESPIRATORY: respiratory failure  - MV: Volume /14/5/30  - CPAP/PSV trials   CARDIOVASCULAR:  - Keep MAP >65  GI / NUTRITION:  - NPO  - GI ppx: IV Protonix 40mg BID  RENAL / GENITOURINARY: hx of bilateral nephrostomy tubes  - Monitor electrolytes   HEMATOLOGIC:  - VTE ppx: SQH q8hr  INFECTIOUS DISEASE: hx of neutropenic fevers,   - Zosyn and Caspofungin  ENDOCRINOLOGY:  - Monitor fingersticks q6 hours while NPO  DISPO:  - Full code  - SICU

## 2022-06-15 NOTE — PROGRESS NOTE ADULT - ATTENDING COMMENTS
patient is overall improved, fascia closed with vicryl mesh yesterday  Heme consult  possible extubation, will re-eval plan for skin closer later this week  daughter aware of plan.

## 2022-06-15 NOTE — PROGRESS NOTE ADULT - SUBJECTIVE AND OBJECTIVE BOX
R2 GYN ONC Progress Note     Interval Events:  -OR: Bridging vicryl mesh to close fascial gap, wound vac placed on top, 3 SARAH drains left in place. Plans for RTOR w/ PRS for skin closure.  -Transfused 2uPRBC (given during the day and this morning) and 3Plt over 24h  -Tachycardic O/N with EKG showing sinus tachycardia  -ETT suction with bilious output possibly 2/2 to aspiration  -troponin elevated to 58.    Patient seen and examined at bedside.  Sedated.     Vital Signs Last 24 Hours  T(C): 36.7 (06-15-22 @ 04:00), Max: 37.4 (06-14-22 @ 16:00)  HR: 71 (06-15-22 @ 06:00) (69 - 133)  BP: 117/73 (06-14-22 @ 15:09) (100/48 - 117/73)  RR: 14 (06-15-22 @ 06:00) (14 - 23)  SpO2: 100% (06-15-22 @ 06:00) (99% - 100%)    I&O's Summary    13 Jun 2022 07:01  -  14 Jun 2022 07:00  --------------------------------------------------------  IN: 2558.1 mL / OUT: 3932 mL / NET: -1373.9 mL    14 Jun 2022 07:01  -  15 Noe 2022 06:43  --------------------------------------------------------  IN: 4236.6 mL / OUT: 4803 mL / NET: -566.4 mL        Physical Exam:  Gen: NAD, sedated  CV: RRR   Pulm: Mechanical breath sounds 2/2 ventilator   Abd: Soft, non-distended, Abthera in place with serosanguinous fluid surrounding incision edges well perfused, no fluid in wound vac cannister (recently changed) ; ostomy pink and well perfused with air in ostomy bag; NGT in place  : L and R nephrostomy tube draining cloudy yellow urine;, hilliard in place draining clear red-la nena urine  Ext: Warm & well perfused    Labs:                        8.8    6.63  )-----------( 16       ( 15 Noe 2022 01:20 )             25.6   baso x      eos x      imm gran x      lymph x      mono x      poly x                            9.3    6.31  )-----------( 16       ( 14 Jun 2022 22:07 )             27.6   baso x      eos x      imm gran x      lymph x      mono x      poly x                            7.1    6.33  )-----------( 14       ( 14 Jun 2022 12:00 )             20.8   baso x      eos x      imm gran x      lymph x      mono x      poly x                            7.7    7.66  )-----------( 14       ( 14 Jun 2022 01:02 )             22.3   baso x      eos x      imm gran x      lymph x      mono x      poly x                            7.0    7.83  )-----------( 18       ( 13 Jun 2022 21:30 )             21.2   baso x      eos x      imm gran x      lymph x      mono x      poly x                            7.5    9.30  )-----------( 15       ( 13 Jun 2022 15:00 )             21.9   baso x      eos x      imm gran x      lymph x      mono x      poly x                            6.6    9.65  )-----------( 23       ( 13 Jun 2022 09:40 )             20.0   baso x      eos x      imm gran x      lymph x      mono x      poly x                            7.2    10.10 )-----------( 16       ( 13 Jun 2022 05:45 )             21.6   baso x      eos x      imm gran x      lymph x      mono x      poly x                            7.2    9.69  )-----------( 27       ( 13 Jun 2022 01:26 )             21.4   baso 0.8    eos 0.0    imm gran 4.4    lymph 5.8    mono 5.6    poly 83.4                         6.8    9.49  )-----------( 37       ( 12 Jun 2022 21:32 )             21.3   baso x      eos x      imm gran x      lymph x      mono x      poly x                            7.1    9.54  )-----------( 34       ( 12 Jun 2022 18:46 )             22.0   baso x      eos x      imm gran x      lymph x      mono x      poly x                            7.8    8.46  )-----------( 20       ( 12 Jun 2022 12:02 )             23.3   baso x      eos x      imm gran x      lymph x      mono x      poly x                            7.6    8.52  )-----------( 46       ( 12 Jun 2022 07:08 )             22.8   baso x      eos x      imm gran x      lymph x      mono x      poly x          MEDICATIONS  (STANDING):  caspofungin IVPB      caspofungin IVPB 50 milliGRAM(s) IV Intermittent every 24 hours  chlorhexidine 0.12% Liquid 15 milliLiter(s) Oral Mucosa every 12 hours  chlorhexidine 4% Liquid 1 Application(s) Topical daily  dexMEDEtomidine Infusion 1 MICROgram(s)/kG/Hr (25.1 mL/Hr) IV Continuous <Continuous>  fentaNYL   Infusion 0.5 MICROgram(s)/kG/Hr (5.02 mL/Hr) IV Continuous <Continuous>  folic acid 1 milliGRAM(s) Oral daily  heparin   Injectable 5000 Unit(s) SubCutaneous every 8 hours  pantoprazole  Injectable 40 milliGRAM(s) IV Push two times a day  piperacillin/tazobactam IVPB.. 3.375 Gram(s) IV Intermittent every 8 hours  QUEtiapine 100 milliGRAM(s) Oral every 12 hours  thiamine Injectable 100 milliGRAM(s) IV Push daily    MEDICATIONS  (PRN):  HYDROmorphone  Injectable 0.5 milliGRAM(s) IV Push every 4 hours PRN Severe Pain (7 - 10)

## 2022-06-15 NOTE — CONSULT NOTE ADULT - ASSESSMENT
52y  with recurrent cervical CA (stage III?) transferred to [facility] from OSH on  due to neutropenic fever. last cycle of chemotherapy 5 days prior to this admission.  found having free air on abd x ray concerning for bowel perforation s/p emergent exploratory laparotomy, abdominal washout, rectosigmoid colectomy, diverting colostomy, bronchoscopy and Abthera placement on 22;  s/p a  second RTOR for abdominal washout and replacement of Abthera on 22.     As per chart,  [facility] she initially was diagnosed with stage III cervical CA in  s/p VBRT + chemo but never had surgical intervention. Reported now being s/p 5 cycles of chemo, unsure of regimen but next due 22. Detailed info unavailable.     Hematology consulted for thrombocytopenia and anemia. Lab Hb 8.9; Plt 12 (on admission PLT WNL). To be noted neutropenia resolved after zarxio (-6/10)    #Thrombocytopenia likely multifactorial including chemo side effects and infection  #Anemia   -peripheral blood smear will be reviewed by hematology team   -monitor cbc with diff daily;  - check B12/folate level; f/u  LDH/uric acid; ferritin and iron study   -reticulocyte 0.8%; normal indirect Bilirubin;  LDH and haptoglobin-no e/o hemolysis.   -transfuse if Hb <7; Plt <10k, or <15k if febrile; or <50k if active bleeding; transfuse plt before procedure, the goal of platelet per GynOnc and primary team.  -Hold off AC for now in view of Plt 12 on 6/15/22  -continue with GI ppx;   -Heparin PF4 antibody negative; low HIT score;   -request medical record from pt's oncology office for more information of chemo       Discuss with attending Dr. Busby and fellow Dr. Will Estrada PGY4 [phone]

## 2022-06-15 NOTE — PROGRESS NOTE ADULT - ASSESSMENT
52y  with recurrent cervical CA admitted for management of neutropenic fever, now s/p emergent exploratory laparotomy, abdominal washout, rectosigmoid colectomy, diverting colostomy, bronchoscopy and Abthera placement on 22 (ebl 2000cc, s/p 5UpRBC, 3L albumin, 3U Plts, 3U FFP, 3U Cryo) for findings of increased free air on abdominal xray concerning for bowel perforation. Patient s/p a  second RTOR for abdominal washout and replacement of Abthera and now POD #1 s/p washout and bridging vicryl mesh placement to close fascial gap. Plan for eventual return to OR with plastics for closure.    Neuro: Continue IV analgesics regimen per SICU. Sedated; continue Precedex,  Seroquel, per SICU.  CV: Off pressor support. Continue titration of pressors per SICU.  -tachcardia improving; troponin mildly elevated with EKG showing sinus tachcardia  Pulm: Intubated  GI: NPO. GI following. Continue IV Protonix/Reglan. NG tube in place (- ). Caspo () for GI perf and neutropenia  : b/l nephrostomy tubes in place. Infante in place w clear redish output. Patient has resolving ASHLEY likely 2/2 IV Contrast & Dehydration, Cr 1.19 Continue to monitor UOP. Replete electrolytes PRN.  Heme: Continue Venodynes for DVT ppx.    -3u Plt given (1 unit plts en route to OR, 1 unit platelet in OR, and 1 unit platelet post-op)  - Maintain Hgb >7; pRBCs administered: 1 unit of pRBC given with a 1uPRBC running.   ID: Afebrile. Continue Zosyn (s/p Cefepime). Continue to trend WBC. s/p Zarixo (-6/10) for neutropenic fever. WBC 7.66.   Dispo: Appreciate excellent SICU care    Margarita PGY2

## 2022-06-15 NOTE — PROGRESS NOTE ADULT - SUBJECTIVE AND OBJECTIVE BOX
SICU Daily Progress Note  =====================================================  Interval/Overnight Events:     - 1 unit of pRBC given during day (Hgb 7.7-->7.1)  - Lasix 40 x1 pre op  - 1 unit plts en route to OR, 1 unit platelet in OR, and 1 unit platelet post-op   - Tachycardic to 120s overnight, EKG sinus tachycardia  - Bilious output noted from ETT suctioning, concerning for aspiration  - POCUS: respiratory variations on IVC, collapsing LV  - 1L LR bolus given  - Troponin elevated to 58    HPI: 52y female with recurrent cervical CA (per patient stage III) presenting as transfer from [place] a/w neutropenic fever. Patient reports she had last cycle of chemotherapy 5 days prior.  Patient had VNS care stop by yesterday and was noted to be tachycardic on vital signs and was sent to the ED.  She reports feeling weak + fatigued. + upper abdominal pain that worsens while having chills. She denies any vomiting, chest pain, SOB.     At [facility] she was given Meropenem and Zosyn and was noted to be tachycardic to 140s and febrile to 39.5 and was thus transferred for further management. She became acutely altered yesterday afternoon without improvement and AXR with increased free air concern for perforated viscous. She is now s/p washout, open resection of left colon, colostomy. SICU consulted for close hemodynamic monitoring.       Allergies: No Known Allergies      MEDICATIONS:   --------------------------------------------------------------------------------------  Neurologic Medications  dexMEDEtomidine Infusion 1 MICROgram(s)/kG/Hr IV Continuous <Continuous>  fentaNYL   Infusion 0.5 MICROgram(s)/kG/Hr IV Continuous <Continuous>  HYDROmorphone  Injectable 0.5 milliGRAM(s) IV Push every 4 hours PRN Severe Pain (7 - 10)  QUEtiapine 100 milliGRAM(s) Oral every 12 hours    Respiratory Medications    Cardiovascular Medications    Gastrointestinal Medications  folic acid 1 milliGRAM(s) Oral daily  pantoprazole  Injectable 40 milliGRAM(s) IV Push two times a day  potassium chloride  10 mEq/100 mL IVPB 10 milliEquivalent(s) IV Intermittent every 1 hour  thiamine Injectable 100 milliGRAM(s) IV Push daily    Genitourinary Medications    Hematologic/Oncologic Medications  heparin   Injectable 5000 Unit(s) SubCutaneous every 8 hours    Antimicrobial/Immunologic Medications  caspofungin IVPB      caspofungin IVPB 50 milliGRAM(s) IV Intermittent every 24 hours  piperacillin/tazobactam IVPB.. 3.375 Gram(s) IV Intermittent every 8 hours    Endocrine/Metabolic Medications    Topical/Other Medications  chlorhexidine 0.12% Liquid 15 milliLiter(s) Oral Mucosa every 12 hours  chlorhexidine 4% Liquid 1 Application(s) Topical daily    --------------------------------------------------------------------------------------    VITAL SIGNS, INS/OUTS (last 24 hours):  --------------------------------------------------------------------------------------  T(C): 36.6 (06-15-22 @ 00:00), Max: 37.4 (06-14-22 @ 16:00)  HR: 105 (06-15-22 @ 03:15) (69 - 133)  BP: 117/73 (06-14-22 @ 15:09) (100/48 - 117/73)  RR: 19 (06-15-22 @ 01:45) (18 - 23)  SpO2: 100% (06-15-22 @ 03:15) (99% - 100%)    06-13-22 @ 07:01  -  06-14-22 @ 07:00  --------------------------------------------------------  IN: 2558.1 mL / OUT: 3932 mL / NET: -1373.9 mL    06-14-22 @ 07:01  -  06-15-22 @ 03:17  --------------------------------------------------------  IN: 3651.2 mL / OUT: 4228 mL / NET: -576.8 mL      --------------------------------------------------------------------------------------    EXAM  NEUROLOGY  Exam: Normal, NAD, alert, oriented x3, no focal deficits.    HEENT  Exam: Normocephalic, atraumatic, EOMI.     RESPIRATORY  Exam: Normal expansion/effort.  Mechanical Ventilation: Mode: AC/ CMV (Assist Control/ Continuous Mandatory Ventilation), RR (machine): 14, TV (machine): 500, FiO2: 30, PEEP: 5, ITime: 0.93, MAP: 10, PIP: 23    CARDIOVASCULAR  Exam: Regular rate and rhythm.       GI/NUTRITION  Exam: Abdomen soft, Non-tender, Non-distended.     VASCULAR  Exam: Extremities warm, pink, well-perfused.     MUSCULOSKELETAL  Exam: All extremities moving spontaneously without limitations.     SKIN  Exam: Good skin turgor, no skin breakdown.       LABS  --------------------------------------------------------------------------------------                        8.8    6.63  )-----------( 16       ( 15 Noe 2022 01:20 )             25.6   06-15    145  |  110<H>  |  32<H>  ----------------------------<  116<H>  3.2<L>   |  23  |  1.11    Ca    7.5<L>      15 Noe 2022 01:20  Phos  4.4     06-15  Mg     1.90     06-15    TPro  5.2<L>  /  Alb  2.2<L>  /  TBili  4.1<H>  /  DBili  x   /  AST  61<H>  /  ALT  29  /  AlkPhos  118  06-14  ABG - ( 15 Noe 2022 02:30 )  pH, Arterial: 7.47  pH, Blood: x     /  pCO2: 32    /  pO2: 195   / HCO3: 23    / Base Excess: 0.0   /  SaO2: 99.2      PT/INR - ( 14 Jun 2022 22:07 )   PT: 15.3 sec;   INR: 1.32 ratio    PTT - ( 14 Jun 2022 22:07 )  PTT:32.9 sec  --------------------------------------------------------------------------------------     SICU Daily Progress Note  =====================================================  Interval/Overnight Events:     - 1 unit of pRBC given during day (Hgb 7.7-->7.1)  - Lasix 40 x1 pre op  - 1 unit plts en route to OR, 1 unit platelet in OR, and 1 unit platelet post-op  - OR: Bridging vicryl mesh to close fascial gap, wound vac placed on top, 3 SARAH drains left in place. Plans for RTOR w/ PRS for skin closure.  - Tachycardic to 120s overnight, EKG sinus tachycardia  - Bilious output noted from ETT suctioning, concerning for aspiration  - POCUS: respiratory variations on IVC, collapsing LV  - 1L LR bolus given  - Troponin elevated to 58    HPI: 52y female with recurrent cervical CA (per patient stage III) presenting as transfer from [place] a/w neutropenic fever. Patient reports she had last cycle of chemotherapy 5 days prior.  Patient had VNS care stop by yesterday and was noted to be tachycardic on vital signs and was sent to the ED.  She reports feeling weak + fatigued. + upper abdominal pain that worsens while having chills. She denies any vomiting, chest pain, SOB.     At [facility] she was given Meropenem and Zosyn and was noted to be tachycardic to 140s and febrile to 39.5 and was thus transferred for further management. She became acutely altered yesterday afternoon without improvement and AXR with increased free air concern for perforated viscous. She is now s/p washout, open resection of left colon, colostomy. SICU consulted for close hemodynamic monitoring.       Allergies: No Known Allergies      MEDICATIONS:   --------------------------------------------------------------------------------------  Neurologic Medications  dexMEDEtomidine Infusion 1 MICROgram(s)/kG/Hr IV Continuous <Continuous>  fentaNYL   Infusion 0.5 MICROgram(s)/kG/Hr IV Continuous <Continuous>  HYDROmorphone  Injectable 0.5 milliGRAM(s) IV Push every 4 hours PRN Severe Pain (7 - 10)  QUEtiapine 100 milliGRAM(s) Oral every 12 hours    Respiratory Medications    Cardiovascular Medications    Gastrointestinal Medications  folic acid 1 milliGRAM(s) Oral daily  pantoprazole  Injectable 40 milliGRAM(s) IV Push two times a day  potassium chloride  10 mEq/100 mL IVPB 10 milliEquivalent(s) IV Intermittent every 1 hour  thiamine Injectable 100 milliGRAM(s) IV Push daily    Genitourinary Medications    Hematologic/Oncologic Medications  heparin   Injectable 5000 Unit(s) SubCutaneous every 8 hours    Antimicrobial/Immunologic Medications  caspofungin IVPB      caspofungin IVPB 50 milliGRAM(s) IV Intermittent every 24 hours  piperacillin/tazobactam IVPB.. 3.375 Gram(s) IV Intermittent every 8 hours    Endocrine/Metabolic Medications    Topical/Other Medications  chlorhexidine 0.12% Liquid 15 milliLiter(s) Oral Mucosa every 12 hours  chlorhexidine 4% Liquid 1 Application(s) Topical daily    --------------------------------------------------------------------------------------  VITAL SIGNS, INS/OUTS (last 24 hours):  --------------------------------------------------------------------------------------  T(C): 36.6 (06-15-22 @ 00:00), Max: 37.4 (06-14-22 @ 16:00)  HR: 105 (06-15-22 @ 03:15) (69 - 133)  BP: 117/73 (06-14-22 @ 15:09) (100/48 - 117/73)  RR: 19 (06-15-22 @ 01:45) (18 - 23)  SpO2: 100% (06-15-22 @ 03:15) (99% - 100%)    06-13-22 @ 07:01  -  06-14-22 @ 07:00  --------------------------------------------------------  IN: 2558.1 mL / OUT: 3932 mL / NET: -1373.9 mL    06-14-22 @ 07:01  -  06-15-22 @ 03:17  --------------------------------------------------------  IN: 3651.2 mL / OUT: 4228 mL / NET: -576.8 mL  --------------------------------------------------------------------------------------  EXAM  NEUROLOGY  Exam: Normal, NAD, alert, no focal deficits.    HEENT  Exam: Normocephalic, atraumatic, EOMI.     RESPIRATORY  Exam: Normal expansion/effort.  Mechanical Ventilation: Mode: AC/ CMV (Assist Control/ Continuous Mandatory Ventilation), RR (machine): 14, TV (machine): 500, FiO2: 30, PEEP: 5, ITime: 0.93, MAP: 10, PIP: 23    CARDIOVASCULAR  Exam: Sinus tachycardia to 110s     GI/NUTRITION  Exam: Abdomen soft, Non-tender, Non-distended. Wound vac in place, holding suction    VASCULAR  Exam: Extremities warm, pink, well-perfused.     MUSCULOSKELETAL  Exam: All extremities moving spontaneously without limitations.     SKIN  Exam: Good skin turgor, no skin breakdown.       LABS  --------------------------------------------------------------------------------------                        8.8    6.63  )-----------( 16       ( 15 Noe 2022 01:20 )             25.6   06-15    145  |  110<H>  |  32<H>  ----------------------------<  116<H>  3.2<L>   |  23  |  1.11    Ca    7.5<L>      15 Noe 2022 01:20  Phos  4.4     06-15  Mg     1.90     06-15    TPro  5.2<L>  /  Alb  2.2<L>  /  TBili  4.1<H>  /  DBili  x   /  AST  61<H>  /  ALT  29  /  AlkPhos  118  06-14  ABG - ( 15 Noe 2022 02:30 )  pH, Arterial: 7.47  pH, Blood: x     /  pCO2: 32    /  pO2: 195   / HCO3: 23    / Base Excess: 0.0   /  SaO2: 99.2      PT/INR - ( 14 Jun 2022 22:07 )   PT: 15.3 sec;   INR: 1.32 ratio    PTT - ( 14 Jun 2022 22:07 )  PTT:32.9 sec  --------------------------------------------------------------------------------------     SICU Daily Progress Note  =====================================================  Interval/Overnight Events:     - 1 unit of pRBC given during day (Hgb 7.7-->7.1)  - Lasix 40 x1 pre op  - 1 unit plts en route to OR, 1 unit platelet in OR, and 1 unit platelet post-op  - OR: Bridging vicryl mesh to close fascial gap, wound vac placed on top, 3 SARAH drains left in place. Plans for RTOR w/ PRS for skin closure.  - Tachycardic to 120s overnight, EKG sinus tachycardia  - Bilious output noted from ETT suctioning, concerning for aspiration  - POCUS: respiratory variations on IVC, collapsing LV  - 1L LR bolus given  - Troponin elevated to 58  - heme consult  - POCUS in PM to assess for diuresis  - plan for OR Friday for skin closure      HPI: 52y female with recurrent cervical CA (per patient stage III) presenting as transfer from [place] a/w neutropenic fever. Patient reports she had last cycle of chemotherapy 5 days prior.  Patient had VNS care stop by yesterday and was noted to be tachycardic on vital signs and was sent to the ED.  She reports feeling weak + fatigued. + upper abdominal pain that worsens while having chills. She denies any vomiting, chest pain, SOB.     At [facility] she was given Meropenem and Zosyn and was noted to be tachycardic to 140s and febrile to 39.5 and was thus transferred for further management. She became acutely altered yesterday afternoon without improvement and AXR with increased free air concern for perforated viscous. She is now s/p washout, open resection of left colon, colostomy. SICU consulted for close hemodynamic monitoring.       Allergies: No Known Allergies      MEDICATIONS:   --------------------------------------------------------------------------------------  Neurologic Medications  dexMEDEtomidine Infusion 1 MICROgram(s)/kG/Hr IV Continuous <Continuous>  fentaNYL   Infusion 0.5 MICROgram(s)/kG/Hr IV Continuous <Continuous>  HYDROmorphone  Injectable 0.5 milliGRAM(s) IV Push every 4 hours PRN Severe Pain (7 - 10)  QUEtiapine 100 milliGRAM(s) Oral every 12 hours    Respiratory Medications    Cardiovascular Medications    Gastrointestinal Medications  folic acid 1 milliGRAM(s) Oral daily  pantoprazole  Injectable 40 milliGRAM(s) IV Push two times a day  potassium chloride  10 mEq/100 mL IVPB 10 milliEquivalent(s) IV Intermittent every 1 hour  thiamine Injectable 100 milliGRAM(s) IV Push daily    Genitourinary Medications    Hematologic/Oncologic Medications  heparin   Injectable 5000 Unit(s) SubCutaneous every 8 hours    Antimicrobial/Immunologic Medications  caspofungin IVPB      caspofungin IVPB 50 milliGRAM(s) IV Intermittent every 24 hours  piperacillin/tazobactam IVPB.. 3.375 Gram(s) IV Intermittent every 8 hours    Endocrine/Metabolic Medications    Topical/Other Medications  chlorhexidine 0.12% Liquid 15 milliLiter(s) Oral Mucosa every 12 hours  chlorhexidine 4% Liquid 1 Application(s) Topical daily    --------------------------------------------------------------------------------------  VITAL SIGNS, INS/OUTS (last 24 hours):  --------------------------------------------------------------------------------------  T(C): 36.6 (06-15-22 @ 00:00), Max: 37.4 (06-14-22 @ 16:00)  HR: 105 (06-15-22 @ 03:15) (69 - 133)  BP: 117/73 (06-14-22 @ 15:09) (100/48 - 117/73)  RR: 19 (06-15-22 @ 01:45) (18 - 23)  SpO2: 100% (06-15-22 @ 03:15) (99% - 100%)    06-13-22 @ 07:01  -  06-14-22 @ 07:00  --------------------------------------------------------  IN: 2558.1 mL / OUT: 3932 mL / NET: -1373.9 mL    06-14-22 @ 07:01  -  06-15-22 @ 03:17  --------------------------------------------------------  IN: 3651.2 mL / OUT: 4228 mL / NET: -576.8 mL  --------------------------------------------------------------------------------------  EXAM  NEUROLOGY  Exam: Normal, NAD, alert, no focal deficits.    HEENT  Exam: Normocephalic, atraumatic, EOMI.     RESPIRATORY  Exam: Normal expansion/effort.  Mechanical Ventilation: Mode: AC/ CMV (Assist Control/ Continuous Mandatory Ventilation), RR (machine): 14, TV (machine): 500, FiO2: 30, PEEP: 5, ITime: 0.93, MAP: 10, PIP: 23    CARDIOVASCULAR  Exam: Sinus tachycardia to 110s     GI/NUTRITION  Exam: Abdomen soft, Non-tender, Non-distended. Wound vac in place, holding suction    VASCULAR  Exam: Extremities warm, pink, well-perfused.     MUSCULOSKELETAL  Exam: All extremities moving spontaneously without limitations.     SKIN  Exam: Good skin turgor, no skin breakdown.       LABS  --------------------------------------------------------------------------------------                        8.8    6.63  )-----------( 16       ( 15 Noe 2022 01:20 )             25.6   06-15    145  |  110<H>  |  32<H>  ----------------------------<  116<H>  3.2<L>   |  23  |  1.11    Ca    7.5<L>      15 Noe 2022 01:20  Phos  4.4     06-15  Mg     1.90     06-15    TPro  5.2<L>  /  Alb  2.2<L>  /  TBili  4.1<H>  /  DBili  x   /  AST  61<H>  /  ALT  29  /  AlkPhos  118  06-14  ABG - ( 15 Noe 2022 02:30 )  pH, Arterial: 7.47  pH, Blood: x     /  pCO2: 32    /  pO2: 195   / HCO3: 23    / Base Excess: 0.0   /  SaO2: 99.2      PT/INR - ( 14 Jun 2022 22:07 )   PT: 15.3 sec;   INR: 1.32 ratio    PTT - ( 14 Jun 2022 22:07 )  PTT:32.9 sec  --------------------------------------------------------------------------------------

## 2022-06-15 NOTE — PROGRESS NOTE ADULT - ASSESSMENT
52y with a PMHx of HTN, cervical CA (stage III)  on chemo - last session last week - c/b b/l hydronephrosis s/p B/L nephrostomy tube placement, p/w severe sepsis from neutropenic fever. Pt was acutely altered yesterday afternoon without improvement and AXR showed increased free air with concern for perforated viscous. She is now s/p washout, open resection of left colon, with colostomy creation. Recovering in SICU for close hemodynamic monitoring.     PLAN:  NEUROLOGY:  - Sedation: Precedex and Fentanyl gtt  - Pain control: IV Dilaudid 0.5mg q4hr PRN   - Seroquel 100mg PO BID  - Folic acid and thiamine    RESPIRATORY:  - MV: Volume /14/5/30  - CPAP trials as tolerated  - Monitor daily chest x-rays and ABG  - Maintain O2 saturation >92%    CARDIOVASCULAR:  - Off pressors  - Monitor hemodynamics  - Keep MAP >65    GI / NUTRITION:  - NPO  - GI ppx: IV Protonix 40mg BID  - Wound VAC in place, SS output    RENAL / GENITOURINARY: hx of bilateral nephrostomy tubes  - Indwelling hilliard catheter, bilateral nephrostomy tubes  - IR nephrostomy tube exchange postponed  - Monitor electrolytes and replete PRN  - Continue to monitor strict ins and outs q1 hour    HEMATOLOGIC:  - VTE ppx: SQH q8hr  - Transfuse prbc and plt as needed  - Monitor H/H and platelet count on CBC daily    INFECTIOUS DISEASE: hx of neutropenic fevers, s/p zarxio tx  - Zosyn and Caspofungin  - Monitor fever curve and WBC on daily CBC w/diff    ENDOCRINOLOGY:  - Monitor fingersticks q6 hours while NPO    DISPO:  - Full code  - SICU 52y with a PMHx of HTN, cervical CA (stage III)  on chemo - last session last week - c/b b/l hydronephrosis s/p B/L nephrostomy tube placement, p/w severe sepsis from neutropenic fever. Pt was acutely altered yesterday afternoon without improvement and AXR showed increased free air with concern for perforated viscous. She is now s/p washout, open resection of left colon, with colostomy creation. Recovering in SICU for close hemodynamic monitoring.     PLAN:  NEUROLOGY:  - Sedation: Precedex and Fentanyl gtt  - Pain control: IV Dilaudid 0.5mg q4hr PRN   - Seroquel 100mg PO BID  - Folic acid and thiamine    RESPIRATORY:  - MV: Volume /14/5/30  - CPAP trials as tolerated  - Monitor daily chest x-rays and ABG  - Maintain O2 saturation >92%    CARDIOVASCULAR:  - Off pressors  - Monitor hemodynamics  - Keep MAP >65    GI / NUTRITION:  - NPO  - GI ppx: IV Protonix 40mg BID  - Wound VAC in place, SS output    RENAL / GENITOURINARY: hx of bilateral nephrostomy tubes  - Indwelling hilliard catheter, bilateral nephrostomy tubes  - IR nephrostomy tube exchange postponed  - Monitor electrolytes and replete PRN  - Continue to monitor strict ins and outs q1 hour    HEMATOLOGIC:  - VTE ppx: SQH q8hr  - Transfuse prbc and plt as needed  - Monitor H/H and platelet count on CBC daily  - Heme consulted for low platelet count    INFECTIOUS DISEASE: hx of neutropenic fevers, s/p zarxio tx  - Zosyn and Caspofungin  - Monitor fever curve and WBC on daily CBC w/diff    ENDOCRINOLOGY:  - Monitor fingersticks q6 hours while NPO    DISPO:  - Full code  - SICU

## 2022-06-16 LAB
ANION GAP SERPL CALC-SCNC: 14 MMOL/L — SIGNIFICANT CHANGE UP (ref 7–14)
BUN SERPL-MCNC: 29 MG/DL — HIGH (ref 7–23)
CALCIUM SERPL-MCNC: 8.1 MG/DL — LOW (ref 8.4–10.5)
CHLORIDE SERPL-SCNC: 109 MMOL/L — HIGH (ref 98–107)
CO2 SERPL-SCNC: 22 MMOL/L — SIGNIFICANT CHANGE UP (ref 22–31)
CREAT SERPL-MCNC: 1.09 MG/DL — SIGNIFICANT CHANGE UP (ref 0.5–1.3)
EGFR: 60 ML/MIN/1.73M2 — SIGNIFICANT CHANGE UP
GLUCOSE SERPL-MCNC: 98 MG/DL — SIGNIFICANT CHANGE UP (ref 70–99)
HCT VFR BLD CALC: 25.9 % — LOW (ref 34.5–45)
HGB BLD-MCNC: 8.6 G/DL — LOW (ref 11.5–15.5)
MAGNESIUM SERPL-MCNC: 1.7 MG/DL — SIGNIFICANT CHANGE UP (ref 1.6–2.6)
MCHC RBC-ENTMCNC: 29.7 PG — SIGNIFICANT CHANGE UP (ref 27–34)
MCHC RBC-ENTMCNC: 33.2 GM/DL — SIGNIFICANT CHANGE UP (ref 32–36)
MCV RBC AUTO: 89.3 FL — SIGNIFICANT CHANGE UP (ref 80–100)
METHYLMALONATE SERPL-SCNC: 252 NMOL/L — SIGNIFICANT CHANGE UP (ref 0–378)
NRBC # BLD: 0 /100 WBCS — SIGNIFICANT CHANGE UP
NRBC # FLD: 0.06 K/UL — HIGH
PHOSPHATE SERPL-MCNC: 3.8 MG/DL — SIGNIFICANT CHANGE UP (ref 2.5–4.5)
PLATELET # BLD AUTO: 14 K/UL — CRITICAL LOW (ref 150–400)
POTASSIUM SERPL-MCNC: 3.6 MMOL/L — SIGNIFICANT CHANGE UP (ref 3.5–5.3)
POTASSIUM SERPL-SCNC: 3.6 MMOL/L — SIGNIFICANT CHANGE UP (ref 3.5–5.3)
RBC # BLD: 2.9 M/UL — LOW (ref 3.8–5.2)
RBC # FLD: 15.9 % — HIGH (ref 10.3–14.5)
SODIUM SERPL-SCNC: 145 MMOL/L — SIGNIFICANT CHANGE UP (ref 135–145)
SRA INTERP SER-IMP: SIGNIFICANT CHANGE UP
WBC # BLD: 6.87 K/UL — SIGNIFICANT CHANGE UP (ref 3.8–10.5)
WBC # FLD AUTO: 6.87 K/UL — SIGNIFICANT CHANGE UP (ref 3.8–10.5)

## 2022-06-16 PROCEDURE — 99291 CRITICAL CARE FIRST HOUR: CPT

## 2022-06-16 PROCEDURE — 71045 X-RAY EXAM CHEST 1 VIEW: CPT | Mod: 26

## 2022-06-16 RX ORDER — FUROSEMIDE 40 MG
80 TABLET ORAL ONCE
Refills: 0 | Status: COMPLETED | OUTPATIENT
Start: 2022-06-16 | End: 2022-06-16

## 2022-06-16 RX ORDER — MAGNESIUM SULFATE 500 MG/ML
2 VIAL (ML) INJECTION ONCE
Refills: 0 | Status: COMPLETED | OUTPATIENT
Start: 2022-06-16 | End: 2022-06-16

## 2022-06-16 RX ORDER — POTASSIUM CHLORIDE 20 MEQ
20 PACKET (EA) ORAL ONCE
Refills: 0 | Status: COMPLETED | OUTPATIENT
Start: 2022-06-16 | End: 2022-06-16

## 2022-06-16 RX ORDER — OXYCODONE HYDROCHLORIDE 5 MG/1
10 TABLET ORAL EVERY 4 HOURS
Refills: 0 | Status: DISCONTINUED | OUTPATIENT
Start: 2022-06-16 | End: 2022-06-20

## 2022-06-16 RX ORDER — OXYCODONE HYDROCHLORIDE 5 MG/1
5 TABLET ORAL EVERY 4 HOURS
Refills: 0 | Status: DISCONTINUED | OUTPATIENT
Start: 2022-06-16 | End: 2022-06-20

## 2022-06-16 RX ADMIN — HEPARIN SODIUM 5000 UNIT(S): 5000 INJECTION INTRAVENOUS; SUBCUTANEOUS at 05:54

## 2022-06-16 RX ADMIN — CASPOFUNGIN ACETATE 260 MILLIGRAM(S): 7 INJECTION, POWDER, LYOPHILIZED, FOR SOLUTION INTRAVENOUS at 11:23

## 2022-06-16 RX ADMIN — Medication 1 MILLIGRAM(S): at 11:20

## 2022-06-16 RX ADMIN — CHLORHEXIDINE GLUCONATE 15 MILLILITER(S): 213 SOLUTION TOPICAL at 17:33

## 2022-06-16 RX ADMIN — PIPERACILLIN AND TAZOBACTAM 25 GRAM(S): 4; .5 INJECTION, POWDER, LYOPHILIZED, FOR SOLUTION INTRAVENOUS at 10:26

## 2022-06-16 RX ADMIN — HEPARIN SODIUM 5000 UNIT(S): 5000 INJECTION INTRAVENOUS; SUBCUTANEOUS at 21:08

## 2022-06-16 RX ADMIN — Medication 80 MILLIGRAM(S): at 11:23

## 2022-06-16 RX ADMIN — Medication 25 GRAM(S): at 02:20

## 2022-06-16 RX ADMIN — HEPARIN SODIUM 5000 UNIT(S): 5000 INJECTION INTRAVENOUS; SUBCUTANEOUS at 15:23

## 2022-06-16 RX ADMIN — OXYCODONE HYDROCHLORIDE 10 MILLIGRAM(S): 5 TABLET ORAL at 17:30

## 2022-06-16 RX ADMIN — PANTOPRAZOLE SODIUM 40 MILLIGRAM(S): 20 TABLET, DELAYED RELEASE ORAL at 17:33

## 2022-06-16 RX ADMIN — QUETIAPINE FUMARATE 100 MILLIGRAM(S): 200 TABLET, FILM COATED ORAL at 17:32

## 2022-06-16 RX ADMIN — CHLORHEXIDINE GLUCONATE 15 MILLILITER(S): 213 SOLUTION TOPICAL at 05:54

## 2022-06-16 RX ADMIN — FENTANYL CITRATE 5.02 MICROGRAM(S)/KG/HR: 50 INJECTION INTRAVENOUS at 00:44

## 2022-06-16 RX ADMIN — DEXMEDETOMIDINE HYDROCHLORIDE IN 0.9% SODIUM CHLORIDE 25.1 MICROGRAM(S)/KG/HR: 4 INJECTION INTRAVENOUS at 05:54

## 2022-06-16 RX ADMIN — PIPERACILLIN AND TAZOBACTAM 25 GRAM(S): 4; .5 INJECTION, POWDER, LYOPHILIZED, FOR SOLUTION INTRAVENOUS at 17:33

## 2022-06-16 RX ADMIN — CHLORHEXIDINE GLUCONATE 1 APPLICATION(S): 213 SOLUTION TOPICAL at 11:37

## 2022-06-16 RX ADMIN — DEXMEDETOMIDINE HYDROCHLORIDE IN 0.9% SODIUM CHLORIDE 25.1 MICROGRAM(S)/KG/HR: 4 INJECTION INTRAVENOUS at 20:42

## 2022-06-16 RX ADMIN — Medication 100 MILLIGRAM(S): at 11:23

## 2022-06-16 RX ADMIN — PANTOPRAZOLE SODIUM 40 MILLIGRAM(S): 20 TABLET, DELAYED RELEASE ORAL at 05:54

## 2022-06-16 RX ADMIN — Medication 50 MILLIEQUIVALENT(S): at 06:06

## 2022-06-16 RX ADMIN — PIPERACILLIN AND TAZOBACTAM 25 GRAM(S): 4; .5 INJECTION, POWDER, LYOPHILIZED, FOR SOLUTION INTRAVENOUS at 01:54

## 2022-06-16 NOTE — PROGRESS NOTE ADULT - ATTENDING COMMENTS
I agree with the detailed interval history, physical, and plan, which I have reviewed and edited where appropriate'; also agree with notes/assessment with my team on service.  I have personally examined the patient.  I was physically present for the key portions of the evaluation and management (E/M) service provided.  I reviewed all the pertinent data.  The patient is a critical care patient with life threatening hemodynamic and metabolic instability in SICU.  The SICU team has a constant risk benefit analyzes discussion and coordinating care with the primary team and all consultants.   The patient is in SICU with the chief complaint and diagnosis mentioned in the note.   The plan will be specified in the note.  52y s/p washout, open resection of left colon, with colostomy creation in SICU  no focal deficits.  RESPIRATORY  Exam: coarse BS  Mechanical Ventilation: Mode: AC/ CMV   CARDIOVASCULAR  Exam: Regular rate   GI/NUTRITION  Exam: Abdomen soft, Non-tender,   VASCULAR  Exam: Extremities warm,    PLAN:  NEUROLOGY: Sedation:  -  Precedex and Fentanyl gtt  - IV Dilaudid 0.5mg q4hr PRN   - Seroquel 100mg PO BID  -RESPIRATORY: respiratory failure  - MV: Volume /14/5/30  - CPAP/PSV trials   CARDIOVASCULAR:  - Keep MAP >65  GI / NUTRITION:  - NPO  - GI ppx: IV Protonix 40mg BID  -RENAL / GENITOURINARY: hx of bilateral nephrostomy tubes  - Monitor electrolytes   HEMATOLOGIC:  - VTE ppx: SQH q8hr  -INFECTIOUS DISEASE: hx of neutropenic fevers,   - Zosyn and Caspofungin  -ENDOCRINOLOGY:  - Monitor fingersticks q6 hours while NPO  DISPO:  - Full code  - SICU

## 2022-06-16 NOTE — PROGRESS NOTE ADULT - ASSESSMENT
52y  with recurrent cervical CA admitted for management of neutropenic fever, now s/p emergent exploratory laparotomy, abdominal washout, rectosigmoid colectomy, diverting colostomy, bronchoscopy and Abthera placement on 22 (ebl 2000cc, s/p 5UpRBC, 3L albumin, 3U Plts, 3U FFP, 3U Cryo) for findings of increased free air on abdominal xray concerning for bowel perforation. Patient s/p a  second RTOR for abdominal washout and replacement of Abthera and now POD #1 s/p washout and bridging vicryl mesh placement to close fascial gap. Plan for return to OR on Friday with plastics for closure.    Neuro: Continue IV analgesics regimen per SICU. Sedated; continue Precedex,  Seroquel, Fentanyl per SICU.  CV: Hemodynamically stable  Pulm: Intubated  GI: NPO. GI following. Continue IV Protonix/Reglan. NG tube in place (- ). Caspo () for GI perf and neutropenia  : b/l nephrostomy tubes in place. Infante in place w la nena output. Patient has resolving ASHLEY likely 2/2 IV Contrast & Dehydration, Cr 1.09. S/P lasix yesterday. Continue to monitor UOP. Replete electrolytes PRN.  Heme: Continue Venodynes for DVT ppx.  -Thrombocytopenia likely multifactorial including chemo side effects and infection.  -As per heme: transfuse if Hb <7; Plt <10k, or <15k if febrile; or <50k if active bleeding.  ID: Afebrile. Continue Zosyn (s/p Cefepime). Continue to trend WBC. s/p Zarixo (-6/10) for neutropenic fever. WBC 7.66.   Dispo: Appreciate excellent SICU care

## 2022-06-16 NOTE — CHART NOTE - NSCHARTNOTEFT_GEN_A_CORE
52y  with recurrent cervical CA admitted for management of neutropenic fever, Neurology consulted for AMS, now s/p emergent exploratory laparotomy, abdominal washout, rectosigmoid colectomy, diverting colostomy, bronchoscopy and Abthera placement on 22 for findings of increased free air on abdominal X-ray concerning for bowel perforation. Patient s/p another abdominal washout and replacement of Abthera with plan to return to OR on  with Plastic Surgery for closure.    If patient's mental status has improved, would not require MRI brain or EEG.    Please Neurology call back at [phone] after patient has been extubated and off sedation.

## 2022-06-16 NOTE — PROGRESS NOTE ADULT - SUBJECTIVE AND OBJECTIVE BOX
R2 GYN ONC Progress Note     Interval/Overnight Events:  - No acute events overnight  - plan for OR Friday for skin closure  - NGT kept to drainage  - Lasix 60 given during day, adequate response  - Seen by heme; thrombocytopenia likely multifactorial (chemo and infection)     Pt seen and examined at bedside. Responsive to commands.     Patient seen and examined at bedside.  No acute events overnight. No acute complaints.  Pain well controlled.  Patient is ambulating and tolerating.....   Has not yet passed flatus vs. Patient is passing flatus.    Patient is voiding spontaneously vs. Hilliard is still in place.   Denies CP, SOB, N/V, fevers, and chills.    Vital Signs Last 24 Hours  T(C): 37.6 (06-16-22 @ 00:00), Max: 37.6 (06-16-22 @ 00:00)  HR: 72 (06-16-22 @ 04:00) (70 - 106)  BP: --  RR: 14 (06-16-22 @ 04:00) (14 - 26)  SpO2: 100% (06-16-22 @ 04:00) (100% - 100%)    I&O's Summary    14 Jun 2022 07:01  -  15 Noe 2022 07:00  --------------------------------------------------------  IN: 4271.7 mL / OUT: 4848 mL / NET: -576.3 mL    15 Noe 2022 07:01  -  16 Jun 2022 06:27  --------------------------------------------------------  IN: 1538.7 mL / OUT: 3355 mL / NET: -1816.3 mL        Physical Exam:  Gen: NAD, responsive  CV: RRR   Pulm: Mechanical breath sounds 2/2 ventilator   Abd: Soft, non-distended, Abthera in place holding suction ; ostomy pink and well perfused with no air in ostomy bag; NGT in place-minimal clear output; +3 Ric draings  : L and R nephrostomy tube draining clear urine;hilliard in place draining dark red-la nena urine  Ext: Warm & well perfused, +3 edema. +b/l pedal pulses   Labs:                        8.6    6.87  )-----------( 14       ( 16 Jun 2022 00:53 )             25.9   baso x      eos x      imm gran x      lymph x      mono x      poly x                            8.9    6.27  )-----------( 12       ( 15 Noe 2022 10:05 )             27.1   baso x      eos x      imm gran x      lymph x      mono x      poly x                            8.8    6.63  )-----------( 16       ( 15 Noe 2022 01:20 )             25.6   baso x      eos x      imm gran x      lymph x      mono x      poly x                            9.3    6.31  )-----------( 16       ( 14 Jun 2022 22:07 )             27.6   baso x      eos x      imm gran x      lymph x      mono x      poly x                            7.1    6.33  )-----------( 14       ( 14 Jun 2022 12:00 )             20.8   baso x      eos x      imm gran x      lymph x      mono x      poly x                            7.7    7.66  )-----------( 14       ( 14 Jun 2022 01:02 )             22.3   baso x      eos x      imm gran x      lymph x      mono x      poly x                            7.0    7.83  )-----------( 18       ( 13 Jun 2022 21:30 )             21.2   baso x      eos x      imm gran x      lymph x      mono x      poly x                            7.5    9.30  )-----------( 15       ( 13 Jun 2022 15:00 )             21.9   baso x      eos x      imm gran x      lymph x      mono x      poly x                            6.6    9.65  )-----------( 23       ( 13 Jun 2022 09:40 )             20.0   baso x      eos x      imm gran x      lymph x      mono x      poly x          MEDICATIONS  (STANDING):  caspofungin IVPB      caspofungin IVPB 50 milliGRAM(s) IV Intermittent every 24 hours  chlorhexidine 0.12% Liquid 15 milliLiter(s) Oral Mucosa every 12 hours  chlorhexidine 4% Liquid 1 Application(s) Topical daily  dexMEDEtomidine Infusion 1 MICROgram(s)/kG/Hr (25.1 mL/Hr) IV Continuous <Continuous>  fentaNYL   Infusion 0.5 MICROgram(s)/kG/Hr (5.02 mL/Hr) IV Continuous <Continuous>  folic acid 1 milliGRAM(s) Oral daily  heparin   Injectable 5000 Unit(s) SubCutaneous every 8 hours  pantoprazole  Injectable 40 milliGRAM(s) IV Push two times a day  piperacillin/tazobactam IVPB.. 3.375 Gram(s) IV Intermittent every 8 hours  QUEtiapine 100 milliGRAM(s) Oral every 12 hours  thiamine Injectable 100 milliGRAM(s) IV Push daily    MEDICATIONS  (PRN):  HYDROmorphone  Injectable 0.5 milliGRAM(s) IV Push every 4 hours PRN Severe Pain (7 - 10)       R2 GYN ONC Progress Note     Interval/Overnight Events:  - No acute events overnight  - plan for OR Friday for skin closure  - NGT kept to drainage  - Lasix 60 given during day, adequate response  - Seen by heme; thrombocytopenia likely multifactorial (chemo and infection)     Pt seen and examined at bedside. Responsive to commands.     Vital Signs Last 24 Hours  T(C): 37.6 (06-16-22 @ 00:00), Max: 37.6 (06-16-22 @ 00:00)  HR: 72 (06-16-22 @ 04:00) (70 - 106)  BP: --  RR: 14 (06-16-22 @ 04:00) (14 - 26)  SpO2: 100% (06-16-22 @ 04:00) (100% - 100%)    I&O's Summary    14 Jun 2022 07:01  -  15 Noe 2022 07:00  --------------------------------------------------------  IN: 4271.7 mL / OUT: 4848 mL / NET: -576.3 mL    15 Noe 2022 07:01  -  16 Jun 2022 06:27  --------------------------------------------------------  IN: 1538.7 mL / OUT: 3355 mL / NET: -1816.3 mL        Physical Exam:  Gen: NAD, responsive  CV: RRR   Pulm: Mechanical breath sounds 2/2 ventilator   Abd: Soft, non-distended, Abthera in place holding suction ; ostomy pink and well perfused with no air in ostomy bag; NGT in place-minimal clear output; +3 Ric draings  : L and R nephrostomy tube draining clear urine;hilliard in place draining dark red-la nena urine  Ext: Warm & well perfused, +3 edema. +b/l pedal pulses   Labs:                        8.6    6.87  )-----------( 14       ( 16 Jun 2022 00:53 )             25.9   baso x      eos x      imm gran x      lymph x      mono x      poly x                            8.9    6.27  )-----------( 12       ( 15 Noe 2022 10:05 )             27.1   baso x      eos x      imm gran x      lymph x      mono x      poly x                            8.8    6.63  )-----------( 16       ( 15 Noe 2022 01:20 )             25.6   baso x      eos x      imm gran x      lymph x      mono x      poly x                            9.3    6.31  )-----------( 16       ( 14 Jun 2022 22:07 )             27.6   baso x      eos x      imm gran x      lymph x      mono x      poly x                            7.1    6.33  )-----------( 14       ( 14 Jun 2022 12:00 )             20.8   baso x      eos x      imm gran x      lymph x      mono x      poly x                            7.7    7.66  )-----------( 14       ( 14 Jun 2022 01:02 )             22.3   baso x      eos x      imm gran x      lymph x      mono x      poly x                            7.0    7.83  )-----------( 18       ( 13 Jun 2022 21:30 )             21.2   baso x      eos x      imm gran x      lymph x      mono x      poly x                            7.5    9.30  )-----------( 15       ( 13 Jun 2022 15:00 )             21.9   baso x      eos x      imm gran x      lymph x      mono x      poly x                            6.6    9.65  )-----------( 23       ( 13 Jun 2022 09:40 )             20.0   baso x      eos x      imm gran x      lymph x      mono x      poly x          MEDICATIONS  (STANDING):  caspofungin IVPB      caspofungin IVPB 50 milliGRAM(s) IV Intermittent every 24 hours  chlorhexidine 0.12% Liquid 15 milliLiter(s) Oral Mucosa every 12 hours  chlorhexidine 4% Liquid 1 Application(s) Topical daily  dexMEDEtomidine Infusion 1 MICROgram(s)/kG/Hr (25.1 mL/Hr) IV Continuous <Continuous>  fentaNYL   Infusion 0.5 MICROgram(s)/kG/Hr (5.02 mL/Hr) IV Continuous <Continuous>  folic acid 1 milliGRAM(s) Oral daily  heparin   Injectable 5000 Unit(s) SubCutaneous every 8 hours  pantoprazole  Injectable 40 milliGRAM(s) IV Push two times a day  piperacillin/tazobactam IVPB.. 3.375 Gram(s) IV Intermittent every 8 hours  QUEtiapine 100 milliGRAM(s) Oral every 12 hours  thiamine Injectable 100 milliGRAM(s) IV Push daily    MEDICATIONS  (PRN):  HYDROmorphone  Injectable 0.5 milliGRAM(s) IV Push every 4 hours PRN Severe Pain (7 - 10)

## 2022-06-16 NOTE — CHART NOTE - NSCHARTNOTEFT_GEN_A_CORE
R1 Gyn Onc Chart Note    Interval/Overnight Events:     - plan for OR Friday for skin closure  - NGT kept to drainage  - Lasix 60 given during day, adequate response  - No acute events overnight    Vital Signs Last 24 Hrs  T(C): 37.6 (16 Jun 2022 00:00), Max: 37.6 (16 Jun 2022 00:00)  T(F): 99.7 (16 Jun 2022 00:00), Max: 99.7 (16 Jun 2022 00:00)  HR: 72 (16 Jun 2022 04:00) (70 - 106)  BP: --  BP(mean): --  RR: 14 (16 Jun 2022 04:00) (14 - 26)  SpO2: 100% (16 Jun 2022 04:00) (100% - 100%)    Physical Exam:  Gen: NAD, sedated, follows commands   CV: tachycardic   Pulm: Mechanical breath sounds 2/2 ventilator   Abd: Soft, non-distended, +BS, wound vac in place holding suction; 3x Ric drains - LLQ, LUQ, and R. abdominal w/serosanguinous fluid. ostomy salmon-pink and well perfused with no air in ostomy bag  : L nephrostomy tube draining clear urine; R nephrostomy tube draining clear urine, hilliard in place draining la nena urine   Ext: Warm & well perfused, +3 edema. +b/l pedal pulses   Ventilator: AC 14/500/5/30%    I&O's Detail    14 Jun 2022 07:01  -  15 Noe 2022 07:00  --------------------------------------------------------  IN:    Dexmedetomidine: 561.7 mL    FentaNYL: 135 mL    IV PiggyBack: 1160 mL    Lactated Ringers Bolus: 1000 mL    Platelets - Single Donor: 865 mL    PRBCs (Packed Red Blood Cells): 550 mL  Total IN: 4271.7 mL    OUT:    Drain (mL): 65 mL    Drain (mL): 70 mL    Drain (mL): 35 mL    Indwelling Catheter - Urethral (mL): 243 mL    Nasogastric/Oral tube (mL): 1250 mL    Nephrostomy Tube (mL): 1645 mL    Nephrostomy Tube (mL): 1090 mL    VAC (Vacuum Assisted Closure) System (mL): 450 mL  Total OUT: 4848 mL    Total NET: -576.3 mL      15 Noe 2022 07:01  -  16 Jun 2022 06:00  --------------------------------------------------------  IN:    Dexmedetomidine: 502 mL    FentaNYL: 286.7 mL    IV PiggyBack: 750 mL  Total IN: 1538.7 mL    OUT:    Drain (mL): 40 mL    Drain (mL): 50 mL    Drain (mL): 90 mL    Indwelling Catheter - Urethral (mL): 450 mL    Nasogastric/Oral tube (mL): 300 mL    Nephrostomy Tube (mL): 1225 mL    Nephrostomy Tube (mL): 900 mL    VAC (Vacuum Assisted Closure) System (mL): 300 mL  Total OUT: 3355 mL    Total NET: -1816.3 mL                        8.6    6.87  )-----------( 14       ( 16 Jun 2022 00:53 )             25.9       06-16    145  |  109<H>  |  29<H>  ----------------------------<  98  3.6   |  22  |  1.09    Ca    8.1<L>      16 Jun 2022 00:53  Phos  3.8     06-16  Mg     1.70     06-16    TPro  5.2<L>  /  Alb  2.2<L>  /  TBili  4.1<H>  /  DBili  x   /  AST  61<H>  /  ALT  29  /  AlkPhos  118  06-14    LIVER FUNCTIONS - ( 14 Jun 2022 22:07 )  Alb: 2.2 g/dL / Pro: 5.2 g/dL / ALK PHOS: 118 U/L / ALT: 29 U/L / AST: 61 U/L / GGT: x         PT/INR - ( 14 Jun 2022 22:07 )   PT: 15.3 sec;   INR: 1.32 ratio    PTT - ( 14 Jun 2022 22:07 )  PTT:32.9 sec R1 Gyn Onc Chart Note    Interval/Overnight Events:     - plan for OR Friday for skin closure  - NGT kept to drainage  - Lasix 60 given during day, adequate response  - No acute events overnight    Vital Signs Last 24 Hrs  T(C): 37.6 (16 Jun 2022 00:00), Max: 37.6 (16 Jun 2022 00:00)  T(F): 99.7 (16 Jun 2022 00:00), Max: 99.7 (16 Jun 2022 00:00)  HR: 72 (16 Jun 2022 04:00) (70 - 106)  BP: --  BP(mean): --  RR: 14 (16 Jun 2022 04:00) (14 - 26)  SpO2: 100% (16 Jun 2022 04:00) (100% - 100%)    Physical Exam:  Gen: NAD, sedated, follows commands   CV: tachycardic   Pulm: Mechanical breath sounds 2/2 ventilator   Abd: Soft, non-distended, +BS, wound vac in place holding suction; 3x Ric drains - LLQ, LUQ, and R. abdominal w/serosanguinous fluid. ostomy salmon-pink and well perfused with no air in ostomy bag  : L nephrostomy tube draining clear urine; R nephrostomy tube draining clear urine, hilliard in place draining la nena urine   Ext: Warm & well perfused, +3 edema. +b/l pedal pulses   Ventilator: AC 14/500/5/30%    I&O's Detail    14 Jun 2022 07:01  -  15 Noe 2022 07:00  --------------------------------------------------------  IN:    Dexmedetomidine: 561.7 mL    FentaNYL: 135 mL    IV PiggyBack: 1160 mL    Lactated Ringers Bolus: 1000 mL    Platelets - Single Donor: 865 mL    PRBCs (Packed Red Blood Cells): 550 mL  Total IN: 4271.7 mL    OUT:    Drain (mL): 65 mL    Drain (mL): 70 mL    Drain (mL): 35 mL    Indwelling Catheter - Urethral (mL): 243 mL    Nasogastric/Oral tube (mL): 1250 mL    Nephrostomy Tube (mL): 1645 mL    Nephrostomy Tube (mL): 1090 mL    VAC (Vacuum Assisted Closure) System (mL): 450 mL  Total OUT: 4848 mL    Total NET: -576.3 mL      15 Noe 2022 07:01  -  16 Jun 2022 06:00  --------------------------------------------------------  IN:    Dexmedetomidine: 502 mL    FentaNYL: 286.7 mL    IV PiggyBack: 750 mL  Total IN: 1538.7 mL    OUT:    Drain (mL): 40 mL    Drain (mL): 50 mL    Drain (mL): 90 mL    Indwelling Catheter - Urethral (mL): 450 mL    Nasogastric/Oral tube (mL): 300 mL    Nephrostomy Tube (mL): 1225 mL    Nephrostomy Tube (mL): 900 mL    VAC (Vacuum Assisted Closure) System (mL): 300 mL  Total OUT: 3355 mL    Total NET: -1816.3 mL                        8.6    6.87  )-----------( 14       ( 16 Jun 2022 00:53 )             25.9       06-16    145  |  109<H>  |  29<H>  ----------------------------<  98  3.6   |  22  |  1.09    Ca    8.1<L>      16 Jun 2022 00:53  Phos  3.8     06-16  Mg     1.70     06-16    TPro  5.2<L>  /  Alb  2.2<L>  /  TBili  4.1<H>  /  DBili  x   /  AST  61<H>  /  ALT  29  /  AlkPhos  118  06-14    LIVER FUNCTIONS - ( 14 Jun 2022 22:07 )  Alb: 2.2 g/dL / Pro: 5.2 g/dL / ALK PHOS: 118 U/L / ALT: 29 U/L / AST: 61 U/L / GGT: x         PT/INR - ( 14 Jun 2022 22:07 )   PT: 15.3 sec;   INR: 1.32 ratio    PTT - ( 14 Jun 2022 22:07 )  PTT:32.9 sec    A/P: 52y with recurrent cervical CA admitted for management of neutropenic fever c/b AMS and c/f bowel perforation 2/2 increased free air on abdominal X-ray s/p emergent exploratory laparotomy, abdominal washout, rectosigmoid colectomy, diverting colostomy, bronchoscopy and Abthera placement on 6/8/22 (EBL 2000cc, s/p 5UpRBC, 3L albumin, 3U Plts, 3U FFP, 3U Cryo) with intraoperative findings notable for perforation in distal sigmoid colon. Patient POD#2 s/p 3rd re-exploratory laparotomy, abdominal washout, fascia bridge with [id number] vicryl mesh.     - RTOR w/ PRS for skin closure on Friday  - Sedated; continue Precedex, Fentanyl, Seroquel; IV analgesia per SICU  - Continues to be off pressor support; Continue to monitor closely   - Intubated on AC   - NPO/NGT in place (6/8-). Tube feeds held. Continue IV Protonix/Reglan. GI following  - b/l PCN, 10-30cc/hour concentrated urine bilaterally. Hilliard in place. ASHLEY likely 2/2 IV contrast, dehydration, Cr 1.29->1.31->1.23->1.19->>1.1->1.09. Continue to monitor UOP. Replete electrolytes PRN  - VTE ppx: Venodynes, HSQ 5K q8h  - Maintain Plt >50K ariel-operatively, total 5u plts transfused on 6/14  - Maintain Hgb >7; pRBCs administered: 5u pRBC intraop 6/8, 1u pRBC intraop 6/10; 1u pRBC post-op 6/12, 2u pRBC on 6/13, 2u pRBC on 6/14  - Afebrile. WBC 6.63, trend WBC; Continue Zosyn (6/8-), Caspofungin (6/13-), (s/p Cefepime), s/p Zarixo (6/5-6/10) for neutropenic fever     Dispo: Appreciate excellent SICU care.     seen and evaluated with YANI Brito, PGY-4  GRIS Lopez, PGY-1 R2 Gyn Onc Chart Note    Interval/Overnight Events:     - plan for OR Friday for skin closure w/PRS  - NGT kept to drainage  - Lasix 60 given during day, adequate response  - No acute events overnight    Pt evaluated at bedside. Responds to commands.     Vital Signs Last 24 Hrs  T(C): 37.6 (16 Jun 2022 00:00), Max: 37.6 (16 Jun 2022 00:00)  T(F): 99.7 (16 Jun 2022 00:00), Max: 99.7 (16 Jun 2022 00:00)  HR: 72 (16 Jun 2022 04:00) (70 - 106)  BP: --  BP(mean): --  RR: 14 (16 Jun 2022 04:00) (14 - 26)  SpO2: 100% (16 Jun 2022 04:00) (100% - 100%)    Physical Exam:  Gen: NAD, sedated, follows commands   CV: tachycardic   Pulm: Mechanical breath sounds 2/2 ventilator   Abd: Soft, non-distended, +BS, wound vac in place holding suction; 3x Ric drains - LLQ, LUQ, and R. abdominal w/serosanguinous fluid. ostomy salmon-pink and well perfused with no air in ostomy bag  : L nephrostomy tube draining clear urine; R nephrostomy tube draining clear urine, hilliard in place draining la nena urine   Ext: Warm & well perfused, +3 edema. +b/l pedal pulses   Ventilator: AC 14/500/5/30%    I&O's Detail    14 Jun 2022 07:01  -  15 Noe 2022 07:00  --------------------------------------------------------  IN:    Dexmedetomidine: 561.7 mL    FentaNYL: 135 mL    IV PiggyBack: 1160 mL    Lactated Ringers Bolus: 1000 mL    Platelets - Single Donor: 865 mL    PRBCs (Packed Red Blood Cells): 550 mL  Total IN: 4271.7 mL    OUT:    Drain (mL): 65 mL    Drain (mL): 70 mL    Drain (mL): 35 mL    Indwelling Catheter - Urethral (mL): 243 mL    Nasogastric/Oral tube (mL): 1250 mL    Nephrostomy Tube (mL): 1645 mL    Nephrostomy Tube (mL): 1090 mL    VAC (Vacuum Assisted Closure) System (mL): 450 mL  Total OUT: 4848 mL    Total NET: -576.3 mL      15 Noe 2022 07:01  -  16 Jun 2022 06:00  --------------------------------------------------------  IN:    Dexmedetomidine: 502 mL    FentaNYL: 286.7 mL    IV PiggyBack: 750 mL  Total IN: 1538.7 mL    OUT:    Drain (mL): 40 mL    Drain (mL): 50 mL    Drain (mL): 90 mL    Indwelling Catheter - Urethral (mL): 450 mL    Nasogastric/Oral tube (mL): 300 mL    Nephrostomy Tube (mL): 1225 mL    Nephrostomy Tube (mL): 900 mL    VAC (Vacuum Assisted Closure) System (mL): 300 mL  Total OUT: 3355 mL    Total NET: -1816.3 mL                        8.6    6.87  )-----------( 14       ( 16 Jun 2022 00:53 )             25.9       06-16    145  |  109<H>  |  29<H>  ----------------------------<  98  3.6   |  22  |  1.09    Ca    8.1<L>      16 Jun 2022 00:53  Phos  3.8     06-16  Mg     1.70     06-16    TPro  5.2<L>  /  Alb  2.2<L>  /  TBili  4.1<H>  /  DBili  x   /  AST  61<H>  /  ALT  29  /  AlkPhos  118  06-14    LIVER FUNCTIONS - ( 14 Jun 2022 22:07 )  Alb: 2.2 g/dL / Pro: 5.2 g/dL / ALK PHOS: 118 U/L / ALT: 29 U/L / AST: 61 U/L / GGT: x         PT/INR - ( 14 Jun 2022 22:07 )   PT: 15.3 sec;   INR: 1.32 ratio    PTT - ( 14 Jun 2022 22:07 )  PTT:32.9 sec    A/P: 52y with recurrent cervical CA admitted for management of neutropenic fever c/b AMS and c/f bowel perforation 2/2 increased free air on abdominal X-ray s/p emergent exploratory laparotomy, abdominal washout, rectosigmoid colectomy, diverting colostomy, bronchoscopy and Abthera placement on 6/8/22 (EBL 2000cc, s/p 5UpRBC, 3L albumin, 3U Plts, 3U FFP, 3U Cryo) with intraoperative findings notable for perforation in distal sigmoid colon. Patient POD#2 s/p 3rd re-exploratory laparotomy, abdominal washout, fascia bridge with [id number] vicryl mesh.     - RTOR w/ PRS for skin closure on Friday  - Sedated; continue Precedex, Fentanyl, Seroquel; IV analgesia per SICU  - Continues to be off pressor support; Continue to monitor closely   - Intubated on AC   - NPO/NGT in place (6/8-). Tube feeds held. Continue IV Protonix/Reglan. GI following  - b/l PCN, 10-30cc/hour concentrated urine bilaterally. Hilliard in place. ASHLEY likely 2/2 IV contrast, dehydration, Cr 1.29->1.31->1.23->1.19->>1.1->1.09. Continue to monitor UOP. Replete electrolytes PRN  - VTE ppx: Venodynes, HSQ 5K q8h  - Maintain Plt >50K ariel-operatively, total 5u plts transfused on 6/14  - Maintain Hgb >7; pRBCs administered: 5u pRBC intraop 6/8, 1u pRBC intraop 6/10; 1u pRBC post-op 6/12, 2u pRBC on 6/13, 2u pRBC on 6/14  - Afebrile. WBC 6.63, trend WBC; Continue Zosyn (6/8-), Caspofungin (6/13-), (s/p Cefepime), s/p Zarixo (6/5-6/10) for neutropenic fever     Dispo: Appreciate excellent SICU care.     seen and evaluated with YANI Brito, PGY-4  GRIS Lopez PGY-2 R2 Gyn Onc Chart Note    Interval/Overnight Events:     - plan for OR Friday for skin closure w/PRS  - NGT kept to drainage  - Lasix 60 given during day, adequate response  - No acute events overnight    Pt evaluated at bedside. Responds to commands.     Vital Signs Last 24 Hrs  T(C): 37.6 (16 Jun 2022 00:00), Max: 37.6 (16 Jun 2022 00:00)  T(F): 99.7 (16 Jun 2022 00:00), Max: 99.7 (16 Jun 2022 00:00)  HR: 72 (16 Jun 2022 04:00) (70 - 106)  BP: --  BP(mean): --  RR: 14 (16 Jun 2022 04:00) (14 - 26)  SpO2: 100% (16 Jun 2022 04:00) (100% - 100%)    Physical Exam:  Gen: NAD, sedated, follows commands   CV: tachycardic   Pulm: Mechanical breath sounds 2/2 ventilator   Abd: Soft, non-distended, +BS, wound vac in place holding suction; 3x Ric drains - LLQ, LUQ, and R. abdominal w/serosanguinous fluid. ostomy salmon-pink and well perfused with no air in ostomy bag  : L nephrostomy tube draining clear urine; R nephrostomy tube draining clear urine, hilliard in place draining la nena urine   Ext: Warm & well perfused, +3 edema. +b/l pedal pulses   Ventilator: AC 14/500/5/30%    I&O's Detail    14 Jun 2022 07:01  -  15 Noe 2022 07:00  --------------------------------------------------------  IN:    Dexmedetomidine: 561.7 mL    FentaNYL: 135 mL    IV PiggyBack: 1160 mL    Lactated Ringers Bolus: 1000 mL    Platelets - Single Donor: 865 mL    PRBCs (Packed Red Blood Cells): 550 mL  Total IN: 4271.7 mL    Attd: Gyn Onc - Seen on rounds in SICU with Rs and PA. Labs and flow. Agree with plan.     OUT:    Drain (mL): 65 mL    Drain (mL): 70 mL    Drain (mL): 35 mL    Indwelling Catheter - Urethral (mL): 243 mL    Nasogastric/Oral tube (mL): 1250 mL    Nephrostomy Tube (mL): 1645 mL    Nephrostomy Tube (mL): 1090 mL    VAC (Vacuum Assisted Closure) System (mL): 450 mL  Total OUT: 4848 mL    Total NET: -576.3 mL      15 Noe 2022 07:01  -  16 Jun 2022 06:00  --------------------------------------------------------  IN:    Dexmedetomidine: 502 mL    FentaNYL: 286.7 mL    IV PiggyBack: 750 mL  Total IN: 1538.7 mL    OUT:    Drain (mL): 40 mL    Drain (mL): 50 mL    Drain (mL): 90 mL    Indwelling Catheter - Urethral (mL): 450 mL    Nasogastric/Oral tube (mL): 300 mL    Nephrostomy Tube (mL): 1225 mL    Nephrostomy Tube (mL): 900 mL    VAC (Vacuum Assisted Closure) System (mL): 300 mL  Total OUT: 3355 mL    Total NET: -1816.3 mL                        8.6    6.87  )-----------( 14       ( 16 Jun 2022 00:53 )             25.9       06-16    145  |  109<H>  |  29<H>  ----------------------------<  98  3.6   |  22  |  1.09    Ca    8.1<L>      16 Jun 2022 00:53  Phos  3.8     06-16  Mg     1.70     06-16    TPro  5.2<L>  /  Alb  2.2<L>  /  TBili  4.1<H>  /  DBili  x   /  AST  61<H>  /  ALT  29  /  AlkPhos  118  06-14    LIVER FUNCTIONS - ( 14 Jun 2022 22:07 )  Alb: 2.2 g/dL / Pro: 5.2 g/dL / ALK PHOS: 118 U/L / ALT: 29 U/L / AST: 61 U/L / GGT: x         PT/INR - ( 14 Jun 2022 22:07 )   PT: 15.3 sec;   INR: 1.32 ratio    PTT - ( 14 Jun 2022 22:07 )  PTT:32.9 sec    A/P: 52y with recurrent cervical CA admitted for management of neutropenic fever c/b AMS and c/f bowel perforation 2/2 increased free air on abdominal X-ray s/p emergent exploratory laparotomy, abdominal washout, rectosigmoid colectomy, diverting colostomy, bronchoscopy and Abthera placement on 6/8/22 (EBL 2000cc, s/p 5UpRBC, 3L albumin, 3U Plts, 3U FFP, 3U Cryo) with intraoperative findings notable for perforation in distal sigmoid colon. Patient POD#2 s/p 3rd re-exploratory laparotomy, abdominal washout, fascia bridge with [id number] vicryl mesh.     - RTOR w/ PRS for skin closure on Friday  - Sedated; continue Precedex, Fentanyl, Seroquel; IV analgesia per SICU  - Continues to be off pressor support; Continue to monitor closely   - Intubated on AC   - NPO/NGT in place (6/8-). Tube feeds held. Continue IV Protonix/Reglan. GI following  - b/l PCN, 10-30cc/hour concentrated urine bilaterally. Hilliard in place. ASHLEY likely 2/2 IV contrast, dehydration, Cr 1.29->1.31->1.23->1.19->>1.1->1.09. Continue to monitor UOP. Replete electrolytes PRN  - VTE ppx: Venodynes, HSQ 5K q8h  - Maintain Plt >50K ariel-operatively, total 5u plts transfused on 6/14  - Maintain Hgb >7; pRBCs administered: 5u pRBC intraop 6/8, 1u pRBC intraop 6/10; 1u pRBC post-op 6/12, 2u pRBC on 6/13, 2u pRBC on 6/14  - Afebrile. WBC 6.63, trend WBC; Continue Zosyn (6/8-), Caspofungin (6/13-), (s/p Cefepime), s/p Zarixo (6/5-6/10) for neutropenic fever     Dispo: Appreciate excellent SICU care.     seen and evaluated with YANI Brito, PGY-4  GRIS Lopez, PGY-2

## 2022-06-16 NOTE — PROGRESS NOTE ADULT - SUBJECTIVE AND OBJECTIVE BOX
SICU Daily Progress Note  =====================================================  Interval/Overnight Events:     - plan for OR Friday for skin closure  - NGT kept to drainage  - Lasix 60 given during day, adequate response  - No acute events overnight    HPI: 52y female with recurrent cervical CA (per patient stage III) presenting as transfer from [place] a/w neutropenic fever. Patient reports she had last cycle of chemotherapy 5 days prior.  Patient had [facility] care stop by yesterday and was noted to be tachycardic on vital signs and was sent to the ED.  She reports feeling weak + fatigued. + upper abdominal pain that worsens while having chills. She denies any vomiting, chest pain, SOB.     At [facility] she was given Meropenem and Zosyn and was noted to be tachycardic to 140s and febrile to 39.5 and was thus transferred for further management. She became acutely altered yesterday afternoon without improvement and AXR with increased free air concern for perforated viscous. She is now s/p washout, open resection of left colon, colostomy. SICU consulted for close hemodynamic monitoring.     Allergies: No Known Allergies    MEDICATIONS:   --------------------------------------------------------------------------------------  Neurologic Medications  dexMEDEtomidine Infusion 1 MICROgram(s)/kG/Hr IV Continuous <Continuous>  fentaNYL   Infusion 0.5 MICROgram(s)/kG/Hr IV Continuous <Continuous>  HYDROmorphone  Injectable 0.5 milliGRAM(s) IV Push every 4 hours PRN Severe Pain (7 - 10)  QUEtiapine 100 milliGRAM(s) Oral every 12 hours    Respiratory Medications    Cardiovascular Medications    Gastrointestinal Medications  folic acid 1 milliGRAM(s) Oral daily  pantoprazole  Injectable 40 milliGRAM(s) IV Push two times a day  potassium chloride  20 mEq/100 mL IVPB 20 milliEquivalent(s) IV Intermittent once  thiamine Injectable 100 milliGRAM(s) IV Push daily    Genitourinary Medications    Hematologic/Oncologic Medications  heparin   Injectable 5000 Unit(s) SubCutaneous every 8 hours    Antimicrobial/Immunologic Medications  caspofungin IVPB      caspofungin IVPB 50 milliGRAM(s) IV Intermittent every 24 hours  piperacillin/tazobactam IVPB.. 3.375 Gram(s) IV Intermittent every 8 hours    Endocrine/Metabolic Medications    Topical/Other Medications  chlorhexidine 0.12% Liquid 15 milliLiter(s) Oral Mucosa every 12 hours  chlorhexidine 4% Liquid 1 Application(s) Topical daily    --------------------------------------------------------------------------------------  VITAL SIGNS, INS/OUTS (last 24 hours):  --------------------------------------------------------------------------------------  T(C): 37.3 (06-15-22 @ 16:00), Max: 37.3 (06-15-22 @ 16:00)  HR: 76 (06-15-22 @ 23:15) (70 - 108)  BP: --  RR: 15 (06-15-22 @ 20:00) (14 - 26)  SpO2: 100% (06-15-22 @ 23:15) (100% - 100%)    06-14-22 @ 07:01  -  06-15-22 @ 07:00  --------------------------------------------------------  IN: 4271.7 mL / OUT: 4848 mL / NET: -576.3 mL    06-15-22 @ 07:01  -  06-16-22 @ 02:41  --------------------------------------------------------  IN: 1458.3 mL / OUT: 3080 mL / NET: -1621.7 mL      --------------------------------------------------------------------------------------  EXAM  NEUROLOGY  Exam: Normal, NAD, alert, oriented x3, no focal deficits.    HEENT  Exam: Normocephalic, atraumatic, EOMI.     RESPIRATORY  Exam: Normal expansion/effort.  Mechanical Ventilation: Mode: AC/ CMV (Assist Control/ Continuous Mandatory Ventilation), RR (machine): 14, TV (machine): 500, FiO2: 30, PEEP: 5, ITime: 0.92, MAP: 9, PIP: 22    CARDIOVASCULAR  Exam: Regular rate and rhythm.       GI/NUTRITION  Exam: Abdomen soft, Non-tender, Non-distended. Wound vac holding suction    VASCULAR  Exam: Extremities warm, pink, well-perfused.     MUSCULOSKELETAL  Exam: All extremities moving spontaneously without limitations.     SKIN  Exam: Good skin turgor, no skin breakdown.     LABS  --------------------------------------------------------------------------------------                        8.6    6.87  )-----------( 14 ( 16 Jun 2022 00:53 )             25.9   06-16    145  |  109<H>  |  29<H>  ----------------------------<  98  3.6   |  22  |  1.09    Ca    8.1<L>      16 Jun 2022 00:53  Phos  3.8     06-16  Mg     1.70     06-16    TPro  5.2<L>  /  Alb  2.2<L>  /  TBili  4.1<H>  /  DBili  x   /  AST  61<H>  /  ALT  29  /  AlkPhos  118  06-14  ABG - ( 15 Noe 2022 02:30 )  pH, Arterial: 7.47  pH, Blood: x     /  pCO2: 32    /  pO2: 195   / HCO3: 23    / Base Excess: 0.0   /  SaO2: 99.2            PT/INR - ( 14 Jun 2022 22:07 )   PT: 15.3 sec;   INR: 1.32 ratio         PTT - ( 14 Jun 2022 22:07 )  PTT:32.9 sec  --------------------------------------------------------------------------------------

## 2022-06-16 NOTE — PROGRESS NOTE ADULT - ASSESSMENT
52y with a PMHx of HTN, cervical CA (stage III)  on chemo - last session last week - c/b b/l hydronephrosis s/p B/L nephrostomy tube placement, p/w severe sepsis from neutropenic fever. Pt was acutely altered yesterday afternoon without improvement and AXR showed increased free air with concern for perforated viscous. She is now s/p washout, open resection of left colon, with colostomy creation. Recovering in SICU for close hemodynamic monitoring.     PLAN:  NEUROLOGY:  - Sedation: Precedex and Fentanyl gtt  - Pain control: IV Dilaudid 0.5mg q4hr PRN   - Seroquel 100mg PO BID  - Folic acid and thiamine    RESPIRATORY:  - MV: Volume /14/5/30  - CPAP trials as tolerated  - Monitor daily chest x-rays and ABG  - Maintain O2 saturation >92%    CARDIOVASCULAR:  - Off pressors  - Monitor hemodynamics  - Keep MAP >65    GI / NUTRITION:  - NPO  - GI ppx: IV Protonix 40mg BID  - Wound VAC in place, SS output    RENAL / GENITOURINARY: hx of bilateral nephrostomy tubes  - Indwelling hilliard catheter, bilateral nephrostomy tubes  - IR nephrostomy tube exchange postponed  - Monitor electrolytes and replete PRN  - Continue to monitor strict ins and outs q1 hour    HEMATOLOGIC:  - VTE ppx: SQH q8hr  - Transfuse prbc and plt as needed  - Monitor H/H and platelet count on CBC daily  - Heme consulted for low platelet count    INFECTIOUS DISEASE: hx of neutropenic fevers, s/p zarxio tx  - Zosyn and Caspofungin  - Monitor fever curve and WBC on daily CBC w/diff    ENDOCRINOLOGY:  - Monitor fingersticks q6 hours while NPO    DISPO:  - Full code  - SICU

## 2022-06-17 DIAGNOSIS — R79.89 OTHER SPECIFIED ABNORMAL FINDINGS OF BLOOD CHEMISTRY: ICD-10-CM

## 2022-06-17 LAB
ANION GAP SERPL CALC-SCNC: 14 MMOL/L — SIGNIFICANT CHANGE UP (ref 7–14)
BLD GP AB SCN SERPL QL: NEGATIVE — SIGNIFICANT CHANGE UP
BLOOD GAS ARTERIAL COMPREHENSIVE RESULT: SIGNIFICANT CHANGE UP
BUN SERPL-MCNC: 29 MG/DL — HIGH (ref 7–23)
CALCIUM SERPL-MCNC: 8.1 MG/DL — LOW (ref 8.4–10.5)
CHLORIDE SERPL-SCNC: 108 MMOL/L — HIGH (ref 98–107)
CO2 SERPL-SCNC: 24 MMOL/L — SIGNIFICANT CHANGE UP (ref 22–31)
CREAT SERPL-MCNC: 1.16 MG/DL — SIGNIFICANT CHANGE UP (ref 0.5–1.3)
EGFR: 56 ML/MIN/1.73M2 — LOW
GAS PNL BLDA: SIGNIFICANT CHANGE UP
GLUCOSE SERPL-MCNC: 111 MG/DL — HIGH (ref 70–99)
HCT VFR BLD CALC: 27.1 % — LOW (ref 34.5–45)
HGB BLD-MCNC: 8.8 G/DL — LOW (ref 11.5–15.5)
MAGNESIUM SERPL-MCNC: 1.8 MG/DL — SIGNIFICANT CHANGE UP (ref 1.6–2.6)
MCHC RBC-ENTMCNC: 29.5 PG — SIGNIFICANT CHANGE UP (ref 27–34)
MCHC RBC-ENTMCNC: 32.5 GM/DL — SIGNIFICANT CHANGE UP (ref 32–36)
MCV RBC AUTO: 90.9 FL — SIGNIFICANT CHANGE UP (ref 80–100)
NRBC # BLD: 0 /100 WBCS — SIGNIFICANT CHANGE UP
NRBC # FLD: 0.04 K/UL — HIGH
PHOSPHATE SERPL-MCNC: 3.6 MG/DL — SIGNIFICANT CHANGE UP (ref 2.5–4.5)
PLATELET # BLD AUTO: 18 K/UL — CRITICAL LOW (ref 150–400)
POTASSIUM SERPL-MCNC: 3.2 MMOL/L — LOW (ref 3.5–5.3)
POTASSIUM SERPL-SCNC: 3.2 MMOL/L — LOW (ref 3.5–5.3)
RBC # BLD: 2.98 M/UL — LOW (ref 3.8–5.2)
RBC # FLD: 16.1 % — HIGH (ref 10.3–14.5)
RH IG SCN BLD-IMP: POSITIVE — SIGNIFICANT CHANGE UP
SODIUM SERPL-SCNC: 146 MMOL/L — HIGH (ref 135–145)
T3 SERPL-MCNC: 81 NG/DL — SIGNIFICANT CHANGE UP (ref 80–200)
T4 FREE SERPL-MCNC: 1 NG/DL — SIGNIFICANT CHANGE UP (ref 0.9–1.8)
TSH SERPL-MCNC: 7.84 UIU/ML — HIGH (ref 0.27–4.2)
WBC # BLD: 6.41 K/UL — SIGNIFICANT CHANGE UP (ref 3.8–10.5)
WBC # FLD AUTO: 6.41 K/UL — SIGNIFICANT CHANGE UP (ref 3.8–10.5)

## 2022-06-17 PROCEDURE — 99232 SBSQ HOSP IP/OBS MODERATE 35: CPT

## 2022-06-17 PROCEDURE — 71045 X-RAY EXAM CHEST 1 VIEW: CPT | Mod: 26

## 2022-06-17 PROCEDURE — 99291 CRITICAL CARE FIRST HOUR: CPT | Mod: 25

## 2022-06-17 RX ORDER — ACETAMINOPHEN 500 MG
1000 TABLET ORAL ONCE
Refills: 0 | Status: COMPLETED | OUTPATIENT
Start: 2022-06-17 | End: 2022-06-17

## 2022-06-17 RX ORDER — FUROSEMIDE 40 MG
60 TABLET ORAL DAILY
Refills: 0 | Status: DISCONTINUED | OUTPATIENT
Start: 2022-06-17 | End: 2022-06-18

## 2022-06-17 RX ORDER — MIDAZOLAM HYDROCHLORIDE 1 MG/ML
6 INJECTION, SOLUTION INTRAMUSCULAR; INTRAVENOUS ONCE
Refills: 0 | Status: DISCONTINUED | OUTPATIENT
Start: 2022-06-17 | End: 2022-06-17

## 2022-06-17 RX ORDER — MIDAZOLAM HYDROCHLORIDE 1 MG/ML
2 INJECTION, SOLUTION INTRAMUSCULAR; INTRAVENOUS ONCE
Refills: 0 | Status: DISCONTINUED | OUTPATIENT
Start: 2022-06-17 | End: 2022-06-17

## 2022-06-17 RX ORDER — FENTANYL CITRATE 50 UG/ML
100 INJECTION INTRAVENOUS ONCE
Refills: 0 | Status: DISCONTINUED | OUTPATIENT
Start: 2022-06-17 | End: 2022-06-17

## 2022-06-17 RX ORDER — FENTANYL CITRATE 50 UG/ML
50 INJECTION INTRAVENOUS ONCE
Refills: 0 | Status: DISCONTINUED | OUTPATIENT
Start: 2022-06-17 | End: 2022-06-17

## 2022-06-17 RX ORDER — POTASSIUM CHLORIDE 20 MEQ
10 PACKET (EA) ORAL
Refills: 0 | Status: COMPLETED | OUTPATIENT
Start: 2022-06-17 | End: 2022-06-17

## 2022-06-17 RX ORDER — MAGNESIUM SULFATE 500 MG/ML
1 VIAL (ML) INJECTION ONCE
Refills: 0 | Status: COMPLETED | OUTPATIENT
Start: 2022-06-17 | End: 2022-06-17

## 2022-06-17 RX ADMIN — Medication 400 MILLIGRAM(S): at 23:35

## 2022-06-17 RX ADMIN — QUETIAPINE FUMARATE 100 MILLIGRAM(S): 200 TABLET, FILM COATED ORAL at 18:38

## 2022-06-17 RX ADMIN — CASPOFUNGIN ACETATE 260 MILLIGRAM(S): 7 INJECTION, POWDER, LYOPHILIZED, FOR SOLUTION INTRAVENOUS at 11:48

## 2022-06-17 RX ADMIN — FENTANYL CITRATE 50 MICROGRAM(S): 50 INJECTION INTRAVENOUS at 10:00

## 2022-06-17 RX ADMIN — PIPERACILLIN AND TAZOBACTAM 25 GRAM(S): 4; .5 INJECTION, POWDER, LYOPHILIZED, FOR SOLUTION INTRAVENOUS at 01:48

## 2022-06-17 RX ADMIN — CHLORHEXIDINE GLUCONATE 15 MILLILITER(S): 213 SOLUTION TOPICAL at 18:38

## 2022-06-17 RX ADMIN — MIDAZOLAM HYDROCHLORIDE 2 MILLIGRAM(S): 1 INJECTION, SOLUTION INTRAMUSCULAR; INTRAVENOUS at 09:40

## 2022-06-17 RX ADMIN — CHLORHEXIDINE GLUCONATE 1 APPLICATION(S): 213 SOLUTION TOPICAL at 11:30

## 2022-06-17 RX ADMIN — PIPERACILLIN AND TAZOBACTAM 25 GRAM(S): 4; .5 INJECTION, POWDER, LYOPHILIZED, FOR SOLUTION INTRAVENOUS at 11:29

## 2022-06-17 RX ADMIN — HEPARIN SODIUM 5000 UNIT(S): 5000 INJECTION INTRAVENOUS; SUBCUTANEOUS at 16:48

## 2022-06-17 RX ADMIN — FENTANYL CITRATE 50 MICROGRAM(S): 50 INJECTION INTRAVENOUS at 10:10

## 2022-06-17 RX ADMIN — CHLORHEXIDINE GLUCONATE 15 MILLILITER(S): 213 SOLUTION TOPICAL at 05:40

## 2022-06-17 RX ADMIN — DEXMEDETOMIDINE HYDROCHLORIDE IN 0.9% SODIUM CHLORIDE 25.1 MICROGRAM(S)/KG/HR: 4 INJECTION INTRAVENOUS at 07:53

## 2022-06-17 RX ADMIN — Medication 100 MILLIEQUIVALENT(S): at 06:14

## 2022-06-17 RX ADMIN — Medication 100 MILLIEQUIVALENT(S): at 05:17

## 2022-06-17 RX ADMIN — Medication 60 MILLIGRAM(S): at 11:29

## 2022-06-17 RX ADMIN — Medication 100 GRAM(S): at 06:13

## 2022-06-17 RX ADMIN — OXYCODONE HYDROCHLORIDE 10 MILLIGRAM(S): 5 TABLET ORAL at 11:59

## 2022-06-17 RX ADMIN — Medication 1 MILLIGRAM(S): at 11:30

## 2022-06-17 RX ADMIN — FENTANYL CITRATE 50 MICROGRAM(S): 50 INJECTION INTRAVENOUS at 09:30

## 2022-06-17 RX ADMIN — Medication 100 MILLIEQUIVALENT(S): at 03:28

## 2022-06-17 RX ADMIN — OXYCODONE HYDROCHLORIDE 10 MILLIGRAM(S): 5 TABLET ORAL at 11:29

## 2022-06-17 RX ADMIN — Medication 400 MILLIGRAM(S): at 16:48

## 2022-06-17 RX ADMIN — MIDAZOLAM HYDROCHLORIDE 2 MILLIGRAM(S): 1 INJECTION, SOLUTION INTRAMUSCULAR; INTRAVENOUS at 09:30

## 2022-06-17 RX ADMIN — HEPARIN SODIUM 5000 UNIT(S): 5000 INJECTION INTRAVENOUS; SUBCUTANEOUS at 21:44

## 2022-06-17 RX ADMIN — HEPARIN SODIUM 5000 UNIT(S): 5000 INJECTION INTRAVENOUS; SUBCUTANEOUS at 05:40

## 2022-06-17 RX ADMIN — FENTANYL CITRATE 50 MICROGRAM(S): 50 INJECTION INTRAVENOUS at 09:40

## 2022-06-17 RX ADMIN — Medication 1000 MILLIGRAM(S): at 20:00

## 2022-06-17 RX ADMIN — PANTOPRAZOLE SODIUM 40 MILLIGRAM(S): 20 TABLET, DELAYED RELEASE ORAL at 05:39

## 2022-06-17 RX ADMIN — PANTOPRAZOLE SODIUM 40 MILLIGRAM(S): 20 TABLET, DELAYED RELEASE ORAL at 18:38

## 2022-06-17 RX ADMIN — Medication 100 MILLIGRAM(S): at 11:30

## 2022-06-17 RX ADMIN — PIPERACILLIN AND TAZOBACTAM 25 GRAM(S): 4; .5 INJECTION, POWDER, LYOPHILIZED, FOR SOLUTION INTRAVENOUS at 18:39

## 2022-06-17 RX ADMIN — QUETIAPINE FUMARATE 100 MILLIGRAM(S): 200 TABLET, FILM COATED ORAL at 05:40

## 2022-06-17 NOTE — PROGRESS NOTE ADULT - SUBJECTIVE AND OBJECTIVE BOX
Surgery Progress Note    INTERVAl/SUBJECTIVE: No acute event overnight. Patient seen and examined in am rounds. Patient is intubated and sedated.    Vital Signs Last 24 Hrs  T(C): 37.3 (17 Jun 2022 08:00), Max: 37.7 (17 Jun 2022 04:00)  T(F): 99.2 (17 Jun 2022 08:00), Max: 99.8 (17 Jun 2022 04:00)  HR: 73 (17 Jun 2022 08:24) (64 - 105)  BP: --  BP(mean): --  RR: 14 (17 Jun 2022 08:00) (14 - 24)  SpO2: 100% (17 Jun 2022 08:24) (99% - 100%)    Physical Exam:  Gen: NAD, responsive  CV: RRR   Pulm: Mechanical breath sounds 2/2 ventilator   Abd: Soft, non-distended, Abthera in place holding suction ; ostomy pink and well perfused with brownish liquid output in  ostomy bag; NGT in place-minimal clear output; +3 Ric drains  : L and R nephrostomy tube draining clear urine; hilliard in place draining blood tinged urine  Ext: Warm & well perfused, +3 edema. +b/l pedal pulses     LABS:                        8.8    6.41  )-----------( 18       ( 17 Jun 2022 01:26 )             27.1     06-17    146<H>  |  108<H>  |  29<H>  ----------------------------<  111<H>  3.2<L>   |  24  |  1.16    Ca    8.1<L>      17 Jun 2022 01:26  Phos  3.6     06-17  Mg     1.80     06-17            INs and OUTs:    06-16-22 @ 07:01  -  06-17-22 @ 07:00  --------------------------------------------------------  IN: 1897.9 mL / OUT: 4965 mL / NET: -3067.1 mL    06-17-22 @ 07:01  -  06-17-22 @ 08:33  --------------------------------------------------------  IN: 30.1 mL / OUT: 270 mL / NET: -239.9 mL

## 2022-06-17 NOTE — PROGRESS NOTE ADULT - ATTENDING COMMENTS
Patient s/p ex lap left colectomy and colostomy with abdominal closure with bridging mesh. Patient remains intubated fluid overloaded, plastics with bedside skin closure and wound vac placed  N mentating poorly, versed fentanyl administered for conscious sedation otherwise on precedex gtt  resp on minimal vent settings adequate gas exchange, cxr with slight infiltrates lower lung bases, RSBI 90s, a bit oversedated  cv hemodynmically stable, perfusing adequately, hypervolemic will proceed with diuresis +7L i/o  gi npo, will resume diet  gu/renal adequate uop, currently diuresing  heme vte ppx  id on zosyn and caspo, monitor wbc  endo no changes    The patient is a critical care patient with life threatening hemodynamic and metabolic instability in SICU.  I have personally interviewed when possible and examined the patient, reviewed data and laboratory tests/x-rays and all pertinent electronic images.  I was physically present for the key portions of the evaluation and management (E/M) service provided.   The SICU team has a constant risk benefit analyzes discussion with the primary team, all consultants, House Staff and PA's on all decisions.  These diagnoses are unrelated to the surgical procedure noted above.  I meet with family if needed to get further history, discuss the case and make care decisions for this patient who might not be able to participate.  Time involved in performance of separately billable procedures was not counted toward my critical care time. There is no overlap.  I spent 55-75 minutes ( 0800Hrs-0915Hrs in AM/ 1600Hrs-1715Hrs in PM, or other time indicated) of critical care time for the diagnoses, assessment, plan and interventions.  This time excludes time spent on separate procedures and teaching.

## 2022-06-17 NOTE — PROGRESS NOTE ADULT - ASSESSMENT
52y  with recurrent cervical CA admitted for management of neutropenic fever, now s/p emergent exploratory laparotomy, abdominal washout, rectosigmoid colectomy, diverting colostomy, bronchoscopy and Abthera placement on 22 (ebl 2000cc, s/p 5UpRBC, 3L albumin, 3U Plts, 3U FFP, 3U Cryo) for findings of increased free air on abdominal xray concerning for bowel perforation. Patient s/p a second RTOR for abdominal washout and replacement of Abthera and now POD #3 s/p washout and bridging vicryl mesh placement to close fascial gap. Plan for wound closure today    Neuro: Continue IV analgesics per SICU.  CV: Hemodynamically stable  Pulm: Intubated  GI: NPO for procedure. GI following. Continue IV Protonix/Reglan. NG tube in place (- ). Caspo () for GI perf and neutropenia  : b/l nephrostomy tubes in place. Infante in place w la nena output. Patient has resolving ASHLEY likely 2/2 IV Contrast & Dehydration, Cr 1.16. S/P lasix yesterday. Continue to monitor UOP. Replete electrolytes PRN.  Heme: Continue Venodynes for DVT ppx.  -Thrombocytopenia likely multifactorial including chemo side effects and infection.  -As per heme: transfuse if Hb <7; Plt <10k, or <15k if febrile; or <50k if active bleeding.  ID: Afebrile. Continue Zosyn (s/p Cefepime). Continue to trend WBC. s/p Zarixo (-6/10) for neutropenic fever. WBC 6.41.   Dispo: Appreciate excellent SICU care 52y  with recurrent cervical CA admitted for management of neutropenic fever, now s/p emergent exploratory laparotomy, abdominal washout, rectosigmoid colectomy, diverting colostomy, bronchoscopy and Abthera placement on 22 (ebl 2000cc, s/p 5UpRBC, 3L albumin, 3U Plts, 3U FFP, 3U Cryo) for findings of increased free air on abdominal xray concerning for bowel perforation. Patient s/p a second RTOR for abdominal washout and replacement of Abthera and now POD #3 s/p washout and bridging vicryl mesh placement to close fascial gap. Plan for wound closure and SBT today.    Neuro: Continue IV analgesics per SICU.  CV: Hemodynamically stable  Pulm: Intubated  GI: NPO for procedure. GI following. Continue IV Protonix/Reglan. NG tube in place (- ). Caspo () for GI perf and neutropenia  : b/l nephrostomy tubes in place. Infante in place w la nena output. Patient has resolving ASHLEY likely 2/2 IV Contrast & Dehydration, Cr 1.16. S/P lasix yesterday. Continue to monitor UOP. Replete electrolytes PRN.  Heme: Continue Venodynes for DVT ppx.  -Thrombocytopenia likely multifactorial including chemo side effects and infection.  -As per heme: transfuse if Hb <7; Plt <10k, or <15k if febrile; or <50k if active bleeding.  ID: Afebrile. Continue Zosyn (s/p Cefepime). Continue to trend WBC. s/p Zarixo (-6/10) for neutropenic fever. WBC 6.41.   Dispo: Appreciate excellent SICU care

## 2022-06-17 NOTE — CHART NOTE - NSCHARTNOTEFT_GEN_A_CORE
R2 GYN ONCOLOGY Chart Note    Interval/Overnight Events:     - plan for bedside closure with plastics this morning 6/17. plan to hang platelets during procedure   - lasix 80 given during day -> output 3L during day  - NGT kept to drainage  - restart TF  - plan for SBT in AM  - no acute events overnight     Patient evaluated at bedside. Arousable, follows commands.    Vital Signs Last 24 Hrs  T(C): 37.4 (16 Jun 2022 20:00), Max: 37.4 (16 Jun 2022 16:00)  T(F): 99.3 (16 Jun 2022 20:00), Max: 99.3 (16 Jun 2022 16:00)  HR: 70 (17 Jun 2022 03:37) (67 - 105)  BP: --  BP(mean): --  RR: 14 (17 Jun 2022 03:00) (14 - 24)  SpO2: 100% (17 Jun 2022 03:37) (99% - 100%)    I&O's Detail    15 Noe 2022 07:01  -  16 Jun 2022 07:00  --------------------------------------------------------  IN:    Dexmedetomidine: 602.4 mL    FentaNYL: 347.1 mL    IV PiggyBack: 750 mL  Total IN: 1699.5 mL    OUT:    Drain (mL): 80 mL    Drain (mL): 90 mL    Drain (mL): 140 mL    Indwelling Catheter - Urethral (mL): 510 mL    Nasogastric/Oral tube (mL): 300 mL    Nephrostomy Tube (mL): 960 mL    Nephrostomy Tube (mL): 1375 mL    VAC (Vacuum Assisted Closure) System (mL): 500 mL  Total OUT: 3955 mL    Total NET: -2255.5 mL      16 Jun 2022 07:01  -  17 Jun 2022 05:04  --------------------------------------------------------  IN:    Dexmedetomidine: 562 mL    Enteral Tube Flush: 60 mL    FentaNYL: 75.5 mL    IV PiggyBack: 450 mL    TwoCal HN: 255 mL  Total IN: 1402.5 mL    OUT:    Drain (mL): 20 mL    Drain (mL): 90 mL    Drain (mL): 70 mL    Indwelling Catheter - Urethral (mL): 350 mL    Nephrostomy Tube (mL): 1225 mL    Nephrostomy Tube (mL): 2250 mL    VAC (Vacuum Assisted Closure) System (mL): 200 mL  Total OUT: 4205 mL  Total NET: -2802.5 mL                        8.8    6.41  )-----------( 18       ( 17 Jun 2022 01:26 )             27.1       06-17    146<H>  |  108<H>  |  29<H>  ----------------------------<  111<H>  3.2<L>   |  24  |  1.16    Ca    8.1<L>      17 Jun 2022 01:26  Phos  3.6     06-17  Mg     1.80     06-17    A/P: 52y with recurrent cervical CA admitted for management of neutropenic fever c/b AMS and c/f bowel perforation 2/2 increased free air on abdominal X-ray s/p emergent exploratory laparotomy, abdominal washout, rectosigmoid colectomy, diverting colostomy, bronchoscopy and Abthera placement on 6/8/22 (EBL 2000cc, s/p 5UpRBC, 3L albumin, 3U Plts, 3U FFP, 3U Cryo) with intraoperative findings notable for perforation in distal sigmoid colon. Patient POD#2 s/p 3rd re-exploratory laparotomy, abdominal washout, fascia bridge with [id number] vicryl mesh.     - RTOR w/ PRS for skin closure on Friday  - Sedated; continue Precedex, Fentanyl, Seroquel; IV analgesia per SICU  - Continues to be off pressor support; Continue to monitor closely   - Intubated on AC   - NPO/NGT in place (6/8-). Tube feeds held. Continue IV Protonix/Reglan. GI following  - b/l PCN, 10-30cc/hour concentrated urine bilaterally. Infante in place. ASHLEY likely 2/2 IV contrast, dehydration, Cr 1.29->1.31->1.23->1.19->>1.1->1.09. Continue to monitor UOP. Replete electrolytes PRN  - VTE ppx: Venodynes, HSQ 5K q8h  - Maintain Plt >50K ariel-operatively, total 5u plts transfused on 6/14  - Maintain Hgb >7; pRBCs administered: 5u pRBC intraop 6/8, 1u pRBC intraop 6/10; 1u pRBC post-op 6/12, 2u pRBC on 6/13, 2u pRBC on 6/14  - Afebrile. WBC 6.63, trend WBC; Continue Zosyn (6/8-), Caspofungin (6/13-), (s/p Cefepime), s/p Zarixo (6/5-6/10) for neutropenic fever     Dispo: Appreciate excellent SICU care.     seen and evaluated with YANI Brito, PGY-4  GRIS Lopez, PGY-2. R2 GYN ONCOLOGY Chart Note    Interval/Overnight Events:     - plan for bedside closure with plastics this morning 6/17. plan to hang platelets during procedure   - lasix 80 given during day -> output 3L during day  - NGT kept to drainage  - restart TF  - plan for SBT in AM  - no acute events overnight     Patient evaluated at bedside. Arousable, follows commands.    Vital Signs Last 24 Hrs  T(C): 37.4 (16 Jun 2022 20:00), Max: 37.4 (16 Jun 2022 16:00)  T(F): 99.3 (16 Jun 2022 20:00), Max: 99.3 (16 Jun 2022 16:00)  HR: 70 (17 Jun 2022 03:37) (67 - 105)  BP: --  BP(mean): --  RR: 14 (17 Jun 2022 03:00) (14 - 24)  SpO2: 100% (17 Jun 2022 03:37) (99% - 100%)    I&O's Detail    15 Noe 2022 07:01  -  16 Jun 2022 07:00  --------------------------------------------------------  IN:    Dexmedetomidine: 602.4 mL    FentaNYL: 347.1 mL    IV PiggyBack: 750 mL  Total IN: 1699.5 mL    OUT:    Drain (mL): 80 mL    Drain (mL): 90 mL    Drain (mL): 140 mL    Indwelling Catheter - Urethral (mL): 510 mL    Nasogastric/Oral tube (mL): 300 mL    Nephrostomy Tube (mL): 960 mL    Nephrostomy Tube (mL): 1375 mL    VAC (Vacuum Assisted Closure) System (mL): 500 mL  Total OUT: 3955 mL    Total NET: -2255.5 mL      16 Jun 2022 07:01  -  17 Jun 2022 05:04  --------------------------------------------------------  IN:    Dexmedetomidine: 562 mL    Enteral Tube Flush: 60 mL    FentaNYL: 75.5 mL    IV PiggyBack: 450 mL    TwoCal HN: 255 mL  Total IN: 1402.5 mL    OUT:    Drain (mL): 20 mL    Drain (mL): 90 mL    Drain (mL): 70 mL    Indwelling Catheter - Urethral (mL): 350 mL    Nephrostomy Tube (mL): 1225 mL    Nephrostomy Tube (mL): 2250 mL    VAC (Vacuum Assisted Closure) System (mL): 200 mL  Total OUT: 4205 mL  Total NET: -2802.5 mL                        8.8    6.41  )-----------( 18       ( 17 Jun 2022 01:26 )             27.1       06-17    146<H>  |  108<H>  |  29<H>  ----------------------------<  111<H>  3.2<L>   |  24  |  1.16    Ca    8.1<L>      17 Jun 2022 01:26  Phos  3.6     06-17  Mg     1.80     06-17    A/P: 52y with recurrent cervical CA admitted for management of neutropenic fever c/b AMS and c/f bowel perforation 2/2 increased free air on abdominal X-ray s/p emergent exploratory laparotomy, abdominal washout, rectosigmoid colectomy, diverting colostomy, bronchoscopy and Abthera placement on 6/8/22 (EBL 2000cc, s/p 5UpRBC, 3L albumin, 3U Plts, 3U FFP, 3U Cryo) with intraoperative findings notable for perforation in distal sigmoid colon. Patient POD#2 s/p 3rd re-exploratory laparotomy, abdominal washout, fascia bridge with [id number] vicryl mesh.     - RTOR w/ PRS for skin closure today and then possible SBRT  - Sedated; continue Precedex, seroquel PRN; IV analgesia per SICU  - Continues to be off pressor support; Continue to monitor closely   - Intubated on AC   - NPO/NGT in place (6/8-). Tube feeds. Continue IV Protonix/Reglan. GI following  - b/l PCN, 10-30cc/hour concentrated urine bilaterally. Infante in place. ASHLEY likely 2/2 IV contrast, dehydration, Cr 1.29->1.31->1.23->1.19->>1.1->1.09. Continue to monitor UOP. Replete electrolytes PRN  - VTE ppx: Venodynes, HSQ 5K q8h  - Maintain Plt >50K ariel-operatively, total 5u plts transfused on 6/14  - Maintain Hgb >7; pRBCs administered: 5u pRBC intraop 6/8, 1u pRBC intraop 6/10; 1u pRBC post-op 6/12, 2u pRBC on 6/13, 2u pRBC on 6/14  - Afebrile. WBC 6.41, trend WBC; Continue Zosyn (6/8-), Caspofungin (6/13-), (s/p Cefepime), s/p Zarixo (6/5-6/10) for neutropenic fever     Dispo: Appreciate excellent SICU care.     seen and evaluated with YANI Brito, PGY-4  GRIS Lopez, PGY-2.

## 2022-06-17 NOTE — PROGRESS NOTE ADULT - SUBJECTIVE AND OBJECTIVE BOX
HPI:  52y  with recurrent cervical CA presenting to the ED as transfer from [place] a/w neutropenic fever. Consulted for: Hypothyroidism r/o    Interval hx:  BP stable, normothermic  Repeat TFTs: TSH elevated 7.84, FT4 normal 1, TT3 normal 81      MEDICATIONS  (STANDING):  caspofungin IVPB      caspofungin IVPB 50 milliGRAM(s) IV Intermittent every 24 hours  chlorhexidine 0.12% Liquid 15 milliLiter(s) Oral Mucosa every 12 hours  chlorhexidine 4% Liquid 1 Application(s) Topical daily  dexMEDEtomidine Infusion 1 MICROgram(s)/kG/Hr (25.1 mL/Hr) IV Continuous <Continuous>  fentaNYL    Injectable 100 MICROGram(s) IV Push once  folic acid 1 milliGRAM(s) Oral daily  heparin   Injectable 5000 Unit(s) SubCutaneous every 8 hours  midazolam Injectable 6 milliGRAM(s) IV Push once  pantoprazole  Injectable 40 milliGRAM(s) IV Push two times a day  piperacillin/tazobactam IVPB.. 3.375 Gram(s) IV Intermittent every 8 hours  QUEtiapine 100 milliGRAM(s) Oral every 12 hours  thiamine Injectable 100 milliGRAM(s) IV Push daily    MEDICATIONS  (PRN):  oxyCODONE    Solution 5 milliGRAM(s) Enteral Tube every 4 hours PRN Moderate Pain (4 - 6)  oxyCODONE    Solution 10 milliGRAM(s) Enteral Tube every 4 hours PRN Severe Pain (7 - 10)      Allergies    No Known Allergies    Intolerances        Review of Systems:  Constitutional: No fever, good appetite/po intake  Eyes: No blurry vision, diplopia  Neuro: No tremors  HEENT: No pain  Cardiovascular: No chest pain, palpitations  Respiratory: No SOB, no cough  GI: No nausea, vomiting,   : No dysuria, hematuria  Skin: no rash  Psych: no depression  Endocrine: no polyuria, polydipsia  Hem/lymph: no swelling  Osteoporosis: no fractures    ALL OTHER SYSTEMS REVIEWED AND NEGATIVE    PHYSICAL EXAM:  VITALS: T(C): 37.3 (22 @ 08:00)  T(F): 99.2 (22 @ 08:00), Max: 99.8 (22 @ 04:00)  HR: 75 (22 @ 10:00) (64 - 105)  BP: --  RR:  (14 - 24)  SpO2:  (99% - 100%)  Wt(kg): --  GENERAL: NAD, well-groomed, well-developed  EYES: No proptosis, no lid lag, anicteric  HEENT:  Atraumatic, normocephalic, moist mucous membranes  RESPIRATORY: nonlabored respirations, no wheezing  PSYCH: Alert and oriented x 3, normal affect, normal mood    POCT Blood Glucose.: 119 mg/dL (06-15-22 @ 05:06)  POCT Blood Glucose.: 110 mg/dL (06-15-22 @ 00:49)  POCT Blood Glucose.: 107 mg/dL (22 @ 17:33)  POCT Blood Glucose.: 98 mg/dL (22 @ 12:32)                            8.8    6.41  )-----------( 18        01:26 )             27.1           146<H>  |  108<H>  |  29<H>  ----------------------------<  111<H>  3.2<L>   |  24  |  1.16    eGFR: 56<L>    Ca    8.1<L>        Mg     1.80       Phos  3.6         TPro  5.2<L>  /  Alb  2.2<L>  /  TBili  4.1<H>  /  DBili  x   /  AST  61<H>  /  ALT  29  /  AlkPhos  118        Thyroid Function Tests:   @ 01:26 TSH 7.84 FreeT4 1.0 T3 81 Anti TPO -- Anti Thyroglobulin Ab -- TSI --   @ 01:26 TSH 1.36 FreeT4 0.6 T3 34 Anti TPO -- Anti Thyroglobulin Ab -- TSI --          Radiology:            HPI:  52y  with recurrent cervical CA presenting to the ED as transfer from [place] a/w neutropenic fever. Consulted for: Hypothyroidism r/o    Interval hx:  BP stable, normothermic  Repeat TFTs: TSH elevated 7.84, FT4 normal 1, TT3 normal 81    MEDICATIONS  (STANDING):  caspofungin IVPB      caspofungin IVPB 50 milliGRAM(s) IV Intermittent every 24 hours  chlorhexidine 0.12% Liquid 15 milliLiter(s) Oral Mucosa every 12 hours  chlorhexidine 4% Liquid 1 Application(s) Topical daily  dexMEDEtomidine Infusion 1 MICROgram(s)/kG/Hr (25.1 mL/Hr) IV Continuous <Continuous>  fentaNYL    Injectable 100 MICROGram(s) IV Push once  folic acid 1 milliGRAM(s) Oral daily  heparin   Injectable 5000 Unit(s) SubCutaneous every 8 hours  midazolam Injectable 6 milliGRAM(s) IV Push once  pantoprazole  Injectable 40 milliGRAM(s) IV Push two times a day  piperacillin/tazobactam IVPB.. 3.375 Gram(s) IV Intermittent every 8 hours  QUEtiapine 100 milliGRAM(s) Oral every 12 hours  thiamine Injectable 100 milliGRAM(s) IV Push daily    MEDICATIONS  (PRN):  oxyCODONE    Solution 5 milliGRAM(s) Enteral Tube every 4 hours PRN Moderate Pain (4 - 6)  oxyCODONE    Solution 10 milliGRAM(s) Enteral Tube every 4 hours PRN Severe Pain (7 - 10)      Allergies    No Known Allergies    Intolerances        Review of Systems:  Constitutional: No fever, good appetite/po intake  Eyes: No blurry vision, diplopia  Neuro: No tremors  HEENT: No pain  Cardiovascular: No chest pain, palpitations  Respiratory: No SOB, no cough  GI: No nausea, vomiting,   : No dysuria, hematuria  Skin: no rash  Psych: no depression  Endocrine: no polyuria, polydipsia  Hem/lymph: no swelling  Osteoporosis: no fractures    ALL OTHER SYSTEMS REVIEWED AND NEGATIVE    PHYSICAL EXAM:  VITALS: T(C): 37.3 (22 @ 08:00)  T(F): 99.2 (22 @ 08:00), Max: 99.8 (22 @ 04:00)  HR: 75 (22 @ 10:00) (64 - 105)  BP: --  RR:  (14 - 24)  SpO2:  (99% - 100%)  Wt(kg): --  GENERAL: sedated, well-groomed, well-developed  EYES: No proptosis, no lid lag, anicteric  HEENT:  Atraumatic, normocephalic, moist mucous membranes  RESPIRATORY: nonlabored respirations, no wheezing  PSYCH: sedated, calm    POCT Blood Glucose.: 119 mg/dL (06-15-22 @ 05:06)  POCT Blood Glucose.: 110 mg/dL (06-15-22 @ 00:49)  POCT Blood Glucose.: 107 mg/dL (22 @ 17:33)  POCT Blood Glucose.: 98 mg/dL (22 @ 12:32)                            8.8    6.41  )-----------(  01:26 )             27.1           146<H>  |  108<H>  |  29<H>  ----------------------------<  111<H>  3.2<L>   |  24  |  1.16    eGFR: 56<L>    Ca    8.1<L>        Mg     1.80       Phos  3.6         TPro  5.2<L>  /  Alb  2.2<L>  /  TBili  4.1<H>  /  DBili  x   /  AST  61<H>  /  ALT  29  /  AlkPhos  118        Thyroid Function Tests:   @ 01:26 TSH 7.84 FreeT4 1.0 T3 81 Anti TPO -- Anti Thyroglobulin Ab -- TSI --   @ 01:26 TSH 1.36 FreeT4 0.6 T3 34 Anti TPO -- Anti Thyroglobulin Ab -- TSI --          Radiology:

## 2022-06-17 NOTE — PROGRESS NOTE ADULT - SUBJECTIVE AND OBJECTIVE BOX
R2 GYN ONC Progress Note     Interval Events:   - plan for bedside closure with plastics yesterday.   - restarted TF yesterday but held overnight for procedure today  - ostomy with 450 of output   - plan for SBT in AM  - no acute events overnight     Patient seen and examined at bedside.  No acute events overnight. Patient awake and responsive.    Vital Signs Last 24 Hours  T(C): 37.7 (06-17-22 @ 04:00), Max: 37.7 (06-17-22 @ 04:00)  HR: 85 (06-17-22 @ 06:00) (67 - 105)  BP: --  RR: 14 (06-17-22 @ 06:00) (14 - 24)  SpO2: 100% (06-17-22 @ 04:00) (99% - 100%)    I&O's Summary    16 Jun 2022 07:01  -  17 Jun 2022 07:00  --------------------------------------------------------  IN: 1492.8 mL / OUT: 4580 mL / NET: -3087.2 mL        Physical Exam:  Gen: NAD, responsive  CV: RRR   Pulm: Mechanical breath sounds 2/2 ventilator   Abd: Soft, non-distended, Abthera in place holding suction ; ostomy pink and well perfused with 450cc brownish liquid output in  ostomy bag; NGT in place-minimal clear output; +3 Ric drains  : L and R nephrostomy tube draining clear urine; hilliard in place draining dark la nena urine  Ext: Warm & well perfused, +3 edema. +b/l pedal pulses     Labs:                        8.8    6.41  )-----------( 18       ( 17 Jun 2022 01:26 )             27.1   baso x      eos x      imm gran x      lymph x      mono x      poly x                            8.6    6.87  )-----------( 14       ( 16 Jun 2022 00:53 )             25.9   baso x      eos x      imm gran x      lymph x      mono x      poly x                            8.9    6.27  )-----------( 12       ( 15 Noe 2022 10:05 )             27.1   baso x      eos x      imm gran x      lymph x      mono x      poly x                            8.8    6.63  )-----------( 16       ( 15 Noe 2022 01:20 )             25.6   baso x      eos x      imm gran x      lymph x      mono x      poly x                            9.3    6.31  )-----------( 16       ( 14 Jun 2022 22:07 )             27.6   baso x      eos x      imm gran x      lymph x      mono x      poly x                            7.1    6.33  )-----------( 14       ( 14 Jun 2022 12:00 )             20.8   baso x      eos x      imm gran x      lymph x      mono x      poly x          MEDICATIONS  (STANDING):  caspofungin IVPB      caspofungin IVPB 50 milliGRAM(s) IV Intermittent every 24 hours  chlorhexidine 0.12% Liquid 15 milliLiter(s) Oral Mucosa every 12 hours  chlorhexidine 4% Liquid 1 Application(s) Topical daily  dexMEDEtomidine Infusion 1 MICROgram(s)/kG/Hr (25.1 mL/Hr) IV Continuous <Continuous>  folic acid 1 milliGRAM(s) Oral daily  heparin   Injectable 5000 Unit(s) SubCutaneous every 8 hours  pantoprazole  Injectable 40 milliGRAM(s) IV Push two times a day  piperacillin/tazobactam IVPB.. 3.375 Gram(s) IV Intermittent every 8 hours  QUEtiapine 100 milliGRAM(s) Oral every 12 hours  thiamine Injectable 100 milliGRAM(s) IV Push daily    MEDICATIONS  (PRN):  oxyCODONE    Solution 5 milliGRAM(s) Enteral Tube every 4 hours PRN Moderate Pain (4 - 6)  oxyCODONE    Solution 10 milliGRAM(s) Enteral Tube every 4 hours PRN Severe Pain (7 - 10)       R2 GYN ONC Progress Note     Interval Events:   - plan for bedside closure with plastics yesterday.   - restarted TF yesterday but held overnight for procedure today  - ostomy with 450 of output   - plan for SBT today  - no acute events overnight     Patient seen and examined at bedside.  No acute events overnight. Patient awake and responsive.    Vital Signs Last 24 Hours  T(C): 37.7 (06-17-22 @ 04:00), Max: 37.7 (06-17-22 @ 04:00)  HR: 85 (06-17-22 @ 06:00) (67 - 105)  BP: --  RR: 14 (06-17-22 @ 06:00) (14 - 24)  SpO2: 100% (06-17-22 @ 04:00) (99% - 100%)    I&O's Summary    16 Jun 2022 07:01  -  17 Jun 2022 07:00  --------------------------------------------------------  IN: 1492.8 mL / OUT: 4580 mL / NET: -3087.2 mL        Physical Exam:  Gen: NAD, responsive  CV: RRR   Pulm: Mechanical breath sounds 2/2 ventilator   Abd: Soft, non-distended, Abthera in place holding suction ; ostomy pink and well perfused with 450cc brownish liquid output in  ostomy bag; NGT in place-minimal clear output; +3 Ric drains  : L and R nephrostomy tube draining clear urine; hilliard in place draining dark la nena urine  Ext: Warm & well perfused, +3 edema. +b/l pedal pulses     Labs:                        8.8    6.41  )-----------( 18       ( 17 Jun 2022 01:26 )             27.1   baso x      eos x      imm gran x      lymph x      mono x      poly x                            8.6    6.87  )-----------( 14       ( 16 Jun 2022 00:53 )             25.9   baso x      eos x      imm gran x      lymph x      mono x      poly x                            8.9    6.27  )-----------( 12       ( 15 Noe 2022 10:05 )             27.1   baso x      eos x      imm gran x      lymph x      mono x      poly x                            8.8    6.63  )-----------( 16       ( 15 Noe 2022 01:20 )             25.6   baso x      eos x      imm gran x      lymph x      mono x      poly x                            9.3    6.31  )-----------( 16       ( 14 Jun 2022 22:07 )             27.6   baso x      eos x      imm gran x      lymph x      mono x      poly x                            7.1    6.33  )-----------( 14       ( 14 Jun 2022 12:00 )             20.8   baso x      eos x      imm gran x      lymph x      mono x      poly x          MEDICATIONS  (STANDING):  caspofungin IVPB      caspofungin IVPB 50 milliGRAM(s) IV Intermittent every 24 hours  chlorhexidine 0.12% Liquid 15 milliLiter(s) Oral Mucosa every 12 hours  chlorhexidine 4% Liquid 1 Application(s) Topical daily  dexMEDEtomidine Infusion 1 MICROgram(s)/kG/Hr (25.1 mL/Hr) IV Continuous <Continuous>  folic acid 1 milliGRAM(s) Oral daily  heparin   Injectable 5000 Unit(s) SubCutaneous every 8 hours  pantoprazole  Injectable 40 milliGRAM(s) IV Push two times a day  piperacillin/tazobactam IVPB.. 3.375 Gram(s) IV Intermittent every 8 hours  QUEtiapine 100 milliGRAM(s) Oral every 12 hours  thiamine Injectable 100 milliGRAM(s) IV Push daily    MEDICATIONS  (PRN):  oxyCODONE    Solution 5 milliGRAM(s) Enteral Tube every 4 hours PRN Moderate Pain (4 - 6)  oxyCODONE    Solution 10 milliGRAM(s) Enteral Tube every 4 hours PRN Severe Pain (7 - 10)

## 2022-06-17 NOTE — PROGRESS NOTE ADULT - ASSESSMENT
52y with a PMHx of HTN, cervical CA (stage III)  on chemo - last session last week - c/b b/l hydronephrosis s/p B/L nephrostomy tube placement, p/w severe sepsis from neutropenic fever. Pt was acutely altered yesterday afternoon without improvement and AXR showed increased free air with concern for perforated viscous. She is now s/p washout, open resection of left colon, with colostomy creation. Recovering in SICU for close hemodynamic monitoring.     PLAN:  NEUROLOGY:  - Sedation: Precedex gtt  - Pain control: Oxy PRN  - Seroquel 100mg PO BID  - Folic acid and thiamine    RESPIRATORY:  - MV: Volume /14/5/30  - CPAP trials as tolerated  - Monitor daily chest x-rays and ABG  - Maintain O2 saturation >92%    CARDIOVASCULAR:  - Off pressors  - Monitor hemodynamics  - Keep MAP >65    GI / NUTRITION:  - NPO  - GI ppx: IV Protonix 40mg BID  - Wound VAC in place, SS output  - Plan for abd closure on 6/17    RENAL / GENITOURINARY: hx of bilateral nephrostomy tubes  - Indwelling hilliard catheter, bilateral nephrostomy tubes  - IR nephrostomy tube exchange postponed  - Monitor electrolytes and replete PRN  - Continue to monitor strict ins and outs q1 hour    HEMATOLOGIC:  - VTE ppx: SQH q8hr  - Transfuse prbc and plt as needed  - Monitor H/H and platelet count on CBC daily  - Heme consulted for low platelet count    INFECTIOUS DISEASE: hx of neutropenic fevers, s/p zarxio tx  - Zosyn and Caspofungin  - Monitor fever curve and WBC on daily CBC w/diff    ENDOCRINOLOGY:  - Monitor fingersticks q6 hours while NPO    DISPO:  - Full code  - SICU

## 2022-06-17 NOTE — PROGRESS NOTE ADULT - ASSESSMENT
52y with a PMHx of HTN, cervical CA (stage III)  on chemo - last session last week - c/b b/l hydronephrosis s/p B/L nephrostomy tube placement, p/w severe sepsis from neutropenic fever. Pt was acutely altered yesterday afternoon without improvement and AXR showed increased free air with concern for perforated viscous. She is now s/p washout, open resection of left colon, with colostomy creation. Recovering in SICU for close hemodynamic monitoring.     Plan:  - plan for bedside closure with plastics 6/17. plan to hang platelets during procedure   - NGT kept to drainage  - f/u CBC and transfusion as needed  - care per SICU and primary team    D team surgery  [id number]

## 2022-06-17 NOTE — PROGRESS NOTE ADULT - SUBJECTIVE AND OBJECTIVE BOX
SICU Daily Progress Note  =====================================================  Interval/Overnight Events:     - plan for bedside closure with plastics 6/17. plan to hang platelets during procedure   - lasix 80 given during day -> output 3L during day  - NGT kept to drainage  - restart TF  - plan for SBT in AM  - no acute events overnight       HPI: 52y female with recurrent cervical CA (per patient stage III) presenting as transfer from [place] a/w neutropenic fever. Patient reports she had last cycle of chemotherapy 5 days prior.  Patient had [facility] care stop by yesterday and was noted to be tachycardic on vital signs and was sent to the ED.  She reports feeling weak + fatigued. + upper abdominal pain that worsens while having chills. She denies any vomiting, chest pain, SOB.     At [facility] she was given Meropenem and Zosyn and was noted to be tachycardic to 140s and febrile to 39.5 and was thus transferred for further management. She became acutely altered yesterday afternoon without improvement and AXR with increased free air concern for perforated viscous. She is now s/p washout, open resection of left colon, colostomy. SICU consulted for close hemodynamic monitoring.     Allergies: No Known Allergies    MEDICATIONS:   --------------------------------------------------------------------------------------  Neurologic Medications  dexMEDEtomidine Infusion 1 MICROgram(s)/kG/Hr IV Continuous <Continuous>  fentaNYL   Infusion 0.5 MICROgram(s)/kG/Hr IV Continuous <Continuous>  HYDROmorphone  Injectable 0.5 milliGRAM(s) IV Push every 4 hours PRN Severe Pain (7 - 10)  QUEtiapine 100 milliGRAM(s) Oral every 12 hours    Respiratory Medications    Cardiovascular Medications    Gastrointestinal Medications  folic acid 1 milliGRAM(s) Oral daily  pantoprazole  Injectable 40 milliGRAM(s) IV Push two times a day  potassium chloride  20 mEq/100 mL IVPB 20 milliEquivalent(s) IV Intermittent once  thiamine Injectable 100 milliGRAM(s) IV Push daily    Genitourinary Medications    Hematologic/Oncologic Medications  heparin   Injectable 5000 Unit(s) SubCutaneous every 8 hours    Antimicrobial/Immunologic Medications  caspofungin IVPB      caspofungin IVPB 50 milliGRAM(s) IV Intermittent every 24 hours  piperacillin/tazobactam IVPB.. 3.375 Gram(s) IV Intermittent every 8 hours    Endocrine/Metabolic Medications    Topical/Other Medications  chlorhexidine 0.12% Liquid 15 milliLiter(s) Oral Mucosa every 12 hours  chlorhexidine 4% Liquid 1 Application(s) Topical daily    --------------------------------------------------------------------------------------  VITAL SIGNS, INS/OUTS (last 24 hours):  --------------------------------------------------------------------------------------  ICU Vital Signs Last 24 Hrs  T(C): 37.4 (16 Jun 2022 20:00), Max: 37.4 (16 Jun 2022 04:00)  T(F): 99.3 (16 Jun 2022 20:00), Max: 99.4 (16 Jun 2022 04:00)  HR: 88 (16 Jun 2022 23:51) (67 - 89)  BP: --  BP(mean): --  ABP: 133/68 (16 Jun 2022 21:00) (111/57 - 150/84)  ABP(mean): 87 (16 Jun 2022 21:00) (77 - 108)  RR: 14 (16 Jun 2022 21:00) (14 - 14)  SpO2: 100% (16 Jun 2022 23:51) (99% - 100%)      06-15 @ 07:01  -  06-16 @ 07:00  --------------------------------------------------------  IN: 1699.5 mL / OUT: 3955 mL / NET: -2255.5 mL            --------------------------------------------------------------------------------------  EXAM  NEUROLOGY  Exam: following commands appropriately    HEENT  Exam: Normocephalic, atraumatic, EOMI.     RESPIRATORY  Exam: Normal expansion/effort.  Mechanical Ventilation: Mode: AC/ CMV (Assist Control/ Continuous Mandatory Ventilation), RR (machine): 14, TV (machine): 500, FiO2: 30, PEEP: 5, ITime: 0.92, MAP: 9, PIP: 22    CARDIOVASCULAR  Exam: Regular rate and rhythm.       GI/NUTRITION  Exam: Abdomen soft, Non-tender, Non-distended. Wound vac holding suction    VASCULAR  Exam: Extremities warm, pink, well-perfused.     MUSCULOSKELETAL  Exam: All extremities moving spontaneously without limitations.     SKIN  Exam: Good skin turgor, no skin breakdown.     LABS  --------------------------------------------------------------------------------------     .  LABS:                         8.6    6.87  )-----------( 14 ( 16 Jun 2022 00:53 )             25.9     06-16    145  |  109<H>  |  29<H>  ----------------------------<  98  3.6   |  22  |  1.09    Ca    8.1<L>      16 Jun 2022 00:53  Phos  3.8     06-16  Mg     1.70     06-16                RADIOLOGY, EKG & ADDITIONAL TESTS: Reviewed.

## 2022-06-17 NOTE — PROGRESS NOTE ADULT - ATTENDING COMMENTS
Patient is sleeping, after fentanyl for closure of the abdomen  plan for likely extubation possibly later today or tomorrow  continue sicu care

## 2022-06-17 NOTE — PROGRESS NOTE ADULT - ASSESSMENT
52y  with recurrent cervical CA presenting to the ED as transfer from [place] a/w neutropenic fever. Consulted for: Hypothyroidism r/o    #Abnormal TFTs  TSH 2.89, TT3 41, TT4 3.17 --> Repeat FT4 0.6 with TSH 1.39 (received  mg x 1 the day prior which can possibly lower TSH)  This is likely sick euthyroid syndrome/nonthyroidal illness given low TT3 and low FT4, with normal TSH.  Repeat TFTs again on  show TSH elevated 7.84, Free T4 1.0, Total T3 81 suggesting possible recovery from sick euthyroid status (although pt remains critically ill)  - would not give thyroid hormone replacement at this time  -Can repeat TSH, FT4, and TT3 on     Kaelyn Whitmore MD  Attending Physician   Department of Endocrinology, Diabetes and Metabolism   Pager: [phone]    If before 9AM or after 5PM, or on weekends/holidays, please call the Endocrine answering service for assistance ([phone]).  For nonurgent matters, please email [email or web address] for assistance.      52y  with recurrent cervical CA presenting to the ED as transfer from [place] a/w neutropenic fever. Consulted for: Hypothyroidism r/o    #Abnormal TFTs  TSH 2.89, TT3 41, TT4 3.17 --> Repeat FT4 0.6 with TSH 1.39 (received  mg x 1 the day prior which can possibly lower TSH)  This is likely sick euthyroid syndrome/nonthyroidal illness given low TT3 and low FT4, with normal TSH.  Repeat TFTs again on  show TSH elevated 7.84, Free T4 1.0, Total T3 81 suggesting possible recovery from sick euthyroid status (although pt remains critically ill)  - would not give thyroid hormone replacement at this time  -Can repeat TSH, FT4, and TT3 on     Kaelyn Whitmore MD  Attending Physician   Department of Endocrinology, Diabetes and Metabolism   Pager: [phone]    If before 9AM or after 5PM, or on weekends/holidays, please call the Endocrine answering service for assistance ([phone]).  For nonurgent matters, please email [email or web address] for assistance.

## 2022-06-18 LAB
ANION GAP SERPL CALC-SCNC: 13 MMOL/L — SIGNIFICANT CHANGE UP (ref 7–14)
ANION GAP SERPL CALC-SCNC: 14 MMOL/L — SIGNIFICANT CHANGE UP (ref 7–14)
BUN SERPL-MCNC: 26 MG/DL — HIGH (ref 7–23)
BUN SERPL-MCNC: 26 MG/DL — HIGH (ref 7–23)
CALCIUM SERPL-MCNC: 7.8 MG/DL — LOW (ref 8.4–10.5)
CALCIUM SERPL-MCNC: 8 MG/DL — LOW (ref 8.4–10.5)
CHLORIDE SERPL-SCNC: 107 MMOL/L — SIGNIFICANT CHANGE UP (ref 98–107)
CHLORIDE SERPL-SCNC: 107 MMOL/L — SIGNIFICANT CHANGE UP (ref 98–107)
CO2 SERPL-SCNC: 24 MMOL/L — SIGNIFICANT CHANGE UP (ref 22–31)
CO2 SERPL-SCNC: 25 MMOL/L — SIGNIFICANT CHANGE UP (ref 22–31)
CREAT SERPL-MCNC: 1.16 MG/DL — SIGNIFICANT CHANGE UP (ref 0.5–1.3)
CREAT SERPL-MCNC: 1.16 MG/DL — SIGNIFICANT CHANGE UP (ref 0.5–1.3)
EGFR: 56 ML/MIN/1.73M2 — LOW
EGFR: 56 ML/MIN/1.73M2 — LOW
GAS PNL BLDA: SIGNIFICANT CHANGE UP
GAS PNL BLDA: SIGNIFICANT CHANGE UP
GLUCOSE SERPL-MCNC: 103 MG/DL — HIGH (ref 70–99)
GLUCOSE SERPL-MCNC: 107 MG/DL — HIGH (ref 70–99)
HCT VFR BLD CALC: 24.5 % — LOW (ref 34.5–45)
HCT VFR BLD CALC: 25.1 % — LOW (ref 34.5–45)
HCT VFR BLD CALC: 26.1 % — LOW (ref 34.5–45)
HGB BLD-MCNC: 7.9 G/DL — LOW (ref 11.5–15.5)
HGB BLD-MCNC: 8.1 G/DL — LOW (ref 11.5–15.5)
HGB BLD-MCNC: 8.7 G/DL — LOW (ref 11.5–15.5)
MAGNESIUM SERPL-MCNC: 1.6 MG/DL — SIGNIFICANT CHANGE UP (ref 1.6–2.6)
MAGNESIUM SERPL-MCNC: 1.9 MG/DL — SIGNIFICANT CHANGE UP (ref 1.6–2.6)
MCHC RBC-ENTMCNC: 29.8 PG — SIGNIFICANT CHANGE UP (ref 27–34)
MCHC RBC-ENTMCNC: 29.9 PG — SIGNIFICANT CHANGE UP (ref 27–34)
MCHC RBC-ENTMCNC: 30.3 PG — SIGNIFICANT CHANGE UP (ref 27–34)
MCHC RBC-ENTMCNC: 32.2 GM/DL — SIGNIFICANT CHANGE UP (ref 32–36)
MCHC RBC-ENTMCNC: 32.3 GM/DL — SIGNIFICANT CHANGE UP (ref 32–36)
MCHC RBC-ENTMCNC: 33.3 GM/DL — SIGNIFICANT CHANGE UP (ref 32–36)
MCV RBC AUTO: 90.9 FL — SIGNIFICANT CHANGE UP (ref 80–100)
MCV RBC AUTO: 92.3 FL — SIGNIFICANT CHANGE UP (ref 80–100)
MCV RBC AUTO: 92.8 FL — SIGNIFICANT CHANGE UP (ref 80–100)
NRBC # BLD: 0 /100 WBCS — SIGNIFICANT CHANGE UP
NRBC # FLD: 0.03 K/UL — HIGH
PHOSPHATE SERPL-MCNC: 3 MG/DL — SIGNIFICANT CHANGE UP (ref 2.5–4.5)
PHOSPHATE SERPL-MCNC: 3.1 MG/DL — SIGNIFICANT CHANGE UP (ref 2.5–4.5)
PLATELET # BLD AUTO: 19 K/UL — CRITICAL LOW (ref 150–400)
PLATELET # BLD AUTO: 20 K/UL — CRITICAL LOW (ref 150–400)
PLATELET # BLD AUTO: 23 K/UL — LOW (ref 150–400)
POTASSIUM SERPL-MCNC: 2.9 MMOL/L — CRITICAL LOW (ref 3.5–5.3)
POTASSIUM SERPL-MCNC: 3.6 MMOL/L — SIGNIFICANT CHANGE UP (ref 3.5–5.3)
POTASSIUM SERPL-SCNC: 2.9 MMOL/L — CRITICAL LOW (ref 3.5–5.3)
POTASSIUM SERPL-SCNC: 3.6 MMOL/L — SIGNIFICANT CHANGE UP (ref 3.5–5.3)
RBC # BLD: 2.64 M/UL — LOW (ref 3.8–5.2)
RBC # BLD: 2.72 M/UL — LOW (ref 3.8–5.2)
RBC # BLD: 2.87 M/UL — LOW (ref 3.8–5.2)
RBC # FLD: 16.3 % — HIGH (ref 10.3–14.5)
RBC # FLD: 16.4 % — HIGH (ref 10.3–14.5)
RBC # FLD: 16.5 % — HIGH (ref 10.3–14.5)
SODIUM SERPL-SCNC: 144 MMOL/L — SIGNIFICANT CHANGE UP (ref 135–145)
SODIUM SERPL-SCNC: 146 MMOL/L — HIGH (ref 135–145)
WBC # BLD: 5.7 K/UL — SIGNIFICANT CHANGE UP (ref 3.8–10.5)
WBC # BLD: 5.83 K/UL — SIGNIFICANT CHANGE UP (ref 3.8–10.5)
WBC # BLD: 6.52 K/UL — SIGNIFICANT CHANGE UP (ref 3.8–10.5)
WBC # FLD AUTO: 5.7 K/UL — SIGNIFICANT CHANGE UP (ref 3.8–10.5)
WBC # FLD AUTO: 5.83 K/UL — SIGNIFICANT CHANGE UP (ref 3.8–10.5)
WBC # FLD AUTO: 6.52 K/UL — SIGNIFICANT CHANGE UP (ref 3.8–10.5)

## 2022-06-18 PROCEDURE — 99291 CRITICAL CARE FIRST HOUR: CPT | Mod: 25

## 2022-06-18 PROCEDURE — 71045 X-RAY EXAM CHEST 1 VIEW: CPT | Mod: 26

## 2022-06-18 RX ORDER — ACETAMINOPHEN 500 MG
1000 TABLET ORAL ONCE
Refills: 0 | Status: COMPLETED | OUTPATIENT
Start: 2022-06-18 | End: 2022-06-18

## 2022-06-18 RX ORDER — FUROSEMIDE 40 MG
20 TABLET ORAL ONCE
Refills: 0 | Status: DISCONTINUED | OUTPATIENT
Start: 2022-06-18 | End: 2022-06-18

## 2022-06-18 RX ORDER — METOPROLOL TARTRATE 50 MG
50 TABLET ORAL
Refills: 0 | Status: DISCONTINUED | OUTPATIENT
Start: 2022-06-18 | End: 2022-06-18

## 2022-06-18 RX ORDER — METOPROLOL TARTRATE 50 MG
5 TABLET ORAL EVERY 6 HOURS
Refills: 0 | Status: DISCONTINUED | OUTPATIENT
Start: 2022-06-18 | End: 2022-06-18

## 2022-06-18 RX ORDER — POTASSIUM CHLORIDE 20 MEQ
10 PACKET (EA) ORAL ONCE
Refills: 0 | Status: COMPLETED | OUTPATIENT
Start: 2022-06-18 | End: 2022-06-18

## 2022-06-18 RX ORDER — ACETAMINOPHEN 500 MG
1000 TABLET ORAL EVERY 6 HOURS
Refills: 0 | Status: DISCONTINUED | OUTPATIENT
Start: 2022-06-18 | End: 2022-06-18

## 2022-06-18 RX ORDER — SODIUM CHLORIDE 9 MG/ML
500 INJECTION, SOLUTION INTRAVENOUS ONCE
Refills: 0 | Status: COMPLETED | OUTPATIENT
Start: 2022-06-18 | End: 2022-06-18

## 2022-06-18 RX ORDER — POTASSIUM CHLORIDE 20 MEQ
40 PACKET (EA) ORAL ONCE
Refills: 0 | Status: COMPLETED | OUTPATIENT
Start: 2022-06-18 | End: 2022-06-18

## 2022-06-18 RX ORDER — METOPROLOL TARTRATE 50 MG
7.5 TABLET ORAL EVERY 6 HOURS
Refills: 0 | Status: DISCONTINUED | OUTPATIENT
Start: 2022-06-18 | End: 2022-06-20

## 2022-06-18 RX ORDER — METOPROLOL TARTRATE 50 MG
7.5 TABLET ORAL EVERY 6 HOURS
Refills: 0 | Status: DISCONTINUED | OUTPATIENT
Start: 2022-06-18 | End: 2022-06-18

## 2022-06-18 RX ORDER — MAGNESIUM SULFATE 500 MG/ML
2 VIAL (ML) INJECTION ONCE
Refills: 0 | Status: COMPLETED | OUTPATIENT
Start: 2022-06-18 | End: 2022-06-18

## 2022-06-18 RX ORDER — POTASSIUM CHLORIDE 20 MEQ
10 PACKET (EA) ORAL
Refills: 0 | Status: COMPLETED | OUTPATIENT
Start: 2022-06-18 | End: 2022-06-18

## 2022-06-18 RX ORDER — METOPROLOL TARTRATE 50 MG
7.5 TABLET ORAL ONCE
Refills: 0 | Status: DISCONTINUED | OUTPATIENT
Start: 2022-06-18 | End: 2022-06-18

## 2022-06-18 RX ORDER — FUROSEMIDE 40 MG
20 TABLET ORAL ONCE
Refills: 0 | Status: COMPLETED | OUTPATIENT
Start: 2022-06-18 | End: 2022-06-18

## 2022-06-18 RX ADMIN — Medication 25 GRAM(S): at 01:01

## 2022-06-18 RX ADMIN — HEPARIN SODIUM 5000 UNIT(S): 5000 INJECTION INTRAVENOUS; SUBCUTANEOUS at 13:56

## 2022-06-18 RX ADMIN — Medication 115 MILLIGRAM(S): at 17:18

## 2022-06-18 RX ADMIN — Medication 100 MILLIGRAM(S): at 12:12

## 2022-06-18 RX ADMIN — Medication 40 MILLIEQUIVALENT(S): at 01:45

## 2022-06-18 RX ADMIN — SODIUM CHLORIDE 1000 MILLILITER(S): 9 INJECTION, SOLUTION INTRAVENOUS at 03:45

## 2022-06-18 RX ADMIN — HEPARIN SODIUM 5000 UNIT(S): 5000 INJECTION INTRAVENOUS; SUBCUTANEOUS at 23:05

## 2022-06-18 RX ADMIN — Medication 100 MILLIEQUIVALENT(S): at 02:28

## 2022-06-18 RX ADMIN — DEXMEDETOMIDINE HYDROCHLORIDE IN 0.9% SODIUM CHLORIDE 25.1 MICROGRAM(S)/KG/HR: 4 INJECTION INTRAVENOUS at 07:43

## 2022-06-18 RX ADMIN — Medication 400 MILLIGRAM(S): at 16:29

## 2022-06-18 RX ADMIN — PANTOPRAZOLE SODIUM 40 MILLIGRAM(S): 20 TABLET, DELAYED RELEASE ORAL at 17:19

## 2022-06-18 RX ADMIN — QUETIAPINE FUMARATE 100 MILLIGRAM(S): 200 TABLET, FILM COATED ORAL at 06:27

## 2022-06-18 RX ADMIN — PIPERACILLIN AND TAZOBACTAM 25 GRAM(S): 4; .5 INJECTION, POWDER, LYOPHILIZED, FOR SOLUTION INTRAVENOUS at 17:19

## 2022-06-18 RX ADMIN — OXYCODONE HYDROCHLORIDE 5 MILLIGRAM(S): 5 TABLET ORAL at 01:45

## 2022-06-18 RX ADMIN — Medication 20 MILLIGRAM(S): at 09:30

## 2022-06-18 RX ADMIN — Medication 1 MILLIGRAM(S): at 12:11

## 2022-06-18 RX ADMIN — Medication 100 MILLIEQUIVALENT(S): at 06:31

## 2022-06-18 RX ADMIN — OXYCODONE HYDROCHLORIDE 5 MILLIGRAM(S): 5 TABLET ORAL at 02:45

## 2022-06-18 RX ADMIN — QUETIAPINE FUMARATE 100 MILLIGRAM(S): 200 TABLET, FILM COATED ORAL at 17:18

## 2022-06-18 RX ADMIN — CHLORHEXIDINE GLUCONATE 1 APPLICATION(S): 213 SOLUTION TOPICAL at 12:11

## 2022-06-18 RX ADMIN — Medication 100 MILLIEQUIVALENT(S): at 03:29

## 2022-06-18 RX ADMIN — Medication 100 MILLIEQUIVALENT(S): at 01:00

## 2022-06-18 RX ADMIN — DEXMEDETOMIDINE HYDROCHLORIDE IN 0.9% SODIUM CHLORIDE 25.1 MICROGRAM(S)/KG/HR: 4 INJECTION INTRAVENOUS at 19:19

## 2022-06-18 RX ADMIN — PANTOPRAZOLE SODIUM 40 MILLIGRAM(S): 20 TABLET, DELAYED RELEASE ORAL at 06:28

## 2022-06-18 RX ADMIN — PIPERACILLIN AND TAZOBACTAM 25 GRAM(S): 4; .5 INJECTION, POWDER, LYOPHILIZED, FOR SOLUTION INTRAVENOUS at 01:00

## 2022-06-18 RX ADMIN — CASPOFUNGIN ACETATE 260 MILLIGRAM(S): 7 INJECTION, POWDER, LYOPHILIZED, FOR SOLUTION INTRAVENOUS at 12:29

## 2022-06-18 RX ADMIN — HEPARIN SODIUM 5000 UNIT(S): 5000 INJECTION INTRAVENOUS; SUBCUTANEOUS at 06:27

## 2022-06-18 RX ADMIN — Medication 1000 MILLIGRAM(S): at 00:00

## 2022-06-18 RX ADMIN — CHLORHEXIDINE GLUCONATE 15 MILLILITER(S): 213 SOLUTION TOPICAL at 06:27

## 2022-06-18 RX ADMIN — Medication 5 MILLIGRAM(S): at 04:29

## 2022-06-18 RX ADMIN — PIPERACILLIN AND TAZOBACTAM 25 GRAM(S): 4; .5 INJECTION, POWDER, LYOPHILIZED, FOR SOLUTION INTRAVENOUS at 09:29

## 2022-06-18 RX ADMIN — Medication 60 MILLIGRAM(S): at 06:36

## 2022-06-18 RX ADMIN — CHLORHEXIDINE GLUCONATE 15 MILLILITER(S): 213 SOLUTION TOPICAL at 17:18

## 2022-06-18 RX ADMIN — Medication 115 MILLIGRAM(S): at 23:05

## 2022-06-18 RX ADMIN — Medication 115 MILLIGRAM(S): at 10:09

## 2022-06-18 NOTE — PROGRESS NOTE ADULT - ASSESSMENT
52y with a PMHx of HTN, cervical CA (stage III)  on chemo - last session last week - c/b b/l hydronephrosis s/p B/L nephrostomy tube placement, p/w severe sepsis from neutropenic fever. Pt was acutely altered yesterday afternoon without improvement and AXR showed increased free air with concern for perforated viscous. She is now s/p washout, open resection of left colon, with colostomy creation. Recovering in SICU for close hemodynamic monitoring.     Plan:  - plan for bedside closure with plastics   - NGT kept to drainage, TF  - f/u CBC and transfusion as needed  - care per SICU and primary team    D team surgery  [id number]

## 2022-06-18 NOTE — PROGRESS NOTE ADULT - ATTENDING COMMENTS
Patient overnight no issues, rsbi around 100 did not meet criteria for extubation. Patient tachycardic reflex likely from off beta blocker, resumed last night.  N mentating well, on precedex  resp on minimal vent settings adequate gas exchange, rsbi maintained around 100, borderline currently does not meet critiera for extubation, will reconsider tomorrow meantime will continue diuresis  cv perfusing adequately, hypervolemic, continue diuresis to maintain net negative fluid balance  gi npo, ivf, tf  gu/renal diuresis  heme vte ppx  id on zosyn and caspo, decrease leukoycytosis  endo no changes    The patient is a critical care patient with life threatening hemodynamic and metabolic instability in SICU.  I have personally interviewed when possible and examined the patient, reviewed data and laboratory tests/x-rays and all pertinent electronic images.  I was physically present for the key portions of the evaluation and management (E/M) service provided.   The SICU team has a constant risk benefit analyzes discussion with the primary team, all consultants, House Staff and PA's on all decisions.  These diagnoses are unrelated to the surgical procedure noted above.  I meet with family if needed to get further history, discuss the case and make care decisions for this patient who might not be able to participate.  Time involved in performance of separately billable procedures was not counted toward my critical care time. There is no overlap.  I spent 55-75 minutes ( 0800Hrs-0915Hrs in AM/ 1600Hrs-1715Hrs in PM, or other time indicated) of critical care time for the diagnoses, assessment, plan and interventions.  This time excludes time spent on separate procedures and teaching.

## 2022-06-18 NOTE — CHART NOTE - NSCHARTNOTEFT_GEN_A_CORE
R2 GYN ONCOLOGY Chart Note    Interval Events:  -s/p RTOR with plastics for closure today  -failed trial of extubation, plan for SBRT tomorrow    Patient evaluated at bedside. Arousable, opens eyes to voice, follows commands, communicates with hand gestures.    Vital Signs Last 24 Hrs  T(C): 37.8 (17 Jun 2022 20:00), Max: 37.9 (17 Jun 2022 16:00)  T(F): 100 (17 Jun 2022 20:00), Max: 100.3 (17 Jun 2022 16:00)  HR: 105 (18 Jun 2022 00:00) (64 - 115)  BP: 119/72 (17 Jun 2022 20:00) (119/72 - 119/72)  BP(mean): 84 (17 Jun 2022 20:00) (84 - 84)  RR: 14 (18 Jun 2022 00:00) (13 - 27)  SpO2: 100% (18 Jun 2022 00:00) (96% - 100%)    I&O's Detail    16 Jun 2022 07:01  -  17 Jun 2022 07:00  --------------------------------------------------------  IN:    Dexmedetomidine: 682.4 mL    Enteral Tube Flush: 60 mL    FentaNYL: 75.5 mL    IV PiggyBack: 800 mL    TwoCal HN: 280 mL  Total IN: 1897.9 mL    OUT:    Drain (mL): 80 mL    Drain (mL): 30 mL    Drain (mL): 120 mL    Indwelling Catheter - Urethral (mL): 435 mL    Nephrostomy Tube (mL): 2395 mL    Nephrostomy Tube (mL): 1405 mL    VAC (Vacuum Assisted Closure) System (mL): 500 mL  Total OUT: 4965 mL    Total NET: -3067.1 mL      17 Jun 2022 07:01  -  18 Jun 2022 00:24  --------------------------------------------------------  IN:    Dexmedetomidine: 263.4 mL    Enteral Tube Flush: 80 mL    IV PiggyBack: 550 mL    TwoCal HN: 150 mL  Total IN: 1043.4 mL    OUT:    Colostomy (mL): 200 mL    Drain (mL): 10 mL    Drain (mL): 45 mL    Drain (mL): 45 mL    Indwelling Catheter - Urethral (mL): 410 mL    Nephrostomy Tube (mL): 2355 mL    Nephrostomy Tube (mL): 1265 mL    VAC (Vacuum Assisted Closure) System (mL): 200 mL  Total OUT: 4530 mL    Total NET: -3486.6 mL                          8.1    5.70  )-----------( 20       ( 17 Jun 2022 23:31 )             25.1       06-17    x   |  x   |  26<H>  ----------------------------<  103<H>  x    |  25  |  1.16    Ca    8.0<L>      17 Jun 2022 23:31  Phos  3.1     06-17  Mg     1.60     06-17    A/P: 52y with recurrent cervical CA admitted for management of neutropenic fever c/b AMS and c/f bowel perforation 2/2 increased free air on abdominal X-ray s/p emergent exploratory laparotomy, abdominal washout, rectosigmoid colectomy, diverting colostomy, bronchoscopy and Abthera placement on 6/8/22 (EBL 2000cc, s/p 5UpRBC, 3L albumin, 3U Plts, 3U FFP, 3U Cryo) with intraoperative findings notable for perforation in distal sigmoid colon. Patient POD#4 s/p 3rd re-exploratory laparotomy, abdominal washout, fascia bridge with [id number] vicryl mesh. Now POD#0 s/p RTOR w/ PRS for skin closure today. Patient failed SBRT today, plan for trial again tomorrow    - Sedated; continue Precedex, seroquel PRN; IV analgesia per SICU  - Continues to be off pressor support; Continue to monitor closely   - Intubated on AC   - NPO/NGT in place (6/8-). Tube feeds. Continue IV Protonix/Reglan. GI following  - b/l PCN, 10-30cc/hour concentrated urine bilaterally. Infante in place. ASHLEY likely 2/2 IV contrast, dehydration, Cr 1.29->1.31->1.23->1.19->>1.1->1.09. Continue to monitor UOP. Replete electrolytes PRN  - VTE ppx: Venodynes, HSQ 5K q8h  - Maintain Plt >50K arile-operatively, total 5u plts transfused on 6/14  - Maintain Hgb >7; pRBCs administered: 5u pRBC intraop 6/8, 1u pRBC intraop 6/10; 1u pRBC post-op 6/12, 2u pRBC on 6/13, 2u pRBC on 6/14  - Afebrile. WBC 5.70, trend WBC; Continue Zosyn (6/8-), Caspofungin (6/13-), (s/p Cefepime), s/p Zarixo (6/5-6/10) for neutropenic fever     Dispo: Appreciate excellent SICU care.     Seen and evaluated with YANI Brito, PGY-4  GRIS Lopez PGY-2.

## 2022-06-18 NOTE — PROGRESS NOTE ADULT - ATTENDING COMMENTS
Patient seen and examined at bedside, agree with above  note, including assessment and plan. Appreciate ICU care.

## 2022-06-18 NOTE — PROGRESS NOTE ADULT - SUBJECTIVE AND OBJECTIVE BOX
S: pt seen and examined. remains intubated    O:  T(C): 37.9 (06-18-22 @ 08:00), Max: 37.9 (06-17-22 @ 16:00)  HR: 108 (06-18-22 @ 10:00) (67 - 131)  BP: 119/72 (06-17-22 @ 20:00) (119/72 - 119/72)  RR: 30 (06-18-22 @ 10:00) (13 - 30)  SpO2: 99% (06-18-22 @ 10:00) (96% - 100%)  Wt(kg): --  06-17 @ 07:01  -  06-18 @ 07:00  --------------------------------------------------------  IN:    Dexmedetomidine: 303.4 mL    Enteral Tube Flush: 330 mL    IV PiggyBack: 1000 mL    TwoCal HN: 300 mL  Total IN: 1933.4 mL    OUT:    Colostomy (mL): 200 mL    Drain (mL): 72 mL    Drain (mL): 62 mL    Drain (mL): 11 mL    Indwelling Catheter - Urethral (mL): 510 mL    Nephrostomy Tube (mL): 1570 mL    Nephrostomy Tube (mL): 2730 mL    VAC (Vacuum Assisted Closure) System (mL): 200 mL  Total OUT: 5355 mL    Total NET: -3421.6 mL      06-18 @ 07:01  -  06-18 @ 10:28  --------------------------------------------------------  IN:    Dexmedetomidine: 30 mL  Total IN: 30 mL    OUT:    Indwelling Catheter - Urethral (mL): 115 mL    Nephrostomy Tube (mL): 875 mL    Nephrostomy Tube (mL): 385 mL  Total OUT: 1375 mL    Total NET: -1345 mL    Gen NAD  Abdomen: soft no collections, LLQ drain murky, vac in place holding suction, dermaclose in place, remaining drains ss    .  LABS:                         8.7    6.52  )-----------( 23       ( 18 Jun 2022 07:30 )             26.1     06-18    144  |  107  |  26<H>  ----------------------------<  107<H>  3.6   |  24  |  1.16    Ca    7.8<L>      18 Jun 2022 04:00  Phos  3.0     06-18  Mg     1.90     06-18                RADIOLOGY, EKG & ADDITIONAL TESTS: Reviewed.

## 2022-06-18 NOTE — PROGRESS NOTE ADULT - SUBJECTIVE AND OBJECTIVE BOX
Surgery Progress Note    INTERVAl/SUBJECTIVE: No acute event overnight. Patient seen and examined in am rounds. Intubated and sedated.     Vital Signs Last 24 Hrs  T(C): 37.9 (18 Jun 2022 08:00), Max: 37.9 (17 Jun 2022 16:00)  T(F): 100.3 (18 Jun 2022 08:00), Max: 100.3 (17 Jun 2022 16:00)  HR: 125 (18 Jun 2022 09:00) (67 - 131)  BP: 119/72 (17 Jun 2022 20:00) (119/72 - 119/72)  BP(mean): 84 (17 Jun 2022 20:00) (84 - 84)  RR: 24 (18 Jun 2022 09:00) (13 - 28)  SpO2: 99% (18 Jun 2022 09:00) (96% - 100%)    Physical Exam:  Gen: NAD, responsive  CV: RRR   Pulm: Mechanical breath sounds 2/2 ventilator   Abd: Soft, non-distended, Abthera in place holding suction ; ostomy pink and well perfused with brownish liquid output in  ostomy bag; NGT in place-minimal clear output; +3 Ric drains  : L and R nephrostomy tube draining clear urine; hilliard in place draining SS.  Ext: Warm & well perfused, +3 edema. +b/l pedal pulses       LABS:                        8.7    6.52  )-----------( 23       ( 18 Jun 2022 07:30 )             26.1     06-18    144  |  107  |  26<H>  ----------------------------<  107<H>  3.6   |  24  |  1.16    Ca    7.8<L>      18 Jun 2022 04:00  Phos  3.0     06-18  Mg     1.90     06-18            INs and OUTs:    06-17-22 @ 07:01  -  06-18-22 @ 07:00  --------------------------------------------------------  IN: 1933.4 mL / OUT: 5355 mL / NET: -3421.6 mL    06-18-22 @ 07:01  -  06-18-22 @ 09:26  --------------------------------------------------------  IN: 10 mL / OUT: 515 mL / NET: -505 mL

## 2022-06-18 NOTE — PROGRESS NOTE ADULT - SUBJECTIVE AND OBJECTIVE BOX
SICU Daily Progress Note  =====================================================  Interval/Overnight Events:     - No acute events overnight       HPI: 52y female with recurrent cervical CA (per patient stage III) presenting as transfer from [place] a/w neutropenic fever. Patient reports she had last cycle of chemotherapy 5 days prior.  Patient had VNS care stop by yesterday and was noted to be tachycardic on vital signs and was sent to the ED.  She reports feeling weak + fatigued. + upper abdominal pain that worsens while having chills. She denies any vomiting, chest pain, SOB.     At [facility] she was given Meropenem and Zosyn and was noted to be tachycardic to 140s and febrile to 39.5 and was thus transferred for further management. She became acutely altered yesterday afternoon without improvement and AXR with increased free air concern for perforated viscous. She is now s/p washout, open resection of left colon, colostomy. SICU consulted for close hemodynamic monitoring.     Allergies: No Known Allergies    MEDICATIONS:   --------------------------------------------------------------------------------------  Neurologic Medications  dexMEDEtomidine Infusion 1 MICROgram(s)/kG/Hr IV Continuous <Continuous>  fentaNYL   Infusion 0.5 MICROgram(s)/kG/Hr IV Continuous <Continuous>  HYDROmorphone  Injectable 0.5 milliGRAM(s) IV Push every 4 hours PRN Severe Pain (7 - 10)  QUEtiapine 100 milliGRAM(s) Oral every 12 hours    Respiratory Medications    Cardiovascular Medications    Gastrointestinal Medications  folic acid 1 milliGRAM(s) Oral daily  pantoprazole  Injectable 40 milliGRAM(s) IV Push two times a day  potassium chloride  20 mEq/100 mL IVPB 20 milliEquivalent(s) IV Intermittent once  thiamine Injectable 100 milliGRAM(s) IV Push daily    Genitourinary Medications    Hematologic/Oncologic Medications  heparin   Injectable 5000 Unit(s) SubCutaneous every 8 hours    Antimicrobial/Immunologic Medications  caspofungin IVPB      caspofungin IVPB 50 milliGRAM(s) IV Intermittent every 24 hours  piperacillin/tazobactam IVPB.. 3.375 Gram(s) IV Intermittent every 8 hours    Endocrine/Metabolic Medications    Topical/Other Medications  chlorhexidine 0.12% Liquid 15 milliLiter(s) Oral Mucosa every 12 hours  chlorhexidine 4% Liquid 1 Application(s) Topical daily    --------------------------------------------------------------------------------------  VITAL SIGNS, INS/OUTS (last 24 hours):  --------------------------------------------------------------------------------------  T(C): 37.8 (06-17-22 @ 20:00), Max: 37.9 (06-17-22 @ 16:00)  HR: 105 (06-18-22 @ 00:00) (64 - 115)  BP: 119/72 (06-17-22 @ 20:00) (119/72 - 119/72)  ABP: 125/58 (06-18-22 @ 00:00) (92/44 - 159/95)  ABP(mean): 79 (06-18-22 @ 00:00) (59 - 117)  RR: 14 (06-18-22 @ 00:00) (13 - 27)  SpO2: 100% (06-18-22 @ 00:00) (96% - 100%)  Wt(kg): --  CVP(mm Hg): --      06-16 @ 07:01  -  06-17 @ 07:00  --------------------------------------------------------  IN:    Dexmedetomidine: 682.4 mL    Enteral Tube Flush: 60 mL    FentaNYL: 75.5 mL    IV PiggyBack: 800 mL    TwoCal HN: 280 mL  Total IN: 1897.9 mL    OUT:    Drain (mL): 80 mL    Drain (mL): 30 mL    Drain (mL): 120 mL    Indwelling Catheter - Urethral (mL): 435 mL    Nephrostomy Tube (mL): 2395 mL    Nephrostomy Tube (mL): 1405 mL    VAC (Vacuum Assisted Closure) System (mL): 500 mL  Total OUT: 4965 mL    Total NET: -3067.1 mL      06-17 @ 07:01  -  06-18 @ 00:14  --------------------------------------------------------  IN:    Dexmedetomidine: 263.4 mL    Enteral Tube Flush: 80 mL    IV PiggyBack: 550 mL    TwoCal HN: 150 mL  Total IN: 1043.4 mL    OUT:    Colostomy (mL): 200 mL    Drain (mL): 10 mL    Drain (mL): 45 mL    Drain (mL): 45 mL    Indwelling Catheter - Urethral (mL): 410 mL    Nephrostomy Tube (mL): 2355 mL    Nephrostomy Tube (mL): 1265 mL    VAC (Vacuum Assisted Closure) System (mL): 200 mL  Total OUT: 4530 mL    Total NET: -3486.6 mL    --------------------------------------------------------------------------------------  EXAM  NEUROLOGY  Exam: following commands appropriately    HEENT  Exam: Normocephalic, atraumatic, EOMI.     RESPIRATORY  Exam: Normal expansion/effort.  Mechanical Ventilation: Mode: AC/ CMV (Assist Control/ Continuous Mandatory Ventilation), RR (machine): 14, TV (machine): 500, FiO2: 30, PEEP: 5, ITime: 0.92, MAP: 9, PIP: 22    CARDIOVASCULAR  Exam: Regular rate and rhythm.       GI/NUTRITION  Exam: Abdomen soft, Non-tender, Non-distended. Wound vac holding suction    VASCULAR  Exam: Extremities warm, pink, well-perfused.     MUSCULOSKELETAL  Exam: All extremities moving spontaneously without limitations.     SKIN  Exam: Good skin turgor, no skin breakdown.     LABS  --------------------------------------------------------------------------------------     ****    RADIOLOGY, EKG & ADDITIONAL TESTS: Reviewed.  SICU Daily Progress Note  =====================================================  Interval/Overnight Events:     - No acute events overnight       HPI: 52y female with recurrent cervical CA (per patient stage III) presenting as transfer from [place] a/w neutropenic fever. Patient reports she had last cycle of chemotherapy 5 days prior.  Patient had VNS care stop by yesterday and was noted to be tachycardic on vital signs and was sent to the ED.  She reports feeling weak + fatigued. + upper abdominal pain that worsens while having chills. She denies any vomiting, chest pain, SOB.     At [facility] she was given Meropenem and Zosyn and was noted to be tachycardic to 140s and febrile to 39.5 and was thus transferred for further management. She became acutely altered yesterday afternoon without improvement and AXR with increased free air concern for perforated viscous. She is now s/p washout, open resection of left colon, colostomy. SICU consulted for close hemodynamic monitoring.     Allergies: No Known Allergies    MEDICATIONS:   --------------------------------------------------------------------------------------  Neurologic Medications  dexMEDEtomidine Infusion 1 MICROgram(s)/kG/Hr IV Continuous <Continuous>  fentaNYL   Infusion 0.5 MICROgram(s)/kG/Hr IV Continuous <Continuous>  HYDROmorphone  Injectable 0.5 milliGRAM(s) IV Push every 4 hours PRN Severe Pain (7 - 10)  QUEtiapine 100 milliGRAM(s) Oral every 12 hours    Respiratory Medications    Cardiovascular Medications    Gastrointestinal Medications  folic acid 1 milliGRAM(s) Oral daily  pantoprazole  Injectable 40 milliGRAM(s) IV Push two times a day  potassium chloride  20 mEq/100 mL IVPB 20 milliEquivalent(s) IV Intermittent once  thiamine Injectable 100 milliGRAM(s) IV Push daily    Genitourinary Medications    Hematologic/Oncologic Medications  heparin   Injectable 5000 Unit(s) SubCutaneous every 8 hours    Antimicrobial/Immunologic Medications  caspofungin IVPB      caspofungin IVPB 50 milliGRAM(s) IV Intermittent every 24 hours  piperacillin/tazobactam IVPB.. 3.375 Gram(s) IV Intermittent every 8 hours    Endocrine/Metabolic Medications    Topical/Other Medications  chlorhexidine 0.12% Liquid 15 milliLiter(s) Oral Mucosa every 12 hours  chlorhexidine 4% Liquid 1 Application(s) Topical daily    --------------------------------------------------------------------------------------  VITAL SIGNS, INS/OUTS (last 24 hours):  --------------------------------------------------------------------------------------  T(C): 37.8 (06-17-22 @ 20:00), Max: 37.9 (06-17-22 @ 16:00)  HR: 105 (06-18-22 @ 00:00) (64 - 115)  BP: 119/72 (06-17-22 @ 20:00) (119/72 - 119/72)  ABP: 125/58 (06-18-22 @ 00:00) (92/44 - 159/95)  ABP(mean): 79 (06-18-22 @ 00:00) (59 - 117)  RR: 14 (06-18-22 @ 00:00) (13 - 27)  SpO2: 100% (06-18-22 @ 00:00) (96% - 100%)  Wt(kg): --  CVP(mm Hg): --      06-16 @ 07:01  -  06-17 @ 07:00  --------------------------------------------------------  IN:    Dexmedetomidine: 682.4 mL    Enteral Tube Flush: 60 mL    FentaNYL: 75.5 mL    IV PiggyBack: 800 mL    TwoCal HN: 280 mL  Total IN: 1897.9 mL    OUT:    Drain (mL): 80 mL    Drain (mL): 30 mL    Drain (mL): 120 mL    Indwelling Catheter - Urethral (mL): 435 mL    Nephrostomy Tube (mL): 2395 mL    Nephrostomy Tube (mL): 1405 mL    VAC (Vacuum Assisted Closure) System (mL): 500 mL  Total OUT: 4965 mL    Total NET: -3067.1 mL      06-17 @ 07:01  -  06-18 @ 00:14  --------------------------------------------------------  IN:    Dexmedetomidine: 263.4 mL    Enteral Tube Flush: 80 mL    IV PiggyBack: 550 mL    TwoCal HN: 150 mL  Total IN: 1043.4 mL    OUT:    Colostomy (mL): 200 mL    Drain (mL): 10 mL    Drain (mL): 45 mL    Drain (mL): 45 mL    Indwelling Catheter - Urethral (mL): 410 mL    Nephrostomy Tube (mL): 2355 mL    Nephrostomy Tube (mL): 1265 mL    VAC (Vacuum Assisted Closure) System (mL): 200 mL  Total OUT: 4530 mL    Total NET: -3486.6 mL    --------------------------------------------------------------------------------------  EXAM  NEUROLOGY  Exam: following commands appropriately    HEENT  Exam: Normocephalic, atraumatic, EOMI.     RESPIRATORY  Exam: Normal expansion/effort.  Mechanical Ventilation: Mode: AC/ CMV (Assist Control/ Continuous Mandatory Ventilation), RR (machine): 14, TV (machine): 500, FiO2: 30, PEEP: 5, ITime: 0.92, MAP: 9, PIP: 22    CARDIOVASCULAR  Exam: Regular rate and rhythm.       GI/NUTRITION  Exam: Abdomen soft, Non-tender, Non-distended. Wound vac holding suction    VASCULAR  Exam: Extremities warm, pink, well-perfused.     MUSCULOSKELETAL  Exam: All extremities moving spontaneously without limitations.     SKIN  Exam: Good skin turgor, no skin breakdown.     LABS  --------------------------------------------------------------------------------------   CBC (06-17 @ 23:31)                              8.1<L>                         5.70    )----------------(  20<LL>     --    % Neutrophils, --    % Lymphocytes, ANC: --                                  25.1<L>  CBC (06-17 @ 01:26)                              8.8<L>                         6.41    )----------------(  18<LL>     --    % Neutrophils, --    % Lymphocytes, ANC: --                                  27.1<L>    BMP (06-17 @ 23:31)             146<H>  |  107     |  26<H> 		Ca++ --      Ca 8.0<L>             ---------------------------------( 103<H>		Mg 1.60               2.9<LL>  |  25      |  1.16  			Ph 3.1     BMP (06-17 @ 01:26)             146<H>  |  108<H>  |  29<H> 		Ca++ --      Ca 8.1<L>             ---------------------------------( 111<H>		Mg 1.80               3.2<L>  |  24      |  1.16  			Ph 3.6             ABG (06-17 @ 14:22)     7.47<H> / 38<H> / 91 / 28 / 3.8<H> / 98.7<H>%     Lactate:    ABG (06-17 @ 01:26)     7.44 / 38<H> / 139<H> / 26 / 1.6 / 98.8<H>%     Lactate:          RADIOLOGY, EKG & ADDITIONAL TESTS: Reviewed.

## 2022-06-18 NOTE — PROGRESS NOTE ADULT - ASSESSMENT
A/P: 52y with recurrent cervical CA admitted for management of neutropenic fever c/b AMS and c/f bowel perforation 2/2 increased free air on abdominal X-ray s/p emergent exploratory laparotomy, abdominal washout, rectosigmoid colectomy, diverting colostomy, bronchoscopy and Abthera placement on 6/8/22 (EBL 2000cc, s/p 5UpRBC, 3L albumin, 3U Plts, 3U FFP, 3U Cryo) with intraoperative findings notable for perforation in distal sigmoid colon. Patient POD#4 s/p 3rd re-exploratory laparotomy, abdominal washout, fascia bridge with [id number] vicryl mesh. Now POD#1 s/p RTOR w/ PRS for skin closure today. Patient failed SBRT, plan for trial again today.    - Sedated; continue Precedex, seroquel PRN; IV analgesia per SICU  - Continues to be off pressor support; Continue to monitor closely   - Intubated on AC   - NPO/NGT in place (6/8-). Tube feeds. Continue IV Protonix/Reglan. GI following  - b/l PCN, 10-30cc/hour concentrated urine bilaterally. Infante in place. ASHLEY likely 2/2 IV contrast, dehydration, Cr 1.29->1.31->1.23->1.19->>1.1->1.09. Continue to monitor UOP. Replete electrolytes PRN  - VTE ppx: Venodynes, HSQ 5K q8h  - Maintain Plt >50K ariel-operatively, total 5u plts transfused on 6/14  - Maintain Hgb >7; pRBCs administered: 5u pRBC intraop 6/8, 1u pRBC intraop 6/10; 1u pRBC post-op 6/12, 2u pRBC on 6/13, 2u pRBC on 6/14  - Afebrile. though Tmax 100.3 overnight. WBC 5.70, trend WBC; Continue Zosyn (6/8-), Caspofungin (6/13-), (s/p Cefepime), s/p Zarixo (6/5-6/10) for neutropenic fever     Dispo: Appreciate excellent SICU care.     Jessika Pathak, PGY2 A/P: 52y with recurrent cervical CA admitted for management of neutropenic fever c/b AMS and c/f bowel perforation 2/2 increased free air on abdominal X-ray s/p emergent exploratory laparotomy, abdominal washout, rectosigmoid colectomy, diverting colostomy, bronchoscopy and Abthera placement on 6/8/22 (EBL 2000cc, s/p 5UpRBC, 3L albumin, 3U Plts, 3U FFP, 3U Cryo) with intraoperative findings notable for perforation in distal sigmoid colon. Patient POD#4 s/p 3rd re-exploratory laparotomy, abdominal washout, fascia bridge with [id number] vicryl mesh. Now POD#1 s/p RTOR w/ PRS for skin closure today by plastics team. Patient failed SBRT, plan for trial again today.    - Sedated; continue Precedex, seroquel PRN; IV analgesia per SICU  - Continues to be off pressor support; Continue to monitor closely   - Intubated on AC   - NPO/NGT in place (6/8-). Tube feeds. Continue IV Protonix/Reglan. GI following  - b/l PCN, 10-30cc/hour concentrated urine bilaterally. Infante in place. ASHLEY likely 2/2 IV contrast, dehydration, Cr 1.29->1.31->1.23->1.19->>1.1->1.09->->1.16. Continue to monitor UOP. Replete electrolytes PRN  - VTE ppx: Venodynes, HSQ 5K q8h  - Maintain Plt >50K ariel-operatively, total 5u plts transfused on 6/14  - Maintain Hgb >7; pRBCs administered: 5u pRBC intraop 6/8, 1u pRBC intraop 6/10; 1u pRBC post-op 6/12, 2u pRBC on 6/13, 2u pRBC on 6/14  - Afebrile. though Tmax 100.3 overnight. WBC 5.83, trend WBC; Continue Zosyn (6/8-), Caspofungin (6/13-), (s/p Cefepime), s/p Zarixo (6/5-6/10) for neutropenic fever     Dispo: Appreciate excellent SICU care.     Jessika Pathak, PGY2

## 2022-06-18 NOTE — PROGRESS NOTE ADULT - ASSESSMENT
A/P 52F hx of recurrent cervical ca, now s/p emergent ex lap, washout, rectosigmoid collectomy, diverting colostomy, w. open abdomen now s/p vac and dermaclose placement.    -continue vac and dermaclose  -continue to monitor drains  -appreciate care per sicu and gen surg    Rodolfo Burr MD PGY5 PRS

## 2022-06-18 NOTE — PROGRESS NOTE ADULT - PROBLEM SELECTOR PLAN 1
GYN ONC Fellow Addendum:    Pt seen and examined at bedside. Pt intubated, mildly sedated. Denies pain. On CPAP trial.    VS reviewed: tachy to 120s  Labs reviewed    - Continue current pain regimen  - CPAP trial, extubate if able  - Thrombocytopenia: stable  - Tube feeds  - DVT ppx: SCDs, hold chemical due to plts  - Appreciate SICU care    GRIS Mckeon, PGY6

## 2022-06-18 NOTE — PROGRESS NOTE ADULT - SUBJECTIVE AND OBJECTIVE BOX
R2 GYN ONC Progress Note     Interval Events:   -s/p RTOR with plastics for closure today  -failed trial of extubation, plan for SBRT tomorrow    Patient seen and examined at bedside.  No acute events overnight. Patient awake and responsive.    Vital Signs Last 24 Hrs  T(C): 37.9 (18 Jun 2022 00:00), Max: 37.9 (17 Jun 2022 16:00)  T(F): 100.2 (18 Jun 2022 00:00), Max: 100.3 (17 Jun 2022 16:00)  HR: 118 (18 Jun 2022 04:00) (64 - 126)  BP: 119/72 (17 Jun 2022 20:00) (119/72 - 119/72)  BP(mean): 84 (17 Jun 2022 20:00) (84 - 84)  RR: 14 (18 Jun 2022 04:00) (13 - 27)  SpO2: 100% (18 Jun 2022 04:00) (96% - 100%)      16 Jun 2022 07:01  -  17 Jun 2022 07:00  --------------------------------------------------------  IN:    Dexmedetomidine: 682.4 mL    Enteral Tube Flush: 60 mL    FentaNYL: 75.5 mL    IV PiggyBack: 800 mL    TwoCal HN: 280 mL  Total IN: 1897.9 mL    OUT:    Drain (mL): 80 mL    Drain (mL): 30 mL    Drain (mL): 120 mL    Indwelling Catheter - Urethral (mL): 435 mL    Nephrostomy Tube (mL): 2395 mL    Nephrostomy Tube (mL): 1405 mL    VAC (Vacuum Assisted Closure) System (mL): 500 mL  Total OUT: 4965 mL    Total NET: -3067.1 mL      17 Jun 2022 07:01  -  18 Jun 2022 05:39  --------------------------------------------------------  IN:    Dexmedetomidine: 278.4 mL    Enteral Tube Flush: 330 mL    IV PiggyBack: 900 mL    TwoCal HN: 225 mL  Total IN: 1733.4 mL    OUT:    Colostomy (mL): 200 mL    Drain (mL): 62 mL    Drain (mL): 55 mL    Drain (mL): 10 mL    Indwelling Catheter - Urethral (mL): 435 mL    Nephrostomy Tube (mL): 2400 mL    Nephrostomy Tube (mL): 1330 mL    VAC (Vacuum Assisted Closure) System (mL): 200 mL  Total OUT: 4692 mL    Total NET: -2958.6 mL      Physical Exam:  Gen: NAD, responsive  CV: RRR   Pulm: Mechanical breath sounds 2/2 ventilator   Abd: Soft, non-distended, Abthera in place holding suction ; ostomy pink and well perfused with 450cc brownish liquid output in  ostomy bag; NGT in place-minimal clear output; +3 Ric drains  : L and R nephrostomy tube draining clear urine; hilliard in place draining dark la nena urine  Ext: Warm & well perfused, +3 edema. +b/l pedal pulses     Labs:             Blood type: A Positive                          7.9<L>   5.83 >-----------< 19<LL>    ( 06-18 @ 04:00 )             24.5<L>                        8.1<L>   5.70 >-----------< 20<LL>    ( 06-17 @ 23:31 )             25.1<L>                        8.8<L>   6.41 >-----------< 18<LL>    ( 06-17 @ 01:26 )             27.1<L>                        8.6<L>   6.87 >-----------< 14<LL>    ( 06-16 @ 00:53 )             25.9<L>                        8.9<L>   6.27 >-----------< 12<LL>    ( 06-15 @ 10:05 )             27.1<L>    06-18-22 @ 04:00      144  |  107  |  26<H>  ----------------------------<  107<H>  3.6   |  24  |  1.16    06-17-22 @ 23:31      146<H>  |  107  |  26<H>  ----------------------------<  103<H>  2.9<LL>   |  25  |  1.16    06-17-22 @ 01:26      146<H>  |  108<H>  |  29<H>  ----------------------------<  111<H>  3.2<L>   |  24  |  1.16    06-16-22 @ 00:53      145  |  109<H>  |  29<H>  ----------------------------<  98  3.6   |  22  |  1.09        Ca    7.8<L>      18 Jun 2022 04:00  Ca    8.0<L>      17 Jun 2022 23:31  Ca    8.1<L>      17 Jun 2022 01:26  Ca    8.1<L>      16 Jun 2022 00:53  Phos  3.0     06-18  Phos  3.1     06-17  Phos  3.6     06-17  Phos  3.8     06-16  Mg     1.90     06-18  Mg     1.60     06-17  Mg     1.80     06-17  Mg     1.70     06-16        MEDICATIONS  (STANDING):  caspofungin IVPB      caspofungin IVPB 50 milliGRAM(s) IV Intermittent every 24 hours  chlorhexidine 0.12% Liquid 15 milliLiter(s) Oral Mucosa every 12 hours  chlorhexidine 4% Liquid 1 Application(s) Topical daily  dexMEDEtomidine Infusion 1 MICROgram(s)/kG/Hr (25.1 mL/Hr) IV Continuous <Continuous>  folic acid 1 milliGRAM(s) Oral daily  furosemide   Injectable 60 milliGRAM(s) IV Push daily  heparin   Injectable 5000 Unit(s) SubCutaneous every 8 hours  lactated ringers Bolus 500 milliLiter(s) IV Bolus once  metoprolol tartrate Injectable 5 milliGRAM(s) IV Push every 6 hours  pantoprazole  Injectable 40 milliGRAM(s) IV Push two times a day  piperacillin/tazobactam IVPB.. 3.375 Gram(s) IV Intermittent every 8 hours  potassium chloride  10 mEq/100 mL IVPB 10 milliEquivalent(s) IV Intermittent once  QUEtiapine 100 milliGRAM(s) Oral every 12 hours  thiamine Injectable 100 milliGRAM(s) IV Push daily    MEDICATIONS  (PRN):  oxyCODONE    Solution 5 milliGRAM(s) Enteral Tube every 4 hours PRN Moderate Pain (4 - 6)  oxyCODONE    Solution 10 milliGRAM(s) Enteral Tube every 4 hours PRN Severe Pain (7 - 10)             R2 GYN ONC Progress Note     Interval Events:   -s/p RTOR with plastics for closure today  -failed trial of extubation, plan for SBRT today    Patient seen and examined at bedside. Per SICU team patient mildly tachycardic to 120s overnight which has since resolved s/p fluid bolus. Patient awake and responsive, though intubated.    Vital Signs Last 24 Hrs  T(C): 37.9 (18 Jun 2022 00:00), Max: 37.9 (17 Jun 2022 16:00)  T(F): 100.2 (18 Jun 2022 00:00), Max: 100.3 (17 Jun 2022 16:00)  HR: 118 (18 Jun 2022 04:00) (64 - 126)  BP: 119/72 (17 Jun 2022 20:00) (119/72 - 119/72)  BP(mean): 84 (17 Jun 2022 20:00) (84 - 84)  RR: 14 (18 Jun 2022 04:00) (13 - 27)  SpO2: 100% (18 Jun 2022 04:00) (96% - 100%)      16 Jun 2022 07:01  -  17 Jun 2022 07:00  --------------------------------------------------------  IN:    Dexmedetomidine: 682.4 mL    Enteral Tube Flush: 60 mL    FentaNYL: 75.5 mL    IV PiggyBack: 800 mL    TwoCal HN: 280 mL  Total IN: 1897.9 mL    OUT:    Drain (mL): 80 mL    Drain (mL): 30 mL    Drain (mL): 120 mL    Indwelling Catheter - Urethral (mL): 435 mL    Nephrostomy Tube (mL): 2395 mL    Nephrostomy Tube (mL): 1405 mL    VAC (Vacuum Assisted Closure) System (mL): 500 mL  Total OUT: 4965 mL    Total NET: -3067.1 mL      17 Jun 2022 07:01  -  18 Jun 2022 05:39  --------------------------------------------------------  IN:    Dexmedetomidine: 278.4 mL    Enteral Tube Flush: 330 mL    IV PiggyBack: 900 mL    TwoCal HN: 225 mL  Total IN: 1733.4 mL    OUT:    Colostomy (mL): 200 mL    Drain (mL): 62 mL    Drain (mL): 55 mL    Drain (mL): 10 mL    Indwelling Catheter - Urethral (mL): 435 mL    Nephrostomy Tube (mL): 2400 mL    Nephrostomy Tube (mL): 1330 mL    VAC (Vacuum Assisted Closure) System (mL): 200 mL  Total OUT: 4692 mL    Total NET: -2958.6 mL      Physical Exam:  Gen: NAD, responsive  CV: RRR   Pulm: Mechanical breath sounds 2/2 ventilator   Abd: Soft, non-distended, Abthera in place holding suction ; ostomy pink and well perfused with 450cc brownish liquid output in  ostomy bag; NGT in place-minimal clear output; +3 Irc drains  : L and R nephrostomy tube draining clear urine; hilliard in place draining dark la nena urine  Ext: Warm & well perfused, +3 edema. +b/l pedal pulses     Labs:             Blood type: A Positive                          7.9<L>   5.83 >-----------< 19<LL>    ( 06-18 @ 04:00 )             24.5<L>                        8.1<L>   5.70 >-----------< 20<LL>    ( 06-17 @ 23:31 )             25.1<L>                        8.8<L>   6.41 >-----------< 18<LL>    ( 06-17 @ 01:26 )             27.1<L>                        8.6<L>   6.87 >-----------< 14<LL>    ( 06-16 @ 00:53 )             25.9<L>                        8.9<L>   6.27 >-----------< 12<LL>    ( 06-15 @ 10:05 )             27.1<L>    06-18-22 @ 04:00      144  |  107  |  26<H>  ----------------------------<  107<H>  3.6   |  24  |  1.16    06-17-22 @ 23:31      146<H>  |  107  |  26<H>  ----------------------------<  103<H>  2.9<LL>   |  25  |  1.16    06-17-22 @ 01:26      146<H>  |  108<H>  |  29<H>  ----------------------------<  111<H>  3.2<L>   |  24  |  1.16    06-16-22 @ 00:53      145  |  109<H>  |  29<H>  ----------------------------<  98  3.6   |  22  |  1.09        Ca    7.8<L>      18 Jun 2022 04:00  Ca    8.0<L>      17 Jun 2022 23:31  Ca    8.1<L>      17 Jun 2022 01:26  Ca    8.1<L>      16 Jun 2022 00:53  Phos  3.0     06-18  Phos  3.1     06-17  Phos  3.6     06-17  Phos  3.8     06-16  Mg     1.90     06-18  Mg     1.60     06-17  Mg     1.80     06-17  Mg     1.70     06-16        MEDICATIONS  (STANDING):  caspofungin IVPB      caspofungin IVPB 50 milliGRAM(s) IV Intermittent every 24 hours  chlorhexidine 0.12% Liquid 15 milliLiter(s) Oral Mucosa every 12 hours  chlorhexidine 4% Liquid 1 Application(s) Topical daily  dexMEDEtomidine Infusion 1 MICROgram(s)/kG/Hr (25.1 mL/Hr) IV Continuous <Continuous>  folic acid 1 milliGRAM(s) Oral daily  furosemide   Injectable 60 milliGRAM(s) IV Push daily  heparin   Injectable 5000 Unit(s) SubCutaneous every 8 hours  lactated ringers Bolus 500 milliLiter(s) IV Bolus once  metoprolol tartrate Injectable 5 milliGRAM(s) IV Push every 6 hours  pantoprazole  Injectable 40 milliGRAM(s) IV Push two times a day  piperacillin/tazobactam IVPB.. 3.375 Gram(s) IV Intermittent every 8 hours  potassium chloride  10 mEq/100 mL IVPB 10 milliEquivalent(s) IV Intermittent once  QUEtiapine 100 milliGRAM(s) Oral every 12 hours  thiamine Injectable 100 milliGRAM(s) IV Push daily    MEDICATIONS  (PRN):  oxyCODONE    Solution 5 milliGRAM(s) Enteral Tube every 4 hours PRN Moderate Pain (4 - 6)  oxyCODONE    Solution 10 milliGRAM(s) Enteral Tube every 4 hours PRN Severe Pain (7 - 10)             R2 GYN ONC Progress Note     Interval Events:   -failed trial of extubation, plan for SBRT today    Patient seen and examined at bedside. Per SICU team patient mildly tachycardic to 120s overnight which has since resolved s/p fluid bolus. Patient awake and responsive, though intubated.    Vital Signs Last 24 Hrs  T(C): 37.9 (18 Jun 2022 00:00), Max: 37.9 (17 Jun 2022 16:00)  T(F): 100.2 (18 Jun 2022 00:00), Max: 100.3 (17 Jun 2022 16:00)  HR: 118 (18 Jun 2022 04:00) (64 - 126)  BP: 119/72 (17 Jun 2022 20:00) (119/72 - 119/72)  BP(mean): 84 (17 Jun 2022 20:00) (84 - 84)  RR: 14 (18 Jun 2022 04:00) (13 - 27)  SpO2: 100% (18 Jun 2022 04:00) (96% - 100%)      16 Jun 2022 07:01  -  17 Jun 2022 07:00  --------------------------------------------------------  IN:    Dexmedetomidine: 682.4 mL    Enteral Tube Flush: 60 mL    FentaNYL: 75.5 mL    IV PiggyBack: 800 mL    TwoCal HN: 280 mL  Total IN: 1897.9 mL    OUT:    Drain (mL): 80 mL    Drain (mL): 30 mL    Drain (mL): 120 mL    Indwelling Catheter - Urethral (mL): 435 mL    Nephrostomy Tube (mL): 2395 mL    Nephrostomy Tube (mL): 1405 mL    VAC (Vacuum Assisted Closure) System (mL): 500 mL  Total OUT: 4965 mL    Total NET: -3067.1 mL      17 Jun 2022 07:01  -  18 Jun 2022 05:39  --------------------------------------------------------  IN:    Dexmedetomidine: 278.4 mL    Enteral Tube Flush: 330 mL    IV PiggyBack: 900 mL    TwoCal HN: 225 mL  Total IN: 1733.4 mL    OUT:    Colostomy (mL): 200 mL    Drain (mL): 62 mL    Drain (mL): 55 mL    Drain (mL): 10 mL    Indwelling Catheter - Urethral (mL): 435 mL    Nephrostomy Tube (mL): 2400 mL    Nephrostomy Tube (mL): 1330 mL    VAC (Vacuum Assisted Closure) System (mL): 200 mL  Total OUT: 4692 mL    Total NET: -2958.6 mL      Physical Exam:  Gen: NAD, responsive  CV: RRR   Pulm: Mechanical breath sounds 2/2 ventilator   Abd: Soft, non-distended, Abthera in place holding suction ; ostomy pink and well perfused with 450cc brownish liquid output in  ostomy bag; NGT in place-minimal clear output; +3 Ric drains  : L and R nephrostomy tube draining clear urine; hilliard in place draining dark la enna urine  Ext: Warm & well perfused, +3 edema. +b/l pedal pulses     Labs:             Blood type: A Positive                          7.9<L>   5.83 >-----------< 19<LL>    ( 06-18 @ 04:00 )             24.5<L>                        8.1<L>   5.70 >-----------< 20<LL>    ( 06-17 @ 23:31 )             25.1<L>                        8.8<L>   6.41 >-----------< 18<LL>    ( 06-17 @ 01:26 )             27.1<L>                        8.6<L>   6.87 >-----------< 14<LL>    ( 06-16 @ 00:53 )             25.9<L>                        8.9<L>   6.27 >-----------< 12<LL>    ( 06-15 @ 10:05 )             27.1<L>    06-18-22 @ 04:00      144  |  107  |  26<H>  ----------------------------<  107<H>  3.6   |  24  |  1.16    06-17-22 @ 23:31      146<H>  |  107  |  26<H>  ----------------------------<  103<H>  2.9<LL>   |  25  |  1.16    06-17-22 @ 01:26      146<H>  |  108<H>  |  29<H>  ----------------------------<  111<H>  3.2<L>   |  24  |  1.16    06-16-22 @ 00:53      145  |  109<H>  |  29<H>  ----------------------------<  98  3.6   |  22  |  1.09        Ca    7.8<L>      18 Jun 2022 04:00  Ca    8.0<L>      17 Jun 2022 23:31  Ca    8.1<L>      17 Jun 2022 01:26  Ca    8.1<L>      16 Jun 2022 00:53  Phos  3.0     06-18  Phos  3.1     06-17  Phos  3.6     06-17  Phos  3.8     06-16  Mg     1.90     06-18  Mg     1.60     06-17  Mg     1.80     06-17  Mg     1.70     06-16        MEDICATIONS  (STANDING):  caspofungin IVPB      caspofungin IVPB 50 milliGRAM(s) IV Intermittent every 24 hours  chlorhexidine 0.12% Liquid 15 milliLiter(s) Oral Mucosa every 12 hours  chlorhexidine 4% Liquid 1 Application(s) Topical daily  dexMEDEtomidine Infusion 1 MICROgram(s)/kG/Hr (25.1 mL/Hr) IV Continuous <Continuous>  folic acid 1 milliGRAM(s) Oral daily  furosemide   Injectable 60 milliGRAM(s) IV Push daily  heparin   Injectable 5000 Unit(s) SubCutaneous every 8 hours  lactated ringers Bolus 500 milliLiter(s) IV Bolus once  metoprolol tartrate Injectable 5 milliGRAM(s) IV Push every 6 hours  pantoprazole  Injectable 40 milliGRAM(s) IV Push two times a day  piperacillin/tazobactam IVPB.. 3.375 Gram(s) IV Intermittent every 8 hours  potassium chloride  10 mEq/100 mL IVPB 10 milliEquivalent(s) IV Intermittent once  QUEtiapine 100 milliGRAM(s) Oral every 12 hours  thiamine Injectable 100 milliGRAM(s) IV Push daily    MEDICATIONS  (PRN):  oxyCODONE    Solution 5 milliGRAM(s) Enteral Tube every 4 hours PRN Moderate Pain (4 - 6)  oxyCODONE    Solution 10 milliGRAM(s) Enteral Tube every 4 hours PRN Severe Pain (7 - 10)

## 2022-06-18 NOTE — PROGRESS NOTE ADULT - ASSESSMENT
52y with a PMHx of HTN, cervical CA (stage III)  on chemo - last session last week - c/b b/l hydronephrosis s/p B/L nephrostomy tube placement, p/w severe sepsis from neutropenic fever. Pt was acutely altered yesterday afternoon without improvement and AXR showed increased free air with concern for perforated viscous. She is now s/p washout, open resection of left colon, with colostomy creation. Recovering in SICU for close hemodynamic monitoring.     PLAN:  NEUROLOGY:  - Sedation: Precedex gtt  - Pain control: Oxy PRN  - Seroquel 100mg PO BID  - Folic acid and thiamine    RESPIRATORY:  - MV: Volume /14/5/30  - CPAP trials as tolerated  - Monitor daily chest x-rays and ABG  - Maintain O2 saturation >92%    CARDIOVASCULAR:  - Off pressors  - Monitor hemodynamics  - Keep MAP >65    GI / NUTRITION:  - NPO  - GI ppx: IV Protonix 40mg BID  - Wound VAC in place, SS output  - s/p abd closure on 6/17    RENAL / GENITOURINARY: hx of bilateral nephrostomy tubes  - Indwelling hilliard catheter, bilateral nephrostomy tubes  - IR nephrostomy tube exchange postponed--> reconsult for exchange   - Monitor electrolytes and replete PRN  - Continue to monitor strict ins and outs q1 hour    HEMATOLOGIC:  - VTE ppx: SQH q8hr  - Transfuse prbc and plt as needed  - Monitor H/H and platelet count on CBC daily  - Heme consulted for low platelet count    INFECTIOUS DISEASE: hx of neutropenic fevers, s/p zarxio tx  - Zosyn and Caspofungin  - Monitor fever curve and WBC on daily CBC w/diff    ENDOCRINOLOGY:  - Monitor fingersticks q6 hours while NPO  -Endocrine consult for hypothyroidism--> recommend repeat TSH, FT4, and TT3 on 6/24    DISPO:  - Full code  - SICU

## 2022-06-19 DIAGNOSIS — C53.9 MALIGNANT NEOPLASM OF CERVIX UTERI, UNSPECIFIED: ICD-10-CM

## 2022-06-19 LAB
ANION GAP SERPL CALC-SCNC: 11 MMOL/L — SIGNIFICANT CHANGE UP (ref 7–14)
ANION GAP SERPL CALC-SCNC: 11 MMOL/L — SIGNIFICANT CHANGE UP (ref 7–14)
BASE EXCESS BLDA CALC-SCNC: 3.7 MMOL/L — HIGH (ref -2–3)
BLOOD GAS ARTERIAL - LYTES,HGB,ICA,LACT RESULT: SIGNIFICANT CHANGE UP
BUN SERPL-MCNC: 23 MG/DL — SIGNIFICANT CHANGE UP (ref 7–23)
BUN SERPL-MCNC: 25 MG/DL — HIGH (ref 7–23)
CALCIUM SERPL-MCNC: 7.7 MG/DL — LOW (ref 8.4–10.5)
CALCIUM SERPL-MCNC: 8 MG/DL — LOW (ref 8.4–10.5)
CHLORIDE SERPL-SCNC: 106 MMOL/L — SIGNIFICANT CHANGE UP (ref 98–107)
CHLORIDE SERPL-SCNC: 108 MMOL/L — HIGH (ref 98–107)
CO2 BLDA-SCNC: 29 MMOL/L — HIGH (ref 19–24)
CO2 SERPL-SCNC: 25 MMOL/L — SIGNIFICANT CHANGE UP (ref 22–31)
CO2 SERPL-SCNC: 26 MMOL/L — SIGNIFICANT CHANGE UP (ref 22–31)
CREAT SERPL-MCNC: 0.98 MG/DL — SIGNIFICANT CHANGE UP (ref 0.5–1.3)
CREAT SERPL-MCNC: 1.11 MG/DL — SIGNIFICANT CHANGE UP (ref 0.5–1.3)
EGFR: 59 ML/MIN/1.73M2 — LOW
EGFR: 69 ML/MIN/1.73M2 — SIGNIFICANT CHANGE UP
GLUCOSE SERPL-MCNC: 104 MG/DL — HIGH (ref 70–99)
GLUCOSE SERPL-MCNC: 105 MG/DL — HIGH (ref 70–99)
HCO3 BLDA-SCNC: 28 MMOL/L — SIGNIFICANT CHANGE UP (ref 21–28)
HCT VFR BLD CALC: 23.9 % — LOW (ref 34.5–45)
HGB BLD-MCNC: 7.6 G/DL — LOW (ref 11.5–15.5)
MAGNESIUM SERPL-MCNC: 1.5 MG/DL — LOW (ref 1.6–2.6)
MCHC RBC-ENTMCNC: 29.7 PG — SIGNIFICANT CHANGE UP (ref 27–34)
MCHC RBC-ENTMCNC: 31.8 GM/DL — LOW (ref 32–36)
MCV RBC AUTO: 93.4 FL — SIGNIFICANT CHANGE UP (ref 80–100)
NRBC # BLD: 0 /100 WBCS — SIGNIFICANT CHANGE UP
NRBC # FLD: 0 K/UL — SIGNIFICANT CHANGE UP
PCO2 BLDA: 39 MMHG — HIGH (ref 32–35)
PH BLDA: 7.46 — HIGH (ref 7.35–7.45)
PHOSPHATE SERPL-MCNC: 2.7 MG/DL — SIGNIFICANT CHANGE UP (ref 2.5–4.5)
PLATELET # BLD AUTO: 27 K/UL — LOW (ref 150–400)
PO2 BLDA: 105 MMHG — SIGNIFICANT CHANGE UP (ref 83–108)
POTASSIUM SERPL-MCNC: 2.8 MMOL/L — CRITICAL LOW (ref 3.5–5.3)
POTASSIUM SERPL-MCNC: 3.2 MMOL/L — LOW (ref 3.5–5.3)
POTASSIUM SERPL-SCNC: 2.8 MMOL/L — CRITICAL LOW (ref 3.5–5.3)
POTASSIUM SERPL-SCNC: 3.2 MMOL/L — LOW (ref 3.5–5.3)
RBC # BLD: 2.56 M/UL — LOW (ref 3.8–5.2)
RBC # FLD: 16.4 % — HIGH (ref 10.3–14.5)
SAO2 % BLDA: 98.8 % — HIGH (ref 94–98)
SODIUM SERPL-SCNC: 142 MMOL/L — SIGNIFICANT CHANGE UP (ref 135–145)
SODIUM SERPL-SCNC: 145 MMOL/L — SIGNIFICANT CHANGE UP (ref 135–145)
WBC # BLD: 5.79 K/UL — SIGNIFICANT CHANGE UP (ref 3.8–10.5)
WBC # FLD AUTO: 5.79 K/UL — SIGNIFICANT CHANGE UP (ref 3.8–10.5)

## 2022-06-19 PROCEDURE — 99291 CRITICAL CARE FIRST HOUR: CPT | Mod: 25

## 2022-06-19 RX ORDER — POTASSIUM CHLORIDE 20 MEQ
10 PACKET (EA) ORAL
Refills: 0 | Status: COMPLETED | OUTPATIENT
Start: 2022-06-19 | End: 2022-06-19

## 2022-06-19 RX ORDER — ONDANSETRON 8 MG/1
4 TABLET, FILM COATED ORAL ONCE
Refills: 0 | Status: COMPLETED | OUTPATIENT
Start: 2022-06-19 | End: 2022-06-19

## 2022-06-19 RX ORDER — POTASSIUM CHLORIDE 20 MEQ
20 PACKET (EA) ORAL
Refills: 0 | Status: DISCONTINUED | OUTPATIENT
Start: 2022-06-19 | End: 2022-06-19

## 2022-06-19 RX ORDER — MAGNESIUM SULFATE 500 MG/ML
2 VIAL (ML) INJECTION ONCE
Refills: 0 | Status: COMPLETED | OUTPATIENT
Start: 2022-06-19 | End: 2022-06-19

## 2022-06-19 RX ORDER — POTASSIUM CHLORIDE 20 MEQ
20 PACKET (EA) ORAL ONCE
Refills: 0 | Status: DISCONTINUED | OUTPATIENT
Start: 2022-06-19 | End: 2022-06-19

## 2022-06-19 RX ORDER — HYDROMORPHONE HYDROCHLORIDE 2 MG/ML
0.5 INJECTION INTRAMUSCULAR; INTRAVENOUS; SUBCUTANEOUS ONCE
Refills: 0 | Status: DISCONTINUED | OUTPATIENT
Start: 2022-06-19 | End: 2022-06-19

## 2022-06-19 RX ORDER — FUROSEMIDE 40 MG
20 TABLET ORAL ONCE
Refills: 0 | Status: COMPLETED | OUTPATIENT
Start: 2022-06-19 | End: 2022-06-19

## 2022-06-19 RX ORDER — POTASSIUM CHLORIDE 20 MEQ
40 PACKET (EA) ORAL ONCE
Refills: 0 | Status: COMPLETED | OUTPATIENT
Start: 2022-06-19 | End: 2022-06-19

## 2022-06-19 RX ADMIN — Medication 100 MILLIEQUIVALENT(S): at 18:21

## 2022-06-19 RX ADMIN — HYDROMORPHONE HYDROCHLORIDE 0.5 MILLIGRAM(S): 2 INJECTION INTRAMUSCULAR; INTRAVENOUS; SUBCUTANEOUS at 22:57

## 2022-06-19 RX ADMIN — PANTOPRAZOLE SODIUM 40 MILLIGRAM(S): 20 TABLET, DELAYED RELEASE ORAL at 05:26

## 2022-06-19 RX ADMIN — Medication 100 MILLIEQUIVALENT(S): at 13:38

## 2022-06-19 RX ADMIN — CHLORHEXIDINE GLUCONATE 15 MILLILITER(S): 213 SOLUTION TOPICAL at 05:24

## 2022-06-19 RX ADMIN — Medication 115 MILLIGRAM(S): at 05:24

## 2022-06-19 RX ADMIN — Medication 40 MILLIEQUIVALENT(S): at 05:24

## 2022-06-19 RX ADMIN — Medication 1 MILLIGRAM(S): at 12:21

## 2022-06-19 RX ADMIN — HEPARIN SODIUM 5000 UNIT(S): 5000 INJECTION INTRAVENOUS; SUBCUTANEOUS at 13:39

## 2022-06-19 RX ADMIN — ONDANSETRON 4 MILLIGRAM(S): 8 TABLET, FILM COATED ORAL at 22:06

## 2022-06-19 RX ADMIN — HEPARIN SODIUM 5000 UNIT(S): 5000 INJECTION INTRAVENOUS; SUBCUTANEOUS at 05:24

## 2022-06-19 RX ADMIN — PANTOPRAZOLE SODIUM 40 MILLIGRAM(S): 20 TABLET, DELAYED RELEASE ORAL at 19:47

## 2022-06-19 RX ADMIN — Medication 100 MILLIEQUIVALENT(S): at 20:50

## 2022-06-19 RX ADMIN — Medication 115 MILLIGRAM(S): at 23:36

## 2022-06-19 RX ADMIN — QUETIAPINE FUMARATE 100 MILLIGRAM(S): 200 TABLET, FILM COATED ORAL at 05:24

## 2022-06-19 RX ADMIN — Medication 40 MILLIEQUIVALENT(S): at 13:36

## 2022-06-19 RX ADMIN — CASPOFUNGIN ACETATE 260 MILLIGRAM(S): 7 INJECTION, POWDER, LYOPHILIZED, FOR SOLUTION INTRAVENOUS at 11:26

## 2022-06-19 RX ADMIN — Medication 100 MILLIEQUIVALENT(S): at 06:36

## 2022-06-19 RX ADMIN — Medication 20 MILLIGRAM(S): at 10:12

## 2022-06-19 RX ADMIN — HEPARIN SODIUM 5000 UNIT(S): 5000 INJECTION INTRAVENOUS; SUBCUTANEOUS at 23:36

## 2022-06-19 RX ADMIN — Medication 115 MILLIGRAM(S): at 17:42

## 2022-06-19 RX ADMIN — HYDROMORPHONE HYDROCHLORIDE 0.5 MILLIGRAM(S): 2 INJECTION INTRAMUSCULAR; INTRAVENOUS; SUBCUTANEOUS at 21:52

## 2022-06-19 RX ADMIN — Medication 100 MILLIEQUIVALENT(S): at 19:49

## 2022-06-19 RX ADMIN — Medication 100 MILLIEQUIVALENT(S): at 05:18

## 2022-06-19 RX ADMIN — Medication 100 MILLIGRAM(S): at 12:21

## 2022-06-19 RX ADMIN — PIPERACILLIN AND TAZOBACTAM 25 GRAM(S): 4; .5 INJECTION, POWDER, LYOPHILIZED, FOR SOLUTION INTRAVENOUS at 17:04

## 2022-06-19 RX ADMIN — Medication 115 MILLIGRAM(S): at 12:22

## 2022-06-19 RX ADMIN — PIPERACILLIN AND TAZOBACTAM 25 GRAM(S): 4; .5 INJECTION, POWDER, LYOPHILIZED, FOR SOLUTION INTRAVENOUS at 01:30

## 2022-06-19 RX ADMIN — CHLORHEXIDINE GLUCONATE 1 APPLICATION(S): 213 SOLUTION TOPICAL at 11:28

## 2022-06-19 RX ADMIN — QUETIAPINE FUMARATE 100 MILLIGRAM(S): 200 TABLET, FILM COATED ORAL at 17:02

## 2022-06-19 RX ADMIN — PIPERACILLIN AND TAZOBACTAM 25 GRAM(S): 4; .5 INJECTION, POWDER, LYOPHILIZED, FOR SOLUTION INTRAVENOUS at 10:03

## 2022-06-19 RX ADMIN — DEXMEDETOMIDINE HYDROCHLORIDE IN 0.9% SODIUM CHLORIDE 25.1 MICROGRAM(S)/KG/HR: 4 INJECTION INTRAVENOUS at 10:12

## 2022-06-19 RX ADMIN — Medication 25 GRAM(S): at 05:24

## 2022-06-19 NOTE — PROGRESS NOTE ADULT - ASSESSMENT
52y with a PMHx of HTN, cervical CA (stage III)  on chemo - last session last week - c/b b/l hydronephrosis s/p B/L nephrostomy tube placement, p/w severe sepsis from neutropenic fever. Pt was acutely altered yesterday afternoon without improvement and AXR showed increased free air with concern for perforated viscous. She is now s/p washout, open resection of left colon, with colostomy creation. Recovering in SICU for close hemodynamic monitoring.     Plan:  - NGT w/ tube feeds  - care per SICU and primary team    D team surgery  [id number]

## 2022-06-19 NOTE — PROGRESS NOTE ADULT - SUBJECTIVE AND OBJECTIVE BOX
Plastic Surgery Progress Note (pg [facility]: [id number], [facility]: [phone])    SUBJECTIVE  Pt comfortable in bed. Pain well controlled, no complaints.    OBJECTIVE  ___________________________________________________  VITAL SIGNS / I&O's   Vital Signs Last 24 Hrs  T(C): 37.6 (19 Jun 2022 08:00), Max: 38.2 (18 Jun 2022 16:00)  T(F): 99.7 (19 Jun 2022 08:00), Max: 100.7 (18 Jun 2022 16:00)  HR: 125 (19 Jun 2022 09:00) (81 - 127)  BP: 169/65 (19 Jun 2022 09:00) (169/65 - 169/65)  BP(mean): 85 (19 Jun 2022 09:00) (85 - 85)  RR: 27 (19 Jun 2022 09:00) (14 - 30)  SpO2: 100% (19 Jun 2022 09:00) (96% - 100%)  Mode: AC/ CMV (Assist Control/ Continuous Mandatory Ventilation)  RR (machine): 15  TV (machine): 500  FiO2: 30  PEEP: 5  ITime: 0.93  MAP: 9  PIP: 21      18 Jun 2022 07:01  -  19 Jun 2022 07:00  --------------------------------------------------------  IN:    Dexmedetomidine: 220 mL    Enteral Tube Flush: 60 mL    IV PiggyBack: 725 mL    TwoCal HN: 412.5 mL  Total IN: 1417.5 mL    OUT:    Colostomy (mL): 250 mL    Drain (mL): 40 mL    Drain (mL): 20 mL    Drain (mL): 2 mL    Indwelling Catheter - Urethral (mL): 580 mL    Nephrostomy Tube (mL): 2225 mL    Nephrostomy Tube (mL): 1305 mL    VAC (Vacuum Assisted Closure) System (mL): 550 mL  Total OUT: 4972 mL    Total NET: -3554.5 mL      19 Jun 2022 07:01  -  19 Jun 2022 09:19  --------------------------------------------------------  IN:    Dexmedetomidine: 30 mL    TwoCal HN: 12.5 mL  Total IN: 42.5 mL    OUT:    Indwelling Catheter - Urethral (mL): 265 mL    Nephrostomy Tube (mL): 125 mL    Nephrostomy Tube (mL): 160 mL  Total OUT: 550 mL    Total NET: -507.5 mL        ___________________________________________________  PHYSICAL EXAM    General: NAD  Abdomen: soft no collections, LLQ drain murky, vac in place holding suction, dermaclose in place, remaining drains ss    ___________________________________________________  LABS                        7.6    5.79  )-----------( 27       ( 19 Jun 2022 03:00 )             23.9     19 Jun 2022 03:00    142    |  106    |  25     ----------------------------<  104    2.8     |  25     |  1.11     Ca    7.7        19 Jun 2022 03:00  Phos  2.7       19 Jun 2022 03:00  Mg     1.50      19 Jun 2022 03:00    ___________________________________________________  MEDICATIONS  (STANDING):  caspofungin IVPB      caspofungin IVPB 50 milliGRAM(s) IV Intermittent every 24 hours  chlorhexidine 0.12% Liquid 15 milliLiter(s) Oral Mucosa every 12 hours  chlorhexidine 4% Liquid 1 Application(s) Topical daily  dexMEDEtomidine Infusion 1 MICROgram(s)/kG/Hr (25.1 mL/Hr) IV Continuous <Continuous>  folic acid 1 milliGRAM(s) Oral daily  heparin   Injectable 5000 Unit(s) SubCutaneous every 8 hours  metoprolol tartrate IVPB 7.5 milliGRAM(s) IV Intermittent every 6 hours  pantoprazole  Injectable 40 milliGRAM(s) IV Push two times a day  piperacillin/tazobactam IVPB.. 3.375 Gram(s) IV Intermittent every 8 hours  potassium chloride  10 mEq/100 mL IVPB 10 milliEquivalent(s) IV Intermittent every 1 hour  QUEtiapine 100 milliGRAM(s) Oral every 12 hours  thiamine Injectable 100 milliGRAM(s) IV Push daily    MEDICATIONS  (PRN):  oxyCODONE    Solution 5 milliGRAM(s) Enteral Tube every 4 hours PRN Moderate Pain (4 - 6)  oxyCODONE    Solution 10 milliGRAM(s) Enteral Tube every 4 hours PRN Severe Pain (7 - 10)

## 2022-06-19 NOTE — PROGRESS NOTE ADULT - ATTENDING COMMENTS
Patient seen and examined at bedside, agree with above gyn resident note, including assessment and plan. Appreciate ICU care.

## 2022-06-19 NOTE — PROGRESS NOTE ADULT - ASSESSMENT
A/P: 52y with recurrent cervical CA admitted for management of neutropenic fever c/b AMS and c/f bowel perforation 2/2 increased free air on abdominal X-ray s/p emergent exploratory laparotomy, abdominal washout, rectosigmoid colectomy, diverting colostomy, bronchoscopy and Abthera placement on 6/8/22 (EBL 2000cc, s/p 5UpRBC, 3L albumin, 3U Plts, 3U FFP, 3U Cryo) with intraoperative findings notable for perforation in distal sigmoid colon. Patient POD#5 s/p 3rd re-exploratory laparotomy, abdominal washout, fascia bridge with 11u87od vicryl mesh.  Per Plastics, patient to continue with wound vac dressing and dermaclose.    - Sedated; continue Precedex, seroquel PRN; IV analgesia per SICU  - Continues to be off pressor support; Continue to monitor closely   - Intubated on AC   - NPO/NGT in place (6/8-). Tube feeds. Continue IV Protonix/Reglan. GI following  - b/l PCN, 10-30cc/hour concentrated urine bilaterally. Infante in place. ASHLEY likely 2/2 IV contrast, dehydration, Cr 1.29->1.31->1.23->1.19->>1.1->1.09->->1.16->1.11. Continue to monitor UOP. Replete electrolytes PRN  - VTE ppx: Venodynes, HSQ 5K q8h  - Maintain Plt >50K ariel-operatively, total 5u plts transfused on 6/14  - Maintain Hgb >7; pRBCs administered: 5u pRBC intraop 6/8, 1u pRBC intraop 6/10; 1u pRBC post-op 6/12, 2u pRBC on 6/13, 2u pRBC on 6/14  - Afebrile. WBC 5.83, trend WBC; Continue Zosyn (6/8-), Caspofungin (6/13-), (s/p Cefepime), s/p Zarixo (6/5-6/10) for neutropenic fever     Dispo: Appreciate excellent SICU care.     Jessika Pathak, PGY2

## 2022-06-19 NOTE — PROGRESS NOTE ADULT - ASSESSMENT
A/P 52F hx of recurrent cervical ca, now s/p emergent ex lap, washout, rectosigmoid collectomy, diverting colostomy, w. open abdomen now s/p vac and dermaclose placement.    -continue vac and dermaclose  -continue to monitor drains  -appreciate care per sicu and gen surg    Plastic Surgery  [id number]

## 2022-06-19 NOTE — PROGRESS NOTE ADULT - ATTENDING COMMENTS
Patient no acute events overnight. Patient failed sbt yesterday. Patient maintained on diuresis. This am SBT improvement of cpap/ps 10/5 with plan to extubate to bipap 10/5.  N mentating well, rass of 0  resp plan for extubation to bipap  cv hemodynamically stable, perfusing adequately  gi npo, ivf, will resume diet   gu/renal monitor uop, continue diuresis lasix 20, net even from los  heme vte ppx  id on zosyn and caspo, improving leukocytosis  endo no changes    The patient is a critical care patient with life threatening hemodynamic and metabolic instability in SICU.  I have personally interviewed when possible and examined the patient, reviewed data and laboratory tests/x-rays and all pertinent electronic images.  I was physically present for the key portions of the evaluation and management (E/M) service provided.   The SICU team has a constant risk benefit analyzes discussion with the primary team, all consultants, House Staff and PA's on all decisions.  These diagnoses are unrelated to the surgical procedure noted above.  I meet with family if needed to get further history, discuss the case and make care decisions for this patient who might not be able to participate.  Time involved in performance of separately billable procedures was not counted toward my critical care time. There is no overlap.  I spent 55-75 minutes ( 0800Hrs-0915Hrs in AM/ 1600Hrs-1715Hrs in PM, or other time indicated) of critical care time for the diagnoses, assessment, plan and interventions.  This time excludes time spent on separate procedures and teaching.

## 2022-06-19 NOTE — PROGRESS NOTE ADULT - ASSESSMENT
52y with a PMHx of HTN, cervical CA (stage III)  on chemo - last session last week - c/b b/l hydronephrosis s/p B/L nephrostomy tube placement, p/w severe sepsis from neutropenic fever. Pt was acutely altered yesterday afternoon without improvement and AXR showed increased free air with concern for perforated viscous. She is now s/p washout, open resection of left colon, with colostomy creation. Recovering in SICU for close hemodynamic monitoring.     PLAN:  NEUROLOGY:  - Sedation: Precedex gtt  - Pain control: Oxy PRN  - Seroquel 100mg PO BID  - Folic acid and thiamine    RESPIRATORY:  - MV: Volume /14/5/30  - CPAP trials as tolerated  - Monitor daily chest x-rays and ABG  - Maintain O2 saturation >92%    CARDIOVASCULAR:  - Off pressors  - Monitor hemodynamics  - Keep MAP >65  - Tachy, restarting home metoprolol at low dose 7.5q6    GI / NUTRITION:  - NPO  - GI ppx: IV Protonix 40mg BID  - Wound VAC in place, SS output  - s/p abd closure on 6/17    RENAL / GENITOURINARY: hx of bilateral nephrostomy tubes  - Indwelling hilliard catheter, bilateral nephrostomy tubes  - IR nephrostomy tube exchange postponed--> reconsult for exchange   - Monitor electrolytes and replete PRN  - Continue to monitor strict ins and outs q1 hour    HEMATOLOGIC:  - VTE ppx: SQH q8hr  - Transfuse prbc and plt as needed  - Monitor H/H and platelet count on CBC daily  - Heme consulted for low platelet count    INFECTIOUS DISEASE: hx of neutropenic fevers, s/p zarxio tx  - Zosyn and Caspofungin  - Monitor fever curve and WBC on daily CBC w/diff    ENDOCRINOLOGY:  - Monitor fingersticks q6 hours while NPO  -Endocrine consult for hypothyroidism--> recommend repeat TSH, FT4, and TT3 on 6/24    DISPO:  - Full code  - SICU

## 2022-06-19 NOTE — PROGRESS NOTE ADULT - SUBJECTIVE AND OBJECTIVE BOX
GYNECOLOGIC ONCOLOGY PROGRESS NOTE    PROBLEMS:  Severe sepsis    Neutropenic fever    Uterine cancer    Urinary retention    Obstructive uropathy    HTN (hypertension)    Acute encephalopathy    GI bleed    Anemia due to acute blood loss    Thrombocytopenia    Hypothyroidism    Abnormal TSH        Pt seen and examined at bedside.     SUBJECTIVE:    Interval Events:   -failed trial of extubation yesterday    Patient seen and examined at bedside. Per SICU team patient _____________. Patient awake and responsive, though intubated.    OBJECTIVE:     VITALS:  T(F): 99.1 (06-19-22 @ 00:00), Max: 100.7 (06-18-22 @ 16:00)  HR: 103 (06-19-22 @ 04:02) (86 - 131)  RR: 16 (06-19-22 @ 04:00) (14 - 30)  SpO2: 100% (06-19-22 @ 04:02) (96% - 100%)    I&O's Summary    17 Jun 2022 07:01  -  18 Jun 2022 07:00  --------------------------------------------------------  IN: 1933.4 mL / OUT: 5355 mL / NET: -3421.6 mL    18 Jun 2022 07:01  -  19 Jun 2022 05:24  --------------------------------------------------------  IN: 1417.5 mL / OUT: 4972 mL / NET: -3554.5 mL        MEDICATIONS  (STANDING):  caspofungin IVPB      caspofungin IVPB 50 milliGRAM(s) IV Intermittent every 24 hours  chlorhexidine 0.12% Liquid 15 milliLiter(s) Oral Mucosa every 12 hours  chlorhexidine 4% Liquid 1 Application(s) Topical daily  dexMEDEtomidine Infusion 1 MICROgram(s)/kG/Hr (25.1 mL/Hr) IV Continuous <Continuous>  folic acid 1 milliGRAM(s) Oral daily  heparin   Injectable 5000 Unit(s) SubCutaneous every 8 hours  magnesium sulfate  IVPB 2 Gram(s) IV Intermittent once  metoprolol tartrate IVPB 7.5 milliGRAM(s) IV Intermittent every 6 hours  pantoprazole  Injectable 40 milliGRAM(s) IV Push two times a day  piperacillin/tazobactam IVPB.. 3.375 Gram(s) IV Intermittent every 8 hours  potassium chloride   Powder 40 milliEquivalent(s) Enteral Tube once  potassium chloride  10 mEq/100 mL IVPB 10 milliEquivalent(s) IV Intermittent every 1 hour  QUEtiapine 100 milliGRAM(s) Oral every 12 hours  thiamine Injectable 100 milliGRAM(s) IV Push daily    MEDICATIONS  (PRN):  oxyCODONE    Solution 5 milliGRAM(s) Enteral Tube every 4 hours PRN Moderate Pain (4 - 6)  oxyCODONE    Solution 10 milliGRAM(s) Enteral Tube every 4 hours PRN Severe Pain (7 - 10)      Physical Exam:  Gen: NAD, responsive  CV: RRR   Pulm: Mechanical breath sounds 2/2 ventilator   Abd: Soft, non-distended, Abthera in place holding suction ; ostomy pink and well perfused with 450cc brownish liquid output in  ostomy bag; NGT in place-minimal clear output; +3 Ric drains  : L and R nephrostomy tube draining clear urine; hilliard in place draining dark la nena urine  Ext: Warm & well perfused, +3 edema. +b/l pedal pulses     LABS:                        7.6    5.79  )-----------( 27       ( 19 Jun 2022 03:00 )             23.9     06-19    142  |  106  |  25<H>  ----------------------------<  104<H>  2.8<LL>   |  25  |  1.11    Ca    7.7<L>      19 Jun 2022 03:00  Phos  2.7     06-19  Mg     1.50     06-19

## 2022-06-19 NOTE — PROGRESS NOTE ADULT - PROBLEM SELECTOR PLAN 1
GYN ONC Fellow Addendum:    Pt seen and examined at bedside. Agree with above. Pain controlled. Unable to extubate yesterday.     VS reviewed  Labs reviewed    - pain control  - good ostomy output  - Anemia: stable  - Thrombocytopenia: slowly improving  - Continue TF  - wean vent support as tolerated  - Replete lytes prn  - DVT ppx: HSQ  - Appreciate SICU care    GRIS Mckeon, PGY6

## 2022-06-19 NOTE — PROGRESS NOTE ADULT - SUBJECTIVE AND OBJECTIVE BOX
Subjective:   Patient seen at bedside this AM. Reports feeling well, without complaints. Denies chest pain, SOB. Tolerating diet without N/V.     24h Events:   - Overnight, no acute events    Objective:  Vital Signs  T(C): 36.7 (06-19 @ 04:00), Max: 38.2 (06-18 @ 16:00)  HR: 100 (06-19 @ 07:30) (81 - 127)  BP: --  RR: 14 (06-19 @ 07:30) (14 - 30)  SpO2: 100% (06-19 @ 07:30) (96% - 100%)  06-18-22 @ 07:01  -  06-19-22 @ 07:00  --------------------------------------------------------  IN:  Total IN: 0 mL    OUT:    Colostomy (mL): 250 mL    Drain (mL): 40 mL    Drain (mL): 20 mL    Drain (mL): 2 mL    Indwelling Catheter - Urethral (mL): 580 mL    Nephrostomy Tube (mL): 2225 mL    Nephrostomy Tube (mL): 1305 mL    VAC (Vacuum Assisted Closure) System (mL): 550 mL  Total OUT: 4972 mL    Total NET: -4972 mL      06-19-22 @ 07:01  -  06-19-22 @ 08:10  --------------------------------------------------------  IN:  Total IN: 0 mL    OUT:    Indwelling Catheter - Urethral (mL): 225 mL    Nephrostomy Tube (mL): 125 mL    Nephrostomy Tube (mL): 100 mL  Total OUT: 450 mL    Total NET: -450 mL        Physical Exam:  Gen: NAD, responsive  CV: RRR   Pulm: vented  Abd: Soft, non-distended, Abthera in place holding suction; dermaclose in place  : L and R nephrostomy tube draining clear urine; hilliard in place draining dark la nena urine  Ext: Warm & well perfused, +3 edema. +b/l pedal pulses     Labs:                        7.6    5.79  )-----------( 27       ( 19 Jun 2022 03:00 )             23.9   06-19    142  |  106  |  25<H>  ----------------------------<  104<H>  2.8<LL>   |  25  |  1.11    Ca    7.7<L>      19 Jun 2022 03:00  Phos  2.7     06-19  Mg     1.50     06-19      CAPILLARY BLOOD GLUCOSE          Medications:   MEDICATIONS  (STANDING):  caspofungin IVPB      caspofungin IVPB 50 milliGRAM(s) IV Intermittent every 24 hours  chlorhexidine 0.12% Liquid 15 milliLiter(s) Oral Mucosa every 12 hours  chlorhexidine 4% Liquid 1 Application(s) Topical daily  dexMEDEtomidine Infusion 1 MICROgram(s)/kG/Hr (25.1 mL/Hr) IV Continuous <Continuous>  folic acid 1 milliGRAM(s) Oral daily  heparin   Injectable 5000 Unit(s) SubCutaneous every 8 hours  metoprolol tartrate IVPB 7.5 milliGRAM(s) IV Intermittent every 6 hours  pantoprazole  Injectable 40 milliGRAM(s) IV Push two times a day  piperacillin/tazobactam IVPB.. 3.375 Gram(s) IV Intermittent every 8 hours  potassium chloride  10 mEq/100 mL IVPB 10 milliEquivalent(s) IV Intermittent every 1 hour  QUEtiapine 100 milliGRAM(s) Oral every 12 hours  thiamine Injectable 100 milliGRAM(s) IV Push daily    MEDICATIONS  (PRN):  oxyCODONE    Solution 5 milliGRAM(s) Enteral Tube every 4 hours PRN Moderate Pain (4 - 6)  oxyCODONE    Solution 10 milliGRAM(s) Enteral Tube every 4 hours PRN Severe Pain (7 - 10)      Imaging:

## 2022-06-19 NOTE — PROGRESS NOTE ADULT - SUBJECTIVE AND OBJECTIVE BOX
SICU Daily Progress Note  =====================================================  Interval/Overnight Events:     - No acute events overnight       HPI: 52y female with recurrent cervical CA (per patient stage III) presenting as transfer from [place] a/w neutropenic fever. Patient reports she had last cycle of chemotherapy 5 days prior.  Patient had [facility] care stop by yesterday and was noted to be tachycardic on vital signs and was sent to the ED.  She reports feeling weak + fatigued. + upper abdominal pain that worsens while having chills. She denies any vomiting, chest pain, SOB.     At [facility] she was given Meropenem and Zosyn and was noted to be tachycardic to 140s and febrile to 39.5 and was thus transferred for further management. She became acutely altered yesterday afternoon without improvement and AXR with increased free air concern for perforated viscous. She is now s/p washout, open resection of left colon, colostomy. SICU consulted for close hemodynamic monitoring.     Allergies: No Known Allergies    MEDICATIONS:   --------------------------------------------------------------------------------------  Neurologic Medications  dexMEDEtomidine Infusion 1 MICROgram(s)/kG/Hr IV Continuous <Continuous>  fentaNYL   Infusion 0.5 MICROgram(s)/kG/Hr IV Continuous <Continuous>  HYDROmorphone  Injectable 0.5 milliGRAM(s) IV Push every 4 hours PRN Severe Pain (7 - 10)  QUEtiapine 100 milliGRAM(s) Oral every 12 hours    Respiratory Medications    Cardiovascular Medications    Gastrointestinal Medications  folic acid 1 milliGRAM(s) Oral daily  pantoprazole  Injectable 40 milliGRAM(s) IV Push two times a day  potassium chloride  20 mEq/100 mL IVPB 20 milliEquivalent(s) IV Intermittent once  thiamine Injectable 100 milliGRAM(s) IV Push daily    Genitourinary Medications    Hematologic/Oncologic Medications  heparin   Injectable 5000 Unit(s) SubCutaneous every 8 hours    Antimicrobial/Immunologic Medications  caspofungin IVPB      caspofungin IVPB 50 milliGRAM(s) IV Intermittent every 24 hours  piperacillin/tazobactam IVPB.. 3.375 Gram(s) IV Intermittent every 8 hours    Endocrine/Metabolic Medications    Topical/Other Medications  chlorhexidine 0.12% Liquid 15 milliLiter(s) Oral Mucosa every 12 hours  chlorhexidine 4% Liquid 1 Application(s) Topical daily    --------------------------------------------------------------------------------------  VITAL SIGNS, INS/OUTS (last 24 hours):  --------------------------------------------------------------------------------------  ICU Vital Signs Last 24 Hrs  T(C): 37.3 (19 Jun 2022 00:00), Max: 38.2 (18 Jun 2022 16:00)  T(F): 99.1 (19 Jun 2022 00:00), Max: 100.7 (18 Jun 2022 16:00)  HR: 92 (19 Jun 2022 01:00) (86 - 131)  BP: --  BP(mean): --  ABP: 115/54 (19 Jun 2022 01:00) (95/48 - 164/74)  ABP(mean): 74 (19 Jun 2022 01:00) (63 - 103)  RR: 14 (19 Jun 2022 01:00) (14 - 30)  SpO2: 100% (19 Jun 2022 01:00) (96% - 100%)        06-17 @ 07:01  -  06-18 @ 07:00  --------------------------------------------------------  IN: 1933.4 mL / OUT: 5355 mL / NET: -3421.6 mL        --------------------------------------------------------------------------------------  EXAM  NEUROLOGY  Exam: following commands appropriately    HEENT  Exam: Normocephalic, atraumatic, EOMI.     RESPIRATORY  Exam: Normal expansion/effort.  Mechanical Ventilation: Mode: AC/ CMV (Assist Control/ Continuous Mandatory Ventilation), RR (machine): 14, TV (machine): 500, FiO2: 30, PEEP: 5, ITime: 0.92, MAP: 9, PIP: 22    CARDIOVASCULAR  Exam: Regular rate and rhythm.       GI/NUTRITION  Exam: Abdomen soft, Non-tender, Non-distended. Wound vac holding suction    VASCULAR  Exam: Extremities warm, pink, well-perfused.     MUSCULOSKELETAL  Exam: All extremities moving spontaneously without limitations.     SKIN  Exam: Good skin turgor, no skin breakdown.     LABS  --------------------------------------------------------------------------------------   CBC (06-17 @ 23:31)                              8.1<L>                         5.70    )----------------(  20<LL>     --    % Neutrophils, --    % Lymphocytes, ANC: --                                  25.1<L>  CBC (06-17 @ 01:26)                              8.8<L>                         6.41    )----------------(  18<LL>     --    % Neutrophils, --    % Lymphocytes, ANC: --                                  27.1<L>    BMP (06-17 @ 23:31)             146<H>  |  107     |  26<H> 		Ca++ --      Ca 8.0<L>             ---------------------------------( 103<H>		Mg 1.60               2.9<LL>  |  25      |  1.16  			Ph 3.1     BMP (06-17 @ 01:26)             146<H>  |  108<H>  |  29<H> 		Ca++ --      Ca 8.1<L>             ---------------------------------( 111<H>		Mg 1.80               3.2<L>  |  24      |  1.16  			Ph 3.6             ABG (06-17 @ 14:22)     7.47<H> / 38<H> / 91 / 28 / 3.8<H> / 98.7<H>%     Lactate:    ABG (06-17 @ 01:26)     7.44 / 38<H> / 139<H> / 26 / 1.6 / 98.8<H>%     Lactate:          RADIOLOGY, EKG & ADDITIONAL TESTS: Reviewed.

## 2022-06-20 LAB
BASE EXCESS BLDA CALC-SCNC: 4.9 MMOL/L — HIGH (ref -2–3)
CA-I BLD-SCNC: 1.12 MMOL/L — LOW (ref 1.15–1.29)
CO2 BLDA-SCNC: 30 MMOL/L — HIGH (ref 19–24)
GAS PNL BLDA: SIGNIFICANT CHANGE UP
HCO3 BLDA-SCNC: 29 MMOL/L — HIGH (ref 21–28)
HCT VFR BLD CALC: 23.5 % — LOW (ref 34.5–45)
HGB BLD-MCNC: 7.7 G/DL — LOW (ref 11.5–15.5)
MAGNESIUM SERPL-MCNC: 1.6 MG/DL — SIGNIFICANT CHANGE UP (ref 1.6–2.6)
MCHC RBC-ENTMCNC: 30.3 PG — SIGNIFICANT CHANGE UP (ref 27–34)
MCHC RBC-ENTMCNC: 32.8 GM/DL — SIGNIFICANT CHANGE UP (ref 32–36)
MCV RBC AUTO: 92.5 FL — SIGNIFICANT CHANGE UP (ref 80–100)
NRBC # BLD: 0 /100 WBCS — SIGNIFICANT CHANGE UP
NRBC # FLD: 0 K/UL — SIGNIFICANT CHANGE UP
PCO2 BLDA: 41 MMHG — HIGH (ref 32–35)
PH BLDA: 7.46 — HIGH (ref 7.35–7.45)
PHOSPHATE SERPL-MCNC: 2.4 MG/DL — LOW (ref 2.5–4.5)
PLATELET # BLD AUTO: 29 K/UL — LOW (ref 150–400)
PO2 BLDA: 82 MMHG — LOW (ref 83–108)
PYRIDOXAL PHOS SERPL-MCNC: 2.5 UG/L — LOW (ref 3.4–65.2)
RBC # BLD: 2.54 M/UL — LOW (ref 3.8–5.2)
RBC # FLD: 16.7 % — HIGH (ref 10.3–14.5)
SAO2 % BLDA: 97.7 % — SIGNIFICANT CHANGE UP (ref 94–98)
WBC # BLD: 5.69 K/UL — SIGNIFICANT CHANGE UP (ref 3.8–10.5)
WBC # FLD AUTO: 5.69 K/UL — SIGNIFICANT CHANGE UP (ref 3.8–10.5)

## 2022-06-20 PROCEDURE — 71045 X-RAY EXAM CHEST 1 VIEW: CPT | Mod: 26

## 2022-06-20 PROCEDURE — 99291 CRITICAL CARE FIRST HOUR: CPT | Mod: 24

## 2022-06-20 PROCEDURE — 93970 EXTREMITY STUDY: CPT | Mod: 26

## 2022-06-20 RX ORDER — POTASSIUM PHOSPHATE, MONOBASIC POTASSIUM PHOSPHATE, DIBASIC 236; 224 MG/ML; MG/ML
15 INJECTION, SOLUTION INTRAVENOUS ONCE
Refills: 0 | Status: DISCONTINUED | OUTPATIENT
Start: 2022-06-20 | End: 2022-06-20

## 2022-06-20 RX ORDER — MAGNESIUM SULFATE 500 MG/ML
2 VIAL (ML) INJECTION ONCE
Refills: 0 | Status: COMPLETED | OUTPATIENT
Start: 2022-06-20 | End: 2022-06-20

## 2022-06-20 RX ORDER — ACETAMINOPHEN 500 MG
650 TABLET ORAL EVERY 6 HOURS
Refills: 0 | Status: DISCONTINUED | OUTPATIENT
Start: 2022-06-20 | End: 2022-06-26

## 2022-06-20 RX ORDER — FOLIC ACID 0.8 MG
1 TABLET ORAL DAILY
Refills: 0 | Status: DISCONTINUED | OUTPATIENT
Start: 2022-06-20 | End: 2022-06-20

## 2022-06-20 RX ORDER — PANTOPRAZOLE SODIUM 20 MG/1
40 TABLET, DELAYED RELEASE ORAL EVERY 12 HOURS
Refills: 0 | Status: DISCONTINUED | OUTPATIENT
Start: 2022-06-20 | End: 2022-06-30

## 2022-06-20 RX ORDER — METOPROLOL TARTRATE 50 MG
100 TABLET ORAL DAILY
Refills: 0 | Status: DISCONTINUED | OUTPATIENT
Start: 2022-06-20 | End: 2022-06-20

## 2022-06-20 RX ORDER — POTASSIUM PHOSPHATE, MONOBASIC POTASSIUM PHOSPHATE, DIBASIC 236; 224 MG/ML; MG/ML
15 INJECTION, SOLUTION INTRAVENOUS ONCE
Refills: 0 | Status: COMPLETED | OUTPATIENT
Start: 2022-06-20 | End: 2022-06-20

## 2022-06-20 RX ORDER — POTASSIUM CHLORIDE 20 MEQ
10 PACKET (EA) ORAL
Refills: 0 | Status: COMPLETED | OUTPATIENT
Start: 2022-06-20 | End: 2022-06-20

## 2022-06-20 RX ORDER — QUETIAPINE FUMARATE 200 MG/1
100 TABLET, FILM COATED ORAL EVERY 12 HOURS
Refills: 0 | Status: DISCONTINUED | OUTPATIENT
Start: 2022-06-20 | End: 2022-06-21

## 2022-06-20 RX ORDER — POTASSIUM CHLORIDE 20 MEQ
20 PACKET (EA) ORAL ONCE
Refills: 0 | Status: COMPLETED | OUTPATIENT
Start: 2022-06-20 | End: 2022-06-20

## 2022-06-20 RX ORDER — POTASSIUM CHLORIDE 20 MEQ
40 PACKET (EA) ORAL ONCE
Refills: 0 | Status: COMPLETED | OUTPATIENT
Start: 2022-06-20 | End: 2022-06-20

## 2022-06-20 RX ORDER — THIAMINE MONONITRATE (VIT B1) 100 MG
100 TABLET ORAL DAILY
Refills: 0 | Status: DISCONTINUED | OUTPATIENT
Start: 2022-06-20 | End: 2022-06-21

## 2022-06-20 RX ORDER — ENOXAPARIN SODIUM 100 MG/ML
40 INJECTION SUBCUTANEOUS EVERY 24 HOURS
Refills: 0 | Status: DISCONTINUED | OUTPATIENT
Start: 2022-06-20 | End: 2022-07-20

## 2022-06-20 RX ORDER — METOPROLOL TARTRATE 50 MG
100 TABLET ORAL DAILY
Refills: 0 | Status: DISCONTINUED | OUTPATIENT
Start: 2022-06-20 | End: 2022-06-26

## 2022-06-20 RX ADMIN — POTASSIUM PHOSPHATE, MONOBASIC POTASSIUM PHOSPHATE, DIBASIC 62.5 MILLIMOLE(S): 236; 224 INJECTION, SOLUTION INTRAVENOUS at 07:17

## 2022-06-20 RX ADMIN — Medication 650 MILLIGRAM(S): at 20:38

## 2022-06-20 RX ADMIN — PIPERACILLIN AND TAZOBACTAM 25 GRAM(S): 4; .5 INJECTION, POWDER, LYOPHILIZED, FOR SOLUTION INTRAVENOUS at 01:49

## 2022-06-20 RX ADMIN — Medication 25 GRAM(S): at 02:19

## 2022-06-20 RX ADMIN — ENOXAPARIN SODIUM 40 MILLIGRAM(S): 100 INJECTION SUBCUTANEOUS at 12:18

## 2022-06-20 RX ADMIN — QUETIAPINE FUMARATE 100 MILLIGRAM(S): 200 TABLET, FILM COATED ORAL at 18:23

## 2022-06-20 RX ADMIN — Medication 115 MILLIGRAM(S): at 05:26

## 2022-06-20 RX ADMIN — Medication 100 MILLIEQUIVALENT(S): at 11:25

## 2022-06-20 RX ADMIN — Medication 1 TABLET(S): at 12:18

## 2022-06-20 RX ADMIN — Medication 40 MILLIEQUIVALENT(S): at 02:19

## 2022-06-20 RX ADMIN — PANTOPRAZOLE SODIUM 40 MILLIGRAM(S): 20 TABLET, DELAYED RELEASE ORAL at 08:41

## 2022-06-20 RX ADMIN — Medication 100 MILLIGRAM(S): at 12:18

## 2022-06-20 RX ADMIN — HEPARIN SODIUM 5000 UNIT(S): 5000 INJECTION INTRAVENOUS; SUBCUTANEOUS at 08:40

## 2022-06-20 RX ADMIN — PANTOPRAZOLE SODIUM 40 MILLIGRAM(S): 20 TABLET, DELAYED RELEASE ORAL at 21:10

## 2022-06-20 RX ADMIN — Medication 100 MILLIEQUIVALENT(S): at 14:03

## 2022-06-20 RX ADMIN — CHLORHEXIDINE GLUCONATE 1 APPLICATION(S): 213 SOLUTION TOPICAL at 10:08

## 2022-06-20 RX ADMIN — Medication 100 MILLIEQUIVALENT(S): at 12:16

## 2022-06-20 RX ADMIN — Medication 25 GRAM(S): at 08:41

## 2022-06-20 RX ADMIN — Medication 650 MILLIGRAM(S): at 21:00

## 2022-06-20 RX ADMIN — Medication 100 MILLIGRAM(S): at 15:05

## 2022-06-20 NOTE — PROGRESS NOTE ADULT - ASSESSMENT
52y with a PMHx of HTN, cervical CA (stage III)  on chemo - last session last week - c/b b/l hydronephrosis s/p B/L nephrostomy tube placement, p/w severe sepsis from neutropenic fever. Pt was acutely altered yesterday afternoon without improvement and AXR showed increased free air with concern for perforated viscous. She is now s/p multiple washouts, open resection of left colon, with transverse colostomy creation now s/p fascial closure with bridging vicryl mesh and skin closure with dermaclose by plastic surgery. Extubated 6/19. Recovering in SICU for close hemodynamic monitoring.     Plan:  - Self d/c'd NGT this AM, consider replacing if nauseous or vomiting  - Monitor ostomy function and SARAH outputs  - Appreciate SICU care    D Team Surgery   [id number]

## 2022-06-20 NOTE — PROGRESS NOTE ADULT - SUBJECTIVE AND OBJECTIVE BOX
SUBJECTIVE:  Is upset and wants to go home. States pain is well-controlled.    OBJECTIVE:  Vital Signs Last 24 Hrs  T(C): 37.5 (20 Jun 2022 12:00), Max: 37.6 (20 Jun 2022 08:00)  T(F): 99.5 (20 Jun 2022 12:00), Max: 99.7 (20 Jun 2022 08:00)  HR: 97 (20 Jun 2022 14:00) (85 - 117)  BP: 142/92 (20 Jun 2022 08:45) (142/92 - 142/92)  BP(mean): 109 (20 Jun 2022 08:45) (109 - 109)  RR: 23 (20 Jun 2022 14:00) (20 - 30)  SpO2: 99% (20 Jun 2022 14:00) (95% - 100%)      06-19-22 @ 07:01  -  06-20-22 @ 07:00  --------------------------------------------------------  IN: 1372.5 mL / OUT: 5220 mL / NET: -3847.5 mL    06-20-22 @ 07:01  -  06-20-22 @ 14:00  --------------------------------------------------------  IN: 295 mL / OUT: 515 mL / NET: -220 mL        Physical Examination:  GEN: NAD, resting quietly  PULM: symmetric chest rise bilaterally, no increased WOB  ABD: soft, appropriately tender, ostomy pink with gas and brown stool in bag, midline with dermaclose, right SARAH purulent/serosang, left JPs serosang x2  EXTR: no LE erythema, moving all extremities      LABS:                        7.7    5.69  )-----------( 29       ( 20 Jun 2022 00:45 )             23.5       06-20    143  |  106  |  22  ----------------------------<  106<H>  3.3<L>   |  25  |  0.98    Ca    7.9<L>      20 Jun 2022 00:45  Phos  2.4     06-20  Mg     1.60     06-20

## 2022-06-20 NOTE — PHYSICAL THERAPY INITIAL EVALUATION ADULT - GENERAL OBSERVATIONS, REHAB EVAL
Patient received in semifowler position in bed in NAD +RN Jenifer present. Patient denies chest pain, SOB, headache, and dizziness.

## 2022-06-20 NOTE — PROGRESS NOTE ADULT - SUBJECTIVE AND OBJECTIVE BOX
R2 GYN ONC Progress Note     Interval Events:  - extubated to bipap weaned to RA  - one episode of emesis overnight  - s/p Lasix 20mg    Patient seen and examined at bedside.    Reports that she would like to go home today and that "if she dies at home that is ok."  Denies CP, SOB, N/V, fevers, and chills.  Denies pain.       Vital Signs Last 24 Hours  T(C): 37.1 (06-20-22 @ 04:00), Max: 37.6 (06-19-22 @ 08:00)  HR: 93 (06-20-22 @ 06:00) (86 - 125)  BP: 169/65 (06-19-22 @ 09:00) (169/65 - 169/65)  RR: 26 (06-20-22 @ 06:00) (14 - 30)  SpO2: 97% (06-20-22 @ 06:00) (95% - 100%)    I&O's Summary    18 Jun 2022 07:01  -  19 Jun 2022 07:00  --------------------------------------------------------  IN: 1417.5 mL / OUT: 5072 mL / NET: -3654.5 mL    19 Jun 2022 07:01  -  20 Jun 2022 06:29  --------------------------------------------------------  IN: 1372.5 mL / OUT: 5220 mL / NET: -3847.5 mL        Physical Exam:  General: NAD, mildly aggitated  CV: RRR  Lungs: CTA b/l  Abd: Soft, non-distended, wound vac in place ; ostomy pink and well perfused with brownish liquid output in  ostomy bag; NGT in place-minimal clear output; +3 Ric drains  : L and R nephrostomy tube draining clear urine; hilliard in place draining dark la nena urine  Ext: Warm & well perfused.     Labs:                        7.7    5.69  )-----------( 29       ( 20 Jun 2022 00:45 )             23.5   baso x      eos x      imm gran x      lymph x      mono x      poly x                            7.6    5.79  )-----------( 27       ( 19 Jun 2022 03:00 )             23.9   baso x      eos x      imm gran x      lymph x      mono x      poly x                            8.7    6.52  )-----------( 23       ( 18 Jun 2022 07:30 )             26.1   baso x      eos x      imm gran x      lymph x      mono x      poly x                            7.9    5.83  )-----------( 19       ( 18 Jun 2022 04:00 )             24.5   baso x      eos x      imm gran x      lymph x      mono x      poly x                            8.1    5.70  )-----------( 20       ( 17 Jun 2022 23:31 )             25.1   baso x      eos x      imm gran x      lymph x      mono x      poly x          MEDICATIONS  (STANDING):  caspofungin IVPB 50 milliGRAM(s) IV Intermittent every 24 hours  caspofungin IVPB      chlorhexidine 4% Liquid 1 Application(s) Topical daily  folic acid 1 milliGRAM(s) Oral daily  heparin   Injectable 5000 Unit(s) SubCutaneous every 8 hours  metoprolol tartrate IVPB 7.5 milliGRAM(s) IV Intermittent every 6 hours  pantoprazole  Injectable 40 milliGRAM(s) IV Push two times a day  piperacillin/tazobactam IVPB.. 3.375 Gram(s) IV Intermittent every 8 hours  potassium chloride   Powder 20 milliEquivalent(s) Enteral Tube once  potassium phosphate IVPB 15 milliMole(s) IV Intermittent once  QUEtiapine 100 milliGRAM(s) Oral every 12 hours  thiamine Injectable 100 milliGRAM(s) IV Push daily    MEDICATIONS  (PRN):  oxyCODONE    Solution 5 milliGRAM(s) Enteral Tube every 4 hours PRN Moderate Pain (4 - 6)  oxyCODONE    Solution 10 milliGRAM(s) Enteral Tube every 4 hours PRN Severe Pain (7 - 10)       R2 GYN ONC Progress Note     Interval Events:  - extubated to bipap weaned to RA  - one episode of emesis overnight  - s/p Lasix 20mg    Patient seen and examined at bedside.    Reports that she would like to go home today and that "if she dies at home that is ok."  Denies CP, SOB, N/V, fevers, and chills.  Denies pain.       Vital Signs Last 24 Hours  T(C): 37.1 (06-20-22 @ 04:00), Max: 37.6 (06-19-22 @ 08:00)  HR: 93 (06-20-22 @ 06:00) (86 - 125)  BP: 169/65 (06-19-22 @ 09:00) (169/65 - 169/65)  RR: 26 (06-20-22 @ 06:00) (14 - 30)  SpO2: 97% (06-20-22 @ 06:00) (95% - 100%)    I&O's Summary    18 Jun 2022 07:01  -  19 Jun 2022 07:00  --------------------------------------------------------  IN: 1417.5 mL / OUT: 5072 mL / NET: -3654.5 mL    19 Jun 2022 07:01  -  20 Jun 2022 06:29  --------------------------------------------------------  IN: 1372.5 mL / OUT: 5220 mL / NET: -3847.5 mL        Physical Exam:  General: NAD, A&Ox3  CV: RRR  Lungs: CTA b/l  Abd: Soft, non-distended, wound vac in place ; ostomy pink and well perfused with brownish liquid output in  ostomy bag; NGT in place-minimal clear output; +3 Ric drains  : L and R nephrostomy tube draining clear urine; hilliard in place draining dark la nena urine  Ext: Warm & well perfused.     Labs:                        7.7    5.69  )-----------( 29       ( 20 Jun 2022 00:45 )             23.5   baso x      eos x      imm gran x      lymph x      mono x      poly x                            7.6    5.79  )-----------( 27       ( 19 Jun 2022 03:00 )             23.9   baso x      eos x      imm gran x      lymph x      mono x      poly x                            8.7    6.52  )-----------( 23       ( 18 Jun 2022 07:30 )             26.1   baso x      eos x      imm gran x      lymph x      mono x      poly x                            7.9    5.83  )-----------( 19       ( 18 Jun 2022 04:00 )             24.5   baso x      eos x      imm gran x      lymph x      mono x      poly x                            8.1    5.70  )-----------( 20       ( 17 Jun 2022 23:31 )             25.1   baso x      eos x      imm gran x      lymph x      mono x      poly x          MEDICATIONS  (STANDING):  caspofungin IVPB 50 milliGRAM(s) IV Intermittent every 24 hours  caspofungin IVPB      chlorhexidine 4% Liquid 1 Application(s) Topical daily  folic acid 1 milliGRAM(s) Oral daily  heparin   Injectable 5000 Unit(s) SubCutaneous every 8 hours  metoprolol tartrate IVPB 7.5 milliGRAM(s) IV Intermittent every 6 hours  pantoprazole  Injectable 40 milliGRAM(s) IV Push two times a day  piperacillin/tazobactam IVPB.. 3.375 Gram(s) IV Intermittent every 8 hours  potassium chloride   Powder 20 milliEquivalent(s) Enteral Tube once  potassium phosphate IVPB 15 milliMole(s) IV Intermittent once  QUEtiapine 100 milliGRAM(s) Oral every 12 hours  thiamine Injectable 100 milliGRAM(s) IV Push daily    MEDICATIONS  (PRN):  oxyCODONE    Solution 5 milliGRAM(s) Enteral Tube every 4 hours PRN Moderate Pain (4 - 6)  oxyCODONE    Solution 10 milliGRAM(s) Enteral Tube every 4 hours PRN Severe Pain (7 - 10)

## 2022-06-20 NOTE — PROGRESS NOTE ADULT - ATTENDING COMMENTS
Patient seen and evaluated.   Case reviewed in detail with SICU team.   R drain with purulent output - consider culture.   Swallow study today and then anticipate advancing to regular diet.   Agree with assessment and plan as above.

## 2022-06-20 NOTE — PROGRESS NOTE ADULT - SUBJECTIVE AND OBJECTIVE BOX
SICU Daily Progress Note  =====================================================  Interval/Overnight Events:       - extubated to bipap weaned to RA  - colostomy leakage underneath to midline wound vac, plastic surgery called replaced midline wound vac.  - s/p 20 lasix      HPI: 52y female with recurrent cervical CA (per patient stage III) presenting as transfer from [place] a/w neutropenic fever. Patient reports she had last cycle of chemotherapy 5 days prior.  Patient had [facility] care stop by yesterday and was noted to be tachycardic on vital signs and was sent to the ED.  She reports feeling weak + fatigued. + upper abdominal pain that worsens while having chills. She denies any vomiting, chest pain, SOB.     At [facility] she was given Meropenem and Zosyn and was noted to be tachycardic to 140s and febrile to 39.5 and was thus transferred for further management. She became acutely altered yesterday afternoon without improvement and AXR with increased free air concern for perforated viscous. She is now s/p washout, open resection of left colon, colostomy. SICU consulted for close hemodynamic monitoring.       ALLERGIES:  No Known Allergies      --------------------------------------------------------------------------------------    MEDICATIONS:    Neurologic Medications  oxyCODONE    Solution 5 milliGRAM(s) Enteral Tube every 4 hours PRN Moderate Pain (4 - 6)  oxyCODONE    Solution 10 milliGRAM(s) Enteral Tube every 4 hours PRN Severe Pain (7 - 10)  QUEtiapine 100 milliGRAM(s) Oral every 12 hours    Respiratory Medications    Cardiovascular Medications  metoprolol tartrate IVPB 7.5 milliGRAM(s) IV Intermittent every 6 hours    Gastrointestinal Medications  folic acid 1 milliGRAM(s) Oral daily  pantoprazole  Injectable 40 milliGRAM(s) IV Push two times a day  thiamine Injectable 100 milliGRAM(s) IV Push daily    Genitourinary Medications    Hematologic/Oncologic Medications  heparin   Injectable 5000 Unit(s) SubCutaneous every 8 hours    Antimicrobial/Immunologic Medications  caspofungin IVPB      caspofungin IVPB 50 milliGRAM(s) IV Intermittent every 24 hours  piperacillin/tazobactam IVPB.. 3.375 Gram(s) IV Intermittent every 8 hours    Endocrine/Metabolic Medications    Topical/Other Medications  chlorhexidine 4% Liquid 1 Application(s) Topical daily    --------------------------------------------------------------------------------------    VITAL SIGNS:  ICU Vital Signs Last 24 Hrs  T(C): 37.3 (20 Jun 2022 00:00), Max: 37.6 (19 Jun 2022 08:00)  T(F): 99.2 (20 Jun 2022 00:00), Max: 99.7 (19 Jun 2022 08:00)  HR: 111 (20 Jun 2022 00:00) (81 - 125)  BP: 169/65 (19 Jun 2022 09:00) (169/65 - 169/65)  BP(mean): 85 (19 Jun 2022 09:00) (85 - 85)  ABP: 129/60 (20 Jun 2022 00:00) (114/51 - 149/68)  ABP(mean): 85 (20 Jun 2022 00:00) (70 - 95)  RR: 26 (20 Jun 2022 00:00) (14 - 30)  SpO2: 97% (20 Jun 2022 00:00) (95% - 100%)      --------------------------------------------------------------------------------------    INS AND OUTS:  I&O's Detail    18 Jun 2022 07:01  -  19 Jun 2022 07:00  --------------------------------------------------------  IN:    Dexmedetomidine: 220 mL    Enteral Tube Flush: 60 mL    IV PiggyBack: 725 mL    TwoCal HN: 412.5 mL  Total IN: 1417.5 mL    OUT:    Colostomy (mL): 250 mL    Drain (mL): 67 mL    Drain (mL): 55 mL    Drain (mL): 40 mL    Indwelling Catheter - Urethral (mL): 580 mL    Nephrostomy Tube (mL): 1305 mL    Nephrostomy Tube (mL): 2225 mL    VAC (Vacuum Assisted Closure) System (mL): 550 mL  Total OUT: 5072 mL    Total NET: -3654.5 mL      19 Jun 2022 07:01  -  20 Jun 2022 00:40  --------------------------------------------------------  IN:    Dexmedetomidine: 65 mL    Enteral Tube Flush: 170 mL    IV PiggyBack: 650 mL    TwoCal HN: 177.5 mL  Total IN: 1062.5 mL    OUT:    Colostomy (mL): 850 mL    Drain (mL): 30 mL    Drain (mL): 90 mL    Drain (mL): 25 mL    Indwelling Catheter - Urethral (mL): 440 mL    Nephrostomy Tube (mL): 1050 mL    Nephrostomy Tube (mL): 1600 mL  Total OUT: 4085 mL    Total NET: -3022.5 mL        --------------------------------------------------------------------------------------    EXAM    NEURO: NAD, resting comforably  HEENT: NC/AT  RESPIRATORY: nonlabored respirations, normal CW expansion  CARDIO: RRR, S1S2  ABDOMEN: soft, nontender, nondistended, NGT w/ TF, colostomy +/+, yanet drain x2 seropurulent, nephrostomy tube x2 with clear urine outpaut, hilliard with blood tinged urine output.  EXTREMITIES: +pitting edema, GROSS    --------------------------------------------------------------------------------------    LABS    ((Insert SICU Labs here))***  -------------------------------------------------------------------------------------- SICU Daily Progress Note  =====================================================  Interval Events  - colostomy leakage underneath to midline wound vac, plastic surgery called replaced midline wound vac.  - Passed swallow eval, reg diet  - If weight significantly increased diurese  - DVT sono negative    HPI: 52y female with recurrent cervical CA (per patient stage III) presenting as transfer from [place] a/w neutropenic fever. Patient reports she had last cycle of chemotherapy 5 days prior.  Patient had [facility]NS care stop by yesterday and was noted to be tachycardic on vital signs and was sent to the ED.  She reports feeling weak + fatigued. + upper abdominal pain that worsens while having chills. She denies any vomiting, chest pain, SOB.     At [facility] she was given Meropenem and Zosyn and was noted to be tachycardic to 140s and febrile to 39.5 and was thus transferred for further management. She became acutely altered yesterday afternoon without improvement and AXR with increased free air concern for perforated viscous. She is now s/p washout, open resection of left colon, colostomy. SICU consulted for close hemodynamic monitoring.       ALLERGIES:  No Known Allergies      --------------------------------------------------------------------------------------    MEDICATIONS:    Neurologic Medications  oxyCODONE    Solution 5 milliGRAM(s) Enteral Tube every 4 hours PRN Moderate Pain (4 - 6)  oxyCODONE    Solution 10 milliGRAM(s) Enteral Tube every 4 hours PRN Severe Pain (7 - 10)  QUEtiapine 100 milliGRAM(s) Oral every 12 hours    Respiratory Medications    Cardiovascular Medications  metoprolol tartrate IVPB 7.5 milliGRAM(s) IV Intermittent every 6 hours    Gastrointestinal Medications  folic acid 1 milliGRAM(s) Oral daily  pantoprazole  Injectable 40 milliGRAM(s) IV Push two times a day  thiamine Injectable 100 milliGRAM(s) IV Push daily    Genitourinary Medications    Hematologic/Oncologic Medications  heparin   Injectable 5000 Unit(s) SubCutaneous every 8 hours    Antimicrobial/Immunologic Medications  caspofungin IVPB      caspofungin IVPB 50 milliGRAM(s) IV Intermittent every 24 hours  piperacillin/tazobactam IVPB.. 3.375 Gram(s) IV Intermittent every 8 hours    Endocrine/Metabolic Medications    Topical/Other Medications  chlorhexidine 4% Liquid 1 Application(s) Topical daily    --------------------------------------------------------------------------------------    VITAL SIGNS:  ICU Vital Signs Last 24 Hrs  T(C): 37.3 (20 Jun 2022 00:00), Max: 37.6 (19 Jun 2022 08:00)  T(F): 99.2 (20 Jun 2022 00:00), Max: 99.7 (19 Jun 2022 08:00)  HR: 111 (20 Jun 2022 00:00) (81 - 125)  BP: 169/65 (19 Jun 2022 09:00) (169/65 - 169/65)  BP(mean): 85 (19 Jun 2022 09:00) (85 - 85)  ABP: 129/60 (20 Jun 2022 00:00) (114/51 - 149/68)  ABP(mean): 85 (20 Jun 2022 00:00) (70 - 95)  RR: 26 (20 Jun 2022 00:00) (14 - 30)  SpO2: 97% (20 Jun 2022 00:00) (95% - 100%)      --------------------------------------------------------------------------------------    INS AND OUTS:  I&O's Detail    18 Jun 2022 07:01  -  19 Jun 2022 07:00  --------------------------------------------------------  IN:    Dexmedetomidine: 220 mL    Enteral Tube Flush: 60 mL    IV PiggyBack: 725 mL    TwoCal HN: 412.5 mL  Total IN: 1417.5 mL    OUT:    Colostomy (mL): 250 mL    Drain (mL): 67 mL    Drain (mL): 55 mL    Drain (mL): 40 mL    Indwelling Catheter - Urethral (mL): 580 mL    Nephrostomy Tube (mL): 1305 mL    Nephrostomy Tube (mL): 2225 mL    VAC (Vacuum Assisted Closure) System (mL): 550 mL  Total OUT: 5072 mL    Total NET: -3654.5 mL      19 Jun 2022 07:01  -  20 Jun 2022 00:40  --------------------------------------------------------  IN:    Dexmedetomidine: 65 mL    Enteral Tube Flush: 170 mL    IV PiggyBack: 650 mL    TwoCal HN: 177.5 mL  Total IN: 1062.5 mL    OUT:    Colostomy (mL): 850 mL    Drain (mL): 30 mL    Drain (mL): 90 mL    Drain (mL): 25 mL    Indwelling Catheter - Urethral (mL): 440 mL    Nephrostomy Tube (mL): 1050 mL    Nephrostomy Tube (mL): 1600 mL  Total OUT: 4085 mL    Total NET: -3022.5 mL        --------------------------------------------------------------------------------------    EXAM    NEURO: NAD, resting comforably  HEENT: NC/AT  RESPIRATORY: nonlabored respirations, normal CW expansion  CARDIO: RRR, S1S2  ABDOMEN: soft, nontender, nondistended, NGT w/ TF, colostomy +/+, yanet drain x2 seropurulent, nephrostomy tube x2 with clear urine outpaut, hilliard with blood tinged urine output.  EXTREMITIES: +pitting edema, GROSS    --------------------------------------------------------------------------------------    LABS    ((Insert SICU Labs here))***  --------------------------------------------------------------------------------------

## 2022-06-20 NOTE — PROGRESS NOTE ADULT - ASSESSMENT
52y  with recurrent cervical CA admitted for management of neutropenic fever, now s/p emergent exploratory laparotomy, abdominal washout, rectosigmoid colectomy, diverting colostomy, bronchoscopy and Abthera placement on 22 (ebl 2000cc, s/p 5UpRBC, 3L albumin, 3U Plts, 3U FFP, 3U Cryo) for findings of increased free air on abdominal xray concerning for bowel perforation. Patient s/p washout and bridging vicryl mesh placement to close fascial gap on . Pt was extubated yesterday.  Hemodynamically stable and responsive this morning.     - Patient is off precedex. Seroquel PRN; IV analgesia per SICU  - Continues to be off pressor support; Continue to monitor closely   - Extubated on RA,  tachypneic to 28; O2 >95%   - NPO/NGT in place (-). Tube feeds. Continue IV Protonix. GI following  - b/l PCN, 10-30cc/hour concentrated urine bilaterally. Inf[place] in place. ASHLEY likely 2/2 IV contrast, dehydration, Cr 0.98 this morning. Continue to monitor UOP. Replete electrolytes PRN  - VTE ppx: Venodynes, HSQ 5K q8h  - Thrombocytopenia likely multifactorial; plts continue to uptrend  - H/H stable; pt s/p multiple units of pRBC transfusion  - Afebrile. WBC 5.69, trend WBC; Continue Zosyn (-), Caspofungin (-), (s/p Cefepime), s/p Zarixo (-6/10) for neutropenic fever     Dispo: Appreciate excellent SICU care.     Margarita PGY2

## 2022-06-20 NOTE — CHART NOTE - NSCHARTNOTEFT_GEN_A_CORE
R2 GYNECOLOGIC ONCOLOGY Chart NOTE    SUBJECTIVE:    Interval Events:   - successful extubation today    Patient seen and examined at bedside. Per SICU team patient has been doing well tonight although she had an episode of emesis, clear fluid. Patient found asleep but able to awaken and responded to questions.     OBJECTIVE:   T(C): 37.3 (06-20-22 @ 00:00), Max: 37.6 (06-19-22 @ 08:00)  HR: 111 (06-20-22 @ 00:00) (81 - 125)  BP: 169/65 (06-19-22 @ 09:00) (169/65 - 169/65)  RR: 26 (06-20-22 @ 00:00) (14 - 30)  SpO2: 97% (06-20-22 @ 00:00) (95% - 100%)  I&O's Detail    18 Jun 2022 07:01  -  19 Jun 2022 07:00  --------------------------------------------------------  IN:    Dexmedetomidine: 220 mL    Enteral Tube Flush: 60 mL    IV PiggyBack: 725 mL    TwoCal HN: 412.5 mL  Total IN: 1417.5 mL    OUT:    Colostomy (mL): 250 mL    Drain (mL): 67 mL    Drain (mL): 55 mL    Drain (mL): 40 mL    Indwelling Catheter - Urethral (mL): 580 mL    Nephrostomy Tube (mL): 1305 mL    Nephrostomy Tube (mL): 2225 mL    VAC (Vacuum Assisted Closure) System (mL): 550 mL  Total OUT: 5072 mL    Total NET: -3654.5 mL      19 Jun 2022 07:01  -  20 Jun 2022 00:45  --------------------------------------------------------  IN:    Dexmedetomidine: 65 mL    Enteral Tube Flush: 170 mL    IV PiggyBack: 650 mL    TwoCal HN: 177.5 mL  Total IN: 1062.5 mL    OUT:    Colostomy (mL): 850 mL    Drain (mL): 30 mL    Drain (mL): 90 mL    Drain (mL): 25 mL    Indwelling Catheter - Urethral (mL): 440 mL    Nephrostomy Tube (mL): 1050 mL    Nephrostomy Tube (mL): 1600 mL  Total OUT: 4085 mL    Total NET: -3022.5 mL    Drains/Lines:  RUQ Ric: 25cc, serosanguinous  LUQ Ric: 20cc, serosanguinous  L pelvic Ric: 20cc, serosanguinous    b/l nephrostomy tubes, draining yellow urine  Infante in place, blood tinged  L A-line  Colostomy bag draining light brown stool  Abthera    Gen: NAD  CV: Physiologic S1 and S2  Pulm: CTAB  Abd: soft, nondistended  Ext: Venodynes in place    A/P: 52y with recurrent cervical CA admitted for management of neutropenic fever c/b AMS and c/f bowel perforation 2/2 increased free air on abdominal X-ray s/p emergent exploratory laparotomy, abdominal washout, rectosigmoid colectomy, diverting colostomy, bronchoscopy and Abthera placement on 6/8/22 (EBL 2000cc, s/p 5UpRBC, 3L albumin, 3U Plts, 3U FFP, 3U Cryo) with intraoperative findings notable for perforation in distal sigmoid colon. Patient POD#5 s/p 3rd re-exploratory laparotomy, abdominal washout, fascia bridge with [id number] vicryl mesh.  Per Plastics, patient to continue with wound vac dressing and dermaclose.    - Patient is off precedex. Seroquel PRN; IV analgesia per SICU  - Continues to be off pressor support; Continue to monitor closely   - Extubated on RA, satting >95%  - NPO/NGT in place (6/8-). Tube feeds. Continue IV Protonix. GI following  - b/l PCN, 10-30cc/hour concentrated urine bilaterally. Infante in place. ASHLEY likely 2/2 IV contrast, dehydration, Cr 1.29->1.31->1.23->1.19->>1.1->1.09->->1.16->1.11->0.98 Continue to monitor UOP. Replete electrolytes PRN  - VTE ppx: Venodynes, HSQ 5K q8h  - Maintain Plt >50K ariel-operatively, total 5u plts transfused on 6/14  - Hg 7.6 on 6/19. Maintain Hgb >7; pRBCs administered: 5u pRBC intraop 6/8, 1u pRBC intraop 6/10; 1u pRBC post-op 6/12, 2u pRBC on 6/13, 2u pRBC on 6/14  - Afebrile. WBC 5.79, trend WBC; Continue Zosyn (6/8-), Caspofungin (6/13-), (s/p Cefepime), s/p Zarixo (6/5-6/10) for neutropenic fever     Dispo: Appreciate excellent SICU care.     NANCY Lugo PGY1 R2 GYNECOLOGIC ONCOLOGY Chart NOTE    SUBJECTIVE:    Interval Events:   - successful extubation today    Patient seen and examined at bedside. Per SICU team patient has been doing well tonight although she had an episode of emesis, clear fluid. Patient found asleep but able to awaken and responded to questions.     OBJECTIVE:   T(C): 37.3 (06-20-22 @ 00:00), Max: 37.6 (06-19-22 @ 08:00)  HR: 111 (06-20-22 @ 00:00) (81 - 125)  BP: 169/65 (06-19-22 @ 09:00) (169/65 - 169/65)  RR: 26 (06-20-22 @ 00:00) (14 - 30)  SpO2: 97% (06-20-22 @ 00:00) (95% - 100%)  I&O's Detail    18 Jun 2022 07:01  -  19 Jun 2022 07:00  --------------------------------------------------------  IN:    Dexmedetomidine: 220 mL    Enteral Tube Flush: 60 mL    IV PiggyBack: 725 mL    TwoCal HN: 412.5 mL  Total IN: 1417.5 mL    OUT:    Colostomy (mL): 250 mL    Drain (mL): 67 mL    Drain (mL): 55 mL    Drain (mL): 40 mL    Indwelling Catheter - Urethral (mL): 580 mL    Nephrostomy Tube (mL): 1305 mL    Nephrostomy Tube (mL): 2225 mL    VAC (Vacuum Assisted Closure) System (mL): 550 mL  Total OUT: 5072 mL    Total NET: -3654.5 mL      19 Jun 2022 07:01  -  20 Jun 2022 00:45  --------------------------------------------------------  IN:    Dexmedetomidine: 65 mL    Enteral Tube Flush: 170 mL    IV PiggyBack: 650 mL    TwoCal HN: 177.5 mL  Total IN: 1062.5 mL    OUT:    Colostomy (mL): 850 mL    Drain (mL): 30 mL    Drain (mL): 90 mL    Drain (mL): 25 mL    Indwelling Catheter - Urethral (mL): 440 mL    Nephrostomy Tube (mL): 1050 mL    Nephrostomy Tube (mL): 1600 mL  Total OUT: 4085 mL    Total NET: -3022.5 mL    Drains/Lines:  RUQ Ric: 25cc, serosanguinous  LUQ Ric: 20cc, serosanguinous  L pelvic Ric: 20cc, serosanguinous    b/l nephrostomy tubes, draining yellow urine  Infante in place, blood tinged  L A-line  Colostomy bag draining light brown stool  Abdominal wound vac    Gen: NAD  CV: Physiologic S1 and S2  Pulm: CTAB  Abd: soft, nondistended  Ext: Venodynes in place    A/P: 52y with recurrent cervical CA admitted for management of neutropenic fever c/b AMS and c/f bowel perforation 2/2 increased free air on abdominal X-ray s/p emergent exploratory laparotomy, abdominal washout, rectosigmoid colectomy, diverting colostomy, bronchoscopy and Abthera placement on 6/8/22 (EBL 2000cc, s/p 5UpRBC, 3L albumin, 3U Plts, 3U FFP, 3U Cryo) with intraoperative findings notable for perforation in distal sigmoid colon. Patient POD#5 s/p 3rd re-exploratory laparotomy, abdominal washout, fascia bridge with [id number] vicryl mesh.  Per Plastics, patient to continue with wound vac dressing and dermaclose.    - Patient is off precedex. Seroquel PRN; IV analgesia per SICU  - Continues to be off pressor support; Continue to monitor closely   - Extubated on RA, satting >95%  - NPO/NGT in place (6/8-). Tube feeds. Continue IV Protonix. GI following  - b/l PCN, 10-30cc/hour concentrated urine bilaterally. Infante in place. ASHLEY likely 2/2 IV contrast, dehydration, Cr 1.29->1.31->1.23->1.19->>1.1->1.09->->1.16->1.11->0.98 Continue to monitor UOP. Replete electrolytes PRN  - VTE ppx: Venodynes, HSQ 5K q8h  - Maintain Plt >50K ariel-operatively, total 5u plts transfused on 6/14  - Hg 7.6 on 6/19. Maintain Hgb >7; pRBCs administered: 5u pRBC intraop 6/8, 1u pRBC intraop 6/10; 1u pRBC post-op 6/12, 2u pRBC on 6/13, 2u pRBC on 6/14  - Afebrile. WBC 5.79, trend WBC; Continue Zosyn (6/8-), Caspofungin (6/13-), (s/p Cefepime), s/p Zarixo (6/5-6/10) for neutropenic fever     Dispo: Appreciate excellent SICU care.     NANCY Lugo PGY1 R2 GYNECOLOGIC ONCOLOGY Chart NOTE    SUBJECTIVE:    Interval Events:   - successful extubation today    Patient seen and examined at bedside. Per SICU team patient has been doing well tonight although she had an episode of emesis, clear fluid. Patient found asleep but able to awaken and responded to questions.     OBJECTIVE:   T(C): 37.3 (06-20-22 @ 00:00), Max: 37.6 (06-19-22 @ 08:00)  HR: 111 (06-20-22 @ 00:00) (81 - 125)  BP: 169/65 (06-19-22 @ 09:00) (169/65 - 169/65)  RR: 26 (06-20-22 @ 00:00) (14 - 30)  SpO2: 97% (06-20-22 @ 00:00) (95% - 100%)  I&O's Detail    18 Jun 2022 07:01  -  19 Jun 2022 07:00  --------------------------------------------------------  IN:    Dexmedetomidine: 220 mL    Enteral Tube Flush: 60 mL    IV PiggyBack: 725 mL    TwoCal HN: 412.5 mL  Total IN: 1417.5 mL    OUT:    Colostomy (mL): 250 mL    Drain (mL): 67 mL    Drain (mL): 55 mL    Drain (mL): 40 mL    Indwelling Catheter - Urethral (mL): 580 mL    Nephrostomy Tube (mL): 1305 mL    Nephrostomy Tube (mL): 2225 mL    VAC (Vacuum Assisted Closure) System (mL): 550 mL  Total OUT: 5072 mL    Total NET: -3654.5 mL      19 Jun 2022 07:01  -  20 Jun 2022 00:45  --------------------------------------------------------  IN:    Dexmedetomidine: 65 mL    Enteral Tube Flush: 170 mL    IV PiggyBack: 650 mL    TwoCal HN: 177.5 mL  Total IN: 1062.5 mL    OUT:    Colostomy (mL): 850 mL    Drain (mL): 30 mL    Drain (mL): 90 mL    Drain (mL): 25 mL    Indwelling Catheter - Urethral (mL): 440 mL    Nephrostomy Tube (mL): 1050 mL    Nephrostomy Tube (mL): 1600 mL  Total OUT: 4085 mL    Total NET: -3022.5 mL    Drains/Lines:  RUQ Ric: 25cc, serosanguinous  LUQ Ric: 20cc, serosanguinous  L pelvic Ric: 20cc, serosanguinous    b/l nephrostomy tubes, draining yellow urine  Infante in place, blood tinged  L A-line  Colostomy bag draining light brown stool  Abdominal wound vac    Gen: NAD  CV: Physiologic S1 and S2  Pulm: CTAB  Abd: soft, nondistended, pink ostomy, colostomy bag with brown liquid stool, Abthera in place  Ext: Venodynes in place    A/P: 52y with recurrent cervical CA admitted for management of neutropenic fever c/b AMS and c/f bowel perforation 2/2 increased free air on abdominal X-ray s/p emergent exploratory laparotomy, abdominal washout, rectosigmoid colectomy, diverting colostomy, bronchoscopy and Abthera placement on 6/8/22 (EBL 2000cc, s/p 5UpRBC, 3L albumin, 3U Plts, 3U FFP, 3U Cryo) with intraoperative findings notable for perforation in distal sigmoid colon. Patient POD#5 s/p 3rd re-exploratory laparotomy, abdominal washout, fascia bridge with [id number] vicryl mesh.  Per Plastics, patient to continue with wound vac dressing and dermaclose.    - Patient is off precedex. Seroquel PRN; IV analgesia per SICU  - Continues to be off pressor support; Continue to monitor closely   - Extubated on RA, satting >95%  - NPO/NGT in place (6/8-). Tube feeds. Continue IV Protonix. GI following  - b/l PCN, 10-30cc/hour concentrated urine bilaterally. Infante in place. ASHLEY likely 2/2 IV contrast, dehydration, Cr 1.29->1.31->1.23->1.19->>1.1->1.09->->1.16->1.11->0.98 Continue to monitor UOP. Replete electrolytes PRN  - VTE ppx: Venodynes, HSQ 5K q8h  - Maintain Plt >50K ariel-operatively, total 5u plts transfused on 6/14  - Hg 7.6 on 6/19. Maintain Hgb >7; pRBCs administered: 5u pRBC intraop 6/8, 1u pRBC intraop 6/10; 1u pRBC post-op 6/12, 2u pRBC on 6/13, 2u pRBC on 6/14  - Afebrile. WBC 5.79, trend WBC; Continue Zosyn (6/8-), Caspofungin (6/13-), (s/p Cefepime), s/p Zarixo (6/5-6/10) for neutropenic fever     Dispo: Appreciate excellent SICU care.     NANCY Lugo, PGY1 R2 GYNECOLOGIC ONCOLOGY Chart NOTE    SUBJECTIVE:    Interval Events:   - successful extubation today    Patient seen and examined at bedside. Per SICU team patient has been doing well tonight although she had an episode of emesis, clear fluid. Patient found asleep but able to awaken and responded to questions.     OBJECTIVE:   T(C): 37.3 (06-20-22 @ 00:00), Max: 37.6 (06-19-22 @ 08:00)  HR: 111 (06-20-22 @ 00:00) (81 - 125)  BP: 169/65 (06-19-22 @ 09:00) (169/65 - 169/65)  RR: 26 (06-20-22 @ 00:00) (14 - 30)  SpO2: 97% (06-20-22 @ 00:00) (95% - 100%)  I&O's Detail    18 Jun 2022 07:01  -  19 Jun 2022 07:00  --------------------------------------------------------  IN:    Dexmedetomidine: 220 mL    Enteral Tube Flush: 60 mL    IV PiggyBack: 725 mL    TwoCal HN: 412.5 mL  Total IN: 1417.5 mL    OUT:    Colostomy (mL): 250 mL    Drain (mL): 67 mL    Drain (mL): 55 mL    Drain (mL): 40 mL    Indwelling Catheter - Urethral (mL): 580 mL    Nephrostomy Tube (mL): 1305 mL    Nephrostomy Tube (mL): 2225 mL    VAC (Vacuum Assisted Closure) System (mL): 550 mL  Total OUT: 5072 mL    Total NET: -3654.5 mL      19 Jun 2022 07:01  -  20 Jun 2022 00:45  --------------------------------------------------------  IN:    Dexmedetomidine: 65 mL    Enteral Tube Flush: 170 mL    IV PiggyBack: 650 mL    TwoCal HN: 177.5 mL  Total IN: 1062.5 mL    OUT:    Colostomy (mL): 850 mL    Drain (mL): 30 mL    Drain (mL): 90 mL    Drain (mL): 25 mL    Indwelling Catheter - Urethral (mL): 440 mL    Nephrostomy Tube (mL): 1050 mL    Nephrostomy Tube (mL): 1600 mL  Total OUT: 4085 mL    Total NET: -3022.5 mL    Drains/Lines:  RUQ Ric: 25cc, serosanguinous  LUQ Ric: 20cc, serosanguinous  L pelvic Ric: 20cc, serosanguinous    b/l nephrostomy tubes, draining yellow urine  Infante in place, blood tinged  L A-line  Colostomy bag draining light brown stool  Abdominal wound vac    Gen: NAD  CV: Physiologic S1 and S2  Pulm: CTAB  Abd: soft, nondistended, pink ostomy, colostomy bag with brown liquid stool, wound vac in place  Ext: Venodynes in place    A/P: 52y with recurrent cervical CA admitted for management of neutropenic fever c/b AMS and c/f bowel perforation 2/2 increased free air on abdominal X-ray s/p emergent exploratory laparotomy, abdominal washout, rectosigmoid colectomy, diverting colostomy, bronchoscopy and Abthera placement on 6/8/22 (EBL 2000cc, s/p 5UpRBC, 3L albumin, 3U Plts, 3U FFP, 3U Cryo) with intraoperative findings notable for perforation in distal sigmoid colon. Patient POD#5 s/p 3rd re-exploratory laparotomy, abdominal washout, fascia bridge with [id number] vicryl mesh.  Per Plastics, patient to continue with wound vac dressing and dermaclose.    - Patient is off precedex. Seroquel PRN; IV analgesia per SICU  - Continues to be off pressor support; Continue to monitor closely   - Extubated on RA, satting >95%  - NPO/NGT in place (6/8-). Tube feeds. Continue IV Protonix. GI following  - b/l PCN, 10-30cc/hour concentrated urine bilaterally. Infante in place. ASHLEY likely 2/2 IV contrast, dehydration, Cr 1.29->1.31->1.23->1.19->>1.1->1.09->->1.16->1.11->0.98 Continue to monitor UOP. Replete electrolytes PRN  - VTE ppx: Venodynes, HSQ 5K q8h  - Maintain Plt >50K ariel-operatively, total 5u plts transfused on 6/14  - Hg 7.6 on 6/19. Maintain Hgb >7; pRBCs administered: 5u pRBC intraop 6/8, 1u pRBC intraop 6/10; 1u pRBC post-op 6/12, 2u pRBC on 6/13, 2u pRBC on 6/14  - Afebrile. WBC 5.79, trend WBC; Continue Zosyn (6/8-), Caspofungin (6/13-), (s/p Cefepime), s/p Zarixo (6/5-6/10) for neutropenic fever     Dispo: Appreciate excellent SICU care.     NANCY Lugo, PGY1

## 2022-06-20 NOTE — PROGRESS NOTE ADULT - ASSESSMENT
52y with a PMHx of HTN, cervical CA (stage III)  on chemo - last session last week - c/b b/l hydronephrosis s/p B/L nephrostomy tube placement, p/w severe sepsis from neutropenic fever. Pt was acutely altered yesterday afternoon without improvement and AXR showed increased free air with concern for perforated viscous. She is now s/p washout, open resection of left colon, with colostomy creation. Recovering in SICU for close hemodynamic monitoring.     PLAN:  NEUROLOGY:  - Pain control: Oxy PRN, tylenol  - Seroquel 100mg PO BID  - Folic acid and thiamine    RESPIRATORY:  - Monitor daily chest x-rays  - Maintain O2 saturation >92%    CARDIOVASCULAR:  - cont home metoprolol at low dose 7.5q6    GI / NUTRITION:  - NPO w/ TF  - GI ppx: IV Protonix 40mg BID  - Wound VAC in place, SS output  - s/p abd closure on 6/17    RENAL / GENITOURINARY: hx of bilateral nephrostomy tubes  - Indwelling hilliard catheter, bilateral nephrostomy tubes  - IR nephrostomy tube exchange postponed--> reconsult for exchange   - Monitor electrolytes and replete PRN  - Continue to monitor strict ins and outs q1 hour    HEMATOLOGIC:  - VTE ppx: SQH q8hr  - Transfuse prbc and plt as needed  - Monitor H/H and platelet count on CBC daily  - Heme consulted for low platelet count    INFECTIOUS DISEASE: hx of neutropenic fevers, s/p zarxio tx  - Zosyn and Caspofungin  - Monitor fever curve and WBC on daily CBC w/diff    ENDOCRINOLOGY:  - Monitor fingersticks q6 hours while NPO  -Endocrine consult for hypothyroidism--> recommend repeat TSH, FT4, and TT3 on 6/24    DISPO:  - Full code  - SICU    52y with a PMHx of HTN, cervical CA (stage III)  on chemo - last session last week - c/b b/l hydronephrosis s/p B/L nephrostomy tube placement, p/w severe sepsis from neutropenic fever. Pt was acutely altered yesterday afternoon without improvement and AXR showed increased free air with concern for perforated viscous. She is now s/p washout, open resection of left colon, with colostomy creation. Recovering in SICU for close hemodynamic monitoring.     PLAN:    NEUROLOGY:  - Pain control: Oxy PRN  - Seroquel 100mg PO BID  - Folic acid and thiamine  - Passed swallow eval    RESPIRATORY:  - Extubated, now on room air  - Monitor daily chest x-rays and ABG  - Maintain O2 saturation >92%    CARDIOVASCULAR:  - Off pressors  - Monitor hemodynamics, tachy, restarted home BB  - Keep MAP >65  - Check weight to determine if needs diuresis    GI / NUTRITION:  - Regular diet  - Protonix PO  - s/p abd closure device with plastics on 6/17  - Consult ostomy nurse    RENAL / GENITOURINARY: hx of bilateral nephrostomy tubes  - Indwelling hilliard catheter, bilateral nephrostomy tubes  - Monitor electrolytes and replete PRN  - Continue to monitor strict ins and outs q1 hour  - 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    HEMATOLOGIC:  - VTE ppx: SQH q8hr  - Persistent tachycardia, screening DVT US  - Transfuse prbc and plt as needed  - Monitor H/H and platelet count on CBC daily  - Heme consulted for low platelet count    INFECTIOUS DISEASE: hx of neutropenic fevers, s/p zarxio tx  - Has been afebrile without leukocytosis, dx abx/antifungals  - Monitor fever curve and WBC on daily CBC w/diff  - Febrile 6/18, no leukocytosis, hemodynamically stable    ENDOCRINOLOGY:  - Monitor fingersticks q6 hours while NPO  -Endocrine consult for hypothyroidism--> recommend repeat TSH, FT4, and TT3 on 6/24    DISPO:  - Full code  - SICU

## 2022-06-20 NOTE — PROGRESS NOTE ADULT - PROBLEM SELECTOR PLAN 1
GYN ONC Fellow Addendum:    Pt seen and examined at bedside. Agree with above. Pt extubated now on RA. Denies pain.     VS reviewed  Labs reviewed    - pain control prn  - CLD  - Replete K, mag, phos  - Thrombocytopenia: improving  - PT eval  - DVT ppx: HSQ  - Appreciate SICU care    GRIS Mckeon, PGY6

## 2022-06-20 NOTE — PHYSICAL THERAPY INITIAL EVALUATION ADULT - PERTINENT HX OF CURRENT PROBLEM, REHAB EVAL
patient is a 52 year old female s/p multiple washouts, open resection of left colon, with transverse colostomy creation now s/p fascial closure with bridging vicryl mesh and skin closure with dermaclose by plastic surgery. Extubated 6/19. Recovering in SICU for close hemodynamic monitoring.

## 2022-06-20 NOTE — PROGRESS NOTE ADULT - SUBJECTIVE AND OBJECTIVE BOX
Plastic Surgery Progress Note (pg [facility]: [id number], [facility]: [phone])    SUBJECTIVE  Pt comfortable in bed. Pain well controlled, no complaints.    OBJECTIVE    PHYSICAL EXAM    General: NAD  Pulm: comfortable  Neuro: alert  Abdomen: soft, no collections, LLQ drain murky, vac in place holding suction, Dermaclose in place, remaining drains with s/s output    ______    Vital Signs Last 24 Hrs  T(C): 37.6 (20 Jun 2022 08:00), Max: 37.6 (19 Jun 2022 11:00)  T(F): 99.7 (20 Jun 2022 08:00), Max: 99.7 (20 Jun 2022 08:00)  HR: 85 (20 Jun 2022 10:00) (85 - 117)  BP: 142/92 (20 Jun 2022 08:45) (142/92 - 142/92)  BP(mean): 109 (20 Jun 2022 08:45) (109 - 109)  RR: 25 (20 Jun 2022 10:00) (20 - 30)  SpO2: 99% (20 Jun 2022 10:00) (95% - 100%)    I&O's Detail    19 Jun 2022 07:01  -  20 Jun 2022 07:00  --------------------------------------------------------  IN:    Dexmedetomidine: 65 mL    Enteral Tube Flush: 230 mL    IV PiggyBack: 700 mL    TwoCal HN: 377.5 mL  Total IN: 1372.5 mL    OUT:    Colostomy (mL): 1100 mL    Drain (mL): 120 mL    Drain (mL): 45 mL    Drain (mL): 35 mL    Indwelling Catheter - Urethral (mL): 560 mL    Nephrostomy Tube (mL): 1880 mL    Nephrostomy Tube (mL): 1430 mL    VAC (Vacuum Assisted Closure) System (mL): 50 mL  Total OUT: 5220 mL    Total NET: -3847.5 mL      MEDICATIONS  (STANDING):  chlorhexidine 4% Liquid 1 Application(s) Topical daily  enoxaparin Injectable 40 milliGRAM(s) SubCutaneous every 24 hours  metoprolol tartrate IVPB 7.5 milliGRAM(s) IV Intermittent every 6 hours  multivitamin 1 Tablet(s) Oral daily  pantoprazole    Tablet 40 milliGRAM(s) Oral every 12 hours  potassium chloride  10 mEq/100 mL IVPB 10 milliEquivalent(s) IV Intermittent every 1 hour  QUEtiapine 100 milliGRAM(s) Oral every 12 hours  thiamine 100 milliGRAM(s) Oral daily    MEDICATIONS  (PRN):  acetaminophen     Tablet .. 650 milliGRAM(s) Oral every 6 hours PRN Mild Pain (1 - 3)      LABS:                        7.7    5.69  )-----------( 29       ( 20 Jun 2022 00:45 )             23.5     06-20    143  |  106  |  22  ----------------------------<  106<H>  3.3<L>   |  25  |  0.98    Ca    7.9<L>      20 Jun 2022 00:45  Phos  2.4     06-20  Mg     1.60     06-20            ABO Interpretation: KATHY (06-20-22 @ 01:15)

## 2022-06-20 NOTE — PROGRESS NOTE ADULT - ATTENDING COMMENTS
I have personally interviewed and examined this patient, reviewed pertinent labs and imaging on SICU rounds.    50   minutes in total were spent in providing direct critical care for the diagnoses, assessment and plan outlined below.  This patient suffers from a critical illness that acutely impairs one or more vital organ systems such that there is a high probability of life threatening or imminent deterioration of the patient's condition.  These diagnoses are unrelated to the surgical procedure.    Additionally, time spent in teaching or the performance of separately billable procedures was not counted toward my critical care time.  There is no overlap.  Time included review of vitals, labs, imaging, discussion with consultants (gyn onc attg at bedside/surrogate decision-makers..    52F hx of recurrent cervical ca, now s/p emergent ex lap, washout, rectosigmoid colectomy, diverting colostomy, w. open abdomen now s/p vac and Dermaclose placement    NEUROLOGY:  - Pain control: Oxy PRN, tylenol  - Seroquel 100mg PO BID  - Folic acid and thiamine    RESPIRATORY: small left effusion  - Monitor daily chest x-rays  - Maintain O2 saturation >92%    CARDIOVASCULAR: sinus tachycardia, likely physiologic, r/o VTE  - cont home metoprolol at low dose 7.5q6  -BLENI negative for DVT    GI / NUTRITION: malnutrition risk due to disease-related inflammation, S&S evaluation today  - hopefully can advance to regular diet by mouth later today    RENAL / GENITOURINARY: hx of bilateral nephrostomy tubes  - Indwelling hilliard catheter, bilateral nephrostomy tubes  - IR nephrostomy tube exchange postponed--> reconsult for exchange   - Monitor electrolytes and replete multiple deficiences (potassium, mag, phosphorus)    HEMATOLOGIC: chemotherapy related chronic anemia and thromboctopenia  - transfusion threshold hgb<6.5 and plt<10    INFECTIOUS DISEASE: hx of neutropenic fevers, s/p zarxio tx  - Zosyn and Caspofungin, can d/c today  - Monitor fever curve and WBC on daily CBC w/diff  -Monitor abd yanet output    ENDOCRINOLOGY:  - Monitor fingersticks q6 hours while NPO  -Endocrine consult for hypothyroidism--> recommend repeat TSH, FT4, and TT3 on 6/24    DISPO:  - Full code  - SICU, likely transfer to regular [facility] tmrw

## 2022-06-20 NOTE — PROGRESS NOTE ADULT - ASSESSMENT
A/P 52F hx of recurrent cervical ca, now s/p emergent ex lap, washout, rectosigmoid colectomy, diverting colostomy, w. open abdomen now s/p vac and Dermaclose placement.    -continue vac and Dermaclose, change MWF  -continue to monitor drains  -appreciate care per SICU and gen surg    Plastic Surgery  [id number]

## 2022-06-21 LAB
ANION GAP SERPL CALC-SCNC: 11 MMOL/L — SIGNIFICANT CHANGE UP (ref 7–14)
BUN SERPL-MCNC: 19 MG/DL — SIGNIFICANT CHANGE UP (ref 7–23)
CALCIUM SERPL-MCNC: 7.9 MG/DL — LOW (ref 8.4–10.5)
CHLORIDE SERPL-SCNC: 107 MMOL/L — SIGNIFICANT CHANGE UP (ref 98–107)
CO2 SERPL-SCNC: 24 MMOL/L — SIGNIFICANT CHANGE UP (ref 22–31)
CREAT SERPL-MCNC: 0.84 MG/DL — SIGNIFICANT CHANGE UP (ref 0.5–1.3)
EGFR: 83 ML/MIN/1.73M2 — SIGNIFICANT CHANGE UP
GLUCOSE SERPL-MCNC: 87 MG/DL — SIGNIFICANT CHANGE UP (ref 70–99)
HCT VFR BLD CALC: 21.8 % — LOW (ref 34.5–45)
HGB BLD-MCNC: 7.2 G/DL — LOW (ref 11.5–15.5)
MAGNESIUM SERPL-MCNC: 1.9 MG/DL — SIGNIFICANT CHANGE UP (ref 1.6–2.6)
MCHC RBC-ENTMCNC: 30.4 PG — SIGNIFICANT CHANGE UP (ref 27–34)
MCHC RBC-ENTMCNC: 33 GM/DL — SIGNIFICANT CHANGE UP (ref 32–36)
MCV RBC AUTO: 92 FL — SIGNIFICANT CHANGE UP (ref 80–100)
NRBC # BLD: 0 /100 WBCS — SIGNIFICANT CHANGE UP
NRBC # FLD: 0 K/UL — SIGNIFICANT CHANGE UP
PHOSPHATE SERPL-MCNC: 2.5 MG/DL — SIGNIFICANT CHANGE UP (ref 2.5–4.5)
PLATELET # BLD AUTO: 31 K/UL — LOW (ref 150–400)
POTASSIUM SERPL-MCNC: 3.1 MMOL/L — LOW (ref 3.5–5.3)
POTASSIUM SERPL-SCNC: 3.1 MMOL/L — LOW (ref 3.5–5.3)
RBC # BLD: 2.37 M/UL — LOW (ref 3.8–5.2)
RBC # FLD: 16.4 % — HIGH (ref 10.3–14.5)
SODIUM SERPL-SCNC: 142 MMOL/L — SIGNIFICANT CHANGE UP (ref 135–145)
SURGICAL PATHOLOGY STUDY: SIGNIFICANT CHANGE UP
T4 FREE SERPL DIALY-MCNC: 1.6 NG/DL — SIGNIFICANT CHANGE UP
VIT B1 SERPL-MCNC: 57 NMOL/L — LOW (ref 66.5–200)
WBC # BLD: 5.29 K/UL — SIGNIFICANT CHANGE UP (ref 3.8–10.5)
WBC # FLD AUTO: 5.29 K/UL — SIGNIFICANT CHANGE UP (ref 3.8–10.5)

## 2022-06-21 PROCEDURE — 99232 SBSQ HOSP IP/OBS MODERATE 35: CPT | Mod: 24,GC

## 2022-06-21 RX ORDER — SODIUM,POTASSIUM PHOSPHATES 278-250MG
1 POWDER IN PACKET (EA) ORAL ONCE
Refills: 0 | Status: COMPLETED | OUTPATIENT
Start: 2022-06-21 | End: 2022-06-21

## 2022-06-21 RX ORDER — POTASSIUM CHLORIDE 20 MEQ
40 PACKET (EA) ORAL ONCE
Refills: 0 | Status: COMPLETED | OUTPATIENT
Start: 2022-06-21 | End: 2022-06-21

## 2022-06-21 RX ADMIN — Medication 650 MILLIGRAM(S): at 19:35

## 2022-06-21 RX ADMIN — ENOXAPARIN SODIUM 40 MILLIGRAM(S): 100 INJECTION SUBCUTANEOUS at 11:44

## 2022-06-21 RX ADMIN — Medication 1 TABLET(S): at 11:45

## 2022-06-21 RX ADMIN — PANTOPRAZOLE SODIUM 40 MILLIGRAM(S): 20 TABLET, DELAYED RELEASE ORAL at 11:44

## 2022-06-21 RX ADMIN — Medication 100 MILLIGRAM(S): at 06:55

## 2022-06-21 RX ADMIN — QUETIAPINE FUMARATE 100 MILLIGRAM(S): 200 TABLET, FILM COATED ORAL at 06:55

## 2022-06-21 RX ADMIN — CHLORHEXIDINE GLUCONATE 1 APPLICATION(S): 213 SOLUTION TOPICAL at 11:46

## 2022-06-21 RX ADMIN — PANTOPRAZOLE SODIUM 40 MILLIGRAM(S): 20 TABLET, DELAYED RELEASE ORAL at 22:10

## 2022-06-21 RX ADMIN — Medication 40 MILLIEQUIVALENT(S): at 06:55

## 2022-06-21 RX ADMIN — Medication 650 MILLIGRAM(S): at 20:30

## 2022-06-21 RX ADMIN — Medication 1 TABLET(S): at 06:55

## 2022-06-21 RX ADMIN — Medication 100 MILLIGRAM(S): at 11:45

## 2022-06-21 NOTE — PROGRESS NOTE ADULT - ASSESSMENT
A/P 52F hx of recurrent cervical ca, now s/p emergent ex lap, washout, rectosigmoid colectomy, diverting colostomy, w. open abdomen now s/p vac and Dermaclose placement.  -continue vac and Dermaclose, change MWF  -continue to monitor drains  -appreciate care per SICU and gen surg    Plastic Surgery  [id number]

## 2022-06-21 NOTE — ADVANCED PRACTICE NURSE CONSULT - RECOMMEDATIONS
Supplies recommended:   Stoma size:   Stoma powder- item #[id number]    Liquid barrier film    One piece drainable pouch- item #[id number]    Ostomy team will continue to follow.    Please contact Wound/Ostomy Care Service Line if we can be of further assistance (ext [phone]).

## 2022-06-21 NOTE — CHART NOTE - NSCHARTNOTEFT_GEN_A_CORE
R3 Gyn Onc Chart Note     Patient seen and evaluated at bedside.     24h events:    - Midline wound vac replaced 2/2 colostomy leakage.    - Pt passed swallow eval, advanced to regular diet    - Neg sono for DVT     Subjective:    - Resting, easily aroused. Denies pain. Denies nausea, emesis overnight.     ICU Vital Signs Last 24 Hrs  T(C): 37.2 (2022 00:00), Max: 37.6 (2022 08:00)  T(F): 98.9 (2022 00:00), Max: 99.7 (2022 08:00)  HR: 100 (2022 02:00) (84 - 113)  BP: 145/87 (2022 20:00) (142/92 - 145/87)  BP(mean): 104 (2022 20:00) (104 - 109)  ABP: 130/61 (2022 02:00) (130/61 - 162/76)  ABP(mean): 87 (2022 02:00) (84 - 107)  RR: 23 (2022 02:00) (19 - 28)  SpO2: 100% (2022 02:00) (95% - 100%)      I&O's Detail    2022 07:01  -  2022 07:00  --------------------------------------------------------  IN:    Dexmedetomidine: 65 mL    Enteral Tube Flush: 230 mL    IV PiggyBack: 700 mL    TwoCal HN: 377.5 mL  Total IN: 1372.5 mL    OUT:    Colostomy (mL): 1100 mL    Drain (mL): 120 mL    Drain (mL): 45 mL    Drain (mL): 35 mL    Indwelling Catheter - Urethral (mL): 560 mL    Nephrostomy Tube (mL): 1880 mL    Nephrostomy Tube (mL): 1430 mL    VAC (Vacuum Assisted Closure) System (mL): 50 mL  Total OUT: 5220 mL    Total NET: -3847.5 mL      2022 07:01  -  2022 02:51  --------------------------------------------------------  IN:    IV PiggyBack: 300 mL    Oral Fluid: 415 mL  Total IN: 715 mL    OUT:    Colostomy (mL): 200 mL    Drain (mL): 20 mL    Drain (mL): 16 mL    Drain (mL): 60 mL    Emesis (mL): 80 mL    Indwelling Catheter - Urethral (mL): 181 mL    Nephrostomy Tube (mL): 505 mL    Nephrostomy Tube (mL): 667 mL    VAC (Vacuum Assisted Closure) System (mL): 50 mL    Voided (mL): 300 mL  Total OUT: 2079 mL    Total NET: -1364 mL      Exam:   Gen: A&Ox3, comfortable, NAD   CV: RRR  Pulm: CTA B/L  Abd: Soft, nondistended. Pink ostomy. Brown liquid stool in colostomy. Wound vac in place. +3 viviane drains  : B/L nephrostomy tubes, draining yellow urine. Infante in place, blood tinged urine output   Ext: Venodynes in place bilaterally       Labs:              7.2    5.29  )-----------( 31       (  @ 01:00 )             21.8      @ 01:00    142  |  107  |  19  ----------------------------<  87  3.1   |  24  |  0.84    Ca    7.9       @ 01:00  Phos  2.5      @ 01:00  Mg     1.90      @ 01:00      A/P: 52y  with recurrent cervical CA admitted for management of neutropenic fever, now s/p emergent exploratory laparotomy, abdominal washout, rectosigmoid colectomy, diverting colostomy, bronchoscopy and Abthera placement on 22 (ebl 2000cc, s/p 5UpRBC, 3L albumin, 3U Plts, 3U FFP, 3U Cryo) for findings of increased free air on abdominal xray concerning for bowel perforation. Patient s/p washout and bridging vicryl mesh placement to close fascial gap on . Pt was extubated . Hemodynamically stable and responsive.    - Patient is off Precedex. Seroquel PRN; IV analgesia per SICU  - Continues to be off pressor support; Continue to monitor closely   - Extubated on RA, tachypneic to 28; O2 >95%   - Now s/p NGT. Tolerating regular diet. PO Protonix.   - b/l PCN, 20-50cc/hr, concentrated urine bilaterally. Infante in place. ASHLEY likely 2/2 IV contrast, dehydration, Cr 0.98->0.84 this morning, improved. Continue to monitor UOP. Replete electrolytes PRN  - VTE ppx: Venodynes, HSQ 5K q8h  - Thrombocytopenia likely multifactorial; plts continue to uptrend (29->31)  - H/H stable; pt s/p multiple units of pRBC transfusion  - Afebrile. WBC 5.29, trend WBC. Now s/p Zosyn (-), Caspofungin (-), (s/p Cefepime), s/p Zarixo (-6/10) for neutropenic fever     Dispo: Appreciate excellent SICU care.     Julissa Leach MD, PGY3

## 2022-06-21 NOTE — PROGRESS NOTE ADULT - ASSESSMENT
ASSESSMENT:   52y with a PMHx of HTN, cervical CA (stage III)  on chemo - last session last week - c/b b/l hydronephrosis s/p B/L nephrostomy tube placement, p/w severe sepsis from neutropenic fever. Pt was acutely altered yesterday afternoon without improvement and AXR showed increased free air with concern for perforated viscous. She is now s/p washout, open resection of left colon, with colostomy creation. Recovering in SICU for close hemodynamic monitoring.     PLAN:  NEUROLOGY:  - Pain control: Oxy PRN  - Seroquel 100mg PO BID  - Folic acid and thiamine    RESPIRATORY:  - Extubated, now on room air  - Maintain O2 saturation >92%    CARDIOVASCULAR:  - Off pressors  - Monitor hemodynamics, tachy, restarted home BB  - Keep MAP >65  - Check weight to determine if needs diuresis    GI / NUTRITION:  - Regular diet  - Protonix PO  - s/p abd closure device with plastics on 6/17    RENAL / GENITOURINARY: hx of bilateral nephrostomy tubes  - Indwelling hilliard catheter, bilateral nephrostomy tubes  - Monitor electrolytes and replete PRN  - Continue to monitor strict ins and outs q1 hour  - 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

## 2022-06-21 NOTE — PROGRESS NOTE ADULT - ASSESSMENT
52y  with recurrent cervical CA admitted for management of neutropenic fever, now s/p emergent exploratory laparotomy, abdominal washout, rectosigmoid colectomy, diverting colostomy, bronchoscopy and Abthera placement on 22 (ebl 2000cc, s/p 5UpRBC, 3L albumin, 3U Plts, 3U FFP, 3U Cryo) for findings of increased free air on abdominal xray concerning for bowel perforation. Patient s/p washout and bridging vicryl mesh placement to close fascial gap on . Pt now extubated and hemodynamically stable. Tolerating clears.     - Patient is off precedex. Seroquel PRN; IV analgesia per SICU  - Continues to be off pressor support; Continue to monitor closely   - Extubated on RA,  tachypneic to 24; O2 >95%   - Passed swallow eval, reg diet. Continue IV Protonix. GI following  - b/l PCN, 10-50cc/hour concentrated urine bilaterally. Infante in place. ASHLEY likely 2/2 IV contrast, dehydration, Cr 0.84 O/N. Continue to monitor UOP. Replete electrolytes PRN  - VTE ppx: Venodynes, HSQ 5K q8h  - Thrombocytopenia likely multifactorial; plts continue to uptrend  - H/H stable; pt s/p multiple units of pRBC transfusion  - Afebrile. WBC 5.29 trend WBC; s/p Zosyn (-), s/p Caspofungin and Cefepime, s/p Zarixo (-6/10) for neutropenic fever     Dispo: Appreciate excellent SICU care.     Margarita PGY2

## 2022-06-21 NOTE — PROGRESS NOTE ADULT - SUBJECTIVE AND OBJECTIVE BOX
R2 GYN ONC Progress Note     Interval Events:  - Passed swallow eval, reg diet  - DVT sono negative    Subjective:  Pt reports that she is feeling better and getting stronger".   Patient seen and examined at bedside.  No acute events overnight. No acute complaints. Denies CP, SOB, N/V, fevers, and chills.    Vital Signs Last 24 Hours  T(C): 37.1 (06-21-22 @ 04:00), Max: 37.6 (06-20-22 @ 08:00)  HR: 100 (06-21-22 @ 06:00) (84 - 106)  BP: 145/87 (06-20-22 @ 20:00) (142/92 - 145/87)  RR: 24 (06-21-22 @ 06:00) (19 - 28)  SpO2: 98% (06-21-22 @ 06:00) (95% - 100%)    I&O's Summary    19 Jun 2022 07:01  -  20 Jun 2022 07:00  --------------------------------------------------------  IN: 1372.5 mL / OUT: 5220 mL / NET: -3847.5 mL    20 Jun 2022 07:01  -  21 Jun 2022 06:29  --------------------------------------------------------  IN: 815 mL / OUT: 2484 mL / NET: -1669 mL        Physical Exam:  General: NAD  CV: RRR  Lungs: CTA b/l, good air flow b/l   Abd: Soft, non-distended, wound vac in place ; ostomy pink and well perfused with brownish liquid output in  ostomy bag; +3 Ric drains with seropurulent output  : L and R nephrostomy tube draining clear urine; hilliard in place draining dark la nena urine  Ext: Warm & well perfused.   Ext: No pain, +pitting edema    Labs:                        7.2    5.29  )-----------( 31       ( 21 Jun 2022 01:00 )             21.8   baso x      eos x      imm gran x      lymph x      mono x      poly x                            7.7    5.69  )-----------( 29       ( 20 Jun 2022 00:45 )             23.5   baso x      eos x      imm gran x      lymph x      mono x      poly x                            7.6    5.79  )-----------( 27       ( 19 Jun 2022 03:00 )             23.9   baso x      eos x      imm gran x      lymph x      mono x      poly x                            8.7    6.52  )-----------( 23       ( 18 Jun 2022 07:30 )             26.1   baso x      eos x      imm gran x      lymph x      mono x      poly x          MEDICATIONS  (STANDING):  chlorhexidine 4% Liquid 1 Application(s) Topical daily  enoxaparin Injectable 40 milliGRAM(s) SubCutaneous every 24 hours  metoprolol succinate  milliGRAM(s) Oral daily  multivitamin 1 Tablet(s) Oral daily  pantoprazole    Tablet 40 milliGRAM(s) Oral every 12 hours  potassium chloride    Tablet ER 40 milliEquivalent(s) Oral once  potassium phosphate / sodium phosphate Tablet (K-PHOS No. 2) 1 Tablet(s) Oral once  QUEtiapine 100 milliGRAM(s) Oral every 12 hours  thiamine 100 milliGRAM(s) Oral daily    MEDICATIONS  (PRN):  acetaminophen     Tablet .. 650 milliGRAM(s) Oral every 6 hours PRN Mild Pain (1 - 3)       R2 GYN ONC Progress Note     Interval Events:  - Passed swallow eval, reg diet  - DVT sono negative    Subjective:  Pt reports that she is feeling better and getting stronger.   Patient seen and examined at bedside.  No acute events overnight. No acute complaints. Denies CP, SOB, N/V, fevers, and chills.    Vital Signs Last 24 Hours  T(C): 37.1 (06-21-22 @ 04:00), Max: 37.6 (06-20-22 @ 08:00)  HR: 100 (06-21-22 @ 06:00) (84 - 106)  BP: 145/87 (06-20-22 @ 20:00) (142/92 - 145/87)  RR: 24 (06-21-22 @ 06:00) (19 - 28)  SpO2: 98% (06-21-22 @ 06:00) (95% - 100%)    I&O's Summary    19 Jun 2022 07:01  -  20 Jun 2022 07:00  --------------------------------------------------------  IN: 1372.5 mL / OUT: 5220 mL / NET: -3847.5 mL    20 Jun 2022 07:01  -  21 Jun 2022 06:29  --------------------------------------------------------  IN: 815 mL / OUT: 2484 mL / NET: -1669 mL        Physical Exam:  General: NAD  CV: RRR  Lungs: CTA b/l, good air flow b/l   Abd: Soft, non-distended, wound vac in place ; ostomy pink and well perfused with brownish liquid output in  ostomy bag; +3 Ric drains with seropurulent output  : L and R nephrostomy tube draining clear urine; hilliard in place draining dark la nena urine  Ext: Warm & well perfused.   Ext: No pain, +pitting edema    Labs:                        7.2    5.29  )-----------( 31       ( 21 Jun 2022 01:00 )             21.8   baso x      eos x      imm gran x      lymph x      mono x      poly x                            7.7    5.69  )-----------( 29       ( 20 Jun 2022 00:45 )             23.5   baso x      eos x      imm gran x      lymph x      mono x      poly x                            7.6    5.79  )-----------( 27       ( 19 Jun 2022 03:00 )             23.9   baso x      eos x      imm gran x      lymph x      mono x      poly x                            8.7    6.52  )-----------( 23       ( 18 Jun 2022 07:30 )             26.1   baso x      eos x      imm gran x      lymph x      mono x      poly x          MEDICATIONS  (STANDING):  chlorhexidine 4% Liquid 1 Application(s) Topical daily  enoxaparin Injectable 40 milliGRAM(s) SubCutaneous every 24 hours  metoprolol succinate  milliGRAM(s) Oral daily  multivitamin 1 Tablet(s) Oral daily  pantoprazole    Tablet 40 milliGRAM(s) Oral every 12 hours  potassium chloride    Tablet ER 40 milliEquivalent(s) Oral once  potassium phosphate / sodium phosphate Tablet (K-PHOS No. 2) 1 Tablet(s) Oral once  QUEtiapine 100 milliGRAM(s) Oral every 12 hours  thiamine 100 milliGRAM(s) Oral daily    MEDICATIONS  (PRN):  acetaminophen     Tablet .. 650 milliGRAM(s) Oral every 6 hours PRN Mild Pain (1 - 3)

## 2022-06-21 NOTE — PROGRESS NOTE ADULT - PROBLEM SELECTOR PLAN 1
GYN ONC Fellow Addendum:    Pt seen and examined at bedside. Agree with above. Pain controlled. Tolerating reg diet, -n/v. +colostomy output.     VS reviewed  Labs reviewed    - Continue current pain regimen  - Reg diet  - Anemia: stable  - thrombocytopenia: slowly improving  - PT eval  - Replete lytes prn  - DVT ppx: HSQ  - Appreciate SICU care    GRIS Mckeon, PGY6

## 2022-06-21 NOTE — PROGRESS NOTE ADULT - SUBJECTIVE AND OBJECTIVE BOX
SICU Daily Progress Note  =====================================================  Interval/Overnight Events:    - colostomy leakage underneath to midline wound vac, plastic surgery called replaced midline wound vac.  - Passed swallow eval, reg diet  - If weight significantly increased diurese  - DVT sono negative    HPI:    Gyn Oncology Admission Note    52y  with recurrent cervical CA (per patient stage III) presenting to the ED as transfer from [place] a/w neutropenic fever. Patient reports she had last cycle of chemotherapy 5 days prior.  Patient had VNS care stop by yesterday and was noted to be tachycardic on vital signs and was sent to the ED.  She reports feeling weak + fatigued. + upper abdominal pain that worsens while having chills. She denies any vomiting, chest pain, SOB. Last BM yesterday morning which was normal.     At [facility] she was given Meropenem and Zosyn and was noted to be tachycardic to 140s and febrile to 39.5 and was thus transferred for further management.     MICU was consulted on patient with recommendation to continue with broad spectrum antibiotics. Patient receives Gyn Oncology care at [facility]). She reports that she initially was diagnosed with stage III cervical CA in  s/p VBRT + chemo but never had surgical intervention. She reports now being s/p 5 cycles of chemo, unsure of regimen but next due .    OB/GYN HISTORY:  x 1, sAB x 2. Denies any ovarian cysts, fibroids, STIs   PSHx: b/l PCN  and exchange  , hernia repair  PMHx:  HTN   All: No Known Allergies  Meds: Metoprolol 100mg qd, unsure of other medications  Psych: denies any anxiety or depression   Social: Denies any tobacco, alcohol, or illicit substance use      (2022 03:21)      Allergies: No Known Allergies      MEDICATIONS:   --------------------------------------------------------------------------------------  Neurologic Medications  acetaminophen     Tablet .. 650 milliGRAM(s) Oral every 6 hours PRN Mild Pain (1 - 3)  QUEtiapine 100 milliGRAM(s) Oral every 12 hours    Respiratory Medications    Cardiovascular Medications  metoprolol succinate  milliGRAM(s) Oral daily    Gastrointestinal Medications  multivitamin 1 Tablet(s) Oral daily  pantoprazole    Tablet 40 milliGRAM(s) Oral every 12 hours  thiamine 100 milliGRAM(s) Oral daily    Genitourinary Medications    Hematologic/Oncologic Medications  enoxaparin Injectable 40 milliGRAM(s) SubCutaneous every 24 hours    Antimicrobial/Immunologic Medications    Endocrine/Metabolic Medications    Topical/Other Medications  chlorhexidine 4% Liquid 1 Application(s) Topical daily    --------------------------------------------------------------------------------------    VITAL SIGNS, INS/OUTS (last 24 hours):  --------------------------------------------------------------------------------------  ICU Vital Signs Last 24 Hrs  T(C): 37.2 (2022 00:00), Max: 37.6 (2022 08:00)  T(F): 98.9 (2022 00:00), Max: 99.7 (2022 08:00)  HR: 96 (2022 00:00) (84 - 114)  BP: 145/87 (2022 20:00) (142/92 - 145/87)  BP(mean): 104 (2022 20:00) (104 - 109)  ABP: 139/61 (2022 00:00) (130/59 - 162/76)  ABP(mean): 91 (2022 00:00) (84 - 107)  RR: 19 (2022 00:00) (19 - 28)  SpO2: 97% (2022 00:00) (95% - 100%)    --------------------------------------------------------------------------------------    EXAM    NEURO: NAD, resting comforably  HEENT: NC/AT  RESPIRATORY: nonlabored respirations, normal CW expansion  CARDIO: RRR, S1S2  ABDOMEN: soft, nontender, nondistended, NGT w/ TF, colostomy +/+, yanet drain x2 seropurulent, nephrostomy tube x2 with clear urine outpaut, hilliard with blood tinged urine output.  EXTREMITIES: +pitting edema, GROSS    METABOLIC/FLUIDS/ELECTROLYTES  multivitamin 1 Tablet(s) Oral daily  thiamine 100 milliGRAM(s) Oral daily      HEMATOLOGIC  [x] VTE Prophylaxis: enoxaparin Injectable 40 milliGRAM(s) SubCutaneous every 24 hours    Transfusions:	[] PRBC	[] Platelets		[] FFP	[] Cryoprecipitate    INFECTIOUS DISEASE  Antimicrobials/Immunologic Medications:    Day #      of     ***    Tubes/Lines/Drains  ***  [x] Peripheral IV  [] Central Venous Line     	[] R	[] L	[] IJ	[] Fem	[] SC	Date Placed:   [] Arterial Line		[] R	[] L	[] Fem	[] Rad	[] Ax	Date Placed:   [] PICC		[] Midline		[] Mediport  [] Urinary Catheter		Date Placed:   [x] Necessity of urinary, arterial, and venous catheters discussed    LABS  --------------------------------------------------------------------------------------  ((Insert SICU Labs here))***  --------------------------------------------------------------------------------------    OTHER LABORATORY:     IMAGING STUDIES:   CXR:         PLAN:  NEUROLOGY:  - Pain control: Oxy PRN  - Seroquel 100mg PO BID  - Folic acid and thiamine  - Passed swallow eval    RESPIRATORY:  - Extubated, now on room air  - Monitor daily chest x-rays and ABG  - Maintain O2 saturation >92%    CARDIOVASCULAR:  - Off pressors  - Monitor hemodynamics, tachy, restarted home BB  - Keep MAP >65  - Check weight to determine if needs diuresis    GI / NUTRITION:  - Regular diet  - Protonix PO  - s/p abd closure device with plastics on   - Consult ostomy nurse    RENAL / GENITOURINARY: hx of bilateral nephrostomy tubes  - Indwelling hilliard catheter, bilateral nephrostomy tubes  - Monitor electrolytes and replete PRN  - Continue to monitor strict ins and outs q1 hour  - 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    HEMATOLOGIC:  - VTE ppx: SQH q8hr  - Persistent tachycardia, screening DVT US  - Transfuse prbc and plt as needed  - Monitor H/H and platelet count on CBC daily  - Heme consulted for low platelet count    INFECTIOUS DISEASE: hx of neutropenic fevers, s/p zarxio tx  - Has been afebrile without leukocytosis, dx abx/antifungals  - Monitor fever curve and WBC on daily CBC w/diff  - Febrile , no leukocytosis, hemodynamically stable    ENDOCRINOLOGY:  - Monitor fingersticks q6 hours while NPO  -Endocrine consult for hypothyroidism--> recommend repeat TSH, FT4, and TT3 on     DISPO:  - Full code  - SICU    SICU Daily Progress Note  =====================================================  Interval/Overnight Events:    - tolerating reg diet   - DVT sono negative yesterday   - listed for floor     HPI:    Gyn Oncology Admission Note    52y  with recurrent cervical CA (per patient stage III) presenting to the ED as transfer from [place] a/w neutropenic fever. Patient reports she had last cycle of chemotherapy 5 days prior.  Patient had VNS care stop by yesterday and was noted to be tachycardic on vital signs and was sent to the ED.  She reports feeling weak + fatigued. + upper abdominal pain that worsens while having chills. She denies any vomiting, chest pain, SOB. Last BM yesterday morning which was normal.     At [facility] she was given Meropenem and Zosyn and was noted to be tachycardic to 140s and febrile to 39.5 and was thus transferred for further management.     MICU was consulted on patient with recommendation to continue with broad spectrum antibiotics. Patient receives Gyn Oncology care at [facility]). She reports that she initially was diagnosed with stage III cervical CA in  s/p VBRT + chemo but never had surgical intervention. She reports now being s/p 5 cycles of chemo, unsure of regimen but next due .    OB/GYN HISTORY:  x 1, sAB x 2. Denies any ovarian cysts, fibroids, STIs   PSHx: b/l PCN  and exchange  , hernia repair  PMHx:  HTN   All: No Known Allergies  Meds: Metoprolol 100mg qd, unsure of other medications  Psych: denies any anxiety or depression   Social: Denies any tobacco, alcohol, or illicit substance use      (2022 03:21)      Allergies: No Known Allergies      MEDICATIONS:   --------------------------------------------------------------------------------------  Neurologic Medications  acetaminophen     Tablet .. 650 milliGRAM(s) Oral every 6 hours PRN Mild Pain (1 - 3)  QUEtiapine 100 milliGRAM(s) Oral every 12 hours      Cardiovascular Medications  metoprolol succinate  milliGRAM(s) Oral daily    Gastrointestinal Medications  multivitamin 1 Tablet(s) Oral daily  pantoprazole    Tablet 40 milliGRAM(s) Oral every 12 hours  thiamine 100 milliGRAM(s) Oral daily      Hematologic/Oncologic Medications  enoxaparin Injectable 40 milliGRAM(s) SubCutaneous every 24 hours      Topical/Other Medications  chlorhexidine 4% Liquid 1 Application(s) Topical daily    --------------------------------------------------------------------------------------    VITAL SIGNS, INS/OUTS (last 24 hours):  ICU Vital Signs Last 24 Hrs  T(C): 37.3 (2022 06:45), Max: 37.6 (2022 16:00)  T(F): 99.1 (2022 06:45), Max: 99.6 (2022 16:00)  HR: 90 (2022 08:00) (84 - 102)  BP: 145/87 (2022 20:00) (145/87 - 145/87)  BP(mean): 104 (2022 20:00) (104 - 104)  ABP: 150/65 (2022 08:00) (130/61 - 162/76)  ABP(mean): 97 (2022 08:00) (87 - 107)  RR: 25 (2022 08:00) (19 - 28)  SpO2: 97% (2022 08:00) (95% - 100%)      I&O's Detail    2022 07:01  -  2022 07:00  --------------------------------------------------------  IN:    IV PiggyBack: 300 mL    Oral Fluid: 515 mL  Total IN: 815 mL    OUT:    Colostomy (mL): 300 mL    Drain (mL): 74 mL    Drain (mL): 23 mL    Drain (mL): 19 mL    Emesis (mL): 80 mL    Indwelling Catheter - Urethral (mL): 351 mL    Nephrostomy Tube (mL): 812 mL    Nephrostomy Tube (mL): 673 mL    VAC (Vacuum Assisted Closure) System (mL): 50 mL    Voided (mL): 300 mL  Total OUT: 2682 mL    Total NET: -1867 mL        --------------------------------------------------------------------------------------    EXAM    NEURO: NAD, resting comforably  HEENT: NC/AT  RESPIRATORY: nonlabored respirations, normal CW expansion  CARDIO: stable tachycardia, S1S2  ABDOMEN: soft, nontender, nondistended, colostomy +/+, yanet drain x2 seropurulent, nephrostomy tube x2 with clear urine output, hilliard in place   EXTREMITIES: +pitting edema.     METABOLIC/FLUIDS/ELECTROLYTES  multivitamin 1 Tablet(s) Oral daily  thiamine 100 milliGRAM(s) Oral daily      HEMATOLOGIC  [x] VTE Prophylaxis: enoxaparin Injectable 40 milliGRAM(s) SubCutaneous every 24 hours      Tubes/Lines/Drains   [x] Peripheral IV  [] Central Venous Line     	[] R	[] L	[] IJ	[] Fem	[] SC	Date Placed:   [] Arterial Line		[] R	[] L	[] Fem	[] Rad	[] Ax	Date Placed:   [] PICC		[] Midline		[] Mediport  [] Urinary Catheter		Date Placed:   [x] Necessity of urinary, arterial, and venous catheters discussed    LABS                        7.2    5.29  )-----------(        ( 2022 01:00 )             21.8   06-21    142  |  107  |  19  ----------------------------<  87  3.1<L>   |  24  |  0.84    Ca    7.9<L>      2022 01:00  Phos  2.5     06-21  Mg     1.90     -      --------------------------------------------------------------------------------------

## 2022-06-21 NOTE — PROGRESS NOTE ADULT - ATTENDING COMMENTS
stable anemia/thrombocytopenia  hypokalemia, being repleted  PT/rehab screen off antibiotics  stable anemia/thrombocytopenia  hypokalemia, being repleted  d/c anti-psychotic now that she is extubated  PT/rehab screen

## 2022-06-21 NOTE — PROGRESS NOTE ADULT - SUBJECTIVE AND OBJECTIVE BOX
Plastic Surgery Progress Note (pg [facility]: [id number], [facility]: [phone])    SUBJECTIVE  The patient was seen and examined. No acute events overnight.    OBJECTIVE  ___________________________________________________  VITAL SIGNS / I&O's   Vital Signs Last 24 Hrs  T(C): 37.2 (21 Jun 2022 16:00), Max: 37.4 (20 Jun 2022 20:00)  T(F): 99 (21 Jun 2022 16:00), Max: 99.3 (20 Jun 2022 20:00)  HR: 95 (21 Jun 2022 16:00) (84 - 111)  BP: 143/82 (21 Jun 2022 16:00) (135/107 - 160/89)  BP(mean): 99 (21 Jun 2022 16:00) (99 - 116)  RR: 26 (21 Jun 2022 16:00) (19 - 28)  SpO2: 99% (21 Jun 2022 16:00) (95% - 100%)      20 Jun 2022 07:01  -  21 Jun 2022 07:00  --------------------------------------------------------  IN:    IV PiggyBack: 300 mL    Oral Fluid: 515 mL  Total IN: 815 mL    OUT:    Colostomy (mL): 300 mL    Drain (mL): 74 mL    Drain (mL): 23 mL    Drain (mL): 19 mL    Emesis (mL): 80 mL    Indwelling Catheter - Urethral (mL): 351 mL    Nephrostomy Tube (mL): 673 mL    Nephrostomy Tube (mL): 812 mL    VAC (Vacuum Assisted Closure) System (mL): 50 mL    Voided (mL): 300 mL  Total OUT: 2682 mL    Total NET: -1867 mL      21 Jun 2022 07:01  -  21 Jun 2022 18:00  --------------------------------------------------------  IN:  Total IN: 0 mL    OUT:    Colostomy (mL): 75 mL    Drain (mL): 12 mL    Drain (mL): 10 mL    Drain (mL): 15 mL    Indwelling Catheter - Urethral (mL): 115 mL    Nephrostomy Tube (mL): 350 mL    Nephrostomy Tube (mL): 325 mL  Total OUT: 902 mL    Total NET: -902 mL        ___________________________________________________  PHYSICAL EXAM  General: NAD  Pulm: comfortable  Neuro: alert  Abdomen: soft, no collections, LLQ drain murky, vac in place holding suction, Dermaclose in place, remaining drains with s/s output    ___________________________________________________  LABS                        7.2    5.29  )-----------( 31       ( 21 Jun 2022 01:00 )             21.8     21 Jun 2022 01:00    142    |  107    |  19     ----------------------------<  87     3.1     |  24     |  0.84     Ca    7.9        21 Jun 2022 01:00  Phos  2.5       21 Jun 2022 01:00  Mg     1.90      21 Jun 2022 01:00  ___________________________________________________  MEDICATIONS  (STANDING):  chlorhexidine 4% Liquid 1 Application(s) Topical daily  enoxaparin Injectable 40 milliGRAM(s) SubCutaneous every 24 hours  metoprolol succinate  milliGRAM(s) Oral daily  multivitamin 1 Tablet(s) Oral daily  pantoprazole    Tablet 40 milliGRAM(s) Oral every 12 hours  QUEtiapine 100 milliGRAM(s) Oral every 12 hours  thiamine 100 milliGRAM(s) Oral daily    MEDICATIONS  (PRN):  acetaminophen     Tablet .. 650 milliGRAM(s) Oral every 6 hours PRN Mild Pain (1 - 3)

## 2022-06-21 NOTE — ADVANCED PRACTICE NURSE CONSULT - ASSESSMENT
Patient AXOx4, weak, attentive to educational session. Difficultly participating due to weakness. Overview of surgical procedure and need of ostomy reinforced. Terms defined utilized: Ostomy, colostomy, wafer, pouch, stoma. Frequency of pouch change and emptying discussed, teach back method utilized. Patient able to show back how to open, empty and close pouch. Verbal instruction provided during pouch change. Questions answered.     RLQ colostomy- 2" See A&I flowsheet for full stoma assessment details.

## 2022-06-21 NOTE — PROGRESS NOTE ADULT - SUBJECTIVE AND OBJECTIVE BOX
SUBJECTIVE:  Pain well-controlled. Denies nausea. Vomited after first time having clears yesterday, but then tolerated a reg diet.    OBJECTIVE:  Vital Signs Last 24 Hrs  T(C): 36.7 (21 Jun 2022 08:00), Max: 37.6 (20 Jun 2022 16:00)  T(F): 98 (21 Jun 2022 08:00), Max: 99.6 (20 Jun 2022 16:00)  HR: 92 (21 Jun 2022 09:00) (84 - 102)  BP: 147/91 (21 Jun 2022 09:00) (145/87 - 147/91)  BP(mean): 109 (21 Jun 2022 09:00) (104 - 109)  RR: 28 (21 Jun 2022 09:00) (19 - 28)  SpO2: 99% (21 Jun 2022 09:00) (95% - 100%)      06-20-22 @ 07:01  -  06-21-22 @ 07:00  --------------------------------------------------------  IN: 815 mL / OUT: 2682 mL / NET: -1867 mL    06-21-22 @ 07:01  -  06-21-22 @ 11:19  --------------------------------------------------------  IN: 0 mL / OUT: 370 mL / NET: -370 mL        Physical Examination:  GEN: NAD, resting quietly  PULM: symmetric chest rise bilaterally, no increased WOB  ABD: soft, appropriately tender, ostomy pink with gas and brown stool in bag, midline with dermaclose, right SARAH purulent/serosang, left JPs serosang x2  EXTR: no LE erythema, moving all extremities      LABS:                        7.2    5.29  )-----------( 31       ( 21 Jun 2022 01:00 )             21.8       06-21    142  |  107  |  19  ----------------------------<  87  3.1<L>   |  24  |  0.84    Ca    7.9<L>      21 Jun 2022 01:00  Phos  2.5     06-21  Mg     1.90     06-21

## 2022-06-21 NOTE — PROGRESS NOTE ADULT - ASSESSMENT
52y with a PMHx of HTN, cervical CA (stage III)  on chemo - last session last week - c/b b/l hydronephrosis s/p B/L nephrostomy tube placement, p/w severe sepsis from neutropenic fever. Pt was acutely altered yesterday afternoon without improvement and AXR showed increased free air with concern for perforated viscous. She is now s/p multiple washouts, open resection of left colon, with transverse colostomy creation now s/p fascial closure with bridging vicryl mesh and skin closure with dermaclose by plastic surgery. Extubated 6/19. Recovering in SICU for close hemodynamic monitoring.     Plan:  - Monitor ostomy function and SARAH outputs  - Appreciate SICU care    VELIA Carrera, PGY2  D Team Surgery   [id number]

## 2022-06-22 LAB
ANION GAP SERPL CALC-SCNC: 13 MMOL/L — SIGNIFICANT CHANGE UP (ref 7–14)
BUN SERPL-MCNC: 16 MG/DL — SIGNIFICANT CHANGE UP (ref 7–23)
CALCIUM SERPL-MCNC: 8.2 MG/DL — LOW (ref 8.4–10.5)
CHLORIDE SERPL-SCNC: 103 MMOL/L — SIGNIFICANT CHANGE UP (ref 98–107)
CO2 SERPL-SCNC: 24 MMOL/L — SIGNIFICANT CHANGE UP (ref 22–31)
CREAT SERPL-MCNC: 0.76 MG/DL — SIGNIFICANT CHANGE UP (ref 0.5–1.3)
EGFR: 93 ML/MIN/1.73M2 — SIGNIFICANT CHANGE UP
GLUCOSE SERPL-MCNC: 92 MG/DL — SIGNIFICANT CHANGE UP (ref 70–99)
HCT VFR BLD CALC: 28.3 % — LOW (ref 34.5–45)
HGB BLD-MCNC: 8.7 G/DL — LOW (ref 11.5–15.5)
MAGNESIUM SERPL-MCNC: 1.6 MG/DL — SIGNIFICANT CHANGE UP (ref 1.6–2.6)
MCHC RBC-ENTMCNC: 29.4 PG — SIGNIFICANT CHANGE UP (ref 27–34)
MCHC RBC-ENTMCNC: 30.7 GM/DL — LOW (ref 32–36)
MCV RBC AUTO: 95.6 FL — SIGNIFICANT CHANGE UP (ref 80–100)
NRBC # BLD: 0 /100 WBCS — SIGNIFICANT CHANGE UP
NRBC # FLD: 0 K/UL — SIGNIFICANT CHANGE UP
PHOSPHATE SERPL-MCNC: 2.5 MG/DL — SIGNIFICANT CHANGE UP (ref 2.5–4.5)
PLATELET # BLD AUTO: 39 K/UL — LOW (ref 150–400)
POTASSIUM SERPL-MCNC: 2.7 MMOL/L — CRITICAL LOW (ref 3.5–5.3)
POTASSIUM SERPL-SCNC: 2.7 MMOL/L — CRITICAL LOW (ref 3.5–5.3)
RBC # BLD: 2.96 M/UL — LOW (ref 3.8–5.2)
RBC # FLD: 16.2 % — HIGH (ref 10.3–14.5)
SODIUM SERPL-SCNC: 140 MMOL/L — SIGNIFICANT CHANGE UP (ref 135–145)
WBC # BLD: 4.92 K/UL — SIGNIFICANT CHANGE UP (ref 3.8–10.5)
WBC # FLD AUTO: 4.92 K/UL — SIGNIFICANT CHANGE UP (ref 3.8–10.5)

## 2022-06-22 RX ORDER — OXYCODONE HYDROCHLORIDE 5 MG/1
5 TABLET ORAL ONCE
Refills: 0 | Status: DISCONTINUED | OUTPATIENT
Start: 2022-06-22 | End: 2022-06-22

## 2022-06-22 RX ORDER — MAGNESIUM OXIDE 400 MG ORAL TABLET 241.3 MG
400 TABLET ORAL ONCE
Refills: 0 | Status: COMPLETED | OUTPATIENT
Start: 2022-06-22 | End: 2022-06-22

## 2022-06-22 RX ORDER — HYDROMORPHONE HYDROCHLORIDE 2 MG/ML
0.5 INJECTION INTRAMUSCULAR; INTRAVENOUS; SUBCUTANEOUS ONCE
Refills: 0 | Status: DISCONTINUED | OUTPATIENT
Start: 2022-06-22 | End: 2022-06-23

## 2022-06-22 RX ORDER — MAGNESIUM SULFATE 500 MG/ML
2 VIAL (ML) INJECTION ONCE
Refills: 0 | Status: DISCONTINUED | OUTPATIENT
Start: 2022-06-22 | End: 2022-06-22

## 2022-06-22 RX ORDER — POTASSIUM CHLORIDE 20 MEQ
40 PACKET (EA) ORAL EVERY 4 HOURS
Refills: 0 | Status: COMPLETED | OUTPATIENT
Start: 2022-06-22 | End: 2022-06-22

## 2022-06-22 RX ADMIN — Medication 650 MILLIGRAM(S): at 02:07

## 2022-06-22 RX ADMIN — Medication 1 TABLET(S): at 11:36

## 2022-06-22 RX ADMIN — ENOXAPARIN SODIUM 40 MILLIGRAM(S): 100 INJECTION SUBCUTANEOUS at 11:37

## 2022-06-22 RX ADMIN — Medication 40 MILLIEQUIVALENT(S): at 08:45

## 2022-06-22 RX ADMIN — Medication 650 MILLIGRAM(S): at 03:01

## 2022-06-22 RX ADMIN — PANTOPRAZOLE SODIUM 40 MILLIGRAM(S): 20 TABLET, DELAYED RELEASE ORAL at 21:43

## 2022-06-22 RX ADMIN — MAGNESIUM OXIDE 400 MG ORAL TABLET 400 MILLIGRAM(S): 241.3 TABLET ORAL at 08:19

## 2022-06-22 RX ADMIN — Medication 40 MILLIEQUIVALENT(S): at 15:27

## 2022-06-22 RX ADMIN — PANTOPRAZOLE SODIUM 40 MILLIGRAM(S): 20 TABLET, DELAYED RELEASE ORAL at 10:09

## 2022-06-22 RX ADMIN — OXYCODONE HYDROCHLORIDE 5 MILLIGRAM(S): 5 TABLET ORAL at 23:26

## 2022-06-22 RX ADMIN — Medication 650 MILLIGRAM(S): at 22:40

## 2022-06-22 RX ADMIN — Medication 40 MILLIEQUIVALENT(S): at 11:36

## 2022-06-22 RX ADMIN — Medication 100 MILLIGRAM(S): at 05:07

## 2022-06-22 RX ADMIN — Medication 650 MILLIGRAM(S): at 23:36

## 2022-06-22 RX ADMIN — Medication 650 MILLIGRAM(S): at 10:08

## 2022-06-22 RX ADMIN — Medication 650 MILLIGRAM(S): at 10:38

## 2022-06-22 NOTE — PROGRESS NOTE ADULT - SUBJECTIVE AND OBJECTIVE BOX
SUBJECTIVE:  Pain well-controlled. Feels much better out of the ICU.    OBJECTIVE:  Vital Signs Last 24 Hrs  T(C): 36.3 (22 Jun 2022 10:00), Max: 37.4 (21 Jun 2022 22:00)  T(F): 97.4 (22 Jun 2022 10:00), Max: 99.3 (21 Jun 2022 22:00)  HR: 94 (22 Jun 2022 10:30) (87 - 100)  BP: 152/88 (22 Jun 2022 10:30) (119/74 - 160/89)  BP(mean): 104 (21 Jun 2022 19:30) (99 - 108)  RR: 18 (22 Jun 2022 10:00) (18 - 27)  SpO2: 94% (22 Jun 2022 10:00) (94% - 100%)      06-21-22 @ 07:01  -  06-22-22 @ 07:00  --------------------------------------------------------  IN: 50 mL / OUT: 2689.5 mL / NET: -2639.5 mL    06-22-22 @ 07:01  -  06-22-22 @ 13:22  --------------------------------------------------------  IN: 120 mL / OUT: 250 mL / NET: -130 mL        Physical Examination:  GEN: NAD, resting quietly  PULM: symmetric chest rise bilaterally, no increased WOB  ABD: soft, appropriately tender, ostomy pink with gas and brown stool in bag, midline with dermaclose, right SARAH purulent/serosang, left JPs serosang x2  EXTR: no LE erythema, moving all extremities      LABS:                        8.7    4.92  )-----------( 39       ( 22 Jun 2022 06:43 )             28.3       06-22    140  |  103  |  16  ----------------------------<  92  2.7<LL>   |  24  |  0.76    Ca    8.2<L>      22 Jun 2022 06:43  Phos  2.5     06-22  Mg     1.60     06-22

## 2022-06-22 NOTE — PROGRESS NOTE ADULT - ASSESSMENT
52y with a PMHx of HTN, cervical CA (stage III)  on chemo - last session last week - c/b b/l hydronephrosis s/p B/L nephrostomy tube placement, p/w severe sepsis from neutropenic fever. Pt was acutely altered yesterday afternoon without improvement and AXR showed increased free air with concern for perforated viscous. She is now s/p multiple washouts, open resection of left colon, with transverse colostomy creation now s/p fascial closure with bridging vicryl mesh and skin closure with dermaclose by plastic surgery. Extubated 6/19. Recovering in SICU for close hemodynamic monitoring.     Plan:  - Monitor ostomy function and SARAH outputs  - Appreciate SICU care    VELIA Carrera, PGY2  D Team Surgery   [id number]

## 2022-06-22 NOTE — PROGRESS NOTE ADULT - PROBLEM SELECTOR PLAN 1
GYN ONC Fellow Addendum:    Pt seen and examined at bedside. Agree with above. Pain controlled. Tolerating reg diet, -n/v. +gas and stool in colostomy.   VS reviewed  Labs reviewed  - Hypokalemia: replete  - Thrombocytopenia: slowly improving  - d/c hilliard  - Continue work with PT  - Appreciate Plastics recs  - Appreciate Surg recs  - Encourage ambulation and IS use  - Replete lytes prn  - DVT ppx: HSQ, SCDs  - Dispo: continue inpatient management    GRIS Mckeon, PGY6

## 2022-06-22 NOTE — PROGRESS NOTE ADULT - ASSESSMENT
A/P 52F hx of recurrent cervical ca, now s/p emergent ex lap, washout, rectosigmoid colectomy, diverting colostomy, w. open abdomen now s/p vac and Dermaclose placement.    -continue vac, change MWF  -continue to monitor drains  -appreciate care per primary    Plastic Surgery  [id number]

## 2022-06-22 NOTE — PROGRESS NOTE ADULT - SUBJECTIVE AND OBJECTIVE BOX
GYN ONC Progress Note     Interval Events:  - Transferred from SICU to floor    Subjective:  Patient seen and examined at bedside.  No acute events overnight.  Pt states she is more comfortable now that she is not longer in the ICU.  Overnight tolerated dinner w/o issues.  No pain at this time.  No acute complaints. Denies CP, SOB, N/V, fevers, and chills.    Vital Signs Last 24 Hrs  T(C): 37.2 (22 Jun 2022 06:00), Max: 37.4 (21 Jun 2022 22:00)  T(F): 98.9 (22 Jun 2022 06:00), Max: 99.3 (21 Jun 2022 22:00)  HR: 100 (22 Jun 2022 06:00) (87 - 111)  BP: 153/88 (22 Jun 2022 06:00) (119/74 - 160/89)  BP(mean): 104 (21 Jun 2022 19:30) (99 - 116)  RR: 20 (22 Jun 2022 06:00) (19 - 28)  SpO2: 100% (22 Jun 2022 06:00) (96% - 100%)    I&O's Summary    20 Jun 2022 07:01  -  21 Jun 2022 07:00  --------------------------------------------------------  IN: 815 mL / OUT: 2682 mL / NET: -1867 mL    21 Jun 2022 07:01  -  22 Jun 2022 06:46  --------------------------------------------------------  IN: 50 mL / OUT: 2689.5 mL / NET: -2639.5 mL    Physical Exam:  General: NAD  CV: RRR  Lungs: CTA b/l, good air flow b/l   Abd: Soft, non-distended, wound vac in place ; ostomy pink and well perfused with brownish liquid output in  ostomy bag; +3 Ric drains with seropurulent output in LLQ  : L and R nephrostomy tube draining clear urine; hilliard in place draining dark la nena urine  Ext: Warm & well perfused.   Ext: No pain, +pitting edema    Labs:                        7.2    5.29  )-----------( 31       ( 06-21 @ 01:00 )             21.8     06-21 @ 01:00    142  |  107  |  19  ----------------------------<  87  3.1   |  24  |  0.84    Ca    7.9      06-21 @ 01:00  Phos  2.5     06-21 @ 01:00  Mg     1.90     06-21 @ 01:00    MEDICATIONS  (STANDING):  enoxaparin Injectable 40 milliGRAM(s) SubCutaneous every 24 hours  metoprolol succinate  milliGRAM(s) Oral daily  multivitamin 1 Tablet(s) Oral daily  pantoprazole    Tablet 40 milliGRAM(s) Oral every 12 hours    MEDICATIONS  (PRN):  acetaminophen     Tablet .. 650 milliGRAM(s) Oral every 6 hours PRN Mild Pain (1 - 3)

## 2022-06-22 NOTE — PROGRESS NOTE ADULT - SUBJECTIVE AND OBJECTIVE BOX
Plastic Surgery Progress Note (pg [facility]: [id number], [facility]: [phone])    SUBJECTIVE  Pt comfortable in bed. Pain well controlled, no complaints.    OBJECTIVE    PHYSICAL EXAM    General: NAD  Pulm: comfortable  Neuro: alert  Abdomen: soft, no collections, drains with murky output, vac in place holding suction, changed at bedside and wound base healthy    Vital Signs Last 24 Hrs  T(C): 36.7 (22 Jun 2022 14:00), Max: 37.4 (21 Jun 2022 22:00)  T(F): 98 (22 Jun 2022 14:00), Max: 99.3 (21 Jun 2022 22:00)  HR: 98 (22 Jun 2022 14:00) (87 - 100)  BP: 150/88 (22 Jun 2022 14:00) (119/74 - 154/96)  BP(mean): 104 (21 Jun 2022 19:30) (99 - 104)  RR: 18 (22 Jun 2022 14:00) (18 - 26)  SpO2: 95% (22 Jun 2022 14:00) (94% - 100%)    I&O's Detail    21 Jun 2022 07:01  -  22 Jun 2022 07:00  --------------------------------------------------------  IN:    Oral Fluid: 50 mL  Total IN: 50 mL    OUT:    Colostomy (mL): 350 mL    Drain (mL): 40 mL    Drain (mL): 22 mL    Drain (mL): 27.5 mL    Indwelling Catheter - Urethral (mL): 775 mL    Nephrostomy Tube (mL): 745 mL    Nephrostomy Tube (mL): 730 mL    VAC (Vacuum Assisted Closure) System (mL): 0 mL  Total OUT: 2689.5 mL    Total NET: -2639.5 mL      22 Jun 2022 07:01  -  22 Jun 2022 14:34  --------------------------------------------------------  IN:    Oral Fluid: 360 mL  Total IN: 360 mL    OUT:    Drain (mL): 10 mL    Drain (mL): 15 mL    Drain (mL): 15 mL    Indwelling Catheter - Urethral (mL): 75 mL    Nephrostomy Tube (mL): 200 mL    Nephrostomy Tube (mL): 125 mL    VAC (Vacuum Assisted Closure) System (mL): 0 mL  Total OUT: 440 mL    Total NET: -80 mL      MEDICATIONS  (STANDING):  enoxaparin Injectable 40 milliGRAM(s) SubCutaneous every 24 hours  HYDROmorphone  Injectable 0.5 milliGRAM(s) IV Push once  metoprolol succinate  milliGRAM(s) Oral daily  multivitamin 1 Tablet(s) Oral daily  pantoprazole    Tablet 40 milliGRAM(s) Oral every 12 hours  potassium chloride    Tablet ER 40 milliEquivalent(s) Oral every 4 hours    MEDICATIONS  (PRN):  acetaminophen     Tablet .. 650 milliGRAM(s) Oral every 6 hours PRN Mild Pain (1 - 3)      LABS:                        8.7    4.92  )-----------( 39       ( 22 Jun 2022 06:43 )             28.3     06-22    140  |  103  |  16  ----------------------------<  92  2.7<LL>   |  24  |  0.76    Ca    8.2<L>      22 Jun 2022 06:43  Phos  2.5     06-22  Mg     1.60     06-22

## 2022-06-22 NOTE — PROGRESS NOTE ADULT - ASSESSMENT
52y  with recurrent cervical CA admitted for management of neutropenic fever, now s/p emergent exploratory laparotomy, abdominal washout, rectosigmoid colectomy, diverting colostomy, bronchoscopy and Abthera placement on 22 (ebl 2000cc, s/p 5UpRBC, 3L albumin, 3U Plts, 3U FFP, 3U Cryo) for findings of increased free air on abdominal xray concerning for bowel perforation. Patient s/p washout and bridging vicryl mesh placement to close fascial gap on  w/ skin closure device place don . Pt now extubated and hemodynamically stable, recently transferred from ICU to floor overnight     Neuro: Pain well controlled, tylenol PRN  CV: Hemodynamically stable, H/H stable w/ slowly rising platelets   Pulm: O2 sat WNL on RA, Increase ambulation, encourage incentive spirometry use  GI: Tolerating regular diet s/p successful swallow evaluation.  Continue w/ protonix BID  : b/l nephrostomy tubes and hilliard in place w/ adequate urine output.  Prior ASHLEY resolving w/ continuing downtrending Cr  Heme: DVT ppx: Lovenox, SCDs while in bed.  Thrombocytopenia likely multifactorial, currently improving spontaneously  ID: Afebrile, No signs of infection at this time. s/p Zosyn (-), s/p Caspofungin and Cefepime, s/p Zarixo (-6/10) for neutropenic fever

## 2022-06-22 NOTE — PROGRESS NOTE ADULT - ATTENDING COMMENTS
Patient seen and examined at bedside, agree with above gyn resident note, including assessment and plan.

## 2022-06-22 NOTE — ADVANCED PRACTICE NURSE CONSULT - ASSESSMENT
Patient stating pouch leaked overnight and was changed, Current pouch intact with pasty stool, no leakage noted. Midline abdomen with NPWT, seal intact.

## 2022-06-23 LAB
ANION GAP SERPL CALC-SCNC: 13 MMOL/L — SIGNIFICANT CHANGE UP (ref 7–14)
BASOPHILS # BLD AUTO: 0 K/UL — SIGNIFICANT CHANGE UP (ref 0–0.2)
BASOPHILS NFR BLD AUTO: 0 % — SIGNIFICANT CHANGE UP (ref 0–2)
BLD GP AB SCN SERPL QL: NEGATIVE — SIGNIFICANT CHANGE UP
BUN SERPL-MCNC: 14 MG/DL — SIGNIFICANT CHANGE UP (ref 7–23)
CALCIUM SERPL-MCNC: 7.9 MG/DL — LOW (ref 8.4–10.5)
CHLORIDE SERPL-SCNC: 105 MMOL/L — SIGNIFICANT CHANGE UP (ref 98–107)
CO2 SERPL-SCNC: 24 MMOL/L — SIGNIFICANT CHANGE UP (ref 22–31)
CREAT SERPL-MCNC: 0.73 MG/DL — SIGNIFICANT CHANGE UP (ref 0.5–1.3)
EGFR: 98 ML/MIN/1.73M2 — SIGNIFICANT CHANGE UP
EOSINOPHIL # BLD AUTO: 0.04 K/UL — SIGNIFICANT CHANGE UP (ref 0–0.5)
EOSINOPHIL NFR BLD AUTO: 0.9 % — SIGNIFICANT CHANGE UP (ref 0–6)
GLUCOSE SERPL-MCNC: 97 MG/DL — SIGNIFICANT CHANGE UP (ref 70–99)
HCT VFR BLD CALC: 26.6 % — LOW (ref 34.5–45)
HGB BLD-MCNC: 8.3 G/DL — LOW (ref 11.5–15.5)
IANC: 3.26 K/UL — SIGNIFICANT CHANGE UP (ref 1.8–7.4)
LYMPHOCYTES # BLD AUTO: 0.6 K/UL — LOW (ref 1–3.3)
LYMPHOCYTES # BLD AUTO: 12.3 % — LOW (ref 13–44)
MAGNESIUM SERPL-MCNC: 1.4 MG/DL — LOW (ref 1.6–2.6)
MCHC RBC-ENTMCNC: 30 PG — SIGNIFICANT CHANGE UP (ref 27–34)
MCHC RBC-ENTMCNC: 31.2 GM/DL — LOW (ref 32–36)
MCV RBC AUTO: 96 FL — SIGNIFICANT CHANGE UP (ref 80–100)
MONOCYTES # BLD AUTO: 0.51 K/UL — SIGNIFICANT CHANGE UP (ref 0–0.9)
MONOCYTES NFR BLD AUTO: 10.5 % — SIGNIFICANT CHANGE UP (ref 2–14)
NEUTROPHILS # BLD AUTO: 3.63 K/UL — SIGNIFICANT CHANGE UP (ref 1.8–7.4)
NEUTROPHILS NFR BLD AUTO: 73.7 % — SIGNIFICANT CHANGE UP (ref 43–77)
PHOSPHATE SERPL-MCNC: 2.1 MG/DL — LOW (ref 2.5–4.5)
PLATELET # BLD AUTO: 45 K/UL — LOW (ref 150–400)
POTASSIUM SERPL-MCNC: 3.3 MMOL/L — LOW (ref 3.5–5.3)
POTASSIUM SERPL-SCNC: 3.3 MMOL/L — LOW (ref 3.5–5.3)
RBC # BLD: 2.77 M/UL — LOW (ref 3.8–5.2)
RBC # FLD: 16.1 % — HIGH (ref 10.3–14.5)
RH IG SCN BLD-IMP: POSITIVE — SIGNIFICANT CHANGE UP
SODIUM SERPL-SCNC: 142 MMOL/L — SIGNIFICANT CHANGE UP (ref 135–145)
T3REVERSE SERPL-MCNC: 79.7 NG/DL — HIGH (ref 9.2–24.1)
WBC # BLD: 4.86 K/UL — SIGNIFICANT CHANGE UP (ref 3.8–10.5)
WBC # FLD AUTO: 4.86 K/UL — SIGNIFICANT CHANGE UP (ref 3.8–10.5)

## 2022-06-23 RX ORDER — MAGNESIUM SULFATE 500 MG/ML
2 VIAL (ML) INJECTION ONCE
Refills: 0 | Status: COMPLETED | OUTPATIENT
Start: 2022-06-23 | End: 2022-06-23

## 2022-06-23 RX ORDER — POTASSIUM CHLORIDE 20 MEQ
40 PACKET (EA) ORAL ONCE
Refills: 0 | Status: COMPLETED | OUTPATIENT
Start: 2022-06-23 | End: 2022-06-23

## 2022-06-23 RX ORDER — ACETAMINOPHEN 500 MG
1000 TABLET ORAL ONCE
Refills: 0 | Status: COMPLETED | OUTPATIENT
Start: 2022-06-23 | End: 2022-06-23

## 2022-06-23 RX ORDER — SODIUM CHLORIDE 9 MG/ML
1000 INJECTION, SOLUTION INTRAVENOUS ONCE
Refills: 0 | Status: COMPLETED | OUTPATIENT
Start: 2022-06-23 | End: 2022-06-23

## 2022-06-23 RX ORDER — SODIUM,POTASSIUM PHOSPHATES 278-250MG
1 POWDER IN PACKET (EA) ORAL
Refills: 0 | Status: COMPLETED | OUTPATIENT
Start: 2022-06-23 | End: 2022-06-24

## 2022-06-23 RX ORDER — SODIUM,POTASSIUM PHOSPHATES 278-250MG
1 POWDER IN PACKET (EA) ORAL ONCE
Refills: 0 | Status: DISCONTINUED | OUTPATIENT
Start: 2022-06-23 | End: 2022-06-23

## 2022-06-23 RX ORDER — OXYCODONE HYDROCHLORIDE 5 MG/1
5 TABLET ORAL EVERY 4 HOURS
Refills: 0 | Status: DISCONTINUED | OUTPATIENT
Start: 2022-06-23 | End: 2022-06-26

## 2022-06-23 RX ADMIN — Medication 1000 MILLIGRAM(S): at 13:11

## 2022-06-23 RX ADMIN — OXYCODONE HYDROCHLORIDE 5 MILLIGRAM(S): 5 TABLET ORAL at 00:15

## 2022-06-23 RX ADMIN — Medication 650 MILLIGRAM(S): at 17:53

## 2022-06-23 RX ADMIN — Medication 40 MILLIEQUIVALENT(S): at 09:50

## 2022-06-23 RX ADMIN — SODIUM CHLORIDE 1000 MILLILITER(S): 9 INJECTION, SOLUTION INTRAVENOUS at 02:53

## 2022-06-23 RX ADMIN — ENOXAPARIN SODIUM 40 MILLIGRAM(S): 100 INJECTION SUBCUTANEOUS at 12:17

## 2022-06-23 RX ADMIN — OXYCODONE HYDROCHLORIDE 5 MILLIGRAM(S): 5 TABLET ORAL at 06:56

## 2022-06-23 RX ADMIN — Medication 650 MILLIGRAM(S): at 18:13

## 2022-06-23 RX ADMIN — HYDROMORPHONE HYDROCHLORIDE 0.5 MILLIGRAM(S): 2 INJECTION INTRAMUSCULAR; INTRAVENOUS; SUBCUTANEOUS at 14:11

## 2022-06-23 RX ADMIN — OXYCODONE HYDROCHLORIDE 5 MILLIGRAM(S): 5 TABLET ORAL at 16:09

## 2022-06-23 RX ADMIN — Medication 25 GRAM(S): at 09:50

## 2022-06-23 RX ADMIN — Medication 100 MILLIGRAM(S): at 06:03

## 2022-06-23 RX ADMIN — OXYCODONE HYDROCHLORIDE 5 MILLIGRAM(S): 5 TABLET ORAL at 09:52

## 2022-06-23 RX ADMIN — Medication 400 MILLIGRAM(S): at 12:41

## 2022-06-23 RX ADMIN — Medication 1 TABLET(S): at 12:17

## 2022-06-23 RX ADMIN — OXYCODONE HYDROCHLORIDE 5 MILLIGRAM(S): 5 TABLET ORAL at 16:39

## 2022-06-23 RX ADMIN — Medication 650 MILLIGRAM(S): at 06:50

## 2022-06-23 RX ADMIN — OXYCODONE HYDROCHLORIDE 5 MILLIGRAM(S): 5 TABLET ORAL at 20:34

## 2022-06-23 RX ADMIN — PANTOPRAZOLE SODIUM 40 MILLIGRAM(S): 20 TABLET, DELAYED RELEASE ORAL at 09:50

## 2022-06-23 RX ADMIN — OXYCODONE HYDROCHLORIDE 5 MILLIGRAM(S): 5 TABLET ORAL at 10:22

## 2022-06-23 RX ADMIN — OXYCODONE HYDROCHLORIDE 5 MILLIGRAM(S): 5 TABLET ORAL at 21:30

## 2022-06-23 RX ADMIN — OXYCODONE HYDROCHLORIDE 5 MILLIGRAM(S): 5 TABLET ORAL at 07:26

## 2022-06-23 RX ADMIN — Medication 650 MILLIGRAM(S): at 06:03

## 2022-06-23 RX ADMIN — PANTOPRAZOLE SODIUM 40 MILLIGRAM(S): 20 TABLET, DELAYED RELEASE ORAL at 21:17

## 2022-06-23 RX ADMIN — Medication 1 TABLET(S): at 17:53

## 2022-06-23 RX ADMIN — HYDROMORPHONE HYDROCHLORIDE 0.5 MILLIGRAM(S): 2 INJECTION INTRAMUSCULAR; INTRAVENOUS; SUBCUTANEOUS at 14:30

## 2022-06-23 NOTE — PROGRESS NOTE ADULT - SUBJECTIVE AND OBJECTIVE BOX
R2 GYN ONC Progress Note     Interval Events  -Oliguric overnight s/p 1L bolus @3a    Patient seen and examined at bedside.    Patient reports that overnight she had pain that was relieved with oxycodone. No acute complaints this morning.   Patient is tolerating CLD without N/V.    Denies CP, SOB, N/V, fevers, and chills.    Vital Signs Last 24 Hours  T(C): 36.3 (06-23-22 @ 05:58), Max: 37.2 (06-22-22 @ 17:39)  HR: 77 (06-23-22 @ 05:58) (77 - 99)  BP: 152/95 (06-23-22 @ 05:58) (146/80 - 154/96)  RR: 18 (06-23-22 @ 05:58) (16 - 20)  SpO2: 98% (06-23-22 @ 05:58) (94% - 100%)    I&O's Summary    21 Jun 2022 07:01  -  22 Jun 2022 07:00  --------------------------------------------------------  IN: 50 mL / OUT: 2689.5 mL / NET: -2639.5 mL    22 Jun 2022 07:01  -  23 Jun 2022 06:39  --------------------------------------------------------  IN: 600 mL / OUT: 1780 mL / NET: -1180 mL        Physical Exam:  General: NAD  CV: RRR  Lungs: CTA b/l, good air flow b/l   Abd: Soft, non-distended, wound vac in place ; ostomy pink and well perfused with brownish liquid output in  ostomy bag; +3 Ric drains with seropurulent output in LLQ  : L and R nephrostomy tube draining clear urine  Ext: Warm & well perfused.   Ext: No pain, +pitting edema    Labs:                        8.3    4.86  )-----------( 45       ( 23 Jun 2022 05:22 )             26.6   baso 0.0    eos 0.9    imm gran x      lymph 12.3   mono 10.5   poly 73.7                         8.7    4.92  )-----------( 39       ( 22 Jun 2022 06:43 )             28.3   baso x      eos x      imm gran x      lymph x      mono x      poly x                            7.2    5.29  )-----------( 31       ( 21 Jun 2022 01:00 )             21.8   baso x      eos x      imm gran x      lymph x      mono x      poly x          MEDICATIONS  (STANDING):  enoxaparin Injectable 40 milliGRAM(s) SubCutaneous every 24 hours  HYDROmorphone  Injectable 0.5 milliGRAM(s) IV Push once  metoprolol succinate  milliGRAM(s) Oral daily  multivitamin 1 Tablet(s) Oral daily  pantoprazole    Tablet 40 milliGRAM(s) Oral every 12 hours    MEDICATIONS  (PRN):  acetaminophen     Tablet .. 650 milliGRAM(s) Oral every 6 hours PRN Mild Pain (1 - 3)       R2 GYN ONC Progress Note     Interval Events  -Oliguric overnight s/p 1L bolus @3a with improving urine output     Patient seen and examined at bedside.    Patient reports that overnight she had pain that was relieved with oxycodone. No acute complaints this morning.   Patient is tolerating CLD without N/V.    Denies CP, SOB, N/V, fevers, and chills.    Vital Signs Last 24 Hours  T(C): 36.3 (06-23-22 @ 05:58), Max: 37.2 (06-22-22 @ 17:39)  HR: 77 (06-23-22 @ 05:58) (77 - 99)  BP: 152/95 (06-23-22 @ 05:58) (146/80 - 154/96)  RR: 18 (06-23-22 @ 05:58) (16 - 20)  SpO2: 98% (06-23-22 @ 05:58) (94% - 100%)    I&O's Summary    21 Jun 2022 07:01  -  22 Jun 2022 07:00  --------------------------------------------------------  IN: 50 mL / OUT: 2689.5 mL / NET: -2639.5 mL    22 Jun 2022 07:01  -  23 Jun 2022 06:39  --------------------------------------------------------  IN: 600 mL / OUT: 1780 mL / NET: -1180 mL        Physical Exam:  General: NAD  CV: RRR  Lungs: CTA b/l, good air flow b/l   Abd: Soft, non-distended, wound vac in place ; ostomy pink and well perfused with brownish liquid output in  ostomy bag; +3 Ric drains with seropurulent output in LLQ  : L and R nephrostomy tube draining clear urine  Ext: Warm & well perfused.   Ext: No pain, +pitting edema    Labs:                        8.3    4.86  )-----------( 45       ( 23 Jun 2022 05:22 )             26.6   baso 0.0    eos 0.9    imm gran x      lymph 12.3   mono 10.5   poly 73.7                         8.7    4.92  )-----------( 39       ( 22 Jun 2022 06:43 )             28.3   baso x      eos x      imm gran x      lymph x      mono x      poly x                            7.2    5.29  )-----------( 31       ( 21 Jun 2022 01:00 )             21.8   baso x      eos x      imm gran x      lymph x      mono x      poly x          MEDICATIONS  (STANDING):  enoxaparin Injectable 40 milliGRAM(s) SubCutaneous every 24 hours  HYDROmorphone  Injectable 0.5 milliGRAM(s) IV Push once  metoprolol succinate  milliGRAM(s) Oral daily  multivitamin 1 Tablet(s) Oral daily  pantoprazole    Tablet 40 milliGRAM(s) Oral every 12 hours    MEDICATIONS  (PRN):  acetaminophen     Tablet .. 650 milliGRAM(s) Oral every 6 hours PRN Mild Pain (1 - 3)       R2 GYN ONC Progress Note     Interval Events  -Oliguric overnight s/p 1L bolus @3a with improving urine output     Patient seen and examined at bedside.    Patient reports that overnight she had pain that was relieved with oxycodone. No acute complaints this morning.   Patient is tolerating regular diet without N/V.    Denies CP, SOB, N/V, fevers, and chills.    Vital Signs Last 24 Hours  T(C): 36.3 (06-23-22 @ 05:58), Max: 37.2 (06-22-22 @ 17:39)  HR: 77 (06-23-22 @ 05:58) (77 - 99)  BP: 152/95 (06-23-22 @ 05:58) (146/80 - 154/96)  RR: 18 (06-23-22 @ 05:58) (16 - 20)  SpO2: 98% (06-23-22 @ 05:58) (94% - 100%)    I&O's Summary    21 Jun 2022 07:01  -  22 Jun 2022 07:00  --------------------------------------------------------  IN: 50 mL / OUT: 2689.5 mL / NET: -2639.5 mL    22 Jun 2022 07:01  -  23 Jun 2022 06:39  --------------------------------------------------------  IN: 600 mL / OUT: 1780 mL / NET: -1180 mL        Physical Exam:  General: NAD  CV: RRR  Lungs: CTA b/l, good air flow b/l   Abd: Soft, non-distended, wound vac in place ; ostomy pink and well perfused with brownish liquid output in  ostomy bag; +3 Ric drains with seropurulent output in LLQ  : L and R nephrostomy tube draining clear urine  Ext: Warm & well perfused.   Ext: No pain, +pitting edema    Labs:                        8.3    4.86  )-----------( 45       ( 23 Jun 2022 05:22 )             26.6   baso 0.0    eos 0.9    imm gran x      lymph 12.3   mono 10.5   poly 73.7                         8.7    4.92  )-----------( 39       ( 22 Jun 2022 06:43 )             28.3   baso x      eos x      imm gran x      lymph x      mono x      poly x                            7.2    5.29  )-----------( 31       ( 21 Jun 2022 01:00 )             21.8   baso x      eos x      imm gran x      lymph x      mono x      poly x          MEDICATIONS  (STANDING):  enoxaparin Injectable 40 milliGRAM(s) SubCutaneous every 24 hours  HYDROmorphone  Injectable 0.5 milliGRAM(s) IV Push once  metoprolol succinate  milliGRAM(s) Oral daily  multivitamin 1 Tablet(s) Oral daily  pantoprazole    Tablet 40 milliGRAM(s) Oral every 12 hours    MEDICATIONS  (PRN):  acetaminophen     Tablet .. 650 milliGRAM(s) Oral every 6 hours PRN Mild Pain (1 - 3)

## 2022-06-23 NOTE — PROGRESS NOTE ADULT - ASSESSMENT
52y  with recurrent cervical CA admitted for management of neutropenic fever, now s/p emergent exploratory laparotomy, abdominal washout, rectosigmoid colectomy, diverting colostomy, bronchoscopy and Abthera placement on 22 (ebl 2000cc, s/p 5UpRBC, 3L albumin, 3U Plts, 3U FFP, 3U Cryo) for findings of increased free air on abdominal xray concerning for bowel perforation. Patient s/p washout and bridging vicryl mesh placement to close fascial gap on  w/ skin closure device place don . Pt now extubated, hemodynamically stable, and clinically improving.       Neuro: Oxy and tylenol PRN  CV: Hemodynamically stable, H/H stable w/ slowly rising platelets   Pulm: O2 sat WNL on RA, Increase ambulation, encourage incentive spirometry use  GI: Tolerating regular diet s/p successful swallow evaluation.  Continue w/ protonix BID  : b/l nephrostomy tubes w/ low  urine output s/p 1L bolus @3a.  Prior ASHLEY resolving w/ continuing downtrending Cr  Heme: DVT ppx: Lovenox, SCDs while in bed.  Thrombocytopenia likely multifactorial, currently improving spontaneously  ID: Afebrile, No signs of infection at this time. s/p Zosyn (-), s/p Caspofungin and Cefepime, s/p Zarixo (-6/10) for neutropenic fever  52y  with recurrent cervical CA admitted for management of neutropenic fever, now s/p emergent exploratory laparotomy, abdominal washout, rectosigmoid colectomy, diverting colostomy, bronchoscopy and Abthera placement on 22 (ebl 2000cc, s/p 5UpRBC, 3L albumin, 3U Plts, 3U FFP, 3U Cryo) for findings of increased free air on abdominal xray concerning for bowel perforation. Patient s/p washout and bridging vicryl mesh placement to close fascial gap on  w/ skin closure device place don . Pt now extubated, hemodynamically stable, and clinically improving.       Neuro: Oxy and tylenol PRN  CV: Hemodynamically stable, H/H stable w/ slowly rising platelets   Pulm: O2 sat WNL on RA, Increase ambulation, encourage incentive spirometry use  GI: Tolerating regular diet s/p successful swallow evaluation.  Continue w/ protonix BID  : b/l nephrostomy tubes w/ low  urine output (8.75cc/hr over 12h) s/p 1L bolus @3a.  Prior ASHLEY resolving w/ continuing downtrending Cr  Heme: DVT ppx: Lovenox, SCDs while in bed.  Thrombocytopenia likely multifactorial, currently improving spontaneously  ID: Afebrile, No signs of infection at this time. s/p Zosyn (-), s/p Caspofungin and Cefepime, s/p Zarixo (-6/10) for neutropenic fever  52y  with recurrent cervical CA admitted for management of neutropenic fever, now s/p emergent exploratory laparotomy, abdominal washout, rectosigmoid colectomy, diverting colostomy, bronchoscopy and Abthera placement on 22 (ebl 2000cc, s/p 5UpRBC, 3L albumin, 3U Plts, 3U FFP, 3U Cryo) for findings of increased free air on abdominal xray concerning for bowel perforation. Patient s/p washout and bridging vicryl mesh placement to close fascial gap on  w/ skin closure device place don . Pt now extubated, hemodynamically stable, and clinically improving.       Neuro: Oxy and tylenol PRN  CV: Hemodynamically stable, H/H stable w/ slowly rising platelets   Pulm: O2 sat WNL on RA, Increase ambulation, encourage incentive spirometry use  GI: Tolerating regular diet s/p successful swallow evaluation.  Continue w/ protonix BID  : b/l nephrostomy tubes. Oliguric overnight s/p 1L bolus @3a with improving urine output .  Prior ASHLEY resolving w/ continuing downtrending Cr.  -hypokalemia, will replete  Heme: DVT ppx: Lovenox, SCDs while in bed.  Thrombocytopenia likely multifactorial, currently improving spontaneously  ID: Afebrile, No signs of infection at this time. s/p Zosyn (-), s/p Caspofungin and Cefepime, s/p Zarixo (-6/10) for neutropenic fever

## 2022-06-23 NOTE — PROVIDER CONTACT NOTE (OTHER) - ACTION/TREATMENT ORDERED:
MD made aware, no new orders received at this time, pt. educated on importance to increase PO fluid intake, continue to monitor.

## 2022-06-23 NOTE — PROGRESS NOTE ADULT - ATTENDING COMMENTS
patient seen and examined, s/p PT this am  rec for OT consult will place order  discussed intraop findings again with patient  discussed path - c/w persistent disease  continue PT/OT  rehab planning

## 2022-06-23 NOTE — CHART NOTE - NSCHARTNOTEFT_GEN_A_CORE
NUTRITION FOLLOW-UP    Pt seen for Nutrition Consult  for poor appetite. Pt was very sleepy at time of visit.   Pt states her appetite and po intake is poor. Offered alternate food choices. Pt states she drinks the Ensure supplement. Pt was too sleepy to answer further questions.    Weight: 113.9 g (6/230; 130.2kg (6/22); 92.9 kg (6/21)    Labs: K-3.3, Ca-7.9, Mg-1.4, Phos-2.1    Current Diet: Diet, Regular:   No Beef  Supplement Feeding Modality:  Oral  Ensure Enlive Cans or Servings Per Day:  1       Frequency:  Three Times a day (06-22-22 @ 15:19)    Edema: 2+ generalized              3+ L ankle, R ankle; L leg, R leg    Skin: surgical incision midline abdomen    Malnutrition Status: Ongoing severe malnutrition    RD to Remain Available: Anna Bella MS, RDN, CDN pager [phone]     Additional Recommendations:   1) Monitor weight, labs, po intake and skin integrity.

## 2022-06-23 NOTE — PROGRESS NOTE ADULT - ASSESSMENT
A/P 52F hx of recurrent cervical ca, now s/p emergent ex lap, washout, rectosigmoid colectomy, diverting colostomy, w. open abdomen now s/p vac and Dermaclose placement.    -continue vac, change MWF  -continue to monitor drains  -appreciate care per primary    Plastic Surgery  [id number]

## 2022-06-23 NOTE — PROGRESS NOTE ADULT - PROBLEM SELECTOR PLAN 1
GYN ONC Fellow Addendum:    Pt seen and examined at bedside. Agree with above. Pain controlled. Tolerating reg diet, -n/v. +ostomy output. ambulating and voiding.    VS reviewed  Labs reviewed  SARAH: murky output    - po analgesia prn  - Reg diet with ensure, nutrition consult, ostomy teaching  - Debility: PT consult  - Monitor drains, appreciate gen surg recs  - Contine wound vac, appreciate plastics recs  - Hypokalemia, Hypomagnesemia, Hypophosphatemia: replete  - DVT ppx: Lovenox  - Dispo: continue inpatient management    GRIS Mckeon, PGY6

## 2022-06-23 NOTE — PROVIDER CONTACT NOTE (OTHER) - BACKGROUND
pt. s/p exp lap , abdominal washout, rectosigmoid colectomy, diverting colectomy, bronchoscopy 6/8/22 , s/p washout, hernia repair 6/14/22 and skin closure on 6/17/22

## 2022-06-23 NOTE — PROGRESS NOTE ADULT - SUBJECTIVE AND OBJECTIVE BOX
Plastic Surgery Progress Note (pg [facility]: [id number], [facility]: [phone])    SUBJECTIVE  The patient was seen and examined. No acute events overnight.    OBJECTIVE  ___________________________________________________  VITAL SIGNS / I&O's   Vital Signs Last 24 Hrs  T(C): 36.3 (23 Jun 2022 05:58), Max: 37.2 (22 Jun 2022 17:39)  T(F): 97.3 (23 Jun 2022 05:58), Max: 98.9 (22 Jun 2022 17:39)  HR: 77 (23 Jun 2022 05:58) (77 - 99)  BP: 152/95 (23 Jun 2022 05:58) (146/80 - 154/96)  BP(mean): --  RR: 18 (23 Jun 2022 05:58) (16 - 20)  SpO2: 98% (23 Jun 2022 05:58) (94% - 100%)      22 Jun 2022 07:01  -  23 Jun 2022 07:00  --------------------------------------------------------  IN:    Lactated Ringers Bolus: 1000 mL    Oral Fluid: 720 mL  Total IN: 1720 mL    OUT:    Colostomy (mL): 750 mL    Drain (mL): 42.5 mL    Drain (mL): 45 mL    Drain (mL): 37.5 mL    Emesis (mL): 0 mL    Indwelling Catheter - Urethral (mL): 75 mL    Nephrostomy Tube (mL): 525 mL    Nephrostomy Tube (mL): 385 mL    VAC (Vacuum Assisted Closure) System (mL): 135 mL  Total OUT: 1995 mL    Total NET: -275 mL  ___________________________________________________  PHYSICAL EXAM      General: NAD  Pulm: comfortable  Neuro: alert  Abdomen: soft, no collections, drains with murky output, vac in place holding suction  ___________________________________________________  LABS                        8.3    4.86  )-----------( 45       ( 23 Jun 2022 05:22 )             26.6     23 Jun 2022 05:22    142    |  105    |  14     ----------------------------<  97     3.3     |  24     |  0.73     Ca    7.9        23 Jun 2022 05:22  Phos  2.1       23 Jun 2022 05:22  Mg     1.40      23 Jun 2022 05:22        CAPILLARY BLOOD GLUCOSE              ___________________________________________________  MICRO  Recent Cultures:    ___________________________________________________  MEDICATIONS  (STANDING):  enoxaparin Injectable 40 milliGRAM(s) SubCutaneous every 24 hours  HYDROmorphone  Injectable 0.5 milliGRAM(s) IV Push once  magnesium sulfate  IVPB 2 Gram(s) IV Intermittent once  metoprolol succinate  milliGRAM(s) Oral daily  multivitamin 1 Tablet(s) Oral daily  pantoprazole    Tablet 40 milliGRAM(s) Oral every 12 hours  potassium phosphate / sodium phosphate Tablet (K-PHOS No. 2) 1 Tablet(s) Oral once    MEDICATIONS  (PRN):  acetaminophen     Tablet .. 650 milliGRAM(s) Oral every 6 hours PRN Mild Pain (1 - 3)  oxyCODONE    IR 5 milliGRAM(s) Oral every 4 hours PRN Severe Pain (7 - 10)

## 2022-06-24 LAB
ANION GAP SERPL CALC-SCNC: 13 MMOL/L — SIGNIFICANT CHANGE UP (ref 7–14)
BASOPHILS # BLD AUTO: 0.03 K/UL — SIGNIFICANT CHANGE UP (ref 0–0.2)
BASOPHILS NFR BLD AUTO: 0.5 % — SIGNIFICANT CHANGE UP (ref 0–2)
BUN SERPL-MCNC: 15 MG/DL — SIGNIFICANT CHANGE UP (ref 7–23)
CALCIUM SERPL-MCNC: 8.2 MG/DL — LOW (ref 8.4–10.5)
CHLORIDE SERPL-SCNC: 105 MMOL/L — SIGNIFICANT CHANGE UP (ref 98–107)
CO2 SERPL-SCNC: 22 MMOL/L — SIGNIFICANT CHANGE UP (ref 22–31)
CREAT SERPL-MCNC: 0.71 MG/DL — SIGNIFICANT CHANGE UP (ref 0.5–1.3)
EGFR: 101 ML/MIN/1.73M2 — SIGNIFICANT CHANGE UP
EOSINOPHIL # BLD AUTO: 0.09 K/UL — SIGNIFICANT CHANGE UP (ref 0–0.5)
EOSINOPHIL NFR BLD AUTO: 1.6 % — SIGNIFICANT CHANGE UP (ref 0–6)
GLUCOSE SERPL-MCNC: 91 MG/DL — SIGNIFICANT CHANGE UP (ref 70–99)
HCT VFR BLD CALC: 26.3 % — LOW (ref 34.5–45)
HGB BLD-MCNC: 8.2 G/DL — LOW (ref 11.5–15.5)
IANC: 3.81 K/UL — SIGNIFICANT CHANGE UP (ref 1.8–7.4)
IMM GRANULOCYTES NFR BLD AUTO: 1.3 % — SIGNIFICANT CHANGE UP (ref 0–1.5)
LYMPHOCYTES # BLD AUTO: 0.83 K/UL — LOW (ref 1–3.3)
LYMPHOCYTES # BLD AUTO: 15 % — SIGNIFICANT CHANGE UP (ref 13–44)
MAGNESIUM SERPL-MCNC: 1.6 MG/DL — SIGNIFICANT CHANGE UP (ref 1.6–2.6)
MCHC RBC-ENTMCNC: 29.8 PG — SIGNIFICANT CHANGE UP (ref 27–34)
MCHC RBC-ENTMCNC: 31.2 GM/DL — LOW (ref 32–36)
MCV RBC AUTO: 95.6 FL — SIGNIFICANT CHANGE UP (ref 80–100)
MONOCYTES # BLD AUTO: 0.69 K/UL — SIGNIFICANT CHANGE UP (ref 0–0.9)
MONOCYTES NFR BLD AUTO: 12.5 % — SIGNIFICANT CHANGE UP (ref 2–14)
NEUTROPHILS # BLD AUTO: 3.81 K/UL — SIGNIFICANT CHANGE UP (ref 1.8–7.4)
NEUTROPHILS NFR BLD AUTO: 69.1 % — SIGNIFICANT CHANGE UP (ref 43–77)
NRBC # BLD: 0 /100 WBCS — SIGNIFICANT CHANGE UP
NRBC # FLD: 0.02 K/UL — HIGH
PHOSPHATE SERPL-MCNC: 1.9 MG/DL — LOW (ref 2.5–4.5)
PLATELET # BLD AUTO: 58 K/UL — LOW (ref 150–400)
POTASSIUM SERPL-MCNC: 3.3 MMOL/L — LOW (ref 3.5–5.3)
POTASSIUM SERPL-SCNC: 3.3 MMOL/L — LOW (ref 3.5–5.3)
RBC # BLD: 2.75 M/UL — LOW (ref 3.8–5.2)
RBC # FLD: 16.1 % — HIGH (ref 10.3–14.5)
SODIUM SERPL-SCNC: 140 MMOL/L — SIGNIFICANT CHANGE UP (ref 135–145)
T4 FREE SERPL-MCNC: 1.4 NG/DL — SIGNIFICANT CHANGE UP (ref 0.9–1.8)
TSH SERPL-MCNC: 3.77 UIU/ML — SIGNIFICANT CHANGE UP (ref 0.27–4.2)
WBC # BLD: 5.52 K/UL — SIGNIFICANT CHANGE UP (ref 3.8–10.5)
WBC # FLD AUTO: 5.52 K/UL — SIGNIFICANT CHANGE UP (ref 3.8–10.5)

## 2022-06-24 PROCEDURE — 99222 1ST HOSP IP/OBS MODERATE 55: CPT

## 2022-06-24 PROCEDURE — 99232 SBSQ HOSP IP/OBS MODERATE 35: CPT

## 2022-06-24 RX ORDER — MAGNESIUM OXIDE 400 MG ORAL TABLET 241.3 MG
400 TABLET ORAL ONCE
Refills: 0 | Status: COMPLETED | OUTPATIENT
Start: 2022-06-24 | End: 2022-06-24

## 2022-06-24 RX ORDER — SODIUM,POTASSIUM PHOSPHATES 278-250MG
1 POWDER IN PACKET (EA) ORAL
Refills: 0 | Status: DISCONTINUED | OUTPATIENT
Start: 2022-06-24 | End: 2022-06-24

## 2022-06-24 RX ORDER — POTASSIUM CHLORIDE 20 MEQ
40 PACKET (EA) ORAL EVERY 4 HOURS
Refills: 0 | Status: COMPLETED | OUTPATIENT
Start: 2022-06-24 | End: 2022-06-24

## 2022-06-24 RX ORDER — SODIUM,POTASSIUM PHOSPHATES 278-250MG
1 POWDER IN PACKET (EA) ORAL
Refills: 0 | Status: COMPLETED | OUTPATIENT
Start: 2022-06-24 | End: 2022-06-25

## 2022-06-24 RX ORDER — KETOROLAC TROMETHAMINE 30 MG/ML
15 SYRINGE (ML) INJECTION ONCE
Refills: 0 | Status: DISCONTINUED | OUTPATIENT
Start: 2022-06-24 | End: 2022-06-24

## 2022-06-24 RX ADMIN — Medication 15 MILLIGRAM(S): at 13:38

## 2022-06-24 RX ADMIN — OXYCODONE HYDROCHLORIDE 5 MILLIGRAM(S): 5 TABLET ORAL at 03:45

## 2022-06-24 RX ADMIN — Medication 15 MILLIGRAM(S): at 08:12

## 2022-06-24 RX ADMIN — Medication 40 MILLIEQUIVALENT(S): at 14:04

## 2022-06-24 RX ADMIN — PANTOPRAZOLE SODIUM 40 MILLIGRAM(S): 20 TABLET, DELAYED RELEASE ORAL at 09:01

## 2022-06-24 RX ADMIN — OXYCODONE HYDROCHLORIDE 5 MILLIGRAM(S): 5 TABLET ORAL at 02:49

## 2022-06-24 RX ADMIN — Medication 1 TABLET(S): at 05:57

## 2022-06-24 RX ADMIN — OXYCODONE HYDROCHLORIDE 5 MILLIGRAM(S): 5 TABLET ORAL at 16:26

## 2022-06-24 RX ADMIN — Medication 1 TABLET(S): at 17:38

## 2022-06-24 RX ADMIN — Medication 650 MILLIGRAM(S): at 19:41

## 2022-06-24 RX ADMIN — OXYCODONE HYDROCHLORIDE 5 MILLIGRAM(S): 5 TABLET ORAL at 16:56

## 2022-06-24 RX ADMIN — Medication 40 MILLIEQUIVALENT(S): at 09:01

## 2022-06-24 RX ADMIN — PANTOPRAZOLE SODIUM 40 MILLIGRAM(S): 20 TABLET, DELAYED RELEASE ORAL at 21:51

## 2022-06-24 RX ADMIN — OXYCODONE HYDROCHLORIDE 5 MILLIGRAM(S): 5 TABLET ORAL at 11:35

## 2022-06-24 RX ADMIN — Medication 100 MILLIGRAM(S): at 05:57

## 2022-06-24 RX ADMIN — MAGNESIUM OXIDE 400 MG ORAL TABLET 400 MILLIGRAM(S): 241.3 TABLET ORAL at 09:01

## 2022-06-24 RX ADMIN — Medication 15 MILLIGRAM(S): at 08:45

## 2022-06-24 RX ADMIN — Medication 1 TABLET(S): at 14:04

## 2022-06-24 RX ADMIN — OXYCODONE HYDROCHLORIDE 5 MILLIGRAM(S): 5 TABLET ORAL at 22:04

## 2022-06-24 RX ADMIN — OXYCODONE HYDROCHLORIDE 5 MILLIGRAM(S): 5 TABLET ORAL at 11:05

## 2022-06-24 RX ADMIN — OXYCODONE HYDROCHLORIDE 5 MILLIGRAM(S): 5 TABLET ORAL at 23:00

## 2022-06-24 RX ADMIN — Medication 15 MILLIGRAM(S): at 12:52

## 2022-06-24 RX ADMIN — Medication 650 MILLIGRAM(S): at 20:28

## 2022-06-24 RX ADMIN — ENOXAPARIN SODIUM 40 MILLIGRAM(S): 100 INJECTION SUBCUTANEOUS at 12:54

## 2022-06-24 RX ADMIN — Medication 1 TABLET(S): at 11:05

## 2022-06-24 NOTE — OCCUPATIONAL THERAPY INITIAL EVALUATION ADULT - PERTINENT HX OF CURRENT PROBLEM, REHAB EVAL
Pt is a 52 year old female with hx of HTN, cervical CA (stage III) on chemo, who presented to [facility] as transfer from [place] for neutropenic fever after last cycle of chemotherapy 5 days prior. (See continued below)

## 2022-06-24 NOTE — OCCUPATIONAL THERAPY INITIAL EVALUATION ADULT - LIVES WITH, PROFILE
Pt. reports she lives with her " and daughter and 3 "kids" in a house with 3 steps to enter. Once inside, pt. reports bedroom and bathroom are located on the main level. Per pt., she has a shower stall in her bathroom.

## 2022-06-24 NOTE — PROGRESS NOTE ADULT - SUBJECTIVE AND OBJECTIVE BOX
TEAM [ D ] Surgery Daily Progress Note  =====================================================    SUBJECTIVE: Patient seen and examined at bedside on AM rounds. Patient reports that they're feeling well. Tolerating diet, denies nausea, vomiting.    +gas/ + BM. OOB/Amublating as tolerated. Denies fever, chills.    ALLERGIES:  No Known Allergies    --------------------------------------------------------------------------------------    MEDICATIONS:    Neurologic Medications  acetaminophen     Tablet .. 650 milliGRAM(s) Oral every 6 hours PRN Mild Pain (1 - 3)  oxyCODONE    IR 5 milliGRAM(s) Oral every 4 hours PRN Severe Pain (7 - 10)    Cardiovascular Medications  metoprolol succinate  milliGRAM(s) Oral daily    Gastrointestinal Medications  multivitamin 1 Tablet(s) Oral daily  pantoprazole    Tablet 40 milliGRAM(s) Oral every 12 hours  potassium chloride    Tablet ER 40 milliEquivalent(s) Oral every 4 hours  potassium phosphate / sodium phosphate Tablet (K-PHOS No. 2) 1 Tablet(s) Oral three times a day with meals    Hematologic/Oncologic Medications  enoxaparin Injectable 40 milliGRAM(s) SubCutaneous every 24 hours    --------------------------------------------------------------------------------------    VITAL SIGNS:  ICU Vital Signs Last 24 Hrs  T(C): 37.2 (24 Jun 2022 05:52), Max: 37.7 (23 Jun 2022 20:29)  T(F): 98.9 (24 Jun 2022 05:52), Max: 99.9 (23 Jun 2022 20:29)  HR: 115 (24 Jun 2022 05:52) (99 - 117)  BP: 153/91 (24 Jun 2022 05:52) (150/82 - 160/90  RR: 18 (24 Jun 2022 05:52) (17 - 18)  SpO2: 99% (24 Jun 2022 05:52) (96% - 100%)  --------------------------------------------------------------------------------------    EXAM    General: NAD, resting in bed comfortably.  Cardiac: regular rate, warm and well perfused  Respiratory: Nonlabored respirations, normal cw expansion.  Abdomen: soft, nontender, nondistended, midline wound with black sponge wound vac in place with adequate suction, colostomy pink/viable +/+, yanet drain x3 with seropurulent output.  Extremities: normal strength, FROM, no deformities     --------------------------------------------------------------------------------------

## 2022-06-24 NOTE — OCCUPATIONAL THERAPY INITIAL EVALUATION ADULT - DIAGNOSIS, OT EVAL
s/p SICU course; Hx of Cervical Cancer; Decreased sitting balance; Decreased functional mobility; Decreased ADL management

## 2022-06-24 NOTE — ADVANCED PRACTICE NURSE CONSULT - ASSESSMENT
Patient states pouch was changed recently and no leaking was noted. Pouch currently intact with liquid stool, no leakage noted. NPWT VAC dressing changed with plastic surgery team at bedside. See A&I flowsheet for NPWT VAC details.

## 2022-06-24 NOTE — OCCUPATIONAL THERAPY INITIAL EVALUATION ADULT - ADDITIONAL COMMENTS
(See continued from above) Pt admitted for management of sepsis from neutropenic fever, s/p emergent exploratory laparotomy, abdominal washout, rectosigmoid colectomy, diverting colostomy, bronchoscopy and Abthera placement on 6/8/22  for findings of increased free air on abdominal xray concerning for bowel perforation. Pt s/p washout and bridging vicryl mesh placement to close fascial gap on 6/14/22 with skin closure device place don 6/17/22. Pt extubated 6/19/22.

## 2022-06-24 NOTE — PROGRESS NOTE ADULT - PROBLEM SELECTOR PLAN 1
GYN ONC Fellow Addendum:    Pt seen and examined at bedside. Agree with above. Reports worsening back pain. Tolerating reg diet, -n/v. +gas and stool in ostomy. B/l PCNs.    VS reviewed  Labs reviewed    - Continue current pain regimen. Give toradol 15 x 1.  - Reg diet  - Replete K, mag, phos  - If back pain persists eval for PCN exchange  - Thrombocytopenia: improving  - PM&R consult, OT consult  - Replete lytes prn  - DVT ppx: lovenox  - OOB  - Dispo: continue inpatient management    GRIS Mckeon, PGY6

## 2022-06-24 NOTE — PROGRESS NOTE ADULT - ASSESSMENT
52y  with recurrent cervical CA admitted for management of neutropenic fever, now s/p emergent exploratory laparotomy, abdominal washout, rectosigmoid colectomy, diverting colostomy, bronchoscopy and Abthera placement on 22 (ebl 2000cc, s/p 5UpRBC, 3L albumin, 3U Plts, 3U FFP, 3U Cryo) for findings of increased free air on abdominal xray concerning for bowel perforation. Patient s/p washout and bridging vicryl mesh placement to close fascial gap on  w/ skin closure device place don . Pt now extubated, hemodynamically stable, and clinically improving.       Neuro: Oxy and tylenol PRN  CV: Hemodynamically stable, H/H stable w/ slowly rising platelets, c/w metoprolol for hx of htn   Pulm: O2 sat WNL on RA, Increase ambulation, encourage incentive spirometry use  GI: Tolerating regular diet s/p successful swallow evaluation.  Continue w/ protonix BID  : b/l nephrostomy tubes. Prior ASHLEY resolving w/ continuing downtrending Cr. Potassium repleted on , f/u AM BMP  Heme: DVT ppx: Lovenox, SCDs while in bed.  Thrombocytopenia likely multifactorial, currently improving spontaneously  ID: Afebrile, No signs of infection at this time. s/p Zosyn (-), s/p Caspofungin and Cefepime, s/p Zarixo (-6/10) for neutropenic fever     Tiffanie Cowan, PGY-2

## 2022-06-24 NOTE — PROGRESS NOTE ADULT - SUBJECTIVE AND OBJECTIVE BOX
52y F with recurrent cervical CA presenting to the ED as transfer from [place] a/w neutropenic fever. Consulted for abnormal TFTs.    Interval hx:  TFTs now normal  TSH 3.7, FT4 1.4    MEDICATIONS  (STANDING):  enoxaparin Injectable 40 milliGRAM(s) SubCutaneous every 24 hours  metoprolol succinate  milliGRAM(s) Oral daily  multivitamin 1 Tablet(s) Oral daily  pantoprazole    Tablet 40 milliGRAM(s) Oral every 12 hours  potassium chloride    Tablet ER 40 milliEquivalent(s) Oral every 4 hours  potassium phosphate / sodium phosphate Tablet (K-PHOS No. 2) 1 Tablet(s) Oral three times a day with meals    MEDICATIONS  (PRN):  acetaminophen     Tablet .. 650 milliGRAM(s) Oral every 6 hours PRN Mild Pain (1 - 3)  oxyCODONE    IR 5 milliGRAM(s) Oral every 4 hours PRN Severe Pain (7 - 10)      Allergies    No Known Allergies    Intolerances        Review of Systems:  Constitutional: No fever, good appetite/po intake  Eyes: No blurry vision, diplopia  Neuro: No tremors  HEENT: No pain  Cardiovascular: No chest pain, palpitations  Respiratory: No SOB, no cough  GI: No nausea, vomiting,   : No dysuria, hematuria  Skin: no rash  Psych: no depression  Endocrine: no polyuria, polydipsia  Hem/lymph: no swelling  Osteoporosis: no fractures    ALL OTHER SYSTEMS REVIEWED AND NEGATIVE    PHYSICAL EXAM:  VITALS: T(C): 37.1 (06-24-22 @ 10:05)  T(F): 98.7 (06-24-22 @ 10:05), Max: 99.9 (06-23-22 @ 20:29)  HR: 113 (06-24-22 @ 10:05) (100 - 117)  BP: 151/88 (06-24-22 @ 10:05) (151/88 - 160/90)  RR:  (17 - 18)  SpO2:  (96% - 100%)  Wt(kg): --  GENERAL: NAD, well-groomed, well-developed  EYES: No proptosis, no lid lag, anicteric  HEENT:  Atraumatic, normocephalic, moist mucous membranes  RESPIRATORY: nonlabored respirations, no wheezing  PSYCH: Alert and oriented x 3, normal affect, normal mood                              8.2    5.52  )-----------( 58       ( 24 Jun 2022 06:00 )             26.3       06-24    140  |  105  |  15  ----------------------------<  91  3.3<L>   |  22  |  0.71    eGFR: 101    Ca    8.2<L>      06-24  Mg     1.60     06-24  Phos  1.9     06-24        Thyroid Function Tests:  06-24 @ 06:00 TSH 3.77 FreeT4 1.4 T3 -- Anti TPO -- Anti Thyroglobulin Ab -- TSI --  06-17 @ 01:26 TSH 7.84 FreeT4 1.0 T3 81 Anti TPO -- Anti Thyroglobulin Ab -- TSI --          Radiology:            52y F with recurrent cervical CA presenting to the ED as transfer from [place] a/w neutropenic fever. Consulted for abnormal TFTs.    Interval hx:  TFTs now normal  TSH 3.7, FT4 1.4  pt feels ok today    MEDICATIONS  (STANDING):  enoxaparin Injectable 40 milliGRAM(s) SubCutaneous every 24 hours  metoprolol succinate  milliGRAM(s) Oral daily  multivitamin 1 Tablet(s) Oral daily  pantoprazole    Tablet 40 milliGRAM(s) Oral every 12 hours  potassium chloride    Tablet ER 40 milliEquivalent(s) Oral every 4 hours  potassium phosphate / sodium phosphate Tablet (K-PHOS No. 2) 1 Tablet(s) Oral three times a day with meals    MEDICATIONS  (PRN):  acetaminophen     Tablet .. 650 milliGRAM(s) Oral every 6 hours PRN Mild Pain (1 - 3)  oxyCODONE    IR 5 milliGRAM(s) Oral every 4 hours PRN Severe Pain (7 - 10)      Allergies    No Known Allergies    Intolerances        Review of Systems:  Constitutional: No fever, good appetite/po intake  Eyes: No blurry vision, diplopia  Neuro: No tremors  HEENT: No pain  Cardiovascular: No chest pain, palpitations  Respiratory: No SOB, no cough  GI: No nausea, vomiting,   : No dysuria, hematuria  Skin: no rash  Psych: no depression  Endocrine: no polyuria, polydipsia  Hem/lymph: no swelling  Osteoporosis: no fractures    ALL OTHER SYSTEMS REVIEWED AND NEGATIVE    PHYSICAL EXAM:  VITALS: T(C): 37.1 (06-24-22 @ 10:05)  T(F): 98.7 (06-24-22 @ 10:05), Max: 99.9 (06-23-22 @ 20:29)  HR: 113 (06-24-22 @ 10:05) (100 - 117)  BP: 151/88 (06-24-22 @ 10:05) (151/88 - 160/90)  RR:  (17 - 18)  SpO2:  (96% - 100%)  Wt(kg): --  GENERAL: NAD, well-groomed, well-developed  EYES: No proptosis, no lid lag, anicteric  HEENT:  Atraumatic, normocephalic, moist mucous membranes  RESPIRATORY: nonlabored respirations, no wheezing  PSYCH: Alert and oriented x 3, normal affect, normal mood                              8.2    5.52  )-----------( 58       ( 24 Jun 2022 06:00 )             26.3       06-24    140  |  105  |  15  ----------------------------<  91  3.3<L>   |  22  |  0.71    eGFR: 101    Ca    8.2<L>      06-24  Mg     1.60     06-24  Phos  1.9     06-24        Thyroid Function Tests:  06-24 @ 06:00 TSH 3.77 FreeT4 1.4 T3 -- Anti TPO -- Anti Thyroglobulin Ab -- TSI --  06-17 @ 01:26 TSH 7.84 FreeT4 1.0 T3 81 Anti TPO -- Anti Thyroglobulin Ab -- TSI --          Radiology:

## 2022-06-24 NOTE — PROGRESS NOTE ADULT - ATTENDING COMMENTS
continue rehab, rehab consult  will re-eval goals for discharge once improved  possible transfer next week

## 2022-06-24 NOTE — CHART NOTE - NSCHARTNOTEFT_GEN_A_CORE
IR consulted for patient experiencing back pain that might be due to her nephrostomy tubes. Patient seen at bedside. Complains of back pain, which she state her bed is the cause. Denies pain at nephrostomy site bilaterally. Non-tender upon palpation. Sutures still intact. Flushes freely.

## 2022-06-24 NOTE — OCCUPATIONAL THERAPY INITIAL EVALUATION ADULT - GENERAL OBSERVATIONS, REHAB EVAL
Pt. received semisupine in bed. No acute distress. Patient agreed to evaluation from Occupational Therapist. +Heplock, +(2) SARAH Drains, +Bilateral Nephrostomy tube, +Wound VAC.

## 2022-06-24 NOTE — PROGRESS NOTE ADULT - ASSESSMENT
52y with a PMHx of HTN, cervical CA (stage III)  on chemo - last session last week - c/b b/l hydronephrosis s/p B/L nephrostomy tube placement, p/w severe sepsis from neutropenic fever. Pt was acutely altered yesterday afternoon without improvement and AXR showed increased free air with concern for perforated viscous. She is now s/p multiple washouts, open resection of left colon, with transverse colostomy creation now s/p fascial closure with bridging vicryl mesh and skin closure with dermaclose by plastic surgery. Extubated 6/19. Recovering in SICU for close hemodynamic monitoring.     Plan:  - plan to continue drains outpatient  - Monitor ostomy function and SARAH output    D Team Surgery  [id number]

## 2022-06-24 NOTE — PROGRESS NOTE ADULT - ASSESSMENT
52y F with recurrent cervical CA presenting to the ED as transfer from [place] a/w neutropenic fever. Consulted for abnormal TFTs.    #Abnormal TFTs  TSH 2.89, TT3 41, TT4 3.17 --> Repeat FT4 0.6 with TSH 1.39 (received  mg x 1 the day prior which can possibly lower TSH). Repeat TFTs today: TSH 3.7, FT4 1.4 which are normal.  This was likely sick euthyroid syndrome/nonthyroidal illness given low TT3 and low FT4, with normal TSH, now labs normalized.  Repeat TFTs on 6/17 show TSH elevated 7.84, Free T4 1.0, Total T3 81 suggesting possible recovery from sick euthyroid status   - pt does not need thyroid hormone replacement    Endocrinology will sign off at this time    Kaelyn Whitmore MD  Attending Physician   Department of Endocrinology, Diabetes and Metabolism   Pager: [phone]    If before 9AM or after 5PM, or on weekends/holidays, please call the Endocrine answering service for assistance ([phone]).  For nonurgent matters, please email [email or web address] for assistance.      52y F with recurrent cervical CA presenting to the ED as transfer from [place] a/w neutropenic fever. Consulted for abnormal TFTs.    #Abnormal TFTs  TSH 2.89, TT3 41, TT4 3.17 --> Repeat FT4 0.6 with TSH 1.39 (received  mg x 1 the day prior which can possibly lower TSH). Repeat TFTs today: TSH 3.7, FT4 1.4 which are normal.  This was likely sick euthyroid syndrome/nonthyroidal illness given low TT3 and low FT4, with normal TSH, now labs normalized.  Repeat TFTs on 6/17 show TSH elevated 7.84, Free T4 1.0, Total T3 81 suggesting possible recovery from sick euthyroid status   - pt does not need thyroid hormone replacement    Endocrinology will sign off at this time    Kaelyn Whitmore MD  Attending Physician   Department of Endocrinology, Diabetes and Metabolism   Pager: [phone]    If before 9AM or after 5PM, or on weekends/holidays, please call the Endocrine answering service for assistance ([phone]).  For nonurgent matters, please email [email or web address] for assistance.

## 2022-06-24 NOTE — ADVANCED PRACTICE NURSE CONSULT - RECOMMEDATIONS
Plan of care discussed with primary RN Lesli. Ostomy team will continue to follow.    Please contact Wound/Ostomy Care Service Line if we can be of further assistance (ext [phone]).

## 2022-06-24 NOTE — CONSULT NOTE ADULT - SUBJECTIVE AND OBJECTIVE BOX
Patient is a 52y old  Female who presents with a chief complaint of neutropenic fever (2022 10:15)      HPI:    Gyn Oncology Admission Note    52y  with recurrent cervical CA (per patient stage III) presenting to the ED as transfer from [place] a/w neutropenic fever. Patient reports she had last cycle of chemotherapy 5 days prior.  Patient had VNS care stop by yesterday and was noted to be tachycardic on vital signs and was sent to the ED.  She reports feeling weak + fatigued. + upper abdominal pain that worsens while having chills. She denies any vomiting, chest pain, SOB. Last BM yesterday morning which was normal.     At [facility] she was given Meropenem and Zosyn and was noted to be tachycardic to 140s and febrile to 39.5 and was thus transferred for further management.     MICU was consulted on patient with recommendation to continue with broad spectrum antibiotics. Patient receives Gyn Oncology care at [facility]). She reports that she initially was diagnosed with stage III cervical CA in  s/p VBRT + chemo but never had surgical intervention. She reports now being s/p 5 cycles of chemo, unsure of regimen but next due .    OB/GYN HISTORY:  x 1, sAB x 2. Denies any ovarian cysts, fibroids, STIs   PSHx: b/l PCN  and exchange  , hernia repair  PMHx:  HTN   All: No Known Allergies  Meds: Metoprolol 100mg qd, unsure of other medications  Psych: denies any anxiety or depression   Social: Denies any tobacco, alcohol, or illicit substance use         (2022 03:21)    also treated for neutropenic fever during admission.   +nephrostomy tubes, IR consulted as possibly contributing to her back pain  + colectomy, colostomy  +drains LLQ    REVIEW OF SYSTEMS  Constitutional - No fever, No weight loss, No fatigue  HEENT - No eye pain, No visual disturbances, No difficulty hearing, No tinnitus, No vertigo, No neck pain  Respiratory - No cough, No wheezing, No shortness of breath  Cardiovascular - No chest pain, No palpitations  Gastrointestinal - No abdominal pain, No nausea, No vomiting, No diarrhea, No constipation  Genitourinary - No dysuria, No frequency, No hematuria, No incontinence  Neurological - No headaches, No memory loss, No loss of strength, No numbness, No tremors  Skin - No itching, No rashes, No lesions   Endocrine - No temperature intolerance  Musculoskeletal - No joint pain, No joint swelling, No muscle pain  Psychiatric - No depression, No anxiety    PAST MEDICAL & SURGICAL HISTORY  HTN (hypertension)    Cervical cancer    No significant past surgical history    Nephrostomy status        SOCIAL HISTORY  Smoking - Denied  EtOH - Denied   Drugs - Denied    FUNCTIONAL HISTORY  Lives with spouse and daughter in home with stairs to enter  Independent with cane     CURRENT FUNCTIONAL STATUS  PT    Bed Mobility  Bed Mobility Training Rehab Potential: good, to achieve stated therapy goals  Bed Mobility Training Symptoms Noted During/After Treatment: none  Bed Mobility Training Rolling/Turning: moderate assist (50% patient effort);  2 person assist  Bed Mobility Training Scooting: moderate assist (50% patient effort);  2 person assist  Bed Mobility Training Bridging: moderate assist (50% patient effort);  2 person assist  Bed Mobility Training Sit-to-Supine: moderate assist (50% patient effort);  2 person assist  Bed Mobility Training Supine-to-Sit: moderate assist (50% patient effort);  2 person assist  Bed Mobility Training Limitations: decreased flexibility;  decreased ROM;  decreased strength    Sit-Stand Transfer Training  Sit-to-Stand Transfer Training Treatment not Performed: Unable to perform due weakness.    Therapeutic Exercise  Therapeutic Exercise Rehab Effort: good  Therapeutic Exercise Symptoms Noted During/After Treatment: none  Therapeutic Exercise Detail: Active range of motion exercises for upper and lower extremities while sitting at edge of bed.         OT    Bed Mobility: Sit to Supine:     · Level of [place]	moderate assist (50% patients effort); maximum assist (25% patients effort)  · Physical Assist/Nonphysical Assist	set-up required; verbal cues; nonverbal cues (demo/gestures); 2 person assist    Bed Mobility: Supine to Sit:     · Level of [place]	moderate assist (50% patients effort)  · Physical Assist/Nonphysical Assist	set-up required; verbal cues; nonverbal cues (demo/gestures); 2 person assist    Bed Mobility Analysis:     · Bed Mobility Limitations	decreased ability to use arms for pushing/pulling; decreased ability to use legs for bridging/pushing; impaired ability to control trunk for mobility  · Impairments Contributing to Impaired Bed Mobility	decreased strength; impaired balance    Transfer: Sit to Stand:     · Level of [place]	Initiated with Maximum Assistance x2; however, pt unable to perform      FAMILY HISTORY   No pertinent family history in first degree relatives    FH: liver cancer (Mother)        RECENT LABS/IMAGING  CBC Full  -  ( 2022 06:00 )  WBC Count : 5.52 K/uL  RBC Count : 2.75 M/uL  Hemoglobin : 8.2 g/dL  Hematocrit : 26.3 %  Platelet Count - Automated : 58 K/uL  Mean Cell Volume : 95.6 fL  Mean Cell Hemoglobin : 29.8 pg  Mean Cell Hemoglobin Concentration : 31.2 gm/dL  Auto Neutrophil # : 3.81 K/uL  Auto Lymphocyte # : 0.83 K/uL  Auto Monocyte # : 0.69 K/uL  Auto Eosinophil # : 0.09 K/uL  Auto Basophil # : 0.03 K/uL  Auto Neutrophil % : 69.1 %  Auto Lymphocyte % : 15.0 %  Auto Monocyte % : 12.5 %  Auto Eosinophil % : 1.6 %  Auto Basophil % : 0.5 %        140  |  105  |  15  ----------------------------<  91  3.3<L>   |  22  |  0.71    Ca    8.2<L>      2022 06:00  Phos  1.9       Mg     1.60               VITALS  T(C): 36.9 (22 @ 14:22), Max: 37.7 (22 @ 20:29)  HR: 107 (22 @ 14:22) (100 - 117)  BP: 151/88 (22 @ 10:05) (151/88 - 153/91)  RR: 18 (22 @ 14:22) (17 - 18)  SpO2: 100% (22 @ 14:22) (97% - 100%)  Wt(kg): --    ALLERGIES  No Known Allergies      MEDICATIONS   acetaminophen     Tablet .. 650 milliGRAM(s) Oral every 6 hours PRN  enoxaparin Injectable 40 milliGRAM(s) SubCutaneous every 24 hours  metoprolol succinate  milliGRAM(s) Oral daily  multivitamin 1 Tablet(s) Oral daily  oxyCODONE    IR 5 milliGRAM(s) Oral every 4 hours PRN  pantoprazole    Tablet 40 milliGRAM(s) Oral every 12 hours  potassium phosphate / sodium phosphate Tablet (K-PHOS No. 2) 1 Tablet(s) Oral three times a day with meals      ----------------------------------------------------------------------------------------  PHYSICAL EXAM  Constitutional - NAD, Comfortable  HEENT - NCAT, EOMI  Neck - Supple, No limited ROM  Chest - CTA bilaterally, No wheeze, No rhonchi, No crackles  Cardiovascular - RRR, S1S2, No murmurs  Abdomen - BS+, Soft, NTND  Extremities - No C/C/E, No calf tenderness   Neurologic Exam -                    Cognitive - Awake, Alert, AAO to self, place, date, year, situation     Communication - Fluent, No dysarthria     Cranial Nerves - CN 2-12 intact     Motor - No focal deficits                    LEFT    UE - ShAB 5/5, EF 5/5, EE 5/5, WE 5/5,  5/5                    RIGHT UE - ShAB 5/5, EF 5/5, EE 5/5, WE 5/5,  5/5                    LEFT    LE - HF 5/5, KE 5/5, DF 5/5, PF 5/5                    RIGHT LE - HF 5/5, KE 5/5, DF 5/5, PF 5/5        Sensory - Intact to LT     Reflexes - DTR Intact, No primitive reflexive     Coordination - FTN intact     OculoVestibular - No saccades, No nystagmus, VOR         Balance - WNL Static  Psychiatric - Mood stable, Affect WNL  ----------------------------------------------------------------------------------------  ASSESSMENT/PLAN    Pain -oxy ir prn  DVT PPX - lovenox  Rehab -     incomplete note, consult in progress Patient is a 52y old  Female who presents with a chief complaint of neutropenic fever (24 Jun 2022 10:15)      HPI:    52y F with recurrent cervical CA admitted for management of neutropenic fever, now s/p emergent exploratory laparotomy, abdominal washout, rectosigmoid colectomy, diverting colostomy, bronchoscopy and Abthera placement on 6/8/22 (ebl 2000cc, s/p 5UpRBC, 3L albumin, 3U Plts, 3U FFP, 3U Cryo) for findings of increased free air on abdominal xray concerning for bowel perforation. Patient s/p washout and bridging vicryl mesh placement 6/14 w/ skin closure device place done 6/17.         +nephrostomy tubes  + colectomy, colostomy  +wound vac abd  +drains LLQ    Patient reports she ambulated independently at home, used cane at times outside the house at baseline, but was ambulatory prior to admission.  Her  at bedside states she lives with him and their 4 children ages  33-21, but they all work during the day. Patient states her goal is to go home, however her  feels she would not have enough help during the day.  She is agreeable to inpatient rehab.      REVIEW OF SYSTEMS  +back pain  +weakness    PAST MEDICAL & SURGICAL HISTORY  HTN (hypertension)    Cervical cancer    No significant past surgical history    Nephrostomy status        SOCIAL HISTORY  Smoking - Denied  EtOH - Denied   Drugs - Denied    FUNCTIONAL HISTORY  Lives with spouse and daughter in home with 3 stairs to enter, none needed inside  Independent with cane     CURRENT FUNCTIONAL STATUS  PT 6/24   Bed Mobility  Bed Mobility Training Rehab Potential: good, to achieve stated therapy goals  Bed Mobility Training Symptoms Noted During/After Treatment: none  Bed Mobility Training Rolling/Turning: moderate assist (50% patient effort);  2 person assist  Bed Mobility Training Scooting: moderate assist (50% patient effort);  2 person assist  Bed Mobility Training Bridging: moderate assist (50% patient effort);  2 person assist  Bed Mobility Training Sit-to-Supine: moderate assist (50% patient effort);  2 person assist  Bed Mobility Training Supine-to-Sit: moderate assist (50% patient effort);  2 person assist  Bed Mobility Training Limitations: decreased flexibility;  decreased ROM;  decreased strength    Sit-Stand Transfer Training  Sit-to-Stand Transfer Training Treatment not Performed: Unable to perform due weakness.    Therapeutic Exercise  Therapeutic Exercise Rehab Effort: good  Therapeutic Exercise Symptoms Noted During/After Treatment: none  Therapeutic Exercise Detail: Active range of motion exercises for upper and lower extremities while sitting at edge of bed.         OT 6/24   Bed Mobility: Sit to Supine:     · Level of [place]	moderate assist (50% patients effort); maximum assist (25% patients effort)  · Physical Assist/Nonphysical Assist	set-up required; verbal cues; nonverbal cues (demo/gestures); 2 person assist    Bed Mobility: Supine to Sit:     · Level of [place]	moderate assist (50% patients effort)  · Physical Assist/Nonphysical Assist	set-up required; verbal cues; nonverbal cues (demo/gestures); 2 person assist    Bed Mobility Analysis:     · Bed Mobility Limitations	decreased ability to use arms for pushing/pulling; decreased ability to use legs for bridging/pushing; impaired ability to control trunk for mobility  · Impairments Contributing to Impaired Bed Mobility	decreased strength; impaired balance    Transfer: Sit to Stand:     · Level of [place]	Initiated with Maximum Assistance x2; however, pt unable to perform      FAMILY HISTORY   No pertinent family history in first degree relatives    FH: liver cancer (Mother)        RECENT LABS/IMAGING  CBC Full  -  ( 24 Jun 2022 06:00 )  WBC Count : 5.52 K/uL  RBC Count : 2.75 M/uL  Hemoglobin : 8.2 g/dL  Hematocrit : 26.3 %  Platelet Count - Automated : 58 K/uL  Mean Cell Volume : 95.6 fL  Mean Cell Hemoglobin : 29.8 pg  Mean Cell Hemoglobin Concentration : 31.2 gm/dL  Auto Neutrophil # : 3.81 K/uL  Auto Lymphocyte # : 0.83 K/uL  Auto Monocyte # : 0.69 K/uL  Auto Eosinophil # : 0.09 K/uL  Auto Basophil # : 0.03 K/uL  Auto Neutrophil % : 69.1 %  Auto Lymphocyte % : 15.0 %  Auto Monocyte % : 12.5 %  Auto Eosinophil % : 1.6 %  Auto Basophil % : 0.5 %    06-24    140  |  105  |  15  ----------------------------<  91  3.3<L>   |  22  |  0.71    Ca    8.2<L>      24 Jun 2022 06:00  Phos  1.9     06-24  Mg     1.60     06-24    VITALS  T(C): 36.9 (06-24-22 @ 14:22), Max: 37.7 (06-23-22 @ 20:29)  HR: 107 (06-24-22 @ 14:22) (100 - 117)  BP: 151/88 (06-24-22 @ 10:05) (151/88 - 153/91)  RR: 18 (06-24-22 @ 14:22) (17 - 18)  SpO2: 100% (06-24-22 @ 14:22) (97% - 100%)  Wt(kg): --    ALLERGIES  No Known Allergies      MEDICATIONS   acetaminophen     Tablet .. 650 milliGRAM(s) Oral every 6 hours PRN  enoxaparin Injectable 40 milliGRAM(s) SubCutaneous every 24 hours  metoprolol succinate  milliGRAM(s) Oral daily  multivitamin 1 Tablet(s) Oral daily  oxyCODONE    IR 5 milliGRAM(s) Oral every 4 hours PRN  pantoprazole    Tablet 40 milliGRAM(s) Oral every 12 hours  potassium phosphate / sodium phosphate Tablet (K-PHOS No. 2) 1 Tablet(s) Oral three times a day with meals      ----------------------------------------------------------------------------------------  PHYSICAL EXAM  Constitutional - NAD, Comfortable, family at bedside, sitting in chair (required breana)  HEENT - NCAT, EOMI   Chest - no respiratory distress  Cardiovascular - RRR, S1S2   Abdomen - + ostomy, + wound vac + drains.  + hilliard  Extremities - + b/l LE edema  Neurologic Exam -                    Cognitive - Awake, Alert, AAO to self, place, date, year, situation     Communication - Fluent, No dysarthria     Cranial Nerves - CN 2-12 intact     Motor -                      LEFT    UE - ShAB 5/5, EF 5/5, EE 5/5, WE 5/5,  5/5                    RIGHT UE - ShAB 5/5, EF 5/5, EE 5/5, WE 5/5,  5/5                    LEFT    LE - HF 3/5                    RIGHT LE - HF 3/5        Sensory - Intact to LT      Balance - WNL Static  Psychiatric - Mood stable, Affect WNL  ----------------------------------------------------------------------------------------  ASSESSMENT/PLAN  52y F with recurrent cervical CA admitted for management of neutropenic fever, now s/p emergent exploratory laparotomy, abdominal washout, rectosigmoid colectomy, diverting colostomy, bronchoscopy and Abthera placement on 6/8/22 (ebl 2000cc, s/p 5UpRBC, 3L albumin, 3U Plts, 3U FFP, 3U Cryo) for findings of increased free air on abdominal xray concerning for bowel perforation. Patient s/p washout and bridging vicryl mesh placement 6/14 w/ skin closure device place done 6/17.    Pain -oxy ir prn  DVT PPX - lovenox  continue bedside PT and OT  Diet: regular  Rehab -   recommend subacute rehab when medically cleared. will monitor for improvement with bedside therapy as patient was previously independent however now significantly limited by weakness, and new back pain. will also need nursing care for ostomy and colostomy vs training for self care

## 2022-06-24 NOTE — PROGRESS NOTE ADULT - SUBJECTIVE AND OBJECTIVE BOX
R Gyn ONC Progress Note HD#20    Subjective:   Pt seen and examined at bedside. No events overnight. Patient endorsed back pain this morning. She was due for her oxycodone at the time of AM rounding. Patient working with PT, currently limited ambulation 2/2 leg weakness. Tolerating regular diet. Pt denies fever, chills, chest pain, SOB, nausea, vomiting, lightheadedness, dizziness.      Objective:  T(F): 98.9 (06-24-22 @ 05:52), Max: 99.9 (06-23-22 @ 20:29)  HR: 115 (06-24-22 @ 05:52) (99 - 117)  BP: 153/91 (06-24-22 @ 05:52) (150/82 - 160/90)  RR: 18 (06-24-22 @ 05:52) (17 - 18)  SpO2: 99% (06-24-22 @ 05:52) (96% - 100%)  Wt(kg): --  I&O's Summary    22 Jun 2022 07:01 - 23 Jun 2022 07:00  --------------------------------------------------------  IN: 1720 mL / OUT: 1995 mL / NET: -275 mL    23 Jun 2022 07:01  -  24 Jun 2022 06:40  --------------------------------------------------------  IN: 340 mL / OUT: 1852.5 mL / NET: -1512.5 mL      CAPILLARY BLOOD GLUCOSE          MEDICATIONS  (STANDING):  enoxaparin Injectable 40 milliGRAM(s) SubCutaneous every 24 hours  metoprolol succinate  milliGRAM(s) Oral daily  multivitamin 1 Tablet(s) Oral daily  pantoprazole    Tablet 40 milliGRAM(s) Oral every 12 hours    MEDICATIONS  (PRN):  acetaminophen     Tablet .. 650 milliGRAM(s) Oral every 6 hours PRN Mild Pain (1 - 3)  oxyCODONE    IR 5 milliGRAM(s) Oral every 4 hours PRN Severe Pain (7 - 10)      Physical Exam:  Constitutional: NAD, A+O x3  CV: RR S1S2 no m/r/g  Lungs: CTA b/l, good air flow b/l  Abd: Soft, non-distended, wound vac in place ; ostomy pink and well perfused with brownish liquid output in  ostomy bag; +3 Ric drains with seropurulent output in LLQ  : L and R nephrostomy tube draining clear urine  Extremities: No pain, +pitting edema    LABS:               8.3    4.86  )-----------( 45       ( 06-23 @ 05:22 )             26.6                8.7    4.92  )-----------( 39       ( 06-22 @ 06:43 )             28.3       06-23    142    |  105    |  14     ----------------------------<  97     3.3<L>   |  24     |  0.73     Ca    7.9<L>      23 Jun 2022 05:22  Phos  2.1       06-23  Mg     1.40      06-23

## 2022-06-25 DIAGNOSIS — F05 DELIRIUM DUE TO KNOWN PHYSIOLOGICAL CONDITION: ICD-10-CM

## 2022-06-25 DIAGNOSIS — M54.9 DORSALGIA, UNSPECIFIED: ICD-10-CM

## 2022-06-25 DIAGNOSIS — Z29.9 ENCOUNTER FOR PROPHYLACTIC MEASURES, UNSPECIFIED: ICD-10-CM

## 2022-06-25 LAB
ANION GAP SERPL CALC-SCNC: 18 MMOL/L — HIGH (ref 7–14)
BASOPHILS # BLD AUTO: 0 K/UL — SIGNIFICANT CHANGE UP (ref 0–0.2)
BASOPHILS NFR BLD AUTO: 0 % — SIGNIFICANT CHANGE UP (ref 0–2)
BUN SERPL-MCNC: 16 MG/DL — SIGNIFICANT CHANGE UP (ref 7–23)
CALCIUM SERPL-MCNC: 8.2 MG/DL — LOW (ref 8.4–10.5)
CHLORIDE SERPL-SCNC: 103 MMOL/L — SIGNIFICANT CHANGE UP (ref 98–107)
CO2 SERPL-SCNC: 19 MMOL/L — LOW (ref 22–31)
CREAT SERPL-MCNC: 0.72 MG/DL — SIGNIFICANT CHANGE UP (ref 0.5–1.3)
EGFR: 99 ML/MIN/1.73M2 — SIGNIFICANT CHANGE UP
EOSINOPHIL # BLD AUTO: 0 K/UL — SIGNIFICANT CHANGE UP (ref 0–0.5)
EOSINOPHIL NFR BLD AUTO: 0 % — SIGNIFICANT CHANGE UP (ref 0–6)
GLUCOSE SERPL-MCNC: 84 MG/DL — SIGNIFICANT CHANGE UP (ref 70–99)
HCT VFR BLD CALC: 29.4 % — LOW (ref 34.5–45)
HGB BLD-MCNC: 9 G/DL — LOW (ref 11.5–15.5)
IANC: 3.46 K/UL — SIGNIFICANT CHANGE UP (ref 1.8–7.4)
LYMPHOCYTES # BLD AUTO: 0.82 K/UL — LOW (ref 1–3.3)
LYMPHOCYTES # BLD AUTO: 16 % — SIGNIFICANT CHANGE UP (ref 13–44)
MAGNESIUM SERPL-MCNC: 1.4 MG/DL — LOW (ref 1.6–2.6)
MCHC RBC-ENTMCNC: 29.4 PG — SIGNIFICANT CHANGE UP (ref 27–34)
MCHC RBC-ENTMCNC: 30.6 GM/DL — LOW (ref 32–36)
MCV RBC AUTO: 96.1 FL — SIGNIFICANT CHANGE UP (ref 80–100)
MONOCYTES # BLD AUTO: 0.62 K/UL — SIGNIFICANT CHANGE UP (ref 0–0.9)
MONOCYTES NFR BLD AUTO: 12 % — SIGNIFICANT CHANGE UP (ref 2–14)
NEUTROPHILS # BLD AUTO: 3.61 K/UL — SIGNIFICANT CHANGE UP (ref 1.8–7.4)
NEUTROPHILS NFR BLD AUTO: 68 % — SIGNIFICANT CHANGE UP (ref 43–77)
PHOSPHATE SERPL-MCNC: 2.7 MG/DL — SIGNIFICANT CHANGE UP (ref 2.5–4.5)
PLATELET # BLD AUTO: 75 K/UL — LOW (ref 150–400)
POTASSIUM SERPL-MCNC: 3.4 MMOL/L — LOW (ref 3.5–5.3)
POTASSIUM SERPL-SCNC: 3.4 MMOL/L — LOW (ref 3.5–5.3)
RBC # BLD: 3.06 M/UL — LOW (ref 3.8–5.2)
RBC # FLD: 16.3 % — HIGH (ref 10.3–14.5)
SODIUM SERPL-SCNC: 140 MMOL/L — SIGNIFICANT CHANGE UP (ref 135–145)
WBC # BLD: 5.15 K/UL — SIGNIFICANT CHANGE UP (ref 3.8–10.5)
WBC # FLD AUTO: 5.15 K/UL — SIGNIFICANT CHANGE UP (ref 3.8–10.5)

## 2022-06-25 PROCEDURE — 99233 SBSQ HOSP IP/OBS HIGH 50: CPT

## 2022-06-25 RX ORDER — MAGNESIUM SULFATE 500 MG/ML
2 VIAL (ML) INJECTION ONCE
Refills: 0 | Status: COMPLETED | OUTPATIENT
Start: 2022-06-25 | End: 2022-06-25

## 2022-06-25 RX ORDER — SENNA PLUS 8.6 MG/1
2 TABLET ORAL AT BEDTIME
Refills: 0 | Status: DISCONTINUED | OUTPATIENT
Start: 2022-06-25 | End: 2022-06-30

## 2022-06-25 RX ORDER — POTASSIUM CHLORIDE 20 MEQ
10 PACKET (EA) ORAL
Refills: 0 | Status: COMPLETED | OUTPATIENT
Start: 2022-06-25 | End: 2022-06-25

## 2022-06-25 RX ADMIN — Medication 1 TABLET(S): at 12:13

## 2022-06-25 RX ADMIN — Medication 1 TABLET(S): at 09:04

## 2022-06-25 RX ADMIN — OXYCODONE HYDROCHLORIDE 5 MILLIGRAM(S): 5 TABLET ORAL at 23:00

## 2022-06-25 RX ADMIN — Medication 650 MILLIGRAM(S): at 05:53

## 2022-06-25 RX ADMIN — OXYCODONE HYDROCHLORIDE 5 MILLIGRAM(S): 5 TABLET ORAL at 19:05

## 2022-06-25 RX ADMIN — PANTOPRAZOLE SODIUM 40 MILLIGRAM(S): 20 TABLET, DELAYED RELEASE ORAL at 09:04

## 2022-06-25 RX ADMIN — OXYCODONE HYDROCHLORIDE 5 MILLIGRAM(S): 5 TABLET ORAL at 02:33

## 2022-06-25 RX ADMIN — OXYCODONE HYDROCHLORIDE 5 MILLIGRAM(S): 5 TABLET ORAL at 18:06

## 2022-06-25 RX ADMIN — OXYCODONE HYDROCHLORIDE 5 MILLIGRAM(S): 5 TABLET ORAL at 14:30

## 2022-06-25 RX ADMIN — OXYCODONE HYDROCHLORIDE 5 MILLIGRAM(S): 5 TABLET ORAL at 03:44

## 2022-06-25 RX ADMIN — Medication 100 MILLIEQUIVALENT(S): at 13:48

## 2022-06-25 RX ADMIN — Medication 650 MILLIGRAM(S): at 16:59

## 2022-06-25 RX ADMIN — OXYCODONE HYDROCHLORIDE 5 MILLIGRAM(S): 5 TABLET ORAL at 22:05

## 2022-06-25 RX ADMIN — Medication 100 MILLIEQUIVALENT(S): at 17:21

## 2022-06-25 RX ADMIN — Medication 25 GRAM(S): at 12:54

## 2022-06-25 RX ADMIN — OXYCODONE HYDROCHLORIDE 5 MILLIGRAM(S): 5 TABLET ORAL at 13:48

## 2022-06-25 RX ADMIN — Medication 100 MILLIEQUIVALENT(S): at 15:30

## 2022-06-25 RX ADMIN — Medication 650 MILLIGRAM(S): at 18:00

## 2022-06-25 RX ADMIN — PANTOPRAZOLE SODIUM 40 MILLIGRAM(S): 20 TABLET, DELAYED RELEASE ORAL at 22:05

## 2022-06-25 RX ADMIN — ENOXAPARIN SODIUM 40 MILLIGRAM(S): 100 INJECTION SUBCUTANEOUS at 12:13

## 2022-06-25 RX ADMIN — SENNA PLUS 2 TABLET(S): 8.6 TABLET ORAL at 22:07

## 2022-06-25 RX ADMIN — Medication 100 MILLIGRAM(S): at 05:47

## 2022-06-25 NOTE — PROGRESS NOTE ADULT - PROBLEM SELECTOR PLAN 2
-initially presented with profound neutropenic sepsis  -now resolved -currently c/o back pain  -per patient and family due to uncomfortable bed  -at this time suspecting musculoskeletal in nature  -reports oxy helps, can try lidocaine patch as well  -if no improvement in oxy 5mg, can consider sparing doses of toradol if no objection from gyn/onc  -flexeril 5mg PRN can also be tried for symptom control  -will monitor

## 2022-06-25 NOTE — PROGRESS NOTE ADULT - PROBLEM SELECTOR PLAN 4
-c/w toprol 100 daily  -bp acceptable -s/p b/l NT tubes  -draining well   -renal function at baseline

## 2022-06-25 NOTE — PROGRESS NOTE ADULT - PROBLEM SELECTOR PLAN 7
-lovenox -patient restless and sometimes makes confusing statements per daughter  -suspect combination of delirium from prolonged hospitalization, uncontrolled pain, narcotics, post operative delirium  -CTH unremarkable on 6/8  -needs mobilization/PT/redirection

## 2022-06-25 NOTE — PROGRESS NOTE ADULT - PROBLEM SELECTOR PLAN 6
-patient restless and sometimes makes confusing statements per daughter  -suspect combination of delirium from prolonged hospitalization, uncontrolled pain, narcotics, post operative delirium  -CTH unremarkable on 6/8  -needs mobilization/PT/redirection -improving  -now in 70s

## 2022-06-25 NOTE — PROGRESS NOTE ADULT - ASSESSMENT
52y  with recurrent cervical CA admitted for management of neutropenic fever, now s/p emergent exploratory laparotomy, abdominal washout, rectosigmoid colectomy, diverting colostomy, bronchoscopy and Abthera placement on 22 (ebl 2000cc, s/p 5UpRBC, 3L albumin, 3U Plts, 3U FFP, 3U Cryo) for findings of increased free air on abdominal xray concerning for bowel perforation. Patient s/p washout and bridging vicryl mesh placement to close fascial gap on  w/ skin closure device place don . Pt now extubated, hemodynamically stable, and clinically improving.       Neuro: Oxy and tylenol PRN  CV: Hemodynamically stable, H/H stable w/ slowly rising platelets, c/w metoprolol for hx of htn   Pulm: O2 sat WNL on RA, Increase ambulation, encourage incentive spirometry use  GI: Tolerating regular diet s/p successful swallow evaluation.  Continue w/ protonix BID  : b/l nephrostomy tubes. Prior ASHLEY resolving w/ continuing downtrending Cr (0.71), f/u am BMP  Heme: DVT ppx: Lovenox, SCDs while in bed.  Thrombocytopenia likely multifactorial, currently improving spontaneously  ID: Afebrile, No signs of infection at this time. s/p Zosyn (-), s/p Caspofungin and Cefepime, s/p Zarixo (-6/10) for neutropenic fever   MSK: continue working with PT and OT, also evaluated by PMR-recs: subacute rehab.  dispo: continue inpatient care    Yosi Cortés PA-C  #[id number]

## 2022-06-25 NOTE — PROGRESS NOTE ADULT - PROBLEM SELECTOR PLAN 1
GYN ONC Fellow Addendum:    Pt seen and examined at bedside. Agree with above. Persistent back pain overnight but states slightly improved from yesterday    VS reviewed  Labs reviewed. thrombocytopenia stable    Continue current pain regimen. New bed ordered for back pain, awaiting arrival  Tolerating reg diet, encourage PO hydration appears dry on exam w/ tachycardia overnight  Bowel regimen  Appreciate gen surg recs  Encourage ambulation and IS use  Replete lytes prn  GHOS  DVT ppx: Lovenox  Dispo: cont inpt care    Guadalupe Jay MD

## 2022-06-25 NOTE — PROGRESS NOTE ADULT - ASSESSMENT
52y with a PMHx of HTN, cervical CA (stage III)  on chemo - last session last week - c/b b/l hydronephrosis s/p B/L nephrostomy tube placement, p/w severe sepsis from neutropenic fever. Pt was acutely altered yesterday afternoon without improvement and AXR showed increased free air with concern for perforated viscous. She is now s/p multiple washouts, open resection of left colon, with transverse colostomy creation now s/p fascial closure with bridging vicryl mesh and skin closure with dermaclose by plastic surgery. Extubated 6/19. Recovering in SICU for close hemodynamic monitoring.     Plan:  - plan to continue drains outpatient  - Monitor ostomy function and SARAH output    D Team Surgery  [id number]

## 2022-06-25 NOTE — PROGRESS NOTE ADULT - PROBLEM SELECTOR PLAN 3
-s/p b/l NT tubes  -draining well   -renal function at baseline -initially presented with profound neutropenic sepsis  -now resolved

## 2022-06-25 NOTE — PROGRESS NOTE ADULT - SUBJECTIVE AND OBJECTIVE BOX
Subjective:   Patient seen at bedside this AM. Reports feeling well, without complaints. Denies chest pain, SOB. Tolerating diet without N/V.     24h Events:   - Overnight, no acute events    Objective:  Vital Signs  T(C): 36.3 (06-25 @ 09:45), Max: 37.1 (06-24 @ 10:05)  HR: 103 (06-25 @ 09:45) (103 - 113)  BP: 150/97 (06-25 @ 09:45) (148/88 - 176/93)  RR: 18 (06-25 @ 09:45) (17 - 18)  SpO2: 95% (06-25 @ 09:45) (95% - 100%)  06-24-22 @ 07:01  -  06-25-22 @ 07:00  --------------------------------------------------------  IN:  Total IN: 0 mL    OUT:    Colostomy (mL): 100 mL    Drain (mL): 37 mL    Drain (mL): 62.5 mL    Drain (mL): 85 mL    Nephrostomy Tube (mL): 665 mL    Nephrostomy Tube (mL): 360 mL    VAC (Vacuum Assisted Closure) System (mL): 40 mL  Total OUT: 1349.5 mL    Total NET: -1349.5 mL    EXAM    General: NAD, resting in bed comfortably.  Cardiac: regular rate, warm and well perfused  Respiratory: Nonlabored respirations, normal cw expansion.  Abdomen: soft, nontender, nondistended, midline wound with black sponge wound vac in place with adequate suction, colostomy pink/viable +/+, yanet drain x3 with seropurulent output.  Extremities: normal strength, FROM, no deformities         Labs:                        8.2    5.52  )-----------( 58       ( 24 Jun 2022 06:00 )             26.3   06-24    140  |  105  |  15  ----------------------------<  91  3.3<L>   |  22  |  0.71    Ca    8.2<L>      24 Jun 2022 06:00  Phos  1.9     06-24  Mg     1.60     06-24      CAPILLARY BLOOD GLUCOSE          Medications:   MEDICATIONS  (STANDING):  enoxaparin Injectable 40 milliGRAM(s) SubCutaneous every 24 hours  metoprolol succinate  milliGRAM(s) Oral daily  multivitamin 1 Tablet(s) Oral daily  pantoprazole    Tablet 40 milliGRAM(s) Oral every 12 hours    MEDICATIONS  (PRN):  acetaminophen     Tablet .. 650 milliGRAM(s) Oral every 6 hours PRN Mild Pain (1 - 3)  oxyCODONE    IR 5 milliGRAM(s) Oral every 4 hours PRN Severe Pain (7 - 10)      Imaging:

## 2022-06-25 NOTE — PROGRESS NOTE ADULT - ASSESSMENT
53y/o F with recurrent cervical CA admitted for management of neutropenic fever, now s/p emergent exploratory laparotomy, abdominal washout, rectosigmoid colectomy, diverting colostomy, bronchoscopy and Abthera placement on 6/8/22 for findings of increased free air on abdominal xray concerning for bowel perforation. Patient s/p washout and bridging vicryl mesh placement to close fascial gap on 6/14 w/ skin closure device place don 6/17, now clinically improving

## 2022-06-25 NOTE — PROGRESS NOTE ADULT - SUBJECTIVE AND OBJECTIVE BOX
Division of Hospital Medicine  Dr. Nubia Fonseca  Pager [phone]    Patient is a 52y old  Female who presents with a chief complaint of neutropenic fever (25 Jun 2022 09:48)    SUBJECTIVE / OVERNIGHT EVENTS: patient seen and examined. Daughter and RN at bedside. Patient c/o back pain. Says improvement in pain with oxycodone. Does not want lidocaine patch. Daughter at bedside concerned about patient's mental status. Patient seems to be restless, asking to be moved to chair and next minute wants to stay in bed. Intermittently wants to play games. Patient overall with poor appetite but is tolerating regular diet. Both NT draining clear urine. Ostomy appears well as well. Wound vac in place.     MEDICATIONS  (STANDING):  enoxaparin Injectable 40 milliGRAM(s) SubCutaneous every 24 hours  metoprolol succinate  milliGRAM(s) Oral daily  multivitamin 1 Tablet(s) Oral daily  pantoprazole    Tablet 40 milliGRAM(s) Oral every 12 hours  senna 2 Tablet(s) Oral at bedtime    MEDICATIONS  (PRN):  acetaminophen     Tablet .. 650 milliGRAM(s) Oral every 6 hours PRN Mild Pain (1 - 3)  oxyCODONE    IR 5 milliGRAM(s) Oral every 4 hours PRN Severe Pain (7 - 10)        Vital Signs Last 24 Hrs  T(C): 36.6 (25 Jun 2022 13:40), Max: 36.9 (25 Jun 2022 02:20)  T(F): 97.9 (25 Jun 2022 13:40), Max: 98.5 (25 Jun 2022 02:20)  HR: 105 (25 Jun 2022 13:40) (103 - 112)  BP: 134/84 (25 Jun 2022 13:40) (134/84 - 153/91)  BP(mean): --  RR: 17 (25 Jun 2022 13:40) (17 - 18)  SpO2: 95% (25 Jun 2022 13:40) (95% - 100%)        I&O's Summary    24 Jun 2022 07:01  -  25 Jun 2022 07:00  --------------------------------------------------------  IN: 0 mL / OUT: 1349.5 mL / NET: -1349.5 mL    25 Jun 2022 07:01  -  25 Jun 2022 18:37  --------------------------------------------------------  IN: 750 mL / OUT: 1012.5 mL / NET: -262.5 mL        PHYSICAL EXAM:  GENERAL: NAD, well-developed, sitting in bed  HEAD:  Atraumatic, Normocephalic  EYES: EOMI, PERRLA, conjunctiva and sclera clear  NECK: Supple, No JVD  CHEST/LUNG: Clear to auscultation anteriorly no wheeze   HEART: Regular rate and rhythm  ABDOMEN: ostomy+, large midline wound with drain, +NTBL  EXTREMITIES:  2+ Peripheral Pulses, No clubbing, cyanosis, or edema  PSYCH: AAOx2-3  NEUROLOGY: non-focal  SKIN: No rashes or lesions    LABS:                        9.0    5.15  )-----------( 75       ( 25 Jun 2022 11:05 )             29.4     06-25    140  |  103  |  16  ----------------------------<  84  3.4<L>   |  19<L>  |  0.72    Ca    8.2<L>      25 Jun 2022 11:05  Phos  2.7     06-25  Mg     1.40     06-25      RADIOLOGY & ADDITIONAL TESTS: < from: CT Head No Cont (06.08.22 @ 14:35) >  Impression:  Unremarkable noncontrast head CT.    < end of copied text >      Imaging Personally Reviewed:    Consultant(s) Notes Reviewed:  surgery    Care Discussed with Consultants/Other Providers:    Assessment and Plan:

## 2022-06-25 NOTE — PROGRESS NOTE ADULT - PROBLEM SELECTOR PLAN 1
-h/o recurrent cervical CA, receives Gyn Oncology care at [facility] ([place]).   -she initially was diagnosed with stage III cervical CA in 2014 s/p VBRT + chemo but never had surgical intervention. -Reported now being s/p 5 cycles of chemo, unsure of regimen but next due 6/21/22.  -will need outpt follow up with med onc for plans for future chemo/management  -course complicated by bowel perforation s/p washout and bridging vicryl mesh placement to close fascial gap on 6/14 w/ skin closure device place don 6/17  -further management per general surgery and plastic surgery  -tolerating regular diet  -needs PT, wound care, rehab planning

## 2022-06-25 NOTE — PROGRESS NOTE ADULT - SUBJECTIVE AND OBJECTIVE BOX
Gyn ONC PA Progress Note HD#21    Subjective:   Pt seen and examined at bedside. No events overnight. Patient endorsing back pain has improved since yesterday (Oxycodone taken prn), Patient working with PT and OT; currently limited ambulation 2/2 leg weakness. Tolerating regular diet. Pt denies fever, chills, chest pain, SOB, nausea, vomiting, lightheadedness, dizziness.      Objective:  Vital Signs Last 24 Hrs  T(C): 36.5 (25 Jun 2022 06:00), Max: 37.1 (24 Jun 2022 10:05)  T(F): 97.7 (25 Jun 2022 06:00), Max: 98.7 (24 Jun 2022 10:05)  HR: 110 (25 Jun 2022 06:00) (107 - 113)  BP: 153/91 (25 Jun 2022 06:00) (148/88 - 176/93)  BP(mean): --  RR: 17 (25 Jun 2022 06:00) (17 - 18)  SpO2: 100% (25 Jun 2022 06:00) (98% - 100%)  I&O's Summary    22 Jun 2022 07:01  -  23 Jun 2022 07:00  --------------------------------------------------------  IN: 1720 mL / OUT: 1995 mL / NET: -275 mL    23 Jun 2022 07:01  -  24 Jun 2022 06:40  --------------------------------------------------------  IN: 340 mL / OUT: 1852.5 mL / NET: -1512.5 mL      CAPILLARY BLOOD GLUCOSE          MEDICATIONS  (STANDING):  enoxaparin Injectable 40 milliGRAM(s) SubCutaneous every 24 hours  metoprolol succinate  milliGRAM(s) Oral daily  multivitamin 1 Tablet(s) Oral daily  pantoprazole    Tablet 40 milliGRAM(s) Oral every 12 hours    MEDICATIONS  (PRN):  acetaminophen     Tablet .. 650 milliGRAM(s) Oral every 6 hours PRN Mild Pain (1 - 3)  oxyCODONE    IR 5 milliGRAM(s) Oral every 4 hours PRN Severe Pain (7 - 10)      Physical Exam:  Constitutional: NAD, A+O x3  CV: RR S1S2 no m/r/g  Lungs: CTA b/l, good air flow b/l  Abd: Soft, non-distended, wound vac in place ; ostomy pink and well perfused with brownish liquid output in  ostomy bag; +3 Ric drains with seropurulent output in LLQ  : L and R nephrostomy tube draining clear urine  Extremities: No pain, +pitting edema    LABS:               8.2    5.52  )-----------( 58       ( 06-24 @ 06:00 )             26.3                8.3    4.86  )-----------( 45       ( 06-23 @ 05:22 )             26.6     06-24    140  |  105  |  15  ----------------------------<  91  3.3<L>   |  22  |  0.71        06-23    142    |  105    |  14     ----------------------------<  97     3.3<L>   |  24     |  0.73     Ca    7.9<L>      23 Jun 2022 05:22  Phos  2.1       06-23  Mg     1.40      06-23

## 2022-06-26 DIAGNOSIS — R00.0 TACHYCARDIA, UNSPECIFIED: ICD-10-CM

## 2022-06-26 LAB
ANION GAP SERPL CALC-SCNC: 18 MMOL/L — HIGH (ref 7–14)
APTT BLD: 32.9 SEC — SIGNIFICANT CHANGE UP (ref 27–36.3)
BASOPHILS # BLD AUTO: 0.09 K/UL — SIGNIFICANT CHANGE UP (ref 0–0.2)
BASOPHILS NFR BLD AUTO: 1.3 % — SIGNIFICANT CHANGE UP (ref 0–2)
BLD GP AB SCN SERPL QL: NEGATIVE — SIGNIFICANT CHANGE UP
BUN SERPL-MCNC: 16 MG/DL — SIGNIFICANT CHANGE UP (ref 7–23)
CALCIUM SERPL-MCNC: 8.2 MG/DL — LOW (ref 8.4–10.5)
CHLORIDE SERPL-SCNC: 102 MMOL/L — SIGNIFICANT CHANGE UP (ref 98–107)
CO2 SERPL-SCNC: 20 MMOL/L — LOW (ref 22–31)
CREAT SERPL-MCNC: 0.71 MG/DL — SIGNIFICANT CHANGE UP (ref 0.5–1.3)
EGFR: 101 ML/MIN/1.73M2 — SIGNIFICANT CHANGE UP
EOSINOPHIL # BLD AUTO: 0.04 K/UL — SIGNIFICANT CHANGE UP (ref 0–0.5)
EOSINOPHIL NFR BLD AUTO: 0.6 % — SIGNIFICANT CHANGE UP (ref 0–6)
GLUCOSE SERPL-MCNC: 93 MG/DL — SIGNIFICANT CHANGE UP (ref 70–99)
HCT VFR BLD CALC: 28 % — LOW (ref 34.5–45)
HGB BLD-MCNC: 9 G/DL — LOW (ref 11.5–15.5)
IANC: 4.7 K/UL — SIGNIFICANT CHANGE UP (ref 1.8–7.4)
IMM GRANULOCYTES NFR BLD AUTO: 3.4 % — HIGH (ref 0–1.5)
INR BLD: 1.37 RATIO — HIGH (ref 0.88–1.16)
LYMPHOCYTES # BLD AUTO: 0.78 K/UL — LOW (ref 1–3.3)
LYMPHOCYTES # BLD AUTO: 11.2 % — LOW (ref 13–44)
MAGNESIUM SERPL-MCNC: 1.6 MG/DL — SIGNIFICANT CHANGE UP (ref 1.6–2.6)
MCHC RBC-ENTMCNC: 30.4 PG — SIGNIFICANT CHANGE UP (ref 27–34)
MCHC RBC-ENTMCNC: 32.1 GM/DL — SIGNIFICANT CHANGE UP (ref 32–36)
MCV RBC AUTO: 94.6 FL — SIGNIFICANT CHANGE UP (ref 80–100)
MONOCYTES # BLD AUTO: 1.14 K/UL — HIGH (ref 0–0.9)
MONOCYTES NFR BLD AUTO: 16.3 % — HIGH (ref 2–14)
NEUTROPHILS # BLD AUTO: 4.7 K/UL — SIGNIFICANT CHANGE UP (ref 1.8–7.4)
NEUTROPHILS NFR BLD AUTO: 67.2 % — SIGNIFICANT CHANGE UP (ref 43–77)
NRBC # BLD: 0 /100 WBCS — SIGNIFICANT CHANGE UP
NRBC # FLD: 0.06 K/UL — HIGH
PHOSPHATE SERPL-MCNC: 2.7 MG/DL — SIGNIFICANT CHANGE UP (ref 2.5–4.5)
PLATELET # BLD AUTO: 78 K/UL — LOW (ref 150–400)
POTASSIUM SERPL-MCNC: 3.5 MMOL/L — SIGNIFICANT CHANGE UP (ref 3.5–5.3)
POTASSIUM SERPL-SCNC: 3.5 MMOL/L — SIGNIFICANT CHANGE UP (ref 3.5–5.3)
PROTHROM AB SERPL-ACNC: 16 SEC — HIGH (ref 10.5–13.4)
RBC # BLD: 2.96 M/UL — LOW (ref 3.8–5.2)
RBC # FLD: 16.5 % — HIGH (ref 10.3–14.5)
RH IG SCN BLD-IMP: POSITIVE — SIGNIFICANT CHANGE UP
SODIUM SERPL-SCNC: 140 MMOL/L — SIGNIFICANT CHANGE UP (ref 135–145)
WBC # BLD: 6.99 K/UL — SIGNIFICANT CHANGE UP (ref 3.8–10.5)
WBC # FLD AUTO: 6.99 K/UL — SIGNIFICANT CHANGE UP (ref 3.8–10.5)

## 2022-06-26 PROCEDURE — 99233 SBSQ HOSP IP/OBS HIGH 50: CPT

## 2022-06-26 PROCEDURE — 93010 ELECTROCARDIOGRAM REPORT: CPT

## 2022-06-26 RX ORDER — OXYCODONE HYDROCHLORIDE 5 MG/1
10 TABLET ORAL EVERY 6 HOURS
Refills: 0 | Status: DISCONTINUED | OUTPATIENT
Start: 2022-06-26 | End: 2022-06-29

## 2022-06-26 RX ORDER — SODIUM CHLORIDE 9 MG/ML
500 INJECTION, SOLUTION INTRAVENOUS ONCE
Refills: 0 | Status: COMPLETED | OUTPATIENT
Start: 2022-06-26 | End: 2022-06-26

## 2022-06-26 RX ORDER — ACETAMINOPHEN 500 MG
975 TABLET ORAL EVERY 6 HOURS
Refills: 0 | Status: DISCONTINUED | OUTPATIENT
Start: 2022-06-26 | End: 2022-06-30

## 2022-06-26 RX ORDER — POTASSIUM CHLORIDE 20 MEQ
10 PACKET (EA) ORAL
Refills: 0 | Status: COMPLETED | OUTPATIENT
Start: 2022-06-26 | End: 2022-06-26

## 2022-06-26 RX ORDER — LIDOCAINE 4 G/100G
1 CREAM TOPICAL DAILY
Refills: 0 | Status: DISCONTINUED | OUTPATIENT
Start: 2022-06-26 | End: 2022-07-20

## 2022-06-26 RX ORDER — METOPROLOL TARTRATE 50 MG
50 TABLET ORAL
Refills: 0 | Status: DISCONTINUED | OUTPATIENT
Start: 2022-06-26 | End: 2022-06-30

## 2022-06-26 RX ORDER — ONDANSETRON 8 MG/1
4 TABLET, FILM COATED ORAL ONCE
Refills: 0 | Status: COMPLETED | OUTPATIENT
Start: 2022-06-26 | End: 2022-06-26

## 2022-06-26 RX ORDER — OXYCODONE HYDROCHLORIDE 5 MG/1
5 TABLET ORAL EVERY 4 HOURS
Refills: 0 | Status: DISCONTINUED | OUTPATIENT
Start: 2022-06-26 | End: 2022-06-30

## 2022-06-26 RX ORDER — CYCLOBENZAPRINE HYDROCHLORIDE 10 MG/1
5 TABLET, FILM COATED ORAL THREE TIMES A DAY
Refills: 0 | Status: DISCONTINUED | OUTPATIENT
Start: 2022-06-26 | End: 2022-07-20

## 2022-06-26 RX ORDER — MAGNESIUM SULFATE 500 MG/ML
2 VIAL (ML) INJECTION ONCE
Refills: 0 | Status: COMPLETED | OUTPATIENT
Start: 2022-06-26 | End: 2022-06-26

## 2022-06-26 RX ADMIN — ENOXAPARIN SODIUM 40 MILLIGRAM(S): 100 INJECTION SUBCUTANEOUS at 13:20

## 2022-06-26 RX ADMIN — ONDANSETRON 4 MILLIGRAM(S): 8 TABLET, FILM COATED ORAL at 19:10

## 2022-06-26 RX ADMIN — Medication 100 MILLIEQUIVALENT(S): at 18:05

## 2022-06-26 RX ADMIN — Medication 50 MILLIGRAM(S): at 01:58

## 2022-06-26 RX ADMIN — SODIUM CHLORIDE 1000 MILLILITER(S): 9 INJECTION, SOLUTION INTRAVENOUS at 18:45

## 2022-06-26 RX ADMIN — LIDOCAINE 1 PATCH: 4 CREAM TOPICAL at 18:41

## 2022-06-26 RX ADMIN — CYCLOBENZAPRINE HYDROCHLORIDE 5 MILLIGRAM(S): 10 TABLET, FILM COATED ORAL at 16:09

## 2022-06-26 RX ADMIN — Medication 1 TABLET(S): at 13:20

## 2022-06-26 RX ADMIN — Medication 100 MILLIEQUIVALENT(S): at 16:10

## 2022-06-26 RX ADMIN — Medication 50 MILLIGRAM(S): at 13:20

## 2022-06-26 RX ADMIN — LIDOCAINE 1 PATCH: 4 CREAM TOPICAL at 16:09

## 2022-06-26 RX ADMIN — OXYCODONE HYDROCHLORIDE 10 MILLIGRAM(S): 5 TABLET ORAL at 14:45

## 2022-06-26 RX ADMIN — PANTOPRAZOLE SODIUM 40 MILLIGRAM(S): 20 TABLET, DELAYED RELEASE ORAL at 13:19

## 2022-06-26 RX ADMIN — Medication 100 MILLIEQUIVALENT(S): at 14:53

## 2022-06-26 RX ADMIN — OXYCODONE HYDROCHLORIDE 10 MILLIGRAM(S): 5 TABLET ORAL at 14:11

## 2022-06-26 RX ADMIN — PANTOPRAZOLE SODIUM 40 MILLIGRAM(S): 20 TABLET, DELAYED RELEASE ORAL at 21:26

## 2022-06-26 RX ADMIN — Medication 975 MILLIGRAM(S): at 18:08

## 2022-06-26 RX ADMIN — SENNA PLUS 2 TABLET(S): 8.6 TABLET ORAL at 21:26

## 2022-06-26 RX ADMIN — Medication 975 MILLIGRAM(S): at 18:40

## 2022-06-26 RX ADMIN — SODIUM CHLORIDE 1000 MILLILITER(S): 9 INJECTION, SOLUTION INTRAVENOUS at 01:55

## 2022-06-26 RX ADMIN — Medication 25 GRAM(S): at 13:21

## 2022-06-26 NOTE — PROGRESS NOTE ADULT - ASSESSMENT
52y  with recurrent cervical CA admitted for management of neutropenic fever, now s/p emergent exploratory laparotomy, abdominal washout, rectosigmoid colectomy, diverting colostomy, bronchoscopy and Abthera placement on 22 (ebl 2000cc, s/p 5UpRBC, 3L albumin, 3U Plts, 3U FFP, 3U Cryo) for findings of increased free air on abdominal xray concerning for bowel perforation. Patient s/p washout and bridging vicryl mesh placement to close fascial gap on  w/ skin closure device place don . Pt now extubated, hemodynamically stable, and clinically improving.       Neuro: Oxy and tylenol PRN  CV: Hemodynamically stable, H/H stable w/ slowly rising platelets, c/w Lopressor 50mg BID   Pulm: O2 sat WNL on RA, Increase ambulation, encourage incentive spirometry use  GI: Tolerating regular diet s/p successful swallow evaluation.  Continue w/ protonix BID  : b/l nephrostomy tubes. Prior ASHLEY resolving w/ continuing downtrending Cr (0.71), f/u am BMP  Heme: DVT ppx: Lovenox, SCDs while in bed.  Thrombocytopenia likely multifactorial, currently improving spontaneously  ID: Afebrile, No signs of infection at this time. s/p Zosyn (-), s/p Caspofungin and Cefepime, s/p Zarixo (-6/10) for neutropenic fever   MSK: continue working with PT and OT, also evaluated by PMR-recs: subacute rehab.  dispo: continue inpatient care    Yosi Cortés PA-C  #[id number]

## 2022-06-26 NOTE — PROGRESS NOTE ADULT - PROBLEM SELECTOR PLAN 3
-vitals reviewed and patient since admission had HR in 100s-110s  -CTPA negative on admission  - currently no fever or hypoxia  -DVT study negative  -suspect tachycardia driven by pain and debility  -would address pain first, if remains tachy, consider ctpa again  -c/w metoprolol

## 2022-06-26 NOTE — PROGRESS NOTE ADULT - PROBLEM SELECTOR PLAN 2
-currently c/o back pain  -per patient and family due to uncomfortable bed  -at this time suspecting musculoskeletal in nature  -reports oxy inadequate for pain control  -needs multimodal pain control, can try lidocaine patch, ATC tylenol and flexeril PRN for better pain control  -if no improvement in oxy 5mg, can consider sparing doses of toradol if no objection from gyn/onc  -will monitor

## 2022-06-26 NOTE — PROGRESS NOTE ADULT - SUBJECTIVE AND OBJECTIVE BOX
Division of Hospital Medicine  Dr. Nubia Fonseca  Pager [phone]    Patient is a 52y old  Female who presents with a chief complaint of neutropenic fever (25 Jun 2022 09:48)    SUBJECTIVE / OVERNIGHT EVENTS: patient seen and examined. Reports low back pain still. Restless in bed. AAO x2 to self and place. Denies chest pain or SOB. Willing to move to chair from bed.     MEDICATIONS  (STANDING):  enoxaparin Injectable 40 milliGRAM(s) SubCutaneous every 24 hours  metoprolol succinate  milliGRAM(s) Oral daily  multivitamin 1 Tablet(s) Oral daily  pantoprazole    Tablet 40 milliGRAM(s) Oral every 12 hours  senna 2 Tablet(s) Oral at bedtime    MEDICATIONS  (PRN):  acetaminophen     Tablet .. 650 milliGRAM(s) Oral every 6 hours PRN Mild Pain (1 - 3)  oxyCODONE    IR 5 milliGRAM(s) Oral every 4 hours PRN Severe Pain (7 - 10)      Vital Signs Last 24 Hrs  T(C): 36.8 (26 Jun 2022 14:00), Max: 37.1 (25 Jun 2022 22:00)  T(F): 98.2 (26 Jun 2022 14:00), Max: 98.7 (25 Jun 2022 22:00)  HR: 121 (26 Jun 2022 14:00) (112 - 135)  BP: 150/94 (26 Jun 2022 14:05) (141/86 - 159/96)  BP(mean): --  RR: 18 (26 Jun 2022 14:00) (16 - 20)  SpO2: 100% (26 Jun 2022 14:00) (99% - 100%)      I&O's Summary    25 Jun 2022 07:01  -  26 Jun 2022 07:00  --------------------------------------------------------  IN: 1250 mL / OUT: 1776.5 mL / NET: -526.5 mL    26 Jun 2022 07:01  -  26 Jun 2022 14:19  --------------------------------------------------------  IN: 0 mL / OUT: 495 mL / NET: -495 mL          PHYSICAL EXAM:  GENERAL: NAD, well-developed, sitting in bed  HEAD:  Atraumatic, Normocephalic  EYES: EOMI, PERRLA, conjunctiva and sclera clear  NECK: Supple, No JVD  CHEST/LUNG: Clear to auscultation anteriorly no wheeze   HEART: +tachy  ABDOMEN: ostomy+, large midline wound with drain, +NTBL  EXTREMITIES:  2+ Peripheral Pulses, No clubbing, cyanosis, or edema  PSYCH: AAOx2-3  NEUROLOGY: non-focal  SKIN: No rashes or lesions    LABS:                                   9.0    6.99  )-----------( 78       ( 26 Jun 2022 06:30 )             28.0   06-26    140  |  102  |  16  ----------------------------<  93  3.5   |  20<L>  |  0.71    Ca    8.2<L>      26 Jun 2022 06:30  Phos  2.7     06-26  Mg     1.60     06-26        RADIOLOGY & ADDITIONAL TESTS: < from: CT Head No Cont (06.08.22 @ 14:35) >  Impression:  Unremarkable noncontrast head CT.    < end of copied text >      Imaging Personally Reviewed:    Consultant(s) Notes Reviewed:  surgery    Care Discussed with Consultants/Other Providers: daniela UPTON    Assessment and Plan:

## 2022-06-26 NOTE — PROGRESS NOTE ADULT - PROBLEM SELECTOR PLAN 8
-patient restless and sometimes makes confusing statements per daughter  -suspect combination of delirium from prolonged hospitalization, uncontrolled pain, narcotics, post operative delirium  -CTH unremarkable on 6/8  -needs mobilization/PT/redirection

## 2022-06-26 NOTE — PROGRESS NOTE ADULT - PROBLEM SELECTOR PLAN 1
GYN ONC Fellow Addendum:    Pt seen and examined at bedside. Agree with above. Overnight w/ tachycardia up to 130s, asymptomatic and w/o other VS changes. EKG performed and reviewed w/ cardiology noted w/ PVCs. Transitioned to lopressor from metop succinate, IV fluid bolus. Improved to low 110s this AM    VS reviewed  Labs reviewed, elevated coags likely in setting of chronic illness cont to monitor    Monitor tachycardia, repeat EKG and additional fluid bolus as indicated. F/u GHOS recs  Continue current pain regimen  Tolerating reg diet  Encourage ambulation and IS use  Replete lytes prn  DVT ppx: Lovenox  Dispo: cont inpt care. PT/OT cont eval for appropriate dispo    Guadalupe Jay MD

## 2022-06-26 NOTE — PROGRESS NOTE ADULT - SUBJECTIVE AND OBJECTIVE BOX
Gyn ONC PA Progress Note HD#22    Subjective:   Pt seen and examined at bedside. Overnight, HR to 135 , EKG obtained showing PVCs; 500cc bolus of LR given, Metoprolol Succinate 100qD, switched to Lopressor 50mg BID for better rate control. HR presently 112.  Patient denies nausea, vomiting, chest pain palpitations and sob. Tolerating regular diet.      Objective:  Vital Signs Last 24 Hrs  T(C): 36.5 (25 Jun 2022 06:00), Max: 37.1 (24 Jun 2022 10:05)  T(F): 97.7 (25 Jun 2022 06:00), Max: 98.7 (24 Jun 2022 10:05)  HR: 110 (25 Jun 2022 06:00) (107 - 113)  BP: 153/91 (25 Jun 2022 06:00) (148/88 - 176/93)  BP(mean): --  RR: 17 (25 Jun 2022 06:00) (17 - 18)  SpO2: 100% (25 Jun 2022 06:00) (98% - 100%)  I&O's Summary    22 Jun 2022 07:01  -  23 Jun 2022 07:00  --------------------------------------------------------  IN: 1720 mL / OUT: 1995 mL / NET: -275 mL    23 Jun 2022 07:01  -  24 Jun 2022 06:40  --------------------------------------------------------  IN: 340 mL / OUT: 1852.5 mL / NET: -1512.5 mL      CAPILLARY BLOOD GLUCOSE          MEDICATIONS  (STANDING):  enoxaparin Injectable 40 milliGRAM(s) SubCutaneous every 24 hours  metoprolol succinate  milliGRAM(s) Oral daily  multivitamin 1 Tablet(s) Oral daily  pantoprazole    Tablet 40 milliGRAM(s) Oral every 12 hours    MEDICATIONS  (PRN):  acetaminophen     Tablet .. 650 milliGRAM(s) Oral every 6 hours PRN Mild Pain (1 - 3)  oxyCODONE    IR 5 milliGRAM(s) Oral every 4 hours PRN Severe Pain (7 - 10)      Physical Exam:  Constitutional: NAD, A+O x3  CV: RR S1S2 no m/r/g  Lungs: CTA b/l, good air flow b/l  Abd: Soft, non-distended, wound vac in place ; ostomy pink and well perfused with brownish liquid output in  ostomy bag; +3 Ric drains with seropurulent output in LLQ  : L and R nephrostomy tube draining clear urine  Extremities: No pain, +pitting edema    LABS:               8.2    5.52  )-----------( 58       ( 06-24 @ 06:00 )             26.3                8.3    4.86  )-----------( 45       ( 06-23 @ 05:22 )             26.6     06-24    140  |  105  |  15  ----------------------------<  91  3.3<L>   |  22  |  0.71        06-23    142    |  105    |  14     ----------------------------<  97     3.3<L>   |  24     |  0.73     Ca    7.9<L>      23 Jun 2022 05:22  Phos  2.1       06-23  Mg     1.40      06-23

## 2022-06-26 NOTE — CHART NOTE - NSCHARTNOTEFT_GEN_A_CORE
S: Patient examined 2/2 tachycardia. Patient denied any pain at the time of the examination. Patient also denied any chest pain, shortness of breath, leg pain. Denied fevers, chills    O: Vital Signs Last 24 Hrs  T(C): 36.9 (26 Jun 2022 00:33), Max: 37.1 (25 Jun 2022 22:00)  T(F): 98.4 (26 Jun 2022 00:33), Max: 98.7 (25 Jun 2022 22:00)  HR: 135 (26 Jun 2022 00:33) (103 - 135)  BP: 144/92 (26 Jun 2022 00:33) (134/84 - 159/96)  BP(mean): --  RR: 20 (26 Jun 2022 00:33) (16 - 20)  SpO2: 100% (26 Jun 2022 00:33) (95% - 100%)  Gen: NAD, resting comfortably in bed, NAD  CV: Tachycardic  Lungs: LCAB      A/P: Patient examined 2/2 tachycardia. No chest pain, shortness of breath  -EKG  -if EKG shows sinus tachycardia, give 500cc bolus  -If EKG is abnormal, consider workup for other etiologies  -continue to monitor heart rate      Discussed with Dr. Jay, PGY-5  Tiffanie Cowan, PGY-2 S: Patient examined 2/2 tachycardia. Patient denied any pain at the time of the examination. Patient also denied any chest pain, shortness of breath, leg pain. Denied fevers, chills    O: Vital Signs Last 24 Hrs  T(C): 36.9 (26 Jun 2022 00:33), Max: 37.1 (25 Jun 2022 22:00)  T(F): 98.4 (26 Jun 2022 00:33), Max: 98.7 (25 Jun 2022 22:00)  HR: 135 (26 Jun 2022 00:33) (103 - 135)  BP: 144/92 (26 Jun 2022 00:33) (134/84 - 159/96)  BP(mean): --  RR: 20 (26 Jun 2022 00:33) (16 - 20)  SpO2: 100% (26 Jun 2022 00:33) (95% - 100%)  Gen: NAD, resting comfortably in bed, NAD  CV: Tachycardic      A/P: Patient examined 2/2 tachycardia. No chest pain, shortness of breath, patient not in acute pain at the time of exam  -EKG->PVCs  -500cc bolus of LR given  -Metoprolol Succinate 100qD, switched to Lopressor 50mg BID for better rate control  -continue to monitor heart rate      Discussed with Dr. Jay, PGY-5  Tiffanie Cowan, PGY-2

## 2022-06-26 NOTE — PROGRESS NOTE ADULT - PROBLEM SELECTOR PLAN 1
-h/o recurrent cervical CA, receives Gyn Oncology care at [facility] ([place]).   -she initially was diagnosed with stage III cervical CA in 2014 s/p VBRT + chemo but never had surgical intervention. -Reported now being s/p 5 cycles of chemo, unsure of regimen but next due 6/21/22.  -will need outpt follow up with med onc for plans for future chemo/management  -course complicated by bowel perforation s/p washout and bridging vicryl mesh placement to close fascial gap on 6/14 w/ skin closure device place don 6/17  -further management per general surgery and plastic surgery  -tolerating regular diet  -needs PT, wound care, rehab planning

## 2022-06-26 NOTE — PROVIDER CONTACT NOTE (OTHER) - ACTION/TREATMENT ORDERED:
Stat EKG done. Pt given loperssor 50mgs PO x1.  500ml LR bolus given.  Will continue to monitor vital signs.

## 2022-06-27 LAB
ANION GAP SERPL CALC-SCNC: 14 MMOL/L — SIGNIFICANT CHANGE UP (ref 7–14)
BASOPHILS # BLD AUTO: 0.01 K/UL — SIGNIFICANT CHANGE UP (ref 0–0.2)
BASOPHILS NFR BLD AUTO: 0.2 % — SIGNIFICANT CHANGE UP (ref 0–2)
BUN SERPL-MCNC: 18 MG/DL — SIGNIFICANT CHANGE UP (ref 7–23)
CALCIUM SERPL-MCNC: 8.5 MG/DL — SIGNIFICANT CHANGE UP (ref 8.4–10.5)
CHLORIDE SERPL-SCNC: 104 MMOL/L — SIGNIFICANT CHANGE UP (ref 98–107)
CO2 SERPL-SCNC: 22 MMOL/L — SIGNIFICANT CHANGE UP (ref 22–31)
CREAT SERPL-MCNC: 0.68 MG/DL — SIGNIFICANT CHANGE UP (ref 0.5–1.3)
EGFR: 103 ML/MIN/1.73M2 — SIGNIFICANT CHANGE UP
EOSINOPHIL # BLD AUTO: 0.1 K/UL — SIGNIFICANT CHANGE UP (ref 0–0.5)
EOSINOPHIL NFR BLD AUTO: 1.5 % — SIGNIFICANT CHANGE UP (ref 0–6)
GLUCOSE SERPL-MCNC: 107 MG/DL — HIGH (ref 70–99)
HCT VFR BLD CALC: 27.3 % — LOW (ref 34.5–45)
HGB BLD-MCNC: 8.9 G/DL — LOW (ref 11.5–15.5)
IANC: 4.39 K/UL — SIGNIFICANT CHANGE UP (ref 1.8–7.4)
IMM GRANULOCYTES NFR BLD AUTO: 2.9 % — HIGH (ref 0–1.5)
LYMPHOCYTES # BLD AUTO: 0.93 K/UL — LOW (ref 1–3.3)
LYMPHOCYTES # BLD AUTO: 14.1 % — SIGNIFICANT CHANGE UP (ref 13–44)
MAGNESIUM SERPL-MCNC: 1.7 MG/DL — SIGNIFICANT CHANGE UP (ref 1.6–2.6)
MCHC RBC-ENTMCNC: 29.9 PG — SIGNIFICANT CHANGE UP (ref 27–34)
MCHC RBC-ENTMCNC: 32.6 GM/DL — SIGNIFICANT CHANGE UP (ref 32–36)
MCV RBC AUTO: 91.6 FL — SIGNIFICANT CHANGE UP (ref 80–100)
MONOCYTES # BLD AUTO: 0.98 K/UL — HIGH (ref 0–0.9)
MONOCYTES NFR BLD AUTO: 14.8 % — HIGH (ref 2–14)
NEUTROPHILS # BLD AUTO: 4.39 K/UL — SIGNIFICANT CHANGE UP (ref 1.8–7.4)
NEUTROPHILS NFR BLD AUTO: 66.5 % — SIGNIFICANT CHANGE UP (ref 43–77)
NRBC # BLD: 1 /100 WBCS — SIGNIFICANT CHANGE UP
NRBC # FLD: 0.07 K/UL — HIGH
PHOSPHATE SERPL-MCNC: 2.6 MG/DL — SIGNIFICANT CHANGE UP (ref 2.5–4.5)
PLATELET # BLD AUTO: 119 K/UL — LOW (ref 150–400)
POTASSIUM SERPL-MCNC: 3.4 MMOL/L — LOW (ref 3.5–5.3)
POTASSIUM SERPL-SCNC: 3.4 MMOL/L — LOW (ref 3.5–5.3)
RBC # BLD: 2.98 M/UL — LOW (ref 3.8–5.2)
RBC # FLD: 16.5 % — HIGH (ref 10.3–14.5)
SODIUM SERPL-SCNC: 140 MMOL/L — SIGNIFICANT CHANGE UP (ref 135–145)
WBC # BLD: 6.6 K/UL — SIGNIFICANT CHANGE UP (ref 3.8–10.5)
WBC # FLD AUTO: 6.6 K/UL — SIGNIFICANT CHANGE UP (ref 3.8–10.5)

## 2022-06-27 PROCEDURE — 99232 SBSQ HOSP IP/OBS MODERATE 35: CPT

## 2022-06-27 RX ORDER — POTASSIUM CHLORIDE 20 MEQ
20 PACKET (EA) ORAL
Refills: 0 | Status: COMPLETED | OUTPATIENT
Start: 2022-06-27 | End: 2022-06-27

## 2022-06-27 RX ORDER — MAGNESIUM SULFATE 500 MG/ML
2 VIAL (ML) INJECTION ONCE
Refills: 0 | Status: COMPLETED | OUTPATIENT
Start: 2022-06-27 | End: 2022-06-27

## 2022-06-27 RX ADMIN — Medication 20 MILLIEQUIVALENT(S): at 09:10

## 2022-06-27 RX ADMIN — SENNA PLUS 2 TABLET(S): 8.6 TABLET ORAL at 22:29

## 2022-06-27 RX ADMIN — Medication 1 TABLET(S): at 13:07

## 2022-06-27 RX ADMIN — OXYCODONE HYDROCHLORIDE 10 MILLIGRAM(S): 5 TABLET ORAL at 09:29

## 2022-06-27 RX ADMIN — ENOXAPARIN SODIUM 40 MILLIGRAM(S): 100 INJECTION SUBCUTANEOUS at 13:07

## 2022-06-27 RX ADMIN — CYCLOBENZAPRINE HYDROCHLORIDE 5 MILLIGRAM(S): 10 TABLET, FILM COATED ORAL at 22:30

## 2022-06-27 RX ADMIN — Medication 50 MILLIGRAM(S): at 13:09

## 2022-06-27 RX ADMIN — LIDOCAINE 1 PATCH: 4 CREAM TOPICAL at 03:32

## 2022-06-27 RX ADMIN — Medication 20 MILLIEQUIVALENT(S): at 13:06

## 2022-06-27 RX ADMIN — Medication 975 MILLIGRAM(S): at 15:11

## 2022-06-27 RX ADMIN — PANTOPRAZOLE SODIUM 40 MILLIGRAM(S): 20 TABLET, DELAYED RELEASE ORAL at 13:05

## 2022-06-27 RX ADMIN — Medication 50 MILLIGRAM(S): at 01:11

## 2022-06-27 RX ADMIN — Medication 975 MILLIGRAM(S): at 22:31

## 2022-06-27 RX ADMIN — OXYCODONE HYDROCHLORIDE 10 MILLIGRAM(S): 5 TABLET ORAL at 10:00

## 2022-06-27 RX ADMIN — OXYCODONE HYDROCHLORIDE 10 MILLIGRAM(S): 5 TABLET ORAL at 02:31

## 2022-06-27 RX ADMIN — Medication 975 MILLIGRAM(S): at 06:39

## 2022-06-27 RX ADMIN — OXYCODONE HYDROCHLORIDE 10 MILLIGRAM(S): 5 TABLET ORAL at 01:33

## 2022-06-27 RX ADMIN — PANTOPRAZOLE SODIUM 40 MILLIGRAM(S): 20 TABLET, DELAYED RELEASE ORAL at 22:29

## 2022-06-27 RX ADMIN — Medication 20 MILLIEQUIVALENT(S): at 15:11

## 2022-06-27 RX ADMIN — Medication 25 GRAM(S): at 09:10

## 2022-06-27 RX ADMIN — Medication 975 MILLIGRAM(S): at 01:11

## 2022-06-27 NOTE — PROGRESS NOTE ADULT - ASSESSMENT
Assessment/Plan:  52y  with recurrent cervical CA admitted for management of neutropenic fever, now s/p emergent exploratory laparotomy, abdominal washout, rectosigmoid colectomy, diverting colostomy, bronchoscopy and Abthera placement on 22 (ebl 2000cc, s/p 5UpRBC, 3L albumin, 3U Plts, 3U FFP, 3U Cryo) for findings of increased free air on abdominal xray concerning for bowel perforation. Patient s/p washout and bridging vicryl mesh placement to close fascial gap on  w/ skin closure device place don . Pt now extubated, hemodynamically stable, and clinically improving.       Neuro: Oxy and tylenol PRN. Felxeril, Lidocaine patch.  CV: Hemodynamically stable, H/H stable w/ slowly rising platelets  - h/o HTN: Lopressor 50mg BID   Pulm: O2 sat WNL on RA, Increase ambulation, encourage incentive spirometry use.   GI: Tolerating regular diet.   - s/p successful swallow evaluation.  - Senna, Protonix BID  : B/l nephrostomy tubes. Prior ASHLEY resolving w/ continuing downtrending Cr (0.71), f/u am BMP  Heme: DVT ppx: Lovenox, SCDs while in bed.    - Thrombocytopenia: likely multifactorial, currently improving spontaneously. Appreciate Heme Onc recs.  ID: Afebrile, continue to monitor fever curve.  - S/p Zosyn (-), s/p Caspofungin and Cefepime  -  s/p Zarixo (-6/10) for neutropenic fever.   MSK: Continue working with PT and OT, also evaluated by PMR-recs: subacute rehab. Appreciate recs.   Dispo: Continue inpatient care    Seen at bedside with GYN Oncology team  Kaci Grimm,  PGY2   Assessment/Plan:  52y  HD#23 with recurrent cervical CA admitted for management of neutropenic fever, now s/p emergent exploratory laparotomy, abdominal washout, rectosigmoid colectomy, diverting colostomy, bronchoscopy and Abthera placement on 22 (ebl 2000cc, s/p 5UpRBC, 3L albumin, 3U Plts, 3U FFP, 3U Cryo) for findings of increased free air on abdominal xray concerning for bowel perforation. Patient s/p washout and bridging vicryl mesh placement to close fascial gap on  w/ skin closure device place don .  Pt now extubated, hemodynamically stable, and clinically improving.       Neuro: Oxy and tylenol PRN. Flexeril Lidocaine patch.  Appreciate OS recommendations.  CV: Hemodynamically stable, H/H stable w/ platelets increasing.   - h/o HTN: Lopressor 50mg BID   Pulm: O2 sat WNL on RA, Increase ambulation, encourage incentive spirometry use.   GI: Tolerating regular diet.   - s/p swallow evaluation.  - Senna, Protonix BID  : B/l nephrostomy tubes. Prior ASHLEY resolving w/ continuing downtrending Cr (0.71), f/u AMBMP.  Heme: DVT ppx: Lovenox, SCDs while in bed.    - Thrombocytopenia: likely multifactorial, currently improving spontaneously. Appreciate Heme Onc recs.  ID: Afebrile, continue to monitor fever curve.  - S/p Zosyn (-), s/p Caspofungin and Cefepime  - S/p Zarixo (-6/10) for neutropenic fever.   MSK: Continue working with PT and OT, also evaluated by PMR, recommending subacute rehab. Appreciate recs.   Dispo: Continue inpatient care    Seen at bedside with GYN Oncology team  Kaci Grimm,  PGY2   52y  HD#23 with recurrent cervical CA admitted for management of neutropenic fever, now s/p emergent exploratory laparotomy, abdominal washout, rectosigmoid colectomy, diverting colostomy, bronchoscopy and Abthera placement on 22 (ebl 2000cc, s/p 5UpRBC, 3L albumin, 3U Plts, 3U FFP, 3U Cryo) for findings of increased free air on abdominal xray concerning for bowel perforation. Patient s/p washout and bridging vicryl mesh placement to close fascial gap on  w/ skin closure device placed on .  Pt now extubated, hemodynamically stable, and clinically improving.       Neuro: Oxy and tylenol PRN. Flexeril Lidocaine patch.  Appreciate GHOS recommendations.  CV: Hemodynamically stable, H/H stable w/ platelets increasing.   - h/o HTN: Lopressor 50mg BID   Pulm: O2 sat WNL on RA, Increase ambulation, encourage incentive spirometry use.   GI: Tolerating regular diet.   - s/p swallow evaluation.  - Senna, Protonix BID  : B/l nephrostomy tubes. Prior ASHLEY resolving w/ continuing downtrending Cr (0.71), f/u AM BMP.  Heme: DVT ppx: Lovenox, SCDs while in bed.    - Thrombocytopenia: likely multifactorial, currently improving spontaneously. Appreciate Heme Onc recs.  ID: Afebrile, continue to monitor fever curve.  - S/p Zosyn (-), s/p Caspofungin and Cefepime  - S/p Zarixo (-6/10) for neutropenic fever.   MSK: Continue working with PT and OT, also evaluated by PMR, recommending subacute rehab. Appreciate recs.   Dispo: Continue inpatient care. Planned to have wound vac changed with plastics today.     Seen at bedside with GYN Oncology team  Kaci Grimm,  PGY2  Julissa Leach MD, PGY4  52y  HD#23 with recurrent cervical CA admitted for management of neutropenic fever, now s/p emergent exploratory laparotomy, abdominal washout, rectosigmoid colectomy, diverting colostomy, bronchoscopy and Abthera placement on 22 (ebl 2000cc, s/p 5UpRBC, 3L albumin, 3U Plts, 3U FFP, 3U Cryo) for findings of increased free air on abdominal xray concerning for bowel perforation. Patient s/p washout and bridging vicryl mesh placement to close fascial gap on  w/ skin closure device placed on .  Pt now extubated, hemodynamically stable, and clinically improving.       Neuro: Oxy and tylenol PRN. Flexeril Lidocaine patch.  Appreciate OS recommendations.  CV: Tachycardic this admission, currently 110-120. TSH elevated but T4 wnl. EKG 6/15 sinus tach. Hemodynamically stable, H/H stable w/ platelets increasing, f/u AM CBC   - h/o HTN: Lopressor 50mg BID   Pulm: O2 sat WNL on RA, Increase ambulation, encourage incentive spirometry use.   GI: Tolerating regular diet.   - s/p swallow evaluation.  - Senna, Protonix BID  : B/l nephrostomy tubes. Prior ASHLEY resolving w/ continuing downtrending Cr (0.71), f/u AM BMP.  Heme: DVT ppx: Lovenox, SCDs while in bed.    - Thrombocytopenia: likely multifactorial, currently improving spontaneously. Appreciate Heme Onc recs.  ID: Afebrile, continue to monitor fever curve.  - S/p Zosyn (-), s/p Caspofungin and Cefepime  - S/p Zarixo (-6/10) for neutropenic fever.   MSK: Continue working with PT and OT, also evaluated by PMR, recommending subacute rehab. Appreciate recs.   Dispo: Continue inpatient care. Planned to have wound vac changed with plastics today.     Seen at bedside with GYN Oncology team  Kaci Grimm,  PGY2  Julissa Leach MD, PGY4  52y  HD#23 with recurrent cervical CA admitted for management of neutropenic fever, now s/p emergent exploratory laparotomy, abdominal washout, rectosigmoid colectomy, diverting colostomy, bronchoscopy and Abthera placement on 22 (ebl 2000cc, s/p 5UpRBC, 3L albumin, 3U Plts, 3U FFP, 3U Cryo) for bowel perforation. Patient s/p washout and bridging vicryl mesh placement to close fascial gap on  w/ skin closure device placed on .  Pt now extubated, hemodynamically stable, and clinically improving.       Neuro: Oxy and tylenol PRN. Flexeril Lidocaine patch.  Appreciate OS recommendations.  CV: Tachycardic this admission, currently 110-120. TSH elevated but T4 wnl. EKG 6/15 sinus tach. Hemodynamically stable, H/H stable w/ platelets increasing, f/u AM CBC   - h/o HTN: Lopressor 50mg BID   Pulm: O2 sat WNL on RA, Increase ambulation, encourage incentive spirometry use.   GI: Tolerating regular diet.   - s/p swallow evaluation.  - Senna, Protonix BID  : B/l nephrostomy tubes. Prior ASHLEY resolving w/ continuing downtrending Cr (0.71), f/u AM BMP.  Heme: DVT ppx: Lovenox, SCDs while in bed.    - Thrombocytopenia: likely multifactorial, currently improving spontaneously. Appreciate Heme Onc recs.  ID: Afebrile, continue to monitor fever curve.  - S/p Zosyn (-), s/p Caspofungin and Cefepime  - S/p Zarixo (-6/10) for neutropenic fever.   MSK: Continue working with PT and OT, also evaluated by PMR, recommending subacute rehab. Appreciate recs.   Dispo: Continue inpatient care. Planned to have wound vac changed with plastics today.     Seen at bedside with GYN Oncology team  Kaci Grimm,  PGY2  Julissa Leach MD, PGY4

## 2022-06-27 NOTE — PROGRESS NOTE ADULT - SUBJECTIVE AND OBJECTIVE BOX
Patient is a 52y old  Female who presents with a chief complaint of neutropenic fever (27 Jun 2022 05:27)      Interval history: patient had emesis overnight per chart.   sleepy but denies pain currently  + wound vac    REVIEW OF SYSTEMS  +vomiting  +weakness  +lethargy    PAST MEDICAL & SURGICAL HISTORY  HTN (hypertension)    Cervical cancer    No significant past surgical history    Nephrostomy status       CURRENT FUNCTIONAL STATUS  6/26 Pt greeted in bed agreeable to PT PCA and RN present. pt refusing to stand with PT pt dangled on the edge of the bed and performed seated and supine exercise. pt was left in bed with head of bed up all needs in reach and PCA present RN aware          FAMILY HISTORY    FH: liver cancer (Mother)        RECENT LABS/IMAGING  CBC Full  -  ( 27 Jun 2022 06:20 )  WBC Count : 6.60 K/uL  RBC Count : 2.98 M/uL  Hemoglobin : 8.9 g/dL  Hematocrit : 27.3 %  Platelet Count - Automated : 119 K/uL  Mean Cell Volume : 91.6 fL  Mean Cell Hemoglobin : 29.9 pg  Mean Cell Hemoglobin Concentration : 32.6 gm/dL  Auto Neutrophil # : 4.39 K/uL  Auto Lymphocyte # : 0.93 K/uL  Auto Monocyte # : 0.98 K/uL  Auto Eosinophil # : 0.10 K/uL  Auto Basophil # : 0.01 K/uL  Auto Neutrophil % : 66.5 %  Auto Lymphocyte % : 14.1 %  Auto Monocyte % : 14.8 %  Auto Eosinophil % : 1.5 %  Auto Basophil % : 0.2 %    06-27    140  |  104  |  18  ----------------------------<  107<H>  3.4<L>   |  22  |  0.68    Ca    8.5      27 Jun 2022 06:20  Phos  2.6     06-27  Mg     1.70     06-27          VITALS  T(C): 36.4 (06-27-22 @ 09:56), Max: 37 (06-26-22 @ 22:00)  HR: 113 (06-27-22 @ 10:45) (110 - 122)  BP: 153/99 (06-27-22 @ 10:45) (132/87 - 158/90)  RR: 20 (06-27-22 @ 09:56) (18 - 22)  SpO2: 100% (06-27-22 @ 09:56) (98% - 100%)  Wt(kg): --    ALLERGIES  No Known Allergies      MEDICATIONS   acetaminophen     Tablet .. 975 milliGRAM(s) Oral every 6 hours  cyclobenzaprine 5 milliGRAM(s) Oral three times a day PRN  enoxaparin Injectable 40 milliGRAM(s) SubCutaneous every 24 hours  lidocaine   4% Patch 1 Patch Transdermal daily PRN  metoprolol tartrate 50 milliGRAM(s) Oral two times a day  multivitamin 1 Tablet(s) Oral daily  oxyCODONE    IR 5 milliGRAM(s) Oral every 4 hours PRN  oxyCODONE    IR 10 milliGRAM(s) Oral every 6 hours PRN  pantoprazole    Tablet 40 milliGRAM(s) Oral every 12 hours  potassium chloride    Tablet ER 20 milliEquivalent(s) Oral every 2 hours  senna 2 Tablet(s) Oral at bedtime      ----------------------------------------------------------------------------------------  PHYSICAL EXAM  Constitutional - NAD, in bed, sleepy  HEENT - NCAT, EOMI   Chest - no respiratory distress  Cardiovascular - RRR, S1S2   Abdomen - + ostomy, + wound vac + drains  Extremities - + b/l LE edema  Neurologic Exam -                    Cognitive - Awake, Alert, does not recall the year (reports 2014), knows she is in the hospital but not which one.      Communication - Fluent, No dysarthria     Cranial Nerves - CN 2-12 intact     Motor -                      LEFT    UE - 4+/5                    RIGHT UE - 4+/5                    LEFT    LE - HF 2-/5                    RIGHT LE - HF 2-/5        Sensory - Intact to LT      Balance - WNL Static  Psychiatric - Mood stable, Affect WNL  ----------------------------------------------------------------------------------------  ASSESSMENT/PLAN  52y F with recurrent cervical CA admitted for management of neutropenic fever, now s/p emergent exploratory laparotomy, abdominal washout, rectosigmoid colectomy, diverting colostomy, bronchoscopy and Abthera placement on 6/8/22 (ebl 2000cc, s/p 5UpRBC, 3L albumin, 3U Plts, 3U FFP, 3U Cryo) for findings of increased free air on abdominal xray concerning for bowel perforation. Patient s/p washout and bridging vicryl mesh placement 6/14 w/ skin closure device place done 6/17.  Pain -oxy ir prn  DVT PPX - lovenox  continue bedside PT and OT  Diet: regular  Rehab -   recommend subacute rehab when medically cleared.     patient has had significant decline in function over past 3-4 weeks, was previously independent at home prior to admission.  Consider palliative care consult to assist in symptom management and for patient and family support.

## 2022-06-27 NOTE — PROGRESS NOTE ADULT - SUBJECTIVE AND OBJECTIVE BOX
Plastic Surgery Progress Note (pg [facility]: [id number], [facility]: [phone])    SUBJECTIVE  The patient was seen and examined. No acute events overnight.    OBJECTIVE  ___________________________________________________  VITAL SIGNS / I&O's   Vital Signs Last 24 Hrs  T(C): 36.7 (27 Jun 2022 13:05), Max: 37 (26 Jun 2022 22:00)  T(F): 98 (27 Jun 2022 13:05), Max: 98.6 (26 Jun 2022 22:00)  HR: 115 (27 Jun 2022 15:38) (110 - 122)  BP: 135/91 (27 Jun 2022 15:38) (132/87 - 158/90)  BP(mean): --  RR: 18 (27 Jun 2022 15:25) (18 - 22)  SpO2: 100% (27 Jun 2022 15:25) (98% - 100%)      26 Jun 2022 07:01  -  27 Jun 2022 07:00  --------------------------------------------------------  IN:  Total IN: 0 mL    OUT:    Colostomy (mL): 875 mL    Drain (mL): 180 mL    Drain (mL): 15 mL    Drain (mL): 34 mL    Emesis (mL): 30 mL    Nephrostomy Tube (mL): 750 mL    Nephrostomy Tube (mL): 425 mL    VAC (Vacuum Assisted Closure) System (mL): 125 mL  Total OUT: 2434 mL    Total NET: -2434 mL      27 Jun 2022 07:01  -  27 Jun 2022 15:52  --------------------------------------------------------  IN:  Total IN: 0 mL    OUT:    Drain (mL): 7.5 mL    Drain (mL): 5 mL    Drain (mL): 45 mL    Nephrostomy Tube (mL): 150 mL    Nephrostomy Tube (mL): 100 mL  Total OUT: 307.5 mL    Total NET: -307.5 mL        ___________________________________________________  PHYSICAL EXAM    General: NAD  Pulm: comfortable  Neuro: alert  Abdomen: mesh in place  mild fibrinous exudate  moderate laxity   ___________________________________________________  LABS                        8.9    6.60  )-----------( 119      ( 27 Jun 2022 06:20 )             27.3     27 Jun 2022 06:20    140    |  104    |  18     ----------------------------<  107    3.4     |  22     |  0.68     Ca    8.5        27 Jun 2022 06:20  Phos  2.6       27 Jun 2022 06:20  Mg     1.70      27 Jun 2022 06:20      PT/INR - ( 26 Jun 2022 02:00 )   PT: 16.0 sec;   INR: 1.37 ratio         PTT - ( 26 Jun 2022 02:00 )  PTT:32.9 sec  CAPILLARY BLOOD GLUCOSE              ___________________________________________________  MICRO  Recent Cultures:    ___________________________________________________  MEDICATIONS  (STANDING):  acetaminophen     Tablet .. 975 milliGRAM(s) Oral every 6 hours  enoxaparin Injectable 40 milliGRAM(s) SubCutaneous every 24 hours  metoprolol tartrate 50 milliGRAM(s) Oral two times a day  multivitamin 1 Tablet(s) Oral daily  pantoprazole    Tablet 40 milliGRAM(s) Oral every 12 hours  senna 2 Tablet(s) Oral at bedtime    MEDICATIONS  (PRN):  cyclobenzaprine 5 milliGRAM(s) Oral three times a day PRN Muscle Spasm  lidocaine   4% Patch 1 Patch Transdermal daily PRN back pain  oxyCODONE    IR 5 milliGRAM(s) Oral every 4 hours PRN Moderate Pain (4 - 6)  oxyCODONE    IR 10 milliGRAM(s) Oral every 6 hours PRN Severe Pain (7 - 10)

## 2022-06-27 NOTE — PROGRESS NOTE ADULT - ASSESSMENT
A/P 52F hx of recurrent cervical ca, now s/p emergent ex lap, washout, rectosigmoid colectomy, diverting colostomy, w. open abdomen now s/p vac and Dermaclose placement.    -continue vac, change MWF, will consider bedside closure vs. continuing vac on Wed during next vac change   -continue to monitor drains  -appreciate care per primary    Plastic Surgery  [id number]

## 2022-06-27 NOTE — PROGRESS NOTE ADULT - SUBJECTIVE AND OBJECTIVE BOX
Division of Hospital Medicine  Dr. Nubia Fonseca  Pager [phone]    Patient is a 52y old  Female who presents with a chief complaint of neutropenic fever (25 Jun 2022 09:48)    SUBJECTIVE / OVERNIGHT EVENTS: patient seen and examined. Denies back pain today. Was sleeping but arousable during evaluation.     MEDICATIONS  (STANDING):  enoxaparin Injectable 40 milliGRAM(s) SubCutaneous every 24 hours  metoprolol succinate  milliGRAM(s) Oral daily  multivitamin 1 Tablet(s) Oral daily  pantoprazole    Tablet 40 milliGRAM(s) Oral every 12 hours  senna 2 Tablet(s) Oral at bedtime    MEDICATIONS  (PRN):  acetaminophen     Tablet .. 650 milliGRAM(s) Oral every 6 hours PRN Mild Pain (1 - 3)  oxyCODONE    IR 5 milliGRAM(s) Oral every 4 hours PRN Severe Pain (7 - 10)      Vital Signs Last 24 Hrs  T(C): 36.4 (27 Jun 2022 09:56), Max: 37 (26 Jun 2022 22:00)  T(F): 97.6 (27 Jun 2022 09:56), Max: 98.6 (26 Jun 2022 22:00)  HR: 113 (27 Jun 2022 10:45) (110 - 122)  BP: 153/99 (27 Jun 2022 10:45) (132/87 - 158/90)  BP(mean): --  RR: 20 (27 Jun 2022 09:56) (18 - 22)  SpO2: 100% (27 Jun 2022 09:56) (98% - 100%)      I&O's Summary    26 Jun 2022 07:01  -  27 Jun 2022 07:00  --------------------------------------------------------  IN: 0 mL / OUT: 2434 mL / NET: -2434 mL    PHYSICAL EXAM:  GENERAL: NAD, well-developed, sitting in bed  HEAD:  Atraumatic, Normocephalic  EYES: EOMI, PERRLA, conjunctiva and sclera clear  NECK: Supple, No JVD  CHEST/LUNG: Clear to auscultation anteriorly no wheeze   HEART: +tachy  ABDOMEN: ostomy+, large midline wound with drain, +NTBL  EXTREMITIES:  2+ Peripheral Pulses, No clubbing, cyanosis, or edema  PSYCH: AAOx2-3  NEUROLOGY: non-focal  SKIN: No rashes or lesions    LABS:                                   8.9    6.60  )-----------( 119      ( 27 Jun 2022 06:20 )             27.3   06-27    140  |  104  |  18  ----------------------------<  107<H>  3.4<L>   |  22  |  0.68    Ca    8.5      27 Jun 2022 06:20  Phos  2.6     06-27  Mg     1.70     06-27          RADIOLOGY & ADDITIONAL TESTS: < from: CT Head No Cont (06.08.22 @ 14:35) >  Impression:  Unremarkable noncontrast head CT.    < end of copied text >      Imaging Personally Reviewed:    Consultant(s) Notes Reviewed:  surgery    Care Discussed with Consultants/Other Providers: daniela UPTON    Assessment and Plan:

## 2022-06-27 NOTE — PROGRESS NOTE ADULT - ATTENDING COMMENTS
Patient seen and evaluated on am rounds.   Case discussed with team and care coordinator/SW.   Clinically improved but severely deconditioned in the setting of advanced metastatic cancer.   For CT today - all drains with purulent fluid.   Not a candidate for rehab, will need discussion with family and patient regarding goals of care an placement options.

## 2022-06-27 NOTE — PROGRESS NOTE ADULT - SUBJECTIVE AND OBJECTIVE BOX
Gyn ONC Progress Note     HD #23    Subjective:   Pt seen and examined at bedside. No events overnight. Pain well controlled. Patient ambulating. Passing flatus. Tolerating regular diet. Pt denies fever, chills, chest pain, SOB, nausea, vomiting, lightheadedness, dizziness.      Objective:  T(F): 97.9 (06-27-22 @ 01:00), Max: 98.6 (06-26-22 @ 22:00)  HR: 118 (06-27-22 @ 01:00) (110 - 122)  BP: 132/87 (06-27-22 @ 01:00) (132/87 - 156/100)  RR: 20 (06-27-22 @ 01:00) (18 - 22)  SpO2: 98% (06-27-22 @ 01:00) (98% - 100%)  Wt(kg): --  I&O's Summary    25 Jun 2022 07:01  -  26 Jun 2022 07:00  --------------------------------------------------------  IN: 1250 mL / OUT: 1776.5 mL / NET: -526.5 mL    26 Jun 2022 07:01  -  27 Jun 2022 05:27  --------------------------------------------------------  IN: 0 mL / OUT: 1709 mL / NET: -1709 mL      CAPILLARY BLOOD GLUCOSE          MEDICATIONS  (STANDING):  acetaminophen     Tablet .. 975 milliGRAM(s) Oral every 6 hours  enoxaparin Injectable 40 milliGRAM(s) SubCutaneous every 24 hours  metoprolol tartrate 50 milliGRAM(s) Oral two times a day  multivitamin 1 Tablet(s) Oral daily  pantoprazole    Tablet 40 milliGRAM(s) Oral every 12 hours  senna 2 Tablet(s) Oral at bedtime    MEDICATIONS  (PRN):  cyclobenzaprine 5 milliGRAM(s) Oral three times a day PRN Muscle Spasm  lidocaine   4% Patch 1 Patch Transdermal daily PRN back pain  oxyCODONE    IR 5 milliGRAM(s) Oral every 4 hours PRN Moderate Pain (4 - 6)  oxyCODONE    IR 10 milliGRAM(s) Oral every 6 hours PRN Severe Pain (7 - 10)      Physical Exam:  Constitutional: NAD, A+O x3  CV: RR S1S2 no m/r/g  Lungs: CTA b/l, good air flow b/l  Abd: Soft, non-distended, wound vac in place ; ostomy pink and well perfused with brownish liquid output in  ostomy bag; +3 Ric drains with seropurulent output in LLQ  : L and R nephrostomy tube draining clear urine  Extremities: No pain, +pitting edema    LABS:                        9.0    6.99  )-----------( 78       ( 26 Jun 2022 06:30 )             28.0     06-26    140    |  102    |  16     ----------------------------<  93     3.5     |  20<L>  |  0.71     Ca    8.2<L>      26 Jun 2022 06:30  Phos  2.7       06-26  Mg     1.60      06-26          PT/INR - ( 26 Jun 2022 02:00 )   PT: 16.0 sec;   INR: 1.37 ratio         PTT - ( 26 Jun 2022 02:00 )  PTT:32.9 sec   Gyn ONC Progress Note     HD #23    Subjective:   Pt seen and examined at bedside. Pt with 3-4 episodes of small volume emesis overnight, however tolerated dinner and still passing flatus. Pain well controlled. Patient ambulating. Pt denies fever, chills, chest pain, SOB,  lightheadedness, dizziness.      Objective:  T(F): 97.9 (06-27-22 @ 01:00), Max: 98.6 (06-26-22 @ 22:00)  HR: 118 (06-27-22 @ 01:00) (110 - 122)  BP: 132/87 (06-27-22 @ 01:00) (132/87 - 156/100)  RR: 20 (06-27-22 @ 01:00) (18 - 22)  SpO2: 98% (06-27-22 @ 01:00) (98% - 100%)      I&O's Summary    25 Jun 2022 07:01  -  26 Jun 2022 07:00  --------------------------------------------------------  IN: 1250 mL / OUT: 1776.5 mL / NET: -526.5 mL    26 Jun 2022 07:01  -  27 Jun 2022 05:27  --------------------------------------------------------  IN: 0 mL / OUT: 1709 mL / NET: -1709 mL    MEDICATIONS  (STANDING):  acetaminophen     Tablet .. 975 milliGRAM(s) Oral every 6 hours  enoxaparin Injectable 40 milliGRAM(s) SubCutaneous every 24 hours  metoprolol tartrate 50 milliGRAM(s) Oral two times a day  multivitamin 1 Tablet(s) Oral daily  pantoprazole    Tablet 40 milliGRAM(s) Oral every 12 hours  senna 2 Tablet(s) Oral at bedtime    MEDICATIONS  (PRN):  cyclobenzaprine 5 milliGRAM(s) Oral three times a day PRN Muscle Spasm  lidocaine   4% Patch 1 Patch Transdermal daily PRN back pain  oxyCODONE    IR 5 milliGRAM(s) Oral every 4 hours PRN Moderate Pain (4 - 6)  oxyCODONE    IR 10 milliGRAM(s) Oral every 6 hours PRN Severe Pain (7 - 10)      Physical Exam:  Constitutional: NAD, A&O x3  CV: RRR  Lungs: CTA b/l, good air flow b/l  Abd: Soft, non-distended, wound vac in place; ostomy pink and well perfused with brown liquid/stool output in ostomy bag; +3 Ric drains with seropurulent output in RLW, LUQ, LLQ  : L nephrostomy tube draining clear urine, R nephrostomy tube draining concentrated urine  Extremities: No pain, 2+ pitting edema.    LABS:                                  9.0    6.99  )-----------( 78       ( 26 Jun 2022 06:30 )             28.0     06-26    140    |  102    |  16     ----------------------------<  93     3.5     |  20<L>  |  0.71     Ca    8.2<L>      26 Jun 2022 06:30  Phos  2.7       06-26  Mg     1.60      06-26    PT/INR - ( 26 Jun 2022 02:00 )   PT: 16.0 sec;   INR: 1.37 ratio    PTT - ( 26 Jun 2022 02:00 )  PTT:32.9 sec

## 2022-06-27 NOTE — PROGRESS NOTE ADULT - PROBLEM SELECTOR PLAN 1
GYN ONC Fellow Addendum:    Pt seen and examined at bedside. Agree with above. Pain controlled. Tolerating reg diet, -n/v. +stool in ostomy.     VS reviewed  Labs reviewed    - Continue current pain regimen  - Reg diet  - Will check CT A/P to evaluate for possible drain removal  - Encourage ambulation and IS use  - Replete lytes prn  - DVT ppx: lovenox  - Dispo: continue inpatient management    GRIS Mckeon, PGY6

## 2022-06-27 NOTE — PROGRESS NOTE ADULT - PROBLEM SELECTOR PLAN 1
-h/o recurrent cervical CA, receives Gyn Oncology care at [facility] ([place]).   -she initially was diagnosed with stage III cervical CA in 2014 s/p VBRT + chemo but never had surgical intervention. -Reported now being s/p 5 cycles of chemo, unsure of regimen but next due 6/21/22.  -will need outpt follow up with med onc for plans for future chemo/management  -course complicated by bowel perforation s/p washout and bridging vicryl mesh placement to close fascial gap on 6/14 w/ skin closure device place don 6/17  -further management per general surgery and plastic surgery  -tolerating regular diet  -needs PT, wound care, rehab planning

## 2022-06-27 NOTE — PROGRESS NOTE ADULT - PROBLEM SELECTOR PLAN 2
-back pain controlled today  -per patient and family due to uncomfortable bed  -at this time suspecting musculoskeletal in nature  -reports oxy inadequate for pain control  -needs multimodal pain control, can try lidocaine patch, ATC tylenol and flexeril PRN for better pain control  -if no improvement in oxy 5mg, can consider sparing doses of toradol if no objection from gyn/onc  -will monitor

## 2022-06-28 DIAGNOSIS — K63.1 PERFORATION OF INTESTINE (NONTRAUMATIC): ICD-10-CM

## 2022-06-28 DIAGNOSIS — R52 PAIN, UNSPECIFIED: ICD-10-CM

## 2022-06-28 DIAGNOSIS — Z51.5 ENCOUNTER FOR PALLIATIVE CARE: ICD-10-CM

## 2022-06-28 DIAGNOSIS — R53.81 OTHER MALAISE: ICD-10-CM

## 2022-06-28 LAB
ANION GAP SERPL CALC-SCNC: 15 MMOL/L — HIGH (ref 7–14)
BASOPHILS # BLD AUTO: 0.06 K/UL — SIGNIFICANT CHANGE UP (ref 0–0.2)
BASOPHILS NFR BLD AUTO: 0.9 % — SIGNIFICANT CHANGE UP (ref 0–2)
BUN SERPL-MCNC: 21 MG/DL — SIGNIFICANT CHANGE UP (ref 7–23)
CALCIUM SERPL-MCNC: 8.4 MG/DL — SIGNIFICANT CHANGE UP (ref 8.4–10.5)
CHLORIDE SERPL-SCNC: 104 MMOL/L — SIGNIFICANT CHANGE UP (ref 98–107)
CO2 SERPL-SCNC: 21 MMOL/L — LOW (ref 22–31)
CREAT SERPL-MCNC: 0.72 MG/DL — SIGNIFICANT CHANGE UP (ref 0.5–1.3)
EGFR: 99 ML/MIN/1.73M2 — SIGNIFICANT CHANGE UP
EOSINOPHIL # BLD AUTO: 0.11 K/UL — SIGNIFICANT CHANGE UP (ref 0–0.5)
EOSINOPHIL NFR BLD AUTO: 1.6 % — SIGNIFICANT CHANGE UP (ref 0–6)
GLUCOSE SERPL-MCNC: 94 MG/DL — SIGNIFICANT CHANGE UP (ref 70–99)
HCT VFR BLD CALC: 28.4 % — LOW (ref 34.5–45)
HGB BLD-MCNC: 8.9 G/DL — LOW (ref 11.5–15.5)
IANC: 4.77 K/UL — SIGNIFICANT CHANGE UP (ref 1.8–7.4)
IMM GRANULOCYTES NFR BLD AUTO: 2.3 % — HIGH (ref 0–1.5)
LYMPHOCYTES # BLD AUTO: 0.85 K/UL — LOW (ref 1–3.3)
LYMPHOCYTES # BLD AUTO: 12.1 % — LOW (ref 13–44)
MAGNESIUM SERPL-MCNC: 1.7 MG/DL — SIGNIFICANT CHANGE UP (ref 1.6–2.6)
MCHC RBC-ENTMCNC: 29.9 PG — SIGNIFICANT CHANGE UP (ref 27–34)
MCHC RBC-ENTMCNC: 31.3 GM/DL — LOW (ref 32–36)
MCV RBC AUTO: 95.3 FL — SIGNIFICANT CHANGE UP (ref 80–100)
MONOCYTES # BLD AUTO: 1.05 K/UL — HIGH (ref 0–0.9)
MONOCYTES NFR BLD AUTO: 15 % — HIGH (ref 2–14)
NEUTROPHILS # BLD AUTO: 4.77 K/UL — SIGNIFICANT CHANGE UP (ref 1.8–7.4)
NEUTROPHILS NFR BLD AUTO: 68.1 % — SIGNIFICANT CHANGE UP (ref 43–77)
NRBC # BLD: 1 /100 WBCS — SIGNIFICANT CHANGE UP
NRBC # FLD: 0.08 K/UL — HIGH
PHOSPHATE SERPL-MCNC: 2.7 MG/DL — SIGNIFICANT CHANGE UP (ref 2.5–4.5)
PLATELET # BLD AUTO: 122 K/UL — LOW (ref 150–400)
POTASSIUM SERPL-MCNC: 3.7 MMOL/L — SIGNIFICANT CHANGE UP (ref 3.5–5.3)
POTASSIUM SERPL-SCNC: 3.7 MMOL/L — SIGNIFICANT CHANGE UP (ref 3.5–5.3)
RBC # BLD: 2.98 M/UL — LOW (ref 3.8–5.2)
RBC # FLD: 16.5 % — HIGH (ref 10.3–14.5)
SODIUM SERPL-SCNC: 140 MMOL/L — SIGNIFICANT CHANGE UP (ref 135–145)
WBC # BLD: 7 K/UL — SIGNIFICANT CHANGE UP (ref 3.8–10.5)
WBC # FLD AUTO: 7 K/UL — SIGNIFICANT CHANGE UP (ref 3.8–10.5)

## 2022-06-28 PROCEDURE — 71275 CT ANGIOGRAPHY CHEST: CPT | Mod: 26

## 2022-06-28 PROCEDURE — 99233 SBSQ HOSP IP/OBS HIGH 50: CPT

## 2022-06-28 PROCEDURE — 99223 1ST HOSP IP/OBS HIGH 75: CPT

## 2022-06-28 PROCEDURE — 74177 CT ABD & PELVIS W/CONTRAST: CPT | Mod: 26

## 2022-06-28 RX ORDER — MAGNESIUM SULFATE 500 MG/ML
2 VIAL (ML) INJECTION ONCE
Refills: 0 | Status: COMPLETED | OUTPATIENT
Start: 2022-06-28 | End: 2022-06-28

## 2022-06-28 RX ORDER — LIDOCAINE HYDROCHLORIDE AND EPINEPHRINE 10; 10 MG/ML; UG/ML
20 INJECTION, SOLUTION INFILTRATION; PERINEURAL ONCE
Refills: 0 | Status: DISCONTINUED | OUTPATIENT
Start: 2022-06-28 | End: 2022-07-05

## 2022-06-28 RX ORDER — POTASSIUM CHLORIDE 20 MEQ
40 PACKET (EA) ORAL ONCE
Refills: 0 | Status: COMPLETED | OUTPATIENT
Start: 2022-06-28 | End: 2022-06-28

## 2022-06-28 RX ADMIN — Medication 975 MILLIGRAM(S): at 16:55

## 2022-06-28 RX ADMIN — Medication 975 MILLIGRAM(S): at 22:15

## 2022-06-28 RX ADMIN — Medication 25 GRAM(S): at 10:46

## 2022-06-28 RX ADMIN — PANTOPRAZOLE SODIUM 40 MILLIGRAM(S): 20 TABLET, DELAYED RELEASE ORAL at 10:47

## 2022-06-28 RX ADMIN — Medication 50 MILLIGRAM(S): at 12:53

## 2022-06-28 RX ADMIN — LIDOCAINE 1 PATCH: 4 CREAM TOPICAL at 19:54

## 2022-06-28 RX ADMIN — ENOXAPARIN SODIUM 40 MILLIGRAM(S): 100 INJECTION SUBCUTANEOUS at 12:52

## 2022-06-28 RX ADMIN — Medication 1 TABLET(S): at 12:53

## 2022-06-28 RX ADMIN — OXYCODONE HYDROCHLORIDE 10 MILLIGRAM(S): 5 TABLET ORAL at 02:20

## 2022-06-28 RX ADMIN — Medication 50 MILLIGRAM(S): at 01:22

## 2022-06-28 RX ADMIN — Medication 975 MILLIGRAM(S): at 04:01

## 2022-06-28 RX ADMIN — LIDOCAINE 1 PATCH: 4 CREAM TOPICAL at 23:01

## 2022-06-28 RX ADMIN — OXYCODONE HYDROCHLORIDE 10 MILLIGRAM(S): 5 TABLET ORAL at 19:35

## 2022-06-28 RX ADMIN — PANTOPRAZOLE SODIUM 40 MILLIGRAM(S): 20 TABLET, DELAYED RELEASE ORAL at 21:30

## 2022-06-28 RX ADMIN — Medication 975 MILLIGRAM(S): at 17:25

## 2022-06-28 RX ADMIN — LIDOCAINE 1 PATCH: 4 CREAM TOPICAL at 11:00

## 2022-06-28 RX ADMIN — Medication 975 MILLIGRAM(S): at 11:17

## 2022-06-28 RX ADMIN — SENNA PLUS 2 TABLET(S): 8.6 TABLET ORAL at 21:29

## 2022-06-28 RX ADMIN — Medication 975 MILLIGRAM(S): at 10:47

## 2022-06-28 RX ADMIN — OXYCODONE HYDROCHLORIDE 10 MILLIGRAM(S): 5 TABLET ORAL at 19:05

## 2022-06-28 RX ADMIN — OXYCODONE HYDROCHLORIDE 10 MILLIGRAM(S): 5 TABLET ORAL at 08:13

## 2022-06-28 RX ADMIN — Medication 975 MILLIGRAM(S): at 21:30

## 2022-06-28 RX ADMIN — OXYCODONE HYDROCHLORIDE 10 MILLIGRAM(S): 5 TABLET ORAL at 08:43

## 2022-06-28 RX ADMIN — Medication 40 MILLIEQUIVALENT(S): at 10:47

## 2022-06-28 RX ADMIN — OXYCODONE HYDROCHLORIDE 10 MILLIGRAM(S): 5 TABLET ORAL at 01:22

## 2022-06-28 NOTE — CONSULT NOTE ADULT - PROBLEM SELECTOR RECOMMENDATION 2
s/p exlab, abdominal washout, rectosigmoid colectomy, diverting colostomy, bronchoscopy  s/p washout and bridging mesh placement on 6/14 and skin closure device placed on 6/17   > wound vac in place   > Patient has pain meds ordered. Palliative team unable to asses patient's symptoms due to declining functional status
s/p chemo last week but also possibility of infected uterine tumor  - Cefepime IV (6/5-)  - monitor CBC

## 2022-06-28 NOTE — CHART NOTE - NSCHARTNOTEFT_GEN_A_CORE
GYN ONC Fellow:    I spoke with the Lydia, the patient's daughter and HCP. We reviewed the patient's hospital course. We again reviewed the pathology results from surgery that showed cancer present on all surgical specimens. We discussed that although her mother has made strides in her recovery, she is still unable to walk and care for herself. We discussed that she is not a candidate for rehab, as she is unable to participate. At this time, she is most suited for a skilled nursing facility or an inpatient hospice. We discussed the pros and cons of each, and the importance of determining the next best steps for her mother. She will discuss these options with her father and aunt. We will follow up tomorrow.    GRIS Mckeon, PGY6

## 2022-06-28 NOTE — PROGRESS NOTE ADULT - ASSESSMENT
52y  HD#23 with recurrent cervical CA admitted for management of neutropenic fever, now s/p emergent exploratory laparotomy, abdominal washout, rectosigmoid colectomy, diverting colostomy, bronchoscopy and Abthera placement on 22 (ebl 2000cc, s/p 5UpRBC, 3L albumin, 3U Plts, 3U FFP, 3U Cryo) for bowel perforation. Patient s/p washout and bridging vicryl mesh placement to close fascial gap on  w/ skin closure device placed on .  Pt now extubated, hemodynamically stable, and clinically improving. Pt with multiple episodes of nausea and emesis on -, now improved. CT AP pending.    Neuro: Oxy and tylenol PRN. Flexeril Lidocaine patch.  Appreciate OS recommendations.  CV: Tachycardic this admission, currently 100-110s. TSH elevated but T4 wnl. EKG 6/15 sinus tach. Hemodynamically stable, H/H stable w/ platelets increasing, f/u AM CBC   - h/o HTN: Lopressor 50mg BID   Pulm: O2 sat WNL on RA, Increase ambulation, encourage incentive spirometry use.   GI: Tolerating regular diet.   - Nausea and emesis - - f/u CT AP  - s/p swallow evaluation.  - Senna, Protonix BID  : B/l nephrostomy tubes. Prior ASHLEY resolving w/ continuing downtrending Cr (0.71), f/u AM BMP.  Heme: DVT ppx: Lovenox, SCDs while in bed.    - Thrombocytopenia: likely multifactorial, currently improving spontaneously. Appreciate Heme Onc recs.  ID: Afebrile, continue to monitor fever curve.  - S/p Zosyn (-), s/p Caspofungin and Cefepime  - S/p Zarixo (-6/10) for neutropenic fever.   MSK: Continue working with PT and OT, also evaluated by PMR, recommending subacute rehab. Appreciate recs.   Dispo: Continue inpatient care. Planned to have wound vac changed with plastics today.     Seen at bedside with GYN Oncology team  Kaci Grimm,  PGY2     52y  HD#24 with recurrent cervical CA admitted for management of neutropenic fever, now s/p emergent exploratory laparotomy, abdominal washout, rectosigmoid colectomy, diverting colostomy, bronchoscopy and Abthera placement on 22 (ebl 2000cc, s/p 5UpRBC, 3L albumin, 3U Plts, 3U FFP, 3U Cryo) for bowel perforation. Patient s/p washout and bridging vicryl mesh placement to close fascial gap on  w/ skin closure device placed on .  Pt now extubated, hemodynamically stable, and clinically improving. Pt with multiple episodes of nausea and emesis on -, now improved. CT AP and CT angio pending in setting of persistent tachycardia.    Neuro: Oxy and tylenol PRN. Flexeril Lidocaine patch.  Appreciate GHOS recommendations.  CV: Tachycardic this admission, currently 100-110s. TSH elevated but T4 wnl. EKG 6/15 sinus tach. Hemodynamically stable, H/H stable w/ platelets increasing, f/u AM CBC.   - f/u CT angio in setting of persistent tachycardia, appreciate Gen Surg recs.  - h/o HTN: Lopressor 50mg BID   Pulm: O2 sat WNL on RA, Increase ambulation, encourage incentive spirometry use.   GI: Tolerating regular diet.   - Nausea and emesis - - f/u CT AP.   - s/p swallow evaluation.  - Senna, Protonix BID  : B/l nephrostomy tubes. Prior ASHLEY resolving w/ continuing downtrending Cr (0.71), f/u AM BMP.  Heme: DVT ppx: Lovenox, SCDs while in bed.    - Thrombocytopenia: likely multifactorial, currently improving spontaneously. Appreciate Heme Onc recs.  ID: Afebrile, continue to monitor fever curve.  - S/p Zosyn (-), s/p Caspofungin and Cefepime  - S/p Zarixo (-6/10) for neutropenic fever.   MSK: Continue working with PT and OT, also evaluated by PMR, recommending subacute rehab. Appreciate recs.   Wound: Wound vac change MWF per plastics, appreciate recs. Will consider bedside closure vs. continuing vac at next vac change.  Dispo: Continue inpatient care.     Seen at bedside with GYN Oncology team  Kaci Grimm,  PGY2     52y  HD#24 with recurrent cervical CA admitted for management of neutropenic fever, now s/p emergent exploratory laparotomy, abdominal washout, rectosigmoid colectomy, diverting colostomy, bronchoscopy and Abthera placement on 22 (ebl 2000cc, s/p 5UpRBC, 3L albumin, 3U Plts, 3U FFP, 3U Cryo) for bowel perforation. Patient s/p washout and bridging vicryl mesh placement to close fascial gap on  w/ skin closure device placed on .  Pt now extubated, hemodynamically stable, and clinically improving. Pt with multiple episodes of nausea and emesis on -, now improved. CT AP and CT angio pending in setting of persistent tachycardia.    Neuro: Oxy and tylenol PRN. Flexeril Lidocaine patch.  Appreciate GHOS recommendations.  CV: Tachycardic this admission, currently 100-110s. TSH elevated but T4 wnl. EKG 6/15 sinus tach. Hemodynamically stable, H/H stable w/ platelets increasing, f/u AM CBC.   - f/u CT angio in setting of persistent tachycardia, appreciate Gen Surg recs. Protocoled with radiology.   - h/o HTN: Lopressor 50mg BID   Pulm: O2 sat WNL on RA, Increase ambulation, encourage incentive spirometry use.   GI: Tolerating regular diet.   - Nausea and emesis - - f/u CT AP. Protocoled with radiology.   - s/p swallow evaluation.  - Senna, Protonix BID  : B/l nephrostomy tubes. Prior ASHLEY resolving w/ continuing downtrending Cr (0.71), f/u AM BMP.  Heme: DVT ppx: Lovenox, SCDs while in bed.    - Thrombocytopenia: likely multifactorial, currently improving spontaneously. Appreciate Heme Onc recs.  ID: Afebrile, continue to monitor fever curve.  - S/p Zosyn (-), s/p Caspofungin and Cefepime  - S/p Zarixo (-6/10) for neutropenic fever.   MSK: Continue working with PT and OT, also evaluated by PMR, recommending subacute rehab. Appreciate recs.   Wound: Wound vac change MWF per plastics, appreciate recs. Will consider bedside closure vs. continuing vac at next vac change.  Dispo: Continue inpatient care. Palliative care consult today.     Seen at bedside with GYN Oncology team  Kaci Grimm,  PGY2    Julissa Leach MD, PGY4    52y  HD#24 with recurrent cervical CA admitted for management of neutropenic fever, now s/p emergent exploratory laparotomy, abdominal washout, rectosigmoid colectomy, diverting colostomy, bronchoscopy and Abthera placement on 22 (ebl 2000cc, s/p 5UpRBC, 3L albumin, 3U Plts, 3U FFP, 3U Cryo) for bowel perforation. Patient s/p washout and bridging vicryl mesh placement to close fascial gap on  w/ skin closure device placed on .  Pt now extubated, hemodynamically stable, and clinically improving. Pt with multiple episodes of nausea and emesis on -, now improved. CT AP and CT angio pending in setting of persistent tachycardia.    Neuro: Oxy and tylenol PRN. Flexeril Lidocaine patch.  Appreciate OS recommendations.  CV: Tachycardic this admission, currently 100-110s. TSH elevated but T4 wnl. EKG 6/15 sinus tach. Hemodynamically stable, H/H stable w/ platelets increasing, f/u AM CBC.   - f/u CT angio in setting of persistent tachycardia, appreciate OS recs. Protocoled with radiology.   - h/o HTN: Lopressor 50mg BID   Pulm: O2 sat WNL on RA, Increase ambulation, encourage incentive spirometry use.   GI: Tolerating regular diet.   - Nausea and emesis - - f/u CT AP. Protocoled with radiology.   - s/p swallow evaluation.  - Senna, Protonix BID  : B/l nephrostomy tubes. Prior ASHLEY resolving w/ continuing downtrending Cr (0.71), f/u AM BMP.  Heme: DVT ppx: Lovenox, SCDs while in bed.    - Thrombocytopenia: likely multifactorial, currently improving spontaneously. Appreciate Heme Onc recs.  ID: Afebrile, continue to monitor fever curve.  - S/p Zosyn (-), s/p Caspofungin and Cefepime  - S/p Zarixo (-6/10) for neutropenic fever.   MSK: Continue working with PT and OT, also evaluated by PMR, recommending subacute rehab. Appreciate recs.   Wound: Wound vac change MWF per plastics, appreciate recs. Will consider bedside closure vs. continuing vac at next vac change.  Dispo: Continue inpatient care. Palliative care consult today.     Seen at bedside with GYN Oncology team  Kaci Grimm,  PGY2    Julissa Leach MD, PGY4

## 2022-06-28 NOTE — PROGRESS NOTE ADULT - ASSESSMENT
s/p ex lap Left colectomy end colostomy for perforated sigmoid and feculent fluid throughout abdomen, s/p multiple washouts and closure with vicryl mesh.    Cont diet as phyllis    Agree with plan for CT PA and abd/pevis to r/o PE, and assess intrabdominal abscess/drain locations

## 2022-06-28 NOTE — PROGRESS NOTE ADULT - PROBLEM SELECTOR PLAN 1
-h/o recurrent cervical CA, receives Gyn Oncology care at [facility] ([place]).   -she initially was diagnosed with stage III cervical CA in 2014 s/p VBRT + chemo but never had surgical intervention. -Reported now being s/p 5 cycles of chemo, unsure of regimen but next due 6/21/22.  -will need outpt follow up with med onc for plans for future chemo/management  -course complicated by bowel perforation s/p washout and bridging vicryl mesh placement to close fascial gap on 6/14 w/ skin closure device place don 6/17  -further management per general surgery and plastic surgery  -tolerating regular diet  -needs PT, wound care, rehab planning

## 2022-06-28 NOTE — PROVIDER CONTACT NOTE (OTHER) - SITUATION
MD Carreon informed that patient right nephrostomy output during the shift was 75 and the left 225. Urine is also la nena color

## 2022-06-28 NOTE — CONSULT NOTE ADULT - PROBLEM SELECTOR RECOMMENDATION 3
Concern for infected tumor vs perforation  - management as per primary team.
Outpatient oncologist: [facility] ([place])   Per chart review, patient was diagnosed with cervical cancer in 2014, s/p VBRT and chemo.  > Would appreciate updated oncology recs regarding patient's eligibility for further DMT  > Gyn/onc recs reviewed   > Would recommend IDT meeting with family to discuss further plan of care

## 2022-06-28 NOTE — PROGRESS NOTE ADULT - SUBJECTIVE AND OBJECTIVE BOX
SURGICAL ONCOLOGY PROGRESS NOTE    No complaints    Vital Signs Last 24 Hrs  T(C): 36.6 (28 Jun 2022 05:45), Max: 37.2 (27 Jun 2022 18:00)  T(F): 97.9 (28 Jun 2022 05:45), Max: 99 (27 Jun 2022 18:00)  HR: 114 (28 Jun 2022 05:45) (103 - 121)  BP: 133/94 (28 Jun 2022 05:45) (132/90 - 156/101)  BP(mean): --  RR: 18 (28 Jun 2022 05:45) (17 - 20)  SpO2: 100% (28 Jun 2022 05:45) (98% - 100%)  I&O's Detail    27 Jun 2022 07:01  -  28 Jun 2022 07:00  --------------------------------------------------------  IN:    IV PiggyBack: 50 mL    Oral Fluid: 360 mL  Total IN: 410 mL    OUT:    Colostomy (mL): 1150 mL    Drain (mL): 20 mL    Drain (mL): 25 mL    Drain (mL): 112.5 mL    Nephrostomy Tube (mL): 225 mL    Nephrostomy Tube (mL): 500 mL    VAC (Vacuum Assisted Closure) System (mL): 0 mL  Total OUT: 2032.5 mL    Total NET: -1622.5 mL          PE:    A&A  NAD    soft, NT, ND, No peritoneal signs    Vac in place    Ric drains                          8.9    7.00  )-----------( 122      ( 28 Jun 2022 06:20 )             28.4     06-28    140  |  104  |  21  ----------------------------<  94  3.7   |  21<L>  |  0.72    Ca    8.4      28 Jun 2022 06:20  Phos  2.7     06-28  Mg     1.70     06-28

## 2022-06-28 NOTE — ADVANCED PRACTICE NURSE CONSULT - ASSESSMENT
Pouch intact with pasty stool, no leakage noted. Midline abdomen with NPWT, seal intact.  Patient educated on frequency of pouch change and verbalized understanding. Patient states that pouch was changed recently. Primary RN confirmed that pouch was changed on 6/26/2022

## 2022-06-28 NOTE — PROGRESS NOTE ADULT - PROBLEM SELECTOR PLAN 1
GYN ONC Fellow Addendum:    Pt seen and examined at bedside. Agree with above. Pain controlled. Tolerating reg diet, -n/v. +ostomy output. Unable to ambulate. B/l PCNs.    VS reviewed  Labs reviewed    - Continue current pain regimen  - Reg diet  - CT chest to r/o PE; CT A/P to assess drains  - Thrombocytopenia: improving, now 122   - Encourage ambulation and IS use  - Replete lytes prn  - DVT ppx: lovenox  - OOB  - Debility: d/w patient need for skilled nursing facility vs inpatient hospice. pt desires SNF. Consult SW. Will d/w daughter Lydia  - Dispo: continue inpatient management    GRIS Mckeon, PGY6

## 2022-06-28 NOTE — PROGRESS NOTE ADULT - PROBLEM SELECTOR PLAN 3
-vitals reviewed and patient since admission had HR in 100s-110s  -CTPA negative on admission  - currently no fever or hypoxia  -DVT study negative  -suspect tachycardia driven by pain and debility  -plan for ctpa to r/o PE  -c/w metoprolol

## 2022-06-28 NOTE — PROGRESS NOTE ADULT - SUBJECTIVE AND OBJECTIVE BOX
Gyn ONC Progress Note     HD #24    Subjective:   Pt seen and examined at bedside. No events overnight. Pain well controlled. Patient ambulating. Passing flatus. Tolerating regular diet. Pt denies fever, chills, chest pain, SOB, nausea, vomiting, lightheadedness, dizziness.      Objective:  T(F): 97.9 (06-28-22 @ 01:45), Max: 99 (06-27-22 @ 18:00)  HR: 119 (06-28-22 @ 01:45) (103 - 121)  BP: 132/90 (06-28-22 @ 01:45) (132/90 - 158/90)  RR: 18 (06-28-22 @ 01:45) (17 - 20)  SpO2: 100% (06-28-22 @ 01:45) (98% - 100%)  Wt(kg): --  I&O's Summary    26 Jun 2022 07:01  -  27 Jun 2022 07:00  --------------------------------------------------------  IN: 0 mL / OUT: 2434 mL / NET: -2434 mL    27 Jun 2022 07:01  -  28 Jun 2022 05:28  --------------------------------------------------------  IN: 410 mL / OUT: 1665 mL / NET: -1255 mL      CAPILLARY BLOOD GLUCOSE          MEDICATIONS  (STANDING):  acetaminophen     Tablet .. 975 milliGRAM(s) Oral every 6 hours  enoxaparin Injectable 40 milliGRAM(s) SubCutaneous every 24 hours  metoprolol tartrate 50 milliGRAM(s) Oral two times a day  multivitamin 1 Tablet(s) Oral daily  pantoprazole    Tablet 40 milliGRAM(s) Oral every 12 hours  senna 2 Tablet(s) Oral at bedtime    MEDICATIONS  (PRN):  cyclobenzaprine 5 milliGRAM(s) Oral three times a day PRN Muscle Spasm  lidocaine   4% Patch 1 Patch Transdermal daily PRN back pain  oxyCODONE    IR 10 milliGRAM(s) Oral every 6 hours PRN Severe Pain (7 - 10)  oxyCODONE    IR 5 milliGRAM(s) Oral every 4 hours PRN Moderate Pain (4 - 6)      Physical Exam:  Constitutional: NAD, A&O x3  CV: RRR  Lungs: CTA b/l, good air flow b/l  Abd: Soft, non-distended, wound vac in place; ostomy pink and well perfused with brown liquid/stool output in ostomy bag; +3 Ric drains with seropurulent output in RLW, LUQ, LLQ  : L nephrostomy tube draining clear urine, R nephrostomy tube draining concentrated urine  Extremities: No pain, 2+ pitting edema.    LABS:                          8.9    6.60  )-----------( 119      ( 27 Jun 2022 06:20 )             27.3     06-27    140    |  104    |  18     ----------------------------<  107<H>  3.4<L>   |  22     |  0.68     Ca    8.5        27 Jun 2022 06:20  Phos  2.6       06-27  Mg     1.70      06-27         Gyn ONC Progress Note     HD #24    Subjective:   Pt seen and examined at bedside. No events overnight. Pain well controlled. +OOB. Passing flatus. Nausea improved from yesterday, no episodes of emesis overnight. Tolerating regular diet.   Pt denies fever, chills, chest pain, SOB, lightheadedness, dizziness.      Objective:  T(F): 97.9 (06-28-22 @ 01:45), Max: 99 (06-27-22 @ 18:00)  HR: 119 (06-28-22 @ 01:45) (103 - 121)  BP: 132/90 (06-28-22 @ 01:45) (132/90 - 158/90)  RR: 18 (06-28-22 @ 01:45) (17 - 20)  SpO2: 100% (06-28-22 @ 01:45) (98% - 100%)      I&O's Summary    26 Jun 2022 07:01  -  27 Jun 2022 07:00  --------------------------------------------------------  IN: 0 mL / OUT: 2434 mL / NET: -2434 mL    27 Jun 2022 07:01  -  28 Jun 2022 05:28  --------------------------------------------------------  IN: 410 mL / OUT: 1665 mL / NET: -1255 mL      MEDICATIONS  (STANDING):  acetaminophen     Tablet .. 975 milliGRAM(s) Oral every 6 hours  enoxaparin Injectable 40 milliGRAM(s) SubCutaneous every 24 hours  metoprolol tartrate 50 milliGRAM(s) Oral two times a day  multivitamin 1 Tablet(s) Oral daily  pantoprazole    Tablet 40 milliGRAM(s) Oral every 12 hours  senna 2 Tablet(s) Oral at bedtime    MEDICATIONS  (PRN):  cyclobenzaprine 5 milliGRAM(s) Oral three times a day PRN Muscle Spasm  lidocaine   4% Patch 1 Patch Transdermal daily PRN back pain  oxyCODONE    IR 10 milliGRAM(s) Oral every 6 hours PRN Severe Pain (7 - 10)  oxyCODONE    IR 5 milliGRAM(s) Oral every 4 hours PRN Moderate Pain (4 - 6)      Physical Exam:  Constitutional: NAD, A&O x3  CV: RRR  Lungs: CTA b/l   Abd: Soft, non-distended, wound vac in place w/ serous output; ostomy pink and well perfused with brown liquid/stool output in ostomy bag; +3 Ric drains with seropurulent output in RLW, LUQ, LLQ   : R&L nephrostomy tubes draining concentrated urine  Extremities: No pain, 2+ pitting edema.    LABS:                          8.9    6.60  )-----------( 119      ( 27 Jun 2022 06:20 )             27.3     06-27    140    |  104    |  18     ----------------------------<  107<H>  3.4<L>   |  22     |  0.68     Ca    8.5        27 Jun 2022 06:20  Phos  2.6       06-27  Mg     1.70      06-27         Gyn ONC Progress Note     HD #24    Subjective:   Pt seen and examined at bedside. No events overnight. Pain well controlled. +OOB to chair. Passing flatus. Nausea improved from yesterday, no episodes of emesis overnight. Tolerating regular diet.   Pt denies fever, chills, chest pain, SOB, lightheadedness, dizziness.      Objective:  T(F): 97.9 (06-28-22 @ 01:45), Max: 99 (06-27-22 @ 18:00)  HR: 119 (06-28-22 @ 01:45) (103 - 121)  BP: 132/90 (06-28-22 @ 01:45) (132/90 - 158/90)  RR: 18 (06-28-22 @ 01:45) (17 - 20)  SpO2: 100% (06-28-22 @ 01:45) (98% - 100%)      I&O's Summary  26 Jun 2022 07:01  -  27 Jun 2022 07:00  --------------------------------------------------------  IN: 0 mL / OUT: 2434 mL / NET: -2434 mL    27 Jun 2022 07:01  -  28 Jun 2022 05:28  --------------------------------------------------------  IN: 410 mL / OUT: 1665 mL / NET: -1255 mL      MEDICATIONS  (STANDING):  acetaminophen     Tablet .. 975 milliGRAM(s) Oral every 6 hours  enoxaparin Injectable 40 milliGRAM(s) SubCutaneous every 24 hours  metoprolol tartrate 50 milliGRAM(s) Oral two times a day  multivitamin 1 Tablet(s) Oral daily  pantoprazole    Tablet 40 milliGRAM(s) Oral every 12 hours  senna 2 Tablet(s) Oral at bedtime    MEDICATIONS  (PRN):  cyclobenzaprine 5 milliGRAM(s) Oral three times a day PRN Muscle Spasm  lidocaine   4% Patch 1 Patch Transdermal daily PRN back pain  oxyCODONE    IR 10 milliGRAM(s) Oral every 6 hours PRN Severe Pain (7 - 10)  oxyCODONE    IR 5 milliGRAM(s) Oral every 4 hours PRN Moderate Pain (4 - 6)      Physical Exam:  Constitutional: NAD, A&O x3  CV: Tachycardic, regular S1/S2  Lungs: CTA b/l   Abd: Soft, non-distended, wound vac in place w/ serous output; ostomy pink and well perfused with brown liquid/stool output in ostomy bag; +3 Ric drains with seropurulent output in RLW, LUQ, LLQ   : R&L nephrostomy tubes draining concentrated urine  Extremities: No pain, 2+ pitting edema.    LABS:                          8.9    6.60  )-----------( 119      ( 27 Jun 2022 06:20 )             27.3     06-27    140    |  104    |  18     ----------------------------<  107<H>  3.4<L>   |  22     |  0.68     Ca    8.5        27 Jun 2022 06:20  Phos  2.6       06-27  Mg     1.70      06-27

## 2022-06-28 NOTE — PROGRESS NOTE ADULT - SUBJECTIVE AND OBJECTIVE BOX
Division of Hospital Medicine  Dr. Nubia Fonseca  Pager [phone]    Patient is a 52y old  Female who presents with a chief complaint of neutropenic fever (25 Jun 2022 09:48)    SUBJECTIVE / OVERNIGHT EVENTS: patient seen and examined. Still with back pain. Per RN recieved oxy prior to eval. Yesterday she sat in chair. Sitting in chair helps with back pain. Plan for CT c/a/p today.     MEDICATIONS  (STANDING):  enoxaparin Injectable 40 milliGRAM(s) SubCutaneous every 24 hours  metoprolol succinate  milliGRAM(s) Oral daily  multivitamin 1 Tablet(s) Oral daily  pantoprazole    Tablet 40 milliGRAM(s) Oral every 12 hours  senna 2 Tablet(s) Oral at bedtime    MEDICATIONS  (PRN):  acetaminophen     Tablet .. 650 milliGRAM(s) Oral every 6 hours PRN Mild Pain (1 - 3)  oxyCODONE    IR 5 milliGRAM(s) Oral every 4 hours PRN Severe Pain (7 - 10)      Vital Signs Last 24 Hrs  T(C): 36.6 (28 Jun 2022 09:48), Max: 37.2 (27 Jun 2022 18:00)  T(F): 97.8 (28 Jun 2022 09:48), Max: 99 (27 Jun 2022 18:00)  HR: 118 (28 Jun 2022 09:48) (103 - 121)  BP: 136/84 (28 Jun 2022 09:48) (132/90 - 143/88)  BP(mean): --  RR: 18 (28 Jun 2022 09:48) (17 - 20)  SpO2: 99% (28 Jun 2022 09:48) (98% - 100%)    I&O's Summary    26 Jun 2022 07:01  -  27 Jun 2022 07:00  --------------------------------------------------------  IN: 0 mL / OUT: 2434 mL / NET: -2434 mL    PHYSICAL EXAM:  GENERAL: NAD, well-developed, sitting in bed  HEAD:  Atraumatic, Normocephalic  EYES: EOMI, PERRLA, conjunctiva and sclera clear  NECK: Supple, No JVD  CHEST/LUNG: Clear to auscultation anteriorly no wheeze   HEART: +tachy  ABDOMEN: ostomy+, large midline wound with drain, +NTBL  EXTREMITIES:  2+ Peripheral Pulses, No clubbing, cyanosis, or edema  PSYCH: AAOx2-3  NEUROLOGY: non-focal  SKIN: No rashes or lesions    LABS:                                              8.9    7.00  )-----------( 122      ( 28 Jun 2022 06:20 )             28.4   06-28    140  |  104  |  21  ----------------------------<  94  3.7   |  21<L>  |  0.72    Ca    8.4      28 Jun 2022 06:20  Phos  2.7     06-28  Mg     1.70     06-28            RADIOLOGY & ADDITIONAL TESTS: < from: CT Head No Cont (06.08.22 @ 14:35) >  Impression:  Unremarkable noncontrast head CT.    < end of copied text >      Imaging Personally Reviewed:    Consultant(s) Notes Reviewed:  surgery    Care Discussed with Consultants/Other Providers: daniela UPTON    Assessment and Plan:

## 2022-06-28 NOTE — PROGRESS NOTE ADULT - PROBLEM SELECTOR PLAN 2
-back pain persists.  -per patient and family due to uncomfortable bed  -at this time suspecting musculoskeletal in nature as pain improves when in chair  -needs multimodal pain control, can try lidocaine patch, ATC tylenol and flexeril PRN for better pain control  -if no improvement in oxy 5mg, can consider sparing doses of toradol if no objection from gyn/onc  -will monitor  -CT c/a/p today

## 2022-06-28 NOTE — CONSULT NOTE ADULT - PROBLEM SELECTOR RECOMMENDATION 4
Monitor output from nephrostomy tube
Thank you for allowing us to participate in your patient's care. We will continue to follow with you. Please page [phone] for any q's or c's.     Shannon Schwab D.O.   Palliative Medicine

## 2022-06-28 NOTE — PROGRESS NOTE ADULT - ATTENDING COMMENTS
patient seen and evaluated  discussed discharge planning  sniff vs. hospice  patient is going to discuss with her family

## 2022-06-28 NOTE — CONSULT NOTE ADULT - SUBJECTIVE AND OBJECTIVE BOX
[facility] Geriatrics and Palliative Care  Shanonn Schwab, Palliative Care Attending  Contact Info: Page [phone] (including Nights/Weekends), message on Microsoft Teams (Shannon Schwab), or leave  at Palliative Office [phone] (non-urgent)     HPI: Gyn Oncology Admission Note    52y  with recurrent cervical CA (per patient stage III) presenting to the ED as transfer from [place] a/w neutropenic fever. Patient reports she had last cycle of chemotherapy 5 days prior.  Patient had VNS care stop by yesterday and was noted to be tachycardic on vital signs and was sent to the ED.  She reports feeling weak + fatigued. + upper abdominal pain that worsens while having chills. She denies any vomiting, chest pain, SOB. Last BM yesterday morning which was normal.     At [facility] she was given Meropenem and Zosyn and was noted to be tachycardic to 140s and febrile to 39.5 and was thus transferred for further management.     MICU was consulted on patient with recommendation to continue with broad spectrum antibiotics. Patient receives Gyn Oncology care at [facility] She reports that she initially was diagnosed with stage III cervical CA in  s/p VBRT + chemo but never had surgical intervention. She reports now being s/p 5 cycles of chemo, unsure of regimen but next due .    OB/GYN HISTORY:  x 1, sAB x 2. Denies any ovarian cysts, fibroids, STIs   PSHx: b/l PCN  and exchange  , hernia repair  PMHx:  HTN   All: No Known Allergies  Meds: Metoprolol 100mg qd, unsure of other medications  Psych: denies any anxiety or depression   Social: Denies any tobacco, alcohol, or illicit substance use      (2022 03:21)    PERTINENT PM/SXH:   HTN (hypertension)  Cervical cancer    No significant past surgical history    Nephrostomy status      FAMILY HISTORY:  FH: liver cancer (Mother)    Family Hx substance abuse [ ]yes [ ]no  ITEMS NOT CHECKED ARE NOT PRESENT    SOCIAL HISTORY:   Significant other/partner[x ]  Children[x ]  Baptist/Spirituality:  Substance hx:  [ ]   Tobacco hx:  [ ]   Alcohol hx: [ ]   Home Opioid hx:  [ ] I-Stop Reference No:   This report was requested by: Shannon Schwab | Reference #: [id number]    Others' Prescriptions  Patient Name: Sade RuddBirth Date: 1970  Address: [address] [place] [id number]: Female  Rx Written	Rx Dispensed	Drug	Quantity	Days Supply	Prescriber Name	Prescriber Ade #	Payment Method	Dispenser  2022	oxycodone-acetaminophen 5-325 mg tab	120	30	Eddie Rubi)	[id number]	[facility]  2022	fentanyl 12 mcg/hr patch	10	30	Eddie Rubi)	[id number]	Medicaid	[facility]  2022	oxycodone-acetaminophen 5-325 mg tab	20	5	Eddie Rubi)	[id number]	Medicaid	[facility]  2022	oxycodone-acetaminophen 5-325 mg tab	20	5	Rosemary Crawford	[id number]	Medicaid	[facility]  2022	oxycodone-acetaminophen 5-325 mg tab	28	7	Annia Lal	[id number]	Medicaid	[facility]  2022	oxycodone-acetaminophen 5-325 mg tab	28	7	Rosemary Crawford	[id number]	Medicaid	[facility]  2022	oxycodone-acetaminophen 5-325 mg tab	28	7	Cecilia Wright	[id number]	Medicaid	[facility]  2022	oxycodone-acetaminophen 5-325 mg tab	40	10	Cecilia Wright	[id number]	Medicaid	[id number]  2022	oxycodone-acetaminophen 5-325 mg tab	28	7	Rosemary Crawford	[id number]	Medicaid	[facility]  2022	tramadol hcl 50 mg tablet	12	3	Lenny Edwards	[id number]	Medicaid	[facility] [id number]  Living Situation: [ ]Home  [ ]Long term care  [ ]Rehab [ ]Other    ADVANCE DIRECTIVES:    DNR/MOLST  [ ]  Living Will  [ ]   DECISION MAKER(s): Patient  [ ] Health Care Proxy(s)  [ ] Surrogate(s)  [ ] Guardian           Name(s): Phone Number(s):    BASELINE (I)ADL(s) (prior to admission): patient has had significant decline in function over past 3-4 weeks, was previously independent at home prior to admission  Mora: [ ]Total  [ x] Moderate [ ]Dependent    Allergies  No Known Allergies    Intolerances    MEDICATIONS  (STANDING):  acetaminophen     Tablet .. 975 milliGRAM(s) Oral every 6 hours  enoxaparin Injectable 40 milliGRAM(s) SubCutaneous every 24 hours  lidocaine 1%/epinephrine 1:100,000 Inj 20 milliLiter(s) Local Injection once  metoprolol tartrate 50 milliGRAM(s) Oral two times a day  multivitamin 1 Tablet(s) Oral daily  pantoprazole    Tablet 40 milliGRAM(s) Oral every 12 hours  senna 2 Tablet(s) Oral at bedtime    MEDICATIONS  (PRN):  cyclobenzaprine 5 milliGRAM(s) Oral three times a day PRN Muscle Spasm  lidocaine   4% Patch 1 Patch Transdermal daily PRN back pain  oxyCODONE    IR 5 milliGRAM(s) Oral every 4 hours PRN Moderate Pain (4 - 6)  oxyCODONE    IR 10 milliGRAM(s) Oral every 6 hours PRN Severe Pain (7 - 10)    PRESENT SYMPTOMS: [ ]Unable to self-report  [ ] CPOT [ ] PAINADs [ ] RDOS  Source if other than patient:  [ ]Family   [ ]Team     Pain: [ ]yes [ ]no  QOL impact -   Location -                    Aggravating factors -  Quality -  Radiation -  Timing-  Severity (0-10 scale):  Minimal acceptable level (0-10 scale):     CPOT:    https://www.sccm.org/getattachment/kij54q58-0q0u-7h2o-7a6u-4795g6017g0m/Critical-Care-Pain-Observation-Tool-(CPOT)    PAIN AD Score:   http://geriatrictoolkit.[place].[facility]/cog/painad.pdf (press ctrl +  left click to view)    Dyspnea:                           [ ]Mild [ ]Moderate [ ]Severe      RDOS:  0 to 2  minimal or no respiratory distress   3  mild distress  4 to 6 moderate distress  >7 severe distress  https://homecareinformation.net/handouts/hen/Respiratory_Distress_Observation_Scale.pdf (Ctrl +  left click to view)     Anxiety:                             [ ]Mild [ ]Moderate [ ]Severe  Fatigue:                             [ ]Mild [ ]Moderate [ ]Severe  Nausea:                             [ ]Mild [ ]Moderate [ ]Severe  Loss of appetite:              [ ]Mild [ ]Moderate [ ]Severe  Constipation:                    [ ]Mild [ ]Moderate [ ]Severe    PCSSQ[Palliative Care Spiritual Screening Question]   Severity (0-10):  Chaplaincy Referral: [ ] yes [ ] refused [ ] following    Other Symptoms:  [ ]All other review of systems negative     Palliative Performance Status Version 2:         %    http://npcrc.org/files/news/palliative_performance_scale_ppsv2.pdf  PHYSICAL EXAM:  Vital Signs Last 24 Hrs  T(C): 36.8 (2022 12:55), Max: 37.2 (2022 18:00)  T(F): 98.2 (2022 12:55), Max: 99 (2022 18:00)  HR: 116 (2022 12:55) (103 - 121)  BP: 145/79 (2022 12:55) (132/90 - 145/79)  BP(mean): --  RR: 18 (2022 12:55) (17 - 18)  SpO2: 99% (2022 12:55) (99% - 100%) I&O's Summary    2022 07:01  -  2022 07:00  --------------------------------------------------------  IN: 410 mL / OUT: 2032.5 mL / NET: -1622.5 mL      GENERAL: [ ]Cachexia    [ ]Alert  [ ]Oriented x   [ ]Lethargic  [ ]Unarousable  [ ]Verbal  [ ]Non-Verbal  Behavioral:   [ ] Anxiety  [ ] Delirium [ ] Agitation [ ] Other  HEENT:  [ ]Normal   [ ]Dry mouth   [ ]ET Tube/Trach  [ ]Oral lesions  PULMONARY:   [ ]Clear [ ]Tachypnea  [ ]Audible excessive secretions   [ ]Rhonchi        [ ]Right [ ]Left [ ]Bilateral  [ ]Crackles        [ ]Right [ ]Left [ ]Bilateral  [ ]Wheezing     [ ]Right [ ]Left [ ]Bilateral  [ ]Diminished breath sounds [ ]right [ ]left [ ]bilateral  CARDIOVASCULAR:    [ ]Regular [ ]Irregular [ ]Tachy  [ ]Moi [ ]Murmur [ ]Other  GASTROINTESTINAL:  [ ]Soft  [ ]Distended   [ ]+BS  [ ]Non tender [ ]Tender  [ ]Other [ ]PEG [ ]OGT/ NGT  Last BM:  GENITOURINARY:  [ ]Normal [ ] Incontinent   [ ]Oliguria/Anuria   [ ]Infante  MUSCULOSKELETAL:   [ ]Normal   [ ]Weakness  [ ]Bed/Wheelchair bound [ ]Edema  NEUROLOGIC:   [ ]No focal deficits  [ ]Cognitive impairment  [ ]Dysphagia [ ]Dysarthria [ ]Paresis [ ]Other   SKIN:   [ ]Normal  [ ]Rash  [ ]Other  [ ]Pressure ulcer(s)       Present on admission [ ]y [ ]n    CRITICAL CARE:  [ ] Shock Present  [ ]Septic [ ]Cardiogenic [ ]Neurologic [ ]Hypovolemic  [ ]  Vasopressors [ ]  Inotropes   [ ]Respiratory failure present [ ]Mechanical ventilation [ ]Non-invasive ventilatory support [ ]High flow    [ ]Acute  [ ]Chronic [ ]Hypoxic  [ ]Hypercarbic [ ]Other  [ ]Other organ failure     LABS:                        8.9    7.00  )-----------( 122      ( 2022 06:20 )             28.4       140  |  104  |  21  ----------------------------<  94  3.7   |  21<L>  |  0.72    Ca    8.4      2022 06:20  Phos  2.7       Mg     1.70         RADIOLOGY & ADDITIONAL STUDIES: < from: CT Abdomen and Pelvis w/wo IV Cont (06.10.22 @ 21:22) >  IMPRESSION:  Evaluation is significantly limited by out of field artifact/patient   positioning in the scanner.    Nephrostomy tubes in place without evidence of active bleeding.    Postsurgical changes of the abdominal wall with ABERTHA.    Air and fluid in the endometrial cavity with adjacent ill-defined air and   fluid, similar to prior.    Conglomerate soft tissue in the lower pelvis.        < end of copied text >      PROTEIN CALORIE MALNUTRITION PRESENT: [ ]mild [ ]moderate [ ]severe [ ]underweight [ ]morbid obesity  https://www.andeal.org/vault/2440/web/files/ONC/Table_Clinical%20Characteristics%20to%20Document%20Malnutrition-White%20JV%20et%20al%2020.pdf    Height (cm): 167 (22 @ 14:48), 167.6 (22 @ 15:52), 167.6 (22 @ 10:03)  Weight (kg): 100.4 (22 @ 14:48), 98.9 (22 @ 15:52), 110.7 (22 @ 10:03)  BMI (kg/m2): 36 (22 @ 14:48), 35.2 (22 @ 15:52), 39.4 (22 @ 10:03)    [ ]PPSV2 < or = to 30% [ ]significant weight loss  [ ]poor nutritional intake  [ ]anasarca[ ]Artificial Nutrition      Other REFERRALS:  [ ]Hospice  [ ]Child Life  [x ]Social Work  [ ]Case management [ ]Holistic Therapy      Health system Geriatrics and Palliative Care  Shannon Schwab, Palliative Care Attending  Contact Info: Page [phone] (including Nights/Weekends), message on Microsoft Teams (Shannon Schwab), or leave  at Palliative Office [phone] (non-urgent)     HPI: Gyn Oncology Admission Note    52y  with recurrent cervical CA (per patient stage III) presenting to the ED as transfer from [place] a/w neutropenic fever. Patient reports she had last cycle of chemotherapy 5 days prior.  Patient had VNS care stop by yesterday and was noted to be tachycardic on vital signs and was sent to the ED.  She reports feeling weak + fatigued. + upper abdominal pain that worsens while having chills. She denies any vomiting, chest pain, SOB. Last BM yesterday morning which was normal.     At [facility] she was given Meropenem and Zosyn and was noted to be tachycardic to 140s and febrile to 39.5 and was thus transferred for further management.     MICU was consulted on patient with recommendation to continue with broad spectrum antibiotics. Patient receives Gyn Oncology care at [facility] She reports that she initially was diagnosed with stage III cervical CA in  s/p VBRT + chemo but never had surgical intervention. She reports now being s/p 5 cycles of chemo, unsure of regimen but next due .    OB/GYN HISTORY:  x 1, sAB x 2. Denies any ovarian cysts, fibroids, STIs   PSHx: b/l PCN  and exchange  , hernia repair  PMHx:  HTN   All: No Known Allergies  Meds: Metoprolol 100mg qd, unsure of other medications  Psych: denies any anxiety or depression   Social: Denies any tobacco, alcohol, or illicit substance use      (2022 03:21)    PERTINENT PM/SXH:   HTN (hypertension)  Cervical cancer    No significant past surgical history    Nephrostomy status      FAMILY HISTORY:  FH: liver cancer (Mother)    Family Hx substance abuse [ ]yes [ ]no  ITEMS NOT CHECKED ARE NOT PRESENT    SOCIAL HISTORY:   Significant other/partner[x ]  Children[x ]  Yazdanism/Spirituality:  Substance hx:  [ ]   Tobacco hx:  [ ]   Alcohol hx: [ ]   Home Opioid hx:  [ ] I-Stop Reference No:   This report was requested by: Shannon Schwab | Reference #: [id number]    Others' Prescriptions  Patient Name: Sade RuddBirth Date: 1970  Address: [address] [place] [id number]: Female  Rx Written	Rx Dispensed	Drug	Quantity	Days Supply	Prescriber Name	Prescriber Ade #	Payment Method	Dispenser  2022	oxycodone-acetaminophen 5-325 mg tab	120	30	Eddie Rubi)	[id number]	[facility]  2022	fentanyl 12 mcg/hr patch	10	30	Eddie Rubi)	[id number]	Medicaid	[facility]  2022	oxycodone-acetaminophen 5-325 mg tab	20	5	Eddie Rubi)	[id number]	Medicaid	[facility]  2022	oxycodone-acetaminophen 5-325 mg tab	20	5	Rosemary Crawford	[id number]	Medicaid	[facility]  2022	oxycodone-acetaminophen 5-325 mg tab	28	7	Annia Lal	[id number]	Medicaid	[facility]  2022	oxycodone-acetaminophen 5-325 mg tab	28	7	Rosemary Crawford	[id number]	Medicaid	[facility]  2022	oxycodone-acetaminophen 5-325 mg tab	28	7	Cecilia Wright	[id number]	Medicaid	[facility]  2022	oxycodone-acetaminophen 5-325 mg tab	40	10	Cecilia Wright	[id number]	Medicaid	[id number]  2022	oxycodone-acetaminophen 5-325 mg tab	28	7	Rosemary Crawford	[id number]	Medicaid	[facility]  2022	tramadol hcl 50 mg tablet	12	3	Lenny Edwards	[id number]	Medicaid	[facility] [id number]  Living Situation: [ ]Home  [ ]Long term care  [ ]Rehab [ ]Other    ADVANCE DIRECTIVES:    DNR/MOLST  [ ]  Living Will  [ ]   DECISION MAKER(s): Patient unable to make decisions for herself at this time.   [ ] Health Care Proxy(s)  [ x] Surrogate(s)  [ ] Guardian           Name(s): Phone Number(s):    BASELINE (I)ADL(s) (prior to admission): patient has had significant decline in function over past 3-4 weeks, was previously independent at home prior to admission  Filer: [ ]Total  [ x] Moderate [ ]Dependent    Allergies  No Known Allergies    Intolerances    MEDICATIONS  (STANDING):  acetaminophen     Tablet .. 975 milliGRAM(s) Oral every 6 hours  enoxaparin Injectable 40 milliGRAM(s) SubCutaneous every 24 hours  lidocaine 1%/epinephrine 1:100,000 Inj 20 milliLiter(s) Local Injection once  metoprolol tartrate 50 milliGRAM(s) Oral two times a day  multivitamin 1 Tablet(s) Oral daily  pantoprazole    Tablet 40 milliGRAM(s) Oral every 12 hours  senna 2 Tablet(s) Oral at bedtime    MEDICATIONS  (PRN):  cyclobenzaprine 5 milliGRAM(s) Oral three times a day PRN Muscle Spasm  lidocaine   4% Patch 1 Patch Transdermal daily PRN back pain  oxyCODONE    IR 5 milliGRAM(s) Oral every 4 hours PRN Moderate Pain (4 - 6)  oxyCODONE    IR 10 milliGRAM(s) Oral every 6 hours PRN Severe Pain (7 - 10)    PRESENT SYMPTOMS: [ x]Unable to self-report  [ ] CPOT [ ] PAINADs [ ] RDOS  Source if other than patient:  [ ]Family   [ ]Team     Pain: [ ]yes [ ]no- See PAIN AD score   QOL impact -   Location -                    Aggravating factors -  Quality -  Radiation -  Timing-  Severity (0-10 scale):  Minimal acceptable level (0-10 scale):     CPOT:    https://www.sccm.org/getattachment/sfm33x87-8t0x-5f5p-7p3w-6532r1021j9u/Critical-Care-Pain-Observation-Tool-(CPOT)    PAIN AD Score: 0  http://geriatrictoolkit.[facility]/cog/painad.pdf (press ctrl +  left click to view)    Dyspnea:                           [ ]Mild [ ]Moderate [ ]Severe      RDOS:  0 to 2  minimal or no respiratory distress   3  mild distress  4 to 6 moderate distress  >7 severe distress  https://homecareinformation.net/handouts/hen/Respiratory_Distress_Observation_Scale.pdf (Ctrl +  left click to view)     Anxiety:                             [ ]Mild [ ]Moderate [ ]Severe  Fatigue:                             [ ]Mild [ ]Moderate [ ]Severe  Nausea:                             [ ]Mild [ ]Moderate [ ]Severe  Loss of appetite:              [ ]Mild [ ]Moderate [ ]Severe  Constipation:                    [ ]Mild [ ]Moderate [ ]Severe    PCSSQ[Palliative Care Spiritual Screening Question]   Severity (0-10):  Chaplaincy Referral: [ ] yes [ ] refused [ ] following    Other Symptoms:  [ ]All other review of systems negative     Palliative Performance Status Version 2:      30   %    http://npcrc.org/files/news/palliative_performance_scale_ppsv2.pdf  PHYSICAL EXAM:  Vital Signs Last 24 Hrs  T(C): 36.8 (2022 12:55), Max: 37.2 (2022 18:00)  T(F): 98.2 (2022 12:55), Max: 99 (2022 18:00)  HR: 116 (2022 12:55) (103 - 121)  BP: 145/79 (2022 12:55) (132/90 - 145/79)  BP(mean): --  RR: 18 (2022 12:55) (17 - 18)  SpO2: 99% (2022 12:55) (99% - 100%) I&O's Summary    2022 07:01  -  2022 07:00  --------------------------------------------------------  IN: 410 mL / OUT: 2032.5 mL / NET: -1622.5 mL      GENERAL: [ ]Cachexia    [ ]Alert  [ ]Oriented x   [ x]Lethargic  [ ]Unarousable  [ ]Verbal  [ ]Non-Verbal  Behavioral:   [ ] Anxiety  [ ] Delirium [ ] Agitation [x ] Other  HEENT:  [x ]Normal   [ ]Dry mouth   [ ]ET Tube/Trach  [ ]Oral lesions  PULMONARY:   [ x]Clear [ ]Tachypnea  [ ]Audible excessive secretions   [ ]Rhonchi        [ ]Right [ ]Left [ ]Bilateral  [ ]Crackles        [ ]Right [ ]Left [ ]Bilateral  [ ]Wheezing     [ ]Right [ ]Left [ ]Bilateral  [ ]Diminished breath sounds [ ]right [ ]left [ ]bilateral  CARDIOVASCULAR:    [ ]Regular [ ]Irregular [ x]Tachy  [ ]Moi [ ]Murmur [ ]Other  GASTROINTESTINAL: wound vac noted   [ ]Soft  [ ]Distended   [ ]+BS  [ ]Non tender [ ]Tender  [ ]Other [ ]PEG [ ]OGT/ NGT  Last BM: +colostomy   GENITOURINARY: +b/l nephrostomy   [ ]Normal [ ] Incontinent   [ ]Oliguria/Anuria   [ ]Infante  MUSCULOSKELETAL:   [ ]Normal   [ x]Weakness  [x ]Bed/Wheelchair bound [ ]Edema  NEUROLOGIC:   [ ]No focal deficits  [x ]Cognitive impairment  [ ]Dysphagia [ ]Dysarthria [ ]Paresis [ ]Other   SKIN:   [ ]Normal  [ ]Rash  [ ]Other  [ ]Pressure ulcer(s)       Present on admission [ ]y [ ]n    CRITICAL CARE:  [ ] Shock Present  [ ]Septic [ ]Cardiogenic [ ]Neurologic [ ]Hypovolemic  [ ]  Vasopressors [ ]  Inotropes   [ ]Respiratory failure present [ ]Mechanical ventilation [ ]Non-invasive ventilatory support [ ]High flow    [ ]Acute  [ ]Chronic [ ]Hypoxic  [ ]Hypercarbic [ ]Other  [ ]Other organ failure     LABS:                        8.9    7.00  )-----------( 122      ( 2022 06:20 )             28.4       140  |  104  |  21  ----------------------------<  94  3.7   |  21<L>  |  0.72    Ca    8.4      2022 06:20  Phos  2.7       Mg     1.70         RADIOLOGY & ADDITIONAL STUDIES: < from: CT Abdomen and Pelvis w/wo IV Cont (06.10.22 @ 21:22) >  IMPRESSION:  Evaluation is significantly limited by out of field artifact/patient   positioning in the scanner.    Nephrostomy tubes in place without evidence of active bleeding.    Postsurgical changes of the abdominal wall with ABERTHA.    Air and fluid in the endometrial cavity with adjacent ill-defined air and   fluid, similar to prior.    Conglomerate soft tissue in the lower pelvis.        < end of copied text >      PROTEIN CALORIE MALNUTRITION PRESENT: [ ]mild [ ]moderate [ ]severe [ ]underweight [ ]morbid obesity  https://www.andeal.org/vault/2440/web/files/ONC/Table_Clinical%20Characteristics%20to%20Document%20Malnutrition-White%20JV%20et%20al%754211.pdf    Height (cm): 167 (22 @ 14:48), 167.6 (22 @ 15:52), 167.6 (22 @ 10:03)  Weight (kg): 100.4 (22 @ 14:48), 98.9 (22 @ 15:52), 110.7 (22 @ 10:03)  BMI (kg/m2): 36 (22 @ 14:48), 35.2 (22 @ 15:52), 39.4 (22 @ 10:03)    [ ]PPSV2 < or = to 30% [ ]significant weight loss  [ ]poor nutritional intake  [ ]anasarca[ ]Artificial Nutrition      Other REFERRALS:  [ ]Hospice  [ ]Child Life  [x ]Social Work  [ ]Case management [ ]Holistic Therapy

## 2022-06-28 NOTE — ADVANCED PRACTICE NURSE CONSULT - RECOMMEDATIONS
Plan of care discussed with Primary RN Kyaw VALDES Ostomy team will continue to follow.     Please contact Wound/Ostomy Care Service Line if we can be of further assistance (ext [phone]).

## 2022-06-29 DIAGNOSIS — Z71.89 OTHER SPECIFIED COUNSELING: ICD-10-CM

## 2022-06-29 DIAGNOSIS — D72.825 BANDEMIA: ICD-10-CM

## 2022-06-29 DIAGNOSIS — R63.8 OTHER SYMPTOMS AND SIGNS CONCERNING FOOD AND FLUID INTAKE: ICD-10-CM

## 2022-06-29 LAB
ANION GAP SERPL CALC-SCNC: 12 MMOL/L — SIGNIFICANT CHANGE UP (ref 7–14)
BASOPHILS # BLD AUTO: 0 K/UL — SIGNIFICANT CHANGE UP (ref 0–0.2)
BASOPHILS NFR BLD AUTO: 0 % — SIGNIFICANT CHANGE UP (ref 0–2)
BLD GP AB SCN SERPL QL: NEGATIVE — SIGNIFICANT CHANGE UP
BUN SERPL-MCNC: 26 MG/DL — HIGH (ref 7–23)
CALCIUM SERPL-MCNC: 8.3 MG/DL — LOW (ref 8.4–10.5)
CHLORIDE SERPL-SCNC: 107 MMOL/L — SIGNIFICANT CHANGE UP (ref 98–107)
CO2 SERPL-SCNC: 23 MMOL/L — SIGNIFICANT CHANGE UP (ref 22–31)
CREAT SERPL-MCNC: 0.81 MG/DL — SIGNIFICANT CHANGE UP (ref 0.5–1.3)
EGFR: 86 ML/MIN/1.73M2 — SIGNIFICANT CHANGE UP
EOSINOPHIL # BLD AUTO: 0.14 K/UL — SIGNIFICANT CHANGE UP (ref 0–0.5)
EOSINOPHIL NFR BLD AUTO: 1.8 % — SIGNIFICANT CHANGE UP (ref 0–6)
GLUCOSE SERPL-MCNC: 106 MG/DL — HIGH (ref 70–99)
HCT VFR BLD CALC: 25.8 % — LOW (ref 34.5–45)
HGB BLD-MCNC: 8.2 G/DL — LOW (ref 11.5–15.5)
IANC: 5.8 K/UL — SIGNIFICANT CHANGE UP (ref 1.8–7.4)
LYMPHOCYTES # BLD AUTO: 0.28 K/UL — LOW (ref 1–3.3)
LYMPHOCYTES # BLD AUTO: 3.6 % — LOW (ref 13–44)
MAGNESIUM SERPL-MCNC: 1.7 MG/DL — SIGNIFICANT CHANGE UP (ref 1.6–2.6)
MCHC RBC-ENTMCNC: 29.9 PG — SIGNIFICANT CHANGE UP (ref 27–34)
MCHC RBC-ENTMCNC: 31.8 GM/DL — LOW (ref 32–36)
MCV RBC AUTO: 94.2 FL — SIGNIFICANT CHANGE UP (ref 80–100)
MONOCYTES # BLD AUTO: 0.55 K/UL — SIGNIFICANT CHANGE UP (ref 0–0.9)
MONOCYTES NFR BLD AUTO: 7.2 % — SIGNIFICANT CHANGE UP (ref 2–14)
NEUTROPHILS # BLD AUTO: 6.52 K/UL — SIGNIFICANT CHANGE UP (ref 1.8–7.4)
NEUTROPHILS NFR BLD AUTO: 70.3 % — SIGNIFICANT CHANGE UP (ref 43–77)
PHOSPHATE SERPL-MCNC: 1.9 MG/DL — LOW (ref 2.5–4.5)
PLATELET # BLD AUTO: 159 K/UL — SIGNIFICANT CHANGE UP (ref 150–400)
POTASSIUM SERPL-MCNC: 4.2 MMOL/L — SIGNIFICANT CHANGE UP (ref 3.5–5.3)
POTASSIUM SERPL-SCNC: 4.2 MMOL/L — SIGNIFICANT CHANGE UP (ref 3.5–5.3)
RBC # BLD: 2.74 M/UL — LOW (ref 3.8–5.2)
RBC # FLD: 16.7 % — HIGH (ref 10.3–14.5)
RH IG SCN BLD-IMP: POSITIVE — SIGNIFICANT CHANGE UP
SODIUM SERPL-SCNC: 142 MMOL/L — SIGNIFICANT CHANGE UP (ref 135–145)
WBC # BLD: 7.7 K/UL — SIGNIFICANT CHANGE UP (ref 3.8–10.5)
WBC # FLD AUTO: 7.7 K/UL — SIGNIFICANT CHANGE UP (ref 3.8–10.5)

## 2022-06-29 PROCEDURE — 99497 ADVNCD CARE PLAN 30 MIN: CPT | Mod: 25

## 2022-06-29 PROCEDURE — 99233 SBSQ HOSP IP/OBS HIGH 50: CPT

## 2022-06-29 RX ORDER — OXYCODONE HYDROCHLORIDE 5 MG/1
10 TABLET ORAL EVERY 4 HOURS
Refills: 0 | Status: DISCONTINUED | OUTPATIENT
Start: 2022-06-29 | End: 2022-06-30

## 2022-06-29 RX ORDER — HYDROMORPHONE HYDROCHLORIDE 2 MG/ML
0.5 INJECTION INTRAMUSCULAR; INTRAVENOUS; SUBCUTANEOUS ONCE
Refills: 0 | Status: DISCONTINUED | OUTPATIENT
Start: 2022-06-29 | End: 2022-06-29

## 2022-06-29 RX ORDER — MAGNESIUM OXIDE 400 MG ORAL TABLET 241.3 MG
400 TABLET ORAL ONCE
Refills: 0 | Status: COMPLETED | OUTPATIENT
Start: 2022-06-29 | End: 2022-06-29

## 2022-06-29 RX ORDER — SODIUM,POTASSIUM PHOSPHATES 278-250MG
1 POWDER IN PACKET (EA) ORAL EVERY 6 HOURS
Refills: 0 | Status: COMPLETED | OUTPATIENT
Start: 2022-06-29 | End: 2022-06-30

## 2022-06-29 RX ORDER — HYDROMORPHONE HYDROCHLORIDE 2 MG/ML
0.5 INJECTION INTRAMUSCULAR; INTRAVENOUS; SUBCUTANEOUS ONCE
Refills: 0 | Status: DISCONTINUED | OUTPATIENT
Start: 2022-06-29 | End: 2022-06-30

## 2022-06-29 RX ORDER — PIPERACILLIN AND TAZOBACTAM 4; .5 G/20ML; G/20ML
3.38 INJECTION, POWDER, LYOPHILIZED, FOR SOLUTION INTRAVENOUS EVERY 8 HOURS
Refills: 0 | Status: COMPLETED | OUTPATIENT
Start: 2022-06-29 | End: 2022-07-06

## 2022-06-29 RX ORDER — PIPERACILLIN AND TAZOBACTAM 4; .5 G/20ML; G/20ML
3.38 INJECTION, POWDER, LYOPHILIZED, FOR SOLUTION INTRAVENOUS ONCE
Refills: 0 | Status: COMPLETED | OUTPATIENT
Start: 2022-06-29 | End: 2022-06-29

## 2022-06-29 RX ORDER — MIRTAZAPINE 45 MG/1
15 TABLET, ORALLY DISINTEGRATING ORAL DAILY
Refills: 0 | Status: DISCONTINUED | OUTPATIENT
Start: 2022-06-29 | End: 2022-06-30

## 2022-06-29 RX ORDER — SODIUM,POTASSIUM PHOSPHATES 278-250MG
1 POWDER IN PACKET (EA) ORAL EVERY 6 HOURS
Refills: 0 | Status: DISCONTINUED | OUTPATIENT
Start: 2022-06-29 | End: 2022-06-29

## 2022-06-29 RX ORDER — OXYCODONE HYDROCHLORIDE 5 MG/1
5 TABLET ORAL ONCE
Refills: 0 | Status: DISCONTINUED | OUTPATIENT
Start: 2022-06-29 | End: 2022-06-29

## 2022-06-29 RX ADMIN — Medication 50 MILLIGRAM(S): at 01:14

## 2022-06-29 RX ADMIN — HYDROMORPHONE HYDROCHLORIDE 0.5 MILLIGRAM(S): 2 INJECTION INTRAMUSCULAR; INTRAVENOUS; SUBCUTANEOUS at 17:05

## 2022-06-29 RX ADMIN — PANTOPRAZOLE SODIUM 40 MILLIGRAM(S): 20 TABLET, DELAYED RELEASE ORAL at 11:00

## 2022-06-29 RX ADMIN — OXYCODONE HYDROCHLORIDE 10 MILLIGRAM(S): 5 TABLET ORAL at 12:55

## 2022-06-29 RX ADMIN — Medication 975 MILLIGRAM(S): at 18:12

## 2022-06-29 RX ADMIN — OXYCODONE HYDROCHLORIDE 5 MILLIGRAM(S): 5 TABLET ORAL at 16:55

## 2022-06-29 RX ADMIN — Medication 1 TABLET(S): at 11:26

## 2022-06-29 RX ADMIN — SENNA PLUS 2 TABLET(S): 8.6 TABLET ORAL at 22:26

## 2022-06-29 RX ADMIN — Medication 975 MILLIGRAM(S): at 05:45

## 2022-06-29 RX ADMIN — OXYCODONE HYDROCHLORIDE 10 MILLIGRAM(S): 5 TABLET ORAL at 23:15

## 2022-06-29 RX ADMIN — HYDROMORPHONE HYDROCHLORIDE 0.5 MILLIGRAM(S): 2 INJECTION INTRAMUSCULAR; INTRAVENOUS; SUBCUTANEOUS at 16:35

## 2022-06-29 RX ADMIN — OXYCODONE HYDROCHLORIDE 10 MILLIGRAM(S): 5 TABLET ORAL at 22:26

## 2022-06-29 RX ADMIN — PIPERACILLIN AND TAZOBACTAM 25 GRAM(S): 4; .5 INJECTION, POWDER, LYOPHILIZED, FOR SOLUTION INTRAVENOUS at 22:26

## 2022-06-29 RX ADMIN — Medication 50 MILLIGRAM(S): at 12:24

## 2022-06-29 RX ADMIN — OXYCODONE HYDROCHLORIDE 10 MILLIGRAM(S): 5 TABLET ORAL at 05:45

## 2022-06-29 RX ADMIN — OXYCODONE HYDROCHLORIDE 10 MILLIGRAM(S): 5 TABLET ORAL at 06:36

## 2022-06-29 RX ADMIN — OXYCODONE HYDROCHLORIDE 5 MILLIGRAM(S): 5 TABLET ORAL at 15:55

## 2022-06-29 RX ADMIN — ENOXAPARIN SODIUM 40 MILLIGRAM(S): 100 INJECTION SUBCUTANEOUS at 11:27

## 2022-06-29 RX ADMIN — Medication 975 MILLIGRAM(S): at 11:27

## 2022-06-29 RX ADMIN — MIRTAZAPINE 15 MILLIGRAM(S): 45 TABLET, ORALLY DISINTEGRATING ORAL at 22:26

## 2022-06-29 RX ADMIN — MAGNESIUM OXIDE 400 MG ORAL TABLET 400 MILLIGRAM(S): 241.3 TABLET ORAL at 15:56

## 2022-06-29 RX ADMIN — Medication 975 MILLIGRAM(S): at 06:36

## 2022-06-29 RX ADMIN — OXYCODONE HYDROCHLORIDE 10 MILLIGRAM(S): 5 TABLET ORAL at 12:22

## 2022-06-29 RX ADMIN — PANTOPRAZOLE SODIUM 40 MILLIGRAM(S): 20 TABLET, DELAYED RELEASE ORAL at 22:26

## 2022-06-29 RX ADMIN — PIPERACILLIN AND TAZOBACTAM 200 GRAM(S): 4; .5 INJECTION, POWDER, LYOPHILIZED, FOR SOLUTION INTRAVENOUS at 15:55

## 2022-06-29 NOTE — PROGRESS NOTE ADULT - ASSESSMENT
A/p ex-lap peritonitis, [facility], multiple wash outs, closure with vicryl mesh    Cont Vac dressing changes with plastics    CT without obvious intraabdominal collections - cont drains for now    Cont to follow     A/p ex-lap peritonitis, Tiffany's, multiple wash outs, closure with vicryl mesh    Cont Vac dressing changes with plastics    CT without obvious intraabdominal collections - cont drains for now.  Consider ongoing ABX    Cont to follow

## 2022-06-29 NOTE — PROGRESS NOTE ADULT - ASSESSMENT
52y  HD#25 with recurrent cervical CA admitted for management of neutropenic fever, now s/p emergent exploratory laparotomy, abdominal washout, rectosigmoid colectomy, diverting colostomy, bronchoscopy and Abthera placement on 22 (ebl 2000cc, s/p 5UpRBC, 3L albumin, 3U Plts, 3U FFP, 3U Cryo) for bowel perforation. Patient s/p washout and bridging vicryl mesh placement to close fascial gap on  w/ skin closure device placed on .  Pt now extubated and stable. Patient with persistent low grade tachycardia, CT AP and CT angio () not concerning for PE.     Neuro: Oxy and tylenol PRN. Flexeril Lidocaine patch.  Appreciate OS recommendations.  CV: Tachycardic this admission, currently 100-110s. TSH elevated but T4 wnl. EKG 6/15 sinus tach. Hemodynamically stable, H/H stable w/ platelets increasing, f/u AM CBC.   - CT A/P and CT angio with no PE   - h/o HTN: Lopressor 50mg BID   Pulm: O2 sat WNL on RA, Increase ambulation, encourage incentive spirometry use.   GI: Tolerating regular diet.   - Nausea and emesis - - now resolved. CTAP showing mild post operative ileus.   - s/p swallow evaluation.  - Senna, Protonix BID  : B/l nephrostomy tubes. Prior ASHLEY resolving w/ continuing downtrending Cr (0.71), f/u AM BMP.  Heme: DVT ppx: Lovenox, SCDs while in bed.    - Thrombocytopenia: likely multifactorial, currently improving spontaneously. Appreciate Heme Onc recs.  ID: Afebrile, continue to monitor fever curve.  - S/p Zosyn (-), s/p Caspofungin and Cefepime  - S/p Zarixo (-6/10) for neutropenic fever.   MSK: Continue working with PT and OT, also evaluated by PMR, recommending subacute rehab, however patient unable to participate.   Wound: Wound vac change MWF per plastics, appreciate recs. Will consider bedside closure vs. continuing vac at next vac change.  Dispo: Continue inpatient care. Appreciate palliative care recs. Goals of care discussion pending with daughter regarding next steps including skilled nursing facility vs. inpatient hospice.     Seen at bedside with GYN Oncology team  Kaci Grimm,  PGY2 52y  HD#25 with recurrent cervical CA admitted for management of neutropenic fever, now s/p emergent exploratory laparotomy, abdominal washout, rectosigmoid colectomy, diverting colostomy, bronchoscopy and Abthera placement on 22 (ebl 2000cc, s/p 5UpRBC, 3L albumin, 3U Plts, 3U FFP, 3U Cryo) for bowel perforation. Patient s/p washout and bridging vicryl mesh placement to close fascial gap on  w/ skin closure device placed on .  Patient with persistent low grade tachycardia, s/p CT AP and CT angio () without acute findings. Overall stable at this time, however with significant deconditioning and requiring assistance with ADL's.     Neuro: Oxy and Tylenol PRN. Flexeril Lidocaine patch.  Appreciate OS recommendations.  CV: Tachycardic this admission, currently 100-117. TSH elevated but T4 wnl. EKG 6/15 sinus tach. Hemodynamically stable, H/H stable w/ platelets increasing, f/u AM CBC.   - CT A/P and CT angio with no PE   - h/o HTN: Lopressor 50mg BID   Pulm: O2 sat WNL on RA, Increase ambulation, encourage incentive spirometry use.   GI: Tolerating regular diet.   - Nausea and emesis - - now resolved. CTAP showing mild post operative ileus.   - s/p swallow evaluation.  - Senna, Protonix BID  : B/l nephrostomy tubes. Prior ASHLEY resolving w/ continuing downtrending Cr (0.72), f/u AM BMP.  Heme: DVT ppx: Lovenox, SCDs while in bed.    - Thrombocytopenia: likely multifactorial, currently improving spontaneously. Appreciate Heme Onc recs.  ID: Afebrile, continue to monitor fever curve.  - S/p Zosyn (-), s/p Caspofungin and Cefepime  - S/p Zarixo (-6/10) for neutropenic fever.   MSK: Continue working with PT and OT, also evaluated by PMR, recommending subacute rehab, however patient unable to participate.   Wound: Wound vac change MWF per plastics, appreciate recs. Will consider bedside closure vs. continuing vac at next vac change.    Dispo: Continue inpatient care. Appreciate palliative care recs. Ongoing goals of care discussion with daughter regarding next steps including skilled nursing facility vs. inpatient hospice.     Seen at bedside with GYN Oncology team  Kaci Grimm,  PGY2    Julissa Leach MD, PGY4

## 2022-06-29 NOTE — PROGRESS NOTE ADULT - SUBJECTIVE AND OBJECTIVE BOX
[facility] Geriatrics and Palliative Care  Shannon Schwab Palliative Care Attending  Contact Info: Page [phone] (including Nights/Weekends), message on Microsoft Teams (Shannon Schwab), or leave  at Palliative Office [phone] (non-urgent)     SUBJECTIVE AND OBJECTIVE: Patient seen this morning, more awake, denied pain. States that she would like to go to rehab and will be looking for an oncologist for continued DMT. She gave me permission to speak to her daughter, Lydia.     Indication for Geriatrics and Palliative Care Services/INTERVAL HPI:  > 6/29: Chart notes reviewed. Over the past 24 hours, patient required PRNs of 10mg PO oxycodone x2.     DNR on chart:  Allergies    No Known Allergies    Intolerances    MEDICATIONS  (STANDING):  acetaminophen     Tablet .. 975 milliGRAM(s) Oral every 6 hours  enoxaparin Injectable 40 milliGRAM(s) SubCutaneous every 24 hours  lidocaine 1%/epinephrine 1:100,000 Inj 20 milliLiter(s) Local Injection once  magnesium oxide 400 milliGRAM(s) Oral once  metoprolol tartrate 50 milliGRAM(s) Oral two times a day  multivitamin 1 Tablet(s) Oral daily  oxyCODONE    IR 5 milliGRAM(s) Oral once  pantoprazole    Tablet 40 milliGRAM(s) Oral every 12 hours  piperacillin/tazobactam IVPB. 3.375 Gram(s) IV Intermittent once  piperacillin/tazobactam IVPB.. 3.375 Gram(s) IV Intermittent every 8 hours  potassium phosphate / sodium phosphate Tablet (K-PHOS No. 2) 1 Tablet(s) Oral every 6 hours  senna 2 Tablet(s) Oral at bedtime    MEDICATIONS  (PRN):  cyclobenzaprine 5 milliGRAM(s) Oral three times a day PRN Muscle Spasm  lidocaine   4% Patch 1 Patch Transdermal daily PRN back pain  oxyCODONE    IR 5 milliGRAM(s) Oral every 4 hours PRN Moderate Pain (4 - 6)  oxyCODONE    IR 10 milliGRAM(s) Oral every 6 hours PRN Severe Pain (7 - 10)      ITEMS UNCHECKED ARE NOT PRESENT    PRESENT SYMPTOMS: [ ]Unable to self-report - see [ ] CPOT [ ] PAINADS [ ] RDOS  Source if other than patient:  [ ]Family   [ ]Team     Pain:  [ ]yes [ x]no  QOL impact -   Location -                    Aggravating factors -  Quality -  Radiation -  Timing-  Severity (0-10 scale):  Minimal acceptable level (0-10 scale):     CPOT:    https://www.[facility].org/getattachment/[email or web address]-[email or web address]-[email or web address]-[email or web address]/Critical-Care-Pain-Observation-Tool-(CPOT)    PAIN AD Score:	  http://geriatrictoolkit.[facility]/cog/painad.pdf (Ctrl + left click to view)    Dyspnea:                           [ ]Mild [ ]Moderate [ ]Severe    RDOS:  0 to 2  minimal or no respiratory distress   3  mild distress  4 to 6 moderate distress  >7 severe distress  https://homecareinformation.net/handouts/hen/Respiratory_Distress_Observation_Scale.pdf (Ctrl +  left click to view)     Anxiety:                             [ ]Mild [ ]Moderate [ ]Severe  Fatigue:                             [ ]Mild [ ]Moderate [ ]Severe  Nausea:                             [ ]Mild [ ]Moderate [ ]Severe  Loss of appetite:              [ ]Mild [ ]Moderate [ ]Severe  Constipation:                    [ ]Mild [ ]Moderate [ ]Severe    PCSSQ[Palliative Care Spiritual Screening Question]   Severity (0-10):  Score of 4 or > indicate consideration of Chaplaincy referral.  Chaplaincy Referral: [ ] yes [ ] refused [ ] following    Other Symptoms:  [ ]All other review of systems negative     Palliative Performance Status Version 2:     40    %      http://[facility].org/files/news/palliative_performance_scale_ppsv2.pdf  PHYSICAL EXAM:  Vital Signs Last 24 Hrs  T(C): 37.1 (29 Jun 2022 10:00), Max: 37.5 (28 Jun 2022 22:00)  T(F): 98.8 (29 Jun 2022 10:00), Max: 99.5 (28 Jun 2022 22:00)  HR: 103 (29 Jun 2022 10:00) (103 - 118)  BP: 136/87 (29 Jun 2022 10:00) (124/78 - 136/87)  BP(mean): --  RR: 18 (29 Jun 2022 10:00) (18 - 20)  SpO2: 100% (29 Jun 2022 10:00) (100% - 100%) I&O's Summary    28 Jun 2022 07:01  -  29 Jun 2022 07:00  --------------------------------------------------------  IN: 1080 mL / OUT: 1975 mL / NET: -895 mL    29 Jun 2022 07:01  -  29 Jun 2022 15:27  --------------------------------------------------------  IN: 0 mL / OUT: 395 mL / NET: -395 mL       GENERAL: [ ]Cachexia    [ ]Alert  [ ]Oriented x   [ x]Lethargic  [ ]Unarousable  [ ]Verbal  [ ]Non-Verbal  Behavioral:   [ ] Anxiety  [ ] Delirium [ ] Agitation [x ] Other  HEENT:  [x ]Normal   [ ]Dry mouth   [ ]ET Tube/Trach  [ ]Oral lesions  PULMONARY:   [ x]Clear [ ]Tachypnea  [ ]Audible excessive secretions   [ ]Rhonchi        [ ]Right [ ]Left [ ]Bilateral  [ ]Crackles        [ ]Right [ ]Left [ ]Bilateral  [ ]Wheezing     [ ]Right [ ]Left [ ]Bilateral  [ ]Diminished breath sounds [ ]right [ ]left [ ]bilateral  CARDIOVASCULAR:    [ ]Regular [ ]Irregular [ x]Tachy  [ ]Moi [ ]Murmur [ ]Other  GASTROINTESTINAL: wound vac noted   [ ]Soft  [ ]Distended   [ ]+BS  [ ]Non tender [ ]Tender  [ ]Other [ ]PEG [ ]OGT/ NGT  Last BM: +colostomy   GENITOURINARY: +b/l nephrostomy   [ ]Normal [ ] Incontinent   [ ]Oliguria/Anuria   [ ]Infante  MUSCULOSKELETAL:   [ ]Normal   [ x]Weakness  [x ]Bed/Wheelchair bound [ ]Edema  NEUROLOGIC:   [ ]No focal deficits  [x ]Cognitive impairment  [ ]Dysphagia [ ]Dysarthria [ ]Paresis [ ]Other   SKIN: wound vac in place   [ ]Normal  [ ]Rash  [ ]Other  [ ]Pressure ulcer(s)       Present on admission [ ]y [ ]n    CRITICAL CARE:  [ ] Shock Present  [ ]Septic [ ]Cardiogenic [ ]Neurologic [ ]Hypovolemic  [ ]  Vasopressors [ ]  Inotropes   [ ]Respiratory failure present [ ]Mechanical ventilation [ ]Non-invasive ventilatory support [ ]High flow    [ ]Acute  [ ]Chronic [ ]Hypoxic  [ ]Hypercarbic [ ]Other  [ ]Other organ failure       LABS:                        8.2    7.70  )-----------( 159      ( 29 Jun 2022 05:28 )             25.8   06-29    142  |  107  |  26<H>  ----------------------------<  106<H>  4.2   |  23  |  0.81    Ca    8.3<L>      29 Jun 2022 05:28  Phos  1.9     06-29  Mg     1.70     06-29      RADIOLOGY & ADDITIONAL STUDIES: n/a     Protein Calorie Malnutrition Present: [ ]mild [ ]moderate [ ]severe [ ]underweight [ ]morbid obesity  https://www.andeal.org/vault/2440/web/files/ONC/Table_Clinical%20Characteristics%20to%20Document%20Malnutrition-White%20JV%20et%20al%202012.pdf    Height (cm): 167 (06-14-22 @ 14:48), 167.6 (06-04-22 @ 15:52), 167.6 (02-08-22 @ 10:03)  Weight (kg): 100.4 (06-14-22 @ 14:48), 98.9 (06-04-22 @ 15:52), 110.7 (02-08-22 @ 10:03)  BMI (kg/m2): 36 (06-14-22 @ 14:48), 35.2 (06-04-22 @ 15:52), 39.4 (02-08-22 @ 10:03)    [ ]PPSV2 < or = 30%  [ ]significant weight loss [ ]poor nutritional intake [ ]anasarca[ ]Artificial Nutrition    Other REFERRALS:  [ ]Hospice  [ ]Child Life  [ ]Social Work  [ ]Case management [ ]Holistic Therapy

## 2022-06-29 NOTE — PROGRESS NOTE ADULT - PROBLEM SELECTOR PLAN 5
Outpatient oncologist: [facility] (Lara)   Per chart review, patient was diagnosed with cervical cancer in 2014, s/p VBRT and chemo.  > Would appreciate updated oncology recs regarding patient's eligibility for further DMT  > Gyn/onc recs reviewed   > Would recommend IDT meeting with family to discuss further plan of care.

## 2022-06-29 NOTE — PROGRESS NOTE ADULT - SUBJECTIVE AND OBJECTIVE BOX
SURGICAL ONCOLOGY PROGRESS NOTE    No complaints.  Tono po.  Ostomy functioning    Vital Signs Last 24 Hrs  T(C): 36.5 (29 Jun 2022 05:40), Max: 37.5 (28 Jun 2022 22:00)  T(F): 97.7 (29 Jun 2022 05:40), Max: 99.5 (28 Jun 2022 22:00)  HR: 117 (29 Jun 2022 05:40) (109 - 118)  BP: 128/84 (29 Jun 2022 05:40) (124/78 - 145/79)  BP(mean): --  RR: 18 (29 Jun 2022 05:40) (18 - 20)  SpO2: 100% (29 Jun 2022 05:40) (99% - 100%)  I&O's Detail    28 Jun 2022 07:01  -  29 Jun 2022 07:00  --------------------------------------------------------  IN:    Oral Fluid: 1080 mL  Total IN: 1080 mL    OUT:    Colostomy (mL): 730 mL    Drain (mL): 40 mL    Drain (mL): 67.5 mL    Drain (mL): 67.5 mL    Emesis (mL): 0 mL    Nephrostomy Tube (mL): 325 mL    Nephrostomy Tube (mL): 625 mL    VAC (Vacuum Assisted Closure) System (mL): 120 mL  Total OUT: 1975 mL    Total NET: -895 mL          PE:    A&A  NAD    soft, NT, ND, No peritoneal signs    Vac in place.  Ric drains with pus                          8.2    7.70  )-----------( 159      ( 29 Jun 2022 05:28 )             25.8     06-29    142  |  107  |  26<H>  ----------------------------<  106<H>  4.2   |  23  |  0.81    Ca    8.3<L>      29 Jun 2022 05:28  Phos  1.9     06-29  Mg     1.70     06-29

## 2022-06-29 NOTE — PROGRESS NOTE ADULT - PROBLEM SELECTOR PLAN 2
Can consider starting remeron 15mg qhs for appetite   Daughter was interested in medical cannabis for her mother.   Upon dispo, can refer her to [facility] for medical cannabis certification:   [facility]  Dr. Vandana Mathias or Dr. Katie Toribio ([phone]) at [address] [place] [address], [address] [place] [address]

## 2022-06-29 NOTE — PROGRESS NOTE ADULT - ASSESSMENT
53y/o F with recurrent cervical CA admitted for management of neutropenic fever, now s/p emergent exploratory laparotomy, abdominal washout, rectosigmoid colectomy, diverting colostomy, bronchoscopy and Abthera placement on 6/8/22 for findings of increased free air on abdominal xray concerning for bowel perforation. Patient s/p washout and bridging vicryl mesh placement to close fascial gap on 6/14 w/ skin closure device place don 6/17. Patient now extubated, hemodynamically stable and clinically improving. Palliative consulted for sx management and assistance with complex decision making regarding goc in setting of recurrent malignancy.

## 2022-06-29 NOTE — PROGRESS NOTE ADULT - SUBJECTIVE AND OBJECTIVE BOX
Gyn ONC Progress Note      HD #25    Subjective:   Pt seen and examined at bedside. No events overnight. Reporting back pain this morning, otherwise pain well controlled. +OOB to chair.  Passing flatus. Tolerating regular diet. Pt denies fever, chills, chest pain, SOB, nausea, vomiting, lightheadedness, dizziness.      Objective:  T(F): 97.7 (06-29-22 @ 05:40), Max: 99.5 (06-28-22 @ 22:00)  HR: 117 (06-29-22 @ 05:40) (109 - 118)  BP: 128/84 (06-29-22 @ 05:40) (124/78 - 145/79)  RR: 18 (06-29-22 @ 05:40) (18 - 20)  SpO2: 100% (06-29-22 @ 05:40) (99% - 100%)  I&O's Summary    27 Jun 2022 07:01  -  28 Jun 2022 07:00  --------------------------------------------------------  IN: 410 mL / OUT: 2032.5 mL / NET: -1622.5 mL    28 Jun 2022 07:01  -  29 Jun 2022 06:34  --------------------------------------------------------  IN: 1080 mL / OUT: 1975 mL / NET: -895 mL      CAPILLARY BLOOD GLUCOSE          MEDICATIONS  (STANDING):  acetaminophen     Tablet .. 975 milliGRAM(s) Oral every 6 hours  enoxaparin Injectable 40 milliGRAM(s) SubCutaneous every 24 hours  lidocaine 1%/epinephrine 1:100,000 Inj 20 milliLiter(s) Local Injection once  metoprolol tartrate 50 milliGRAM(s) Oral two times a day  multivitamin 1 Tablet(s) Oral daily  pantoprazole    Tablet 40 milliGRAM(s) Oral every 12 hours  senna 2 Tablet(s) Oral at bedtime    MEDICATIONS  (PRN):  cyclobenzaprine 5 milliGRAM(s) Oral three times a day PRN Muscle Spasm  lidocaine   4% Patch 1 Patch Transdermal daily PRN back pain  oxyCODONE    IR 5 milliGRAM(s) Oral every 4 hours PRN Moderate Pain (4 - 6)  oxyCODONE    IR 10 milliGRAM(s) Oral every 6 hours PRN Severe Pain (7 - 10)    Physical Exam:  Constitutional: NAD, A&O x3  CV: Tachycardic, regular S1/S2  Lungs: CTA b/l   Abd: Soft, non-distended, wound vac in place w/ serous output; ostomy pink and well perfused with brown liquid/stool output in ostomy bag; +3 Ric drains with seropurulent output in RLW, LUQ, LLQ.   : R&L nephrostomy tubes draining clear urine  Extremities: No pain, 2+ pitting edema.    LABS:                        8.9    7.00  )-----------( 122      ( 28 Jun 2022 06:20 )             28.4       06-28    140    |  104    |  21     ----------------------------<  94     3.7     |  21<L>  |  0.72     Ca    8.4        28 Jun 2022 06:20  Phos  2.7       06-28  Mg     1.70      06-28               Gyn ONC Progress Note   HD #25    Subjective:   Pt seen and examined at bedside. No events overnight. Reporting back pain this morning, otherwise pain well controlled. Minimal time OOB.  Passing flatus. Tolerating regular diet. Pt denies fever, chills, chest pain, SOB, nausea, vomiting, lightheadedness, dizziness.        Objective:  T(F): 97.7 (06-29-22 @ 05:40), Max: 99.5 (06-28-22 @ 22:00)  HR: 117 (06-29-22 @ 05:40) (109 - 118)  BP: 128/84 (06-29-22 @ 05:40) (124/78 - 145/79)  RR: 18 (06-29-22 @ 05:40) (18 - 20)  SpO2: 100% (06-29-22 @ 05:40) (99% - 100%)      I&O's Summary  27 Jun 2022 07:01  -  28 Jun 2022 07:00  --------------------------------------------------------  IN: 410 mL / OUT: 2032.5 mL / NET: -1622.5 mL    28 Jun 2022 07:01  -  29 Jun 2022 06:34  --------------------------------------------------------  IN: 1080 mL / OUT: 1975 mL / NET: -895 mL      MEDICATIONS  (STANDING):  acetaminophen     Tablet .. 975 milliGRAM(s) Oral every 6 hours  enoxaparin Injectable 40 milliGRAM(s) SubCutaneous every 24 hours  lidocaine 1%/epinephrine 1:100,000 Inj 20 milliLiter(s) Local Injection once  metoprolol tartrate 50 milliGRAM(s) Oral two times a day  multivitamin 1 Tablet(s) Oral daily  pantoprazole    Tablet 40 milliGRAM(s) Oral every 12 hours  senna 2 Tablet(s) Oral at bedtime    MEDICATIONS  (PRN):  cyclobenzaprine 5 milliGRAM(s) Oral three times a day PRN Muscle Spasm  lidocaine   4% Patch 1 Patch Transdermal daily PRN back pain  oxyCODONE    IR 5 milliGRAM(s) Oral every 4 hours PRN Moderate Pain (4 - 6)  oxyCODONE    IR 10 milliGRAM(s) Oral every 6 hours PRN Severe Pain (7 - 10)    Physical Exam:  Constitutional: NAD, A&O x3  CV: Tachycardic, regular S1/S2  Lungs: CTA b/l   Abd: Soft, non-distended, wound vac in place w/ serous output; ostomy pink and well perfused with brown liquid/stool output in ostomy bag; +3 Ric drains with seropurulent output in RLW, LUQ, LLQ.   : R&L nephrostomy tubes draining clear urine  Extremities: No pain, 2+ pitting edema.    LABS:                        8.9    7.00  )-----------( 122      ( 28 Jun 2022 06:20 )             28.4       06-28    140    |  104    |  21     ----------------------------<  94     3.7     |  21<L>  |  0.72     Ca    8.4        28 Jun 2022 06:20  Phos  2.7       06-28  Mg     1.70      06-28

## 2022-06-29 NOTE — PROGRESS NOTE ADULT - PROBLEM SELECTOR PLAN 6
Please see goc note attached. Goal of patient and family is to pursue rehab, DMT if offered once speak with oncologist and all medical management.

## 2022-06-29 NOTE — ADVANCED PRACTICE NURSE CONSULT - ASSESSMENT
Midline abdomen surgical wound -  measuring 18.1mbg74mgzy5.3cm. Center of wound is 100% mesh covering intestines, sides of wound is 100% adipose tissue. Purulent drainage noted to 9 o clock and 11 o clock of wound, Sanguinous drainage from 3 and 6 o clock. Dr. Basilio Glasgow and Dr. Ganesh Flynn, Plastic surgery team,  at bedside. Hemostat applied to 3 and 6 o clock by plastics team. Adaptic silicone nonadherent layer applied to base of wound, packed with saline soaked gauze, covered with abdominal pad and Tegaderm.     RLQ colostomy - Ostomy covered with dried black stool, after cleansing revealed pink moist stoma. Mucocutaneous separation from 6 to 9 o clock, sutures no longer intact. Area of dehiscence extending 1cm under the skin, able to express liquid stool from 6-9 o clock. Area of peristomal erosion noted to 11 o clock, measuring 1cmx1.5cmx0.2cm, exposing 100% fibrinous tissue. Dr. Brand (general surgery) called to bedside.  Per Dr. Brand, area of separation packed, using Aquacel ribbon with 2" left out to wick. Peristomal skin treated with ostomy powder and liquid barrier using crusting technique. Comfeel applied over skin impairment to create pouching surface.     Stoma measurement: 1 5/8", see A&I flowsheet for full assessment.    Midline abdomen surgical wound -  measuring 18.8mzw87wxxh4.3cm. Center of wound is 100% mesh covering intestines, sides of wound is 100% adipose tissue. Purulent drainage noted to 9 o clock and 11 o clock of wound, Sanguinous drainage from 3 and 6 o clock. Dr. Basilio Glasgow and Dr. Ganesh Flynn, Plastic surgery team,  at bedside. Hemostat applied to 3 and 6 o clock by plastics team. Adaptic silicone nonadherent layer applied to base of wound, packed with saline soaked gauze, covered with abdominal pad and Tegaderm.     RLQ colostomy - Ostomy covered with dried black stool, after cleansing revealed pink moist stoma. Mucocutaneous separation from 6 to 9 o clock, sutures no longer intact. Area of dehiscence extending 1cm under the skin, able to express liquid stool from 6-9 o clock. Area of peristomal erosion noted to 11 o clock, measuring 1cmx1.5cmx0.2cm, exposing 100% fibrinous tissue. Dr. Brand (general surgery) called to bedside.  Per Dr. Brand, area of separation packed, using Aquacel ribbon with 2" left out to wick. Peristomal skin treated with ostomy powder and liquid barrier using crusting technique. Comfeel hydrocolloid dressing applied over skin impairment to create pouching surface.     Stoma measurement: 1 5/8", see A&I flowsheet for full assessment.

## 2022-06-29 NOTE — PROGRESS NOTE ADULT - PROBLEM SELECTOR PLAN 3
-back pain improved today  -per patient and family due to uncomfortable bed  -at this time suspecting musculoskeletal in nature as pain improves when in chair  -needs multimodal pain control, can try lidocaine patch, ATC tylenol and flexeril PRN for better pain control  -if no improvement in oxy 5mg, can consider sparing doses of toradol if no objection from gyn/onc  -will monitor

## 2022-06-29 NOTE — CHART NOTE - NSCHARTNOTEFT_GEN_A_CORE
R4 Chart Note     Called to patient bedside by plastic surgery team during wound-vac change. Patient noted to have new purulent discharge at umbilicus. Also noted to have mucocutaneous separation at ostomy from 6-9 o'clock extending 1cm under skin, with feculent material expressed and fibrinous tissue expressed. Surg Onc also at bedside.     Wound vac removed per plastics and wound care given concern for infection at site. Abdominal wound packed with saline soaked gauze and covered with abdominal pad and Tegaderm.     Per surg onc the area of separation at ostomy was packed with quacel, peristomal skin was treated with ostomy powder and liquid barrier, and hydrocolloid dressing was applied.     While at bedside, patient was noted to become febrile to 100.8.      - Pt to be continued on IV Zosyn q8h    - Wound vac removed, wound and ostomy dressed as above   - Appreciate plastic surgery, surg onc, wound care input and assistance    - Will continue conversation with patient and family re: [facility]     Dr Mckeon GYN Oncology fellow at bedside   Julissa Leach MD, PGY4

## 2022-06-29 NOTE — PROGRESS NOTE ADULT - PROBLEM SELECTOR PLAN 4
s/p exlap, abdominal washout, rectosigmoid colectomy, diverting colostomy, bronchoscopy  s/p washout and bridging mesh placement on 6/14 and skin closure device placed on 6/17   > wound vac in place   > pain management as above

## 2022-06-29 NOTE — PROGRESS NOTE ADULT - PROBLEM SELECTOR PLAN 1
-patient noted to have bandemia to 14% today on routine labs  -no fever, leukocytosis however patient may not mount a response due to underlying malignancy   -Hemodynamically stable  -unclear source, recent CTPA without fever or consolidation. Given no respiratory symptoms or hypoxia - unlikely pulmonary etiology  -given abdominal drains, surgery - potential for abdominal source?  -consider blood cx, check lactate and also consider empiric antibiotic such as zosyn to cover abdominal source  -monitor clinically -patient noted to have bandemia to 14% today on routine labs  -no fever, leukocytosis however patient may not mount a response due to underlying malignancy   -Hemodynamically stable  -unclear source, recent CTPA without PE or consolidation. Given no respiratory symptoms or hypoxia - unlikely pulmonary etiology  -given abdominal drains, surgery - potential for abdominal source?  -consider blood cx, check lactate and also consider empiric antibiotic such as zosyn to cover abdominal source  -monitor clinically

## 2022-06-29 NOTE — PROGRESS NOTE ADULT - SUBJECTIVE AND OBJECTIVE BOX
Division of Hospital Medicine  Dr. Nubia Fonseca  Pager [phone]    Patient is a 52y old  Female who presents with a chief complaint of neutropenic fever (25 Jun 2022 09:48)    SUBJECTIVE / OVERNIGHT EVENTS: patient seen and examined. Denies back pain this morning. Still restless in bed. NT draining, ostomy with output. Per report - pt is tolerating diet.      MEDICATIONS  (STANDING):  enoxaparin Injectable 40 milliGRAM(s) SubCutaneous every 24 hours  metoprolol succinate  milliGRAM(s) Oral daily  multivitamin 1 Tablet(s) Oral daily  pantoprazole    Tablet 40 milliGRAM(s) Oral every 12 hours  senna 2 Tablet(s) Oral at bedtime    MEDICATIONS  (PRN):  acetaminophen     Tablet .. 650 milliGRAM(s) Oral every 6 hours PRN Mild Pain (1 - 3)  oxyCODONE    IR 5 milliGRAM(s) Oral every 4 hours PRN Severe Pain (7 - 10)    Vital Signs Last 24 Hrs  T(C): 37.1 (29 Jun 2022 10:00), Max: 37.5 (28 Jun 2022 22:00)  T(F): 98.8 (29 Jun 2022 10:00), Max: 99.5 (28 Jun 2022 22:00)  HR: 103 (29 Jun 2022 10:00) (103 - 118)  BP: 136/87 (29 Jun 2022 10:00) (124/78 - 145/79)  BP(mean): --  RR: 18 (29 Jun 2022 10:00) (18 - 20)  SpO2: 100% (29 Jun 2022 10:00) (99% - 100%)    I&O's Summary    28 Jun 2022 07:01  -  29 Jun 2022 07:00  --------------------------------------------------------  IN: 1080 mL / OUT: 1975 mL / NET: -895 mL      PHYSICAL EXAM:  GENERAL: NAD, well-developed, sitting in bed  HEAD:  Atraumatic, Normocephalic  EYES: EOMI, PERRLA, conjunctiva and sclera clear  NECK: Supple, No JVD  CHEST/LUNG: Clear to auscultation anteriorly no wheeze   HEART: +tachy  ABDOMEN: ostomy+, large midline wound with drain, +NTBL  EXTREMITIES:  2+ Peripheral Pulses, No clubbing, cyanosis, or edema  PSYCH: AAOx2-3  NEUROLOGY: non-focal  SKIN: No rashes or lesions    LABS:                                   8.2    7.70  )-----------( 159      ( 29 Jun 2022 05:28 )             25.8   06-29    142  |  107  |  26<H>  ----------------------------<  106<H>  4.2   |  23  |  0.81    Ca    8.3<L>      29 Jun 2022 05:28  Phos  1.9     06-29  Mg     1.70     06-29    Manual Differential (06.29.22 @ 05:28)    Poikilocytosis: Slight    Polychromasia: Slight    Ovalocytes: Slight    Microcytosis: Slight    Macrocytosis: Moderate    Red Cell Morphology: Normal    Platelet Morphology: Normal    Reactive Lymphocytes %: 2.7 %    Giant Platelets: Present    Manual Smear Verification: Performed WBC: Toxic Granulation Present.    Platelet Count - Estimate: Normal    Band Neutrophils %: 14.4 %    Nucleated RBC: 5 /100               RADIOLOGY & ADDITIONAL TESTS: < from: CT Head No Cont (06.08.22 @ 14:35) >  Impression:  Unremarkable noncontrast head CT.    < end of copied text >      Imaging Personally Reviewed:    Consultant(s) Notes Reviewed:  surgery    Care Discussed with Consultants/Other Providers: daniela UPTON    Assessment and Plan:

## 2022-06-29 NOTE — PROGRESS NOTE ADULT - ASSESSMENT
A/P 52F hx of recurrent cervical ca, now s/p emergent ex lap, washout, rectosigmoid colectomy, diverting colostomy, w. open abdomen now s/p vac and Dermaclose placement c/b purulent drainage    - BID WTD to abdomen with adaptic, moist 4x4 gauze, ABD, tegaderm  - Ostomy mgmt per surg onc  -continue to monitor drains  -appreciate care per primary    Plastic Surgery  [id number]

## 2022-06-29 NOTE — PROGRESS NOTE ADULT - SUBJECTIVE AND OBJECTIVE BOX
Plastic Surgery Progress Note (pg [facility]: [id number], [facility]: [phone])    SUBJECTIVE  The patient was seen and examined. No acute events overnight.    OBJECTIVE  ___________________________________________________  VITAL SIGNS / I&O's   Vital Signs Last 24 Hrs  T(C): 37.2 (29 Jun 2022 15:00), Max: 37.5 (28 Jun 2022 22:00)  T(F): 98.9 (29 Jun 2022 15:00), Max: 99.5 (28 Jun 2022 22:00)  HR: 103 (29 Jun 2022 10:00) (103 - 118)  BP: 119/83 (29 Jun 2022 15:00) (119/83 - 136/87)  BP(mean): --  RR: 22 (29 Jun 2022 15:00) (18 - 22)  SpO2: 100% (29 Jun 2022 15:00) (100% - 100%)      28 Jun 2022 07:01  -  29 Jun 2022 07:00  --------------------------------------------------------  IN:    Oral Fluid: 1080 mL  Total IN: 1080 mL    OUT:    Colostomy (mL): 730 mL    Drain (mL): 67.5 mL    Drain (mL): 40 mL    Drain (mL): 67.5 mL    Emesis (mL): 0 mL    Nephrostomy Tube (mL): 325 mL    Nephrostomy Tube (mL): 625 mL    VAC (Vacuum Assisted Closure) System (mL): 120 mL  Total OUT: 1975 mL    Total NET: -895 mL      29 Jun 2022 07:01  -  29 Jun 2022 17:53  --------------------------------------------------------  IN:  Total IN: 0 mL    OUT:    Colostomy (mL): 100 mL    Drain (mL): 25 mL    Drain (mL): 25 mL    Drain (mL): 45 mL    Nephrostomy Tube (mL): 100 mL    Nephrostomy Tube (mL): 100 mL  Total OUT: 395 mL    Total NET: -395 mL        ___________________________________________________  PHYSICAL EXAM    PHYSICAL EXAM    General: NAD  Pulm: comfortable  Neuro: alert  Abdomen: mesh in place  Purulent drainage along umbilical stalk  mild fibrinous exudate BL  ostomy w/ dehiscence and leakage    ___________________________________________________  LABS                        8.2    7.70  )-----------( 159      ( 29 Jun 2022 05:28 )             25.8     29 Jun 2022 05:28    142    |  107    |  26     ----------------------------<  106    4.2     |  23     |  0.81     Ca    8.3        29 Jun 2022 05:28  Phos  1.9       29 Jun 2022 05:28  Mg     1.70      29 Jun 2022 05:28        CAPILLARY BLOOD GLUCOSE              ___________________________________________________  MICRO  Recent Cultures:    ___________________________________________________  MEDICATIONS  (STANDING):  acetaminophen     Tablet .. 975 milliGRAM(s) Oral every 6 hours  enoxaparin Injectable 40 milliGRAM(s) SubCutaneous every 24 hours  HYDROmorphone  Injectable 0.5 milliGRAM(s) IV Push once  HYDROmorphone  Injectable 0.5 milliGRAM(s) IV Push once  lidocaine 1%/epinephrine 1:100,000 Inj 20 milliLiter(s) Local Injection once  metoprolol tartrate 50 milliGRAM(s) Oral two times a day  mirtazapine 15 milliGRAM(s) Oral daily  multivitamin 1 Tablet(s) Oral daily  pantoprazole    Tablet 40 milliGRAM(s) Oral every 12 hours  piperacillin/tazobactam IVPB.. 3.375 Gram(s) IV Intermittent every 8 hours  potassium phosphate / sodium phosphate Tablet (K-PHOS No. 2) 1 Tablet(s) Oral every 6 hours  senna 2 Tablet(s) Oral at bedtime    MEDICATIONS  (PRN):  cyclobenzaprine 5 milliGRAM(s) Oral three times a day PRN Muscle Spasm  lidocaine   4% Patch 1 Patch Transdermal daily PRN back pain  oxyCODONE    IR 5 milliGRAM(s) Oral every 4 hours PRN Moderate Pain (4 - 6)  oxyCODONE    IR 10 milliGRAM(s) Oral every 4 hours PRN Severe Pain (7 - 10)

## 2022-06-29 NOTE — PROGRESS NOTE ADULT - CONVERSATION DETAILS
Met with patient at bedside this AM. She was not able to explain her clinical course but was able to share that her goal was to go to rehab and that she "will look for oncologist for more treatment options". She was amenable with palliative provider speaking with her daughter, Lydia, as well.     Called Lydia Oleary and introduced role of palliative care to daughter. Lydia shared that she understands that her mother had a complex surgery and her mother has metastatic cancer. She shared that the "doctors explained that she may need long term abx, mechanical ventilation........ but she overcame all of that and she's not on a breathing machine and doesn't need long term abx". She stated that she spoke to gyn/onc team yesterday evening and is aware of patient's hospice eligibility vs long term care, however she spoke to her mother yesterday evening and her mother wants to continue "fighting". She states that she is aware her mother is not eating very much and her performance status is declining, however she feels that he r mother would want to try and get stronger and pursue treatment. Explored her understanding of hospice services. Lydia stated "I know she can't get treatment with hospice, so that's off the table". She wants to continue with all medical management for her mother and does want to prioritize symptom management and increasing her appetite so that she can regain her strength. Met with patient at bedside this AM. She was not able to explain her clinical course but was able to share that her goal was to go to rehab and that she "will look for oncologist for more treatment options". She was amenable with palliative provider speaking with her daughter, Lydia, as well.     Called Lydia Oleary and introduced role of palliative care to daughter. Lydia shared that she understands that her mother had a complex surgery and her mother has metastatic cancer. She shared that the "doctors explained that she may need long term abx, mechanical ventilation........ but she overcame all of that and she's not on a breathing machine and doesn't need long term abx". She stated that she spoke to gyn/onc team yesterday evening and is aware of patient's hospice eligibility vs long term care, however she spoke to her mother yesterday evening and her mother wants to continue "fighting". She states that she is aware her mother is not eating very much and her performance status is declining, however she feels that he r mother would want to try and get stronger and pursue treatment. Explored her understanding of hospice services. Lydia stated "I know she can't get treatment with hospice, so that's off the table". She wants to continue with all medical management for her mother and does want to prioritize symptom management and increasing her appetite so that she can regain her strength. She would like for her mother to follow up with the oncologists that are familiar with her case.

## 2022-06-29 NOTE — PROGRESS NOTE ADULT - PROBLEM SELECTOR PLAN 2
-h/o recurrent cervical CA, receives Gyn Oncology care at [facility] ([facility]).   -she initially was diagnosed with stage III cervical CA in 2014 s/p VBRT + chemo but never had surgical intervention. -Reported now being s/p 5 cycles of chemo, unsure of regimen but next due 6/21/22.  -will need outpt follow up with med onc for plans for future chemo/management  -course complicated by bowel perforation s/p washout and bridging vicryl mesh placement to close fascial gap on 6/14 w/ skin closure device place don 6/17  -further management per general surgery and plastic surgery  -tolerating regular diet  -needs PT, wound care, rehab planning  -pall care following for goals of care discussion

## 2022-06-29 NOTE — PROGRESS NOTE ADULT - PROBLEM SELECTOR PLAN 1
Patient more awake today, answering questions. Her goal is to attend rehab.   Per chart review, patient has been declining over the past 3-4 weeks.   Please assist with ADLs.

## 2022-06-29 NOTE — PROGRESS NOTE ADULT - PROBLEM SELECTOR PLAN 3
On oxycodone 5mg q4h prn moderate pain, oxycodone 10mg q6hr prn severe pain. Over the past 24 hours, patient required PRNs of 10mg PO oxycodone x2.   Can liberalize frequency of oxycodone 10mg to q4h prn severe pain

## 2022-06-29 NOTE — PROVIDER CONTACT NOTE (OTHER) - SITUATION
Patient seen for wound vac and ostomy management. Mucocutaneous separation noted from 6 to 9 o clock of stoma, able to express liquid stool. General surgery team called to bedside.

## 2022-06-29 NOTE — CHART NOTE - NSCHARTNOTEFT_GEN_A_CORE
GYN ONC Fellow:    Spoke with patient's daughter Lydia. I updated her on the wound and restarting antibiotics. She expressed that she is not willing to "give up on her mother" and wants to "keep fighting". I discussed that given her mother's debility that she is not currently a candidate for DMT, and my concern that she may never recover to that point. We reviewed that there was cancer involving all the specimens removed at the time of surgery, indicating a high disease burden. Lydia would like for her mother to go to a rehab facility. We discussed the requirements for participation at an inpatient rehab. She would like her mother to receive medication to help stimulate her appetite. We discussed that her decreased appetite reflects her overall health status (acute illness and cancer). Will start remeron per Palliative recommendations. We explored the possibility of a skilled nursing facility. This situation would provide with nursing care, but would not have a significant rehab element. Lydia is planning to come visit Friday afternoon with her family to discuss options with the Social Work team. Will work with palliative care to try and setup a family meeting.     GRIS Mckeon, PGY6

## 2022-06-29 NOTE — PROGRESS NOTE ADULT - PROBLEM SELECTOR PLAN 1
GYN ONC Fellow Addendum:    Pt seen and examined at bedside. Agree with above. Pain controlled. Tolerating reg diet, -n/v. +ostomy output.     VS reviewed  Labs reviewed    - Continue current pain regimen  - JPs with persistent purulent drainage. No identifiable collections on imaging. Continue drains per Gen Surg.   - Debility: will continue future planning with patient and family. Skilled nursing vs hospice. Continue work with PT.  - Replete lytes prn  - DVT ppx: lovenox  - OOB  - Dispo: continue inpatient management    GRIS Mckeon, PGY6

## 2022-06-29 NOTE — PROGRESS NOTE ADULT - PROBLEM SELECTOR PLAN 7
Thank you for allowing us to participate in your patient's care. We will continue to follow with you. Please page [phone] for any q's or c's.     Shannon Schwab D.O.   Palliative Medicine.

## 2022-06-29 NOTE — ADVANCED PRACTICE NURSE CONSULT - RECOMMEDATIONS
Supplies utilized:   Stoma powder- item #[id number]    Liquid barrier film    Skin barrier ring- item # [id number]  Flat wafer (2 3/4")- item # [id number]   Drainable pouch (2 1/4")- item # [id number],     Midline abdominal surgical wound dressing orders per plastics team.    Plan of care discussed with primary RN     Ostomy team will continue to follow.    Please contact Wound/Ostomy Care Service Line if we can be of further assistance (ext [phone]).  Supplies utilized:   Comfeel hydrocolloid dressing  Stoma powder- item #[id number]    Liquid barrier film    Skin barrier ring- item # [id number]  Flat wafer (2 3/4")- item # [id number]   Drainable pouch (2 1/4")- item # [id number]    Midline abdominal surgical wound dressing orders per plastics team.    Plan of care discussed with primary RN Carlota Gonzalez.    Ostomy team will continue to follow.    Please contact Wound/Ostomy Care Service Line if we can be of further assistance (ext [phone]).

## 2022-06-29 NOTE — PROGRESS NOTE ADULT - PROBLEM SELECTOR PLAN 4
-vitals reviewed and patient since admission had HR in 100s-110s  -CTPA negative on admission  - currently no fever or hypoxia  -DVT study negative  -suspect tachycardia driven by pain and debility  -CTPA negative for PE  -c/w metoprolol

## 2022-06-30 DIAGNOSIS — R06.00 DYSPNEA, UNSPECIFIED: ICD-10-CM

## 2022-06-30 DIAGNOSIS — A41.9 SEPSIS, UNSPECIFIED ORGANISM: ICD-10-CM

## 2022-06-30 LAB
ANION GAP SERPL CALC-SCNC: 14 MMOL/L — SIGNIFICANT CHANGE UP (ref 7–14)
B PERT DNA SPEC QL NAA+PROBE: SIGNIFICANT CHANGE UP
B PERT+PARAPERT DNA PNL SPEC NAA+PROBE: SIGNIFICANT CHANGE UP
BASOPHILS # BLD AUTO: 0.14 K/UL — SIGNIFICANT CHANGE UP (ref 0–0.2)
BASOPHILS NFR BLD AUTO: 0.9 % — SIGNIFICANT CHANGE UP (ref 0–2)
BORDETELLA PARAPERTUSSIS (RAPRVP): SIGNIFICANT CHANGE UP
BUN SERPL-MCNC: 31 MG/DL — HIGH (ref 7–23)
C PNEUM DNA SPEC QL NAA+PROBE: SIGNIFICANT CHANGE UP
CALCIUM SERPL-MCNC: 8.5 MG/DL — SIGNIFICANT CHANGE UP (ref 8.4–10.5)
CHLORIDE SERPL-SCNC: 106 MMOL/L — SIGNIFICANT CHANGE UP (ref 98–107)
CO2 SERPL-SCNC: 21 MMOL/L — LOW (ref 22–31)
CREAT SERPL-MCNC: 1.14 MG/DL — SIGNIFICANT CHANGE UP (ref 0.5–1.3)
EGFR: 57 ML/MIN/1.73M2 — LOW
EOSINOPHIL # BLD AUTO: 0.2 K/UL — SIGNIFICANT CHANGE UP (ref 0–0.5)
EOSINOPHIL NFR BLD AUTO: 1.3 % — SIGNIFICANT CHANGE UP (ref 0–6)
FLUAV SUBTYP SPEC NAA+PROBE: SIGNIFICANT CHANGE UP
FLUBV RNA SPEC QL NAA+PROBE: SIGNIFICANT CHANGE UP
GLUCOSE SERPL-MCNC: 93 MG/DL — SIGNIFICANT CHANGE UP (ref 70–99)
HADV DNA SPEC QL NAA+PROBE: SIGNIFICANT CHANGE UP
HCOV 229E RNA SPEC QL NAA+PROBE: SIGNIFICANT CHANGE UP
HCOV HKU1 RNA SPEC QL NAA+PROBE: SIGNIFICANT CHANGE UP
HCOV NL63 RNA SPEC QL NAA+PROBE: SIGNIFICANT CHANGE UP
HCOV OC43 RNA SPEC QL NAA+PROBE: SIGNIFICANT CHANGE UP
HCT VFR BLD CALC: 28.3 % — LOW (ref 34.5–45)
HGB BLD-MCNC: 8.7 G/DL — LOW (ref 11.5–15.5)
HMPV RNA SPEC QL NAA+PROBE: SIGNIFICANT CHANGE UP
HPIV1 RNA SPEC QL NAA+PROBE: SIGNIFICANT CHANGE UP
HPIV2 RNA SPEC QL NAA+PROBE: SIGNIFICANT CHANGE UP
HPIV3 RNA SPEC QL NAA+PROBE: SIGNIFICANT CHANGE UP
HPIV4 RNA SPEC QL NAA+PROBE: SIGNIFICANT CHANGE UP
IANC: 13.64 K/UL — HIGH (ref 1.8–7.4)
IMM GRANULOCYTES NFR BLD AUTO: 4.1 % — HIGH (ref 0–1.5)
LYMPHOCYTES # BLD AUTO: 0.32 K/UL — LOW (ref 1–3.3)
LYMPHOCYTES # BLD AUTO: 2.1 % — LOW (ref 13–44)
M PNEUMO DNA SPEC QL NAA+PROBE: SIGNIFICANT CHANGE UP
MAGNESIUM SERPL-MCNC: 1.7 MG/DL — SIGNIFICANT CHANGE UP (ref 1.6–2.6)
MCHC RBC-ENTMCNC: 30.3 PG — SIGNIFICANT CHANGE UP (ref 27–34)
MCHC RBC-ENTMCNC: 30.7 GM/DL — LOW (ref 32–36)
MCV RBC AUTO: 98.6 FL — SIGNIFICANT CHANGE UP (ref 80–100)
MONOCYTES # BLD AUTO: 0.48 K/UL — SIGNIFICANT CHANGE UP (ref 0–0.9)
MONOCYTES NFR BLD AUTO: 3.1 % — SIGNIFICANT CHANGE UP (ref 2–14)
NEUTROPHILS # BLD AUTO: 13.64 K/UL — HIGH (ref 1.8–7.4)
NEUTROPHILS NFR BLD AUTO: 88.5 % — HIGH (ref 43–77)
NRBC # BLD: 2 /100 WBCS — SIGNIFICANT CHANGE UP
NRBC # FLD: 0.3 K/UL — HIGH
PHOSPHATE SERPL-MCNC: 2.2 MG/DL — LOW (ref 2.5–4.5)
PLATELET # BLD AUTO: 164 K/UL — SIGNIFICANT CHANGE UP (ref 150–400)
POTASSIUM SERPL-MCNC: 5 MMOL/L — SIGNIFICANT CHANGE UP (ref 3.5–5.3)
POTASSIUM SERPL-SCNC: 5 MMOL/L — SIGNIFICANT CHANGE UP (ref 3.5–5.3)
RAPID RVP RESULT: SIGNIFICANT CHANGE UP
RBC # BLD: 2.87 M/UL — LOW (ref 3.8–5.2)
RBC # FLD: 16.9 % — HIGH (ref 10.3–14.5)
RSV RNA SPEC QL NAA+PROBE: SIGNIFICANT CHANGE UP
RV+EV RNA SPEC QL NAA+PROBE: SIGNIFICANT CHANGE UP
SARS-COV-2 RNA SPEC QL NAA+PROBE: SIGNIFICANT CHANGE UP
SODIUM SERPL-SCNC: 141 MMOL/L — SIGNIFICANT CHANGE UP (ref 135–145)
WBC # BLD: 15.41 K/UL — HIGH (ref 3.8–10.5)
WBC # FLD AUTO: 15.41 K/UL — HIGH (ref 3.8–10.5)

## 2022-06-30 PROCEDURE — 99233 SBSQ HOSP IP/OBS HIGH 50: CPT

## 2022-06-30 PROCEDURE — 99222 1ST HOSP IP/OBS MODERATE 55: CPT

## 2022-06-30 PROCEDURE — 93010 ELECTROCARDIOGRAM REPORT: CPT

## 2022-06-30 RX ORDER — ACETAMINOPHEN 500 MG
1000 TABLET ORAL ONCE
Refills: 0 | Status: COMPLETED | OUTPATIENT
Start: 2022-06-30 | End: 2022-06-30

## 2022-06-30 RX ORDER — ACETAMINOPHEN 500 MG
1000 TABLET ORAL ONCE
Refills: 0 | Status: COMPLETED | OUTPATIENT
Start: 2022-07-01 | End: 2022-07-01

## 2022-06-30 RX ORDER — HYDROMORPHONE HYDROCHLORIDE 2 MG/ML
0.5 INJECTION INTRAMUSCULAR; INTRAVENOUS; SUBCUTANEOUS EVERY 6 HOURS
Refills: 0 | Status: DISCONTINUED | OUTPATIENT
Start: 2022-06-30 | End: 2022-07-01

## 2022-06-30 RX ORDER — SODIUM CHLORIDE 9 MG/ML
1000 INJECTION, SOLUTION INTRAVENOUS
Refills: 0 | Status: DISCONTINUED | OUTPATIENT
Start: 2022-06-30 | End: 2022-07-20

## 2022-06-30 RX ORDER — HYDROMORPHONE HYDROCHLORIDE 2 MG/ML
0.5 INJECTION INTRAMUSCULAR; INTRAVENOUS; SUBCUTANEOUS EVERY 6 HOURS
Refills: 0 | Status: DISCONTINUED | OUTPATIENT
Start: 2022-06-30 | End: 2022-06-30

## 2022-06-30 RX ORDER — HYDROMORPHONE HYDROCHLORIDE 2 MG/ML
1 INJECTION INTRAMUSCULAR; INTRAVENOUS; SUBCUTANEOUS ONCE
Refills: 0 | Status: DISCONTINUED | OUTPATIENT
Start: 2022-06-30 | End: 2022-06-30

## 2022-06-30 RX ORDER — SODIUM CHLORIDE 9 MG/ML
500 INJECTION, SOLUTION INTRAVENOUS ONCE
Refills: 0 | Status: COMPLETED | OUTPATIENT
Start: 2022-06-30 | End: 2022-06-30

## 2022-06-30 RX ORDER — VANCOMYCIN HCL 1 G
1000 VIAL (EA) INTRAVENOUS ONCE
Refills: 0 | Status: COMPLETED | OUTPATIENT
Start: 2022-06-30 | End: 2022-06-30

## 2022-06-30 RX ORDER — HYDROMORPHONE HYDROCHLORIDE 2 MG/ML
1 INJECTION INTRAMUSCULAR; INTRAVENOUS; SUBCUTANEOUS ONCE
Refills: 0 | Status: DISCONTINUED | OUTPATIENT
Start: 2022-06-30 | End: 2022-07-07

## 2022-06-30 RX ORDER — ACETAMINOPHEN 500 MG
975 TABLET ORAL EVERY 6 HOURS
Refills: 0 | Status: DISCONTINUED | OUTPATIENT
Start: 2022-06-30 | End: 2022-06-30

## 2022-06-30 RX ORDER — LIDOCAINE HCL 20 MG/ML
50 VIAL (ML) INJECTION ONCE
Refills: 0 | Status: DISCONTINUED | OUTPATIENT
Start: 2022-06-30 | End: 2022-07-20

## 2022-06-30 RX ORDER — PANTOPRAZOLE SODIUM 20 MG/1
40 TABLET, DELAYED RELEASE ORAL
Refills: 0 | Status: DISCONTINUED | OUTPATIENT
Start: 2022-06-30 | End: 2022-07-18

## 2022-06-30 RX ORDER — HYDROMORPHONE HYDROCHLORIDE 2 MG/ML
0.5 INJECTION INTRAMUSCULAR; INTRAVENOUS; SUBCUTANEOUS
Refills: 0 | Status: DISCONTINUED | OUTPATIENT
Start: 2022-06-30 | End: 2022-07-02

## 2022-06-30 RX ADMIN — HYDROMORPHONE HYDROCHLORIDE 1 MILLIGRAM(S): 2 INJECTION INTRAMUSCULAR; INTRAVENOUS; SUBCUTANEOUS at 16:58

## 2022-06-30 RX ADMIN — ENOXAPARIN SODIUM 40 MILLIGRAM(S): 100 INJECTION SUBCUTANEOUS at 11:52

## 2022-06-30 RX ADMIN — PANTOPRAZOLE SODIUM 40 MILLIGRAM(S): 20 TABLET, DELAYED RELEASE ORAL at 17:47

## 2022-06-30 RX ADMIN — Medication 50 MILLIGRAM(S): at 00:52

## 2022-06-30 RX ADMIN — PIPERACILLIN AND TAZOBACTAM 25 GRAM(S): 4; .5 INJECTION, POWDER, LYOPHILIZED, FOR SOLUTION INTRAVENOUS at 14:58

## 2022-06-30 RX ADMIN — Medication 1000 MILLIGRAM(S): at 21:30

## 2022-06-30 RX ADMIN — Medication 400 MILLIGRAM(S): at 08:53

## 2022-06-30 RX ADMIN — Medication 975 MILLIGRAM(S): at 00:47

## 2022-06-30 RX ADMIN — SODIUM CHLORIDE 1000 MILLILITER(S): 9 INJECTION, SOLUTION INTRAVENOUS at 08:53

## 2022-06-30 RX ADMIN — PIPERACILLIN AND TAZOBACTAM 25 GRAM(S): 4; .5 INJECTION, POWDER, LYOPHILIZED, FOR SOLUTION INTRAVENOUS at 23:57

## 2022-06-30 RX ADMIN — SODIUM CHLORIDE 100 MILLILITER(S): 9 INJECTION, SOLUTION INTRAVENOUS at 10:15

## 2022-06-30 RX ADMIN — HYDROMORPHONE HYDROCHLORIDE 1 MILLIGRAM(S): 2 INJECTION INTRAMUSCULAR; INTRAVENOUS; SUBCUTANEOUS at 12:36

## 2022-06-30 RX ADMIN — Medication 400 MILLIGRAM(S): at 14:24

## 2022-06-30 RX ADMIN — HYDROMORPHONE HYDROCHLORIDE 0.5 MILLIGRAM(S): 2 INJECTION INTRAMUSCULAR; INTRAVENOUS; SUBCUTANEOUS at 19:39

## 2022-06-30 RX ADMIN — SODIUM CHLORIDE 1000 MILLILITER(S): 9 INJECTION, SOLUTION INTRAVENOUS at 18:04

## 2022-06-30 RX ADMIN — Medication 1 TABLET(S): at 00:47

## 2022-06-30 RX ADMIN — Medication 1000 MILLIGRAM(S): at 15:00

## 2022-06-30 RX ADMIN — Medication 250 MILLIGRAM(S): at 10:17

## 2022-06-30 RX ADMIN — HYDROMORPHONE HYDROCHLORIDE 0.5 MILLIGRAM(S): 2 INJECTION INTRAMUSCULAR; INTRAVENOUS; SUBCUTANEOUS at 18:59

## 2022-06-30 RX ADMIN — Medication 975 MILLIGRAM(S): at 01:30

## 2022-06-30 RX ADMIN — HYDROMORPHONE HYDROCHLORIDE 1 MILLIGRAM(S): 2 INJECTION INTRAMUSCULAR; INTRAVENOUS; SUBCUTANEOUS at 16:16

## 2022-06-30 RX ADMIN — Medication 400 MILLIGRAM(S): at 21:08

## 2022-06-30 RX ADMIN — SODIUM CHLORIDE 100 MILLILITER(S): 9 INJECTION, SOLUTION INTRAVENOUS at 23:56

## 2022-06-30 RX ADMIN — Medication 1000 MILLIGRAM(S): at 09:48

## 2022-06-30 RX ADMIN — HYDROMORPHONE HYDROCHLORIDE 1 MILLIGRAM(S): 2 INJECTION INTRAMUSCULAR; INTRAVENOUS; SUBCUTANEOUS at 11:51

## 2022-06-30 RX ADMIN — PIPERACILLIN AND TAZOBACTAM 25 GRAM(S): 4; .5 INJECTION, POWDER, LYOPHILIZED, FOR SOLUTION INTRAVENOUS at 06:45

## 2022-06-30 NOTE — PROGRESS NOTE ADULT - SUBJECTIVE AND OBJECTIVE BOX
Gyn ONC Progress Note     HD #26    Subjective:   Pt seen and examined at bedside. No events overnight. Minimal time OOB.  Passing flatus. Tolerating regular diet. Pt denies fever, chills, chest pain, SOB, nausea, vomiting, lightheadedness, dizziness.       Objective:  T(F): 99.6 (06-30-22 @ 00:51), Max: 100.8 (06-29-22 @ 17:20)  HR: 127 (06-30-22 @ 00:51) (103 - 127)  BP: 117/60 (06-30-22 @ 00:51) (100/67 - 136/87)  RR: 20 (06-30-22 @ 00:51) (17 - 22)  SpO2: 96% (06-30-22 @ 00:51) (96% - 100%)  I&O's Summary    28 Jun 2022 07:01  -  29 Jun 2022 07:00  --------------------------------------------------------  IN: 1080 mL / OUT: 1975 mL / NET: -895 mL    29 Jun 2022 07:01  -  30 Jun 2022 05:05  --------------------------------------------------------  IN: 820 mL / OUT: 1138 mL / NET: -318 mL      MEDICATIONS  (STANDING):  acetaminophen     Tablet .. 975 milliGRAM(s) Oral every 6 hours  enoxaparin Injectable 40 milliGRAM(s) SubCutaneous every 24 hours  HYDROmorphone  Injectable 0.5 milliGRAM(s) IV Push once  HYDROmorphone  Injectable 0.5 milliGRAM(s) IV Push once  lidocaine 1%/epinephrine 1:100,000 Inj 20 milliLiter(s) Local Injection once  metoprolol tartrate 50 milliGRAM(s) Oral two times a day  mirtazapine 15 milliGRAM(s) Oral daily  multivitamin 1 Tablet(s) Oral daily  pantoprazole    Tablet 40 milliGRAM(s) Oral every 12 hours  piperacillin/tazobactam IVPB.. 3.375 Gram(s) IV Intermittent every 8 hours  potassium phosphate / sodium phosphate Tablet (K-PHOS No. 2) 1 Tablet(s) Oral every 6 hours  senna 2 Tablet(s) Oral at bedtime    MEDICATIONS  (PRN):  cyclobenzaprine 5 milliGRAM(s) Oral three times a day PRN Muscle Spasm  lidocaine   4% Patch 1 Patch Transdermal daily PRN back pain  oxyCODONE    IR 5 milliGRAM(s) Oral every 4 hours PRN Moderate Pain (4 - 6)  oxyCODONE    IR 10 milliGRAM(s) Oral every 4 hours PRN Severe Pain (7 - 10)    Physical Exam:  Constitutional: NAD, A&O x3  CV: Tachycardic, regular S1/S2  Lungs: CTA b/l   Abd: Soft, non-distended, s/p wound vac, packing in place w/ overlying abdominal pad with purulent drainage at umbilicus, ; ostomy w/ mucocutaneous separation from 6-9 oclock extending 1cmc under skin w/ feculent material and fibrinous material expressed,  pink and well perfused with brown liquid/stool output in ostomy bag; +3 Ric drains with seropurulent output in RLW, LUQ, LLQ.   : R&L nephrostomy tubes draining clear urine  Extremities: No pain, 2+ pitting edema.    LABS:      06-29                        8.2    7.70  )-----------( 159      ( 29 Jun 2022 05:28 )             25.8       142    |  107    |  26<H>  ----------------------------<  106<H>  4.2     |  23     |  0.81     Ca    8.3<L>      29 Jun 2022 05:28  Phos  1.9       06-29  Mg     1.70      06-29               Gyn ONC Progress Note     HD #26    Subjective:   Pt seen and examined at bedside. No events overnight. Pt w/ generalized pain. Pt not ambulating or OOB.  Passing flatus. Tolerating regular diet. Pt denies fever, chills, chest pain, SOB, nausea, vomiting, lightheadedness, dizziness.       Objective:  T(F): 99.6 (06-30-22 @ 00:51), Max: 100.8 (06-29-22 @ 17:20)  HR: 127 (06-30-22 @ 00:51) (103 - 127)  BP: 117/60 (06-30-22 @ 00:51) (100/67 - 136/87)  RR: 20 (06-30-22 @ 00:51) (17 - 22)  SpO2: 96% (06-30-22 @ 00:51) (96% - 100%)  I&O's Summary    28 Jun 2022 07:01  -  29 Jun 2022 07:00  --------------------------------------------------------  IN: 1080 mL / OUT: 1975 mL / NET: -895 mL    29 Jun 2022 07:01  -  30 Jun 2022 05:05  --------------------------------------------------------  IN: 820 mL / OUT: 1138 mL / NET: -318 mL      MEDICATIONS  (STANDING):  acetaminophen     Tablet .. 975 milliGRAM(s) Oral every 6 hours  enoxaparin Injectable 40 milliGRAM(s) SubCutaneous every 24 hours  HYDROmorphone  Injectable 0.5 milliGRAM(s) IV Push once  HYDROmorphone  Injectable 0.5 milliGRAM(s) IV Push once  lidocaine 1%/epinephrine 1:100,000 Inj 20 milliLiter(s) Local Injection once  metoprolol tartrate 50 milliGRAM(s) Oral two times a day  mirtazapine 15 milliGRAM(s) Oral daily  multivitamin 1 Tablet(s) Oral daily  pantoprazole    Tablet 40 milliGRAM(s) Oral every 12 hours  piperacillin/tazobactam IVPB.. 3.375 Gram(s) IV Intermittent every 8 hours  potassium phosphate / sodium phosphate Tablet (K-PHOS No. 2) 1 Tablet(s) Oral every 6 hours  senna 2 Tablet(s) Oral at bedtime    MEDICATIONS  (PRN):  cyclobenzaprine 5 milliGRAM(s) Oral three times a day PRN Muscle Spasm  lidocaine   4% Patch 1 Patch Transdermal daily PRN back pain  oxyCODONE    IR 5 milliGRAM(s) Oral every 4 hours PRN Moderate Pain (4 - 6)  oxyCODONE    IR 10 milliGRAM(s) Oral every 4 hours PRN Severe Pain (7 - 10)    Physical Exam:  Constitutional: pt intermittently drowsy, arousable but unable to verbalize location of pain.   CV: Tachycardic, regular S1/S2  Lungs: CTA b/l   Abd: Soft, non-distended, s/p wound vac, packing in place w/ overlying abdominal pad with purulent drainage at umbilicus, ; ostomy w/ mucocutaneous separation from 6-9 o'clock extending 1cmc under skin w/ feculent material and fibrinous material,  pink and well perfused with brown liquid/stool output in ostomy bag; +3 Ric drains with seropurulent output in RLW, LUQ, LLQ.   : R&L nephrostomy tubes draining clear urine.  Extremities: No pain, 2+ pitting edema.    LABS:    06-29                        8.2    7.70  )-----------( 159      ( 29 Jun 2022 05:28 )             25.8       142    |  107    |  26<H>  ----------------------------<  106<H>  4.2     |  23     |  0.81     Ca    8.3<L>      29 Jun 2022 05:28  Phos  1.9       06-29  Mg     1.70      06-29

## 2022-06-30 NOTE — PROGRESS NOTE ADULT - ASSESSMENT
52y  HD#26 with recurrent cervical CA admitted for management of neutropenic fever, now s/p emergent exploratory laparotomy, abdominal washout, rectosigmoid colectomy, diverting colostomy, bronchoscopy and Abthera placement on 22 (ebl 2000cc, s/p 5UpRBC, 3L albumin, 3U Plts, 3U FFP, 3U Cryo) for bowel perforation. Patient s/p washout and bridging vicryl mesh placement to close fascial gap on  w/ skin closure device placed on .  Patient with persistent low grade tachycardia, s/p CT AP and CT angio () without acute findings. Significant deconditioning and requiring assistance with ADL's. Pt now s/p wound vac and would packing by Surg onc in setting of purulent discharge at umbilicus and ostomy  from skin with leakage of stool into her subcutaneous tissue. Tmax 38.2 yesterday evening, afebrile overnight, however with increased generalized pain.     Neuro: Tylenol, oxycodone and Dilaudid PRN. Flexeril, Lidocaine patch. Remeron QD.  Appreciate GHOS, palliative recommendations.  CV: Tachycardic this admission, currently 100-117. TSH elevated but T4 wnl. EKG 6/15 sinus tach.  Hemodynamically stable, H/H stable w/ platelets increasing, f/u AM CBC.   - CT A/P and CT angio with no PE   - h/o HTN: Lopressor 50mg BID   Pulm: O2 sat WNL on RA, Increase ambulation, encourage incentive spirometry use.   GI: Tolerating regular diet.   - Ostomy: area of separation packed w/ quacel, ostomy powder and liquid barrier, hydrocolloid dressing.  - Nausea and emesis - - now resolved. CTAP showing mild post operative ileus.   - s/p swallow evaluation.  - Senna, Protonix BID  : B/l nephrostomy tubes. Prior ASHLEY resolving w/ continuing downtrending Cr (0.72), f/u AM BMP.  Heme: DVT ppx: Lovenox, SCDs while in bed.    - Thrombocytopenia: likely multifactorial, currently improving spontaneously. Appreciate Heme Onc recs.  ID: Bandemia (14.4), cont to monitor for leukocytosis.  Tmax 38.2 (), afebrile overnight.   - Continue Zosyn (- ) for broad antiobiotic coverage.   - s/p Caspofungin and Cefepime  - S/p Zarixo (-6/10) for neutropenic fever.   - S/p Zosyn (-)  MSK: Continue working with PT and OT, also evaluated by PMR, recommending subacute rehab, however patient unable to participate.   Wound: S/p wound vac removal per plastics () and packing w/ saline soaked gauze, w/ overlying abdominal pad.   Dispo: Continue inpatient care. Appreciate palliative care recs. Ongoing goals of care discussion with daughter regarding next steps including skilled nursing facility vs. inpatient hospice.     Seen at bedside with GYN Oncology team  Kaci Grimm,  PGY2 52y  HD#26 with recurrent cervical CA admitted for management of neutropenic fever, now s/p emergent exploratory laparotomy, abdominal washout, rectosigmoid colectomy, diverting colostomy, bronchoscopy and Abthera placement on 22 (ebl 2000cc, s/p 5UpRBC, 3L albumin, 3U Plts, 3U FFP, 3U Cryo) for bowel perforation. Patient s/p washout and bridging vicryl mesh placement to close fascial gap on  w/ skin closure device placed on .  Patient with persistent low grade tachycardia, s/p CT AP and CT angio () without acute findings. Significant deconditioning and requiring assistance with ADL's. Pt now s/p wound vac and would packing by Surg onc in setting of purulent discharge at umbilicus and ostomy  from skin with leakage of stool into her subcutaneous tissue. Tmax 38.2 yesterday evening, afebrile overnight, however with increased generalized pain and drowsiness.     Neuro: Tylenol, oxycodone and Dilaudid PRN. Flexeril, Lidocaine patch.   Appreciate GHOS, palliative recommendations.  CV: Tachycardic this admission, currently 100-117. TSH elevated but T4 wnl. EKG 6/15 sinus tach.  Hemodynamically stable, H/H stable w/ platelets increasing, f/u AM CBC.   - CT A/P and CT angio with no PE   - h/o HTN: Lopressor 50mg BID   Pulm: O2 sat WNL on RA, Increase ambulation, encourage incentive spirometry use.   GI: Tolerating regular diet  - Decreased appetite: Remeron for stimulation, appreciate palliative recs.  - Ostomy: area of separation packed w/ quacel, ostomy powder and liquid barrier, hydrocolloid dressing.  - Nausea and emesis - - now resolved. CTAP showing mild post operative ileus.   - s/p swallow evaluation.  - Senna, Protonix BID  : B/l nephrostomy tubes. Prior ASHLEY resolving w/ continuing downtrending Cr (0.72), f/u AM BMP.  Heme: DVT ppx: Lovenox, SCDs while in bed.    - Thrombocytopenia: likely multifactorial, currently improving spontaneously. Appreciate Heme Onc recs.  ID: Bandemia (14.4), cont to monitor for leukocytosis.  Tmax 38.2 (), afebrile overnight.   - Continue Zosyn (- ) for broad antiobiotic coverage.   - s/p Caspofungin and Cefepime  - S/p Zarixo (-6/10) for neutropenic fever.   - S/p Zosyn (-)  MSK: Continue working with PT and OT, also evaluated by PMR, recommending subacute rehab, however patient unable to participate.   Wound: S/p wound vac removal per plastics () and packing w/ saline soaked gauze, w/ overlying abdominal pad.   Dispo: Continue inpatient care. Appreciate palliative care recs. Ongoing goals of care discussion with daughter regarding next steps including skilled nursing facility vs. inpatient hospice.     Seen at bedside with GYN Oncology team  Kaci Grimm,  PGY2 52y  HD#26 with recurrent cervical CA admitted for management of neutropenic fever, now s/p emergent exploratory laparotomy, abdominal washout, rectosigmoid colectomy, diverting colostomy, bronchoscopy and Abthera placement on 22 (ebl 2000cc, s/p 5UpRBC, 3L albumin, 3U Plts, 3U FFP, 3U Cryo) for bowel perforation. Patient s/p washout and bridging vicryl mesh placement to close fascial gap on  w/ skin closure device placed on .  Patient with persistent low grade tachycardia, s/p CT AP and CT angio () without acute findings. Significant deconditioning and requiring assistance with ADL's. Pt noted to have purulent discharge at umbilicus and ostomy  from skin with leakage of stool into her subcutaneous tissue . Tmax 38.2 yesterday evening, afebrile overnight, however with increased generalized pain and drowsiness.     Neuro: Tylenol, oxycodone and Dilaudid PRN. Flexeril, Lidocaine patch.   Appreciate GHOS, palliative recommendations.  CV: Tachycardic this admission, currently 100-117. TSH elevated but T4 wnl. EKG 6/15 sinus tach.  Hemodynamically stable, H/H stable w/ platelets increasing, f/u AM CBC.   - CT A/P and CT angio with no PE   - h/o HTN: Lopressor 50mg BID   Pulm: O2 sat WNL on RA, Increase ambulation, encourage incentive spirometry use.   GI: Tolerating regular diet  - Decreased appetite: Remeron for stimulation, appreciate palliative recs.  - Ostomy: area of separation packed w/ quacel, ostomy powder and liquid barrier, hydrocolloid dressing.  - Nausea and emesis - - now resolved. CTAP showing mild post operative ileus.   - s/p swallow evaluation.  - Senna, Protonix BID  : B/l nephrostomy tubes. Prior ASHLEY resolving w/ continuing downtrending Cr (0.72), f/u AM BMP.  Heme: DVT ppx: Lovenox, SCDs while in bed.    - Thrombocytopenia: likely multifactorial, currently improving spontaneously. Appreciate Heme Onc recs.  ID: Ostomy noted to leak into subcutaneous tissue. Bandemia (14.4), cont to monitor for leukocytosis.  Tmax 38.2 (), afebrile overnight.   - Continue Zosyn (- ) for broad antibiotic coverage.   - s/p Caspofungin and Cefepime  - S/p Zarixo (-6/10) for neutropenic fever.   - S/p Zosyn (-)  MSK: Continue working with PT and OT, also evaluated by PMR, recommending subacute rehab, however patient unable to participate.   Wound: S/p wound vac removal per plastics () and packing w/ saline soaked gauze, w/ overlying abdominal pad.   Dispo: Continue inpatient care. Appreciate palliative care recs. Ongoing goals of care discussion with daughter regarding GOC.    Seen at bedside with GYN Oncology team  Kaci Grimm,  PGY2  Julissa Leach MD, PGY4

## 2022-06-30 NOTE — CHART NOTE - NSCHARTNOTEFT_GEN_A_CORE
GYN ONC Fellow:    Pt seen and evaluated at bedside on PM rounds. She opens her eyes to her name, but does not follow commands. She continues to be tachycardic and tachypneic. Her last BP was 127/80. She has received 3250 mL of IVF.     After placement of drain around ostomy, HCP is no longer considering DNR/DNI.     Ethics consult placed.    Patient remains FULL CODE at this time. If further clinical worsening, will reconsult SICU.    GRIS Mckeon, PGY6  d/w Dr. Merino

## 2022-06-30 NOTE — PROGRESS NOTE ADULT - SUBJECTIVE AND OBJECTIVE BOX
[facility] Geriatrics and Palliative Care  Shannon Schwab Palliative Care Attending  Contact Info: Page [phone] (including Nights/Weekends), message on Microsoft Teams (Shannon Schwab), or leave  at Palliative Office [phone] (non-urgent)     SUBJECTIVE AND OBJECTIVE: patient seen this AM, opened eyes to gentle stimulation but did not verbalize any of her needs. Unable to assess ROS. Per discussion with gyn/onc team, patient with stool noted in wound and zosyn started.     Indication for Geriatrics and Palliative Care Services/INTERVAL HPI:  > 6/29: Chart notes reviewed. Over the past 24 hours, patient required PRNs of 10mg PO oxycodone x2.   > 6/30: Chart notes reviewed. Over the past 24 hours, patient required PRNs of IV dilaudid 0.5mg x1, Po oxycodone 10mg x2, PO oxycodone 5mg x1.     DNR on chart:  Allergies    No Known Allergies    Intolerances    MEDICATIONS  (STANDING):  acetaminophen   IVPB .. 1000 milliGRAM(s) IV Intermittent once  enoxaparin Injectable 40 milliGRAM(s) SubCutaneous every 24 hours  lactated ringers. 1000 milliLiter(s) (100 mL/Hr) IV Continuous <Continuous>  lidocaine 1% (Preservative-free) Injectable 50 milliLiter(s) Local Injection once  lidocaine 1%/epinephrine 1:100,000 Inj 20 milliLiter(s) Local Injection once  multivitamin 1 Tablet(s) Oral daily  pantoprazole  Injectable 40 milliGRAM(s) IV Push two times a day  piperacillin/tazobactam IVPB.. 3.375 Gram(s) IV Intermittent every 8 hours  senna 2 Tablet(s) Oral at bedtime    MEDICATIONS  (PRN):  cyclobenzaprine 5 milliGRAM(s) Oral three times a day PRN Muscle Spasm  HYDROmorphone  Injectable 1 milliGRAM(s) IV Push once PRN ostomy  lidocaine   4% Patch 1 Patch Transdermal daily PRN back pain  oxyCODONE    IR 5 milliGRAM(s) Oral every 4 hours PRN Moderate Pain (4 - 6)      ITEMS UNCHECKED ARE NOT PRESENT    PRESENT SYMPTOMS: [x ]Unable to self-report - see [ ] CPOT [x ] PAINADS [ ] RDOS  Source if other than patient:  [ ]Family   [ ]Team     Pain:  [ ]yes [ ]no  QOL impact -   Location -                    Aggravating factors -  Quality -  Radiation -  Timing-  Severity (0-10 scale):  Minimal acceptable level (0-10 scale):     CPOT:    https://www.[email or web address]/getattachment/[email or web address]/Critical-Care-Pain-Observation-Tool-(CPOT)    PAIN AD Score:	2  http://geriatrictoolkit.[email or web address]/painad.pdf (Ctrl + left click to view)    Dyspnea:                           [ ]Mild [ ]Moderate [ ]Severe    RDOS:  0 to 2  minimal or no respiratory distress   3  mild distress  4 to 6 moderate distress  >7 severe distress  https://[email or web address].net/handouts/hen/Respiratory_Distress_Observation_Scale.pdf (Ctrl +  left click to view)     Anxiety:                             [ ]Mild [ ]Moderate [ ]Severe  Fatigue:                             [ ]Mild [ ]Moderate [ ]Severe  Nausea:                             [ ]Mild [ ]Moderate [ ]Severe  Loss of appetite:              [ ]Mild [ ]Moderate [ ]Severe  Constipation:                    [ ]Mild [ ]Moderate [ ]Severe    PCSSQ[Palliative Care Spiritual Screening Question]   Severity (0-10):  Score of 4 or > indicate consideration of Chaplaincy referral.  Chaplaincy Referral: [ ] yes [ ] refused [ ] following    Other Symptoms:  [ ]All other review of systems negative     Palliative Performance Status Version 2:     20-30    %      [email or web address]/[email or web address]_performance_scale_[email or web address].pdf  PHYSICAL EXAM:  Vital Signs Last 24 Hrs  T(C): 37.2 (30 Jun 2022 16:00), Max: 39.4 (30 Jun 2022 09:48)  T(F): 99 (30 Jun 2022 16:00), Max: 102.9 (30 Jun 2022 09:48)  HR: 118 (30 Jun 2022 16:00) (116 - 131)  BP: 98/48 (30 Jun 2022 16:00) (97/57 - 117/60)  BP(mean): 64 (30 Jun 2022 12:06) (64 - 65)  RR: 26 (30 Jun 2022 16:00) (17 - 28)  SpO2: 97% (30 Jun 2022 16:00) (92% - 99%) I&O's Summary    29 Jun 2022 07:01  -  30 Jun 2022 07:00  --------------------------------------------------------  IN: 820 mL / OUT: 1270.5 mL / NET: -450.5 mL    30 Jun 2022 07:01  -  30 Jun 2022 16:28  --------------------------------------------------------  IN: 1150 mL / OUT: 380 mL / NET: 770 mL       GENERAL: [ ]Cachexia    [ ]Alert  [ ]Oriented x   [ x]Lethargic  [ ]Unarousable  [ ]Verbal  [ ]Non-Verbal  Behavioral:   [ ] Anxiety  [ ] Delirium [ ] Agitation [x ] Other  HEENT:  [x ]Normal   [ ]Dry mouth   [ ]ET Tube/Trach  [ ]Oral lesions  PULMONARY:   [ x]Clear [ ]Tachypnea  [ ]Audible excessive secretions   [ ]Rhonchi        [ ]Right [ ]Left [ ]Bilateral  [ ]Crackles        [ ]Right [ ]Left [ ]Bilateral  [ ]Wheezing     [ ]Right [ ]Left [ ]Bilateral  [ ]Diminished breath sounds [ ]right [ ]left [ ]bilateral  CARDIOVASCULAR:    [ ]Regular [ ]Irregular [ x]Tachy  [ ]Moi [ ]Murmur [ ]Other  GASTROINTESTINAL: wound vac off, dressings in place   [ ]Soft  [ ]Distended   [ ]+BS  [ ]Non tender [ ]Tender  [ ]Other [ ]PEG [ ]OGT/ NGT  Last BM: +colostomy   GENITOURINARY: +b/l nephrostomy   [ ]Normal [ ] Incontinent   [ ]Oliguria/Anuria   [ ]Infante  MUSCULOSKELETAL:   [ ]Normal   [ x]Weakness  [x ]Bed/Wheelchair bound [ ]Edema  NEUROLOGIC:   [ ]No focal deficits  [x ]Cognitive impairment  [ ]Dysphagia [ ]Dysarthria [ ]Paresis [ ]Other   SKIN: wound vac in place   [ ]Normal  [ ]Rash  [ ]Other  [ ]Pressure ulcer(s)       Present on admission [ ]y [ ]n    CRITICAL CARE:  [ ] Shock Present  [ ]Septic [ ]Cardiogenic [ ]Neurologic [ ]Hypovolemic  [ ]  Vasopressors [ ]  Inotropes   [ ]Respiratory failure present [ ]Mechanical ventilation [ ]Non-invasive ventilatory support [ ]High flow    [ ]Acute  [ ]Chronic [ ]Hypoxic  [ ]Hypercarbic [ ]Other  [ ]Other organ failure     LABS:                        8.7    15.41 )-----------( 164      ( 30 Jun 2022 05:25 )             28.3   06-30    141  |  106  |  31<H>  ----------------------------<  93  5.0   |  21<L>  |  1.14    Ca    8.5      30 Jun 2022 05:25  Phos  2.2     06-30  Mg     1.70     06-30      RADIOLOGY & ADDITIONAL STUDIES: n/a     Protein Calorie Malnutrition Present: [ ]mild [ ]moderate [ ]severe [ ]underweight [ ]morbid obesity  https://www.andeal.org/vault/2440/web/files/ONC/Table_Clinical%20Characteristics%20to%20Document%20Malnutrition-White%20JV%20et%20al%202012.pdf    Height (cm): 167 (06-14-22 @ 14:48), 167.6 (06-04-22 @ 15:52), 167.6 (02-08-22 @ 10:03)  Weight (kg): 100.4 (06-14-22 @ 14:48), 98.9 (06-04-22 @ 15:52), 110.7 (02-08-22 @ 10:03)  BMI (kg/m2): 36 (06-14-22 @ 14:48), 35.2 (06-04-22 @ 15:52), 39.4 (02-08-22 @ 10:03)    [x ]PPSV2 < or = 30%  [ ]significant weight loss [ ]poor nutritional intake [ ]anasarca[ ]Artificial Nutrition    Other REFERRALS:  [ ]Hospice  [ ]Child Life  [ ]Social Work  [ ]Case management [ ]Holistic Therapy

## 2022-06-30 NOTE — PROGRESS NOTE ADULT - PROBLEM SELECTOR PLAN 8
Thank you for allowing us to participate in your patient's care. We will continue to follow with you. Please page [phone] for any q's or c's.     Shannon Schwab D.O.   Palliative Medicine.

## 2022-06-30 NOTE — PROVIDER CONTACT NOTE (OTHER) - BACKGROUND
pt. with Hx: cervical CA , chemo , admitted for perforated viscous, s/p emergency exp lap , washout, rectosigmoid resection, colostomy

## 2022-06-30 NOTE — PROGRESS NOTE ADULT - PROBLEM SELECTOR PLAN 1
On oxycodone 5mg q4h prn moderate pain, oxycodone 10mg q6hr prn severe pain. Over the past 24 hours, patient required PRNs of 10mg PO oxycodone x2, 5mg oxycodone x1 and IV dilaudid 0.5mg x1.   > Patient declining and per gyn/onc team, daughter amenable to symptom directed care to optimize patient's comfort at EOL. Can start IV dilaudid 0.5mg q6hrs ATC and 0.5mg IV dilaudid q3h prn breakthrough pain

## 2022-06-30 NOTE — CONSULT NOTE ADULT - ASSESSMENT
ASSESSMENT:  52y with a PMHx of HTN, cervical CA (stage III)  on chemo - last session last week - c/b b/l hydronephrosis s/p B/L nephrostomy tube placement, p/w severe sepsis from neutropenic fever. Pt was acutely altered yesterday afternoon without improvement and AXR showed increased free air with concern for perforated viscous. She is now s/p multiple washouts, open resection of left colon, with transverse colostomy creation now s/p fascial closure with bridging vicryl mesh and skin closure with dermaclose by plastic surgery    PLAN:   In the event the patient codes, SICU recommends against BLS/ACLS given her poor prognosis. Palliative care has been following, would recommend continuing GOC discussions with the family. Will continue to follow.  ASSESSMENT:  52y with a PMHx of HTN, cervical CA (stage III)  on chemo - last session last week - c/b b/l hydronephrosis s/p B/L nephrostomy tube placement, p/w severe sepsis from neutropenic fever. Pt was acutely altered yesterday afternoon without improvement and AXR showed increased free air with concern for perforated viscous. She is now s/p multiple washouts, open resection of left colon, with transverse colostomy creation now s/p fascial closure with bridging vicryl mesh and skin closure with dermaclose by plastic surgery. SICU was consulted for sepsis and worsening mental status.    PLAN:   In the event the patient codes, SICU recommends against BLS/ACLS given her poor prognosis. Palliative care has been following, would recommend continuing GOC discussions with the family. Will continue to follow.

## 2022-06-30 NOTE — CONSULT NOTE ADULT - SUBJECTIVE AND OBJECTIVE BOX
SICU Consultation Note  =====================================================  HPI: 52y  with recurrent cervical CA (per patient stage III) presenting to the ED as transfer from [place] a/w neutropenic fever. Patient reports she had last cycle of chemotherapy 5 days prior.  Patient had VNS care stop by yesterday and was noted to be tachycardic on vital signs and was sent to the ED.  She reports feeling weak + fatigued. + upper abdominal pain that worsens while having chills. She denies any vomiting, chest pain, SOB. Last BM yesterday morning which was normal.     At [facility] she was given Meropenem and Zosyn and was noted to be tachycardic to 140s and febrile to 39.5 and was thus transferred for further management.     MICU was consulted on patient with recommendation to continue with broad spectrum antibiotics. Patient receives Gyn Oncology care at [facility]). She reports that she initially was diagnosed with stage III cervical CA in  s/p VBRT + chemo but never had surgical intervention. She reports now being s/p 5 cycles of chemo, unsure of regimen but next due . c/b b/l hydronephrosis s/p B/L nephrostomy tube placement, p/w severe sepsis from neutropenic fever. Pt was acutely altered and AXR showed increased free air with concern for perforated viscous. She is now s/p multiple washouts, open resection of left colon, with transverse colostomy creation now s/p fascial closure with bridging vicryl mesh and skin closure with dermaclose by plastic surgery. Extubated . She was transferred to the floor hemodynamically stable.     SICU was consulted for sepsis and worsening mental status. Overnight, febrile and tachycardic to 130's. Stoma retracted with stool in abdomen. Palliative care on board and family wishes to remain full code.     OB/GYN HISTORY:  x 1, sAB x 2. Denies any ovarian cysts, fibroids, STIs   PSHx: b/l PCN  and exchange  , hernia repair  PMHx:  HTN   All: No Known Allergies  Meds: Metoprolol 100mg qd, unsure of other medications  Psych: denies any anxiety or depression   Social: Denies any tobacco, alcohol, or illicit substance use        (2022 03:21)    Allergies:   PAST MEDICAL & SURGICAL HISTORY:  HTN (hypertension)      Cervical cancer      Nephrostomy status        FAMILY HISTORY:  FH: liver cancer (Mother)    ADVANCE DIRECTIVES: Full Code    REVIEW OF SYSTEMS:   Constitutional Symptoms: Denies weight loss/gain, chills, sweats, headache, appetite changes. +fever, fatigue  Allergic/Immunologic: Denies drug/food/pet allergies, hayfever  Integumentary: Denies color changes, itch, rash, sores, hives, moles, alopecia  Eyes: Denies vision changes, blurriness, dryness, pain, cataracts, glasses/contacts, photophobia   ENT: Denies tinnitus, hearing difficulty, ear discharge/pain/obstruction, sinusitis, PND, epistaxis, sore throat, dysphagia, canker sores  Cardiovascular: Denies chest pain upon exertion, murmurs, angina, HTN, MI, palpitations, thrombosis, PVD, DVT  Respiratory: Denies dyspnea, cough, infection, sputum, wheezing, SOB, hemoptysis, congestion  Musculoskeletal: Denies pain, weakness, cramps, joint pain, swelling, arthritis, strain/sprain/fracture, scoliosis, atrophy  Gastrointestinal: Denies bloating, nausea, vomiting, heartburn, diarrhea, constipation, abdominal pain, hematemesis, BRBPR  Neurological: Denies syncope, seizures, vertigo, coordination, memory loss, paralysis, tremors, blackouts, ataxia, numbness  Genitourinary: Denies dysuria, hematuria, frequency, burning, urgency, renal stones, STD, infertility  Endocrine: Denies diabetes, excessive hunger/thirst/urination, goiter  Hematologic/Lymphatic: Denies bleeding disorders, anemia  Psychiatric: Denies depression, anxiety, hallucinations, suicidal ideation, tension, mood changes, substance abuse, addictions    HOME MEDICATIONS:   --------------------------------------------------------------------------------------  Home Medications:    --------------------------------------------------------------------------------------    CURRENT MEDICATIONS:   --------------------------------------------------------------------------------------  Neurologic Medications  acetaminophen   IVPB .. 1000 milliGRAM(s) IV Intermittent once  acetaminophen   IVPB .. 1000 milliGRAM(s) IV Intermittent once  cyclobenzaprine 5 milliGRAM(s) Oral three times a day PRN Muscle Spasm  HYDROmorphone  Injectable 0.5 milliGRAM(s) IV Push once  HYDROmorphone  Injectable 0.5 milliGRAM(s) IV Push once  mirtazapine 15 milliGRAM(s) Oral daily  oxyCODONE    IR 5 milliGRAM(s) Oral every 4 hours PRN Moderate Pain (4 - 6)  oxyCODONE    IR 10 milliGRAM(s) Oral every 4 hours PRN Severe Pain (7 - 10)    Respiratory Medications    Cardiovascular Medications  metoprolol tartrate 50 milliGRAM(s) Oral two times a day    Gastrointestinal Medications  lactated ringers. 1000 milliLiter(s) IV Continuous <Continuous>  multivitamin 1 Tablet(s) Oral daily  pantoprazole    Tablet 40 milliGRAM(s) Oral every 12 hours  senna 2 Tablet(s) Oral at bedtime    Genitourinary Medications    Hematologic/Oncologic Medications  enoxaparin Injectable 40 milliGRAM(s) SubCutaneous every 24 hours    Antimicrobial/Immunologic Medications  piperacillin/tazobactam IVPB.. 3.375 Gram(s) IV Intermittent every 8 hours    Endocrine/Metabolic Medications    Topical/Other Medications  lidocaine   4% Patch 1 Patch Transdermal daily PRN back pain  lidocaine 1%/epinephrine 1:100,000 Inj 20 milliLiter(s) Local Injection once    --------------------------------------------------------------------------------------    VITAL SIGNS, INS/OUTS (last 24 hours):  --------------------------------------------------------------------------------------  I&O's Detail    2022 07:01  -  2022 07:00  --------------------------------------------------------  IN:    IV PiggyBack: 100 mL    Oral Fluid: 720 mL  Total IN: 820 mL    OUT:    Colostomy (mL): 100 mL    Drain (mL): 80 mL    Drain (mL): 152.5 mL    Drain (mL): 98 mL    Emesis (mL): 0 mL    Nephrostomy Tube (mL): 360 mL    Nephrostomy Tube (mL): 480 mL  Total OUT: 1270.5 mL    Total NET: -450.5 mL      2022 07:  -  2022 12:33  --------------------------------------------------------  IN:  Total IN: 0 mL    OUT:    Drain (mL): 30 mL    Drain (mL): 5 mL    Nephrostomy Tube (mL): 100 mL    Nephrostomy Tube (mL): 150 mL  Total OUT: 285 mL    Total NET: -285 mL        --------------------------------------------------------------------------------------    EXAM  NEUROLOGY  EXAM: Lethargic appearing. Grimaces to pain.    HEENT  Exam: Normocephalic, atraumatic. Temporal wasting    RESPIRATORY  Exam: Lungs clear to auscultation, Normal expansion/effort.      CARDIOVASCULAR  Exam: S1, S2.  Regular rhythm. Tachycardic.     GI/NUTRITION  Exam: Wet to dry open abdominal wall with vicryl mesh. Ostomy with aquacel packing in place, bottom portion of ostomy open.     VASCULAR  Exam: Extremities warm, pink, well-perfused.    MUSCULOSKELETAL  Exam: All extremities moving spontaneously without limitations.    SKIN:  Exam: Good skin turgor, no skin breakdown.    METABOLIC/FLUIDS/ELECTROLYTES  lactated ringers. 1000 milliLiter(s) IV Continuous <Continuous>  multivitamin 1 Tablet(s) Oral daily      HEMATOLOGIC  [x] DVT Prophylaxis: enoxaparin Injectable 40 milliGRAM(s) SubCutaneous every 24 hours    Transfusions:	[] PRBC	[] Platelets		[] FFP	[] Cryoprecipitate    INFECTIOUS DISEASE  Antimicrobials/Immunologic Medications:  piperacillin/tazobactam IVPB.. 3.375 Gram(s) IV Intermittent every 8 hours      Tubes/Lines/Drains  [x] Peripheral IV  [] Central Venous Line     	[] R	[] L	[] IJ	[] Fem	[] SC	Date Placed:   [] Arterial Line		[] R	[] L	[] Fem	[] Rad	[] Ax	Date Placed:   [] PICC:         	[] Midline		[] Mediport  [] Urinary Catheter		Date Placed:     LABS  --------------------------------------------------------------------------------------  CBC ( @ 05:25)                          8.7<L>                   15.41<H>  )--------------(  164        88.5<H>% Neuts, 2.1<L>% Lymphs, ANC: 13.64<H>                          28.3<L>  CBC ( @ 05:28)                          8.2<L>                   7.70    )--------------(  159        70.3  % Neuts, 3.6<L>% Lymphs, ANC: 6.52                            25.8<L>    BMP ( @ 05:25)       141     |  106     |  31<H> 			Ca++ --      Ca 8.5          ---------------------------------( 93    		Mg 1.70         5.0     |  21<L>   |  1.14  			Ph 2.2<L>  BMP ( @ 05:28)       142     |  107     |  26<H> 			Ca++ --      Ca 8.3<L>       ---------------------------------( 106<H>		Mg 1.70         4.2     |  23      |  0.81  			Ph 1.9<L>

## 2022-06-30 NOTE — PROGRESS NOTE ADULT - ASSESSMENT
52y with a PMHx of HTN, cervical CA (stage III)  on chemo - last session last week - c/b b/l hydronephrosis s/p B/L nephrostomy tube placement, p/w severe sepsis from neutropenic fever. Pt was acutely altered yesterday afternoon without improvement and AXR showed increased free air with concern for perforated viscous. She is now s/p multiple washouts, open resection of left colon, with transverse colostomy creation now s/p fascial closure with bridging vicryl mesh and skin closure with dermaclose by plastic surgery. Extubated 6/19.     Plan:  - Agree with Surgical Intensive Care Unit evaluation given febrile, tachycardic, increasing WBC.   - Aquacel packing in inferior portion of ostomy BID.   - Appreciate Wound care recs.   - Continue drains.   - Appreciate care per GynOnc.     D Team Surgery  [id number]

## 2022-06-30 NOTE — PROGRESS NOTE ADULT - PROBLEM SELECTOR PLAN 1
-patient noted to have fever, tachycardia, leukocytosis and yesterday with bandemia to 14%  -Hemodynamically stable presently but high risk for decompensation   -recent CTPA without PE or consolidation. Given no respiratory symptoms or hypoxia - unlikely pulmonary etiology  -given abdominal wound noted to have stool - concern for fistulization vs infection. Repeat CT ordered to evaluate. Would defer wound management to surgery  -c/w zosyn and s/p 1 dose of vanco. Monitor vanc level in AM and dose vanc accordingly. Renal function slightly higher today hence standing vanco not ordered  -follow cultures and lactate and procal level  -monitor clinically

## 2022-06-30 NOTE — ADVANCED PRACTICE NURSE CONSULT - ASSESSMENT
General surgery team at bedside. Stoma sutured to skin and drain placed by surgeon. Drain sutured in place at 6 o clock of ostomy, draining into colostomy bag placed by surgeon.

## 2022-06-30 NOTE — ADVANCED PRACTICE NURSE CONSULT - RECOMMEDATIONS
Ostomy team will continue to follow.    Please contact Wound/Ostomy Care Service Line if we can be of further assistance (ext [phone]).

## 2022-06-30 NOTE — PROGRESS NOTE ADULT - SUBJECTIVE AND OBJECTIVE BOX
Surgery Progress Note     Subjective/24hour Events:   Patient seen and examined.   More lethargic this AM.   Ostomy sunken in.   No acute events overnight.   Pain controlled.     Vital Signs:  Vital Signs Last 24 Hrs  T(C): 39.4 (30 Jun 2022 09:48), Max: 39.4 (30 Jun 2022 09:48)  T(F): 102.9 (30 Jun 2022 09:48), Max: 102.9 (30 Jun 2022 09:48)  HR: 131 (30 Jun 2022 09:48) (116 - 131)  BP: 115/58 (30 Jun 2022 09:48) (100/67 - 119/83)  BP(mean): 65 (30 Jun 2022 09:48) (65 - 65)  RR: 17 (30 Jun 2022 09:48) (17 - 22)  SpO2: 97% (30 Jun 2022 09:48) (92% - 100%)    CAPILLARY BLOOD GLUCOSE          I&O's Detail    29 Jun 2022 07:01  -  30 Jun 2022 07:00  --------------------------------------------------------  IN:    IV PiggyBack: 100 mL    Oral Fluid: 720 mL  Total IN: 820 mL    OUT:    Colostomy (mL): 100 mL    Drain (mL): 80 mL    Drain (mL): 152.5 mL    Drain (mL): 98 mL    Emesis (mL): 0 mL    Nephrostomy Tube (mL): 360 mL    Nephrostomy Tube (mL): 480 mL  Total OUT: 1270.5 mL    Total NET: -450.5 mL          MEDICATIONS  (STANDING):  acetaminophen   IVPB .. 1000 milliGRAM(s) IV Intermittent once  acetaminophen   IVPB .. 1000 milliGRAM(s) IV Intermittent once  enoxaparin Injectable 40 milliGRAM(s) SubCutaneous every 24 hours  HYDROmorphone  Injectable 0.5 milliGRAM(s) IV Push once  HYDROmorphone  Injectable 0.5 milliGRAM(s) IV Push once  HYDROmorphone  Injectable 1 milliGRAM(s) IV Push once  lactated ringers. 1000 milliLiter(s) (100 mL/Hr) IV Continuous <Continuous>  lidocaine 1%/epinephrine 1:100,000 Inj 20 milliLiter(s) Local Injection once  metoprolol tartrate 50 milliGRAM(s) Oral two times a day  mirtazapine 15 milliGRAM(s) Oral daily  multivitamin 1 Tablet(s) Oral daily  pantoprazole    Tablet 40 milliGRAM(s) Oral every 12 hours  piperacillin/tazobactam IVPB.. 3.375 Gram(s) IV Intermittent every 8 hours  senna 2 Tablet(s) Oral at bedtime    MEDICATIONS  (PRN):  cyclobenzaprine 5 milliGRAM(s) Oral three times a day PRN Muscle Spasm  lidocaine   4% Patch 1 Patch Transdermal daily PRN back pain  oxyCODONE    IR 5 milliGRAM(s) Oral every 4 hours PRN Moderate Pain (4 - 6)  oxyCODONE    IR 10 milliGRAM(s) Oral every 4 hours PRN Severe Pain (7 - 10)      Physical Exam:  Gen: Lethargic appearing.   Lungs: Non labored breathing.   Ab: Wet to dry open abdominal wall with vicryl mesh. Ostomy with aquacel packing in place, bottom portion of ostomy open.     Labs:    06-30    141  |  106  |  31<H>  ----------------------------<  93  5.0   |  21<L>  |  1.14    Ca    8.5      30 Jun 2022 05:25  Phos  2.2     06-30  Mg     1.70     06-30                              8.7    15.41 )-----------( 164      ( 30 Jun 2022 05:25 )             28.3

## 2022-06-30 NOTE — PROGRESS NOTE ADULT - PROBLEM SELECTOR PLAN 6
Outpatient oncologist: [facility] (Lara)   > Gyn/onc recs reviewed - Family meeting held today with daughter per chart review. Decision made to transition focus of care to comfort and pursue hospice services

## 2022-06-30 NOTE — PROGRESS NOTE ADULT - SUBJECTIVE AND OBJECTIVE BOX
Division of Hospital Medicine  Dr. Nubia Fonseca  Pager [phone]    Patient is a 52y old  Female who presents with a chief complaint of neutropenic fever (25 Jun 2022 09:48)    SUBJECTIVE / OVERNIGHT EVENTS: patient seen and examined. Overnight events noted. Patient febrile overnight and again this morning. Appears more lethargic. Also with leukocytosis. Surgical wound concerning for infection. Overall clinical status is declining.     MEDICATIONS  (STANDING):  enoxaparin Injectable 40 milliGRAM(s) SubCutaneous every 24 hours  metoprolol succinate  milliGRAM(s) Oral daily  multivitamin 1 Tablet(s) Oral daily  pantoprazole    Tablet 40 milliGRAM(s) Oral every 12 hours  senna 2 Tablet(s) Oral at bedtime    MEDICATIONS  (PRN):  acetaminophen     Tablet .. 650 milliGRAM(s) Oral every 6 hours PRN Mild Pain (1 - 3)  oxyCODONE    IR 5 milliGRAM(s) Oral every 4 hours PRN Severe Pain (7 - 10)    Vital Signs Last 24 Hrs  T(C): 38.3 (30 Jun 2022 12:06), Max: 39.4 (30 Jun 2022 09:48)  T(F): 101 (30 Jun 2022 12:06), Max: 102.9 (30 Jun 2022 09:48)  HR: 121 (30 Jun 2022 12:06) (116 - 131)  BP: 97/57 (30 Jun 2022 12:06) (97/57 - 119/83)  BP(mean): 64 (30 Jun 2022 12:06) (64 - 65)  RR: 28 (30 Jun 2022 12:06) (17 - 28)  SpO2: 98% (30 Jun 2022 12:06) (92% - 100%)    I&O's Summary    29 Jun 2022 07:01  -  30 Jun 2022 07:00  --------------------------------------------------------  IN: 820 mL / OUT: 1270.5 mL / NET: -450.5 mL    30 Jun 2022 07:01  -  30 Jun 2022 13:28  --------------------------------------------------------  IN: 0 mL / OUT: 285 mL / NET: -285 mL      PHYSICAL EXAM:  GENERAL: NAD, well-developed, lethargic, ill appearing   HEAD:  Atraumatic, Normocephalic  EYES: EOMI, PERRLA, conjunctiva and sclera clear  NECK: Supple, No JVD  CHEST/LUNG: Clear to auscultation anteriorly no wheeze   HEART: +tachy  ABDOMEN: ostomy+, large midline wound  EXTREMITIES:  2+ Peripheral Pulses, No clubbing, cyanosis, or edema  PSYCH: AAOx2-1  NEUROLOGY: non-focal  SKIN: No rashes or lesions    LABS:                                              8.7    15.41 )-----------( 164      ( 30 Jun 2022 05:25 )             28.3   06-30    141  |  106  |  31<H>  ----------------------------<  93  5.0   |  21<L>  |  1.14    Ca    8.5      30 Jun 2022 05:25  Phos  2.2     06-30  Mg     1.70     06-30        Manual Differential (06.29.22 @ 05:28)    Poikilocytosis: Slight    Polychromasia: Slight    Ovalocytes: Slight    Microcytosis: Slight    Macrocytosis: Moderate    Red Cell Morphology: Normal    Platelet Morphology: Normal    Reactive Lymphocytes %: 2.7 %    Giant Platelets: Present    Manual Smear Verification: Performed WBC: Toxic Granulation Present.    Platelet Count - Estimate: Normal    Band Neutrophils %: 14.4 %    Nucleated RBC: 5 /100               RADIOLOGY & ADDITIONAL TESTS: < from: CT Head No Cont (06.08.22 @ 14:35) >  Impression:  Unremarkable noncontrast head CT.    < end of copied text >      Imaging Personally Reviewed:    Consultant(s) Notes Reviewed:  surgery, sicu    Care Discussed with Consultants/Other Providers: daniela UPTON    Assessment and Plan:

## 2022-06-30 NOTE — CHART NOTE - NSCHARTNOTEFT_GEN_A_CORE
Patient acutely worsening. held family meeting to discuss next steps - daughter asked appropriate questions, [facility] [place], and 2 aunts that came later in conversation from her father side. step son at bedside, not joining meeting as too upset at this time. Lydia understands that mom is doing worse, worsening infection, tachypnea, cancer progression and is likely passing.  Lydia wants her mom to remain comfortable, and is deciding on DNR/DNI. She will let us know her decision after further discussion with her family. All questions were answered in detail. If feasible, she is interested in hospice transfer for inpatient hospice.

## 2022-06-30 NOTE — PROGRESS NOTE ADULT - ASSESSMENT
51y/o F with recurrent cervical CA admitted for management of neutropenic fever, now s/p emergent exploratory laparotomy, abdominal washout, rectosigmoid colectomy, diverting colostomy, bronchoscopy and Abthera placement on 6/8/22 for findings of increased free air on abdominal xray concerning for bowel perforation. Patient s/p washout and bridging vicryl mesh placement to close fascial gap on 6/14 w/ skin closure device place don 6/17. Patient now extubated, hemodynamically stable and clinically improving. Palliative consulted for sx management and assistance with complex decision making regarding goc in setting of recurrent malignancy.

## 2022-06-30 NOTE — CHART NOTE - NSCHARTNOTEFT_GEN_A_CORE
Spoke with patients daughter, Lydia, at bedside earlier and explained that General surgery team placed drain for comfort measures only. Lydia however believes this drain will help her mother recover. She is presently holding  off on signing DNR/DNI. Dr. Merino, Dr. Aiken updated. Patients case to be escalated to Ethics. SICU and palliative teams aware.

## 2022-06-30 NOTE — PROGRESS NOTE ADULT - PROBLEM SELECTOR PLAN 1
GYN ONC Fellow Addendum:    Pt seen and examined at bedside. Agree with above. Tachycardic. Responsive to painful stimuli.    VS reviewed  Labs reviewed    - CT C/A/P to evaluate for source  - On zosyn, bolus, aggressive fluid resuscitation  - Draw Bcx, Ucx  - Viet Mckeon, PGY6

## 2022-06-30 NOTE — PROGRESS NOTE ADULT - PROBLEM SELECTOR PLAN 3
Patient obtunded today and declining acutely   Per chart review, patient has been declining over the past 3-4 weeks.   Please assist with ADLs.

## 2022-06-30 NOTE — PROGRESS NOTE ADULT - PROBLEM SELECTOR PLAN 5
s/p exlap, abdominal washout, rectosigmoid colectomy, diverting colostomy, bronchoscopy  s/p washout and bridging mesh placement on 6/14 and skin closure device placed on 6/17   > wound vac removed as patient with stool and purulence noted in wound   > pain management as above

## 2022-06-30 NOTE — PROGRESS NOTE ADULT - PROBLEM SELECTOR PLAN 2
-h/o recurrent cervical CA, receives Gyn Oncology care at [facility] ([place]).   -she initially was diagnosed with stage III cervical CA in 2014 s/p VBRT + chemo but never had surgical intervention. -Reported now being s/p 5 cycles of chemo, unsure of regimen but next due 6/21/22.  -will need outpt follow up with med onc for plans for future chemo/management  -course complicated by bowel perforation s/p washout and bridging vicryl mesh placement to close fascial gap on 6/14 w/ skin closure device place don 6/17  -further management per general surgery and plastic surgery  -pall care following for goals of care discussion darian in setting of declining clinical status  -overall prognosis is guarded

## 2022-06-30 NOTE — PROGRESS NOTE ADULT - PROBLEM SELECTOR PLAN 2
If patient develop dyspnea, can utilize 0.5mg IV dilaudid q3h prn dyspnea   If patient develops secretions, can utilize IV glyco 0.4mg q4h prn

## 2022-06-30 NOTE — CHART NOTE - NSCHARTNOTEFT_GEN_A_CORE
Patient seen and evaluated, progression of peritonitis. Patient has abdominal tenderness. Likely due to worsening infection/abscess.   Discussed these findings with patient's daughter Lydia. Lydia is on her way in, worsening disease and patient appears to be succumbing to her illness. Progression of cancer and infection.    will have family meeting when she arrives.  Betina Merino MD

## 2022-06-30 NOTE — PROGRESS NOTE ADULT - PROBLEM SELECTOR PLAN 3
-vitals reviewed and patient since admission had HR in 100s-110s  -current worsening tachycardia likely in setting of sepsis  -recent CTPA negative for PE  -treat underlying sepsis as outlined above

## 2022-06-30 NOTE — PROGRESS NOTE ADULT - ATTENDING COMMENTS
Patient seen and examined, agree with gyn onc fellow and resident  Clinically worsened  New leukocytosis  CT to evaluate for source  Panculture  Continue Zosyn  Monitor closely

## 2022-06-30 NOTE — CHART NOTE - NSCHARTNOTEFT_GEN_A_CORE
GYN ONC Fellow:    Pt seen and evaluated at bedside. Increasing tachycardia with decreasing BP concerning for development of septic shock. IV bolus given. On Zosyn, will start Vanc. Aggressive fluid hydration. SICU Consult.      I spoke with Lydia, pt's daughter and HCP. I updated her on the critical change in her mother's condition. We discussed concern for infection and likely source. Discussed patient's desires if natural death were to occur. Desires FULL CODE status at this time.     GRIS Mckeon. PGY6

## 2022-06-30 NOTE — PROGRESS NOTE ADULT - PROBLEM SELECTOR PLAN 7
Chart notes reviewed. Primary team discussed goc with family and family deciding on DNR/DNI. Primary team to complete MOLST form   patient is an appropriate candidate for hospice services- would benefit from [facility] services for wound care

## 2022-06-30 NOTE — PROGRESS NOTE ADULT - PROBLEM SELECTOR PLAN 4
----- Message from Stacia Zhang sent at 6/4/2018  9:39 AM CDT -----  Contact: self  Type: Needs Medical Advice    Who Called:  self  Symptoms (please be specific):  NA  How long has patient had these symptoms:  DANIEL  Pharmacy name and phone #:  [facility]  Best Call Back Number: [phone]  Additional Information: Patient would like to discuss with Beatriz about changing her Hydrocodone prescription. She states 5 mg is not working for her. Please call patient. Thanks!     Can d/c remeron 15mg qhs at patient does not appear to have safe PO route

## 2022-07-01 DIAGNOSIS — N17.9 ACUTE KIDNEY FAILURE, UNSPECIFIED: ICD-10-CM

## 2022-07-01 LAB
ANION GAP SERPL CALC-SCNC: 14 MMOL/L — SIGNIFICANT CHANGE UP (ref 7–14)
BASOPHILS # BLD AUTO: 0 K/UL — SIGNIFICANT CHANGE UP (ref 0–0.2)
BASOPHILS NFR BLD AUTO: 0 % — SIGNIFICANT CHANGE UP (ref 0–2)
BUN SERPL-MCNC: 39 MG/DL — HIGH (ref 7–23)
CALCIUM SERPL-MCNC: 8.1 MG/DL — LOW (ref 8.4–10.5)
CHLORIDE SERPL-SCNC: 106 MMOL/L — SIGNIFICANT CHANGE UP (ref 98–107)
CO2 SERPL-SCNC: 23 MMOL/L — SIGNIFICANT CHANGE UP (ref 22–31)
CREAT SERPL-MCNC: 1.43 MG/DL — HIGH (ref 0.5–1.3)
CULTURE RESULTS: SIGNIFICANT CHANGE UP
EGFR: 44 ML/MIN/1.73M2 — LOW
EOSINOPHIL # BLD AUTO: 0.38 K/UL — SIGNIFICANT CHANGE UP (ref 0–0.5)
EOSINOPHIL NFR BLD AUTO: 3 % — SIGNIFICANT CHANGE UP (ref 0–6)
GLUCOSE SERPL-MCNC: 88 MG/DL — SIGNIFICANT CHANGE UP (ref 70–99)
HCT VFR BLD CALC: 26.1 % — LOW (ref 34.5–45)
HGB BLD-MCNC: 7.9 G/DL — LOW (ref 11.5–15.5)
IANC: 11.3 K/UL — HIGH (ref 1.8–7.4)
LYMPHOCYTES # BLD AUTO: 0.25 K/UL — LOW (ref 1–3.3)
LYMPHOCYTES # BLD AUTO: 2 % — LOW (ref 13–44)
MAGNESIUM SERPL-MCNC: 1.5 MG/DL — LOW (ref 1.6–2.6)
MANUAL SMEAR VERIFICATION: SIGNIFICANT CHANGE UP
MCHC RBC-ENTMCNC: 29.8 PG — SIGNIFICANT CHANGE UP (ref 27–34)
MCHC RBC-ENTMCNC: 30.3 GM/DL — LOW (ref 32–36)
MCV RBC AUTO: 98.5 FL — SIGNIFICANT CHANGE UP (ref 80–100)
MONOCYTES # BLD AUTO: 0.13 K/UL — SIGNIFICANT CHANGE UP (ref 0–0.9)
MONOCYTES NFR BLD AUTO: 1 % — LOW (ref 2–14)
NEUTROPHILS # BLD AUTO: 11.75 K/UL — HIGH (ref 1.8–7.4)
NEUTROPHILS NFR BLD AUTO: 87 % — HIGH (ref 43–77)
NEUTS BAND # BLD: 7 % — HIGH (ref 0–6)
NRBC # BLD: 0 /100 — SIGNIFICANT CHANGE UP (ref 0–0)
PHOSPHATE SERPL-MCNC: 3.1 MG/DL — SIGNIFICANT CHANGE UP (ref 2.5–4.5)
PLAT MORPH BLD: ABNORMAL
PLATELET # BLD AUTO: 158 K/UL — SIGNIFICANT CHANGE UP (ref 150–400)
PLATELET CLUMP BLD QL SMEAR: ABNORMAL
PLATELET COUNT - ESTIMATE: ABNORMAL
POTASSIUM SERPL-MCNC: 4.2 MMOL/L — SIGNIFICANT CHANGE UP (ref 3.5–5.3)
POTASSIUM SERPL-SCNC: 4.2 MMOL/L — SIGNIFICANT CHANGE UP (ref 3.5–5.3)
RBC # BLD: 2.65 M/UL — LOW (ref 3.8–5.2)
RBC # FLD: 17.1 % — HIGH (ref 10.3–14.5)
RBC BLD AUTO: SIGNIFICANT CHANGE UP
SODIUM SERPL-SCNC: 143 MMOL/L — SIGNIFICANT CHANGE UP (ref 135–145)
SPECIMEN SOURCE: SIGNIFICANT CHANGE UP
VANCOMYCIN FLD-MCNC: 9.7 UG/ML — SIGNIFICANT CHANGE UP
WBC # BLD: 12.5 K/UL — HIGH (ref 3.8–10.5)
WBC # FLD AUTO: 12.5 K/UL — HIGH (ref 3.8–10.5)

## 2022-07-01 PROCEDURE — 99497 ADVNCD CARE PLAN 30 MIN: CPT

## 2022-07-01 PROCEDURE — 99233 SBSQ HOSP IP/OBS HIGH 50: CPT

## 2022-07-01 RX ORDER — HYDROMORPHONE HYDROCHLORIDE 2 MG/ML
0.5 INJECTION INTRAMUSCULAR; INTRAVENOUS; SUBCUTANEOUS EVERY 4 HOURS
Refills: 0 | Status: DISCONTINUED | OUTPATIENT
Start: 2022-07-01 | End: 2022-07-05

## 2022-07-01 RX ADMIN — SODIUM CHLORIDE 100 MILLILITER(S): 9 INJECTION, SOLUTION INTRAVENOUS at 06:25

## 2022-07-01 RX ADMIN — HYDROMORPHONE HYDROCHLORIDE 0.5 MILLIGRAM(S): 2 INJECTION INTRAMUSCULAR; INTRAVENOUS; SUBCUTANEOUS at 00:17

## 2022-07-01 RX ADMIN — HYDROMORPHONE HYDROCHLORIDE 0.5 MILLIGRAM(S): 2 INJECTION INTRAMUSCULAR; INTRAVENOUS; SUBCUTANEOUS at 09:15

## 2022-07-01 RX ADMIN — SODIUM CHLORIDE 100 MILLILITER(S): 9 INJECTION, SOLUTION INTRAVENOUS at 08:50

## 2022-07-01 RX ADMIN — HYDROMORPHONE HYDROCHLORIDE 0.5 MILLIGRAM(S): 2 INJECTION INTRAMUSCULAR; INTRAVENOUS; SUBCUTANEOUS at 16:32

## 2022-07-01 RX ADMIN — PANTOPRAZOLE SODIUM 40 MILLIGRAM(S): 20 TABLET, DELAYED RELEASE ORAL at 18:37

## 2022-07-01 RX ADMIN — HYDROMORPHONE HYDROCHLORIDE 0.5 MILLIGRAM(S): 2 INJECTION INTRAMUSCULAR; INTRAVENOUS; SUBCUTANEOUS at 06:36

## 2022-07-01 RX ADMIN — HYDROMORPHONE HYDROCHLORIDE 0.5 MILLIGRAM(S): 2 INJECTION INTRAMUSCULAR; INTRAVENOUS; SUBCUTANEOUS at 18:38

## 2022-07-01 RX ADMIN — Medication 400 MILLIGRAM(S): at 02:51

## 2022-07-01 RX ADMIN — Medication 1000 MILLIGRAM(S): at 03:10

## 2022-07-01 RX ADMIN — HYDROMORPHONE HYDROCHLORIDE 0.5 MILLIGRAM(S): 2 INJECTION INTRAMUSCULAR; INTRAVENOUS; SUBCUTANEOUS at 00:08

## 2022-07-01 RX ADMIN — HYDROMORPHONE HYDROCHLORIDE 0.5 MILLIGRAM(S): 2 INJECTION INTRAMUSCULAR; INTRAVENOUS; SUBCUTANEOUS at 03:59

## 2022-07-01 RX ADMIN — HYDROMORPHONE HYDROCHLORIDE 0.5 MILLIGRAM(S): 2 INJECTION INTRAMUSCULAR; INTRAVENOUS; SUBCUTANEOUS at 04:15

## 2022-07-01 RX ADMIN — HYDROMORPHONE HYDROCHLORIDE 0.5 MILLIGRAM(S): 2 INJECTION INTRAMUSCULAR; INTRAVENOUS; SUBCUTANEOUS at 22:56

## 2022-07-01 RX ADMIN — HYDROMORPHONE HYDROCHLORIDE 0.5 MILLIGRAM(S): 2 INJECTION INTRAMUSCULAR; INTRAVENOUS; SUBCUTANEOUS at 17:00

## 2022-07-01 RX ADMIN — LIDOCAINE 1 PATCH: 4 CREAM TOPICAL at 12:34

## 2022-07-01 RX ADMIN — PIPERACILLIN AND TAZOBACTAM 25 GRAM(S): 4; .5 INJECTION, POWDER, LYOPHILIZED, FOR SOLUTION INTRAVENOUS at 23:57

## 2022-07-01 RX ADMIN — ENOXAPARIN SODIUM 40 MILLIGRAM(S): 100 INJECTION SUBCUTANEOUS at 12:32

## 2022-07-01 RX ADMIN — HYDROMORPHONE HYDROCHLORIDE 0.5 MILLIGRAM(S): 2 INJECTION INTRAMUSCULAR; INTRAVENOUS; SUBCUTANEOUS at 08:54

## 2022-07-01 RX ADMIN — HYDROMORPHONE HYDROCHLORIDE 0.5 MILLIGRAM(S): 2 INJECTION INTRAMUSCULAR; INTRAVENOUS; SUBCUTANEOUS at 13:00

## 2022-07-01 RX ADMIN — PIPERACILLIN AND TAZOBACTAM 25 GRAM(S): 4; .5 INJECTION, POWDER, LYOPHILIZED, FOR SOLUTION INTRAVENOUS at 06:20

## 2022-07-01 RX ADMIN — HYDROMORPHONE HYDROCHLORIDE 0.5 MILLIGRAM(S): 2 INJECTION INTRAMUSCULAR; INTRAVENOUS; SUBCUTANEOUS at 06:20

## 2022-07-01 RX ADMIN — SODIUM CHLORIDE 100 MILLILITER(S): 9 INJECTION, SOLUTION INTRAVENOUS at 16:28

## 2022-07-01 RX ADMIN — Medication 1000 MILLIGRAM(S): at 09:15

## 2022-07-01 RX ADMIN — PANTOPRAZOLE SODIUM 40 MILLIGRAM(S): 20 TABLET, DELAYED RELEASE ORAL at 06:19

## 2022-07-01 RX ADMIN — LIDOCAINE 1 PATCH: 4 CREAM TOPICAL at 20:51

## 2022-07-01 RX ADMIN — PIPERACILLIN AND TAZOBACTAM 25 GRAM(S): 4; .5 INJECTION, POWDER, LYOPHILIZED, FOR SOLUTION INTRAVENOUS at 16:29

## 2022-07-01 RX ADMIN — Medication 400 MILLIGRAM(S): at 08:51

## 2022-07-01 RX ADMIN — HYDROMORPHONE HYDROCHLORIDE 0.5 MILLIGRAM(S): 2 INJECTION INTRAMUSCULAR; INTRAVENOUS; SUBCUTANEOUS at 12:32

## 2022-07-01 NOTE — PROVIDER CONTACT NOTE (OTHER) - ACTION/TREATMENT ORDERED:
Provider made aware, ordered to place drainage gauze around leaking site and cont to monitor Pelvic SARAH

## 2022-07-01 NOTE — PROGRESS NOTE ADULT - SUBJECTIVE AND OBJECTIVE BOX
SURGICAL ONCOLOGY PROGRESS NOTE    Minimally responsive    Vital Signs Last 24 Hrs  T(C): 37.1 (01 Jul 2022 12:41), Max: 37.9 (30 Jun 2022 18:00)  T(F): 98.8 (01 Jul 2022 12:41), Max: 100.2 (30 Jun 2022 18:00)  HR: 124 (01 Jul 2022 12:41) (112 - 126)  BP: 117/68 (01 Jul 2022 12:41) (83/54 - 127/80)  BP(mean): 88 (30 Jun 2022 18:59) (88 - 88)  RR: 22 (01 Jul 2022 12:41) (18 - 28)  SpO2: 95% (01 Jul 2022 12:41) (95% - 99%)  I&O's Detail    30 Jun 2022 07:01  -  01 Jul 2022 07:00  --------------------------------------------------------  IN:    IV PiggyBack: 1000 mL    IV PiggyBack: 550 mL    Lactated Ringers: 2000 mL  Total IN: 3550 mL    OUT:    Colostomy (mL): 2.5 mL    Drain (mL): 45 mL    Drain (mL): 95 mL    Drain (mL): 20 mL    Nephrostomy Tube (mL): 540 mL    Nephrostomy Tube (mL): 285 mL    Oral Fluid: 0 mL  Total OUT: 987.5 mL    Total NET: 2562.5 mL      01 Jul 2022 07:01  -  01 Jul 2022 14:28  --------------------------------------------------------  IN:    IV PiggyBack: 100 mL    Lactated Ringers: 400 mL  Total IN: 500 mL    OUT:    Colostomy (mL): 30 mL    Drain (mL): 7 mL    Drain (mL): 30 mL    Drain (mL): 5 mL    Emesis (mL): 0 mL    Nephrostomy Tube (mL): 150 mL    Nephrostomy Tube (mL): 75 mL    Oral Fluid: 0 mL  Total OUT: 297 mL    Total NET: 203 mL          PE:    A&A  NAD    soft, NT, ND, No peritoneal signs    Wound dressing in place.  ostomy appliance in place                          7.9    12.50 )-----------( 158      ( 01 Jul 2022 05:15 )             26.1     07-01    143  |  106  |  39<H>  ----------------------------<  88  4.2   |  23  |  1.43<H>    Ca    8.1<L>      01 Jul 2022 05:15  Phos  3.1     07-01  Mg     1.50     07-01

## 2022-07-01 NOTE — PROGRESS NOTE ADULT - PROBLEM SELECTOR PLAN 2
-h/o recurrent cervical CA, receives Gyn Oncology care at [facility]).   -she initially was diagnosed with stage III cervical CA in 2014 s/p VBRT + chemo but never had surgical intervention. -Reported now being s/p 5 cycles of chemo, unsure of regimen but next due 6/21/22.  -course complicated by bowel perforation s/p washout and bridging vicryl mesh placement to close fascial gap on 6/14 w/ skin closure device place don 6/17  -further management per general surgery, gyn onc and plastic surgery  -pall care following for goals of care discussion darian in setting of declining clinical status  -overall prognosis is guarded

## 2022-07-01 NOTE — CHART NOTE - NSCHARTNOTEFT_GEN_A_CORE
R4 Chart Note     Spoke with Dr. Schwab of Palliative Care over the phone regarding pain regimen for patient. Per her recommendations, will increase Dilaudid 0.5 q6H to q4H standing, with PRN's available.     dw Dr. Angelique Leach MD, PGY4

## 2022-07-01 NOTE — PROGRESS NOTE ADULT - ASSESSMENT
52y  HD#27 with recurrent cervical CA admitted for management of neutropenic fever, now s/p emergent exploratory laparotomy, abdominal washout, rectosigmoid colectomy, diverting colostomy, bronchoscopy and Abthera placement on 22 for bowel perforation. Patient s/p washout and bridging vicryl mesh placement to close fascial gap on . She is s/p wound vac, discontinued  given signs of infection. Septic with persistent tachycardia, tachypnea and hypotension.  Patient with declining mental status no longer following commands or responsive to name. Ongoing GOC discussion with family, currently full code.     Neuro: IV Tylenol, Dilaudid for pain.  Appreciate GHOS, palliative recommendations.  CV: Tachycardic.  H/H stable w/ platelets increasing, f/u AM CBC.   - CT A/P and CT angio () negative for PE   - h/o HTN: Lopressor 50mg BID, currently holding 2/2 low-normal range BPs  Pulm: Tachypneic. Maintaining appropriate SpO2.   GI: NPO given change in mental status.   - Ostomy: area of separation packed w/ quacel, ostomy powder and liquid barrier, hydrocolloid dressing.  - Senna, Protonix BID  : B/l nephrostomy tubes. Prior ASHLEY resolving w/ continuing downtrending Cr. AM BMP pending.   Heme: DVT ppx: Lovenox, SCDs while in bed.    - Thrombocytopenia: likely multifactorial, currently improving. Appreciate Heme Onc recs.  ID: Ostomy noted to leak into subcutaneous tissue. Increasing leukocytosis, fevers.   - Continue Zosyn (- ) for broad antibiotic coverage.   - F/u BCx, UCx ()   - s/p Caspofungin and Cefepime  - S/p Zarixo (-6/10) for neutropenic fever.   - S/p Zosyn (-)  MSK: Currently unable to participate with PT/OT. Previously evaluated by PMR, recommended subacute rehab, however patient unable to participate at this time.     Dispo: Ongoing goals of care conversation with family, ethics meeting 2PM today. Patients daughter advised to present to hospital as soon as possible this morning.    Julissa Leach MD, PGY4   52y  HD#27 with recurrent cervical CA admitted for management of neutropenic fever, now s/p emergent exploratory laparotomy, abdominal washout, rectosigmoid colectomy, diverting colostomy, bronchoscopy and Abthera placement on 22 for bowel perforation. Patient s/p washout and bridging vicryl mesh placement to close fascial gap on . She is s/p wound vac, discontinued  given signs of infection. Septic with persistent tachycardia, tachypnea and hypotension.  Patient with declining mental status no longer following commands or responsive to name. Guarded prognosis at this time. Ongoing GOC discussion with family, currently full code.     Neuro: IV Tylenol, Dilaudid for pain.  Appreciate GHOS, palliative recommendations.  CV: Tachycardic.  H/H stable w/ platelets increasing, f/u AM CBC.   - CT A/P and CT angio () negative for PE   - h/o HTN: Lopressor 50mg BID, currently holding 2/2 low-normal range BPs  Pulm: Tachypneic. Maintaining appropriate SpO2.   GI: NPO given change in mental status.   - Ostomy: area of separation packed w/ quacel, ostomy powder and liquid barrier, hydrocolloid dressing.  - Senna, Protonix BID  : B/l nephrostomy tubes. Prior ASHLEY resolving w/ continuing downtrending Cr. AM BMP pending.   Heme: DVT ppx: Lovenox, SCDs while in bed.    - Thrombocytopenia: likely multifactorial, currently improving. Appreciate Heme Onc recs.  ID: Ostomy noted to leak into subcutaneous tissue. Increasing leukocytosis, fevers.   - Continue Zosyn (- ) for broad antibiotic coverage.   - F/u BCx, UCx ()   - s/p Caspofungin and Cefepime  - S/p Zarixo (-6/10) for neutropenic fever.   - S/p Zosyn (-)  MSK: Currently unable to participate with PT/OT. Previously evaluated by PMR, recommended subacute rehab, however patient unable to participate at this time.     Dispo: Ongoing goals of care conversation with family, ethics meeting 2PM today. Patients daughter advised to present to hospital as soon as possible this morning.    Prognosis: Guarded.     Julissa Leach MD, PGY4

## 2022-07-01 NOTE — CHART NOTE - NSCHARTNOTEFT_GEN_A_CORE
Spoke to Lydia , HCP on the phone. Lydia has verbally agreed to DNR/DNI  she states that she doesn't want her mother to suffer, and understands that her cancer is terminal  we discussed further comfort measures. She will continue pain treatment, palliative care consult appreciated  all questions answered support provided.

## 2022-07-01 NOTE — PROGRESS NOTE ADULT - ASSESSMENT
53y/o F with recurrent cervical CA admitted for management of neutropenic fever, now s/p emergent exploratory laparotomy, abdominal washout, rectosigmoid colectomy, diverting colostomy, bronchoscopy and Abthera placement on 6/8/22 for findings of increased free air on abdominal xray concerning for bowel perforation. Patient s/p washout and bridging vicryl mesh placement to close fascial gap on 6/14 w/ skin closure device place don 6/17, now with sepsis concerning for peritonitis - clinically declining

## 2022-07-01 NOTE — PROGRESS NOTE ADULT - ATTENDING COMMENTS
called daughter multiple times last night and this AM. Daughter now called back and is willing to come in for meeting this AM with Dr. Louis and Ethics available  will continue family discussion of goals of care

## 2022-07-01 NOTE — CONSULT NOTE ADULT - ASSESSMENT
Bioethics analysis:												 The central ethical issue is the conflict between provider beneficence and non-maleficence and vs autonomy for patient’s medical decisions.  Beneficence is related to the health care provider’s responsibility to prevent evil or harm, remove evil or harm, and to do or promote good. The intention of beneficence is to act in the patient’s best interest, in consideration of psychosocial, cultural, spiritual factors, as well as patient’s age, goals, beliefs, and prognosis. Beneficence is additionally closely related to the concept of non-maleficence, which focuses on the health care provider’s responsibility to not inflict evil or harm . Beneficence requires taking action by helping- preventing harm, removing harm, and promoting good- whereas non-maleficence requires only intentional avoidance of actions that cause harm.1 These pillars must be framed within the goals of the patient or decision-maker, as well as the duty of the provider assisting in care. Patients have the autonomous right to advocate for their needs/wishes in their care and in end-of-life decision-making. However, if a patient lacks the capacity to make these decisions and there are no prior written/verbal advance directives. First the issue of Health Care Proxy that was attempted on Aminata 10 while the patient was ventilated and sedated in the surgical ICU.  Under [facility] Health Care Proxy Law, a competent adult may appoint a health care agent by a health care proxy, signed and dated by the adult in the presence of two adult witnesses who shall also sign the proxy. Another person may sign and date the health care proxy for the adult if the adult is unable to do so, at the adult's direction and in the adult's presence, and in the presence of two adult witnesses who shall sign the proxy. The witnesses shall state that the principal appeared to execute the proxy willingly and free from duress. The person appointed as agent shall not act as witness to execution of the health care proxy. The agent's authority shall commence upon a determination that the principal lacks capacity to make health care decisions. Individuals should choose a person they trust to protect their wishes and interests. Each person can appoint only one agent, but individuals may name an alternate agent. 2  In this case, the patient was without capacity to express her explicit wishes for an appointed agent to utilize substituted judgement, due to being ventilated and sedated.    As such, decision making reverts to the [facility] Family Health Care Decision Act (CDA).   The [facility] establishes a hierarchy of surrogates  in order of priority decision maker for the incapable patient:   – an [facility] Article 81 court-appointed guardian (in this case, there is not one);  – the spouse or domestic partner as defined in the [facility] ([facility] § 2994-a);  – adult children;  – a parent(s);  – a brother and/or sister;  – a close friend/all other family members (nieces, nephews, etc.)  One person from the list must be reasonably available, willing, and competent to act. Such person shall be the surrogate provided no other person in a class higher in priority than the person designated is available. Also, that person may designate any other person on the list to be surrogate, provided no one in a class higher in priority than the person designated objects.3 In this case, the patient’s significant other- domestic partner of 34 years, Jeff Oleary becomes the surrogate decision maker and he is willing and will allow shared decision making with his and the patient’s daughter Lydia.  This being the case, after lengthy discussions, Jeff and Lydia are more interested in comfort and palliative care but are not ready to render DNR/DNI until they are able to orosco more information regarding Palliative Medicine.   In cases where a patient may be nearing end of life, as in Ms. Rudd, it is important to note, however, that physicians are not obligated to provide medical treatments that would induce more harm than benefit. Rather, in accordance with the ethical principle of beneficence, physicians have a duty to critically assess the risks and benefits of any medical treatment (including cardiopulmonary resuscitation and mechanical ventilation). If the clinical determination is that a therapy is of greater harm to the patient than benefit, administration of that therapy can be withheld even if requested.4  Again, this circumstance would require clear justification prior to withholding treatments or care.5  The guiding principle of no escalation is established by the surrogate (or patient), in terminal cases where physicians know that forgoing treatments are necessary because they are impossible to perform. Families are protected from making detailed medical decisions about treatments that offer no medical benefit. Compared to the decision to withdraw or withhold a specific treatment, a decision not to escalate a future treatment that might (or might not) be needed is somewhat vague. The balance of medical action (continued treatment/palliative care) and non-action (no new treatments) softens the impact of recognizing or causing the ultimate outcome, tempering the sense of personal responsibility for decisions. Still, surrogates should also be given flexibility to accommodate personal, [employer], or moral beliefs that make certain options to withhold treatment unacceptable.  It is morally and ethically reasonable to continue to offer the patient care in order to meet the clinical goal of sustaining comfort without inducing harm at the crossroads of terminal illness. Additionally, physicians are not obligated to provide medical treatments that would induce more harm than benefit or that will reach the level of "physiologic futility" whereby the desired physiologic effect of a medical treatment cannot be achieved Rather, in accordance with the ethical principle of beneficence, physicians have a duty to critically assess the risks and benefits of any medical treatment. If the clinical determination is that a therapy is of greater harm to the patient than benefit, administration of that therapy can be withheld even if requested by the patient or family.6 One must also acknowledge the anticipatory grief and overall caregiver burden as demonstrated by patient’s beloved partner as well as the patient’s daughter and extended family who love and support her and it is crucial to validate the grief the family is experiencing. Recommendation:  Thank you for this consultation. Ethics commends the team for their ongoing support of the patient and the patient’s family during the troubling time. The ethical analysis confirms that certain life-sustaining treatments (LSTs) that are considered non-beneficial by the healthcare providers in the context of this patient’s illness should not occur. The benefits of each life sustaining measure must be weighed against the possible harms of each procedure or intervention when it no longer improves a patient’s prognosis but introduces risk of suffering and prolongation of the dying process.  The clinical goals of care are to maintain current treatment, provide the patient and family with sufficient information to make an informed decision honors the patient’s moral status, and ensure palliation of symptoms at the end-of-life. Palliative Medicine made aware of family’s desire for more information regarding Palliative approach for their loved one before they consider DNR/DNI Thank you for this challenging consult, Ethics to remain available for any and all decision points as they occur. Gloria Robison D.Min., RN-BSN, [facility] Ethics, [phone] Case discussed with Courtnye Levy DNP, Director of Medical Ethics More than 50% of the time of this consultation was spent in coordination of Care of Patient

## 2022-07-01 NOTE — CONSULT NOTE ADULT - ASSESSMENT
Lydia and Jeff Oleary are the dutiful surrogates of this patient. Lydia Oleary is the HCP and as such has the authority to sign a DNR and DNI.     Thank you for this challenging consult.     Courtney Hernández  Director of Ethics   [phone]     Lydia and Jeff Oleary are the dutiful surrogates of this patient. Lydia Oleary is the HCP and as such has the authority to sign a DNR and DNI.     Thank you for this challenging consult.     Courtney Hernández DNP  Director of Ethics   [phone]     Lydia and Jeff Oleary are the dutiful surrogates of this patient. Lydia Oleary is the HCP intended and as such has the authority to sign a DNR and DNI.   Jeff Oleary is the hierarchal surrogate as domestic partner under the Family Health Care Decision Act and has the authority to sign a DNR/DNI  As they are both is accord a Medical Order of Life Sustaining Treatment (MOLST) executed by both is appropriate     Thank you for this challenging consult.     Courtney Hernández DNP  Director of Ethics   [phone]

## 2022-07-01 NOTE — CONSULT NOTE ADULT - SUBJECTIVE AND OBJECTIVE BOX
Clinical summary:    52 year old female with perforated viscous and multiple surgical interventions. Past medical history includes cervical cancer (first in 2014 with treatment and remission) recurrence in December 2021 and active chemotherapy until this admission.  Patient with stormy and tortuous admission, including surgical interventions, and need for intensive care. Patient currently on medicine floor. Patient assessed about 10 days ago for PMR/rehab, but has not improved medically and has instead, declined.   Patient currently with bilateral nephrostomy tubes to urine output bag, and a stoma with colostomy on right abdominal wall. Packing is in place with abdominal pad.  6/28 CT of chest and abdomen reveals pulmonary nodules in right upper lobe 1.3x0.9 (previously 0.8x0.4 on 6/5)  and left upper lobe 0.8 x0.9 , (previously 0.7 x0.8.)     Surgery chart notes of 7/1/2022 reveal "Packing in place w/ overlying abdominal pad with purulent drainage at umbilicus, ostomy w/ mucocutaneous separation from 6-9 o'clock extending 1cmc under skin w/ feculent material and fibrinous material, small drain lateral. Brown liquid/stool output in ostomy bag; +3 Ric drains with increased seropurulent output in RLW, LUQ, LLQ. LLQ drain not holding suction well. LUQ drain with oozing at site"    Medicine, surgical and gynecology/oncology teams have been having discussions with the family and family is struggling with patient’s sudden downturn and now extremely poor prognosis and have not been able to process this news and are unable to discuss end of life decisions. Ethics requested to assist.     Prognosis Estimate (survival in days, wks, mos, yrs):	 extremely poor-days							  Patient Decision-Making Capacity:  Has capacity    Lacks capacity				   Patient Aware of:  Diagnosis:   Yes    No   Unknown    Prognosis:   Yes    No   Unknown         Name of medical decision-maker should patient lack capacity:   Relationship: Jeff Oleary Significant other for 34 years			     Role:   Health Care Agent      Legal Surrogate   Contact #(s): 		     Other ‘stakeholders’:  Lydia Oleary- only daughter [phone]											    Other Decision-Maker (i.e., HCA or Surrogate) Aware of:  Diagnosis: Yes   No      Prognosis:   Yes     No   Evidence of Patient’s Preference of Life-Sustaining Treatment (Written or Oral):	NONE		               	   Resuscitation status:   DNR:  Yes   No      DNI:  Yes   No        DISCUSSIONS- [id number]- met patient at bedside, patient moaning and when asked her what was the matter, she grunted and looked down. Asked if she was in pain, she nodded and tried to speak, but closed her eyes and moaned again. Patient with bilatieral nephrostomy tubes to hilliard, as well as two SARAH drains on left side of abdomen, and colostomy bag over a stoma which has several stitches keeping the stoma in place. Asked nursing to address the patient’s pain.     0900-0915 met with Dr. Hester and discussed current status as well as afterhours conversation he had with daughter regarding Advance Directives, both daughter and common law  will be on site at 10am.     10:00-10:30- met with Jeff Oleary and Lydia Oleary at bedside. Introduced role and gave contact information. This ethicist asked about the patient’s substituted judgment (her desires her wishes) and Lydia stated they never had the conversation because the patient was intubated and sedated in the ICU. Explained that because she was unable to agree, that Jeff surrogate decision maker and he immediately stated that he desires that Lydia and he work together to which Lydia agreed.  They gave a narrative of the patient prior to this event. Patient was extremely active and they showed recent videos of her dancing and having a good time with family.  They also acknowledge that if she is suffering now, that is not what she would want, nor they.  They expressed many facets of grief that this is where she’s at right now. This recurrence of cancer (first treated and in then in remission in 2014) is a surprise to all of them as to how much it advanced, and the complications that followed. Answered their questions regarding the aspect of DNR DNI and they verbalized understanding that it only means no cardiopulmonary resuscitation or ventilation, the outcome would remain the same and that natural death would occur regardless. We discussed quality and comfort care and they stated they would consider DNR/DNI once they hear from Palliative Medicine and their plan of care.     1030-10-50 Dr. YANI Louis joined the conversation and followed up with their questions regarding surgical next steps, and he outlined everything that has been done to this point and there aren’t any other options. Explained that the current "fix" to the stoma was only for it to not leak, but it was only temporary.  They asked about the newly discovered cancer (from the pathology report during surgery) and he indicated that the samples taken indicated metastatic disease. This news shook them a bit, and stated that if it’s that far gone then they would want comfort and quality and reiterated they would like to meet with Palliative Medicine.

## 2022-07-01 NOTE — PROGRESS NOTE ADULT - SUBJECTIVE AND OBJECTIVE BOX
Division of Hospital Medicine  Dr. Nubia Fonseca  Pager [phone]    Patient is a 52y old  Female who presents with a chief complaint of neutropenic fever (25 Jun 2022 09:48)    SUBJECTIVE / OVERNIGHT EVENTS: patient seen and examined. Overnight events noted. Afebrile overnight. Appears more lethargic but awake and able to tell her name. Overall clinical status is declining.     MEDICATIONS  (STANDING):  enoxaparin Injectable 40 milliGRAM(s) SubCutaneous every 24 hours  metoprolol succinate  milliGRAM(s) Oral daily  multivitamin 1 Tablet(s) Oral daily  pantoprazole    Tablet 40 milliGRAM(s) Oral every 12 hours  senna 2 Tablet(s) Oral at bedtime    MEDICATIONS  (PRN):  acetaminophen     Tablet .. 650 milliGRAM(s) Oral every 6 hours PRN Mild Pain (1 - 3)  oxyCODONE    IR 5 milliGRAM(s) Oral every 4 hours PRN Severe Pain (7 - 10)    Vital Signs Last 24 Hrs  T(C): 37.1 (01 Jul 2022 09:07), Max: 38.3 (30 Jun 2022 12:06)  T(F): 98.8 (01 Jul 2022 09:07), Max: 101 (30 Jun 2022 12:06)  HR: 126 (01 Jul 2022 09:07) (112 - 126)  BP: 119/77 (01 Jul 2022 09:07) (83/54 - 127/80)  BP(mean): 88 (30 Jun 2022 18:59) (64 - 88)  RR: 26 (01 Jul 2022 09:07) (18 - 28)  SpO2: 99% (01 Jul 2022 09:07) (96% - 99%)    I&O's Summary    30 Jun 2022 07:01  -  01 Jul 2022 07:00  --------------------------------------------------------  IN: 3550 mL / OUT: 987.5 mL / NET: 2562.5 mL    01 Jul 2022 07:01  -  01 Jul 2022 11:16  --------------------------------------------------------  IN: 500 mL / OUT: 297 mL / NET: 203 mL        PHYSICAL EXAM:  GENERAL: appears uncomfortable, lethargic, ill appearing   HEAD:  Atraumatic, Normocephalic  EYES: EOMI, PERRLA, conjunctiva and sclera clear  NECK: Supple, No JVD  CHEST/LUNG: Clear to auscultation anteriorly no wheeze   HEART: +tachy  ABDOMEN: ostomy+, large midline wound - dressing covering wound  EXTREMITIES:  2+ Peripheral Pulses, No clubbing, cyanosis, or edema  PSYCH: AAOx1  NEUROLOGY: non-focal  SKIN: No rashes or lesions    LABS:                                   7.9    12.50 )-----------( 158      ( 01 Jul 2022 05:15 )             26.1   07-01    143  |  106  |  39<H>  ----------------------------<  88  4.2   |  23  |  1.43<H>    Ca    8.1<L>      01 Jul 2022 05:15  Phos  3.1     07-01  Mg     1.50     07-01      Manual Differential (06.29.22 @ 05:28)    Poikilocytosis: Slight    Polychromasia: Slight    Ovalocytes: Slight    Microcytosis: Slight    Macrocytosis: Moderate    Red Cell Morphology: Normal    Platelet Morphology: Normal    Reactive Lymphocytes %: 2.7 %    Giant Platelets: Present    Manual Smear Verification: Performed WBC: Toxic Granulation Present.    Platelet Count - Estimate: Normal    Band Neutrophils %: 14.4 %    Nucleated RBC: 5 /100         RADIOLOGY & ADDITIONAL TESTS: < from: CT Head No Cont (06.08.22 @ 14:35) >  Impression:  Unremarkable noncontrast head CT.    < end of copied text >      Imaging Personally Reviewed:    Consultant(s) Notes Reviewed:  surgery, sicu, pall care    Care Discussed with Consultants/Other Providers: daniela UPTON    Assessment and Plan:

## 2022-07-01 NOTE — CONSULT NOTE ADULT - SUBJECTIVE AND OBJECTIVE BOX
HPI:    Gyn Oncology Admission Note    52y  with recurrent cervical CA (per patient stage III) presenting to the ED as transfer from [place] a/w neutropenic fever. Patient reports she had last cycle of chemotherapy 5 days prior.  Patient had VNS care stop by yesterday and was noted to be tachycardic on vital signs and was sent to the ED.  She reports feeling weak + fatigued. + upper abdominal pain that worsens while having chills. She denies any vomiting, chest pain, SOB. Last BM yesterday morning which was normal.     At [facility] she was given Meropenem and Zosyn and was noted to be tachycardic to 140s and febrile to 39.5 and was thus transferred for further management.     MICU was consulted on patient with recommendation to continue with broad spectrum antibiotics. Patient receives Gyn Oncology care at [facility]). She reports that she initially was diagnosed with stage III cervical CA in  s/p VBRT + chemo but never had surgical intervention. She reports now being s/p 5 cycles of chemo, unsure of regimen but next due .    OB/GYN HISTORY:  x 1, sAB x 2. Denies any ovarian cysts, fibroids, STIs   PSHx: b/l PCN  and exchange  , hernia repair  PMHx:  HTN   All: No Known Allergies  Meds: Metoprolol 100mg qd, unsure of other medications  Psych: denies any anxiety or depression   Social: Denies any tobacco, alcohol, or illicit substance use              (2022 03:21)    Course of hospitalization has been completed by prolonged surgical course. Ethics contacted to assist in family understanding of patient's terminal prognosis and that current surgical interventions will not alter the terminal nature of the cancer. Patient presently hypotensive and imminently dying. Of note patient with a partner of 30+ years, unmarried. On admission patient seen with daughter and Dr. Merino with explanation of the palliative nature of surgical intervention. As per ER note, patient with pain 8/10 and elected daughter as health care proxy. Palliative on consult and initial Ethicist MARGOT Robison RN inadvertently voided health care proxy form. Daughter and partner share in tragic circumstances of mother's current nature. This Ethicist contacted as daughter and patient partner in agreement with DNR and DNI.       Prognosis Estimate (survival in days, wks, mos, yrs):	 extremely poor-days							  Patient Decision-Making Capacity:  Has capacity    Lacks capacity				   Patient Aware of:  Diagnosis:   Yes    No   Unknown    Prognosis:   Yes    No   Unknown         Name of medical decision-maker should patient lack capacity:   Relationship: Lydia Oleary- only daughter [phone]     Role:   Health Care Agent      Legal Surrogate   Contact #(s): 		     Other ‘stakeholders’:  Jeff Oleary Significant other for 34 years									    Other Decision-Maker (i.e., HCA or Surrogate) Aware of:  Diagnosis: Yes   No      Prognosis:   Yes     No   Evidence of Patient’s Preference of Life-Sustaining Treatment (Written or Oral):	On admission patient elected daughter as HCP, which has 2 witnesses, one including Dr. Merino		               	   Resuscitation status:   DNR:  Yes   No      DNI:  Yes   No    TO BE DETERMINED BY THIS CONSULT    DISCUSSION:   Contacted Jeff Oleary via phone. Lydia Oleary is his daugther. He and daughter make decisions and concur with DNR and DNI. Verbalized earlier today that over the past 24 hours met with Surgery, Ethics and Palliative Care and understand the terminal trajectory of Sade.     BIOETHICAL ANALYSIS  In the interest of autonomy, this patient's moral agency is protected by her daughter and her partner. On admission, she elected her daughter to be her HCP and understood that surgical intervention would be palliative. It is morally and ethically challenging to witness this young woman's tragic demise from cervical cancer. Crucial to a pallative approach is clear communication and not burdening the caregivers.     In this specific case it is important to recognize that beneficence extends to easing the caregiver burden and stress of the patient’s family.  In this case all involved parties agree a DNR and DNI are indicated and will prevent the patient from extraordinary burden and suffering and meets critieria of health care determination act          HPI:    Gyn Oncology Admission Note    52y  with recurrent cervical CA (per patient stage III) presenting to the ED as transfer from [place] a/w neutropenic fever. Patient reports she had last cycle of chemotherapy 5 days prior.  Patient had VNS care stop by yesterday and was noted to be tachycardic on vital signs and was sent to the ED.  She reports feeling weak + fatigued. + upper abdominal pain that worsens while having chills. She denies any vomiting, chest pain, SOB. Last BM yesterday morning which was normal.     At [facility] she was given Meropenem and Zosyn and was noted to be tachycardic to 140s and febrile to 39.5 and was thus transferred for further management.     MICU was consulted on patient with recommendation to continue with broad spectrum antibiotics. Patient receives Gyn Oncology care at [facility]). She reports that she initially was diagnosed with stage III cervical CA in  s/p VBRT + chemo but never had surgical intervention. She reports now being s/p 5 cycles of chemo, unsure of regimen but next due .    OB/GYN HISTORY:  x 1, sAB x 2. Denies any ovarian cysts, fibroids, STIs   PSHx: b/l PCN  and exchange  , hernia repair  PMHx:  HTN   All: No Known Allergies  Meds: Metoprolol 100mg qd, unsure of other medications  Psych: denies any anxiety or depression   Social: Denies any tobacco, alcohol, or illicit substance use              (2022 03:21)    Course of hospitalization has been complicated by prolonged surgical course. Ethics contacted to assist in family understanding of patient's terminal prognosis and that current surgical interventions will not alter the terminal nature of the cancer. Patient presently hypotensive and imminently dying. Of note patient with a partner of 30+ years, unmarried. On admission patient seen with daughter and Dr. Merino with explanation of the palliative nature of surgical intervention. As per ER note, patient with pain 8/10 and elected daughter as health care proxy. Palliative on consult and initial Ethicist MARGOT Robison RN inadvertently voided health care proxy form. Daughter and partner share in tragic circumstances of mother's current nature. This Ethicist contacted as daughter and patient partner in agreement with DNR and DNI.       Prognosis Estimate (survival in days, wks, mos, yrs):	 extremely poor-days							  Patient Decision-Making Capacity:  Has capacity    Lacks capacity				   Patient Aware of:  Diagnosis:   Yes    No   Unknown    Prognosis:   Yes    No   Unknown         Name of medical decision-maker should patient lack capacity:   Relationship: Lydia Oleary- only daughter [phone]     Role:   Health Care Agent      Legal Surrogate   Contact #(s): 		     Other ‘stakeholders’:  Jeff Oleary Significant other for 34 years									    Other Decision-Maker (i.e., HCA or Surrogate) Aware of:  Diagnosis: Yes   No      Prognosis:   Yes     No   Evidence of Patient’s Preference of Life-Sustaining Treatment (Written or Oral):	On admission patient elected daughter as HCP, which has 2 witnesses, one including Dr. Merino		               	   Resuscitation status:   DNR:  Yes   No      DNI:  Yes   No    TO BE DETERMINED BY THIS CONSULT    DISCUSSION:   Contacted Jeff Oleary via phone. Lydia Oleary is his daugther. He and daughter make decisions and concur with DNR and DNI. Verbalized earlier today that over the past 24 hours met with Surgery, Ethics and Palliative Care and understand the terminal trajectory of Sade.     BIOETHICAL ANALYSIS  In the interest of autonomy, this patient's moral agency is protected by her daughter and her partner. On admission, she elected her daughter to be her HCP and understood that surgical intervention would be palliative. It is morally and ethically challenging to witness this young woman's tragic demise from cervical cancer. Crucial to a pallative approach is clear communication and not burdening the caregivers.     In this specific case it is important to recognize that beneficence extends to easing the caregiver burden and stress of the patient’s family.  In this case all involved parties agree a DNR and DNI are indicated and will prevent the patient from extraordinary burden and suffering and meets critieria of health care determination act

## 2022-07-01 NOTE — PROGRESS NOTE ADULT - ASSESSMENT
A/p ex-lap with multiple washouts with closure of abdominal wall with vicryl    Lengthy conversation had with pr daughter, Lydia, and pt jess -- explained hospital course thus far. operative findings, and final pathology.  Given extent of dz and clinical worsening, likelihood of a meaningful recovery is slim.  Pt's daughter and fiance indicated they will speak with family, but are considering a DNR/DNI,     Agree w plan for Pall care consultation

## 2022-07-01 NOTE — PROGRESS NOTE ADULT - PROBLEM SELECTOR PLAN 1
-patient noted to have fever, tachycardia, leukocytosis 6/30  -Hemodynamically stable presently but high risk for decompensation   -recent CTPA without PE or consolidation. Given no respiratory symptoms or hypoxia - unlikely pulmonary etiology  -given abdominal wound noted to have stool - concern for fistulization vs peritonitis. Would defer wound management to surgery. Repeat CT abd if in line with patient's GOC.   -c/w zosyn and vanco by level. Would given vanc 750gx1 today if in line with GOC. Given ASHLEY, no standing vanc.    -follow cultures and lactate and procal level  -monitor clinically

## 2022-07-01 NOTE — GOALS OF CARE CONVERSATION - ADVANCED CARE PLANNING - CONVERSATION DETAILS
Palliative provider spoke with patient's daughter, Lydia and her father, Jeff Oleary. They understand the risks and benefits of resuscitative measures. They are in agreement with DNR/DNI and completion of MOLST form. They do want to continue with symptom management, abx and current medical plan of care.     MOLST form to be completed for DNR/DNI

## 2022-07-01 NOTE — CHART NOTE - NSCHARTNOTEFT_GEN_A_CORE
Patient seen at bedside. Patient is responsive to name, however she does not follow commands. Patient appears to be in pain, however patient most recently received Dilaudid 0.5.    She is tachycardic, though respiratory rate has improved to 18. Her last BP was 115/89. Patient has received a total of 2650cc.     Vital Signs Last 24 Hrs  T(C): 37.2 (01 Jul 2022 00:15), Max: 39.4 (30 Jun 2022 09:48)  T(F): 98.9 (01 Jul 2022 00:15), Max: 102.9 (30 Jun 2022 09:48)  HR: 117 (01 Jul 2022 00:15) (112 - 131)  BP: 115/69 (01 Jul 2022 00:15) (83/54 - 127/80)  BP(mean): 88 (30 Jun 2022 18:59) (64 - 88)  RR: 18 (01 Jul 2022 00:15) (17 - 28)  SpO2: 98% (01 Jul 2022 00:15) (92% - 99%)      29 Jun 2022 07:01  -  30 Jun 2022 07:00  --------------------------------------------------------  IN:    IV PiggyBack: 100 mL    Oral Fluid: 720 mL  Total IN: 820 mL    OUT:    Colostomy (mL): 100 mL    Drain (mL): 80 mL    Drain (mL): 152.5 mL    Drain (mL): 98 mL    Emesis (mL): 0 mL    Nephrostomy Tube (mL): 360 mL    Nephrostomy Tube (mL): 480 mL  Total OUT: 1270.5 mL    Total NET: -450.5 mL      30 Jun 2022 07:01  -  01 Jul 2022 02:02  --------------------------------------------------------  IN:    IV PiggyBack: 450 mL    IV PiggyBack: 1000 mL    Lactated Ringers: 1200 mL  Total IN: 2650 mL    OUT:    Colostomy (mL): 0 mL    Drain (mL): 15 mL    Drain (mL): 37.5 mL    Drain (mL): 65 mL    Nephrostomy Tube (mL): 290 mL    Nephrostomy Tube (mL): 185 mL    Oral Fluid: 0 mL  Total OUT: 592.5 mL    Total NET: 2057.5 mL    Patient remains FULL CODE at this time. Continue treatment of pain as needed.       Jessika Pathak, PGY2

## 2022-07-01 NOTE — PROGRESS NOTE ADULT - PROBLEM SELECTOR PLAN 1
GYN ONC Fellow Addendum:    Pt seen and examined at bedside. Agree with above. Pt opens her eyes to her name. When asked if in pain, she moans, but is unable to verbalize.     VS reviewed  Labs reviewed    - Sepsis: continue IV Zosyn and Vanc  - Pain control  - ASHLEY: due to sepsis, continue aggressive IVF hydration  - Ethics consult   - Replete whit prn  - DVT ppx: lovenox  - Dispo: teresa Mckeon, PGY6

## 2022-07-01 NOTE — PROGRESS NOTE ADULT - SUBJECTIVE AND OBJECTIVE BOX
Gyn ONC Progress Note HD#__ POD#__     Patient seen and examined at bedside. No events overnight. Pain well controlled. Patient ambulating. Passing flatus. Tolerating regular diet. Pt denies fever, chills, chest pain, SOB, nausea, vomiting, lightheadedness, dizziness.      Objective:  T(F): 99.1 (07-01-22 @ 06:15), Max: 102.9 (06-30-22 @ 09:48)  HR: 116 (07-01-22 @ 06:34) (112 - 131)  BP: 120/64 (07-01-22 @ 06:34) (83/54 - 127/80)  RR: 18 (07-01-22 @ 06:34) (17 - 28)  SpO2: 98% (07-01-22 @ 06:34) (96% - 99%)  Wt(kg): --  I&O's Summary    30 Jun 2022 07:01  -  01 Jul 2022 07:00  --------------------------------------------------------  IN: 3550 mL / OUT: 987.5 mL / NET: 2562.5 mL      CAPILLARY BLOOD GLUCOSE          MEDICATIONS  (STANDING):  acetaminophen   IVPB .. 1000 milliGRAM(s) IV Intermittent once  enoxaparin Injectable 40 milliGRAM(s) SubCutaneous every 24 hours  HYDROmorphone  Injectable 0.5 milliGRAM(s) IV Push every 6 hours  lactated ringers. 1000 milliLiter(s) (100 mL/Hr) IV Continuous <Continuous>  lidocaine 1% (Preservative-free) Injectable 50 milliLiter(s) Local Injection once  lidocaine 1%/epinephrine 1:100,000 Inj 20 milliLiter(s) Local Injection once  pantoprazole  Injectable 40 milliGRAM(s) IV Push two times a day  piperacillin/tazobactam IVPB.. 3.375 Gram(s) IV Intermittent every 8 hours    MEDICATIONS  (PRN):  cyclobenzaprine 5 milliGRAM(s) Oral three times a day PRN Muscle Spasm  HYDROmorphone  Injectable 1 milliGRAM(s) IV Push once PRN ostomy  HYDROmorphone  Injectable 0.5 milliGRAM(s) IV Push every 3 hours PRN Moderate to Severe Pain (7 - 10)  lidocaine   4% Patch 1 Patch Transdermal daily PRN back pain      Physical Exam:  Constitutional: Pt awake, looking around room. Uncomfortable appearing. Does not follow commands or respond to her name.   CV: Tachycardic, regular S1/S2  Lungs: CTA b/l   Abd: Soft, non-distended. Diffusely tender. Packing in place w/ overlying abdominal pad with purulent drainage at umbilicus, ostomy w/ mucocutaneous separation from 6-9 o'clock extending 1cmc under skin w/ feculent material and fibrinous material, small drain lateral. Brown liquid/stool output in ostomy bag; +3 Ric drains with increased seropurulent output in RLW, LUQ, LLQ. LLQ drain not holding suction well. LUQ drain with oozing at site.   : R&L nephrostomy tubes draining clear urine.  Extremities: No pain, 2+ pitting edema.      LABS:  07-01    143    |  106    |  39<H>  ----------------------------<  88     4.2     |  23     |  1.43<H>  06-30    141    |  106    |  31<H>  ----------------------------<  93     5.0     |  21<L>  |  1.14     Ca    8.1<L>      01 Jul 2022 05:15  Ca    8.5        30 Jun 2022 05:25  Phos  3.1       07-01  Phos  2.2       06-30  Mg     1.50      07-01  Mg     1.70      06-30 Gyn ONC Progress Note     Patient seen and examined at bedside. Uncomfortable appearing. Unable to respond to name, follow commands.     Objective:  T(F): 99.1 (07-01-22 @ 06:15), Max: 102.9 (06-30-22 @ 09:48)  HR: 116 (07-01-22 @ 06:34) (112 - 131)  BP: 120/64 (07-01-22 @ 06:34) (83/54 - 127/80)  RR: 18 (07-01-22 @ 06:34) (17 - 28)  SpO2: 98% (07-01-22 @ 06:34) (96% - 99%)      I&O's Summary  30 Jun 2022 07:01  -  01 Jul 2022 07:00  --------------------------------------------------------  IN: 3550 mL / OUT: 987.5 mL / NET: 2562.5 mL      MEDICATIONS  (STANDING):  acetaminophen   IVPB .. 1000 milliGRAM(s) IV Intermittent once  enoxaparin Injectable 40 milliGRAM(s) SubCutaneous every 24 hours  HYDROmorphone  Injectable 0.5 milliGRAM(s) IV Push every 6 hours  lactated ringers. 1000 milliLiter(s) (100 mL/Hr) IV Continuous <Continuous>  lidocaine 1% (Preservative-free) Injectable 50 milliLiter(s) Local Injection once  lidocaine 1%/epinephrine 1:100,000 Inj 20 milliLiter(s) Local Injection once  pantoprazole  Injectable 40 milliGRAM(s) IV Push two times a day  piperacillin/tazobactam IVPB.. 3.375 Gram(s) IV Intermittent every 8 hours    MEDICATIONS  (PRN):  cyclobenzaprine 5 milliGRAM(s) Oral three times a day PRN Muscle Spasm  HYDROmorphone  Injectable 1 milliGRAM(s) IV Push once PRN ostomy  HYDROmorphone  Injectable 0.5 milliGRAM(s) IV Push every 3 hours PRN Moderate to Severe Pain (7 - 10)  lidocaine   4% Patch 1 Patch Transdermal daily PRN back pain      Physical Exam:  Constitutional: Pt awake, looking around room. Uncomfortable appearing. Does not follow commands or respond to her name.   CV: Tachycardic, regular S1/S2  Lungs: CTA b/l   Abd: Soft, non-distended. Diffusely tender. Packing in place w/ overlying abdominal pad with purulent drainage at umbilicus, ostomy w/ mucocutaneous separation from 6-9 o'clock extending 1cmc under skin w/ feculent material and fibrinous material, small drain lateral. Brown liquid/stool output in ostomy bag; +3 Ric drains with increased seropurulent output in RLW, LUQ, LLQ. LLQ drain not holding suction well. LUQ drain with oozing at site.   : R&L nephrostomy tubes draining clear urine.  Extremities: No pain, 2+ pitting edema.      LABS:  07-01    143    |  106    |  39<H>  ----------------------------<  88     4.2     |  23     |  1.43<H>  06-30    141    |  106    |  31<H>  ----------------------------<  93     5.0     |  21<L>  |  1.14     Ca    8.1<L>      01 Jul 2022 05:15  Ca    8.5        30 Jun 2022 05:25  Phos  3.1       07-01  Phos  2.2       06-30  Mg     1.50      07-01  Mg     1.70      06-30

## 2022-07-02 PROCEDURE — 99233 SBSQ HOSP IP/OBS HIGH 50: CPT

## 2022-07-02 RX ORDER — HYDROMORPHONE HYDROCHLORIDE 2 MG/ML
1 INJECTION INTRAMUSCULAR; INTRAVENOUS; SUBCUTANEOUS
Refills: 0 | Status: DISCONTINUED | OUTPATIENT
Start: 2022-07-02 | End: 2022-07-07

## 2022-07-02 RX ORDER — HYDROMORPHONE HYDROCHLORIDE 2 MG/ML
0.5 INJECTION INTRAMUSCULAR; INTRAVENOUS; SUBCUTANEOUS
Refills: 0 | Status: DISCONTINUED | OUTPATIENT
Start: 2022-07-02 | End: 2022-07-07

## 2022-07-02 RX ADMIN — HYDROMORPHONE HYDROCHLORIDE 0.5 MILLIGRAM(S): 2 INJECTION INTRAMUSCULAR; INTRAVENOUS; SUBCUTANEOUS at 06:38

## 2022-07-02 RX ADMIN — HYDROMORPHONE HYDROCHLORIDE 0.5 MILLIGRAM(S): 2 INJECTION INTRAMUSCULAR; INTRAVENOUS; SUBCUTANEOUS at 00:00

## 2022-07-02 RX ADMIN — HYDROMORPHONE HYDROCHLORIDE 0.5 MILLIGRAM(S): 2 INJECTION INTRAMUSCULAR; INTRAVENOUS; SUBCUTANEOUS at 11:17

## 2022-07-02 RX ADMIN — HYDROMORPHONE HYDROCHLORIDE 0.5 MILLIGRAM(S): 2 INJECTION INTRAMUSCULAR; INTRAVENOUS; SUBCUTANEOUS at 15:30

## 2022-07-02 RX ADMIN — PANTOPRAZOLE SODIUM 40 MILLIGRAM(S): 20 TABLET, DELAYED RELEASE ORAL at 19:47

## 2022-07-02 RX ADMIN — PIPERACILLIN AND TAZOBACTAM 25 GRAM(S): 4; .5 INJECTION, POWDER, LYOPHILIZED, FOR SOLUTION INTRAVENOUS at 15:08

## 2022-07-02 RX ADMIN — SODIUM CHLORIDE 100 MILLILITER(S): 9 INJECTION, SOLUTION INTRAVENOUS at 11:16

## 2022-07-02 RX ADMIN — ENOXAPARIN SODIUM 40 MILLIGRAM(S): 100 INJECTION SUBCUTANEOUS at 15:09

## 2022-07-02 RX ADMIN — HYDROMORPHONE HYDROCHLORIDE 0.5 MILLIGRAM(S): 2 INJECTION INTRAMUSCULAR; INTRAVENOUS; SUBCUTANEOUS at 20:10

## 2022-07-02 RX ADMIN — HYDROMORPHONE HYDROCHLORIDE 0.5 MILLIGRAM(S): 2 INJECTION INTRAMUSCULAR; INTRAVENOUS; SUBCUTANEOUS at 23:01

## 2022-07-02 RX ADMIN — HYDROMORPHONE HYDROCHLORIDE 0.5 MILLIGRAM(S): 2 INJECTION INTRAMUSCULAR; INTRAVENOUS; SUBCUTANEOUS at 19:48

## 2022-07-02 RX ADMIN — PANTOPRAZOLE SODIUM 40 MILLIGRAM(S): 20 TABLET, DELAYED RELEASE ORAL at 06:37

## 2022-07-02 RX ADMIN — HYDROMORPHONE HYDROCHLORIDE 0.5 MILLIGRAM(S): 2 INJECTION INTRAMUSCULAR; INTRAVENOUS; SUBCUTANEOUS at 11:45

## 2022-07-02 RX ADMIN — PIPERACILLIN AND TAZOBACTAM 25 GRAM(S): 4; .5 INJECTION, POWDER, LYOPHILIZED, FOR SOLUTION INTRAVENOUS at 23:01

## 2022-07-02 RX ADMIN — HYDROMORPHONE HYDROCHLORIDE 0.5 MILLIGRAM(S): 2 INJECTION INTRAMUSCULAR; INTRAVENOUS; SUBCUTANEOUS at 04:00

## 2022-07-02 RX ADMIN — LIDOCAINE 1 PATCH: 4 CREAM TOPICAL at 11:17

## 2022-07-02 RX ADMIN — HYDROMORPHONE HYDROCHLORIDE 0.5 MILLIGRAM(S): 2 INJECTION INTRAMUSCULAR; INTRAVENOUS; SUBCUTANEOUS at 02:52

## 2022-07-02 RX ADMIN — LIDOCAINE 1 PATCH: 4 CREAM TOPICAL at 19:10

## 2022-07-02 RX ADMIN — CYCLOBENZAPRINE HYDROCHLORIDE 5 MILLIGRAM(S): 10 TABLET, FILM COATED ORAL at 06:37

## 2022-07-02 RX ADMIN — PIPERACILLIN AND TAZOBACTAM 25 GRAM(S): 4; .5 INJECTION, POWDER, LYOPHILIZED, FOR SOLUTION INTRAVENOUS at 06:36

## 2022-07-02 RX ADMIN — HYDROMORPHONE HYDROCHLORIDE 0.5 MILLIGRAM(S): 2 INJECTION INTRAMUSCULAR; INTRAVENOUS; SUBCUTANEOUS at 15:09

## 2022-07-02 NOTE — CHART NOTE - NSCHARTNOTEFT_GEN_A_CORE
Called by team regarding patient's pain management. Patient on Dilaudid 0.5mg IV q4h ATC with Dilaudid 0.5mg IV q3h PRN for mod-severe pain. Patient used x1 PRN of the Dilaudid 0.5mg IV in past 24 hours. Team concerned that patient is in pain but that she does not ask for pain medication.    Given that patient only used x1 PRN in 24 hours, would not increase standing dose of Dilaudid at this time. Recommend:  - Change PRNs to Dilaudid 0.5mg IV q3h PRN for moderate pain and Dilaudid 1mg IV q3h for severe pain  - Encourage patient to request pain medication if in pain  - Ask nursing to be proactive in asking patient about her pain to give appropriate number of PRNs    D/w Attending Dr. Luis    Will continue to follow for symptom management.    Miranda Garcia MD  Palliative Fellow, PGY5  Geriatrics and Palliative Consult Service

## 2022-07-02 NOTE — PROGRESS NOTE ADULT - PROBLEM SELECTOR PLAN 1
GYN ONC Fellow Addendum:    Pt seen and examined at bedside. Agree with above. Pt responds to her name. When asked if having pain, she shook her head yes. She is otherwise unable to answer questions.    VS reviewed  No Labs    - Continue current pain regimen; appreciate palliative recs as patient unable to verbalize needs  - Continue IV abx  - Continue IVF  - DVT ppx: lovenox  - DNR/ DNI    GRIS Mckeon, PGY7

## 2022-07-02 NOTE — CONSULT NOTE ADULT - ASSESSMENT
Patient is at imminent end of life and appropriate for hospice referral.   Domestic partner and their daughter on one accord  Ethics signing off on case  Feel free to contact throughout the hospital weekend if conflict reappears    Thank you for this most challenging ethics consultation     YANI Hernández, Director of Ethics Consult Service  [phone]

## 2022-07-02 NOTE — PROGRESS NOTE ADULT - SUBJECTIVE AND OBJECTIVE BOX
[facility] Division of Hospital Medicine  Eddie Edwards MD  In House Pager [phone]    Patient is a 52y old  Female who presents with a chief complaint of neutropenic fever (02 Jul 2022 09:50)      SUBJECTIVE / OVERNIGHT EVENTS:  NAEO, Patient seen and examined at bedside, awake and alert, complaining of back pain and abd pain.   No fever, no chills, no SOB, no CP, no n/v/d, no dysuria      MEDICATIONS  (STANDING):  enoxaparin Injectable 40 milliGRAM(s) SubCutaneous every 24 hours  HYDROmorphone  Injectable 0.5 milliGRAM(s) IV Push every 4 hours  lactated ringers. 1000 milliLiter(s) (100 mL/Hr) IV Continuous <Continuous>  lidocaine 1% (Preservative-free) Injectable 50 milliLiter(s) Local Injection once  lidocaine 1%/epinephrine 1:100,000 Inj 20 milliLiter(s) Local Injection once  pantoprazole  Injectable 40 milliGRAM(s) IV Push two times a day  piperacillin/tazobactam IVPB.. 3.375 Gram(s) IV Intermittent every 8 hours    MEDICATIONS  (PRN):  cyclobenzaprine 5 milliGRAM(s) Oral three times a day PRN Muscle Spasm  HYDROmorphone  Injectable 0.5 milliGRAM(s) IV Push every 3 hours PRN Moderate Pain (4 - 6)  HYDROmorphone  Injectable 1 milliGRAM(s) IV Push every 3 hours PRN Severe Pain (7 - 10)  HYDROmorphone  Injectable 1 milliGRAM(s) IV Push once PRN ostomy  lidocaine   4% Patch 1 Patch Transdermal daily PRN back pain    CAPILLARY BLOOD GLUCOSE        I&O's Summary    01 Jul 2022 07:01  -  02 Jul 2022 07:00  --------------------------------------------------------  IN: 1400 mL / OUT: 1662.5 mL / NET: -262.5 mL        PHYSICAL EXAM:  Vital Signs Last 24 Hrs  T(C): 36.6 (02 Jul 2022 10:00), Max: 36.9 (01 Jul 2022 17:43)  T(F): 97.8 (02 Jul 2022 10:00), Max: 98.4 (01 Jul 2022 17:43)  HR: 133 (02 Jul 2022 10:00) (116 - 134)  BP: 136/86 (02 Jul 2022 11:36) (100/68 - 139/84)  BP(mean): --  RR: 20 (02 Jul 2022 10:00) (17 - 20)  SpO2: 100% (02 Jul 2022 10:00) (97% - 100%)    Gen: NAD; sitting in bed comfortably   Pulm: no accessory muscle use; lungs clear on auscultation bilaterally; no wheezing or crackles.   Cards: RRR, nl S1/S2; no LE edema; no JVD  Abd: non-distended; soft and NT on exam; +bs; tender on palpation in all 4 quadrants.   Ext: LESLIE; no joint effusion or tenderness in upper and lower extremities; no cyanosis  Neuro: Awake and Alert; non-focal; moving all extremities.   Skin: no new rashes; warm to touch;     LABS:                        7.9    12.50 )-----------( 158      ( 01 Jul 2022 05:15 )             26.1     07-01    143  |  106  |  39<H>  ----------------------------<  88  4.2   |  23  |  1.43<H>    Ca    8.1<L>      01 Jul 2022 05:15  Phos  3.1     07-01  Mg     1.50     07-01                Culture - Blood (collected 30 Jun 2022 10:00)  Source: .Blood Blood-Peripheral  Preliminary Report (01 Jul 2022 17:02):    No growth to date.    Culture - Blood (collected 30 Jun 2022 09:52)  Source: .Blood Blood-Peripheral  Preliminary Report (01 Jul 2022 17:02):    No growth to date.    Culture - Urine (collected 30 Jun 2022 08:30)  Source: .Urine Nephrostomy - Right  Final Report (01 Jul 2022 18:44):    <10,000 CFU/mL Normal Urogenital Amber        RADIOLOGY & ADDITIONAL TESTS:  Results Reviewed: Y  Imaging Personally Reviewed: Y  Electrocardiogram Personally Reviewed: Y    COORDINATION OF CARE:  Care Discussed with Consultants/Other Providers [Y/N]: Y  Prior or Outpatient Records Reviewed [Y/N]: Y

## 2022-07-02 NOTE — PROGRESS NOTE ADULT - ASSESSMENT
51y/o F with recurrent cervical CA admitted for management of neutropenic fever, now s/p emergent exploratory laparotomy, abdominal washout, rectosigmoid colectomy, diverting colostomy, bronchoscopy and Abthera placement on 6/8/22 for findings of increased free air on abdominal xray concerning for bowel perforation. Patient s/p washout and bridging vicryl mesh placement to close fascial gap on 6/14 w/ skin closure device place don 6/17, now with sepsis concerning for peritonitis - clinically declining

## 2022-07-02 NOTE — PROGRESS NOTE ADULT - SUBJECTIVE AND OBJECTIVE BOX
Gyn ONC Progress Note     Subjective:     Patient seen and examined at bedside. Uncomfortable appearing. Unable to respond to name, follow commands.     Objective:  T(F): 98.4 (07-02-22 @ 01:50), Max: 99.1 (07-01-22 @ 06:15)  HR: 130 (07-02-22 @ 01:50) (116 - 130)  BP: 127/88 (07-02-22 @ 01:50) (100/68 - 139/84)  RR: 20 (07-02-22 @ 01:50) (17 - 26)  SpO2: 99% (07-02-22 @ 01:50) (95% - 100%)    I&O's Summary    30 Jun 2022 07:01  -  01 Jul 2022 07:00  --------------------------------------------------------  IN: 3550 mL / OUT: 987.5 mL / NET: 2562.5 mL    01 Jul 2022 07:01  -  02 Jul 2022 05:59  --------------------------------------------------------  IN: 1400 mL / OUT: 1447.5 mL / NET: -47.5 mL    MEDICATIONS  (STANDING):  enoxaparin Injectable 40 milliGRAM(s) SubCutaneous every 24 hours  HYDROmorphone  Injectable 0.5 milliGRAM(s) IV Push every 4 hours  lactated ringers. 1000 milliLiter(s) (100 mL/Hr) IV Continuous <Continuous>  lidocaine 1% (Preservative-free) Injectable 50 milliLiter(s) Local Injection once  lidocaine 1%/epinephrine 1:100,000 Inj 20 milliLiter(s) Local Injection once  pantoprazole  Injectable 40 milliGRAM(s) IV Push two times a day  piperacillin/tazobactam IVPB.. 3.375 Gram(s) IV Intermittent every 8 hours    MEDICATIONS  (PRN):  cyclobenzaprine 5 milliGRAM(s) Oral three times a day PRN Muscle Spasm  HYDROmorphone  Injectable 1 milliGRAM(s) IV Push once PRN ostomy  HYDROmorphone  Injectable 0.5 milliGRAM(s) IV Push every 3 hours PRN Moderate to Severe Pain (7 - 10)  lidocaine   4% Patch 1 Patch Transdermal daily PRN back pain    Physical Exam:  Constitutional: Pt awake, looking around room. Uncomfortable appearing. Does not follow commands or respond to her name.   CV: Tachycardic, regular S1/S2  Lungs: CTA b/l   Abd: Soft, non-distended. Diffusely tender. Packing in place w/ overlying abdominal pad with purulent drainage at umbilicus, ostomy w/ mucocutaneous separation from 6-9 o'clock extending 1cmc under skin w/ feculent material and fibrinous material, small drain lateral. Brown liquid/stool output in ostomy bag; +3 Ric drains with increased seropurulent output in RLW, LUQ, LLQ. LLQ drain not holding suction well. LUQ drain with oozing at site.   : R&L nephrostomy tubes draining clear urine.  Extremities: No pain, 2+ pitting edema.    LABS:                        7.9    12.50 )-----------( 158      ( 01 Jul 2022 05:15 )             26.1     07-01    143    |  106    |  39<H>  ----------------------------<  88     4.2     |  23     |  1.43<H>    Ca    8.1<L>      01 Jul 2022 05:15  Phos  3.1       07-01  Mg     1.50      07-01

## 2022-07-02 NOTE — CONSULT NOTE ADULT - SUBJECTIVE AND OBJECTIVE BOX
HPI:    Gyn Oncology Admission Note    52y  with recurrent cervical CA (per patient stage III) presenting to the ED as transfer from [place] a/w neutropenic fever. Patient reports she had last cycle of chemotherapy 5 days prior.  Patient had VNS care stop by yesterday and was noted to be tachycardic on vital signs and was sent to the ED.  She reports feeling weak + fatigued. + upper abdominal pain that worsens while having chills. She denies any vomiting, chest pain, SOB. Last BM yesterday morning which was normal.     At [facility] she was given Meropenem and Zosyn and was noted to be tachycardic to 140s and febrile to 39.5 and was thus transferred for further management.     MICU was consulted on patient with recommendation to continue with broad spectrum antibiotics. Patient receives Gyn Oncology care at [facility]). She reports that she initially was diagnosed with stage III cervical CA in  s/p VBRT + chemo but never had surgical intervention. She reports now being s/p 5 cycles of chemo, unsure of regimen but next due .    Presently on comfort measures with palliative care onboard with DNR/DNI       Patient seen this am at 0930, resting comfortably, alert and awake responsive to stimuli without meaningful communication or engagement     This am reviewed directives. MOLST properly executed with domestic partner and daughter   Medical Orders of Life Sustaining Treatment the appropriate advanced directive as patient unable to meaningfully engage health proxy and was unable to sign initial on 2022      DNR/DNI executed and patient pain symptomatology addressed.

## 2022-07-02 NOTE — PROGRESS NOTE ADULT - ASSESSMENT
52y  HD#27 with recurrent cervical CA admitted for management of neutropenic fever, now s/p emergent exploratory laparotomy, abdominal washout, rectosigmoid colectomy, diverting colostomy, bronchoscopy and Abthera placement on 22 for bowel perforation. Patient s/p washout and bridging vicryl mesh placement to close fascial gap on . She is s/p wound vac, discontinued  given signs of infection. Septic with persistent tachycardia, tachypnea and hypotension.  Patient with declining mental status no longer following commands or responsive to name. Guarded prognosis at this time. Following extensive discussion with Palliative care, Ethics and surgery teams on board, patient code status now DNR/DNI as of yesterday.     Neuro: IV Tylenol, Dilaudid for pain.  Appreciate GHOS and Palliative recommendations.  CV: Tachycardic.  H/H stable w/ platelets increasing, f/u AM CBC.   - CT A/P and CT angio () negative for PE   - h/o HTN: Lopressor 50mg BID, currently holding 2/2 low-normal range BPs  Pulm: Tachypneic. Maintaining appropriate SpO2.   GI: NPO given change in mental status.   - Ostomy: area of separation packed w/ quacel, ostomy powder and liquid barrier, hydrocolloid dressing.  - Senna, Protonix BID  : B/l nephrostomy tubes. Prior ASHLEY resolving w/ continuing downtrending Cr. AM BMP pending.   FEN: LR@100. F/u AM BMP, Mg, Phos, replete electrolytes PRN.   Heme: DVT ppx: Lovenox, SCDs while in bed.    - Thrombocytopenia: likely multifactorial, currently improving. Appreciate Heme Onc recs.  ID: Ostomy noted to leak into subcutaneous tissue. Increasing leukocytosis, fevers.   - Continue Zosyn (- ) for broad antibiotic coverage.   - F/u BCx, UCx ()   - s/p Caspofungin and Cefepime  - S/p Zarixo (-6/10) for neutropenic fever.   - S/p Zosyn (-)  MSK: Currently unable to participate with PT/OT. Previously evaluated by PMR, recommended subacute rehab, however patient unable to participate at this time.   Dispo: Continue inpatient management, code status = DNR/DNI.     Seen at bedside with GYN Oncology team  VELIA Grimm PGY2

## 2022-07-02 NOTE — PROGRESS NOTE ADULT - PROBLEM SELECTOR PLAN 1
-patient noted to have fever, tachycardia, leukocytosis 6/30  -Hemodynamically stable presently but high risk for decompensation   -recent CTPA without PE or consolidation. Given no respiratory symptoms or hypoxia - unlikely pulmonary etiology  -given abdominal wound noted to have stool - concern for fistulization vs peritonitis. Would defer wound management to surgery. Repeat CT abd if in line with patient's GOC.   -c/w zosyn for peritonitis  - s/p vanco x1, per primary team, no plan for further blood work. given ASHLEY and need for monitor of renal function and med level, would not continue vancomycin at this time.   -monitor clinically  - poor prognosis.

## 2022-07-02 NOTE — PROGRESS NOTE ADULT - ATTENDING COMMENTS
Patient seen and examined, agree with gyn onc fellow and resident  Pain control   Appreciate palliative care and ethics consultation  DNR/DNI

## 2022-07-02 NOTE — PROGRESS NOTE ADULT - PROBLEM SELECTOR PLAN 2
-h/o recurrent cervical CA, receives Gyn Oncology care at [facility]).   -she initially was diagnosed with stage III cervical CA in 2014 s/p VBRT + chemo but never had surgical intervention. -Reported now being s/p 5 cycles of chemo, unsure of regimen but next due 6/21/22.  -course complicated by bowel perforation s/p washout and bridging vicryl mesh placement to close fascial gap on 6/14 w/ skin closure device place don 6/17  -further management per general surgery, gyn onc and plastic surgery  -pall care following for goals of care discussion darian in setting of declining clinical status  -overall prognosis is poor  - palliative and ethics recs. patient is now DNI/DNR after family meeting.

## 2022-07-03 PROCEDURE — 99232 SBSQ HOSP IP/OBS MODERATE 35: CPT

## 2022-07-03 RX ADMIN — PANTOPRAZOLE SODIUM 40 MILLIGRAM(S): 20 TABLET, DELAYED RELEASE ORAL at 21:52

## 2022-07-03 RX ADMIN — LIDOCAINE 1 PATCH: 4 CREAM TOPICAL at 00:00

## 2022-07-03 RX ADMIN — PIPERACILLIN AND TAZOBACTAM 25 GRAM(S): 4; .5 INJECTION, POWDER, LYOPHILIZED, FOR SOLUTION INTRAVENOUS at 07:00

## 2022-07-03 RX ADMIN — HYDROMORPHONE HYDROCHLORIDE 0.5 MILLIGRAM(S): 2 INJECTION INTRAMUSCULAR; INTRAVENOUS; SUBCUTANEOUS at 21:51

## 2022-07-03 RX ADMIN — SODIUM CHLORIDE 100 MILLILITER(S): 9 INJECTION, SOLUTION INTRAVENOUS at 21:52

## 2022-07-03 RX ADMIN — HYDROMORPHONE HYDROCHLORIDE 0.5 MILLIGRAM(S): 2 INJECTION INTRAMUSCULAR; INTRAVENOUS; SUBCUTANEOUS at 06:18

## 2022-07-03 RX ADMIN — ENOXAPARIN SODIUM 40 MILLIGRAM(S): 100 INJECTION SUBCUTANEOUS at 15:28

## 2022-07-03 RX ADMIN — HYDROMORPHONE HYDROCHLORIDE 0.5 MILLIGRAM(S): 2 INJECTION INTRAMUSCULAR; INTRAVENOUS; SUBCUTANEOUS at 15:28

## 2022-07-03 RX ADMIN — HYDROMORPHONE HYDROCHLORIDE 0.5 MILLIGRAM(S): 2 INJECTION INTRAMUSCULAR; INTRAVENOUS; SUBCUTANEOUS at 22:06

## 2022-07-03 RX ADMIN — HYDROMORPHONE HYDROCHLORIDE 0.5 MILLIGRAM(S): 2 INJECTION INTRAMUSCULAR; INTRAVENOUS; SUBCUTANEOUS at 02:30

## 2022-07-03 RX ADMIN — PIPERACILLIN AND TAZOBACTAM 25 GRAM(S): 4; .5 INJECTION, POWDER, LYOPHILIZED, FOR SOLUTION INTRAVENOUS at 23:11

## 2022-07-03 RX ADMIN — PIPERACILLIN AND TAZOBACTAM 25 GRAM(S): 4; .5 INJECTION, POWDER, LYOPHILIZED, FOR SOLUTION INTRAVENOUS at 15:28

## 2022-07-03 RX ADMIN — SODIUM CHLORIDE 100 MILLILITER(S): 9 INJECTION, SOLUTION INTRAVENOUS at 15:29

## 2022-07-03 RX ADMIN — PANTOPRAZOLE SODIUM 40 MILLIGRAM(S): 20 TABLET, DELAYED RELEASE ORAL at 06:18

## 2022-07-03 NOTE — PROGRESS NOTE ADULT - PROBLEM SELECTOR PLAN 1
GYN ONC Fellow Addendum:    Pt seen and examined at bedside. Agree with above. Pt responds to commands and is able to communicate. Reports her pain is worse in the morning, but improves throughout the day.    VS reviewed  No labs    - Continue current pain regimen; appreciate palliative recs  - Continue IV abx, IVF, NPO for now  - Encourage ambulation and IS use  - Replete lytes prn  - DVT ppx: lovenox  - Dispo: continue inpatient management    GRIS Mckeon, PGY7

## 2022-07-03 NOTE — PROGRESS NOTE ADULT - PROBLEM SELECTOR PLAN 2
-h/o recurrent cervical CA, receives Gyn Oncology care at [facility]).   -she initially was diagnosed with stage III cervical CA in 2014 s/p VBRT + chemo but never had surgical intervention. -Reported now being s/p 5 cycles of chemo, unsure of regimen but next due 6/21/22.  -course complicated by bowel perforation s/p washout and bridging vicryl mesh placement to close fascial gap on 6/14 w/ skin closure device place don 6/17  -further management per general surgery, gyn onc and plastic surgery  -pall care following for goals of care discussion darian in setting of declining clinical status  -overall prognosis is poor  - palliative and ethics recs. patient is now DNI/DNR after family meeting.  - agree with hospice referral.

## 2022-07-03 NOTE — PROGRESS NOTE ADULT - SUBJECTIVE AND OBJECTIVE BOX
[facility] Division of Hospital Medicine  Eddie Edwards MD  In House Pager [phone]    Patient is a 52y old  Female who presents with a chief complaint of neutropenic fever (03 Jul 2022 04:53)      SUBJECTIVE / OVERNIGHT EVENTS:  NAEO, Patient seen and examined at bedside, lethargic, endorse abd pain.   no fever, no cp, no sob, no diarrhea.       MEDICATIONS  (STANDING):  enoxaparin Injectable 40 milliGRAM(s) SubCutaneous every 24 hours  HYDROmorphone  Injectable 0.5 milliGRAM(s) IV Push every 4 hours  lactated ringers. 1000 milliLiter(s) (100 mL/Hr) IV Continuous <Continuous>  lidocaine 1% (Preservative-free) Injectable 50 milliLiter(s) Local Injection once  lidocaine 1%/epinephrine 1:100,000 Inj 20 milliLiter(s) Local Injection once  pantoprazole  Injectable 40 milliGRAM(s) IV Push two times a day  piperacillin/tazobactam IVPB.. 3.375 Gram(s) IV Intermittent every 8 hours    MEDICATIONS  (PRN):  cyclobenzaprine 5 milliGRAM(s) Oral three times a day PRN Muscle Spasm  HYDROmorphone  Injectable 0.5 milliGRAM(s) IV Push every 3 hours PRN Moderate Pain (4 - 6)  HYDROmorphone  Injectable 1 milliGRAM(s) IV Push every 3 hours PRN Severe Pain (7 - 10)  HYDROmorphone  Injectable 1 milliGRAM(s) IV Push once PRN ostomy  lidocaine   4% Patch 1 Patch Transdermal daily PRN back pain    CAPILLARY BLOOD GLUCOSE        I&O's Summary    02 Jul 2022 07:01  -  03 Jul 2022 07:00  --------------------------------------------------------  IN: 2980 mL / OUT: 1290 mL / NET: 1690 mL        PHYSICAL EXAM:  Vital Signs Last 24 Hrs  T(C): 36.8 (03 Jul 2022 06:15), Max: 37.2 (02 Jul 2022 15:33)  T(F): 98.3 (03 Jul 2022 06:15), Max: 99 (02 Jul 2022 15:33)  HR: 126 (03 Jul 2022 06:15) (65 - 133)  BP: 134/83 (03 Jul 2022 06:15) (118/79 - 136/86)  BP(mean): --  RR: 18 (03 Jul 2022 06:15) (18 - 22)  SpO2: 100% (03 Jul 2022 06:15) (95% - 100%)    Gen: NAD; sitting in bed comfortably   Pulm: no accessory muscle use; lungs clear on auscultation bilaterally; no wheezing or crackles.   Cards: RRR, nl S1/S2; no LE edema; no JVD  Abd: non-distended; soft and NT on exam; +bs; b/l nephrostomy, and SARAH drain.   Ext: LESLIE; no joint effusion or tenderness in upper and lower extremities; no cyanosis  Neuro: Awake and Alert; non-focal; moving all extremities.   Skin: no new rashes; warm to touch;     LABS:                    Culture - Blood (collected 30 Jun 2022 10:00)  Source: .Blood Blood-Peripheral  Preliminary Report (01 Jul 2022 17:02):    No growth to date.    Culture - Blood (collected 30 Jun 2022 09:52)  Source: .Blood Blood-Peripheral  Preliminary Report (01 Jul 2022 17:02):    No growth to date.    Culture - Urine (collected 30 Jun 2022 08:30)  Source: .Urine Nephrostomy - Right  Final Report (01 Jul 2022 18:44):    <10,000 CFU/mL Normal Urogenital Amber        RADIOLOGY & ADDITIONAL TESTS:  Results Reviewed: Y  Imaging Personally Reviewed: Y  Electrocardiogram Personally Reviewed: Y    COORDINATION OF CARE:  Care Discussed with Consultants/Other Providers [Y/N]: Y  Prior or Outpatient Records Reviewed [Y/N]: Y

## 2022-07-03 NOTE — PROGRESS NOTE ADULT - SUBJECTIVE AND OBJECTIVE BOX
Gyn ONC Progress Note     HD #28    Subjective:  Patient seen and examined at bedside. Uncomfortable appearing. Intermittently awake/alert, occasionally follows commands and responds.     Objective:  T(F): 98.5 (07-03-22 @ 02:00), Max: 99 (07-02-22 @ 15:33)  HR: 124 (07-03-22 @ 02:25) (65 - 133)  BP: 132/78 (07-03-22 @ 02:25) (118/79 - 136/86)  RR: 20 (07-03-22 @ 02:00) (18 - 22)  SpO2: 100% (07-03-22 @ 02:00) (95% - 100%)  Wt(kg): --  I&O's Summary    01 Jul 2022 07:01  -  02 Jul 2022 07:00  --------------------------------------------------------  IN: 1400 mL / OUT: 1662.5 mL / NET: -262.5 mL    02 Jul 2022 07:01  -  03 Jul 2022 04:53  --------------------------------------------------------  IN: 1900 mL / OUT: 1095 mL / NET: 805 mL    MEDICATIONS  (STANDING):  enoxaparin Injectable 40 milliGRAM(s) SubCutaneous every 24 hours  HYDROmorphone  Injectable 0.5 milliGRAM(s) IV Push every 4 hours  lactated ringers. 1000 milliLiter(s) (100 mL/Hr) IV Continuous <Continuous>  lidocaine 1% (Preservative-free) Injectable 50 milliLiter(s) Local Injection once  lidocaine 1%/epinephrine 1:100,000 Inj 20 milliLiter(s) Local Injection once  pantoprazole  Injectable 40 milliGRAM(s) IV Push two times a day  piperacillin/tazobactam IVPB.. 3.375 Gram(s) IV Intermittent every 8 hours    MEDICATIONS  (PRN):  cyclobenzaprine 5 milliGRAM(s) Oral three times a day PRN Muscle Spasm  HYDROmorphone  Injectable 0.5 milliGRAM(s) IV Push every 3 hours PRN Moderate Pain (4 - 6)  HYDROmorphone  Injectable 1 milliGRAM(s) IV Push every 3 hours PRN Severe Pain (7 - 10)  HYDROmorphone  Injectable 1 milliGRAM(s) IV Push once PRN ostomy  lidocaine   4% Patch 1 Patch Transdermal daily PRN back pain      Physical Exam:  Constitutional: Pt arousable, looking around room. Uncomfortable appearing. Intermittently responsive, intermittently follows commands.   CV: Tachycardic, regular S1/S2  Lungs: CTA b/l   Abd: Soft, non-distended. Diffusely tender. Packing in place w/ overlying abdominal pad with purulent drainage at umbilicus, ostomy w/ mucocutaneous separation from 6-9 o'clock extending 1cmc under skin w/ feculent material and fibrinous material, small drain lateral. Brown liquid/stool output in ostomy bag; +3 Ric drains with increased seropurulent output in RLW, LUQ, LLQ. LLQ drain not holding suction well. LUQ drain with oozing at site.   : R&L nephrostomy tubes draining clear urine.  Extremities: No pain, 2+ pitting edema.      LABS:                        7.9    12.50 )-----------( 158      ( 01 Jul 2022 05:15 )             26.1     07-01    143  |  106  |  39<H>  ----------------------------<  88  4.2   |  23  |  1.43<H>    Ca    8.1<L>      01 Jul 2022 05:15  Phos  3.1     07-01  Mg     1.50     07-01

## 2022-07-03 NOTE — PROGRESS NOTE ADULT - ASSESSMENT
52y  HD#28 with recurrent cervical CA admitted for management of neutropenic fever, now s/p emergent exploratory laparotomy, abdominal washout, rectosigmoid colectomy, diverting colostomy, bronchoscopy and Abthera placement on 22 for bowel perforation. Patient s/p washout and bridging vicryl mesh placement to close fascial gap on . She is s/p wound vac, discontinued  given signs of infection. Septic with persistent tachycardia, tachypnea and hypotension.  Patient with declining mental status, only intermittently following commands or responsive to name. Guarded prognosis at this time. Following extensive discussion with Palliative care, Ethics and surgery teams on board, patient code status now DNR/DNI.     Neuro: IV Tylenol, Dilaudid for pain, ATC and PRN dosing adjusted for patient comfort.  Appreciate GHOS and Palliative recommendations.  CV: Persistently tachycardic.   - CT A/P and CT angio () negative for PE   - h/o HTN: Lopressor 50mg BID, currently holding 2/2 low-normal range BPs  Pulm: Tachypneic. Maintaining appropriate SpO2.   GI: NPO given change in mental status.  ***  - Ostomy: area of separation packed w/ quacel, ostomy powder and liquid barrier, hydrocolloid dressing.  - Senna, Protonix BID  : B/l nephrostomy tubes. ASHLEY w/ increased Creatintine to 1.43 - likely in setting of sepsis. No further labs in setting of declining status, poor prognosis at this time. Appreciate hospitalist recs.   FEN: LR@100.   Heme: DVT ppx: Lovenox, SCDs while in bed.    - Thrombocytopenia: likely multifactorial. Appreciate Heme Onc recs.  ID: Ostomy noted to leak into subcutaneous tissue. Increasing leukocytosis, fevers.   - Continue Zosyn (- ) for broad antibiotic coverage.   - F/u BCx, UCx ()   - s/p Caspofungin and Cefepime  - S/p Zarixo (-6/10) for neutropenic fever.   - S/p Zosyn (-)  MSK: Currently unable to participate with PT/OT. Previously evaluated by PMR, recommended subacute rehab, however patient unable to participate at this time.   Dispo: Continue inpatient management, code status = DNR/DNI.     Seen at bedside with GYN Oncology team  VELIA Grimm PGY2

## 2022-07-03 NOTE — PROGRESS NOTE ADULT - PROBLEM SELECTOR PLAN 1
-patient noted to have fever, tachycardia, leukocytosis 6/30  -Hemodynamically stable presently but high risk for decompensation   -recent CTPA without PE or consolidation. Given no respiratory symptoms or hypoxia - unlikely pulmonary etiology  -given abdominal wound noted to have stool - concern for fistulization vs peritonitis. Would defer wound management to surgery. Repeat CT abd if in line with patient's GOC.   -c/w zosyn for peritonitis  - s/p vanco x1, per primary team, no plan for further blood work. given ASHLEY and need for monitor of renal function and med level, would not continue vancomycin at this time.   - poor prognosis.

## 2022-07-04 PROCEDURE — 99233 SBSQ HOSP IP/OBS HIGH 50: CPT

## 2022-07-04 RX ADMIN — PIPERACILLIN AND TAZOBACTAM 25 GRAM(S): 4; .5 INJECTION, POWDER, LYOPHILIZED, FOR SOLUTION INTRAVENOUS at 15:17

## 2022-07-04 RX ADMIN — HYDROMORPHONE HYDROCHLORIDE 0.5 MILLIGRAM(S): 2 INJECTION INTRAMUSCULAR; INTRAVENOUS; SUBCUTANEOUS at 18:30

## 2022-07-04 RX ADMIN — HYDROMORPHONE HYDROCHLORIDE 0.5 MILLIGRAM(S): 2 INJECTION INTRAMUSCULAR; INTRAVENOUS; SUBCUTANEOUS at 13:47

## 2022-07-04 RX ADMIN — HYDROMORPHONE HYDROCHLORIDE 0.5 MILLIGRAM(S): 2 INJECTION INTRAMUSCULAR; INTRAVENOUS; SUBCUTANEOUS at 05:34

## 2022-07-04 RX ADMIN — PANTOPRAZOLE SODIUM 40 MILLIGRAM(S): 20 TABLET, DELAYED RELEASE ORAL at 05:20

## 2022-07-04 RX ADMIN — HYDROMORPHONE HYDROCHLORIDE 0.5 MILLIGRAM(S): 2 INJECTION INTRAMUSCULAR; INTRAVENOUS; SUBCUTANEOUS at 10:30

## 2022-07-04 RX ADMIN — HYDROMORPHONE HYDROCHLORIDE 0.5 MILLIGRAM(S): 2 INJECTION INTRAMUSCULAR; INTRAVENOUS; SUBCUTANEOUS at 21:18

## 2022-07-04 RX ADMIN — PIPERACILLIN AND TAZOBACTAM 25 GRAM(S): 4; .5 INJECTION, POWDER, LYOPHILIZED, FOR SOLUTION INTRAVENOUS at 06:15

## 2022-07-04 RX ADMIN — SODIUM CHLORIDE 100 MILLILITER(S): 9 INJECTION, SOLUTION INTRAVENOUS at 09:52

## 2022-07-04 RX ADMIN — HYDROMORPHONE HYDROCHLORIDE 1 MILLIGRAM(S): 2 INJECTION INTRAMUSCULAR; INTRAVENOUS; SUBCUTANEOUS at 11:46

## 2022-07-04 RX ADMIN — HYDROMORPHONE HYDROCHLORIDE 0.5 MILLIGRAM(S): 2 INJECTION INTRAMUSCULAR; INTRAVENOUS; SUBCUTANEOUS at 17:59

## 2022-07-04 RX ADMIN — PANTOPRAZOLE SODIUM 40 MILLIGRAM(S): 20 TABLET, DELAYED RELEASE ORAL at 17:59

## 2022-07-04 RX ADMIN — LIDOCAINE 1 PATCH: 4 CREAM TOPICAL at 11:45

## 2022-07-04 RX ADMIN — PIPERACILLIN AND TAZOBACTAM 25 GRAM(S): 4; .5 INJECTION, POWDER, LYOPHILIZED, FOR SOLUTION INTRAVENOUS at 23:26

## 2022-07-04 RX ADMIN — HYDROMORPHONE HYDROCHLORIDE 0.5 MILLIGRAM(S): 2 INJECTION INTRAMUSCULAR; INTRAVENOUS; SUBCUTANEOUS at 05:19

## 2022-07-04 RX ADMIN — HYDROMORPHONE HYDROCHLORIDE 0.5 MILLIGRAM(S): 2 INJECTION INTRAMUSCULAR; INTRAVENOUS; SUBCUTANEOUS at 21:33

## 2022-07-04 RX ADMIN — ENOXAPARIN SODIUM 40 MILLIGRAM(S): 100 INJECTION SUBCUTANEOUS at 11:45

## 2022-07-04 RX ADMIN — SODIUM CHLORIDE 100 MILLILITER(S): 9 INJECTION, SOLUTION INTRAVENOUS at 21:17

## 2022-07-04 RX ADMIN — HYDROMORPHONE HYDROCHLORIDE 0.5 MILLIGRAM(S): 2 INJECTION INTRAMUSCULAR; INTRAVENOUS; SUBCUTANEOUS at 09:52

## 2022-07-04 RX ADMIN — HYDROMORPHONE HYDROCHLORIDE 0.5 MILLIGRAM(S): 2 INJECTION INTRAMUSCULAR; INTRAVENOUS; SUBCUTANEOUS at 01:44

## 2022-07-04 RX ADMIN — HYDROMORPHONE HYDROCHLORIDE 1 MILLIGRAM(S): 2 INJECTION INTRAMUSCULAR; INTRAVENOUS; SUBCUTANEOUS at 12:19

## 2022-07-04 RX ADMIN — HYDROMORPHONE HYDROCHLORIDE 0.5 MILLIGRAM(S): 2 INJECTION INTRAMUSCULAR; INTRAVENOUS; SUBCUTANEOUS at 01:29

## 2022-07-04 RX ADMIN — LIDOCAINE 1 PATCH: 4 CREAM TOPICAL at 19:03

## 2022-07-04 NOTE — PROGRESS NOTE ADULT - PROBLEM SELECTOR PLAN 1
GYN ONC Fellow Addendum:    Pt seen and examined at bedside. Agree with above. Pain controlled.     VS reviewed  Labs reviewed    - Pain control prn to reduce sedation  - Diet as tolerated  - Continue IV abx  - ASHLEY: likely due to sepsis, continue IVF hydration, avoid nephrotoxins  - Encourage ambulation and IS use  - Replete lytes prn  - DVT ppx: lovenox  - OOB  - Dispo: continue inpatient management    GRIS Mckeon, PGY7

## 2022-07-04 NOTE — PROGRESS NOTE ADULT - SUBJECTIVE AND OBJECTIVE BOX
R Gyn ONC Progress Note HD#[id number]    Subjective:   Pt seen and examined at bedside. Intermittently awake/alert. Uncomfortable appearing. Occasionally responds to commands      Objective:  T(F): 98.4 (07-04-22 @ 01:36), Max: 98.6 (07-03-22 @ 17:55)  HR: 114 (07-04-22 @ 01:25) (65 - 126)  BP: 117/74 (07-04-22 @ 01:36) (117/74 - 145/83)  RR: 18 (07-04-22 @ 01:36) (18 - 20)  SpO2: 100% (07-04-22 @ 01:36) (99% - 100%)  Wt(kg): --  I&O's Summary    02 Jul 2022 07:01  -  03 Jul 2022 07:00  --------------------------------------------------------  IN: 2980 mL / OUT: 1290 mL / NET: 1690 mL    03 Jul 2022 07:01  -  04 Jul 2022 04:53  --------------------------------------------------------  IN: 0 mL / OUT: 680 mL / NET: -680 mL      CAPILLARY BLOOD GLUCOSE          MEDICATIONS  (STANDING):  enoxaparin Injectable 40 milliGRAM(s) SubCutaneous every 24 hours  HYDROmorphone  Injectable 0.5 milliGRAM(s) IV Push every 4 hours  lactated ringers. 1000 milliLiter(s) (100 mL/Hr) IV Continuous <Continuous>  lidocaine 1% (Preservative-free) Injectable 50 milliLiter(s) Local Injection once  lidocaine 1%/epinephrine 1:100,000 Inj 20 milliLiter(s) Local Injection once  pantoprazole  Injectable 40 milliGRAM(s) IV Push two times a day  piperacillin/tazobactam IVPB.. 3.375 Gram(s) IV Intermittent every 8 hours    MEDICATIONS  (PRN):  cyclobenzaprine 5 milliGRAM(s) Oral three times a day PRN Muscle Spasm  HYDROmorphone  Injectable 0.5 milliGRAM(s) IV Push every 3 hours PRN Moderate Pain (4 - 6)  HYDROmorphone  Injectable 1 milliGRAM(s) IV Push every 3 hours PRN Severe Pain (7 - 10)  HYDROmorphone  Injectable 1 milliGRAM(s) IV Push once PRN ostomy  lidocaine   4% Patch 1 Patch Transdermal daily PRN back pain      Physical Exam:  Constitutional: Pt arousable, looking around room. Uncomfortable appearing. Intermittently responsive, intermittently follows commands.   CV: Tachycardic, regular S1/S2  Lungs: CTA b/l   Abd: Soft, non-distended. Diffusely tender. Packing in place w/ overlying abdominal pad with purulent drainage at umbilicus, ostomy w/ mucocutaneous separation from 6-9 o'clock extending 1cmc under skin w/ feculent material and fibrinous material, small drain lateral. Brown liquid/stool output in ostomy bag; +3 Ric drains with increased seropurulent output in RLW, LUQ, LLQ. LLQ drain not holding suction well. LUQ drain with oozing at site.   : R&L nephrostomy tubes draining clear urine.  Extremities: No pain, 2+ pitting edema.    LABS:               7.9    12.50 )-----------( 158      ( 07-01 @ 05:15 )             26.1                            R Gyn ONC Progress Note HD#29    Subjective:   Pt seen and examined at bedside. Patient awake this morning. Responding to commands. Intermittently alert. Denies any pain.    Objective:  T(F): 98.4 (07-04-22 @ 01:36), Max: 98.6 (07-03-22 @ 17:55)  HR: 114 (07-04-22 @ 01:25) (65 - 126)  BP: 117/74 (07-04-22 @ 01:36) (117/74 - 145/83)  RR: 18 (07-04-22 @ 01:36) (18 - 20)  SpO2: 100% (07-04-22 @ 01:36) (99% - 100%)  Wt(kg): --  I&O's Summary    02 Jul 2022 07:01  -  03 Jul 2022 07:00  --------------------------------------------------------  IN: 2980 mL / OUT: 1290 mL / NET: 1690 mL    03 Jul 2022 07:01  -  04 Jul 2022 04:53  --------------------------------------------------------  IN: 0 mL / OUT: 680 mL / NET: -680 mL      CAPILLARY BLOOD GLUCOSE          MEDICATIONS  (STANDING):  enoxaparin Injectable 40 milliGRAM(s) SubCutaneous every 24 hours  HYDROmorphone  Injectable 0.5 milliGRAM(s) IV Push every 4 hours  lactated ringers. 1000 milliLiter(s) (100 mL/Hr) IV Continuous <Continuous>  lidocaine 1% (Preservative-free) Injectable 50 milliLiter(s) Local Injection once  lidocaine 1%/epinephrine 1:100,000 Inj 20 milliLiter(s) Local Injection once  pantoprazole  Injectable 40 milliGRAM(s) IV Push two times a day  piperacillin/tazobactam IVPB.. 3.375 Gram(s) IV Intermittent every 8 hours    MEDICATIONS  (PRN):  cyclobenzaprine 5 milliGRAM(s) Oral three times a day PRN Muscle Spasm  HYDROmorphone  Injectable 0.5 milliGRAM(s) IV Push every 3 hours PRN Moderate Pain (4 - 6)  HYDROmorphone  Injectable 1 milliGRAM(s) IV Push every 3 hours PRN Severe Pain (7 - 10)  HYDROmorphone  Injectable 1 milliGRAM(s) IV Push once PRN ostomy  lidocaine   4% Patch 1 Patch Transdermal daily PRN back pain      Physical Exam:  Constitutional: Pt arousable, looking around room. Uncomfortable appearing. Intermittently responsive, intermittently follows commands.   CV: Tachycardic, regular S1/S2  Lungs: CTA b/l   Abd: Soft, non-distended. Diffusely tender. Packing in place w/ overlying abdominal pad with purulent drainage at umbilicus, ostomy w/ mucocutaneous separation from 6-9 o'clock extending 1cmc under skin w/ feculent material and fibrinous material, small drain lateral. Brown liquid/stool output in ostomy bag; +3 Ric drains with increased seropurulent output in RLW, LUQ, LLQ. LLQ drain not holding suction well. LUQ drain with oozing at site.   : R&L nephrostomy tubes draining clear urine.  Extremities: No pain, 2+ pitting edema.    LABS:               7.9    12.50 )-----------( 158      ( 07-01 @ 05:15 )             26.1                            R2 Gyn ONC Progress Note HD#29    Subjective:   Pt seen and examined at bedside. Patient awake this morning. Responding to commands. Intermittently alert. Denies any pain.    Objective:  T(F): 98.4 (07-04-22 @ 01:36), Max: 98.6 (07-03-22 @ 17:55)  HR: 114 (07-04-22 @ 01:25) (65 - 126)  BP: 117/74 (07-04-22 @ 01:36) (117/74 - 145/83)  RR: 18 (07-04-22 @ 01:36) (18 - 20)  SpO2: 100% (07-04-22 @ 01:36) (99% - 100%)  Wt(kg): --  I&O's Summary    02 Jul 2022 07:01  -  03 Jul 2022 07:00  --------------------------------------------------------  IN: 2980 mL / OUT: 1290 mL / NET: 1690 mL    03 Jul 2022 07:01  -  04 Jul 2022 04:53  --------------------------------------------------------  IN: 0 mL / OUT: 680 mL / NET: -680 mL      CAPILLARY BLOOD GLUCOSE          MEDICATIONS  (STANDING):  enoxaparin Injectable 40 milliGRAM(s) SubCutaneous every 24 hours  HYDROmorphone  Injectable 0.5 milliGRAM(s) IV Push every 4 hours  lactated ringers. 1000 milliLiter(s) (100 mL/Hr) IV Continuous <Continuous>  lidocaine 1% (Preservative-free) Injectable 50 milliLiter(s) Local Injection once  lidocaine 1%/epinephrine 1:100,000 Inj 20 milliLiter(s) Local Injection once  pantoprazole  Injectable 40 milliGRAM(s) IV Push two times a day  piperacillin/tazobactam IVPB.. 3.375 Gram(s) IV Intermittent every 8 hours    MEDICATIONS  (PRN):  cyclobenzaprine 5 milliGRAM(s) Oral three times a day PRN Muscle Spasm  HYDROmorphone  Injectable 0.5 milliGRAM(s) IV Push every 3 hours PRN Moderate Pain (4 - 6)  HYDROmorphone  Injectable 1 milliGRAM(s) IV Push every 3 hours PRN Severe Pain (7 - 10)  HYDROmorphone  Injectable 1 milliGRAM(s) IV Push once PRN ostomy  lidocaine   4% Patch 1 Patch Transdermal daily PRN back pain      Physical Exam:  Constitutional: Pt arousable, looking around room. Uncomfortable appearing. Intermittently responsive, intermittently follows commands.   CV: Tachycardic, regular S1/S2  Lungs: CTA b/l   Abd: Soft, non-distended. Diffusely tender. Packing in place w/ overlying abdominal pad with purulent drainage at umbilicus, ostomy w/ mucocutaneous separation from 6-9 o'clock extending 1cmc under skin w/ feculent material and fibrinous material, small drain lateral. Brown liquid/stool output in ostomy bag; +3 Ric drains with increased seropurulent output in RLW, LUQ, LLQ. LLQ drain not holding suction well. LUQ drain with oozing at site.   : R&L nephrostomy tubes draining clear urine.  Extremities: No pain, 2+ pitting edema.    LABS:               7.9    12.50 )-----------( 158      ( 07-01 @ 05:15 )             26.1

## 2022-07-04 NOTE — PROGRESS NOTE ADULT - PROBLEM SELECTOR PLAN 1
-patient noted to have fever, tachycardia, leukocytosis 6/30  -Hemodynamically stable presently but high risk for decompensation   -recent CTPA without PE or consolidation. Given no respiratory symptoms or hypoxia - unlikely pulmonary etiology  -given abdominal wound noted to have stool - concern for fistulization vs peritonitis. Would defer wound management to surgery. Repeat CT abd if in line with patient's GOC.   -c/w zosyn for peritonitis  - s/p vanco x1, per primary team, no plan for further blood work. given ASHLEY and need for monitor of renal function and med level, would not continue vancomycin at this time.   - very poor prognosis.

## 2022-07-04 NOTE — ADVANCED PRACTICE NURSE CONSULT - RECOMMEDATIONS
Topical recommendations:     Bilateral breasts- Interdry textile sheeting, under intertriginous folds leaving 2 inches exposed at ends to wick, remove to wash & dry affected area, then replace. Individual sheeting may be used for up to 5 days unless soiled.     Sacralgluteal fold - Cleanse with skin cleanser, pat dry. Apply Karen moisture barrier cream twice a day and PRN. With episodes of incontinence/discharge only remove soiled layer of Karen, then reinforce with thin layer.     Continue low air loss bed therapy, continue heel elevation, continue to turn & reposition q2h, soft pillow between bony prominences, continue moisture management with barrier cream & single breathable pad, continue measures to decrease friction/shear/pressure. Continue with nutritional support as per dietary/orders.     Plan of care discussed with primary RN Alejandra.     Please contact Wound/Ostomy Care Service Line if we can be of further assistance (ext [phone]).

## 2022-07-04 NOTE — ADVANCED PRACTICE NURSE CONSULT - ASSESSMENT
A&Ox4, able to sit at edge of bed with assistance from physical therapy. Bilateral flank nephrostomy tubes in place. Midline abdominal dressing clean, dry, intact. 2 SARAH drains present on LLQ of abdomen, One RUQ abdominal drain present. Dressings are dry and intact. RLQ colostomy.  Skin warm, dry with increased moisture in intertriginous folds. Patient has enterovaginal fistula, Primary RN states that patient sometimes has mucus discharge.     Bilateral breasts - Skin warm, with increased moisture. At risk for Moisture associated dermatitis.     Sacralgluteal fold - area of previous skin impairment, presenting as irregular area of pink hypopigmented skin measuring [id number], not seen in natural anatomical position. At risk for moisture associated dermatitis. Goals of care: Protect from friction/shear/pressure and excess moisture.       See A&I flowsheet for full ostomy assessment.

## 2022-07-04 NOTE — PROGRESS NOTE ADULT - PROBLEM SELECTOR PLAN 2
-h/o recurrent cervical CA, receives Gyn Oncology care at [facility] ([facility]).   -she initially was diagnosed with stage III cervical CA in 2014 s/p VBRT + chemo but never had surgical intervention. -Reported now being s/p 5 cycles of chemo, unsure of regimen but next due 6/21/22.  -course complicated by bowel perforation s/p washout and bridging vicryl mesh placement to close fascial gap on 6/14 w/ skin closure device place don 6/17  -further management per general surgery, gyn onc and plastic surgery  -pall care following for goals of care discussion darian in setting of declining clinical status  -overall prognosis is poor  - palliative and ethics recs. patient is now DNI/DNR after family meeting.  - agree with hospice referral.

## 2022-07-04 NOTE — PROGRESS NOTE ADULT - SUBJECTIVE AND OBJECTIVE BOX
Patient is a 52y old  Female who presents with a chief complaint of neutropenic fever (04 Jul 2022 04:53)      INTERVAL HPI/OVERNIGHT EVENTS:  Seen by me this afternoon, slightly sleepy but easily arousable, received pain medication not long before my visit. Cousin at bedside, states that she brought her some food and patient ate a bit.    Review of Systems: 12 point review of systems otherwise negative    MEDICATIONS  (STANDING):  enoxaparin Injectable 40 milliGRAM(s) SubCutaneous every 24 hours  HYDROmorphone  Injectable 0.5 milliGRAM(s) IV Push every 4 hours  lactated ringers. 1000 milliLiter(s) (100 mL/Hr) IV Continuous <Continuous>  lidocaine 1% (Preservative-free) Injectable 50 milliLiter(s) Local Injection once  lidocaine 1%/epinephrine 1:100,000 Inj 20 milliLiter(s) Local Injection once  pantoprazole  Injectable 40 milliGRAM(s) IV Push two times a day  piperacillin/tazobactam IVPB.. 3.375 Gram(s) IV Intermittent every 8 hours    MEDICATIONS  (PRN):  cyclobenzaprine 5 milliGRAM(s) Oral three times a day PRN Muscle Spasm  HYDROmorphone  Injectable 0.5 milliGRAM(s) IV Push every 3 hours PRN Moderate Pain (4 - 6)  HYDROmorphone  Injectable 1 milliGRAM(s) IV Push every 3 hours PRN Severe Pain (7 - 10)  HYDROmorphone  Injectable 1 milliGRAM(s) IV Push once PRN ostomy  lidocaine   4% Patch 1 Patch Transdermal daily PRN back pain      Allergies    No Known Allergies    Intolerances          Vital Signs Last 24 Hrs  T(C): 36.9 (04 Jul 2022 09:00), Max: 37.1 (04 Jul 2022 05:00)  T(F): 98.4 (04 Jul 2022 09:00), Max: 98.8 (04 Jul 2022 05:00)  HR: 121 (04 Jul 2022 09:00) (65 - 121)  BP: 139/87 (04 Jul 2022 09:00) (117/74 - 145/83)  BP(mean): --  RR: 18 (04 Jul 2022 09:00) (18 - 20)  SpO2: 94% (04 Jul 2022 09:00) (94% - 100%)  CAPILLARY BLOOD GLUCOSE          07-03 @ 07:01  -  07-04 @ 07:00  --------------------------------------------------------  IN: 1300 mL / OUT: 880 mL / NET: 420 mL    07-04 @ 07:01 - 07-04 @ 15:22  --------------------------------------------------------  IN: 240 mL / OUT: 0 mL / NET: 240 mL        Physical Exam:    Daily     Daily   General:  Ill appearing, NAD, cachetic  HEENT:  Nonicteric, PERRLA  CV:  RRR, no murmur, no JVD  Lungs:  CTA B/L, no wheezes, rales, rhonchi  Abdomen:  Soft, non-tender, no distended, positive BS, +SARAH drain in place, area was wet  Extremities:  2+ pulses, no c/c, +LE edema  Skin:  Warm and dry, no rashes  :  No hilliard  Neuro:  AAOx3, non-focal, CN II-XII grossly intact  No Restraints    LABS:                  RADIOLOGY & ADDITIONAL TESTS:  Reviewed by me

## 2022-07-04 NOTE — PROGRESS NOTE ADULT - ASSESSMENT
52y  HD#29 with recurrent cervical CA admitted for management of neutropenic fever, now s/p emergent exploratory laparotomy, abdominal washout, rectosigmoid colectomy, diverting colostomy, bronchoscopy and Abthera placement on 22 for bowel perforation. Patient s/p washout and bridging vicryl mesh placement to close fascial gap on . She is s/p wound vac, discontinued  given signs of infection. Septic with persistent tachycardia, tachypnea and hypotension.  Patient with declining mental status, only intermittently following commands or responsive to name. Guarded prognosis at this time. Following extensive discussion with Palliative care, Ethics and surgery teams on board, patient code status now DNR/DNI.     Neuro: IV Tylenol, Dilaudid for pain, ATC and PRN dosing adjusted for patient comfort.  Appreciate GHOS and Palliative recommendations.  CV: Persistently tachycardic.   - CT A/P and CT angio () negative for PE   - h/o HTN: Lopressor 50mg BID, currently holding 2/2 low-normal range BPs  Pulm: Tachypneic. Maintaining appropriate SpO2.   GI: Regular diet    Tiffanie Cowan, PGY-2  - Ostomy: area of separation packed w/ quacel, ostomy powder and liquid barrier, hydrocolloid dressing.  - Senna, Protonix BID  : B/l nephrostomy tubes. ASHLEY w/ increased Creatintine to 1.43 - likely in setting of sepsis. No further labs in setting of declining status, poor prognosis at this time. Appreciate hospitalist recs.   FEN: LR@100.   Heme: DVT ppx: Lovenox, SCDs while in bed.    - Thrombocytopenia: likely multifactorial. Appreciate Heme Onc recs.  ID: Ostomy noted to leak into subcutaneous tissue. Increasing leukocytosis, fevers.   - Continue Zosyn (- ) for broad antibiotic coverage.   - F/u BCx, UCx ()   - s/p Caspofungin and Cefepime  - S/p Zarixo (-6/10) for neutropenic fever.   - S/p Zosyn (-)  MSK: Currently unable to participate with PT/OT. Previously evaluated by PMR, recommended subacute rehab, however patient unable to participate at this time. C/w Lidocaine patches and Flexeril PRN for muscle spasms    Dispo: Continue inpatient management, code status = DNR/DNI.

## 2022-07-04 NOTE — PROGRESS NOTE ADULT - ATTENDING COMMENTS
Patient seen and examined, agree with gyn onc fellow and resident  Clinically improving  Pain control adequate  Continue antibiotics

## 2022-07-05 DIAGNOSIS — D62 ACUTE POSTHEMORRHAGIC ANEMIA: ICD-10-CM

## 2022-07-05 LAB
ANION GAP SERPL CALC-SCNC: 14 MMOL/L — SIGNIFICANT CHANGE UP (ref 7–14)
BASOPHILS # BLD AUTO: 0 K/UL — SIGNIFICANT CHANGE UP (ref 0–0.2)
BASOPHILS # BLD AUTO: 0.02 K/UL — SIGNIFICANT CHANGE UP (ref 0–0.2)
BASOPHILS NFR BLD AUTO: 0 % — SIGNIFICANT CHANGE UP (ref 0–2)
BASOPHILS NFR BLD AUTO: 0.3 % — SIGNIFICANT CHANGE UP (ref 0–2)
BLD GP AB SCN SERPL QL: NEGATIVE — SIGNIFICANT CHANGE UP
BUN SERPL-MCNC: 32 MG/DL — HIGH (ref 7–23)
CALCIUM SERPL-MCNC: 7.7 MG/DL — LOW (ref 8.4–10.5)
CHLORIDE SERPL-SCNC: 106 MMOL/L — SIGNIFICANT CHANGE UP (ref 98–107)
CO2 SERPL-SCNC: 21 MMOL/L — LOW (ref 22–31)
CREAT SERPL-MCNC: 0.99 MG/DL — SIGNIFICANT CHANGE UP (ref 0.5–1.3)
CULTURE RESULTS: SIGNIFICANT CHANGE UP
CULTURE RESULTS: SIGNIFICANT CHANGE UP
EGFR: 68 ML/MIN/1.73M2 — SIGNIFICANT CHANGE UP
EOSINOPHIL # BLD AUTO: 0.17 K/UL — SIGNIFICANT CHANGE UP (ref 0–0.5)
EOSINOPHIL # BLD AUTO: 0.24 K/UL — SIGNIFICANT CHANGE UP (ref 0–0.5)
EOSINOPHIL NFR BLD AUTO: 2.3 % — SIGNIFICANT CHANGE UP (ref 0–6)
EOSINOPHIL NFR BLD AUTO: 3.5 % — SIGNIFICANT CHANGE UP (ref 0–6)
GLUCOSE SERPL-MCNC: 91 MG/DL — SIGNIFICANT CHANGE UP (ref 70–99)
HCT VFR BLD CALC: 19.5 % — CRITICAL LOW (ref 34.5–45)
HCT VFR BLD CALC: 25.1 % — LOW (ref 34.5–45)
HGB BLD-MCNC: 6.2 G/DL — CRITICAL LOW (ref 11.5–15.5)
HGB BLD-MCNC: 8 G/DL — LOW (ref 11.5–15.5)
IANC: 5.03 K/UL — SIGNIFICANT CHANGE UP (ref 1.8–7.4)
IANC: 5.26 K/UL — SIGNIFICANT CHANGE UP (ref 1.8–7.4)
IMM GRANULOCYTES NFR BLD AUTO: 2.4 % — HIGH (ref 0–1.5)
LYMPHOCYTES # BLD AUTO: 0.55 K/UL — LOW (ref 1–3.3)
LYMPHOCYTES # BLD AUTO: 1.08 K/UL — SIGNIFICANT CHANGE UP (ref 1–3.3)
LYMPHOCYTES # BLD AUTO: 14.5 % — SIGNIFICANT CHANGE UP (ref 13–44)
LYMPHOCYTES # BLD AUTO: 7.9 % — LOW (ref 13–44)
MAGNESIUM SERPL-MCNC: 1.4 MG/DL — LOW (ref 1.6–2.6)
MCHC RBC-ENTMCNC: 29.1 PG — SIGNIFICANT CHANGE UP (ref 27–34)
MCHC RBC-ENTMCNC: 29.7 PG — SIGNIFICANT CHANGE UP (ref 27–34)
MCHC RBC-ENTMCNC: 31.3 GM/DL — LOW (ref 32–36)
MCHC RBC-ENTMCNC: 31.9 GM/DL — LOW (ref 32–36)
MCV RBC AUTO: 93 FL — SIGNIFICANT CHANGE UP (ref 80–100)
MCV RBC AUTO: 93.3 FL — SIGNIFICANT CHANGE UP (ref 80–100)
MONOCYTES # BLD AUTO: 0.3 K/UL — SIGNIFICANT CHANGE UP (ref 0–0.9)
MONOCYTES # BLD AUTO: 0.75 K/UL — SIGNIFICANT CHANGE UP (ref 0–0.9)
MONOCYTES NFR BLD AUTO: 10.1 % — SIGNIFICANT CHANGE UP (ref 2–14)
MONOCYTES NFR BLD AUTO: 4.4 % — SIGNIFICANT CHANGE UP (ref 2–14)
NEUTROPHILS # BLD AUTO: 5.26 K/UL — SIGNIFICANT CHANGE UP (ref 1.8–7.4)
NEUTROPHILS # BLD AUTO: 5.75 K/UL — SIGNIFICANT CHANGE UP (ref 1.8–7.4)
NEUTROPHILS NFR BLD AUTO: 70.4 % — SIGNIFICANT CHANGE UP (ref 43–77)
NEUTROPHILS NFR BLD AUTO: 81.6 % — HIGH (ref 43–77)
NRBC # BLD: 0 /100 WBCS — SIGNIFICANT CHANGE UP
NRBC # FLD: 0.04 K/UL — HIGH
OB PNL STL: POSITIVE
PHOSPHATE SERPL-MCNC: 2.6 MG/DL — SIGNIFICANT CHANGE UP (ref 2.5–4.5)
PLATELET # BLD AUTO: 115 K/UL — LOW (ref 150–400)
PLATELET # BLD AUTO: 120 K/UL — LOW (ref 150–400)
POTASSIUM SERPL-MCNC: 2.8 MMOL/L — CRITICAL LOW (ref 3.5–5.3)
POTASSIUM SERPL-SCNC: 2.8 MMOL/L — CRITICAL LOW (ref 3.5–5.3)
RBC # BLD: 2.13 M/UL — LOW (ref 3.8–5.2)
RBC # BLD: 2.69 M/UL — LOW (ref 3.8–5.2)
RBC # FLD: 16.6 % — HIGH (ref 10.3–14.5)
RBC # FLD: 16.7 % — HIGH (ref 10.3–14.5)
RH IG SCN BLD-IMP: POSITIVE — SIGNIFICANT CHANGE UP
SODIUM SERPL-SCNC: 141 MMOL/L — SIGNIFICANT CHANGE UP (ref 135–145)
SPECIMEN SOURCE: SIGNIFICANT CHANGE UP
SPECIMEN SOURCE: SIGNIFICANT CHANGE UP
WBC # BLD: 6.9 K/UL — SIGNIFICANT CHANGE UP (ref 3.8–10.5)
WBC # BLD: 7.46 K/UL — SIGNIFICANT CHANGE UP (ref 3.8–10.5)
WBC # FLD AUTO: 6.9 K/UL — SIGNIFICANT CHANGE UP (ref 3.8–10.5)
WBC # FLD AUTO: 7.46 K/UL — SIGNIFICANT CHANGE UP (ref 3.8–10.5)

## 2022-07-05 PROCEDURE — 99233 SBSQ HOSP IP/OBS HIGH 50: CPT

## 2022-07-05 RX ORDER — MAGNESIUM SULFATE 500 MG/ML
2 VIAL (ML) INJECTION EVERY 4 HOURS
Refills: 0 | Status: COMPLETED | OUTPATIENT
Start: 2022-07-05 | End: 2022-07-05

## 2022-07-05 RX ORDER — OXYCODONE HYDROCHLORIDE 5 MG/1
15 TABLET ORAL EVERY 12 HOURS
Refills: 0 | Status: DISCONTINUED | OUTPATIENT
Start: 2022-07-05 | End: 2022-07-12

## 2022-07-05 RX ORDER — MIRTAZAPINE 45 MG/1
15 TABLET, ORALLY DISINTEGRATING ORAL DAILY
Refills: 0 | Status: DISCONTINUED | OUTPATIENT
Start: 2022-07-05 | End: 2022-07-20

## 2022-07-05 RX ORDER — POTASSIUM CHLORIDE 20 MEQ
10 PACKET (EA) ORAL
Refills: 0 | Status: COMPLETED | OUTPATIENT
Start: 2022-07-05 | End: 2022-07-05

## 2022-07-05 RX ORDER — SIMETHICONE 80 MG/1
80 TABLET, CHEWABLE ORAL THREE TIMES A DAY
Refills: 0 | Status: DISCONTINUED | OUTPATIENT
Start: 2022-07-05 | End: 2022-07-20

## 2022-07-05 RX ADMIN — HYDROMORPHONE HYDROCHLORIDE 0.5 MILLIGRAM(S): 2 INJECTION INTRAMUSCULAR; INTRAVENOUS; SUBCUTANEOUS at 01:57

## 2022-07-05 RX ADMIN — Medication 25 GRAM(S): at 18:51

## 2022-07-05 RX ADMIN — SODIUM CHLORIDE 100 MILLILITER(S): 9 INJECTION, SOLUTION INTRAVENOUS at 21:50

## 2022-07-05 RX ADMIN — Medication 25 GRAM(S): at 21:41

## 2022-07-05 RX ADMIN — PIPERACILLIN AND TAZOBACTAM 25 GRAM(S): 4; .5 INJECTION, POWDER, LYOPHILIZED, FOR SOLUTION INTRAVENOUS at 17:09

## 2022-07-05 RX ADMIN — OXYCODONE HYDROCHLORIDE 15 MILLIGRAM(S): 5 TABLET ORAL at 18:21

## 2022-07-05 RX ADMIN — OXYCODONE HYDROCHLORIDE 15 MILLIGRAM(S): 5 TABLET ORAL at 18:51

## 2022-07-05 RX ADMIN — PIPERACILLIN AND TAZOBACTAM 25 GRAM(S): 4; .5 INJECTION, POWDER, LYOPHILIZED, FOR SOLUTION INTRAVENOUS at 08:22

## 2022-07-05 RX ADMIN — MIRTAZAPINE 15 MILLIGRAM(S): 45 TABLET, ORALLY DISINTEGRATING ORAL at 13:08

## 2022-07-05 RX ADMIN — Medication 100 MILLIEQUIVALENT(S): at 13:58

## 2022-07-05 RX ADMIN — HYDROMORPHONE HYDROCHLORIDE 0.5 MILLIGRAM(S): 2 INJECTION INTRAMUSCULAR; INTRAVENOUS; SUBCUTANEOUS at 08:45

## 2022-07-05 RX ADMIN — HYDROMORPHONE HYDROCHLORIDE 0.5 MILLIGRAM(S): 2 INJECTION INTRAMUSCULAR; INTRAVENOUS; SUBCUTANEOUS at 02:11

## 2022-07-05 RX ADMIN — HYDROMORPHONE HYDROCHLORIDE 0.5 MILLIGRAM(S): 2 INJECTION INTRAMUSCULAR; INTRAVENOUS; SUBCUTANEOUS at 13:22

## 2022-07-05 RX ADMIN — Medication 100 MILLIEQUIVALENT(S): at 12:36

## 2022-07-05 RX ADMIN — HYDROMORPHONE HYDROCHLORIDE 0.5 MILLIGRAM(S): 2 INJECTION INTRAMUSCULAR; INTRAVENOUS; SUBCUTANEOUS at 13:07

## 2022-07-05 RX ADMIN — PANTOPRAZOLE SODIUM 40 MILLIGRAM(S): 20 TABLET, DELAYED RELEASE ORAL at 18:21

## 2022-07-05 RX ADMIN — Medication 100 MILLIEQUIVALENT(S): at 15:04

## 2022-07-05 RX ADMIN — HYDROMORPHONE HYDROCHLORIDE 0.5 MILLIGRAM(S): 2 INJECTION INTRAMUSCULAR; INTRAVENOUS; SUBCUTANEOUS at 08:29

## 2022-07-05 NOTE — PROGRESS NOTE ADULT - SUBJECTIVE AND OBJECTIVE BOX
R3 Progress Note     Patient seen and examined at bedside. Patient states that she is frustrated, wants to leave the hospital. She feels that being hospitalized has made her "depressed." She would like to move from the bed to the chair but is unable to without assistance. She does not endorse any pain at this time.     MEDICATIONS  (STANDING):  enoxaparin Injectable 40 milliGRAM(s) SubCutaneous every 24 hours  HYDROmorphone  Injectable 0.5 milliGRAM(s) IV Push every 4 hours  lactated ringers. 1000 milliLiter(s) (100 mL/Hr) IV Continuous <Continuous>  lidocaine 1% (Preservative-free) Injectable 50 milliLiter(s) Local Injection once  lidocaine 1%/epinephrine 1:100,000 Inj 20 milliLiter(s) Local Injection once  pantoprazole  Injectable 40 milliGRAM(s) IV Push two times a day  piperacillin/tazobactam IVPB.. 3.375 Gram(s) IV Intermittent every 8 hours    MEDICATIONS  (PRN):  cyclobenzaprine 5 milliGRAM(s) Oral three times a day PRN Muscle Spasm  HYDROmorphone  Injectable 0.5 milliGRAM(s) IV Push every 3 hours PRN Moderate Pain (4 - 6)  HYDROmorphone  Injectable 1 milliGRAM(s) IV Push every 3 hours PRN Severe Pain (7 - 10)  HYDROmorphone  Injectable 1 milliGRAM(s) IV Push once PRN ostomy  lidocaine   4% Patch 1 Patch Transdermal daily PRN back pain        Vital Signs Last 24 Hrs  T(C): 36.5 (05 Jul 2022 01:40), Max: 36.9 (04 Jul 2022 09:00)  T(F): 97.7 (05 Jul 2022 01:40), Max: 98.4 (04 Jul 2022 09:00)  HR: 105 (05 Jul 2022 06:00) (105 - 121)  BP: 122/89 (05 Jul 2022 06:00) (122/89 - 147/89)  BP(mean): --  RR: 20 (05 Jul 2022 06:00) (18 - 20)  SpO2: 98% (05 Jul 2022 06:00) (94% - 100%)    07-04 @ 07:01  -  07-05 @ 07:00  --------------------------------------------------------  IN: 2980 mL / OUT: 1460 mL / NET: 1520 mL      Physical Exam:  Constitutional: Pt awake, conversational. A&Ox3. Visibly upset   CV: Tachycardic, regular S1/S2  Lungs: CTA b/l   Abd: Soft, non-distended. Diffusely tender. Packing in place w/ overlying abdominal pad with purulent drainage at umbilicus, ostomy w/ mucocutaneous separation from 6-9 o'clock extending 1cmc under skin w/ feculent material and fibrinous material, small drain lateral. Brown liquid/stool output in ostomy bag; +3 Ric drains with increased seropurulent output in RLW, LUQ, LLQ. LLQ drain not holding suction well. LUQ drain with oozing at site.   : R&L nephrostomy tubes draining clear urine.  Extremities: No pain, 2+ pitting edema.    Labs, additional tests:    07-05    x   |  x   |  32<H>  ----------------------------<  91  x    |  21<L>  |  0.99    Ca    7.7<L>      05 Jul 2022 05:30  Phos  2.6     07-05  Mg     1.40     07-05

## 2022-07-05 NOTE — ADVANCED PRACTICE NURSE CONSULT - ASSESSMENT
Patient sitting in chair at this time receiving blood transfusion. Pouch intact, patient denies any leakage. Primary RN Ute denies any leaking overnight.

## 2022-07-05 NOTE — PROGRESS NOTE ADULT - SUBJECTIVE AND OBJECTIVE BOX
[facility] Geriatrics and Palliative Care  Shannon Schwab, Palliative Care Attending  Contact Info: Page [phone] (including Nights/Weekends), message on Microsoft Teams (Shannon Schwab), or leave  at Palliative Office [phone] (non-urgent)     SUBJECTIVE AND OBJECTIVE: Patient seen this AM awake, alert, able to verbalize her needs. She states that the pain medications are alleviating her pain, however, she feels that she is experiencing sedation with them. She asked for lower doses of pain medication to be given during the day and reserve the higher dose PRN for the night time. She states her pain in the back is localized and intermittent. Denies abdominal pain. She feels that she is "getting depressed" being in the hospital for so long and wants to be able to sit on reclining chair and look out the window.     Indication for Geriatrics and Palliative Care Services/INTERVAL HPI:   > 6/29: Chart notes reviewed. Over the past 24 hours, patient required PRNs of 10mg PO oxycodone x2.   > 6/30: Chart notes reviewed. Over the past 24 hours, patient required PRNs of IV dilaudid 0.5mg x1, Po oxycodone 10mg x2, PO oxycodone 5mg x1.   > 7/5: Chart reviewed. Over the past 24 hours, patient required PRNs of IV dilaudid 1mg x1.     DNR on chart:Yes  Yes      Allergies    No Known Allergies    Intolerances    MEDICATIONS  (STANDING):  enoxaparin Injectable 40 milliGRAM(s) SubCutaneous every 24 hours  lactated ringers. 1000 milliLiter(s) (100 mL/Hr) IV Continuous <Continuous>  lidocaine 1% (Preservative-free) Injectable 50 milliLiter(s) Local Injection once  magnesium sulfate  IVPB 2 Gram(s) IV Intermittent every 4 hours  mirtazapine 15 milliGRAM(s) Oral daily  oxyCODONE  ER Tablet 15 milliGRAM(s) Oral every 12 hours  pantoprazole  Injectable 40 milliGRAM(s) IV Push two times a day  piperacillin/tazobactam IVPB.. 3.375 Gram(s) IV Intermittent every 8 hours    MEDICATIONS  (PRN):  cyclobenzaprine 5 milliGRAM(s) Oral three times a day PRN Muscle Spasm  HYDROmorphone  Injectable 0.5 milliGRAM(s) IV Push every 3 hours PRN Moderate Pain (4 - 6)  HYDROmorphone  Injectable 1 milliGRAM(s) IV Push every 3 hours PRN Severe Pain (7 - 10)  HYDROmorphone  Injectable 1 milliGRAM(s) IV Push once PRN ostomy  lidocaine   4% Patch 1 Patch Transdermal daily PRN back pain  simethicone 80 milliGRAM(s) Chew three times a day PRN Gas      ITEMS UNCHECKED ARE NOT PRESENT    PRESENT SYMPTOMS: [ ]Unable to self-report - see [ ] CPOT [ ] PAINADS [ ] RDOS  Source if other than patient:  [ ]Family   [ ]Team     Pain:  [x ]yes [ ]no  QOL impact - difficulty performing ADLs   Location -   low back pain   Aggravating factors -   Quality -  Radiation - denies  Timing- intermittent   Severity (0-10 scale): 8  Minimal acceptable level (0-10 scale): 3    CPOT:    https://www.[facility].org/getattachment/[email or web address]/Critical-Care-Pain-Observation-Tool-(CPOT)    PAIN AD Score:	  http://geriatrictoolkit.[facility]/cog/painad.pdf (Ctrl + left click to view)    Dyspnea:                           [ ]Mild [ ]Moderate [ ]Severe    RDOS:  0 to 2  minimal or no respiratory distress   3  mild distress  4 to 6 moderate distress  >7 severe distress  https://homecareinformation.net/handouts/hen/Respiratory_Distress_Observation_Scale.pdf (Ctrl +  left click to view)     Anxiety:                             [ ]Mild [ ]Moderate [ ]Severe  Fatigue:                             [ ]Mild [ ]Moderate [ ]Severe  Nausea:                             [ ]Mild [ ]Moderate [ ]Severe  Loss of appetite:              [ ]Mild [ ]Moderate [ ]Severe  Constipation:                    [ ]Mild [ ]Moderate [ ]Severe    PCSSQ[Palliative Care Spiritual Screening Question]   Severity (0-10):  Score of 4 or > indicate consideration of Chaplaincy referral.  Chaplaincy Referral: [ ] yes [ ] refused [ ] following    Other Symptoms:  [ ]All other review of systems negative     Palliative Performance Status Version 2:    40     %      http://npcrc.org/files/news/palliative_performance_scale_ppsv2.pdf  PHYSICAL EXAM:  Vital Signs Last 24 Hrs  T(C): 36.8 (05 Jul 2022 10:00), Max: 36.8 (05 Jul 2022 10:00)  T(F): 98.2 (05 Jul 2022 10:00), Max: 98.2 (05 Jul 2022 10:00)  HR: 118 (05 Jul 2022 10:00) (105 - 118)  BP: 132/89 (05 Jul 2022 10:00) (122/89 - 147/89)  BP(mean): --  RR: 18 (05 Jul 2022 10:00) (18 - 20)  SpO2: 94% (05 Jul 2022 10:00) (94% - 100%) I&O's Summary    04 Jul 2022 07:01  -  05 Jul 2022 07:00  --------------------------------------------------------  IN: 2980 mL / OUT: 1460 mL / NET: 1520 mL    05 Jul 2022 07:01  -  05 Jul 2022 17:22  --------------------------------------------------------  IN: 0 mL / OUT: 570 mL / NET: -570 mL       GENERAL: [ ]Cachexia    [x ]Alert  [ x]Oriented x4   [ ]Lethargic  [ ]Unarousable  [ x]Verbal  [ ]Non-Verbal  Behavioral:   [ ] Anxiety  [ ] Delirium [ ] Agitation [x ] Other  HEENT:  [ ]Normal   [x ]Dry mouth   [ ]ET Tube/Trach  [ ]Oral lesions  PULMONARY:   [ x]Clear [ ]Tachypnea  [ ]Audible excessive secretions   [ ]Rhonchi        [ ]Right [ ]Left [ ]Bilateral  [ ]Crackles        [ ]Right [ ]Left [ ]Bilateral  [ ]Wheezing     [ ]Right [ ]Left [ ]Bilateral  [ ]Diminished breath sounds [ ]right [ ]left [ ]bilateral  CARDIOVASCULAR:    [ ]Regular [ ]Irregular [ x]Tachy  [ ]Moi [ ]Murmur [ ]Other  GASTROINTESTINAL: dressings in place   [ ]Soft  [ ]Distended   [ ]+BS  [ ]Non tender [ ]Tender  [ ]Other [ ]PEG [ ]OGT/ NGT  Last BM: +colostomy   GENITOURINARY: +b/l nephrostomy tubes   [ ]Normal [ ] Incontinent   [ ]Oliguria/Anuria   [ ]Infante  MUSCULOSKELETAL:   [ ]Normal   [ x]Weakness  [x ]Bed/Wheelchair bound [ ]Edema  NEUROLOGIC:   [ ]No focal deficits  [x ]Cognitive impairment  [ ]Dysphagia [ ]Dysarthria [ ]Paresis [ ]Other   SKIN: Please see nursing flowsheets   [ ]Normal  [ ]Rash  [ ]Other  [ ]Pressure ulcer(s)       Present on admission [ ]y [ ]n    CRITICAL CARE:  [ ] Shock Present  [ ]Septic [ ]Cardiogenic [ ]Neurologic [ ]Hypovolemic  [ ]  Vasopressors [ ]  Inotropes   [ ]Respiratory failure present [ ]Mechanical ventilation [ ]Non-invasive ventilatory support [ ]High flow    [ ]Acute  [ ]Chronic [ ]Hypoxic  [ ]Hypercarbic [ ]Other  [ ]Other organ failure     LABS:                        6.2    6.90  )-----------( 115      ( 05 Jul 2022 11:20 )             19.5   07-05    141  |  106  |  32<H>  ----------------------------<  91  2.8<LL>   |  21<L>  |  0.99    Ca    7.7<L>      05 Jul 2022 05:30  Phos  2.6     07-05  Mg     1.40     07-05      RADIOLOGY & ADDITIONAL STUDIES: n/a     Protein Calorie Malnutrition Present: [ ]mild [ ]moderate [ ]severe [ ]underweight [ ]morbid obesity  https://www.andeal.org/vault/2440/web/files/ONC/Table_Clinical%20Characteristics%20to%20Document%20Malnutrition-White%20JV%20et%20al%202012.pdf    Height (cm): 167 (06-14-22 @ 14:48), 167.6 (06-04-22 @ 15:52), 167.6 (02-08-22 @ 10:03)  Weight (kg): 100.4 (06-14-22 @ 14:48), 98.9 (06-04-22 @ 15:52), 110.7 (02-08-22 @ 10:03)  BMI (kg/m2): 36 (06-14-22 @ 14:48), 35.2 (06-04-22 @ 15:52), 39.4 (02-08-22 @ 10:03)    [ ]PPSV2 < or = 30%  [ ]significant weight loss [ ]poor nutritional intake [ ]anasarca[ ]Artificial Nutrition    Other REFERRALS:  [ ]Hospice  [ ]Child Life  [ ]Social Work  [ ]Case management [ ]Holistic Therapy     Goals of Care Document:MARGOT Avina (07-01-22 @ 16:25)  Goals of Care Conversation:   Participants:  · Participants  Family; Staff; Pt unable to participate  · Provider  Dr. Schwab  ·   Kaylynn Avina    Advance Directives:  · Does patient have Advance Directive  Yes  · Indicate Type  Health Care Proxy (HCP)  · Agent's Name  Jeff Oleary (domestic partner)  · Phone #  [phone]  · Caregiver:  declines    Conversation Discussion:  · Conversation  MOLST Discussed  · Conversation Details  Palliative provider spoke with patient's daughter, Lydia and her father, Jeff Oleary. They understand the risks and benefits of resuscitative measures. They are in agreement with DNR/DNI and completion of MOLST form. They do want to continue with symptom management, abx and current medical plan of care.     MOLST form to be completed for DNR/DNI    Personal Advance Directives Treatment Guidelines:   Treatment Guidelines:  · Decision Maker  Health Care Proxy  · Treatment Guidelines  DNR Order; DNI    MOLST:  · Completed  01-Jul-2022    Location of Discussion:   Time Spent on Advance Care Planning:  I spent 16 (in minutes) on advance care planning services with the patient.  This time is separate and distinct from any other care management services provided on this date.    Location of Discussion:  · Location of discussion  Telephone      Electronic Signatures:  Kaylynn Avina ([facility] [place])  (Signed 01-Jul-2022 16:30)  	Authored: Goals of Care Conversation, Personal Advance Directives Treatment Guidelines, Location of Discussion      Last Updated: 01-Jul-2022 16:30 by Kaylynn Avina ([facility] [place])

## 2022-07-05 NOTE — PROGRESS NOTE ADULT - PROBLEM SELECTOR PLAN 4
s/p exlap, abdominal washout, rectosigmoid colectomy, diverting colostomy, bronchoscopy  s/p washout and bridging mesh placement on 6/14 and skin closure device placed on 6/17   > pain management as above  > surgery recs appreciated regarding GIB w/u   > On IV abx per primary team

## 2022-07-05 NOTE — PROGRESS NOTE ADULT - SUBJECTIVE AND OBJECTIVE BOX
GENERAL SURGERY DAILY PROGRESS NOTE:    Subjective:  Patient seen and examined. Reports some pain    Vital Signs Last 24 Hrs  T(C): 36.8 (05 Jul 2022 10:00), Max: 36.8 (05 Jul 2022 10:00)  T(F): 98.2 (05 Jul 2022 10:00), Max: 98.2 (05 Jul 2022 10:00)  HR: 118 (05 Jul 2022 10:00) (105 - 118)  BP: 132/89 (05 Jul 2022 10:00) (122/89 - 147/89)  BP(mean): --  RR: 18 (05 Jul 2022 10:00) (18 - 20)  SpO2: 94% (05 Jul 2022 10:00) (94% - 100%)    Exam:  Gen: NAD, resting in bed  Resp: Airway patent, non-labored respirations  Abd: Soft, ND, mildly T, no rebound or guarding. dressing c/d/i; colostomy with clots and blood-tinged liquid stool  Ext: No edema, WWP  Neuro: AAOx2, no focal deficits    I&O's Detail    04 Jul 2022 07:01  -  05 Jul 2022 07:00  --------------------------------------------------------  IN:    IV PiggyBack: 200 mL    Lactated Ringers: 2200 mL    Oral Fluid: 580 mL  Total IN: 2980 mL    OUT:    Colostomy (mL): 310 mL    Drain (mL): 160 mL    Drain (mL): 105 mL    Drain (mL): 60 mL    Emesis (mL): 0 mL    Nephrostomy Tube (mL): 425 mL    Nephrostomy Tube (mL): 400 mL  Total OUT: 1460 mL    Total NET: 1520 mL          Daily     Daily     MEDICATIONS  (STANDING):  enoxaparin Injectable 40 milliGRAM(s) SubCutaneous every 24 hours  HYDROmorphone  Injectable 0.5 milliGRAM(s) IV Push every 4 hours  lactated ringers. 1000 milliLiter(s) (100 mL/Hr) IV Continuous <Continuous>  lidocaine 1% (Preservative-free) Injectable 50 milliLiter(s) Local Injection once  lidocaine 1%/epinephrine 1:100,000 Inj 20 milliLiter(s) Local Injection once  mirtazapine 15 milliGRAM(s) Oral daily  pantoprazole  Injectable 40 milliGRAM(s) IV Push two times a day  piperacillin/tazobactam IVPB.. 3.375 Gram(s) IV Intermittent every 8 hours  potassium chloride  10 mEq/100 mL IVPB 10 milliEquivalent(s) IV Intermittent every 1 hour    MEDICATIONS  (PRN):  cyclobenzaprine 5 milliGRAM(s) Oral three times a day PRN Muscle Spasm  HYDROmorphone  Injectable 0.5 milliGRAM(s) IV Push every 3 hours PRN Moderate Pain (4 - 6)  HYDROmorphone  Injectable 1 milliGRAM(s) IV Push every 3 hours PRN Severe Pain (7 - 10)  HYDROmorphone  Injectable 1 milliGRAM(s) IV Push once PRN ostomy  lidocaine   4% Patch 1 Patch Transdermal daily PRN back pain  simethicone 80 milliGRAM(s) Chew three times a day PRN Gas      LABS:                        6.2    6.90  )-----------( 115      ( 05 Jul 2022 11:20 )             19.5     07-05    141  |  106  |  32<H>  ----------------------------<  91  2.8<LL>   |  21<L>  |  0.99    Ca    7.7<L>      05 Jul 2022 05:30  Phos  2.6     07-05  Mg     1.40     07-05

## 2022-07-05 NOTE — PROGRESS NOTE ADULT - ATTENDING COMMENTS
52F s/p ex-lap, Hartmans procedure, with multiple washouts with closure of abdominal wall with vicryl for perforated sigmoid. Pt is DNI/DNR, now with concern for GIB    Awake and appropriate  Ostomy with maroon output  Ric drains purluent  Midline dressing in place      Plan for GI bleed w/u would be predicated on GoC and pt/family wishes.    Doubt any significant intervention will alter overall outcome given pt's extensive disease.    Would leave all surgical drain in place

## 2022-07-05 NOTE — PROGRESS NOTE ADULT - SUBJECTIVE AND OBJECTIVE BOX
CHIEF COMPLAINT: f/u neutropenic fever    SUBJECTIVE / OVERNIGHT EVENTS: Patient seen and examined. No acute events overnight. Pain well controlled and patient without any complaints.    MEDICATIONS  (STANDING):  enoxaparin Injectable 40 milliGRAM(s) SubCutaneous every 24 hours  HYDROmorphone  Injectable 0.5 milliGRAM(s) IV Push every 4 hours  lactated ringers. 1000 milliLiter(s) (100 mL/Hr) IV Continuous <Continuous>  lidocaine 1% (Preservative-free) Injectable 50 milliLiter(s) Local Injection once  lidocaine 1%/epinephrine 1:100,000 Inj 20 milliLiter(s) Local Injection once  mirtazapine 15 milliGRAM(s) Oral daily  pantoprazole  Injectable 40 milliGRAM(s) IV Push two times a day  piperacillin/tazobactam IVPB.. 3.375 Gram(s) IV Intermittent every 8 hours    MEDICATIONS  (PRN):  cyclobenzaprine 5 milliGRAM(s) Oral three times a day PRN Muscle Spasm  HYDROmorphone  Injectable 0.5 milliGRAM(s) IV Push every 3 hours PRN Moderate Pain (4 - 6)  HYDROmorphone  Injectable 1 milliGRAM(s) IV Push every 3 hours PRN Severe Pain (7 - 10)  HYDROmorphone  Injectable 1 milliGRAM(s) IV Push once PRN ostomy  lidocaine   4% Patch 1 Patch Transdermal daily PRN back pain  simethicone 80 milliGRAM(s) Chew three times a day PRN Gas      VITALS:  T(F): 97.7 (07-05-22 @ 01:40), Max: 98 (07-04-22 @ 14:00)  HR: 105 (07-05-22 @ 06:00) (105 - 118)  BP: 122/89 (07-05-22 @ 06:00) (122/89 - 147/89)  RR: 20 (07-05-22 @ 06:00) (19 - 20)  SpO2: 98% (07-05-22 @ 06:00)    PHYSICAL EXAM:  GENERAL: ill appearing, cachetic, NAD  CHEST/LUNG: Clear to auscultation bilaterally; No wheeze  HEART: Regular rate and rhythm; No murmurs, rubs, or gallops  ABDOMEN: Soft, Nontender, Nondistended; Bowel sounds present; SARAH drain in place; ostomy in place with ?erythematous drainage  EXTREMITIES:  2+ Peripheral Pulses, No clubbing, cyanosis, or edema  SKIN: SARAH drain in place    LABS:              x                    141  | 21   | 32           x     >-----------< x       ------------------------< 91                    x                    2.8  | 106  | 0.99                                         Ca 7.7   Mg 1.40  Ph 2.6      [ ] Consultant(s) Notes Reviewed:  [x] Care Discussed with Consultants/Other Providers: Gyn-Onc WON Mina - discussed given patient more alert her disposition and PT recs   CHIEF COMPLAINT: f/u neutropenic fever    SUBJECTIVE / OVERNIGHT EVENTS: Patient seen and examined. No acute events overnight. Patient appears upset and would like to leave the hospital. Was able to sit at the edge of bed with PT. Patient denies any chest pain or SOB.     MEDICATIONS  (STANDING):  enoxaparin Injectable 40 milliGRAM(s) SubCutaneous every 24 hours  HYDROmorphone  Injectable 0.5 milliGRAM(s) IV Push every 4 hours  lactated ringers. 1000 milliLiter(s) (100 mL/Hr) IV Continuous <Continuous>  lidocaine 1% (Preservative-free) Injectable 50 milliLiter(s) Local Injection once  lidocaine 1%/epinephrine 1:100,000 Inj 20 milliLiter(s) Local Injection once  mirtazapine 15 milliGRAM(s) Oral daily  pantoprazole  Injectable 40 milliGRAM(s) IV Push two times a day  piperacillin/tazobactam IVPB.. 3.375 Gram(s) IV Intermittent every 8 hours    MEDICATIONS  (PRN):  cyclobenzaprine 5 milliGRAM(s) Oral three times a day PRN Muscle Spasm  HYDROmorphone  Injectable 0.5 milliGRAM(s) IV Push every 3 hours PRN Moderate Pain (4 - 6)  HYDROmorphone  Injectable 1 milliGRAM(s) IV Push every 3 hours PRN Severe Pain (7 - 10)  HYDROmorphone  Injectable 1 milliGRAM(s) IV Push once PRN ostomy  lidocaine   4% Patch 1 Patch Transdermal daily PRN back pain  simethicone 80 milliGRAM(s) Chew three times a day PRN Gas      VITALS:  T(F): 97.7 (07-05-22 @ 01:40), Max: 98 (07-04-22 @ 14:00)  HR: 105 (07-05-22 @ 06:00) (105 - 118)  BP: 122/89 (07-05-22 @ 06:00) (122/89 - 147/89)  RR: 20 (07-05-22 @ 06:00) (19 - 20)  SpO2: 98% (07-05-22 @ 06:00)    PHYSICAL EXAM:  GENERAL: ill appearing, cachetic, NAD  CHEST/LUNG: Clear to auscultation bilaterally; No wheeze  HEART: Regular rate and rhythm; No murmurs, rubs, or gallops  ABDOMEN: Soft, Nontender, Nondistended; Bowel sounds present; SARAH drain in place; ostomy in place with ?erythematous drainage  EXTREMITIES:  2+ Peripheral Pulses, No clubbing, cyanosis, or edema  SKIN: SARAH drain in place    LABS:              x                    141  | 21   | 32           x     >-----------< x       ------------------------< 91                    x                    2.8  | 106  | 0.99                                         Ca 7.7   Mg 1.40  Ph 2.6      [ ] Consultant(s) Notes Reviewed:  [x] Care Discussed with Consultants/Other Providers: Gyn-Onc WON Mina - discussed given patient more alert her disposition and PT recs

## 2022-07-05 NOTE — PROGRESS NOTE ADULT - PROBLEM SELECTOR PLAN 8
-resolved -stop lopressor given sepsis and concern for decompensation  -if patient's mental status continues to improve, will resume lopressor at a later date

## 2022-07-05 NOTE — PROGRESS NOTE ADULT - ASSESSMENT
52y  HD#30 with recurrent cervical CA admitted for management of neutropenic fever, now s/p emergent exploratory laparotomy, abdominal washout, rectosigmoid colectomy, diverting colostomy, bronchoscopy and Abthera placement on 22 for bowel perforation. Patient s/p washout and bridging vicryl mesh placement to close fascial gap on . She is s/p wound vac, discontinued  given signs of infection. Patient with signs of sepsis beginning  including persistent tachycardia, tachypnea and hypotension as well as mental status change.  Following extensive discussion with Palliative care, Ethics and surgery teams on board, patient code status now DNR/DNI. At this time, mental status improved. Vital signs stable, remains tachycardic but otherwise hemodynamically stable and afebrile.     Neuro: IV Tylenol, Dilaudid for pain, ATC and PRN dosing adjusted for patient comfort.  Appreciate GHOS and Palliative recommendations.  CV: Persistently tachycardic.   - CT A/P and CT angio () negative for PE   - h/o HTN: on Lopressor 50mg BID, holding 2/2 sepsis and low BPs, BPs now trending toward baseline   Pulm: Maintaining appropriate SpO2.   GI: Regular diet as tolerated   - Ostomy: area of separation packed w/ quacel, ostomy powder and liquid barrier, hydrocolloid dressing.  - Senna, Protonix BID  : B/l nephrostomy tubes. ASHLEY w/ increased Creatintine likely in setting of sepsis, now downtrending. Appreciate hospitalist recs.   FEN: LR@100.   Heme: DVT ppx: Lovenox, SCDs while in bed.    - Thrombocytopenia: likely multifactorial, now normalized. Appreciate Heme Onc recs.  ID: Ostomy noted to leak into subcutaneous tissue w/ subsequent increasing leukocytosis and fevers. Now afebrile, VS otherwise improved with persistent tachycardia. f/u AM CBC for updated WBC   - Continue Zosyn (- ) for broad antibiotic coverage.   - F/u BCx (), prelim NGTD  - UCx () nl jessica   - s/p Caspofungin and Cefepime  - S/p Zarixo (-6/10) for neutropenic fever.   - S/p Zosyn (-)  MSK: Previously evaluated by PMR with rec for subacute rehab. Significant clinical changes have occurred since this time, will reconsult PT for evaluation and recommendations C/w Lidocaine patches and Flexeril PRN for muscle spasms    Dispo: Continue inpatient management, code status = DNR/DNI.     Marika Lemons MD PGY3

## 2022-07-05 NOTE — PROGRESS NOTE ADULT - PROBLEM SELECTOR PLAN 5
-initially presented with profound neutropenic sepsis  -now resolved -vitals reviewed and patient since admission had HR in 100s-110s  -recent CTPA negative for PE  -c/w with treatment underlying sepsis

## 2022-07-05 NOTE — PROGRESS NOTE ADULT - PROBLEM SELECTOR PLAN 7
-stop lopressor given sepsis and concern for decompensation  -if patient's mental status continues to improve, will resume lopressor at a later date -s/p b/l NT tubes  -draining well   -renal function at baseline

## 2022-07-05 NOTE — PROVIDER CONTACT NOTE (OTHER) - SITUATION
patient alert and confused, attempting to pull out lines, disconnected SARAH's and refused temperature and medications.

## 2022-07-05 NOTE — PROGRESS NOTE ADULT - PROBLEM SELECTOR PLAN 1
-patient with sepsis on 6/30 from neutropenic fever  -CTPA without e/o PE or consolidation but given abdominal wound noted to have stool there is concern for fistulization vs peritonitis.   -c/w wound management per gyn-inc   -currently patient has been on zosyn for peritonitis -- c/w zosyn IV  -very poor prognosis.

## 2022-07-05 NOTE — PROGRESS NOTE ADULT - PROBLEM SELECTOR PLAN 1
GYN ONC Fellow Addendum:    Pt seen and examined at bedside. Agree with above. Early this AM was agitated but appropriate w/ conversation at time of eval and oriented, mental status continues to wax and wane. Pt expressing she wants to go home, d/w pt that she will have extensive needs for care once d/c from hospital and will require around the clock care.    VS reviewed  Labs reviewed, replete K. F/u CBC    Dark red colostomy output today, CBC pending. d/w gen surg  Continue current pain regimen  Tolerating small amounts reg diet  Encourage ambulation and IS use  Replete lytes prn  DVT ppx: Lovenox  Dispo: continued dispo planning w/ case management. d/w pt's family needs    Guadalupe Jay MD

## 2022-07-05 NOTE — PROGRESS NOTE ADULT - PROBLEM SELECTOR PLAN 3
-back to baseline now improved at 0.99 -likely secondary to post-op changes; pt hemodynamically stable; will monitor CBC in am  -s/p several pRBCs  -Hgb today 6.2  -recommending transfusing 1unit given  -f/u stool Guaiac from ostomy bag

## 2022-07-05 NOTE — PROGRESS NOTE ADULT - ASSESSMENT
52F s/p ex-lap, Hartmans procedure, with multiple washouts with closure of abdominal wall with vicryl for perforated sigmoid. Pt is DNI/DNR, now with concern for GIB    Recommendation:  - Would defer to primary team regarding GIB management including blood transfusion in accordance with patient's goals of care  - discussed with surgical oncology fellow    D team  [id number]   52F s/p ex-lap, Hartmans procedure, with multiple washouts with closure of abdominal wall with vicryl for perforated sigmoid. Pt is DNI/DNR, now with concern for GIB    Recommendation:  - Would defer to primary team regarding GIB management including blood transfusion in accordance with patient's goals of care  - follow up with Ethics and palliative  - discussed with surgical oncology fellow    D team  [id number]

## 2022-07-05 NOTE — PROGRESS NOTE ADULT - PROBLEM SELECTOR PLAN 4
-vitals reviewed and patient since admission had HR in 100s-110s  -recent CTPA negative for PE  -c/w with treatment underlying sepsis -back to baseline now improved at 0.99

## 2022-07-05 NOTE — CHART NOTE - NSCHARTNOTEFT_GEN_A_CORE
Pt seen at bedside this afternoon.  +OOB, sitting in amanda, A&Ox3.  Conversing with daughter and cousin who are visiting.      ICU Vital Signs Last 24 Hrs  T(C): 36.5 (05 Jul 2022 17:00), Max: 36.8 (05 Jul 2022 10:00)  T(F): 97.7 (05 Jul 2022 17:00), Max: 98.2 (05 Jul 2022 10:00)  HR: 124 (05 Jul 2022 17:00) (105 - 124)  BP: 138/84 (05 Jul 2022 17:00) (122/89 - 147/89)  RR: 18 (05 Jul 2022 17:00) (18 - 20)  SpO2: 95% (05 Jul 2022 17:00) (94% - 100%)    Interval updates:  - Patient OOB, awake and alert, remains tachycardic but VS otherwise stable.  - 1uPRBC transfusion currently running    SBrendan Carreon-Quita PGY2 Pt seen at bedside this afternoon.  +OOB, sitting in amanda, A&Ox3.  Conversing with daughter and cousin who are visiting.      ICU Vital Signs Last 24 Hrs  T(C): 36.5 (05 Jul 2022 17:00), Max: 36.8 (05 Jul 2022 10:00)  T(F): 97.7 (05 Jul 2022 17:00), Max: 98.2 (05 Jul 2022 10:00)  HR: 124 (05 Jul 2022 17:00) (105 - 124)  BP: 138/84 (05 Jul 2022 17:00) (122/89 - 147/89)  RR: 18 (05 Jul 2022 17:00) (18 - 20)  SpO2: 95% (05 Jul 2022 17:00) (94% - 100%)    Interval updates:  - Patient OOB, awake and alert, remains tachycardic but VS otherwise stable.  - 1uPRBC transfusion currently running  - f/u 9pm CBC    VELIA Carreon-Devlin PGY2 Pt seen at bedside this afternoon.  +OOB, sitting in amanda, A&Ox3.  Conversing with daughter and cousin who are visiting.      ICU Vital Signs Last 24 Hrs  T(C): 36.5 (05 Jul 2022 17:00), Max: 36.8 (05 Jul 2022 10:00)  T(F): 97.7 (05 Jul 2022 17:00), Max: 98.2 (05 Jul 2022 10:00)  HR: 124 (05 Jul 2022 17:00) (105 - 124)  BP: 138/84 (05 Jul 2022 17:00) (122/89 - 147/89)  RR: 18 (05 Jul 2022 17:00) (18 - 20)  SpO2: 95% (05 Jul 2022 17:00) (94% - 100%)    Ostomy appears brownish-red w/ liquid stool in ostomy bag.    Interval updates:  - Patient OOB, awake and alert, remains tachycardic but VS otherwise stable.  - 1uPRBC transfusion currently running  - f/u 9pm CBC    S. Carreon-Devlin PGY2

## 2022-07-05 NOTE — PROGRESS NOTE ADULT - PROBLEM SELECTOR PLAN 5
Outpatient oncologist: [facility] ([place])   > Gyn/onc recs reviewed- patient with declining performance status and not a candidate for DMT at this time   > 7/5: Spoke to patient today regarding plan of care. She seems to understand that her performance status has declined significantly and that continuing with DMT would be more harmful than beneficial at this time.

## 2022-07-05 NOTE — PROGRESS NOTE ADULT - PROBLEM SELECTOR PLAN 2
Patient awake today and verbalizing her wishes to sit in chair. Patient has been declining over the past 3-4 weeks.   Please assist with ADLs.

## 2022-07-05 NOTE — PROGRESS NOTE ADULT - PROBLEM SELECTOR PLAN 1
Patient reports mild sedation with current pain regimen. Discussed transitioning to PO regimen and educated her on appropriate use of opioids. She is aware that she can ask for lower dose of pain medication during the day and at bedtime she can ask for higher dose pain meds.   > Patient was on IV dilaudid 0.5mg q4hrs ATC and PRNs of 0.5mg IV dilaudid q3h prn moderate pain, 1mg IV dilaudid q3h prn severe pain  > d/c IV dilaudid ATC, transition to oxycontin 15mg bid and continue PRNs for now

## 2022-07-05 NOTE — PROGRESS NOTE ADULT - PROBLEM SELECTOR PLAN 11
-c/w lovenox subq daily    11. Dispo:  -will f/u PT recs  -[facility] could be a good option -- will discuss with family, CM and primary team.

## 2022-07-05 NOTE — PROGRESS NOTE ADULT - PROBLEM SELECTOR PLAN 7
Thank you for allowing us to participate in your patient's care. We will continue to follow with you. Please page [phone] for any q's or c's.     Shannon Schwab D.O.   Palliative Medicine.

## 2022-07-05 NOTE — PROGRESS NOTE ADULT - PROBLEM SELECTOR PLAN 9
-patient restless and sometimes makes confusing statements per daughter  -suspect combination of delirium from prolonged hospitalization, uncontrolled pain, narcotics, post operative delirium  -CTH unremarkable on 6/8  -needs mobilization/PT/redirection -resolved

## 2022-07-05 NOTE — PROGRESS NOTE ADULT - PROBLEM SELECTOR PLAN 6
DNR/DNI, please see separate [facility] note 7/1   MOLST completed and placed in chart   > 7/1: Family and patient are aware of patient's declining performance status and are aware that she is not a candidate for further DMT at this time. They would like to optimize her symptom management but also want continued medical care for any reversible issues.

## 2022-07-05 NOTE — PROGRESS NOTE ADULT - ASSESSMENT
52F with recurrent cervical CA admitted for management of neutropenic fever, now s/p emergent exploratory laparotomy, abdominal washout, rectosigmoid colectomy, diverting colostomy, bronchoscopy and Abthera placement on 6/8/22 for findings of increased free air on abdominal xray concerning for bowel perforation. Patient s/p washout and bridging vicryl mesh placement to close fascial gap on 6/14 w/ skin closure device place 6/17 course c/b sepsis concerning for peritonitis (6/20) now with slight improvement in mental status - prognosis poor/guarded.

## 2022-07-05 NOTE — PROGRESS NOTE ADULT - PROBLEM SELECTOR PLAN 10
-c/w lovenox subq daily    11. Dispo:  -will f/u PT recs  -[facility] could be a good option -- will discuss with family, CM and primary team. -patient restless and sometimes makes confusing statements per daughter  -suspect combination of delirium from prolonged hospitalization, uncontrolled pain, narcotics, post operative delirium  -CTH unremarkable on 6/8  -needs mobilization/PT/redirection

## 2022-07-05 NOTE — PROGRESS NOTE ADULT - PROBLEM SELECTOR PLAN 2
-h/o recurrent cervical CA, receives Gyn Oncology care at [facility] ([place]).   -she initially was diagnosed with stage III cervical CA in 2014 s/p VBRT + chemo but never had surgical intervention. -Reported now being s/p 5 cycles of chemo, unsure of regimen but next due 6/21/22.  -course complicated by bowel perforation s/p washout and bridging vicryl mesh placement to close fascial gap on 6/14 w/ skin closure device place don 6/17  -further management per general surgery, gyn onc and plastic surgery  -overall prognosis is poor  -f/u palliative and ethics recs.   -patient is DNI/DNR   -initially agreed with hospice referral -- will f/u with family today given slight improvement in mental status -h/o recurrent cervical CA, receives Gyn Oncology care at [facility] ([place]).   -she initially was diagnosed with stage III cervical CA in 2014 s/p VBRT + chemo but never had surgical intervention. -Reported now being s/p 5 cycles of chemo, unsure of regimen but next due 6/21/22.  -course complicated by bowel perforation s/p washout and bridging vicryl mesh placement to close fascial gap on 6/14 w/ skin closure device place don 6/17  -further management per general surgery, gyn onc and plastic surgery  -overall prognosis is poor  -f/u palliative and ethics recs.   -patient is DNI/DNR   -initially agreed with hospice referral -- will f/u with family today given slight improvement in mental status  -will check guiac of ostomy bag for blood

## 2022-07-06 DIAGNOSIS — E87.6 HYPOKALEMIA: ICD-10-CM

## 2022-07-06 LAB
ANION GAP SERPL CALC-SCNC: 11 MMOL/L — SIGNIFICANT CHANGE UP (ref 7–14)
BASOPHILS # BLD AUTO: 0.06 K/UL — SIGNIFICANT CHANGE UP (ref 0–0.2)
BASOPHILS NFR BLD AUTO: 0.8 % — SIGNIFICANT CHANGE UP (ref 0–2)
BUN SERPL-MCNC: 28 MG/DL — HIGH (ref 7–23)
CALCIUM SERPL-MCNC: 7.5 MG/DL — LOW (ref 8.4–10.5)
CHLORIDE SERPL-SCNC: 109 MMOL/L — HIGH (ref 98–107)
CO2 SERPL-SCNC: 24 MMOL/L — SIGNIFICANT CHANGE UP (ref 22–31)
CREAT SERPL-MCNC: 0.83 MG/DL — SIGNIFICANT CHANGE UP (ref 0.5–1.3)
EGFR: 84 ML/MIN/1.73M2 — SIGNIFICANT CHANGE UP
EOSINOPHIL # BLD AUTO: 0.19 K/UL — SIGNIFICANT CHANGE UP (ref 0–0.5)
EOSINOPHIL NFR BLD AUTO: 2.4 % — SIGNIFICANT CHANGE UP (ref 0–6)
GLUCOSE SERPL-MCNC: 98 MG/DL — SIGNIFICANT CHANGE UP (ref 70–99)
HCT VFR BLD CALC: 26.8 % — LOW (ref 34.5–45)
HGB BLD-MCNC: 8.8 G/DL — LOW (ref 11.5–15.5)
IANC: 5.91 K/UL — SIGNIFICANT CHANGE UP (ref 1.8–7.4)
IMM GRANULOCYTES NFR BLD AUTO: 1.8 % — HIGH (ref 0–1.5)
LYMPHOCYTES # BLD AUTO: 0.95 K/UL — LOW (ref 1–3.3)
LYMPHOCYTES # BLD AUTO: 12.2 % — LOW (ref 13–44)
MAGNESIUM SERPL-MCNC: 1.8 MG/DL — SIGNIFICANT CHANGE UP (ref 1.6–2.6)
MCHC RBC-ENTMCNC: 29.4 PG — SIGNIFICANT CHANGE UP (ref 27–34)
MCHC RBC-ENTMCNC: 32.8 GM/DL — SIGNIFICANT CHANGE UP (ref 32–36)
MCV RBC AUTO: 89.6 FL — SIGNIFICANT CHANGE UP (ref 80–100)
MONOCYTES # BLD AUTO: 0.53 K/UL — SIGNIFICANT CHANGE UP (ref 0–0.9)
MONOCYTES NFR BLD AUTO: 6.8 % — SIGNIFICANT CHANGE UP (ref 2–14)
NEUTROPHILS # BLD AUTO: 5.91 K/UL — SIGNIFICANT CHANGE UP (ref 1.8–7.4)
NEUTROPHILS NFR BLD AUTO: 76 % — SIGNIFICANT CHANGE UP (ref 43–77)
NRBC # BLD: 0 /100 WBCS — SIGNIFICANT CHANGE UP
NRBC # FLD: 0.06 K/UL — HIGH
PHOSPHATE SERPL-MCNC: 2.2 MG/DL — LOW (ref 2.5–4.5)
PLATELET # BLD AUTO: 125 K/UL — LOW (ref 150–400)
POTASSIUM SERPL-MCNC: 2.6 MMOL/L — CRITICAL LOW (ref 3.5–5.3)
POTASSIUM SERPL-SCNC: 2.6 MMOL/L — CRITICAL LOW (ref 3.5–5.3)
RBC # BLD: 2.99 M/UL — LOW (ref 3.8–5.2)
RBC # FLD: 17.4 % — HIGH (ref 10.3–14.5)
SODIUM SERPL-SCNC: 144 MMOL/L — SIGNIFICANT CHANGE UP (ref 135–145)
WBC # BLD: 7.78 K/UL — SIGNIFICANT CHANGE UP (ref 3.8–10.5)
WBC # FLD AUTO: 7.78 K/UL — SIGNIFICANT CHANGE UP (ref 3.8–10.5)

## 2022-07-06 PROCEDURE — 99233 SBSQ HOSP IP/OBS HIGH 50: CPT

## 2022-07-06 RX ORDER — PIPERACILLIN AND TAZOBACTAM 4; .5 G/20ML; G/20ML
3.38 INJECTION, POWDER, LYOPHILIZED, FOR SOLUTION INTRAVENOUS EVERY 8 HOURS
Refills: 0 | Status: COMPLETED | OUTPATIENT
Start: 2022-07-06 | End: 2022-07-13

## 2022-07-06 RX ORDER — POTASSIUM CHLORIDE 20 MEQ
10 PACKET (EA) ORAL
Refills: 0 | Status: COMPLETED | OUTPATIENT
Start: 2022-07-06 | End: 2022-07-06

## 2022-07-06 RX ORDER — PIPERACILLIN AND TAZOBACTAM 4; .5 G/20ML; G/20ML
3.38 INJECTION, POWDER, LYOPHILIZED, FOR SOLUTION INTRAVENOUS EVERY 8 HOURS
Refills: 0 | Status: DISCONTINUED | OUTPATIENT
Start: 2022-07-06 | End: 2022-07-06

## 2022-07-06 RX ORDER — MAGNESIUM SULFATE 500 MG/ML
2 VIAL (ML) INJECTION ONCE
Refills: 0 | Status: COMPLETED | OUTPATIENT
Start: 2022-07-06 | End: 2022-07-06

## 2022-07-06 RX ORDER — MAGNESIUM SULFATE 500 MG/ML
2 VIAL (ML) INJECTION ONCE
Refills: 0 | Status: DISCONTINUED | OUTPATIENT
Start: 2022-07-06 | End: 2022-07-06

## 2022-07-06 RX ADMIN — OXYCODONE HYDROCHLORIDE 15 MILLIGRAM(S): 5 TABLET ORAL at 19:15

## 2022-07-06 RX ADMIN — HYDROMORPHONE HYDROCHLORIDE 1 MILLIGRAM(S): 2 INJECTION INTRAMUSCULAR; INTRAVENOUS; SUBCUTANEOUS at 01:27

## 2022-07-06 RX ADMIN — OXYCODONE HYDROCHLORIDE 15 MILLIGRAM(S): 5 TABLET ORAL at 07:38

## 2022-07-06 RX ADMIN — PANTOPRAZOLE SODIUM 40 MILLIGRAM(S): 20 TABLET, DELAYED RELEASE ORAL at 18:47

## 2022-07-06 RX ADMIN — OXYCODONE HYDROCHLORIDE 15 MILLIGRAM(S): 5 TABLET ORAL at 07:08

## 2022-07-06 RX ADMIN — Medication 25 GRAM(S): at 06:59

## 2022-07-06 RX ADMIN — PIPERACILLIN AND TAZOBACTAM 25 GRAM(S): 4; .5 INJECTION, POWDER, LYOPHILIZED, FOR SOLUTION INTRAVENOUS at 09:52

## 2022-07-06 RX ADMIN — Medication 100 MILLIEQUIVALENT(S): at 10:26

## 2022-07-06 RX ADMIN — PIPERACILLIN AND TAZOBACTAM 25 GRAM(S): 4; .5 INJECTION, POWDER, LYOPHILIZED, FOR SOLUTION INTRAVENOUS at 18:45

## 2022-07-06 RX ADMIN — SODIUM CHLORIDE 100 MILLILITER(S): 9 INJECTION, SOLUTION INTRAVENOUS at 17:12

## 2022-07-06 RX ADMIN — HYDROMORPHONE HYDROCHLORIDE 1 MILLIGRAM(S): 2 INJECTION INTRAMUSCULAR; INTRAVENOUS; SUBCUTANEOUS at 00:51

## 2022-07-06 RX ADMIN — MIRTAZAPINE 15 MILLIGRAM(S): 45 TABLET, ORALLY DISINTEGRATING ORAL at 14:34

## 2022-07-06 RX ADMIN — Medication 100 MILLIEQUIVALENT(S): at 09:11

## 2022-07-06 RX ADMIN — ENOXAPARIN SODIUM 40 MILLIGRAM(S): 100 INJECTION SUBCUTANEOUS at 14:34

## 2022-07-06 RX ADMIN — OXYCODONE HYDROCHLORIDE 15 MILLIGRAM(S): 5 TABLET ORAL at 18:45

## 2022-07-06 RX ADMIN — PANTOPRAZOLE SODIUM 40 MILLIGRAM(S): 20 TABLET, DELAYED RELEASE ORAL at 07:00

## 2022-07-06 RX ADMIN — PIPERACILLIN AND TAZOBACTAM 25 GRAM(S): 4; .5 INJECTION, POWDER, LYOPHILIZED, FOR SOLUTION INTRAVENOUS at 00:51

## 2022-07-06 RX ADMIN — Medication 100 MILLIEQUIVALENT(S): at 08:03

## 2022-07-06 NOTE — PROGRESS NOTE ADULT - SUBJECTIVE AND OBJECTIVE BOX
CHIEF COMPLAINT: f/u neutropenic fever    SUBJECTIVE / OVERNIGHT EVENTS: Patient seen and examined. No acute events overnight. Patient denies any chest pain or SOB. Lower back pain improved.    MEDICATIONS  (STANDING):  enoxaparin Injectable 40 milliGRAM(s) SubCutaneous every 24 hours  lactated ringers. 1000 milliLiter(s) (100 mL/Hr) IV Continuous <Continuous>  lidocaine 1% (Preservative-free) Injectable 50 milliLiter(s) Local Injection once  mirtazapine 15 milliGRAM(s) Oral daily  oxyCODONE  ER Tablet 15 milliGRAM(s) Oral every 12 hours  pantoprazole  Injectable 40 milliGRAM(s) IV Push two times a day    MEDICATIONS  (PRN):  cyclobenzaprine 5 milliGRAM(s) Oral three times a day PRN Muscle Spasm  HYDROmorphone  Injectable 0.5 milliGRAM(s) IV Push every 3 hours PRN Moderate Pain (4 - 6)  HYDROmorphone  Injectable 1 milliGRAM(s) IV Push every 3 hours PRN Severe Pain (7 - 10)  HYDROmorphone  Injectable 1 milliGRAM(s) IV Push once PRN ostomy  lidocaine   4% Patch 1 Patch Transdermal daily PRN back pain  simethicone 80 milliGRAM(s) Chew three times a day PRN Gas    VITALS:  T(F): 97.1 (07-06-22 @ 10:00), Max: 98 (07-05-22 @ 13:00)  HR: 111 (07-06-22 @ 10:00) (103 - 124)  BP: 108/74 (07-06-22 @ 10:00) (108/74 - 138/87)  RR: 18 (07-06-22 @ 10:00) (17 - 18)  SpO2: 100% (07-06-22 @ 10:00)    PHYSICAL EXAM:  GENERAL: ill appearing, cachetic, NAD  CHEST/LUNG: Clear to auscultation bilaterally; No wheeze  HEART: Regular rate and rhythm; No murmurs, rubs, or gallops  ABDOMEN: Soft, Nontender, Nondistended; Bowel sounds present; SARAH drain in place; ostomy in place with ?erythematous drainage  EXTREMITIES:  2+ Peripheral Pulses, No clubbing, cyanosis, or edema  SKIN: SARAH drain in place    LABS:              8.8                  144  | 24   | 28           7.78  >-----------< 125     ------------------------< 98                    26.8                 2.6  | 109  | 0.83                                         Ca 7.5   Mg 1.80  Ph 2.2      MICROBIOLOGY:  BCx (6/30): NGTD x2    [ ] Consultant(s) Notes Reviewed:  [x] Care Discussed with Consultants/Other Providers: Gyn-Onc WON Deluca - discussed disposition and plan for family meeting later today in addition to hypokalemia repletion

## 2022-07-06 NOTE — PROGRESS NOTE ADULT - PROBLEM SELECTOR PLAN 1
Pain improved with current regimen.   > Continue oxycontin 15mg bid  > Currently patient has PRNs of 0.5mg IV dilaudid q3h prn moderate pain, 1mg IV dilaudid q3h prn severe pain. Has required 1 PRN of 1mg IVD over last 24 hours. Can transition to oxycodone 5mg q3h prn moderate pain and oxycodone 7.5mg q3h prn severe pain as patient's mental status has improved significantly Pain improved with current regimen.   > Continue oxycontin 15mg bid  > Currently patient has PRNs of 0.5mg IV dilaudid q3h prn moderate pain, 1mg IV dilaudid q3h prn severe pain. Has required 1 PRN of 1mg IVD over last 24 hours. Can transition to oxycodone 5mg q3h prn moderate pain and oxycodone 7.5mg q3h prn severe pain as patient's mental status has improved significantly.

## 2022-07-06 NOTE — PROGRESS NOTE ADULT - SUBJECTIVE AND OBJECTIVE BOX
[facility] Geriatrics and Palliative Care  Shannon Schwab Palliative Care Attending  Contact Info: Page [phone] (including Nights/Weekends), message on Microsoft Teams (Shannon Schwab), or leave  at Palliative Office [phone] (non-urgent)     SUBJECTIVE AND OBJECTIVE: Patient seen this AM, awake, alert, states pain is controlled with new regimen and she does not feel groggy/sedated. She was happy that she was able to sit in chair yesterday.     Indication for Geriatrics and Palliative Care Services/INTERVAL HPI:  > 6/29: Chart notes reviewed. Over the past 24 hours, patient required PRNs of 10mg PO oxycodone x2.   > 6/30: Chart notes reviewed. Over the past 24 hours, patient required PRNs of IV dilaudid 0.5mg x1, Po oxycodone 10mg x2, PO oxycodone 5mg x1.   > 7/5: Chart reviewed. Over the past 24 hours, patient required PRNs of IV dilaudid 1mg x1.   > 7/6: Over the past 24 hours, patient required PRNs of IV dilaudid 1mg x1.     DNR on chart:Yes  Yes      Allergies    No Known Allergies    Intolerances    MEDICATIONS  (STANDING):  enoxaparin Injectable 40 milliGRAM(s) SubCutaneous every 24 hours  lactated ringers. 1000 milliLiter(s) (100 mL/Hr) IV Continuous <Continuous>  lidocaine 1% (Preservative-free) Injectable 50 milliLiter(s) Local Injection once  mirtazapine 15 milliGRAM(s) Oral daily  oxyCODONE  ER Tablet 15 milliGRAM(s) Oral every 12 hours  pantoprazole  Injectable 40 milliGRAM(s) IV Push two times a day  piperacillin/tazobactam IVPB.. 3.375 Gram(s) IV Intermittent every 8 hours    MEDICATIONS  (PRN):  cyclobenzaprine 5 milliGRAM(s) Oral three times a day PRN Muscle Spasm  HYDROmorphone  Injectable 0.5 milliGRAM(s) IV Push every 3 hours PRN Moderate Pain (4 - 6)  HYDROmorphone  Injectable 1 milliGRAM(s) IV Push every 3 hours PRN Severe Pain (7 - 10)  HYDROmorphone  Injectable 1 milliGRAM(s) IV Push once PRN ostomy  lidocaine   4% Patch 1 Patch Transdermal daily PRN back pain  simethicone 80 milliGRAM(s) Chew three times a day PRN Gas      ITEMS UNCHECKED ARE NOT PRESENT    PRESENT SYMPTOMS: [ ]Unable to self-report - see [ ] CPOT [ ] PAINADS [ ] RDOS  Source if other than patient:  [ ]Family   [ ]Team     Pain:  [x ]yes [ ]no  QOL impact - difficulty performing ADLs   Location -   low back pain   Aggravating factors -   Quality -  Radiation - denies  Timing- intermittent   Severity (0-10 scale): 8  Minimal acceptable level (0-10 scale): 3    CPOT:    https://www.[email or web address].org/getattachment/[email or web address]-[email or web address]/Critical-Care-Pain-Observation-Tool-(CPOT)    PAIN AD Score:	  http://geriatrictoolkit.[facility]/cog/painad.pdf (Ctrl + left click to view)    Dyspnea:                           [ ]Mild [ ]Moderate [ ]Severe    RDOS:  0 to 2  minimal or no respiratory distress   3  mild distress  4 to 6 moderate distress  >7 severe distress  https://homecareinformation.net/handouts/hen/Respiratory_Distress_Observation_Scale.pdf (Ctrl +  left click to view)     Anxiety:                             [ ]Mild [ ]Moderate [ ]Severe  Fatigue:                             [ ]Mild [ ]Moderate [ ]Severe  Nausea:                             [ ]Mild [ ]Moderate [ ]Severe  Loss of appetite:              [ ]Mild [ ]Moderate [ ]Severe  Constipation:                    [ ]Mild [ ]Moderate [ ]Severe    PCSSQ[Palliative Care Spiritual Screening Question]   Severity (0-10):  Score of 4 or > indicate consideration of Chaplaincy referral.  Chaplaincy Referral: [ ] yes [ ] refused [ ] following    Other Symptoms:  [ ]All other review of systems negative     Palliative Performance Status Version 2:     40    %      http://npcrc.org/files/news/palliative_performance_scale_ppsv2.pdf  PHYSICAL EXAM:  Vital Signs Last 24 Hrs  T(C): 36.3 (06 Jul 2022 14:00), Max: 36.6 (05 Jul 2022 19:05)  T(F): 97.4 (06 Jul 2022 14:00), Max: 97.9 (05 Jul 2022 19:05)  HR: 108 (06 Jul 2022 14:00) (103 - 124)  BP: 122/80 (06 Jul 2022 14:00) (108/74 - 138/87)  BP(mean): --  RR: 18 (06 Jul 2022 14:00) (17 - 18)  SpO2: 100% (06 Jul 2022 14:00) (95% - 100%) I&O's Summary    05 Jul 2022 07:01  -  06 Jul 2022 07:00  --------------------------------------------------------  IN: 2520 mL / OUT: 2250 mL / NET: 270 mL    06 Jul 2022 07:01  -  06 Jul 2022 16:47  --------------------------------------------------------  IN: 0 mL / OUT: 925 mL / NET: -925 mL       GENERAL: [ ]Cachexia    [x ]Alert  [ x]Oriented x4   [ ]Lethargic  [ ]Unarousable  [ x]Verbal  [ ]Non-Verbal  Behavioral:   [ ] Anxiety  [ ] Delirium [ ] Agitation [x ] Other  HEENT:  [ ]Normal   [x ]Dry mouth   [ ]ET Tube/Trach  [ ]Oral lesions  PULMONARY:   [ x]Clear [ ]Tachypnea  [ ]Audible excessive secretions   [ ]Rhonchi        [ ]Right [ ]Left [ ]Bilateral  [ ]Crackles        [ ]Right [ ]Left [ ]Bilateral  [ ]Wheezing     [ ]Right [ ]Left [ ]Bilateral  [ ]Diminished breath sounds [ ]right [ ]left [ ]bilateral  CARDIOVASCULAR:    [ ]Regular [ ]Irregular [ x]Tachy  [ ]Moi [ ]Murmur [ ]Other  GASTROINTESTINAL: dressings in place   [ ]Soft  [ ]Distended   [ ]+BS  [ ]Non tender [ ]Tender  [ ]Other [ ]PEG [ ]OGT/ NGT  Last BM: +colostomy   GENITOURINARY: +b/l nephrostomy tubes   [ ]Normal [ ] Incontinent   [ ]Oliguria/Anuria   [ ]Infante  MUSCULOSKELETAL:   [ ]Normal   [ x]Weakness  [x ]Bed/Wheelchair bound [ ]Edema  NEUROLOGIC:   [ ]No focal deficits  [x ]Cognitive impairment  [ ]Dysphagia [ ]Dysarthria [ ]Paresis [ ]Other   SKIN: Please see nursing flowsheets   [ ]Normal  [ ]Rash  [ ]Other  [ ]Pressure ulcer(s)       Present on admission [ ]y [ ]n    CRITICAL CARE:  [ ] Shock Present  [ ]Septic [ ]Cardiogenic [ ]Neurologic [ ]Hypovolemic  [ ]  Vasopressors [ ]  Inotropes   [ ]Respiratory failure present [ ]Mechanical ventilation [ ]Non-invasive ventilatory support [ ]High flow    [ ]Acute  [ ]Chronic [ ]Hypoxic  [ ]Hypercarbic [ ]Other  [ ]Other organ failure     LABS:                        8.8    7.78  )-----------( 125      ( 06 Jul 2022 05:50 )             26.8   07-06    144  |  109<H>  |  28<H>  ----------------------------<  98  2.6<LL>   |  24  |  0.83    Ca    7.5<L>      06 Jul 2022 05:50  Phos  2.2     07-06  Mg     1.80     07-06    RADIOLOGY & ADDITIONAL STUDIES: n/a     Protein Calorie Malnutrition Present: [ ]mild [ ]moderate [ x]severe [ ]underweight [ ]morbid obesity  https://www.andeal.org/vault/2440/web/files/ONC/Table_Clinical%20Characteristics%20to%20Document%20Malnutrition-White%20JV%20et%20al%202012.pdf    Height (cm): 167 (06-14-22 @ 14:48), 167.6 (06-04-22 @ 15:52), 167.6 (02-08-22 @ 10:03)  Weight (kg): 100.4 (06-14-22 @ 14:48), 98.9 (06-04-22 @ 15:52), 110.7 (02-08-22 @ 10:03)  BMI (kg/m2): 36 (06-14-22 @ 14:48), 35.2 (06-04-22 @ 15:52), 39.4 (02-08-22 @ 10:03)    [ ]PPSV2 < or = 30%  [ ]significant weight loss [ ]poor nutritional intake [ ]anasarca[ ]Artificial Nutrition    Other REFERRALS:  [ ]Hospice  [ ]Child Life  [ ]Social Work  [ ]Case management [ ]Holistic Therapy     Goals of Care Document:MARGOT Avina (07-01-22 @ 16:25)  Goals of Care Conversation:   Participants:  · Participants  Family; Staff; Pt unable to participate  · Provider  Dr. Schwab  ·   Kaylynn Avina    Advance Directives:  · Does patient have Advance Directive  Yes  · Indicate Type  Health Care Proxy (HCP)  · Agent's Name  Jeff Oleary (domestic partner)  · Phone #  [phone]  · Caregiver:  declines    Conversation Discussion:  · Conversation  MOLST Discussed  · Conversation Details  Palliative provider spoke with patient's daughter, Lydia and her father, Jeff Oleary. They understand the risks and benefits of resuscitative measures. They are in agreement with DNR/DNI and completion of MOLST form. They do want to continue with symptom management, abx and current medical plan of care.     MOLST form to be completed for DNR/DNI    Personal Advance Directives Treatment Guidelines:   Treatment Guidelines:  · Decision Maker  Health Care Proxy  · Treatment Guidelines  DNR Order; DNI    MOLST:  · Completed  01-Jul-2022    Location of Discussion:   Time Spent on Advance Care Planning:  I spent 16 (in minutes) on advance care planning services with the patient.  This time is separate and distinct from any other care management services provided on this date.    Location of Discussion:  · Location of discussion  Telephone      Electronic Signatures:  Kaylynn Avina ([facility])  (Signed 01-Jul-2022 16:30)  	Authored: Goals of Care Conversation, Personal Advance Directives Treatment Guidelines, Location of Discussion      Last Updated: 01-Jul-2022 16:30 by Kaylynn Avina ([facility])

## 2022-07-06 NOTE — PROGRESS NOTE ADULT - PROBLEM SELECTOR PLAN 1
GYN ONC Fellow Addendum:    Pt seen and examined at bedside. Agree with above. Oriented and conversant this AM. Reports back pain, denies abd pain/N/V    VS reviewed  Labs reviewed    Colostomy output less bloody this AM, Hgb stable. Will cont to monitor  Continue current pain regimen  Reg diet  Encourage ambulation and IS use  Replete lytes prn  DVT ppx: Lovenox  Dispo: Continued d/c planning w/ pt and family. Case management involved. At this time options are skilled nursing facility or inpatient hospice. We will d/w family further and coordinate according to their decision.    Guadalupe Jay MD

## 2022-07-06 NOTE — PROGRESS NOTE ADULT - PROBLEM SELECTOR PLAN 12
-c/w lovenox subq daily    11. Dispo:  -will f/u PT recs  -[facility] could be a good option -- will discuss with family, CM and primary team.  -Plan for family meeting today 7/6/22

## 2022-07-06 NOTE — PROGRESS NOTE ADULT - SUBJECTIVE AND OBJECTIVE BOX
Gyn ONC Progress Note     Subjective:   Pt seen and examined at bedside. No events overnight. Pain well controlled. Patient was OOB with assistance yesterday. Passing flatus. Tolerating regular diet. Remains frustrated and upset about being hospitalized. Pt denies fever, chills, chest pain, SOB, nausea, vomiting, lightheadedness, dizziness.      Objective:  T(F): 97.7 (07-06-22 @ 02:00), Max: 98.2 (07-05-22 @ 10:00)  HR: 103 (07-06-22 @ 02:00) (103 - 124)  BP: 124/83 (07-06-22 @ 02:00) (121/84 - 138/87)  RR: 17 (07-06-22 @ 02:00) (17 - 20)  SpO2: 100% (07-06-22 @ 02:00) (94% - 100%)    I&O's Summary    04 Jul 2022 07:01  -  05 Jul 2022 07:00  --------------------------------------------------------  IN: 2980 mL / OUT: 1460 mL / NET: 1520 mL    05 Jul 2022 07:01  -  06 Jul 2022 05:18  --------------------------------------------------------  IN: 2520 mL / OUT: 2025 mL / NET: 495 mL      MEDICATIONS  (STANDING):  enoxaparin Injectable 40 milliGRAM(s) SubCutaneous every 24 hours  lactated ringers. 1000 milliLiter(s) (100 mL/Hr) IV Continuous <Continuous>  lidocaine 1% (Preservative-free) Injectable 50 milliLiter(s) Local Injection once  mirtazapine 15 milliGRAM(s) Oral daily  oxyCODONE  ER Tablet 15 milliGRAM(s) Oral every 12 hours  pantoprazole  Injectable 40 milliGRAM(s) IV Push two times a day  piperacillin/tazobactam IVPB.. 3.375 Gram(s) IV Intermittent every 8 hours    MEDICATIONS  (PRN):  cyclobenzaprine 5 milliGRAM(s) Oral three times a day PRN Muscle Spasm  HYDROmorphone  Injectable 0.5 milliGRAM(s) IV Push every 3 hours PRN Moderate Pain (4 - 6)  HYDROmorphone  Injectable 1 milliGRAM(s) IV Push every 3 hours PRN Severe Pain (7 - 10)  HYDROmorphone  Injectable 1 milliGRAM(s) IV Push once PRN ostomy  lidocaine   4% Patch 1 Patch Transdermal daily PRN back pain  simethicone 80 milliGRAM(s) Chew three times a day PRN Gas      Physical Exam:  Constitutional: Pt awake, conversational. A&Ox3.   CV: Tachycardic, regular S1/S2  Lungs: CTA b/l   Abd: Soft, non-distended. Diffusely tender. Packing in place w/ overlying abdominal pad with purulent drainage at umbilicus, ostomy w/ mucocutaneous separation from 6-9 o'clock extending 1cmc under skin w/ feculent material and fibrinous material, small drain lateral. Red/brown liquid/stool output in ostomy bag; +3 Ric drains with increased seropurulent output in RLW, LUQ, LLQ. LLQ drain not holding suction well. LUQ drain with oozing at site.   : R&L nephrostomy tubes draining clear urine.  Extremities: No pain, 2+ pitting edema.    LABS:                        8.0    7.46  )-----------( 120      ( 05 Jul 2022 22:00 )             25.1     07-05    141    |  106    |  32<H>  ----------------------------<  91     2.8<LL>   |  21<L>  |  0.99     Ca    7.7<L>      05 Jul 2022 05:30  Phos  2.6       07-05  Mg     1.40      07-05           Gyn ONC Progress Note     Subjective:   Pt seen and examined at bedside. No events overnight. Pain well controlled w/ medication. Patient was OOB with assistance yesterday. Passing flatus. Tolerating regular diet.  Pt denies fever, chills, chest pain, SOB, nausea, vomiting, lightheadedness, dizziness.      Objective:  T(F): 97.7 (07-06-22 @ 02:00), Max: 98.2 (07-05-22 @ 10:00)  HR: 103 (07-06-22 @ 02:00) (103 - 124)  BP: 124/83 (07-06-22 @ 02:00) (121/84 - 138/87)  RR: 17 (07-06-22 @ 02:00) (17 - 20)  SpO2: 100% (07-06-22 @ 02:00) (94% - 100%)    I&O's Summary    04 Jul 2022 07:01  -  05 Jul 2022 07:00  --------------------------------------------------------  IN: 2980 mL / OUT: 1460 mL / NET: 1520 mL    05 Jul 2022 07:01  -  06 Jul 2022 05:18  --------------------------------------------------------  IN: 2520 mL / OUT: 2025 mL / NET: 495 mL    I&O's Detail    05 Jul 2022 07:01  -  06 Jul 2022 07:00  --------------------------------------------------------  IN:    IV PiggyBack: 600 mL    Lactated Ringers: 900 mL    Oral Fluid: 720 mL    PRBCs (Packed Red Blood Cells): 300 mL  Total IN: 2520 mL    OUT:    Colostomy (mL): 550 mL    Drain (mL): 187.5 mL    Drain (mL): 55 mL    Drain (mL): 107.5 mL    Nephrostomy Tube (mL): 600 mL    Nephrostomy Tube (mL): 750 mL  Total OUT: 2250 mL    Total NET: 270 mL      MEDICATIONS  (STANDING):  enoxaparin Injectable 40 milliGRAM(s) SubCutaneous every 24 hours  lactated ringers. 1000 milliLiter(s) (100 mL/Hr) IV Continuous <Continuous>  lidocaine 1% (Preservative-free) Injectable 50 milliLiter(s) Local Injection once  mirtazapine 15 milliGRAM(s) Oral daily  oxyCODONE  ER Tablet 15 milliGRAM(s) Oral every 12 hours  pantoprazole  Injectable 40 milliGRAM(s) IV Push two times a day  piperacillin/tazobactam IVPB.. 3.375 Gram(s) IV Intermittent every 8 hours    MEDICATIONS  (PRN):  cyclobenzaprine 5 milliGRAM(s) Oral three times a day PRN Muscle Spasm  HYDROmorphone  Injectable 0.5 milliGRAM(s) IV Push every 3 hours PRN Moderate Pain (4 - 6)  HYDROmorphone  Injectable 1 milliGRAM(s) IV Push every 3 hours PRN Severe Pain (7 - 10)  HYDROmorphone  Injectable 1 milliGRAM(s) IV Push once PRN ostomy  lidocaine   4% Patch 1 Patch Transdermal daily PRN back pain  simethicone 80 milliGRAM(s) Chew three times a day PRN Gas      Physical Exam:  Constitutional: Pt awake, conversational. A&Ox3.   CV: Tachycardic, regular S1/S2  Lungs: CTA b/l   Abd: Soft, non-distended. Diffusely tender. Packing in place w/ overlying abdominal pad with purulent drainage at umbilicus, ostomy w/ mucocutaneous separation from 6-9 o'clock extending 1cmc under skin w/ feculent material and fibrinous material, small drain lateral. Red/brown liquid/stool output in ostomy bag; +3 Ric drains with increased seropurulent output in RLW, LUQ, LLQ. LLQ drain not holding suction well. LUQ drain with oozing at site.   : R&L nephrostomy tubes draining clear urine.  Extremities: No pain, 2+ pitting edema.    LABS:                          8.8    7.78  )-----------( 125      ( 06 Jul 2022 05:50 )             26.8                           8.0    7.46  )-----------( 120      ( 05 Jul 2022 22:00 )             25.1     07-06    144  |  109<H>  |  28<H>  ----------------------------<  98  2.6<LL>   |  24  |  0.83    Ca    7.5<L>      06 Jul 2022 05:50  Phos  2.2     07-06  Mg     1.80     07-06    07-05    141    |  106    |  32<H>  ----------------------------<  91     2.8<LL>   |  21<L>  |  0.99     Ca    7.7<L>      05 Jul 2022 05:30  Phos  2.6       07-05  Mg     1.40      07-05           Gyn ONC Progress Note     Subjective:   Pt seen and examined at bedside. No events overnight. Endorsing lower back pain improved with current pain regimen. Patient was OOB to chair with lift yesterday. Tolerating regular diet.  Pt denies fever, chills, chest pain, SOB, nausea, vomiting, lightheadedness, dizziness.      Objective:  T(F): 97.7 (07-06-22 @ 02:00), Max: 98.2 (07-05-22 @ 10:00)  HR: 103 (07-06-22 @ 02:00) (103 - 124)  BP: 124/83 (07-06-22 @ 02:00) (121/84 - 138/87)  RR: 17 (07-06-22 @ 02:00) (17 - 20)  SpO2: 100% (07-06-22 @ 02:00) (94% - 100%)    I&O's Summary    04 Jul 2022 07:01  -  05 Jul 2022 07:00  --------------------------------------------------------  IN: 2980 mL / OUT: 1460 mL / NET: 1520 mL    05 Jul 2022 07:01  -  06 Jul 2022 05:18  --------------------------------------------------------  IN: 2520 mL / OUT: 2025 mL / NET: 495 mL    I&O's Detail    05 Jul 2022 07:01  -  06 Jul 2022 07:00  --------------------------------------------------------  IN:    IV PiggyBack: 600 mL    Lactated Ringers: 900 mL    Oral Fluid: 720 mL    PRBCs (Packed Red Blood Cells): 300 mL  Total IN: 2520 mL    OUT:    Colostomy (mL): 550 mL    Drain (mL): 187.5 mL    Drain (mL): 55 mL    Drain (mL): 107.5 mL    Nephrostomy Tube (mL): 600 mL    Nephrostomy Tube (mL): 750 mL  Total OUT: 2250 mL    Total NET: 270 mL      MEDICATIONS  (STANDING):  enoxaparin Injectable 40 milliGRAM(s) SubCutaneous every 24 hours  lactated ringers. 1000 milliLiter(s) (100 mL/Hr) IV Continuous <Continuous>  lidocaine 1% (Preservative-free) Injectable 50 milliLiter(s) Local Injection once  mirtazapine 15 milliGRAM(s) Oral daily  oxyCODONE  ER Tablet 15 milliGRAM(s) Oral every 12 hours  pantoprazole  Injectable 40 milliGRAM(s) IV Push two times a day  piperacillin/tazobactam IVPB.. 3.375 Gram(s) IV Intermittent every 8 hours    MEDICATIONS  (PRN):  cyclobenzaprine 5 milliGRAM(s) Oral three times a day PRN Muscle Spasm  HYDROmorphone  Injectable 0.5 milliGRAM(s) IV Push every 3 hours PRN Moderate Pain (4 - 6)  HYDROmorphone  Injectable 1 milliGRAM(s) IV Push every 3 hours PRN Severe Pain (7 - 10)  HYDROmorphone  Injectable 1 milliGRAM(s) IV Push once PRN ostomy  lidocaine   4% Patch 1 Patch Transdermal daily PRN back pain  simethicone 80 milliGRAM(s) Chew three times a day PRN Gas      Physical Exam:  Constitutional: Pt awake, conversational. A&Ox3.   CV: Tachycardic, regular S1/S2  Lungs: CTA b/l   Abd: Soft, non-distended. Diffusely tender. Packing in place w/ overlying abdominal pad with purulent drainage at umbilicus, ostomy w/ mucocutaneous separation from 6-9 o'clock extending 1cmc under skin w/ feculent material and fibrinous material, small drain lateral. Red/brown liquid/stool output in ostomy bag w/ small clots; +3 Ric drains with increased seropurulent output in RLW, LUQ, LLQ. LLQ drain not holding suction well. LUQ drain with oozing at site.   : R&L nephrostomy tubes draining slightly cloudy urine.  Extremities: No pain, 2+ pitting edema.    LABS:                          8.8    7.78  )-----------( 125      ( 06 Jul 2022 05:50 )             26.8                           8.0    7.46  )-----------( 120      ( 05 Jul 2022 22:00 )             25.1     07-06    144  |  109<H>  |  28<H>  ----------------------------<  98  2.6<LL>   |  24  |  0.83    Ca    7.5<L>      06 Jul 2022 05:50  Phos  2.2     07-06  Mg     1.80     07-06    07-05    141    |  106    |  32<H>  ----------------------------<  91     2.8<LL>   |  21<L>  |  0.99     Ca    7.7<L>      05 Jul 2022 05:30  Phos  2.6       07-05  Mg     1.40      07-05

## 2022-07-06 NOTE — PROGRESS NOTE ADULT - PROBLEM SELECTOR PLAN 5
-vitals reviewed and patient since admission had HR in 100s-110s  -recent CTPA negative for PE  -c/w with treatment underlying sepsis -back to baseline now improved at 0.83

## 2022-07-06 NOTE — PROGRESS NOTE ADULT - PROBLEM SELECTOR PLAN 7
Thank you for allowing us to participate in your patient's care. Symptoms are managed. Disposition planning as per primary team.     Palliative team signing off. Please page [phone] for any q's or c's.     Shannon Schwab D.O.   Palliative Medicine.

## 2022-07-06 NOTE — PROGRESS NOTE ADULT - PROBLEM SELECTOR PLAN 4
-back to baseline now improved at 0.83 -likely secondary to post-op changes; pt hemodynamically stable; will monitor CBC in am  -stool guaiac in ostomy back positive for blood  -s/p transfusion of several pRBCs and 1unit given on 7/5  -Hgb today 8.8

## 2022-07-06 NOTE — PROGRESS NOTE ADULT - PROBLEM SELECTOR PLAN 2
-h/o recurrent cervical CA, receives Gyn Oncology care at [facility] ([place]).   -she initially was diagnosed with stage III cervical CA in 2014 s/p VBRT + chemo but never had surgical intervention. -Reported now being s/p 5 cycles of chemo, unsure of regimen but next due 6/21/22.  -course complicated by bowel perforation s/p washout and bridging vicryl mesh placement to close fascial gap on 6/14 w/ skin closure device place don 6/17  -further management per general surgery, gyn onc and plastic surgery  -overall prognosis is poor  -f/u palliative and ethics recs.   -patient is DNI/DNR   -initially agreed with hospice referral -- will f/u with family today given slight improvement in mental status  -will check guiac of ostomy bag for blood

## 2022-07-06 NOTE — PROGRESS NOTE ADULT - PROBLEM SELECTOR PLAN 1
-patient with sepsis on 6/30 from neutropenic fever  -CTPA without e/o PE or consolidation but given abdominal wound noted to have stool there is concern for fistulization vs peritonitis.   -c/w wound management per gyn-onc   -currently patient has been on zosyn for peritonitis -- c/w zosyn IV  -very poor prognosis.

## 2022-07-06 NOTE — PROGRESS NOTE ADULT - PROBLEM SELECTOR PLAN 4
s/p exlap, abdominal washout, rectosigmoid colectomy, diverting colostomy, bronchoscopy  s/p washout and bridging mesh placement on 6/14 and skin closure device placed on 6/17   > pain management as above  > GIB resolved   > On IV abx per primary team

## 2022-07-06 NOTE — PROGRESS NOTE ADULT - ASSESSMENT
52y  HD#30 with recurrent cervical CA admitted for management of neutropenic fever, now s/p emergent exploratory laparotomy, abdominal washout, rectosigmoid colectomy, diverting colostomy, bronchoscopy and Abthera placement on 22 for bowel perforation. Patient s/p washout and bridging vicryl mesh placement to close fascial gap on . She is s/p wound vac, discontinued  given signs of infection. Patient with signs of sepsis beginning  including persistent tachycardia, tachypnea and hypotension as well as mental status change.  Following extensive discussion with Palliative care, Ethics and surgery teams on board, patient code status now DNR/DNI. At this time, mental status improved. Decrease in H/h noted to 6.2/19.5 noted  now s/p 1uPRBC with improvement to /25.0.  Vital signs stable, remains tachycardic but otherwise hemodynamically stable and afebrile.     Neuro: IV Tylenol, Dilaudid for pain, ATC and PRN dosing adjusted for patient comfort.  Appreciate GHOS and Palliative recommendations.  CV: Persistently tachycardic.   - Concern for GI bleed w/ bloody ostomy output-  Decrease in H/h noted  to 6.2/19.5 now s/p 1uPRBC with appropriate response to 25.0.   - CT A/P and CT angio () negative for PE   - h/o HTN: on Lopressor 50mg BID, holding 2/2 sepsis and low BPs, BPs now trending toward baseline   Pulm: Maintaining appropriate SpO2.   GI: Regular diet as tolerated   - Concern for GI bleed in setting of stool guiac positive, dropping H/h.   - Ostomy: area of separation packed w/ quacel, ostomy powder and liquid barrier, hydrocolloid dressing.  - Senna, Protonix BID  : B/l nephrostomy tubes. ASHLEY w/ increased Creatintine likely in setting of sepsis, now downtrending. Appreciate hospitalist recs.   FEN: LR@100.   Heme: DVT ppx: Lovenox, SCDs while in bed.    - Thrombocytopenia: likely multifactorial, now normalized. Appreciate Heme Onc recs.  ID: Ostomy noted to leak into subcutaneous tissue w/ subsequent increasing leukocytosis and fevers. Now afebrile, VS otherwise improved with persistent tachycardia. f/u AM CBC for updated WBC   - Continue Zosyn (- ) for broad antibiotic coverage.   - F/u BCx (), prelim NGTD  - UCx () nl jessica   - s/p Caspofungin and Cefepime  - S/p Zarixo (-6/10) for neutropenic fever.   - S/p Zosyn (-)  MSK: Previously evaluated by PMR with rec for subacute rehab. Significant clinical changes have occurred since this time, will reconsult PT for evaluation and recommendations C/w Lidocaine patches and Flexeril PRN for muscle spasms.     Dispo: Continue inpatient management, code status = DNR/DNI.     Pt seen at [facility] with Gyn Oncology team  VLEIA Grimm PGY2     52y  HD#32 with recurrent cervical CA admitted for management of neutropenic fever, now s/p emergent exploratory laparotomy, abdominal washout, rectosigmoid colectomy, diverting colostomy, bronchoscopy and Abthera placement on 22 for bowel perforation. Patient s/p washout and bridging vicryl mesh placement to close fascial gap on . She is s/p wound vac, discontinued  given signs of infection. Patient with signs of sepsis beginning  including persistent tachycardia, tachypnea and hypotension as well as mental status change.  Following extensive discussion with Palliative care, Ethics and surgery teams on board, patient code status now DNR/DNI. At this time, mental status improved.  Concern for GI bleed w/ bloody ostomy output, Stool guiac positive 7/5 and clots noted in ostomy overnight.   Decrease in H/h noted 7/5 to 6.2/19.5 now s/p 1uPRBC with appropriate response to 8/25.0 -> 8.8/26.8. Continue to monitor ostomy output, will repeat CBC as clinically indicated. Vital signs stable, remains tachycardic but otherwise hemodynamically stable and afebrile.     Neuro: IV Tylenol, Dilaudid for pain, ATC and PRN dosing adjusted for patient comfort.  Appreciate GHOS and Palliative recommendations.  CV: Persistently tachycardic.   -  Concern for GI bleed w/ bloody ostomy output, Stool guiac positive 7/5 and clots noted in ostomy overnight  -  Decrease in H/h noted 7/5 to 6.2/19.5 now s/p 1uPRBC with appropriate response to 8/25.0.    -  Continue to monitor ostomy output, will repeat CBC as clinically indicated.   - CT A/P and CT angio () negative for PE   - h/o HTN: on Lopressor 50mg BID, holding 2/2 sepsis and low BPs, BPs now trending toward baseline   Pulm: Maintaining appropriate SpO2.   GI: Regular diet as tolerated   - Concern for GI bleed in setting of stool guiac positive, dropping H/h.   - Ostomy: area of separation packed w/ quacel, ostomy powder and liquid barrier, hydrocolloid dressing.  - Senna, Protonix BID  : B/l nephrostomy tubes. ASHLEY w/ increased Creatintine likely in setting of sepsis, now downtrending. Appreciate hospitalist recs.   FEN: LR@100. Hypophosphatemia, hypophosphatemia noted on AM BMP, will order repletion.   Heme: DVT ppx: Lovenox, SCDs while in bed.    - Thrombocytopenia: likely multifactorial, now normalized. Appreciate Heme Onc recs.  ID: Ostomy noted to leak into subcutaneous tissue w/ subsequent increasing leukocytosis and fevers. Now afebrile, VS otherwise improved with persistent tachycardia. f/u AM CBC for updated WBC   - Continue Zosyn (- ) for broad antibiotic coverage.   - F/u BCx (), prelim NGTD  - UCx () nl jessica   - s/p Caspofungin and Cefepime  - S/p Zarixo (-6/10) for neutropenic fever.   - S/p Zosyn (-)  MSK: Previously evaluated by PMR with rec for subacute rehab. Significant clinical changes have occurred since this time, will reconsult PT for evaluation and recommendations C/w Lidocaine patches and Flexeril PRN for muscle spasms.     Dispo: Continue inpatient management, code status = DNR/DNI.     Pt seen at [facility] with Gyn Oncology team  VELIA Grimm PGY2     52y  HD#32 with recurrent cervical CA admitted for management of neutropenic fever, now s/p emergent exploratory laparotomy, abdominal washout, rectosigmoid colectomy, diverting colostomy, bronchoscopy and Abthera placement on 22 for bowel perforation. Patient s/p washout and bridging vicryl mesh placement to close fascial gap on . She is s/p wound vac, discontinued  given signs of infection. Patient with signs of sepsis beginning  including persistent tachycardia, tachypnea and hypotension as well as mental status change.  Following extensive discussion with Palliative care, Ethics and surgery teams on board, patient code status now DNR/DNI. At this time, mental status improved.  Concern for GI bleed w/ bloody ostomy output, Stool guiac positive 7/5 and clots noted in ostomy overnight. Decrease in H/h noted 7/5 to 6.2/19.5 now s/p 1uPRBC with appropriate response to 8/25.0 -> 8.8/26.8. Continue to monitor ostomy output, will repeat CBC as clinically indicated. Vital signs stable, remains tachycardic but otherwise hemodynamically stable and afebrile.     Neuro: IV Tylenol, Dilaudid, Flexeril, lidocaine patch for pain, ATC and PRN dosing adjusted for patient comfort.  Appreciate GHOS and Palliative recommendations.  CV: Persistently tachycardic.   -  Concern for GI bleed w/ bloody ostomy output, Stool guiac positive 7/5 and clots noted in ostomy overnight  -  Decrease in H/h noted 7/5 to 6.2/19.5 now s/p 1uPRBC with appropriate response to 8/25.0.    -  Continue to monitor ostomy output, will repeat CBC as clinically indicated.   - CT A/P and CT angio () negative for PE   - h/o HTN: on Lopressor 50mg BID, holding 2/2 sepsis and low BPs, BPs now trending toward baseline   Pulm: Maintaining appropriate SpO2.   GI: Regular diet as tolerated   - Concern for GI bleed in setting of stool guiac positive, s/p 1U pRBC with appropriate response. Output less bloody today but with small clots, cont monitoring   - Ostomy: area of separation packed w/ quacel, ostomy powder and liquid barrier, hydrocolloid dressing.  - Senna, Protonix BID  : B/l nephrostomy tubes. ASHLEY w/ increased Creatintine likely in setting of sepsis, now downtrending. Appreciate hospitalist recs.   FEN: LR@100. hypophosphatemia noted on AM BMP, will order repletion.   Heme: DVT ppx: Lovenox, SCDs while in bed.    - Thrombocytopenia: likely multifactorial, now normalized. Appreciate Heme Onc recs.  ID: Ostomy noted to leak into subcutaneous tissue w/ subsequent increasing leukocytosis and fevers. Now afebrile, VS otherwise improved with persistent tachycardia. No leukocytosis at present   - Continue Zosyn (- ) for broad antibiotic coverage.   - F/u BCx (), prelim NGTD  - UCx () nl jessica   - s/p Caspofungin and Cefepime  - S/p Zarixo (-6/10) for neutropenic fever.   - S/p Zosyn (-)  MSK: Previously evaluated by PMR with rec for subacute rehab. Significant clinical changes have occurred since this time, will reconsult PT for evaluation and recommendations C/w Lidocaine patches and Flexeril PRN for muscle spasms.     Dispo: Pending family meeting, likely hospice vs SNF. Continue inpatient management, code status = DNR/DNI.     Pt seen at [facility] with Gyn Oncology team  VELIA Grimm PGY2  Marika Lemons MD PGY3

## 2022-07-06 NOTE — PROGRESS NOTE ADULT - ATTENDING COMMENTS
patient overall improved  continue drains, continue abx  patient needs dc planning, we discussed options with Lydia  hospice vs. long term nursing facility with specific wound needs  no further colostomy bleeding  hct stable  continue goc discussion

## 2022-07-06 NOTE — PROGRESS NOTE ADULT - PROBLEM SELECTOR PLAN 6
DNR/DNI, please see separate [facility] note 7/1   MOLST completed and placed in chart   > 7/1: Family and patient are aware of patient's declining performance status and are aware that she is not a candidate for further DMT at this time. They would like to optimize her symptom management but also want continued medical care for any reversible issues. DNR/DNI, please see separate [facility] note 7/1   MOLST completed and placed in chart   > 7/1: Family and patient are aware of patient's declining performance status and are aware that she is not a candidate for further DMT at this time. They would like to optimize her symptom management but also want continued medical care for any reversible issues.  > At this time, patient is not appropriate candidate for inpatient hospice as patient with PO route and symptoms are controlled on current regimen. Though patient's goals are to return home with hospice, she does not appear to have adequate support at home as she requires 24/7 care. She is a full assist and requires breana lift for transfer. [facility]ation [facility]. DNR/DNI, please see separate [facility] note 7/1   MOLST completed and placed in chart   > 7/1: Family and patient are aware of patient's declining performance status and are aware that she is not a candidate for further DMT at this time. They would like to optimize her symptom management but also want continued medical care for any reversible issues.  > At this time, patient is not appropriate candidate for inpatient hospice as patient with PO route and symptoms are controlled on current regimen. Though patient's goals are to return home, she does not appear to have adequate support at home as she requires 24/7 care. She is a full assist and requires breana lift for transfer. [facility]ation [facility].

## 2022-07-06 NOTE — PROGRESS NOTE ADULT - PROBLEM SELECTOR PLAN 3
-likely secondary to post-op changes; pt hemodynamically stable; will monitor CBC in am  -stool guaiac in ostomy back positive for blood  -s/p transfusion of several pRBCs and 1unit given on 7/5  -Hgb today 8.8 -will replete with 2gm MagS04 and 3 runs of KCL x2

## 2022-07-06 NOTE — PROGRESS NOTE ADULT - PROBLEM SELECTOR PLAN 11
-c/w lovenox subq daily    11. Dispo:  -will f/u PT recs  -[facility] could be a good option -- will discuss with family, CM and primary team.  -Plan for family meeting today 7/6/22 -patient restless and sometimes makes confusing statements per daughter  -suspect combination of delirium from prolonged hospitalization, uncontrolled pain, narcotics, post operative delirium  -CTH unremarkable on 6/8  -needs mobilization/PT/redirection

## 2022-07-07 LAB
ANION GAP SERPL CALC-SCNC: 9 MMOL/L — SIGNIFICANT CHANGE UP (ref 7–14)
BASOPHILS # BLD AUTO: 0 K/UL — SIGNIFICANT CHANGE UP (ref 0–0.2)
BASOPHILS NFR BLD AUTO: 0 % — SIGNIFICANT CHANGE UP (ref 0–2)
BUN SERPL-MCNC: 27 MG/DL — HIGH (ref 7–23)
CALCIUM SERPL-MCNC: 7.3 MG/DL — LOW (ref 8.4–10.5)
CHLORIDE SERPL-SCNC: 111 MMOL/L — HIGH (ref 98–107)
CO2 SERPL-SCNC: 26 MMOL/L — SIGNIFICANT CHANGE UP (ref 22–31)
CREAT SERPL-MCNC: 0.84 MG/DL — SIGNIFICANT CHANGE UP (ref 0.5–1.3)
EGFR: 83 ML/MIN/1.73M2 — SIGNIFICANT CHANGE UP
EOSINOPHIL # BLD AUTO: 0.34 K/UL — SIGNIFICANT CHANGE UP (ref 0–0.5)
EOSINOPHIL NFR BLD AUTO: 3.6 % — SIGNIFICANT CHANGE UP (ref 0–6)
GLUCOSE SERPL-MCNC: 108 MG/DL — HIGH (ref 70–99)
HCT VFR BLD CALC: 17.9 % — CRITICAL LOW (ref 34.5–45)
HCT VFR BLD CALC: 22.8 % — LOW (ref 34.5–45)
HGB BLD-MCNC: 5.9 G/DL — CRITICAL LOW (ref 11.5–15.5)
HGB BLD-MCNC: 7.4 G/DL — LOW (ref 11.5–15.5)
IANC: 6.06 K/UL — SIGNIFICANT CHANGE UP (ref 1.8–7.4)
IANC: 7.26 K/UL — SIGNIFICANT CHANGE UP (ref 1.8–7.4)
LYMPHOCYTES # BLD AUTO: 0.67 K/UL — LOW (ref 1–3.3)
LYMPHOCYTES # BLD AUTO: 7.1 % — LOW (ref 13–44)
MAGNESIUM SERPL-MCNC: 1.9 MG/DL — SIGNIFICANT CHANGE UP (ref 1.6–2.6)
MCHC RBC-ENTMCNC: 29.8 PG — SIGNIFICANT CHANGE UP (ref 27–34)
MCHC RBC-ENTMCNC: 29.8 PG — SIGNIFICANT CHANGE UP (ref 27–34)
MCHC RBC-ENTMCNC: 32.5 GM/DL — SIGNIFICANT CHANGE UP (ref 32–36)
MCHC RBC-ENTMCNC: 33 GM/DL — SIGNIFICANT CHANGE UP (ref 32–36)
MCV RBC AUTO: 90.4 FL — SIGNIFICANT CHANGE UP (ref 80–100)
MCV RBC AUTO: 91.9 FL — SIGNIFICANT CHANGE UP (ref 80–100)
MONOCYTES # BLD AUTO: 0.17 K/UL — SIGNIFICANT CHANGE UP (ref 0–0.9)
MONOCYTES NFR BLD AUTO: 1.8 % — LOW (ref 2–14)
NEUTROPHILS # BLD AUTO: 8.3 K/UL — HIGH (ref 1.8–7.4)
NEUTROPHILS NFR BLD AUTO: 82.1 % — HIGH (ref 43–77)
PHOSPHATE SERPL-MCNC: 2.6 MG/DL — SIGNIFICANT CHANGE UP (ref 2.5–4.5)
PLATELET # BLD AUTO: 117 K/UL — LOW (ref 150–400)
PLATELET # BLD AUTO: 122 K/UL — LOW (ref 150–400)
POTASSIUM SERPL-MCNC: 3 MMOL/L — LOW (ref 3.5–5.3)
POTASSIUM SERPL-SCNC: 3 MMOL/L — LOW (ref 3.5–5.3)
RBC # BLD: 1.98 M/UL — LOW (ref 3.8–5.2)
RBC # BLD: 2.48 M/UL — LOW (ref 3.8–5.2)
RBC # FLD: 17.5 % — HIGH (ref 10.3–14.5)
RBC # FLD: 18.2 % — HIGH (ref 10.3–14.5)
SODIUM SERPL-SCNC: 146 MMOL/L — HIGH (ref 135–145)
WBC # BLD: 7.65 K/UL — SIGNIFICANT CHANGE UP (ref 3.8–10.5)
WBC # BLD: 9.48 K/UL — SIGNIFICANT CHANGE UP (ref 3.8–10.5)
WBC # FLD AUTO: 7.65 K/UL — SIGNIFICANT CHANGE UP (ref 3.8–10.5)
WBC # FLD AUTO: 9.48 K/UL — SIGNIFICANT CHANGE UP (ref 3.8–10.5)

## 2022-07-07 PROCEDURE — 99222 1ST HOSP IP/OBS MODERATE 55: CPT | Mod: GC

## 2022-07-07 PROCEDURE — 99233 SBSQ HOSP IP/OBS HIGH 50: CPT

## 2022-07-07 RX ORDER — OXYCODONE HYDROCHLORIDE 5 MG/1
5 TABLET ORAL
Refills: 0 | Status: DISCONTINUED | OUTPATIENT
Start: 2022-07-07 | End: 2022-07-14

## 2022-07-07 RX ORDER — METOPROLOL TARTRATE 50 MG
100 TABLET ORAL DAILY
Refills: 0 | Status: DISCONTINUED | OUTPATIENT
Start: 2022-07-07 | End: 2022-07-20

## 2022-07-07 RX ORDER — OXYCODONE HYDROCHLORIDE 5 MG/1
7.5 TABLET ORAL
Refills: 0 | Status: DISCONTINUED | OUTPATIENT
Start: 2022-07-07 | End: 2022-07-14

## 2022-07-07 RX ORDER — ACETAMINOPHEN 500 MG
975 TABLET ORAL ONCE
Refills: 0 | Status: COMPLETED | OUTPATIENT
Start: 2022-07-07 | End: 2022-07-07

## 2022-07-07 RX ORDER — DIPHENHYDRAMINE HCL 50 MG
25 CAPSULE ORAL ONCE
Refills: 0 | Status: COMPLETED | OUTPATIENT
Start: 2022-07-07 | End: 2022-07-07

## 2022-07-07 RX ADMIN — OXYCODONE HYDROCHLORIDE 7.5 MILLIGRAM(S): 5 TABLET ORAL at 22:24

## 2022-07-07 RX ADMIN — PIPERACILLIN AND TAZOBACTAM 25 GRAM(S): 4; .5 INJECTION, POWDER, LYOPHILIZED, FOR SOLUTION INTRAVENOUS at 18:38

## 2022-07-07 RX ADMIN — HYDROMORPHONE HYDROCHLORIDE 0.5 MILLIGRAM(S): 2 INJECTION INTRAMUSCULAR; INTRAVENOUS; SUBCUTANEOUS at 01:30

## 2022-07-07 RX ADMIN — Medication 975 MILLIGRAM(S): at 07:59

## 2022-07-07 RX ADMIN — MIRTAZAPINE 15 MILLIGRAM(S): 45 TABLET, ORALLY DISINTEGRATING ORAL at 11:42

## 2022-07-07 RX ADMIN — PANTOPRAZOLE SODIUM 40 MILLIGRAM(S): 20 TABLET, DELAYED RELEASE ORAL at 18:39

## 2022-07-07 RX ADMIN — Medication 25 MILLIGRAM(S): at 07:59

## 2022-07-07 RX ADMIN — OXYCODONE HYDROCHLORIDE 15 MILLIGRAM(S): 5 TABLET ORAL at 06:25

## 2022-07-07 RX ADMIN — PANTOPRAZOLE SODIUM 40 MILLIGRAM(S): 20 TABLET, DELAYED RELEASE ORAL at 06:25

## 2022-07-07 RX ADMIN — OXYCODONE HYDROCHLORIDE 5 MILLIGRAM(S): 5 TABLET ORAL at 15:00

## 2022-07-07 RX ADMIN — PIPERACILLIN AND TAZOBACTAM 25 GRAM(S): 4; .5 INJECTION, POWDER, LYOPHILIZED, FOR SOLUTION INTRAVENOUS at 02:11

## 2022-07-07 RX ADMIN — OXYCODONE HYDROCHLORIDE 7.5 MILLIGRAM(S): 5 TABLET ORAL at 22:54

## 2022-07-07 RX ADMIN — PIPERACILLIN AND TAZOBACTAM 25 GRAM(S): 4; .5 INJECTION, POWDER, LYOPHILIZED, FOR SOLUTION INTRAVENOUS at 11:42

## 2022-07-07 RX ADMIN — OXYCODONE HYDROCHLORIDE 15 MILLIGRAM(S): 5 TABLET ORAL at 05:47

## 2022-07-07 RX ADMIN — HYDROMORPHONE HYDROCHLORIDE 0.5 MILLIGRAM(S): 2 INJECTION INTRAMUSCULAR; INTRAVENOUS; SUBCUTANEOUS at 01:17

## 2022-07-07 RX ADMIN — SODIUM CHLORIDE 100 MILLILITER(S): 9 INJECTION, SOLUTION INTRAVENOUS at 07:59

## 2022-07-07 RX ADMIN — SODIUM CHLORIDE 100 MILLILITER(S): 9 INJECTION, SOLUTION INTRAVENOUS at 02:11

## 2022-07-07 RX ADMIN — ENOXAPARIN SODIUM 40 MILLIGRAM(S): 100 INJECTION SUBCUTANEOUS at 11:42

## 2022-07-07 RX ADMIN — OXYCODONE HYDROCHLORIDE 5 MILLIGRAM(S): 5 TABLET ORAL at 15:50

## 2022-07-07 RX ADMIN — OXYCODONE HYDROCHLORIDE 15 MILLIGRAM(S): 5 TABLET ORAL at 18:39

## 2022-07-07 NOTE — PROGRESS NOTE ADULT - PROBLEM SELECTOR PLAN 2
-h/o recurrent cervical CA, receives Gyn Oncology care at [facility] ([place]).   -she initially was diagnosed with stage III cervical CA in 2014 s/p VBRT + chemo but never had surgical intervention. -Reported now being s/p 5 cycles of chemo, unsure of regimen but next due 6/21/22.  -course complicated by bowel perforation s/p washout and bridging vicryl mesh placement to close fascial gap on 6/14 w/ skin closure device place don 6/17  -further management per general surgery, gyn onc and plastic surgery  -overall prognosis is poor  -f/u palliative and ethics recs.   -patient is DNI/DNR   -initially agreed with hospice referral -- will f/u with family today given slight improvement in mental status  -will check guiac of ostomy bag for blood -h/o recurrent cervical CA, receives Gyn Oncology care at [facility] ([place]).   -she initially was diagnosed with stage III cervical CA in 2014 s/p VBRT + chemo but never had surgical intervention. -Reported now being s/p 5 cycles of chemo, unsure of regimen but next due 6/21/22.  -course complicated by bowel perforation s/p washout and bridging vicryl mesh placement to close fascial gap on 6/14 w/ skin closure device place don 6/17  -further management per general surgery, gyn onc and plastic surgery  -overall prognosis is poor  -f/u palliative and ethics recs.   -patient is DNI/DNR   -initially agreed with hospice referral but given improved mental status family wants to check with PT for possible SNF

## 2022-07-07 NOTE — CHART NOTE - NSCHARTNOTEFT_GEN_A_CORE
Gyn Onc Fellow Note    Spoke w/ pt's daughter Lydia ([phone]) on the phone at 2:40PM today. She states she will be coming in to the hospital to see her mother around 5PM. We discussed the need for a safe discharge plan for Ms. Rudd. We also discussed her current clinical status including suspected upper GI bleed.  I advised that depending on Ms. Rudd's goals for her care we can potentially offer an EGD for evaluation of the source of bleeding only if we are able to optimize her for the procedure. I stressed that we need to have a discussion together w/ Ms. Rudd to finalize her goals for her care and her discharge plan as soon as possible. I told her I would be able to meet with her when she comes in later today. Will follow up.    Guadalupe Jay MD

## 2022-07-07 NOTE — PROGRESS NOTE ADULT - ASSESSMENT
**NOTE INCOMPELTE**    52y  HD#32 with recurrent cervical CA admitted for management of neutropenic fever, now s/p emergent exploratory laparotomy, abdominal washout, rectosigmoid colectomy, diverting colostomy, bronchoscopy and Abthera placement on 22 for bowel perforation. Patient s/p washout and bridging vicryl mesh placement to close fascial gap on . She is s/p wound vac, discontinued  given signs of infection. Patient with signs of sepsis beginning  including persistent tachycardia, tachypnea and hypotension as well as mental status change.  Following extensive discussion with Palliative care, Ethics and surgery teams on board, patient code status now DNR/DNI. At this time, mental status improved.  Concern for GI bleed w/ bloody ostomy output, Stool guiac positive 7/5 and clots noted in ostomy overnight. Decrease in H/h noted 7/5 to 6.2/19.5 now s/p 1uPRBC with appropriate response to 8/25.0 -> 8.8/26.8. Continue to monitor ostomy output, will repeat CBC as clinically indicated. Vital signs stable, remains tachycardic but otherwise hemodynamically stable and afebrile.     Neuro: IV Tylenol, Dilaudid, Flexeril, lidocaine patch for pain, ATC and PRN dosing adjusted for patient comfort.  Appreciate GHOS and Palliative recommendations.  CV: Persistently tachycardic.   -  Concern for GI bleed w/ bloody ostomy output, Stool guiac positive 7/5 and clots noted in ostomy overnight  -  Decrease in H/h noted 7/5 to 6.2/19.5 now s/p 1uPRBC with appropriate response to 8/25.0.  F/u AM CBC.   -  Continue to monitor ostomy output, will repeat CBC  as clinically indicated.   - CT A/P and CT angio () negative for PE   - h/o HTN: on Lopressor 50mg BID, holding 2/2 sepsis and low BPs, BPs now trending toward baseline   Pulm: Maintaining appropriate SpO2.   GI: Regular diet as tolerated   - Concern for GI bleed in setting of stool guiac positive, s/p 1U pRBC with appropriate response. Output less bloody today but with small clots, cont monitoring.   - Ostomy: area of separation packed w/ quacel, ostomy powder and liquid barrier, hydrocolloid dressing.  - Senna, Protonix BID  : B/l nephrostomy tubes. ASHLEY w/ increased Creatintine likely in setting of sepsis, now downtrending. Appreciate hospitalist recs.   FEN: LR@100.   - hypophosphatemia noted on AM BMP /, s/p repletion, f/u AM BMP.   - hypokalemia noted  s/p repletion, f/u AM BMP  Heme: DVT ppx: Lovenox, SCDs while in bed.    - Thrombocytopenia: likely multifactorial, now normalized. Appreciate Heme Onc recs.  ID: Ostomy noted to leak into subcutaneous tissue w/ subsequent increasing leukocytosis and fevers. Now afebrile, VS otherwise improved with persistent tachycardia. No leukocytosis at present   - Continue Zosyn (- ) for broad antibiotic coverage.   - F/u BCx (), prelim NGTD  - UCx () nl jessica   - s/p Caspofungin and Cefepime  - S/p Zarixo (-6/10) for neutropenic fever.   - S/p Zosyn (-)  MSK: Previously evaluated by PMR with rec for subacute rehab. Significant clinical changes have occurred since this time, will reconsult PT for evaluation and recommendations C/w Lidocaine patches and Flexeril PRN for muscle spasms.     Dispo: Pending family meeting, likely hospice vs SNF. Continue inpatient management, code status = DNR/DNI.     Pt seen at [facility] with Gyn Oncology team  VELIA Carreon-Quita PGY2 52y  HD#32 with recurrent cervical CA admitted for management of neutropenic fever, now s/p emergent exploratory laparotomy, abdominal washout, rectosigmoid colectomy, diverting colostomy, bronchoscopy and Abthera placement on 22 for bowel perforation. Patient s/p washout and bridging vicryl mesh placement to close fascial gap on . She is s/p wound vac, discontinued  given signs of infection. Patient with signs of sepsis beginning  including persistent tachycardia, tachypnea and hypotension as well as mental status change.  Following extensive discussion with Palliative care, Ethics and surgery teams on board, patient code status now DNR/DNI. At this time, mental status improved.  Concern for GI bleed w/ bloody ostomy output, Stool guiac positive  and clots noted in ostomy overnight. Decrease in H/h noted  to 6.2/19.5 s/p 1uPRBC () with appropriate response, however drop to 5.9/17.9 noted on AM labs this morning. Will transfuse 2uPRBC. Continue to monitor ostomy output, will repeat CBC as clinically indicated. Vital signs stable, remains tachycardic but otherwise hemodynamically stable and afebrile.     Neuro: IV Tylenol, Dilaudid, Flexeril, lidocaine patch for pain, ATC and PRN dosing adjusted for patient comfort.  Appreciate GHOS and Palliative recommendations.  CV: Persistently tachycardic.   -  Concern for GI bleed w/ bloody ostomy output, Stool guiac positive  and downtrending H/h.  - Transfuse 2uPRBC.   -  Continue to monitor ostomy output, will repeat CBC  as clinically indicated.   - CT A/P and CT angio () negative for PE   - h/o HTN: on Lopressor 50mg BID, holding 2/2 sepsis and low BPs, BPs now trending toward baseline   Pulm: Maintaining appropriate SpO2.   GI: Regular diet as tolerated   - Concern for GI bleed in setting of stool guiac positive, s/p 1U pRBC with appropriate response. Output less bloody today but with small clots, cont monitoring.   - Ostomy: area of separation packed w/ quacel, ostomy powder and liquid barrier, hydrocolloid dressing.  - Senna, Protonix BID  : B/l nephrostomy tubes. ASHLEY w/ increased Creatintine likely in setting of sepsis, now downtrending. Appreciate hospitalist recs.   FEN: LR@100.   - hypophosphatemia noted on AM BMP , s/p repletion w/ adequate response.   - hypokalemia noted  s/p repletion w/ adequate response.  Heme: DVT ppx: Lovenox, SCDs while in bed.    - Thrombocytopenia: likely multifactorial, now normalized. Appreciate Heme Onc recs.  ID: Ostomy noted to leak into subcutaneous tissue w/ subsequent increasing leukocytosis and fevers. Now afebrile, VS otherwise improved with persistent tachycardia. No leukocytosis at present   - Continue Zosyn (- ) for broad antibiotic coverage.   - F/u BCx (), prelim NGTD  - UCx () nl jessica   - s/p Caspofungin and Cefepime  - S/p Zarixo (-6/10) for neutropenic fever.   - S/p Zosyn (-)  MSK: Previously evaluated by PMR with rec for subacute rehab. Significant clinical changes have occurred since this time, will reconsult PT for evaluation and recommendations C/w Lidocaine patches and Flexeril PRN for muscle spasms.     Dispo: Pending family meeting, likely hospice vs SNF. Continue inpatient management, code status = DNR/DNI.     Pt seen at [facility] with Gyn Oncology team  VELIA Carreon-Quita PGY2

## 2022-07-07 NOTE — CHART NOTE - NSCHARTNOTEFT_GEN_A_CORE
R3 PM Rounding Note     Patient seen on PM rounds. She is s/p 1U pRBC, second unit in progress. Pain well controlled on pain regimen as recommended by palliative care. Patient continue to express desire to go home. No other complaints.     VS  T(C): 36.5 (07-07-22 @ 14:30)  HR: 104 (07-07-22 @ 14:30)  BP: 116/79 (07-07-22 @ 14:30)  RR: 18 (07-07-22 @ 14:30)  SpO2: 100% (07-07-22 @ 14:30)    Gen: NAD   Resp: Nl work of breathing, on RA   Abd: Tender, red/brown liquid stool and blood in ostomy bag     Plan:   - to complete 2U pRBC, f/u post-transfusion CBC  - pending family meeting to discuss further work up for GIB   - Cont pain regimen, appreciate Palliative recs    Marika Lemons MD PGY3

## 2022-07-07 NOTE — CONSULT NOTE ADULT - ATTENDING COMMENTS
Severe sepsis secondary to sigmoid perforation with peritonitis  a.  S/P Tiffany's procedure  b.  Admit to SICU  c.  Continue IV zosyn    Neutropenia  a.  On Zarxio  b.  Culure prn    Respiratory insufficiency  a.  Remains mechanically ventilated  b,.  With adequate gas exchange  c.  Wean propofol to Precedex    At risk for malnutrition  a. NPO at this time
pt is a 53 yo female with hx cervical cancer on chemotherapy, presents from  [facility] with fever and pneumoperitoneum , air around uterus, for gyn evaluation.  Pt with bilateral nephrostomy tubes, Pt alert and awake in no acute distress  asked to evaluate for possible micu admission  bp 120/90  rr 18 heent dry mucosa  lungs clear b/l  heart tachy hr 125   temp 99.4  ext no edema  neuro nonfocal moves all ext.    wbc 0.42  hgb 7.5   hct 24  plts 202    bicarb 31 cr 0.84      A/P  53 yo female with hx cervical ca, onchemotherapy, ct scan showing  pneumoperitoneum , air around uterus, coliits,  pt with neutropenia .  hemodynamically stable on ivf,   -panculture, blood, urine,  -continue broad abx, zosyn,   -heme consult for further f/u  -dvt prophylaxis  -check echo to evaluate lv function  -gyn evaluation  -pt  is not a medical icu candidate
55 minutes spent on total encounter; more than 50% of the visit was spent counseling and / or coordinating care by the attending physician.  The necessity of the time spent during the encounter on this date of service was due to:     clinical eval, review labs, discuss with patient/ team.    Impression:  # GI bleed - patient w/ bloody output from ostomy site, unclear origin. Concern for possible upper GI source (ex PUD, esophagitis/gastritis/duodenitis, AVM, dieulafoy) given rise in BUN over past few days. DDx includes small bowel vs proximal colonic etiologies as well. Given new complicated colostomy, would want to avoid endoscopic evaluation via the colostomy site. Can consider EGD +/- push enteroscopy to evaluate stomach/duodenum/proximal jejunum for bleeding source, if within GOC. In the interim, will plan for PPI therapy  # cervical Ca w/ complicated rectosigmoid resection   # neutropenic fever    Plan:  - pantoprazole 40mg IV BID  - transfuse for goal >7  - pending GOC and surgery input, can plan for possible EGD/push enteroscopy to evaluate for UGIB. At this time would not recommend endoscopic evaluation via the colostomy  - rest of care per primary team    GI will continue to follow.
I agree with the detailed interval history, physical, and plan, which I have reviewed and edited where appropriate'; also agree with notes/assessment with my team on service.  I have personally examined the patient.  I was physically present for the key portions of the evaluation and management (E/M) service provided.  I reviewed all the pertinent data.  The patient is a critical care patient with life threatening hemodynamic and metabolic instability in SICU.  The SICU team has a constant risk benefit analyzes discussion and coordinating care with the primary team and all consultants.   The patient is in SICU with the chief complaint and diagnosis mentioned in the note.   The plan will be specified in the note.  52y s/p B/L nephrostomy tube placement, sp severe sepsis;  s/p multiple washouts, open resection of left colon, with transverse colostomy creation; s/p fascial closure. SICU was consulted for sepsis and worsening mental status.  EXAM  NEUROLOGY  EXAM: Lethargic appearing  RESPIRATORY  Exam: Lungs clear   CARDIOVASCULAR  Exam: Regular rhythm.   GI/NUTRITION  Exam: Wet to dry open abdominal wall   VASCULAR  Exam: Extremities warm,  PLAN:   Patient with no medica/surgical options.  SICU recommends against escalating care given her poor prognosis.   Palliative care has been following. Would recommend continuing GOC discussions with the family. Will continue to follow.
agitated and restless this AM.  Just given sedation prior to our exam.     Exam:  Stupor.  Eyes open a few times for a few seconds during exam with verbal and tactile stimulation. Says a few short phrases.  No follow commands.  Not answering any questions.  EOMI.  BTT, B. Corneal symmetric.  Pupils 3-->2, B. Face - symmetric.     Moving all 4 ext well spontaneously and to stimulation.     Reflexes - absent in the legs.  Moving continually in the arms - unable to test.         A/P  Severe encephalopathy.  DDx: toxic metabolic encephalopathy; r/o seizure; septic encephalopathy or CNS infection if no source found.  MRI brain w/wo gado with sedation.  Continuous EEG with sedation.  ID consult    Thank you
h/o recurrent cervical CA, receives Gyn Oncology care at [facility] ([place]). she initially was diagnosed with stage III cervical CA in 2014 s/p VBRT + chemo but never had surgical intervention. Reported now being s/p 5 cycles of chemo, unsure of regimen but next due 6/21/22.   transferred to [facility] from OSH on 6/5 due to neutropenic fever. last cycle of chemotherapy 5 days prior to this admission.    found having free air on abd x ray concerning for bowel perforation s/p emergent exploratory laparotomy, abdominal washout, rectosigmoid colectomy, diverting colostomy, bronchoscopy and Abthera placement on 6/8/22;    s/p a  second RTOR for abdominal washout and replacement of Abthera on 6/14/22.   Hematology consulted for thrombocytopenia and anemia.   neutropenia resolved after zarxio (6/5-6/10)  Thrombocytopenia likely multifactorial including chemo side effects and infection  peripheral blood smear will be reviewed by hematology team   rec check B12/folate level; f/u  LDH/uric acid; ferritin and iron study   LDH and haptoglobin-no e/o hemolysis.   Heparin PF4 antibody negative; low HIT score;   transfuse if Hb <7; Plt <10k, or <15k if febrile; or <50k if active bleeding; transfuse plt before procedure, the goal of platelet per GynOnc and primary team.  Hold off AC for now in view of Plt 12 on 6/15/22  rec monitor cbc with differential daily
pt is altered on exam and unable to participate in interview  she is sleeping and unable to be aroused  per the nurse pt had received ativan and haldol prior to our exam   d/w team would not call this hypothyroidism with low TOTAL T3 and T4 (which are expected to be low in illness)  TSH normal  obtain free T4   inform endocrine when free T4 results
#Coffee ground emesis, nausea, vomiting  #Abnormal CT with findings c/f necrotic tumor vs rectosigmoid-vaginal fistula, possible colitis, pneumoperitoneum   #Recurrent cervical cancer    --PPI BID  --Given significant neutropenia and AMS, would defer endoscopic evaluation at this time  --NPO for now given AMS and recent nausea/vomiting  --Management of neutropenic fever and AMS per primary team  --Stool studies to r/o infection if diarrhea   --Serial abdominal exams and imaging (if patient cooperates) given c/f peritoneum, further management per surgery/gyn-onc  --G team attempted to reach family to discuss GOC, but unable to reach  --Further recs as above

## 2022-07-07 NOTE — CHART NOTE - NSCHARTNOTEFT_GEN_A_CORE
Gyn Onc Fellow Note    Alerted by resident/PA team that Lydia arrived to unit at 5:40PM. I arrived to the pt's room around 5:50PM. I had an extensive conversation w/ Lydia, her father Jeff, and Ms. Rudd's cousin for an hour this evening. First I discussed Ms. Rudd's most recent findings and concern for GI bleed with need for transfusion today and consultation w/ gastroenterology and their recommendations. Currently she is receiving IV protonix and plan to transfuse hgb>7. There is a possibility of offering EGD for diagnostic purposes if this is in line with the pt's goals of care and agreement from GI and anesthesia teams to perform the procedure. I also discussed the need for a definitive discharge plan for Ms. Rudd. Ms. Rudd has expressed to her care team and her family that she would like to go home. She has been extensively counseled that discharge home is not considered a safe discharge plan given her extensive care needs including advanced wound care, drain management, extensive mobilization assistance, as well as need for assistance with all self care. Her family acknowledges that sending her home is not a possibility. The family has not yet decided where they would like Ms. Rudd to go when she leaves the hospital.  They have been presented the option of hospice care at [facility] vs. skilled nursing facility. Our team as well as care management and palliative care team has recommended [facility] as the safest option that would provide the appropriate level of care for the pt upon discharge. I explained the concept of hospice care and how although she would not receive further disease modifying therapy she would receive all of the care that she needs for symptom management, to maintain her wound and drain care needs, and to receive assistance with self care/mobilization. Lydia expressed that she "wants the best possible care for her mother." The family expressed to me that they would want Ms. Rudd to undergo EGD if it is possible to evaluate her GI bleed. I explained that we can pursue this evaluation if all participating teams are agreeable and that Ms. Rudd provides consent for the procedure.  During our conversation Lydia expressed frustration regarding her mother's care including her care from her outpt gyn oncologist and their experience w/ her mother's hospital course and team here in the hospital. I acknowledged her frustrations and reviewed Ms. Rudd's hospital course with the family. We discussed the natural course cervical cancer and treatment as well as the aggressive nature of her recurrence. She expressed to me that she did not know that her mother had been told about her cancer or her care here in the hospital, I assured her that the team has been discussing her care with her in detail each day. Lydia would like information about [facility] and I informed the family that our team would contact the  tomorrow to provide them w/ the information. I also explained that we will continue to treat her GI bleed and if possible we can pursue an EGD if Ms. Rudd agrees and is able to undergo procedure. We agreed that Lydia would be the primary contact for her family and that our team would continue to update her with clinical changes and that we are able to provide additional updates or answer questions as needed.      Guadalupe Jay MD

## 2022-07-07 NOTE — PROGRESS NOTE ADULT - PROBLEM SELECTOR PLAN 6
-vitals reviewed and patient since admission had HR in 100s-110s  -recent CTPA negative for PE  -c/w with treatment underlying sepsis

## 2022-07-07 NOTE — PROGRESS NOTE ADULT - PROBLEM SELECTOR PLAN 12
-c/w lovenox subq daily    11. Dispo:  -will f/u PT recs  -[place] could be a good option -- will discuss with family, CM and primary team.  -Plan per primary team and family -c/w lovenox subq daily    11. Dispo:  -will f/u PT recs  -[place] could be a good option -- will f/u CM and primary team.  -Plan per primary team and family

## 2022-07-07 NOTE — PROGRESS NOTE ADULT - NSPROGADDITIONALINFOA_GEN_ALL_CORE
Seen on rounds with F, R, and PA. Labs and flow reviewed. Agree with plan.
Seen with F, PA. Labs and flow reviewed. Agree with plan. Hgb trend noted. Coags noted. GHOS f/u planned.
Seen on rounds with R. Labs and flow reviewed. Agree with plan.
Seen on rounds with F, Rs, and PA. Labs and flow reviewed. Pt resting. Agree with plan.
Seen on rounds. Labs and flow reviewed. Agree with plan.
Plan discussed with gynonc PA
Plan discussed with gynonc PA. Overall prognosis is poor. GOC ongoing with daughter by ethics, pall care and surgical teams.
Discussed with daughter Lydia Oleary on phone for 20 minutes on 6/8. Answered all the questions.

## 2022-07-07 NOTE — PROGRESS NOTE ADULT - SUBJECTIVE AND OBJECTIVE BOX
Gyn ONC Progress Note     HD #33    Subjective:   Pt seen and examined at bedside. No events overnight. Pain well controlled. Patient ambulating. Passing flatus. Tolerating regular diet. Pt denies fever, chills, chest pain, SOB, nausea, vomiting, lightheadedness, dizziness.      Objective:  T(F): 97.5 (07-07-22 @ 06:00), Max: 97.7 (07-06-22 @ 21:00)  HR: 109 (07-07-22 @ 06:00) (104 - 111)  BP: 117/84 (07-07-22 @ 06:00) (102/80 - 122/80)  RR: 18 (07-07-22 @ 06:00) (17 - 18)  SpO2: 100% (07-07-22 @ 06:00) (96% - 100%)      I&O's Summary    05 Jul 2022 07:01  -  06 Jul 2022 07:00  --------------------------------------------------------  IN: 2520 mL / OUT: 2250 mL / NET: 270 mL    06 Jul 2022 07:01  -  07 Jul 2022 06:05  --------------------------------------------------------  IN: 3060 mL / OUT: 1862.5 mL / NET: 1197.5 mL    I&O's Detail    05 Jul 2022 07:01  -  06 Jul 2022 07:00  --------------------------------------------------------  IN:    IV PiggyBack: 600 mL    Lactated Ringers: 900 mL    Oral Fluid: 720 mL    PRBCs (Packed Red Blood Cells): 300 mL  Total IN: 2520 mL    OUT:    Colostomy (mL): 550 mL    Drain (mL): 187.5 mL    Drain (mL): 55 mL    Drain (mL): 107.5 mL    Nephrostomy Tube (mL): 600 mL    Nephrostomy Tube (mL): 750 mL  Total OUT: 2250 mL    Total NET: 270 mL    06 Jul 2022 07:01  -  07 Jul 2022 06:06  --------------------------------------------------------  IN:    IV PiggyBack: 600 mL    Lactated Ringers: 2000 mL    Oral Fluid: 460 mL  Total IN: 3060 mL    OUT:    Colostomy (mL): 550 mL    Drain (mL): 425 mL    Drain (mL): 92.5 mL    Drain (mL): 90 mL    Nephrostomy Tube (mL): 290 mL    Nephrostomy Tube (mL): 415 mL  Total OUT: 1862.5 mL    Total NET: 1197.5 mL    MEDICATIONS  (STANDING):  enoxaparin Injectable 40 milliGRAM(s) SubCutaneous every 24 hours  lactated ringers. 1000 milliLiter(s) (100 mL/Hr) IV Continuous <Continuous>  lidocaine 1% (Preservative-free) Injectable 50 milliLiter(s) Local Injection once  mirtazapine 15 milliGRAM(s) Oral daily  oxyCODONE  ER Tablet 15 milliGRAM(s) Oral every 12 hours  pantoprazole  Injectable 40 milliGRAM(s) IV Push two times a day  piperacillin/tazobactam IVPB.. 3.375 Gram(s) IV Intermittent every 8 hours    MEDICATIONS  (PRN):  cyclobenzaprine 5 milliGRAM(s) Oral three times a day PRN Muscle Spasm  HYDROmorphone  Injectable 0.5 milliGRAM(s) IV Push every 3 hours PRN Moderate Pain (4 - 6)  HYDROmorphone  Injectable 1 milliGRAM(s) IV Push every 3 hours PRN Severe Pain (7 - 10)  HYDROmorphone  Injectable 1 milliGRAM(s) IV Push once PRN ostomy  lidocaine   4% Patch 1 Patch Transdermal daily PRN back pain  simethicone 80 milliGRAM(s) Chew three times a day PRN Gas    Physical Exam:  Constitutional: Pt awake, conversational. A&Ox3.   CV: Tachycardic, regular S1/S2  Lungs: CTA b/l   Abd: Soft, non-distended. Diffusely tender. Packing in place w/ overlying abdominal pad with purulent drainage at umbilicus, ostomy w/ mucocutaneous separation from 6-9 o'clock extending 1cmc under skin w/ feculent material and fibrinous material, small drain lateral. Red/brown liquid/stool output in ostomy bag w/ small clots; +3 Ric drains with increased seropurulent output in RLW, LUQ, LLQ. LLQ drain not holding suction well. LUQ drain with oozing at site.   : R&L nephrostomy tubes draining slightly cloudy urine.  Extremities: No pain, 2+ pitting edema.    LABS:                        8.8    7.78  )-----------( 125      ( 06 Jul 2022 05:50 )             26.8     07-06    144    |  109<H>  |  28<H>  ----------------------------<  98     2.6<LL>   |  24     |  0.83     Ca    7.5<L>      06 Jul 2022 05:50  Phos  2.2       07-06  Mg     1.80      07-06     Gyn ONC Progress Note     HD #33    Subjective:   Pt seen and examined at bedside. No events overnight. Pain well controlled. Patient ambulating. Passing flatus. Tolerating regular diet. Pt denies fever, chills, chest pain, SOB, nausea, vomiting, lightheadedness, dizziness.      Objective:  T(F): 97.5 (07-07-22 @ 06:00), Max: 97.7 (07-06-22 @ 21:00)  HR: 109 (07-07-22 @ 06:00) (104 - 111)  BP: 117/84 (07-07-22 @ 06:00) (102/80 - 122/80)  RR: 18 (07-07-22 @ 06:00) (17 - 18)  SpO2: 100% (07-07-22 @ 06:00) (96% - 100%)      I&O's Summary    05 Jul 2022 07:01  -  06 Jul 2022 07:00  --------------------------------------------------------  IN: 2520 mL / OUT: 2250 mL / NET: 270 mL    06 Jul 2022 07:01  -  07 Jul 2022 06:05  --------------------------------------------------------  IN: 3060 mL / OUT: 1862.5 mL / NET: 1197.5 mL    I&O's Detail    05 Jul 2022 07:01  -  06 Jul 2022 07:00  --------------------------------------------------------  IN:    IV PiggyBack: 600 mL    Lactated Ringers: 900 mL    Oral Fluid: 720 mL    PRBCs (Packed Red Blood Cells): 300 mL  Total IN: 2520 mL    OUT:    Colostomy (mL): 550 mL    Drain (mL): 187.5 mL    Drain (mL): 55 mL    Drain (mL): 107.5 mL    Nephrostomy Tube (mL): 600 mL    Nephrostomy Tube (mL): 750 mL  Total OUT: 2250 mL    Total NET: 270 mL    06 Jul 2022 07:01  -  07 Jul 2022 06:06  --------------------------------------------------------  IN:    IV PiggyBack: 600 mL    Lactated Ringers: 2000 mL    Oral Fluid: 460 mL  Total IN: 3060 mL    OUT:    Colostomy (mL): 550 mL    Drain (mL): 425 mL    Drain (mL): 92.5 mL    Drain (mL): 90 mL    Nephrostomy Tube (mL): 290 mL    Nephrostomy Tube (mL): 415 mL  Total OUT: 1862.5 mL    Total NET: 1197.5 mL    MEDICATIONS  (STANDING):  enoxaparin Injectable 40 milliGRAM(s) SubCutaneous every 24 hours  lactated ringers. 1000 milliLiter(s) (100 mL/Hr) IV Continuous <Continuous>  lidocaine 1% (Preservative-free) Injectable 50 milliLiter(s) Local Injection once  mirtazapine 15 milliGRAM(s) Oral daily  oxyCODONE  ER Tablet 15 milliGRAM(s) Oral every 12 hours  pantoprazole  Injectable 40 milliGRAM(s) IV Push two times a day  piperacillin/tazobactam IVPB.. 3.375 Gram(s) IV Intermittent every 8 hours    MEDICATIONS  (PRN):  cyclobenzaprine 5 milliGRAM(s) Oral three times a day PRN Muscle Spasm  HYDROmorphone  Injectable 0.5 milliGRAM(s) IV Push every 3 hours PRN Moderate Pain (4 - 6)  HYDROmorphone  Injectable 1 milliGRAM(s) IV Push every 3 hours PRN Severe Pain (7 - 10)  HYDROmorphone  Injectable 1 milliGRAM(s) IV Push once PRN ostomy  lidocaine   4% Patch 1 Patch Transdermal daily PRN back pain  simethicone 80 milliGRAM(s) Chew three times a day PRN Gas    Physical Exam:  Constitutional: Pt awake, conversational. A&Ox3.   CV: Tachycardic, regular S1/S2  Lungs: CTA b/l   Abd: Soft, non-distended. Diffusely tender. Packing in place w/ overlying abdominal pad with purulent drainage at umbilicus, ostomy w/ mucocutaneous separation from 6-9 o'clock extending 1cmc under skin w/ feculent material and fibrinous material, small drain lateral. Red/brown liquid/stool output in ostomy bag w/ small clots; +3 Ric drains with increased seropurulent output in RLW, LUQ, LLQ. LLQ drain not holding suction well. LUQ drain with oozing at site.   : R&L nephrostomy tubes draining slightly cloudy urine.  Extremities: No pain, 2+ pitting edema.    LABS:                        5.9    9.48  )-----------( 117      ( 07 Jul 2022 05:25 )             17.9                           8.8    7.78  )-----------( 125      ( 06 Jul 2022 05:50 )             26.8     07-07    146<H>  |  111<H>  |  27<H>  ----------------------------<  108<H>  3.0<L>   |  26  |  0.84    Ca    7.3<L>      07 Jul 2022 05:25  Phos  2.6     07-07  Mg     1.90     07-07        07-06    144    |  109<H>  |  28<H>  ----------------------------<  98     2.6<LL>   |  24     |  0.83     Ca    7.5<L>      06 Jul 2022 05:50  Phos  2.2       07-06  Mg     1.80      07-06

## 2022-07-07 NOTE — PROGRESS NOTE ADULT - SUBJECTIVE AND OBJECTIVE BOX
CHIEF COMPLAINT: f/u neutropenic fever    SUBJECTIVE / OVERNIGHT EVENTS: Patient seen and examined. No acute events overnight. Pain well controlled and patient without any complaints. Passing flatus.    MEDICATIONS  (STANDING):  enoxaparin Injectable 40 milliGRAM(s) SubCutaneous every 24 hours  lactated ringers. 1000 milliLiter(s) (100 mL/Hr) IV Continuous <Continuous>  lidocaine 1% (Preservative-free) Injectable 50 milliLiter(s) Local Injection once  mirtazapine 15 milliGRAM(s) Oral daily  oxyCODONE  ER Tablet 15 milliGRAM(s) Oral every 12 hours  pantoprazole  Injectable 40 milliGRAM(s) IV Push two times a day  piperacillin/tazobactam IVPB.. 3.375 Gram(s) IV Intermittent every 8 hours    MEDICATIONS  (PRN):  cyclobenzaprine 5 milliGRAM(s) Oral three times a day PRN Muscle Spasm  HYDROmorphone  Injectable 0.5 milliGRAM(s) IV Push every 3 hours PRN Moderate Pain (4 - 6)  HYDROmorphone  Injectable 1 milliGRAM(s) IV Push every 3 hours PRN Severe Pain (7 - 10)  HYDROmorphone  Injectable 1 milliGRAM(s) IV Push once PRN ostomy  lidocaine   4% Patch 1 Patch Transdermal daily PRN back pain  simethicone 80 milliGRAM(s) Chew three times a day PRN Gas    VITALS:  T(F): 98.9 (07-07-22 @ 11:30), Max: 98.9 (07-07-22 @ 11:30)  HR: 102 (07-07-22 @ 11:30) (102 - 111)  BP: 125/79 (07-07-22 @ 11:30) (102/80 - 125/79)  RR: 20 (07-07-22 @ 11:30) (17 - 20)  SpO2: 100% (07-07-22 @ 11:30)    PHYSICAL EXAM:  GENERAL: ill appearing, cachetic, NAD  CHEST/LUNG: Clear to auscultation bilaterally; No wheeze  HEART: Regular rate and rhythm; No murmurs, rubs, or gallops  ABDOMEN: Soft, Nontender, Nondistended; Bowel sounds present; SARAH drain in place; ostomy in place with ?erythematous drainage  EXTREMITIES:  2+ Peripheral Pulses, No clubbing, cyanosis, or edema  SKIN: SARAH drain in place    LABS:              5.9                  146  | 26   | 27           9.48  >-----------< 117     ------------------------< 108                   17.9                 3.0  | 111  | 0.84                                         Ca 7.3   Mg 1.90  Ph 2.6      [ ] Consultant(s) Notes Reviewed:  [x] Care Discussed with Consultants/Other Providers: Gyn-Onc PA - discussed

## 2022-07-07 NOTE — PROGRESS NOTE ADULT - PROBLEM SELECTOR PLAN 4
-likely secondary to post-op changes; pt hemodynamically stable; will monitor CBC in am  -stool guaiac in ostomy back positive for blood  -s/p transfusion of several pRBCs and 1unit given on 7/5  -Hgb today 5.9 -- recommend transfusion    4. UGIB:  -GI consulted and recommending pantoprazole 40mg IV BID and to transfuse for goal >7.   -GI recommending that "pending GOC and surgery input, can plan for possible EGD/push enteroscopy to evaluate for UGIB. At this time would not recommend endoscopic evaluation via the colostomy"  -will f/u with primary team

## 2022-07-07 NOTE — CONSULT NOTE ADULT - ASSESSMENT
Impression:  # GI bleed - patient w/ bloody output from ostomy site, unclear origin. Concern for possible upper GI source (ex PUD, esophagitis/gastritis/duodenitis, AVM, dieulafoy) given rise in BUN over past few days. DDx includes small bowel vs proximal colonic etiologies as well. Given new complicated colostomy, would want to avoid endoscopic evaluation via the colostomy site. Can consider EGD +/- push enteroscopy to evaluate stomach/duodenum/proximal jejunum for bleeding source, if within GOC. In the interim, will plan for PPI therapy  # cervical Ca w/ complicated rectosigmoid resection   # neutropenic fever    Plan:  - pantoprazole 40mg IV BID  - transfuse for goal >7  - pending GOC and surgery input, can plan for possible EGD/push enteroscopy to evaluate for UGIB. At this time would not recommend endoscopic evaluation via the colostomy  - rest of care per primary team    GI will continue to follow.     All recommendations are tentative until note is attested by attending.     Adolfo Jackson, PGY6  Gastroenterology/Hepatology Fellow    HOW TO REACH THE GI FELLOW:  FOR FOLLOW UP QUESTIONS: Weekday (M-F; 7am-5pm)  1) message on [employer] Teams   2) Page: [phone] ([facility] Short Range Pager); [phone] (Long Range Pager)    FOR FOLLOW UP QUESTIONS: Weeknights   For non-emergent questions that can wait until the morning, please email ([email or web address])  For emergent questions that CANNOT wait until the morning, please   1) page GI fellow via hospital /Mapiliary   OR 2) Call GI fellow on call via Microsoft Teams   (do not message on microscoft teams overnight)    FOR FOLLOW UP QUESTIONS: Weekends  For non-emergent questions please email ([email or web address]; [email or web address])  For emergent questions that CANNOT wait, please   1) page GI fellow via hospital /Mapiliary   OR 2) Call GI fellow on call via [employer] Teams     FOR NEW CONSULTS:  Non-emergent consults: please email ([email or web address]; [email or web address])  For emergent consults:  please   1) page GI fellow via hospital    OR 2) Call GI fellow on call via [employer] Teams

## 2022-07-07 NOTE — CONSULT NOTE ADULT - SUBJECTIVE AND OBJECTIVE BOX
HPI:  52y  HD#32 with recurrent cervical CA admitted for management of neutropenic fever, now s/p emergent exploratory laparotomy, abdominal washout, rectosigmoid colectomy, diverting colostomy, bronchoscopy and Abthera placement on 22 for bowel perforation. Patient s/p washout and bridging vicryl mesh placement to close fascial gap on . She is s/p wound vac, discontinued  given signs of infection. Patient with signs of sepsis beginning  including persistent tachycardia, tachypnea and hypotension as well as mental status change.  Following extensive discussion with Palliative care, Ethics and surgery teams on board, patient code status now DNR/DNI. At this time, mental status improved.      Concern for GI bleed w/ bloody ostomy output starting , with drop in Hg from baseline 8's to 6.2 s/p 1uPRBC () with appropriate response, however drop to 5.9/17.9 noted on AM labs . Transfused 2u pRBC. Patient HDS w/o complaints. No stool per rectum. Continues to have dark bloody output from ostomy; ~500cc on ; ~100cc overnight -.       Allergies:  No Known Allergies      Home Medications:    Hospital Medications:  cyclobenzaprine 5 milliGRAM(s) Oral three times a day PRN  enoxaparin Injectable 40 milliGRAM(s) SubCutaneous every 24 hours  HYDROmorphone  Injectable 0.5 milliGRAM(s) IV Push every 3 hours PRN  HYDROmorphone  Injectable 1 milliGRAM(s) IV Push every 3 hours PRN  HYDROmorphone  Injectable 1 milliGRAM(s) IV Push once PRN  lactated ringers. 1000 milliLiter(s) IV Continuous <Continuous>  lidocaine   4% Patch 1 Patch Transdermal daily PRN  lidocaine 1% (Preservative-free) Injectable 50 milliLiter(s) Local Injection once  mirtazapine 15 milliGRAM(s) Oral daily  oxyCODONE  ER Tablet 15 milliGRAM(s) Oral every 12 hours  pantoprazole  Injectable 40 milliGRAM(s) IV Push two times a day  piperacillin/tazobactam IVPB.. 3.375 Gram(s) IV Intermittent every 8 hours  simethicone 80 milliGRAM(s) Chew three times a day PRN      PMHX/PSHX:  HTN (hypertension)    Cervical cancer    No significant past surgical history    Nephrostomy status        Family history:  No pertinent family history in first degree relatives    FH: liver cancer (Mother)        Denies family history of colon cancer/polyps, stomach cancer/polyps, pancreatic cancer/masses, liver cancer/disease, ovarian cancer and endometrial cancer.    Social History:   Tob: Denies  EtOH: Denies  Illicit Drugs: Denies    ROS:     General:  No wt loss, fevers, chills, night sweats, fatigue  Eyes:  Good vision, no reported pain  ENT:  No sore throat, pain, runny nose, dysphagia  CV:  No pain, palpitations, hypo/hypertension  Pulm:  No dyspnea, cough, tachypnea, wheezing  GI:  see HPI  :  No pain, bleeding, incontinence, nocturia  Muscle:  No pain, weakness  Neuro:  No weakness, tingling, memory problems  Psych:  No fatigue, insomnia, mood problems, depression  Endocrine:  No polyuria, polydipsia, cold/heat intolerance  Heme:  No petechiae, ecchymosis, easy bruisability  Skin:  No rash, tattoos, scars, edema    PHYSICAL EXAM:     GENERAL:  No acute distress  HEENT:  NCAT, no scleral icterus   CHEST:  no respiratory distress  HEART:  Regular rate and rhythm  ABDOMEN:  midline surgical scar covered, RLQ ostomy w/ dark red liquid stool, SARAH drain w/ serosanguinous output  EXTREMITIES: No edema  SKIN:  No rash/erythema/ecchymoses/petechiae/wounds/abscess/warm/dry  NEURO:  Alert and oriented x 3, no asterixis    Vital Signs:  Vital Signs Last 24 Hrs  T(C): 37.2 (2022 11:30), Max: 37.2 (2022 11:30)  T(F): 98.9 (2022 11:30), Max: 98.9 (2022 11:30)  HR: 102 (2022 11:30) (102 - 111)  BP: 125/79 (2022 11:30) (102/80 - 125/79)  BP(mean): --  RR: 20 (2022 11:30) (17 - 20)  SpO2: 100% (2022 11:30) (96% - 100%)  Daily     Daily     LABS:                        5.9    9.48  )-----------( 117      ( 2022 05:25 )             17.9     Mean Cell Volume: 90.4 fL (- @ 05:25)        146<H>  |  111<H>  |  27<H>  ----------------------------<  108<H>  3.0<L>   |  26  |  0.84    Ca    7.3<L>      2022 05:25  Phos  2.6       Mg     1.90                                         5.9    9.48  )-----------( 117      ( 2022 05:25 )             17.9                         8.8    7.78  )-----------( 125      ( 2022 05:50 )             26.8                         8.0    7.46  )-----------( 120      ( 2022 22:00 )             25.1                         6.2    6.90  )-----------( 115      ( 2022 11:20 )             19.5       Imaging:

## 2022-07-08 DIAGNOSIS — C53.9 MALIGNANT NEOPLASM OF CERVIX UTERI, UNSPECIFIED: ICD-10-CM

## 2022-07-08 LAB
ANION GAP SERPL CALC-SCNC: 11 MMOL/L — SIGNIFICANT CHANGE UP (ref 7–14)
ANION GAP SERPL CALC-SCNC: 9 MMOL/L — SIGNIFICANT CHANGE UP (ref 7–14)
ANISOCYTOSIS BLD QL: SLIGHT — SIGNIFICANT CHANGE UP
BASOPHILS # BLD AUTO: 0 K/UL — SIGNIFICANT CHANGE UP (ref 0–0.2)
BASOPHILS # BLD AUTO: 0.02 K/UL — SIGNIFICANT CHANGE UP (ref 0–0.2)
BASOPHILS # BLD AUTO: 0.02 K/UL — SIGNIFICANT CHANGE UP (ref 0–0.2)
BASOPHILS NFR BLD AUTO: 0 % — SIGNIFICANT CHANGE UP (ref 0–2)
BASOPHILS NFR BLD AUTO: 0.2 % — SIGNIFICANT CHANGE UP (ref 0–2)
BASOPHILS NFR BLD AUTO: 0.2 % — SIGNIFICANT CHANGE UP (ref 0–2)
BLD GP AB SCN SERPL QL: NEGATIVE — SIGNIFICANT CHANGE UP
BUN SERPL-MCNC: 23 MG/DL — SIGNIFICANT CHANGE UP (ref 7–23)
BUN SERPL-MCNC: 24 MG/DL — HIGH (ref 7–23)
CALCIUM SERPL-MCNC: 7.3 MG/DL — LOW (ref 8.4–10.5)
CALCIUM SERPL-MCNC: 7.5 MG/DL — LOW (ref 8.4–10.5)
CHLORIDE SERPL-SCNC: 110 MMOL/L — HIGH (ref 98–107)
CHLORIDE SERPL-SCNC: 111 MMOL/L — HIGH (ref 98–107)
CO2 SERPL-SCNC: 23 MMOL/L — SIGNIFICANT CHANGE UP (ref 22–31)
CO2 SERPL-SCNC: 24 MMOL/L — SIGNIFICANT CHANGE UP (ref 22–31)
CREAT SERPL-MCNC: 0.8 MG/DL — SIGNIFICANT CHANGE UP (ref 0.5–1.3)
CREAT SERPL-MCNC: 0.81 MG/DL — SIGNIFICANT CHANGE UP (ref 0.5–1.3)
EGFR: 86 ML/MIN/1.73M2 — SIGNIFICANT CHANGE UP
EGFR: 88 ML/MIN/1.73M2 — SIGNIFICANT CHANGE UP
ELLIPTOCYTES BLD QL SMEAR: SLIGHT — SIGNIFICANT CHANGE UP
EOSINOPHIL # BLD AUTO: 0.21 K/UL — SIGNIFICANT CHANGE UP (ref 0–0.5)
EOSINOPHIL # BLD AUTO: 0.25 K/UL — SIGNIFICANT CHANGE UP (ref 0–0.5)
EOSINOPHIL # BLD AUTO: 0.27 K/UL — SIGNIFICANT CHANGE UP (ref 0–0.5)
EOSINOPHIL NFR BLD AUTO: 2.5 % — SIGNIFICANT CHANGE UP (ref 0–6)
EOSINOPHIL NFR BLD AUTO: 2.8 % — SIGNIFICANT CHANGE UP (ref 0–6)
EOSINOPHIL NFR BLD AUTO: 3.5 % — SIGNIFICANT CHANGE UP (ref 0–6)
GIANT PLATELETS BLD QL SMEAR: PRESENT — SIGNIFICANT CHANGE UP
GLUCOSE SERPL-MCNC: 89 MG/DL — SIGNIFICANT CHANGE UP (ref 70–99)
GLUCOSE SERPL-MCNC: 92 MG/DL — SIGNIFICANT CHANGE UP (ref 70–99)
HCG SERPL-ACNC: <5 MIU/ML — SIGNIFICANT CHANGE UP
HCT VFR BLD CALC: 21.4 % — LOW (ref 34.5–45)
HCT VFR BLD CALC: 23.6 % — LOW (ref 34.5–45)
HCT VFR BLD CALC: 24.1 % — LOW (ref 34.5–45)
HGB BLD-MCNC: 7 G/DL — CRITICAL LOW (ref 11.5–15.5)
HGB BLD-MCNC: 7.7 G/DL — LOW (ref 11.5–15.5)
HGB BLD-MCNC: 7.7 G/DL — LOW (ref 11.5–15.5)
HYPOCHROMIA BLD QL: SLIGHT — SIGNIFICANT CHANGE UP
IANC: 6.09 K/UL — SIGNIFICANT CHANGE UP (ref 1.8–7.4)
IANC: 6.84 K/UL — SIGNIFICANT CHANGE UP (ref 1.8–7.4)
IANC: 6.96 K/UL — SIGNIFICANT CHANGE UP (ref 1.8–7.4)
IMM GRANULOCYTES NFR BLD AUTO: 1.5 % — SIGNIFICANT CHANGE UP (ref 0–1.5)
IMM GRANULOCYTES NFR BLD AUTO: 2.1 % — HIGH (ref 0–1.5)
LYMPHOCYTES # BLD AUTO: 0.8 K/UL — LOW (ref 1–3.3)
LYMPHOCYTES # BLD AUTO: 0.8 K/UL — LOW (ref 1–3.3)
LYMPHOCYTES # BLD AUTO: 0.83 K/UL — LOW (ref 1–3.3)
LYMPHOCYTES # BLD AUTO: 10.4 % — LOW (ref 13–44)
LYMPHOCYTES # BLD AUTO: 9 % — LOW (ref 13–44)
LYMPHOCYTES # BLD AUTO: 9.9 % — LOW (ref 13–44)
MACROCYTES BLD QL: SLIGHT — SIGNIFICANT CHANGE UP
MAGNESIUM SERPL-MCNC: 1.6 MG/DL — SIGNIFICANT CHANGE UP (ref 1.6–2.6)
MAGNESIUM SERPL-MCNC: 1.8 MG/DL — SIGNIFICANT CHANGE UP (ref 1.6–2.6)
MCHC RBC-ENTMCNC: 29.4 PG — SIGNIFICANT CHANGE UP (ref 27–34)
MCHC RBC-ENTMCNC: 29.6 PG — SIGNIFICANT CHANGE UP (ref 27–34)
MCHC RBC-ENTMCNC: 29.8 PG — SIGNIFICANT CHANGE UP (ref 27–34)
MCHC RBC-ENTMCNC: 32 GM/DL — SIGNIFICANT CHANGE UP (ref 32–36)
MCHC RBC-ENTMCNC: 32.6 GM/DL — SIGNIFICANT CHANGE UP (ref 32–36)
MCHC RBC-ENTMCNC: 32.7 GM/DL — SIGNIFICANT CHANGE UP (ref 32–36)
MCV RBC AUTO: 89.9 FL — SIGNIFICANT CHANGE UP (ref 80–100)
MCV RBC AUTO: 91.5 FL — SIGNIFICANT CHANGE UP (ref 80–100)
MCV RBC AUTO: 92.7 FL — SIGNIFICANT CHANGE UP (ref 80–100)
METAMYELOCYTES # FLD: 0.9 % — SIGNIFICANT CHANGE UP (ref 0–1)
MICROCYTES BLD QL: SLIGHT — SIGNIFICANT CHANGE UP
MONOCYTES # BLD AUTO: 0.34 K/UL — SIGNIFICANT CHANGE UP (ref 0–0.9)
MONOCYTES # BLD AUTO: 0.47 K/UL — SIGNIFICANT CHANGE UP (ref 0–0.9)
MONOCYTES # BLD AUTO: 0.7 K/UL — SIGNIFICANT CHANGE UP (ref 0–0.9)
MONOCYTES NFR BLD AUTO: 4 % — SIGNIFICANT CHANGE UP (ref 2–14)
MONOCYTES NFR BLD AUTO: 6.1 % — SIGNIFICANT CHANGE UP (ref 2–14)
MONOCYTES NFR BLD AUTO: 7.9 % — SIGNIFICANT CHANGE UP (ref 2–14)
MYELOCYTES NFR BLD: 0.8 % — HIGH (ref 0–0)
NEUTROPHILS # BLD AUTO: 5.99 K/UL — SIGNIFICANT CHANGE UP (ref 1.8–7.4)
NEUTROPHILS # BLD AUTO: 6.84 K/UL — SIGNIFICANT CHANGE UP (ref 1.8–7.4)
NEUTROPHILS # BLD AUTO: 6.96 K/UL — SIGNIFICANT CHANGE UP (ref 1.8–7.4)
NEUTROPHILS NFR BLD AUTO: 74.8 % — SIGNIFICANT CHANGE UP (ref 43–77)
NEUTROPHILS NFR BLD AUTO: 78.6 % — HIGH (ref 43–77)
NEUTROPHILS NFR BLD AUTO: 81.3 % — HIGH (ref 43–77)
NEUTS BAND # BLD: 3.5 % — SIGNIFICANT CHANGE UP (ref 0–6)
NRBC # BLD: 2 /100 WBCS — SIGNIFICANT CHANGE UP
NRBC # BLD: 2 /100 WBCS — SIGNIFICANT CHANGE UP
NRBC # BLD: 3 /100 — HIGH (ref 0–0)
NRBC # FLD: 0.15 K/UL — HIGH
NRBC # FLD: 0.15 K/UL — HIGH
OVALOCYTES BLD QL SMEAR: SLIGHT — SIGNIFICANT CHANGE UP
PHOSPHATE SERPL-MCNC: 2.6 MG/DL — SIGNIFICANT CHANGE UP (ref 2.5–4.5)
PHOSPHATE SERPL-MCNC: 2.7 MG/DL — SIGNIFICANT CHANGE UP (ref 2.5–4.5)
PLAT MORPH BLD: NORMAL — SIGNIFICANT CHANGE UP
PLATELET # BLD AUTO: 124 K/UL — LOW (ref 150–400)
PLATELET # BLD AUTO: 131 K/UL — LOW (ref 150–400)
PLATELET # BLD AUTO: 134 K/UL — LOW (ref 150–400)
PLATELET COUNT - ESTIMATE: ABNORMAL
POIKILOCYTOSIS BLD QL AUTO: SLIGHT — SIGNIFICANT CHANGE UP
POLYCHROMASIA BLD QL SMEAR: SLIGHT — SIGNIFICANT CHANGE UP
POTASSIUM SERPL-MCNC: 2.8 MMOL/L — CRITICAL LOW (ref 3.5–5.3)
POTASSIUM SERPL-MCNC: 3.4 MMOL/L — LOW (ref 3.5–5.3)
POTASSIUM SERPL-SCNC: 2.8 MMOL/L — CRITICAL LOW (ref 3.5–5.3)
POTASSIUM SERPL-SCNC: 3.4 MMOL/L — LOW (ref 3.5–5.3)
RBC # BLD: 2.38 M/UL — LOW (ref 3.8–5.2)
RBC # BLD: 2.58 M/UL — LOW (ref 3.8–5.2)
RBC # BLD: 2.6 M/UL — LOW (ref 3.8–5.2)
RBC # FLD: 19 % — HIGH (ref 10.3–14.5)
RBC # FLD: 19.1 % — HIGH (ref 10.3–14.5)
RBC # FLD: 19.4 % — HIGH (ref 10.3–14.5)
RBC BLD AUTO: ABNORMAL
RH IG SCN BLD-IMP: POSITIVE — SIGNIFICANT CHANGE UP
SARS-COV-2 RNA SPEC QL NAA+PROBE: SIGNIFICANT CHANGE UP
SODIUM SERPL-SCNC: 143 MMOL/L — SIGNIFICANT CHANGE UP (ref 135–145)
SODIUM SERPL-SCNC: 145 MMOL/L — SIGNIFICANT CHANGE UP (ref 135–145)
WBC # BLD: 7.61 K/UL — SIGNIFICANT CHANGE UP (ref 3.8–10.5)
WBC # BLD: 8.42 K/UL — SIGNIFICANT CHANGE UP (ref 3.8–10.5)
WBC # BLD: 8.86 K/UL — SIGNIFICANT CHANGE UP (ref 3.8–10.5)
WBC # FLD AUTO: 7.61 K/UL — SIGNIFICANT CHANGE UP (ref 3.8–10.5)
WBC # FLD AUTO: 8.42 K/UL — SIGNIFICANT CHANGE UP (ref 3.8–10.5)
WBC # FLD AUTO: 8.86 K/UL — SIGNIFICANT CHANGE UP (ref 3.8–10.5)

## 2022-07-08 PROCEDURE — 99233 SBSQ HOSP IP/OBS HIGH 50: CPT

## 2022-07-08 RX ORDER — MAGNESIUM SULFATE 500 MG/ML
2 VIAL (ML) INJECTION ONCE
Refills: 0 | Status: COMPLETED | OUTPATIENT
Start: 2022-07-08 | End: 2022-07-08

## 2022-07-08 RX ORDER — POTASSIUM CHLORIDE 20 MEQ
10 PACKET (EA) ORAL
Refills: 0 | Status: COMPLETED | OUTPATIENT
Start: 2022-07-08 | End: 2022-07-08

## 2022-07-08 RX ORDER — POTASSIUM CHLORIDE 20 MEQ
40 PACKET (EA) ORAL EVERY 4 HOURS
Refills: 0 | Status: DISCONTINUED | OUTPATIENT
Start: 2022-07-08 | End: 2022-07-08

## 2022-07-08 RX ORDER — POTASSIUM CHLORIDE 20 MEQ
20 PACKET (EA) ORAL
Refills: 0 | Status: COMPLETED | OUTPATIENT
Start: 2022-07-08 | End: 2022-07-08

## 2022-07-08 RX ADMIN — OXYCODONE HYDROCHLORIDE 7.5 MILLIGRAM(S): 5 TABLET ORAL at 02:29

## 2022-07-08 RX ADMIN — Medication 20 MILLIEQUIVALENT(S): at 17:58

## 2022-07-08 RX ADMIN — PIPERACILLIN AND TAZOBACTAM 25 GRAM(S): 4; .5 INJECTION, POWDER, LYOPHILIZED, FOR SOLUTION INTRAVENOUS at 11:52

## 2022-07-08 RX ADMIN — Medication 100 MILLIGRAM(S): at 06:22

## 2022-07-08 RX ADMIN — Medication 20 MILLIEQUIVALENT(S): at 19:42

## 2022-07-08 RX ADMIN — PIPERACILLIN AND TAZOBACTAM 25 GRAM(S): 4; .5 INJECTION, POWDER, LYOPHILIZED, FOR SOLUTION INTRAVENOUS at 17:58

## 2022-07-08 RX ADMIN — OXYCODONE HYDROCHLORIDE 5 MILLIGRAM(S): 5 TABLET ORAL at 14:53

## 2022-07-08 RX ADMIN — Medication 100 MILLIEQUIVALENT(S): at 08:23

## 2022-07-08 RX ADMIN — PANTOPRAZOLE SODIUM 40 MILLIGRAM(S): 20 TABLET, DELAYED RELEASE ORAL at 17:58

## 2022-07-08 RX ADMIN — Medication 40 MILLIEQUIVALENT(S): at 07:25

## 2022-07-08 RX ADMIN — Medication 20 MILLIEQUIVALENT(S): at 18:55

## 2022-07-08 RX ADMIN — OXYCODONE HYDROCHLORIDE 5 MILLIGRAM(S): 5 TABLET ORAL at 19:48

## 2022-07-08 RX ADMIN — PIPERACILLIN AND TAZOBACTAM 25 GRAM(S): 4; .5 INJECTION, POWDER, LYOPHILIZED, FOR SOLUTION INTRAVENOUS at 02:30

## 2022-07-08 RX ADMIN — CYCLOBENZAPRINE HYDROCHLORIDE 5 MILLIGRAM(S): 10 TABLET, FILM COATED ORAL at 21:53

## 2022-07-08 RX ADMIN — OXYCODONE HYDROCHLORIDE 5 MILLIGRAM(S): 5 TABLET ORAL at 20:18

## 2022-07-08 RX ADMIN — Medication 25 GRAM(S): at 08:24

## 2022-07-08 RX ADMIN — OXYCODONE HYDROCHLORIDE 5 MILLIGRAM(S): 5 TABLET ORAL at 15:20

## 2022-07-08 RX ADMIN — Medication 25 GRAM(S): at 17:57

## 2022-07-08 RX ADMIN — OXYCODONE HYDROCHLORIDE 15 MILLIGRAM(S): 5 TABLET ORAL at 07:04

## 2022-07-08 RX ADMIN — ENOXAPARIN SODIUM 40 MILLIGRAM(S): 100 INJECTION SUBCUTANEOUS at 14:00

## 2022-07-08 RX ADMIN — Medication 100 MILLIEQUIVALENT(S): at 11:52

## 2022-07-08 RX ADMIN — Medication 100 MILLIEQUIVALENT(S): at 11:06

## 2022-07-08 RX ADMIN — OXYCODONE HYDROCHLORIDE 7.5 MILLIGRAM(S): 5 TABLET ORAL at 06:19

## 2022-07-08 RX ADMIN — MIRTAZAPINE 15 MILLIGRAM(S): 45 TABLET, ORALLY DISINTEGRATING ORAL at 15:52

## 2022-07-08 RX ADMIN — SODIUM CHLORIDE 100 MILLILITER(S): 9 INJECTION, SOLUTION INTRAVENOUS at 08:23

## 2022-07-08 RX ADMIN — OXYCODONE HYDROCHLORIDE 15 MILLIGRAM(S): 5 TABLET ORAL at 18:04

## 2022-07-08 RX ADMIN — OXYCODONE HYDROCHLORIDE 7.5 MILLIGRAM(S): 5 TABLET ORAL at 06:22

## 2022-07-08 RX ADMIN — OXYCODONE HYDROCHLORIDE 7.5 MILLIGRAM(S): 5 TABLET ORAL at 02:59

## 2022-07-08 RX ADMIN — PANTOPRAZOLE SODIUM 40 MILLIGRAM(S): 20 TABLET, DELAYED RELEASE ORAL at 06:22

## 2022-07-08 RX ADMIN — SODIUM CHLORIDE 100 MILLILITER(S): 9 INJECTION, SOLUTION INTRAVENOUS at 02:30

## 2022-07-08 RX ADMIN — SODIUM CHLORIDE 100 MILLILITER(S): 9 INJECTION, SOLUTION INTRAVENOUS at 19:43

## 2022-07-08 NOTE — PROGRESS NOTE ADULT - SUBJECTIVE AND OBJECTIVE BOX
GYNECOLOGIC ONCOLOGY PROGRESS NOTE    HD#44    Pt seen and examined at bedside.     SUBJECTIVE:    Continuing to endorse lower back pain. Otherwise denying any chest pain or shortness of breath. Tolerating a regular diet w/o n/v. Dr. Jay, Gyn/Onc fellow, had an extensive conversation with daughter yesterday (please refer to chart note).     OBJECTIVE:     VITALS:  T(F): 97.4 (07-08-22 @ 02:00), Max: 98.9 (07-07-22 @ 11:30)  HR: 103 (07-08-22 @ 02:00) (98 - 111)  BP: 133/75 (07-08-22 @ 02:00) (109/85 - 133/75)  RR: 17 (07-08-22 @ 02:00) (17 - 20)  SpO2: 100% (07-08-22 @ 02:00) (100% - 100%)  Wt(kg): --    I&O's Summary    07 Jul 2022 07:01  -  08 Jul 2022 07:00  --------------------------------------------------------  IN: 2540 mL / OUT: 767.5 mL / NET: 1772.5 mL        MEDICATIONS  (STANDING):  enoxaparin Injectable 40 milliGRAM(s) SubCutaneous every 24 hours  lactated ringers. 1000 milliLiter(s) (100 mL/Hr) IV Continuous <Continuous>  lidocaine 1% (Preservative-free) Injectable 50 milliLiter(s) Local Injection once  metoprolol succinate  milliGRAM(s) Oral daily  mirtazapine 15 milliGRAM(s) Oral daily  oxyCODONE  ER Tablet 15 milliGRAM(s) Oral every 12 hours  pantoprazole  Injectable 40 milliGRAM(s) IV Push two times a day  piperacillin/tazobactam IVPB.. 3.375 Gram(s) IV Intermittent every 8 hours  potassium chloride    Tablet ER 40 milliEquivalent(s) Oral every 4 hours    MEDICATIONS  (PRN):  cyclobenzaprine 5 milliGRAM(s) Oral three times a day PRN Muscle Spasm  lidocaine   4% Patch 1 Patch Transdermal daily PRN back pain  oxyCODONE    IR 5 milliGRAM(s) Oral every 3 hours PRN Moderate Pain (4 - 6)  oxyCODONE    IR 7.5 milliGRAM(s) Oral every 3 hours PRN Severe Pain (7 - 10)  simethicone 80 milliGRAM(s) Chew three times a day PRN Gas      Physical Exam:  Constitutional: Awake. Alert.   CV: Tachycardic  rate. Regular rhythm.   Lungs: CTA b/l   Abd: Soft, non-distended. Diffusely tender. Packing in place w/ overlying abdominal pad. Red/brown liquid stool output in the ostomy bag. Lt pelvis drain w/ serous fluid. RLQ and LLQ drains w/ seropurulent f  : R&L nephrostomy tubes draining slightly cloudy urine.  Extremities: SCDs in place. No calf tenderness b/l      LABS:                        7.7    8.42  )-----------( 131      ( 08 Jul 2022 05:40 )             23.6     07-08    145  |  111<H>  |  24<H>  ----------------------------<  92  2.8<LL>   |  23  |  0.81    Ca    7.5<L>      08 Jul 2022 05:40  Phos  2.7     07-08  Mg     1.60     07-08            RADIOLOGY & ADDITIONAL TESTS:

## 2022-07-08 NOTE — PROGRESS NOTE ADULT - ASSESSMENT
52 y.o. Female DNR/DNI with recurrent cervical CA admitted for management of neutropenic fever, now s/p emergent exploratory laparotomy, abdominal washout, rectosigmoid colectomy, diverting colostomy, bronchoscopy and Abthera placement on 6/8/22 for findings of increased free air on abdominal xray concerning for bowel perforation. Patient s/p washout and bridging vicryl mesh placement to close fascial gap on 6/14 w/ skin closure device place 6/17 course c/b sepsis concerning for peritonitis (6/20) now with slight improvement in mental status - prognosis poor/guarded.

## 2022-07-08 NOTE — PROGRESS NOTE ADULT - SUBJECTIVE AND OBJECTIVE BOX
Patient is a 52y old  Female who presents with a chief complaint of neutropenic fever (08 Jul 2022 07:07)      SUBJECTIVE / OVERNIGHT EVENTS:    MEDICATIONS  (STANDING):  enoxaparin Injectable 40 milliGRAM(s) SubCutaneous every 24 hours  lactated ringers. 1000 milliLiter(s) (100 mL/Hr) IV Continuous <Continuous>  lidocaine 1% (Preservative-free) Injectable 50 milliLiter(s) Local Injection once  metoprolol succinate  milliGRAM(s) Oral daily  mirtazapine 15 milliGRAM(s) Oral daily  oxyCODONE  ER Tablet 15 milliGRAM(s) Oral every 12 hours  pantoprazole  Injectable 40 milliGRAM(s) IV Push two times a day  piperacillin/tazobactam IVPB.. 3.375 Gram(s) IV Intermittent every 8 hours  potassium chloride  10 mEq/100 mL IVPB 10 milliEquivalent(s) IV Intermittent every 1 hour    MEDICATIONS  (PRN):  cyclobenzaprine 5 milliGRAM(s) Oral three times a day PRN Muscle Spasm  lidocaine   4% Patch 1 Patch Transdermal daily PRN back pain  oxyCODONE    IR 5 milliGRAM(s) Oral every 3 hours PRN Moderate Pain (4 - 6)  oxyCODONE    IR 7.5 milliGRAM(s) Oral every 3 hours PRN Severe Pain (7 - 10)  simethicone 80 milliGRAM(s) Chew three times a day PRN Gas      Vital Signs Last 24 Hrs  T(C): 36.3 (08 Jul 2022 10:32), Max: 37.2 (07 Jul 2022 11:30)  T(F): 97.3 (08 Jul 2022 10:32), Max: 98.9 (07 Jul 2022 11:30)  HR: 90 (08 Jul 2022 10:32) (90 - 105)  BP: 136/95 (08 Jul 2022 10:32) (109/85 - 136/95)  BP(mean): --  RR: 18 (08 Jul 2022 10:32) (17 - 20)  SpO2: 100% (08 Jul 2022 10:32) (100% - 100%)    Parameters below as of 08 Jul 2022 10:32  Patient On (Oxygen Delivery Method): room air      CAPILLARY BLOOD GLUCOSE        I&O's Summary    07 Jul 2022 07:01  -  08 Jul 2022 07:00  --------------------------------------------------------  IN: 2540 mL / OUT: 767.5 mL / NET: 1772.5 mL    08 Jul 2022 07:01  -  08 Jul 2022 11:24  --------------------------------------------------------  IN: 0 mL / OUT: 180 mL / NET: -180 mL        PHYSICAL EXAM:  GENERAL: NAD, well-developed  HEAD:  Atraumatic, Normocephalic  EYES: EOMI, PERRLA, conjunctiva and sclera clear  NECK: Supple, No JVD  CHEST/LUNG: Clear to auscultation bilaterally; No wheeze  HEART: Regular rate and rhythm; No murmurs, rubs, or gallops  ABDOMEN: Soft, Nontender, Nondistended; Bowel sounds present  EXTREMITIES:  2+ Peripheral Pulses, No clubbing, cyanosis, or edema  PSYCH: AAOx3  NEUROLOGY: non-focal  SKIN: No rashes or lesions    LABS:                        7.7    8.42  )-----------( 131      ( 08 Jul 2022 05:40 )             23.6     07-08    145  |  111<H>  |  24<H>  ----------------------------<  92  2.8<LL>   |  23  |  0.81    Ca    7.5<L>      08 Jul 2022 05:40  Phos  2.7     07-08  Mg     1.60     07-08                RADIOLOGY & ADDITIONAL TESTS:    Imaging Personally Reviewed:    Consultant(s) Notes Reviewed:      Care Discussed with Consultants/Other Providers:   Patient is a 52y old  Female who presents with a chief complaint of neutropenic fever (08 Jul 2022 07:07)      SUBJECTIVE / OVERNIGHT EVENTS:  Patient still feels very weak with poor appetite. Patient has no new complaints. Denies cp, SOB, abdominal pain, N/V/D     MEDICATIONS  (STANDING):  enoxaparin Injectable 40 milliGRAM(s) SubCutaneous every 24 hours  lactated ringers. 1000 milliLiter(s) (100 mL/Hr) IV Continuous <Continuous>  lidocaine 1% (Preservative-free) Injectable 50 milliLiter(s) Local Injection once  metoprolol succinate  milliGRAM(s) Oral daily  mirtazapine 15 milliGRAM(s) Oral daily  oxyCODONE  ER Tablet 15 milliGRAM(s) Oral every 12 hours  pantoprazole  Injectable 40 milliGRAM(s) IV Push two times a day  piperacillin/tazobactam IVPB.. 3.375 Gram(s) IV Intermittent every 8 hours  potassium chloride  10 mEq/100 mL IVPB 10 milliEquivalent(s) IV Intermittent every 1 hour    MEDICATIONS  (PRN):  cyclobenzaprine 5 milliGRAM(s) Oral three times a day PRN Muscle Spasm  lidocaine   4% Patch 1 Patch Transdermal daily PRN back pain  oxyCODONE    IR 5 milliGRAM(s) Oral every 3 hours PRN Moderate Pain (4 - 6)  oxyCODONE    IR 7.5 milliGRAM(s) Oral every 3 hours PRN Severe Pain (7 - 10)  simethicone 80 milliGRAM(s) Chew three times a day PRN Gas      Vital Signs Last 24 Hrs  T(C): 36.3 (08 Jul 2022 10:32), Max: 37.2 (07 Jul 2022 11:30)  T(F): 97.3 (08 Jul 2022 10:32), Max: 98.9 (07 Jul 2022 11:30)  HR: 90 (08 Jul 2022 10:32) (90 - 105)  BP: 136/95 (08 Jul 2022 10:32) (109/85 - 136/95)  BP(mean): --  RR: 18 (08 Jul 2022 10:32) (17 - 20)  SpO2: 100% (08 Jul 2022 10:32) (100% - 100%)    Parameters below as of 08 Jul 2022 10:32  Patient On (Oxygen Delivery Method): room air      CAPILLARY BLOOD GLUCOSE        I&O's Summary    07 Jul 2022 07:01  -  08 Jul 2022 07:00  --------------------------------------------------------  IN: 2540 mL / OUT: 767.5 mL / NET: 1772.5 mL    08 Jul 2022 07:01  -  08 Jul 2022 11:24  --------------------------------------------------------  IN: 0 mL / OUT: 180 mL / NET: -180 mL        PHYSICAL EXAM:  GENERAL: NAD, well-developed  HEAD:  Atraumatic, Normocephalic  EYES: EOMI, PERRLA, conjunctiva and sclera clear  NECK: Supple, No JVD  CHEST/LUNG: Clear to auscultation bilaterally; No wheeze  HEART: Regular rate and rhythm; No murmurs, rubs, or gallops  ABDOMEN: Right colostomy with BRBPR mixed with dark brown liquid stool. Soft, Nontender, Nondistended; Bowel sounds present  EXTREMITIES:  2+ Peripheral Pulses, No clubbing, cyanosis, or edema  PSYCH: AAOx3  NEUROLOGY: non-focal  SKIN: No rashes or lesions    LABS:                        7.7    8.42  )-----------( 131      ( 08 Jul 2022 05:40 )             23.6     07-08    145  |  111<H>  |  24<H>  ----------------------------<  92  2.8<LL>   |  23  |  0.81    Ca    7.5<L>      08 Jul 2022 05:40  Phos  2.7     07-08  Mg     1.60     07-08                RADIOLOGY & ADDITIONAL TESTS:    Imaging Personally Reviewed:    Consultant(s) Notes Reviewed:      Care Discussed with Consultants/Other Providers:

## 2022-07-08 NOTE — PROGRESS NOTE ADULT - SUBJECTIVE AND OBJECTIVE BOX
Plastic Surgery    SUBJECTIVE: Pt seen and examined on rounds with team for wound re-eval      OBJECTIVE    PHYSICAL EXAM:   General: NAD, Lying in bed   Neuro: Awake and alert  Abd: soft, nontender, ostomy with dehiscence laterally, productive with mixed stool and blood, midline abdominal wound with vicryl mesh in place, wound base appears to have significant layer of fibrinous material, no granulation tissue present    VITALS  T(C): 36.3 (07-08-22 @ 10:32), Max: 37.2 (07-07-22 @ 11:30)  HR: 90 (07-08-22 @ 10:32) (90 - 105)  BP: 136/95 (07-08-22 @ 10:32) (109/85 - 136/95)  RR: 18 (07-08-22 @ 10:32) (17 - 20)  SpO2: 100% (07-08-22 @ 10:32) (100% - 100%)  CAPILLARY BLOOD GLUCOSE          Is/Os    07-07 @ 07:01  -  07-08 @ 07:00  --------------------------------------------------------  IN:    IV PiggyBack: 300 mL    Lactated Ringers: 400 mL    Oral Fluid: 940 mL    PRBCs (Packed Red Blood Cells): 900 mL  Total IN: 2540 mL    OUT:    Colostomy (mL): 100 mL    Drain (mL): 50 mL    Drain (mL): 27.5 mL    Drain (mL): 245 mL    Nephrostomy Tube (mL): 250 mL    Nephrostomy Tube (mL): 95 mL  Total OUT: 767.5 mL    Total NET: 1772.5 mL      07-08 @ 07:01  -  07-08 @ 11:27  --------------------------------------------------------  IN:  Total IN: 0 mL    OUT:    Drain (mL): 0 mL    Drain (mL): 75 mL    Drain (mL): 45 mL    Nephrostomy Tube (mL): 40 mL    Nephrostomy Tube (mL): 20 mL  Total OUT: 180 mL    Total NET: -180 mL          MEDICATIONS (STANDING): enoxaparin Injectable 40 milliGRAM(s) SubCutaneous every 24 hours  lactated ringers. 1000 milliLiter(s) IV Continuous <Continuous>  lidocaine 1% (Preservative-free) Injectable 50 milliLiter(s) Local Injection once  metoprolol succinate  milliGRAM(s) Oral daily  mirtazapine 15 milliGRAM(s) Oral daily  oxyCODONE  ER Tablet 15 milliGRAM(s) Oral every 12 hours  pantoprazole  Injectable 40 milliGRAM(s) IV Push two times a day  piperacillin/tazobactam IVPB.. 3.375 Gram(s) IV Intermittent every 8 hours  potassium chloride  10 mEq/100 mL IVPB 10 milliEquivalent(s) IV Intermittent every 1 hour    MEDICATIONS (PRN):cyclobenzaprine 5 milliGRAM(s) Oral three times a day PRN Muscle Spasm  oxyCODONE    IR 5 milliGRAM(s) Oral every 3 hours PRN Moderate Pain (4 - 6)  oxyCODONE    IR 7.5 milliGRAM(s) Oral every 3 hours PRN Severe Pain (7 - 10)  simethicone 80 milliGRAM(s) Chew three times a day PRN Gas      LABS  CBC (07-08 @ 05:40)                              7.7<L>                         8.42    )----------------(  131<L>     81.3<H>% Neutrophils, 9.9<L>% Lymphocytes, ANC: 6.84                                23.6<L>  CBC (07-07 @ 23:15)                              7.4<L>                         7.65    )----------------(  122<L>     74.8  % Neutrophils, 10.4<L>% Lymphocytes, ANC: 5.99                                22.8<L>    BMP (07-08 @ 05:40)             145     |  111<H>  |  24<H> 		Ca++ --      Ca 7.5<L>             ---------------------------------( 92    		Mg 1.60               2.8<LL>  |  23      |  0.81  			Ph 2.7     BMP (07-07 @ 05:25)             146<H>  |  111<H>  |  27<H> 		Ca++ --      Ca 7.3<L>             ---------------------------------( 108<H>		Mg 1.90               3.0<L>  |  26      |  0.84  			Ph 2.6                   IMAGING STUDIES

## 2022-07-08 NOTE — PROGRESS NOTE ADULT - PROBLEM SELECTOR PLAN 1
GYN ONC Fellow Addendum:    Pt seen and examined at bedside. Agree with above. Reports pain well controlled this AM.  Informed her that I met w/ her family yesterday and discussed need for her to speak w/ her daughter regarding her desires for care and discharge planning. Colostomy output less bloody today and Hgb stable.    VS reviewed  Labs reviewed    Seen by GI for suspected GI bleed, pt offered EGD however she is declining. Discussed need for test w/ pt and she declines because she does not want to undergo anesthesia for the procedure. Called pt's daughter to inform her that pt is declining procedure at this time. Appreciate GI recs  Continue wound care. Appreciate wound care team and plastics surgery consultations  Continue current pain recommendation  Encourage IS use  Replete lytes prn  DVT ppx: Lovenox  Dispo: dispo planning w/ pt and family is ongoing. Asked  to provide information on [place] to family per family request. Will continue to follow up.    Guadalupe Jay MD

## 2022-07-08 NOTE — CHART NOTE - NSCHARTNOTEFT_GEN_A_CORE
GI consulted for bloody output from ostomy and downtrending Hg, responding to transfusions. Concern is for GI bleed from unclear site, possibly upper GI bleed (has elevated BUN as well) vs lower source. Given recent, complicated ostomy formation, would not recommend endoscopic evaluation via the ostomy. Discussed with the patient doing EGD to rule out upper GI bleeding source. However patient, both yesterday and today, states she is not interested in the procedure, does not want anestehesia. Given patient's overall GOC, as well as clinical stability, it is reasonable to defer any endoscopic evaluation at this time, and continue to treat w/ PPI for possible PUD, and transfuse as necessary.     Recommendations:  - trend CBC q 8 hours, transfuse for goal >7  - pantoprazole 40mg IV BID  - no plans at this time for endoscopic evaluation given patient's preference and GOC. If patient's decisions change, or change in clinical status, please let GI know    GI to sign off. Please reconsult as necessary    Adolfo Jackson, PGY6  GI Fellow

## 2022-07-08 NOTE — PROGRESS NOTE ADULT - PROBLEM SELECTOR PLAN 4
-likely secondary to UGIB; pt hemodynamically stable  -stool guaiac in ostomy back positive for blood  -s/p transfusion of several pRBCs and 1unit given on 7/5  -s/p 2 Units PRBCs on 7/7 with improved Hb to 7.7    4. UGIB:  -GI consulted and recommending pantoprazole 40mg IV BID and to transfuse for goal >7.   -GI offering EGD/push enteroscopy to evaluate for UGIB since family want to investigate.   F/U GI recs -likely secondary to UGIB; pt hemodynamically stable  -stool guaiac in ostomy back positive for blood  -s/p transfusion of several pRBCs and 1unit given on 7/5  -s/p 2 Units PRBCs on 7/7 with improved Hb to 7.7    4. UGIB:  -GI consulted and recommending pantoprazole 40mg IV BID and to transfuse for goal >7.   -unclear if GI to do EGD/push enteroscopy to evaluate for UGIB since family want to investigate.   F/U GI recs

## 2022-07-08 NOTE — PROGRESS NOTE ADULT - ASSESSMENT
52y  HD#33 with recurrent cervical CA admitted for management of neutropenic fever, now s/p emergent exploratory laparotomy, abdominal washout, rectosigmoid colectomy, diverting colostomy, bronchoscopy and Abthera placement on 22 for bowel perforation. Patient s/p washout and bridging vicryl mesh placement to close fascial gap on . She is s/p wound vac, discontinued  given signs of infection. Patient with signs of sepsis beginning  including persistent tachycardia, tachypnea and hypotension as well as mental status change.  Following extensive discussion with Palliative care, Ethics and surgery teams on board, patient code status now DNR/DNI. At this time, mental status improved. Evaluation by GI with concern for GI bleeding, recommending EGD for further evaluation. Discussion had with family regarding EGD and further evaluation yielded desire to pursue EGD if possible. Patient s/p 2U of pRBCs w/ H/H of this AM 7.7/23.6. Will continue to monitor ostomy output. Patient remains mildly tachycardic and VS are otherwise stable.     Neuro: IV Tylenol, Dilaudid, Flexeril, lidocaine patch for pain, ATC and PRN dosing adjusted for patient comfort.  Appreciate GHOS and Palliative recommendations.  CV: Persistently tachycardic (100-105).   - s/p 2U of pRBCs w/ AM H/H 7.7/23.6  - Continue to monitor ostomy output, will repeat CBC as clinically indicated.   - CT A/P and CT angio () negative for PE   - h/o HTN: on Lopressor 50mg BID, holding 2/2 sepsis and low BPs, BPs now trending toward baseline   Pulm: Maintaining appropriate SpO2.   GI: Regular diet as tolerated   - Concern for GI bleed in setting of stool guiac positive, s/p 2U pRBC with appropriate response. Output still bloody, but less  - Ostomy: area of separation packed w/ quacel, ostomy powder and liquid barrier, hydrocolloid dressing.  - Senna, Protonix BID. Appreciate GI recs   : B/l nephrostomy tubes. ASHLEY w/ increased Creatinine likely in setting of sepsis, now downtrending (0.81 this AM). Appreciate hospitalist recs.   FEN: LR@100.   - Hypokalemia at 2.87 (notified by RN): to be repleted   - Hypomagnesemia to be repleted  Heme: DVT ppx: Lovenox, SCDs while in bed.    - Thrombocytopenia: likely multifactorial, now normalized. Appreciate Heme Onc recs.  ID: Ostomy noted to leak into subcutaneous tissue w/ subsequent increasing leukocytosis and fevers. Now afebrile, VS otherwise improved with persistent tachycardia. No leukocytosis at present time   - Continue Zosyn (- ) for broad antibiotic coverage.   - F/u BCx (), prelim NGTD  - UCx () nl jessica   - s/p Caspofungin and Cefepime  - S/p Zarixo (-6/10) for neutropenic fever.   - S/p Zosyn (-)  MSK: Previously evaluated by PMR with rec for subacute rehab. Significant clinical changes have occurred since this time, will reconsult PT for evaluation and recommendations C/w Lidocaine patches and Flexeril PRN for muscle spasms.     Dispo: Continue inpatient management given new c/f GI bleed. code status = DNR/DNI.

## 2022-07-08 NOTE — PROGRESS NOTE ADULT - PROBLEM SELECTOR PLAN 2
-h/o recurrent cervical CA, receives Gyn Oncology care at [facility] ([place]).   -she initially was diagnosed with stage III cervical CA in 2014 s/p VBRT + chemo but never had surgical intervention. -Reported now being s/p 5 cycles of chemo, unsure of regimen but next due 6/21/22.  -course complicated by bowel perforation s/p washout and bridging vicryl mesh placement to close fascial gap on 6/14 w/ skin closure device place don 6/17  -further management per general surgery, gyn onc and plastic surgery  -overall prognosis is poor  -f/u palliative and ethics recs.   -patient is DNI/DNR   -initially agreed with hospice referral but given improved mental status family wants to check with PT for possible SNF

## 2022-07-08 NOTE — PROGRESS NOTE ADULT - ASSESSMENT
52y  HD#33 with recurrent cervical CA admitted for management of neutropenic fever, now s/p emergent exploratory laparotomy, abdominal washout, rectosigmoid colectomy, diverting colostomy, bronchoscopy and Abthera placement on 22 for bowel perforation. Patient s/p washout and bridging vicryl mesh placement to close fascial gap on . She is s/p wound vac, discontinued  given signs of infection. Patient with signs of sepsis beginning  including persistent tachycardia, tachypnea and hypotension as well as mental status change.  Following extensive discussion with Palliative care, Ethics and surgery teams on board, patient code status now DNR/DNI. At this time, mental status improved. Evaluation by GI with concern for GI bleeding, recommending EGD, possibly will happen today. Considering more intervention vs [place] for palliative care     - Would continue with WTD dressing changes with adaptic, may consider wound vac placement on midline wound to reduce frequency of dressing changes. However, wound appears unlikely to granulate in given fibrinous base/bioburden on wound  - At this time, patient not a candidate for operative closure of abdominal wound  - Will continue to follow  - F/u results of EGD  - F/u GOC conversation  - appreciate primary team care    Reach out with questions/concerns  PRS [id number]

## 2022-07-08 NOTE — CHART NOTE - NSCHARTNOTEFT_GEN_A_CORE
Gyn Oncology Fellow Note    Evaluated pt this evening. Daughter Lydia and of Ms. Rudd's nephews at bedside. Pt reports she is feeling well and that she is not experiencing any pain right now.  I reviewed the diagnosis of suspected GI bleed again with her and the family and discussed GI's recommendation for EGD.  Pt again expressed that she does not want the procedure due to the anesthesia. I expressed that she is able to decline the procedure and our team as well as the GI team agree that it is reasonable to manage the bleeding conservatively for now. Plan is to continue IV protonix and trend CBCs for now.  If the pt changes her mind we can reach out to GI team for EGD at that time.  Reviewed again the need for a discharge plan and need for a decision about location from pt and her family. Pt is alert and conversant and expresses understanding of the discussion and plan. Our team will continue to follow up with the Lydia and her family about their decision. All questions answered.      Guadalupe Jay MD  D/w Dr. Merino

## 2022-07-08 NOTE — PROGRESS NOTE ADULT - PROBLEM SELECTOR PLAN 6
-vitals reviewed and patient since admission had HR in 100s-110s  -recent CTPA negative for PE  -c/w with treatment underlying sepsis  -HR improved with restarting metoprolol.

## 2022-07-08 NOTE — PROGRESS NOTE ADULT - PROBLEM SELECTOR PLAN 12
-c/w lovenox subq daily    11. Dispo:  -will f/u PT recs  -[facility] could be a good option -- will f/u CM and primary team.  -Plan per primary team and family

## 2022-07-09 LAB
ANION GAP SERPL CALC-SCNC: 11 MMOL/L — SIGNIFICANT CHANGE UP (ref 7–14)
BASOPHILS # BLD AUTO: 0.02 K/UL — SIGNIFICANT CHANGE UP (ref 0–0.2)
BASOPHILS # BLD AUTO: 0.03 K/UL — SIGNIFICANT CHANGE UP (ref 0–0.2)
BASOPHILS NFR BLD AUTO: 0.3 % — SIGNIFICANT CHANGE UP (ref 0–2)
BASOPHILS NFR BLD AUTO: 0.4 % — SIGNIFICANT CHANGE UP (ref 0–2)
BUN SERPL-MCNC: 23 MG/DL — SIGNIFICANT CHANGE UP (ref 7–23)
CALCIUM SERPL-MCNC: 7.7 MG/DL — LOW (ref 8.4–10.5)
CHLORIDE SERPL-SCNC: 110 MMOL/L — HIGH (ref 98–107)
CO2 SERPL-SCNC: 23 MMOL/L — SIGNIFICANT CHANGE UP (ref 22–31)
CREAT SERPL-MCNC: 0.83 MG/DL — SIGNIFICANT CHANGE UP (ref 0.5–1.3)
EGFR: 84 ML/MIN/1.73M2 — SIGNIFICANT CHANGE UP
EOSINOPHIL # BLD AUTO: 0.17 K/UL — SIGNIFICANT CHANGE UP (ref 0–0.5)
EOSINOPHIL # BLD AUTO: 0.21 K/UL — SIGNIFICANT CHANGE UP (ref 0–0.5)
EOSINOPHIL # BLD AUTO: 0.21 K/UL — SIGNIFICANT CHANGE UP (ref 0–0.5)
EOSINOPHIL # BLD AUTO: 0.22 K/UL — SIGNIFICANT CHANGE UP (ref 0–0.5)
EOSINOPHIL NFR BLD AUTO: 2.2 % — SIGNIFICANT CHANGE UP (ref 0–6)
EOSINOPHIL NFR BLD AUTO: 2.7 % — SIGNIFICANT CHANGE UP (ref 0–6)
EOSINOPHIL NFR BLD AUTO: 2.8 % — SIGNIFICANT CHANGE UP (ref 0–6)
EOSINOPHIL NFR BLD AUTO: 3.1 % — SIGNIFICANT CHANGE UP (ref 0–6)
GLUCOSE SERPL-MCNC: 87 MG/DL — SIGNIFICANT CHANGE UP (ref 70–99)
HCT VFR BLD CALC: 21.3 % — LOW (ref 34.5–45)
HCT VFR BLD CALC: 22.8 % — LOW (ref 34.5–45)
HCT VFR BLD CALC: 24.8 % — LOW (ref 34.5–45)
HGB BLD-MCNC: 7 G/DL — CRITICAL LOW (ref 11.5–15.5)
HGB BLD-MCNC: 7.5 G/DL — LOW (ref 11.5–15.5)
HGB BLD-MCNC: 7.9 G/DL — LOW (ref 11.5–15.5)
IANC: 5.21 K/UL — SIGNIFICANT CHANGE UP (ref 1.8–7.4)
IANC: 5.81 K/UL — SIGNIFICANT CHANGE UP (ref 1.8–7.4)
IANC: 5.92 K/UL — SIGNIFICANT CHANGE UP (ref 1.8–7.4)
IMM GRANULOCYTES NFR BLD AUTO: 1.7 % — HIGH (ref 0–1.5)
IMM GRANULOCYTES NFR BLD AUTO: 1.7 % — HIGH (ref 0–1.5)
IMM GRANULOCYTES NFR BLD AUTO: 2 % — HIGH (ref 0–1.5)
IMM GRANULOCYTES NFR BLD AUTO: 2 % — HIGH (ref 0–1.5)
LYMPHOCYTES # BLD AUTO: 0.65 K/UL — LOW (ref 1–3.3)
LYMPHOCYTES # BLD AUTO: 0.73 K/UL — LOW (ref 1–3.3)
LYMPHOCYTES # BLD AUTO: 0.75 K/UL — LOW (ref 1–3.3)
LYMPHOCYTES # BLD AUTO: 0.78 K/UL — LOW (ref 1–3.3)
LYMPHOCYTES # BLD AUTO: 11 % — LOW (ref 13–44)
LYMPHOCYTES # BLD AUTO: 8.5 % — LOW (ref 13–44)
LYMPHOCYTES # BLD AUTO: 9.5 % — LOW (ref 13–44)
LYMPHOCYTES # BLD AUTO: 9.9 % — LOW (ref 13–44)
MCHC RBC-ENTMCNC: 29.9 PG — SIGNIFICANT CHANGE UP (ref 27–34)
MCHC RBC-ENTMCNC: 30 PG — SIGNIFICANT CHANGE UP (ref 27–34)
MCHC RBC-ENTMCNC: 30.5 PG — SIGNIFICANT CHANGE UP (ref 27–34)
MCHC RBC-ENTMCNC: 31.9 GM/DL — LOW (ref 32–36)
MCHC RBC-ENTMCNC: 32.9 GM/DL — SIGNIFICANT CHANGE UP (ref 32–36)
MCHC RBC-ENTMCNC: 32.9 GM/DL — SIGNIFICANT CHANGE UP (ref 32–36)
MCV RBC AUTO: 91.4 FL — SIGNIFICANT CHANGE UP (ref 80–100)
MCV RBC AUTO: 92.7 FL — SIGNIFICANT CHANGE UP (ref 80–100)
MCV RBC AUTO: 93.9 FL — SIGNIFICANT CHANGE UP (ref 80–100)
MONOCYTES # BLD AUTO: 0.52 K/UL — SIGNIFICANT CHANGE UP (ref 0–0.9)
MONOCYTES # BLD AUTO: 0.63 K/UL — SIGNIFICANT CHANGE UP (ref 0–0.9)
MONOCYTES # BLD AUTO: 0.66 K/UL — SIGNIFICANT CHANGE UP (ref 0–0.9)
MONOCYTES # BLD AUTO: 0.73 K/UL — SIGNIFICANT CHANGE UP (ref 0–0.9)
MONOCYTES NFR BLD AUTO: 10.3 % — SIGNIFICANT CHANGE UP (ref 2–14)
MONOCYTES NFR BLD AUTO: 6.8 % — SIGNIFICANT CHANGE UP (ref 2–14)
MONOCYTES NFR BLD AUTO: 8.2 % — SIGNIFICANT CHANGE UP (ref 2–14)
MONOCYTES NFR BLD AUTO: 8.7 % — SIGNIFICANT CHANGE UP (ref 2–14)
NEUTROPHILS # BLD AUTO: 5.21 K/UL — SIGNIFICANT CHANGE UP (ref 1.8–7.4)
NEUTROPHILS # BLD AUTO: 5.81 K/UL — SIGNIFICANT CHANGE UP (ref 1.8–7.4)
NEUTROPHILS # BLD AUTO: 5.92 K/UL — SIGNIFICANT CHANGE UP (ref 1.8–7.4)
NEUTROPHILS # BLD AUTO: 6.09 K/UL — SIGNIFICANT CHANGE UP (ref 1.8–7.4)
NEUTROPHILS NFR BLD AUTO: 73.5 % — SIGNIFICANT CHANGE UP (ref 43–77)
NEUTROPHILS NFR BLD AUTO: 76.6 % — SIGNIFICANT CHANGE UP (ref 43–77)
NEUTROPHILS NFR BLD AUTO: 77.2 % — HIGH (ref 43–77)
NEUTROPHILS NFR BLD AUTO: 80.1 % — HIGH (ref 43–77)
NRBC # BLD: 1 /100 WBCS — SIGNIFICANT CHANGE UP
NRBC # BLD: 1 /100 WBCS — SIGNIFICANT CHANGE UP
NRBC # BLD: 2 /100 WBCS — SIGNIFICANT CHANGE UP
NRBC # BLD: 2 /100 WBCS — SIGNIFICANT CHANGE UP
NRBC # FLD: 0.1 K/UL — HIGH
NRBC # FLD: 0.1 K/UL — HIGH
NRBC # FLD: 0.14 K/UL — HIGH
NRBC # FLD: 0.18 K/UL — HIGH
PLATELET # BLD AUTO: 133 K/UL — LOW (ref 150–400)
PLATELET # BLD AUTO: 134 K/UL — LOW (ref 150–400)
PLATELET # BLD AUTO: 141 K/UL — LOW (ref 150–400)
POTASSIUM SERPL-MCNC: 4.3 MMOL/L — SIGNIFICANT CHANGE UP (ref 3.5–5.3)
POTASSIUM SERPL-SCNC: 4.3 MMOL/L — SIGNIFICANT CHANGE UP (ref 3.5–5.3)
RBC # BLD: 2.33 M/UL — LOW (ref 3.8–5.2)
RBC # BLD: 2.46 M/UL — LOW (ref 3.8–5.2)
RBC # BLD: 2.64 M/UL — LOW (ref 3.8–5.2)
RBC # FLD: 20.1 % — HIGH (ref 10.3–14.5)
RBC # FLD: 20.2 % — HIGH (ref 10.3–14.5)
RBC # FLD: 20.4 % — HIGH (ref 10.3–14.5)
SODIUM SERPL-SCNC: 144 MMOL/L — SIGNIFICANT CHANGE UP (ref 135–145)
WBC # BLD: 7.09 K/UL — SIGNIFICANT CHANGE UP (ref 3.8–10.5)
WBC # BLD: 7.58 K/UL — SIGNIFICANT CHANGE UP (ref 3.8–10.5)
WBC # BLD: 7.67 K/UL — SIGNIFICANT CHANGE UP (ref 3.8–10.5)
WBC # FLD AUTO: 7.09 K/UL — SIGNIFICANT CHANGE UP (ref 3.8–10.5)
WBC # FLD AUTO: 7.58 K/UL — SIGNIFICANT CHANGE UP (ref 3.8–10.5)
WBC # FLD AUTO: 7.67 K/UL — SIGNIFICANT CHANGE UP (ref 3.8–10.5)

## 2022-07-09 PROCEDURE — 99233 SBSQ HOSP IP/OBS HIGH 50: CPT

## 2022-07-09 RX ADMIN — PIPERACILLIN AND TAZOBACTAM 25 GRAM(S): 4; .5 INJECTION, POWDER, LYOPHILIZED, FOR SOLUTION INTRAVENOUS at 01:22

## 2022-07-09 RX ADMIN — OXYCODONE HYDROCHLORIDE 7.5 MILLIGRAM(S): 5 TABLET ORAL at 01:33

## 2022-07-09 RX ADMIN — PIPERACILLIN AND TAZOBACTAM 25 GRAM(S): 4; .5 INJECTION, POWDER, LYOPHILIZED, FOR SOLUTION INTRAVENOUS at 10:11

## 2022-07-09 RX ADMIN — OXYCODONE HYDROCHLORIDE 7.5 MILLIGRAM(S): 5 TABLET ORAL at 21:00

## 2022-07-09 RX ADMIN — SODIUM CHLORIDE 100 MILLILITER(S): 9 INJECTION, SOLUTION INTRAVENOUS at 20:16

## 2022-07-09 RX ADMIN — OXYCODONE HYDROCHLORIDE 15 MILLIGRAM(S): 5 TABLET ORAL at 18:58

## 2022-07-09 RX ADMIN — PANTOPRAZOLE SODIUM 40 MILLIGRAM(S): 20 TABLET, DELAYED RELEASE ORAL at 06:00

## 2022-07-09 RX ADMIN — PIPERACILLIN AND TAZOBACTAM 25 GRAM(S): 4; .5 INJECTION, POWDER, LYOPHILIZED, FOR SOLUTION INTRAVENOUS at 17:18

## 2022-07-09 RX ADMIN — Medication 100 MILLIGRAM(S): at 06:00

## 2022-07-09 RX ADMIN — OXYCODONE HYDROCHLORIDE 7.5 MILLIGRAM(S): 5 TABLET ORAL at 10:52

## 2022-07-09 RX ADMIN — OXYCODONE HYDROCHLORIDE 7.5 MILLIGRAM(S): 5 TABLET ORAL at 11:45

## 2022-07-09 RX ADMIN — MIRTAZAPINE 15 MILLIGRAM(S): 45 TABLET, ORALLY DISINTEGRATING ORAL at 12:31

## 2022-07-09 RX ADMIN — CYCLOBENZAPRINE HYDROCHLORIDE 5 MILLIGRAM(S): 10 TABLET, FILM COATED ORAL at 17:12

## 2022-07-09 RX ADMIN — PANTOPRAZOLE SODIUM 40 MILLIGRAM(S): 20 TABLET, DELAYED RELEASE ORAL at 17:12

## 2022-07-09 RX ADMIN — OXYCODONE HYDROCHLORIDE 15 MILLIGRAM(S): 5 TABLET ORAL at 06:00

## 2022-07-09 RX ADMIN — ENOXAPARIN SODIUM 40 MILLIGRAM(S): 100 INJECTION SUBCUTANEOUS at 12:31

## 2022-07-09 RX ADMIN — OXYCODONE HYDROCHLORIDE 7.5 MILLIGRAM(S): 5 TABLET ORAL at 20:16

## 2022-07-09 RX ADMIN — OXYCODONE HYDROCHLORIDE 7.5 MILLIGRAM(S): 5 TABLET ORAL at 01:03

## 2022-07-09 NOTE — PROGRESS NOTE ADULT - ASSESSMENT
52y  HD#34 with recurrent cervical CA admitted for management of neutropenic fever, now s/p emergent exploratory laparotomy, abdominal washout, rectosigmoid colectomy, diverting colostomy, bronchoscopy and Abthera placement on 22 for bowel perforation. Patient s/p washout and bridging vicryl mesh placement to close fascial gap on . She is s/p wound vac, discontinued  given signs of infection. Patient with signs of sepsis beginning  including persistent tachycardia, tachypnea and hypotension as well as mental status change.  Following extensive discussion with Palliative care, Ethics and surgery teams on board, patient code status now DNR/DNI. At this time, mental status improved. Evaluation by GI with concern for GI bleeding, recommending EGD for further evaluation. Discussion had with family regarding EGD and further evaluation yielded desire to pursue EGD if possible. Patient s/p 2U of pRBCs w/ H/H of this AM 7.7/23.6. Will continue to monitor ostomy output. Patient remains mildly tachycardic and VS are otherwise stable.     Neuro: IV Tylenol, Dilaudid, Flexeril, lidocaine patch for pain, ATC and PRN dosing adjusted for patient comfort.  Appreciate GHOS and Palliative recommendations.  CV: Persistently tachycardic (100-105).   - s/p 2U of pRBCs w/ AM H/H 7.7/23.6  - Continue to monitor ostomy output, will repeat CBC as clinically indicated.   - CT A/P and CT angio () negative for PE   - h/o HTN: on Lopressor 50mg BID, holding 2/2 sepsis and low BPs, BPs now trending toward baseline   Pulm: Maintaining appropriate SpO2.   GI: Regular diet as tolerated   - Concern for GI bleed in setting of stool guiac positive, s/p 2U pRBC with appropriate response. Output still bloody, but less  - Ostomy: area of separation packed w/ quacel, ostomy powder and liquid barrier, hydrocolloid dressing.  - Senna, Protonix BID. Appreciate GI recs   : B/l nephrostomy tubes. ASHLEY w/ increased Creatinine likely in setting of sepsis, now downtrending (0.81 this AM). Appreciate hospitalist recs.   FEN: LR@100.   - Hypokalemia at 2.87 (notified by RN): to be repleted   - Hypomagnesemia to be repleted  Heme: DVT ppx: Lovenox, SCDs while in bed.    - Thrombocytopenia: likely multifactorial, now normalized. Appreciate Heme Onc recs.  ID: Ostomy noted to leak into subcutaneous tissue w/ subsequent increasing leukocytosis and fevers. Now afebrile, VS otherwise improved with persistent tachycardia. No leukocytosis at present time   - Continue Zosyn (- ) for broad antibiotic coverage.   - F/u BCx (), prelim NGTD  - UCx () nl jessica   - s/p Caspofungin and Cefepime  - S/p Zarixo (-6/10) for neutropenic fever.   - S/p Zosyn (-)  MSK: Previously evaluated by PMR with rec for subacute rehab. Significant clinical changes have occurred since this time, will reconsult PT for evaluation and recommendations C/w Lidocaine patches and Flexeril PRN for muscle spasms.     Dispo: Continue inpatient management given new c/f GI bleed. code status = DNR/DNI.     patient seen and evaluated with Gyn Onc team    Tiffanie Cowan, PGY-2 52y  HD#34 with recurrent cervical CA admitted for management of neutropenic fever, now s/p emergent exploratory laparotomy, abdominal washout, rectosigmoid colectomy, diverting colostomy, bronchoscopy and Abthera placement on 22 for bowel perforation. Patient s/p washout and bridging vicryl mesh placement to close fascial gap on . She is s/p wound vac, discontinued  given signs of infection. Patient with signs of sepsis beginning  including persistent tachycardia, tachypnea and hypotension as well as mental status change.  Following extensive discussion with Palliative care, Ethics and surgery teams on board, patient code status now DNR/DNI. At this time, mental status improved. Evaluation by GI with concern for GI bleeding, who did not recommend EGD. Upon speaking to patient's family, decision was not made to pursue EGD. Will continue to monitor ostomy output. Patient remains mildly tachycardic and VS are otherwise stable.     Neuro: IV Tylenol, Dilaudid, Flexeril, lidocaine patch for pain, ATC and PRN dosing adjusted for patient comfort.  Appreciate GHOS and Palliative recommendations.  CV: Persistently tachycardic (100-105).   - s/p 2U of pRBCs () w/ H/H stable yesterday (7.7/23.6->7/0/21.4->7.7/24.1). F/u AM CBC  - Continue to monitor ostomy output, CBC q8h per GI.   - CT A/P and CT angio () negative for PE   - h/o HTN: on Lopressor 50mg BID, holding 2/2 sepsis and low BPs, BPs now trending toward baseline   Pulm: Maintaining appropriate SpO2.   GI: Regular diet as tolerated   - Concern for GI bleed in setting of stool guiac positive, s/p 2U pRBC (7.8) with appropriate response. Output still bloody, but less  - Ostomy: area of separation packed w/ quacel, ostomy powder and liquid barrier, hydrocolloid dressing.  - Senna, Protonix BID. Appreciate GI recs   -GI Recommendations: Transfuse CBC q8h and transfuse for Hb <7, pantoprazole 40mg BID, no plan for endoscopic evaluation this time  : B/l nephrostomy tubes. ASHLEY w/ increased Creatinine likely in setting of sepsis, now downtrending (0.81 this AM). Appreciate hospitalist recs.   FEN: LR@100.   -F/u AM BMP, replete electrolytes PRN  Heme: DVT ppx: Lovenox, SCDs while in bed.    - Thrombocytopenia: likely multifactorial, now normalized. Appreciate Heme Onc recs.  ID: Ostomy noted to leak into subcutaneous tissue w/ subsequent increasing leukocytosis and fevers. Now afebrile, VS otherwise improved with persistent tachycardia. No leukocytosis at present time   - Continue Zosyn (- ) for broad antibiotic coverage.   - F/u BCx (), NGTD  - UCx () nl jessica   - s/p Caspofungin and Cefepime  - S/p Zarixo (-6/10) for neutropenic fever.   - S/p Zosyn (-)  MSK: Previously evaluated by PMR with rec for subacute rehab. Significant clinical changes have occurred since this time, will reconsult PT for evaluation and recommendations C/w Lidocaine patches and Flexeril PRN for muscle spasms.     Dispo: Continue inpatient management given new c/f GI bleed. code status = DNR/DNI.     patient seen and evaluated with Gyn Onc team    Tiffanie Cowan, PGY-2

## 2022-07-09 NOTE — PROGRESS NOTE ADULT - ASSESSMENT
52 y.o. Female DNR/DNI with recurrent cervical CA admitted for management of neutropenic fever, now s/p emergent exploratory laparotomy, abdominal washout, rectosigmoid colectomy, diverting colostomy, bronchoscopy and Abthera placement on 6/8/22 for findings of increased free air on abdominal xray concerning for bowel perforation. Patient s/p washout and bridging vicryl mesh placement to close fascial gap on 6/14 w/ skin closure device place 6/17 course c/b sepsis concerning for peritonitis (6/20) now with slight improvement in mental status - prognosis poor/guarded. Hospital course also c/b GIB. patient for hospice.

## 2022-07-09 NOTE — PROGRESS NOTE ADULT - SUBJECTIVE AND OBJECTIVE BOX
R Gyn ONC Progress Note POD#  HD#    Subjective:   Pt seen and examined at bedside. No events overnight. Pain well controlled. Patient ambulating. Passing flatus. Tolerating regular diet. Pt denies fever, chills, chest pain, SOB, nausea, vomiting, lightheadedness, dizziness.      Objective:  T(F): 97.7 (07-09-22 @ 01:21), Max: 98.9 (07-08-22 @ 14:50)  HR: 103 (07-09-22 @ 01:21) (90 - 105)  BP: 104/67 (07-09-22 @ 01:21) (104/67 - 136/95)  RR: 18 (07-09-22 @ 01:21) (17 - 18)  SpO2: 100% (07-09-22 @ 01:21) (100% - 100%)  Wt(kg): --  I&O's Summary    07 Jul 2022 07:01  -  08 Jul 2022 07:00  --------------------------------------------------------  IN: 2540 mL / OUT: 767.5 mL / NET: 1772.5 mL    08 Jul 2022 07:01  -  09 Jul 2022 05:08  --------------------------------------------------------  IN: 2720 mL / OUT: 752 mL / NET: 1968 mL      CAPILLARY BLOOD GLUCOSE          MEDICATIONS  (STANDING):  enoxaparin Injectable 40 milliGRAM(s) SubCutaneous every 24 hours  lactated ringers. 1000 milliLiter(s) (100 mL/Hr) IV Continuous <Continuous>  lidocaine 1% (Preservative-free) Injectable 50 milliLiter(s) Local Injection once  metoprolol succinate  milliGRAM(s) Oral daily  mirtazapine 15 milliGRAM(s) Oral daily  oxyCODONE  ER Tablet 15 milliGRAM(s) Oral every 12 hours  pantoprazole  Injectable 40 milliGRAM(s) IV Push two times a day  piperacillin/tazobactam IVPB.. 3.375 Gram(s) IV Intermittent every 8 hours    MEDICATIONS  (PRN):  cyclobenzaprine 5 milliGRAM(s) Oral three times a day PRN Muscle Spasm  lidocaine   4% Patch 1 Patch Transdermal daily PRN back pain  oxyCODONE    IR 5 milliGRAM(s) Oral every 3 hours PRN Moderate Pain (4 - 6)  oxyCODONE    IR 7.5 milliGRAM(s) Oral every 3 hours PRN Severe Pain (7 - 10)  simethicone 80 milliGRAM(s) Chew three times a day PRN Gas      Physical Exam:  Constitutional: Awake. Alert.   CV: Tachycardic  rate. Regular rhythm.   Lungs: CTA b/l   Abd: Soft, non-distended. Diffusely tender. Packing in place w/ overlying abdominal pad. Red/brown liquid stool output in the ostomy bag. Lt pelvis drain w/ serous fluid. RLQ and LLQ drains w/ seropurulent f  : R&L nephrostomy tubes draining slightly cloudy urine.  Extremities: SCDs in place. No calf tenderness b/l    LABS:               7.7    7.61  )-----------( 134      ( 07-08 @ 23:29 )             24.1                7.0    8.86  )-----------( 124      ( 07-08 @ 14:12 )             21.4                7.7    8.42  )-----------( 131      ( 07-08 @ 05:40 )             23.6                7.4    7.65  )-----------( 122      ( 07-07 @ 23:15 )             22.8                5.9    9.48  )-----------( 117      ( 07-07 @ 05:25 )             17.9                8.8    7.78  )-----------( 125      ( 07-06 @ 05:50 )             26.8       07-08    143    |  110<H>  |  23     ----------------------------<  89     3.4<L>   |  24     |  0.80   07-08    145    |  111<H>  |  24<H>  ----------------------------<  92     2.8<LL>   |  23     |  0.81     Ca    7.3<L>      08 Jul 2022 14:12  Ca    7.5<L>      08 Jul 2022 05:40  Phos  2.6       07-08  Phos  2.7       07-08  Mg     1.80      07-08  Mg     1.60      07-08                     R Gyn ONC Progress Note HD#34    Subjective:   Pt seen and examined at bedside. No events overnight. Patient still endorsing back pain. Patient ambulating. Passing flatus. Tolerating regular diet. Pt denies fever, chills, chest pain, SOB, nausea, vomiting, lightheadedness, dizziness.      Objective:  T(F): 97.7 (07-09-22 @ 01:21), Max: 98.9 (07-08-22 @ 14:50)  HR: 103 (07-09-22 @ 01:21) (90 - 105)  BP: 104/67 (07-09-22 @ 01:21) (104/67 - 136/95)  RR: 18 (07-09-22 @ 01:21) (17 - 18)  SpO2: 100% (07-09-22 @ 01:21) (100% - 100%)  Wt(kg): --  I&O's Summary    07 Jul 2022 07:01  -  08 Jul 2022 07:00  --------------------------------------------------------  IN: 2540 mL / OUT: 767.5 mL / NET: 1772.5 mL    08 Jul 2022 07:01  -  09 Jul 2022 05:08  --------------------------------------------------------  IN: 2720 mL / OUT: 752 mL / NET: 1968 mL      CAPILLARY BLOOD GLUCOSE          MEDICATIONS  (STANDING):  enoxaparin Injectable 40 milliGRAM(s) SubCutaneous every 24 hours  lactated ringers. 1000 milliLiter(s) (100 mL/Hr) IV Continuous <Continuous>  lidocaine 1% (Preservative-free) Injectable 50 milliLiter(s) Local Injection once  metoprolol succinate  milliGRAM(s) Oral daily  mirtazapine 15 milliGRAM(s) Oral daily  oxyCODONE  ER Tablet 15 milliGRAM(s) Oral every 12 hours  pantoprazole  Injectable 40 milliGRAM(s) IV Push two times a day  piperacillin/tazobactam IVPB.. 3.375 Gram(s) IV Intermittent every 8 hours    MEDICATIONS  (PRN):  cyclobenzaprine 5 milliGRAM(s) Oral three times a day PRN Muscle Spasm  lidocaine   4% Patch 1 Patch Transdermal daily PRN back pain  oxyCODONE    IR 5 milliGRAM(s) Oral every 3 hours PRN Moderate Pain (4 - 6)  oxyCODONE    IR 7.5 milliGRAM(s) Oral every 3 hours PRN Severe Pain (7 - 10)  simethicone 80 milliGRAM(s) Chew three times a day PRN Gas      Physical Exam:  Constitutional: Awake. Alert.   CV: Tachycardic  rate. Regular rhythm.   Lungs: CTA b/l   Abd: Soft, non-distended. Diffusely tender. Packing in place w/ overlying abdominal pad. Red/brown liquid stool output in the ostomy bag. Lt pelvis drain w/ serous fluid. RLQ and LLQ drains w/ seropurulent f  : R&L nephrostomy tubes draining slightly cloudy urine.  Extremities: SCDs in place. No calf tenderness b/l    LABS:               7.7    7.61  )-----------( 134      ( 07-08 @ 23:29 )             24.1                7.0    8.86  )-----------( 124      ( 07-08 @ 14:12 )             21.4                7.7    8.42  )-----------( 131      ( 07-08 @ 05:40 )             23.6                7.4    7.65  )-----------( 122      ( 07-07 @ 23:15 )             22.8                5.9    9.48  )-----------( 117      ( 07-07 @ 05:25 )             17.9                8.8    7.78  )-----------( 125      ( 07-06 @ 05:50 )             26.8       07-08    143    |  110<H>  |  23     ----------------------------<  89     3.4<L>   |  24     |  0.80   07-08    145    |  111<H>  |  24<H>  ----------------------------<  92     2.8<LL>   |  23     |  0.81     Ca    7.3<L>      08 Jul 2022 14:12  Ca    7.5<L>      08 Jul 2022 05:40  Phos  2.6       07-08  Phos  2.7       07-08  Mg     1.80      07-08  Mg     1.60      07-08

## 2022-07-09 NOTE — PROGRESS NOTE ADULT - PROBLEM SELECTOR PLAN 1
GYN ONC Fellow Addendum:    Pt seen and examined at bedside. Agree with above. Pain well controlled. Non bloody colostomy output this AM.    Extensive conversation w/ pt and cousin Cookie at bedside this AM about plan for discharge. Pt is very clear about her wishes and able to verbalize understanding of her condition and the plan. She expressed to the team her desire to be discharged to hospice where she would receive wound care and pain control. She understands she is currently not a candidate for disease modifying treatment for her cancer and that she will likely not be able to receive treatment in the future given her clinical status. SW came to see pt and left information regarding [facility] and locations as well as other facilities available to her. I also signed a HCP form w/ the pt where she signified that Lydai Oleary (her daughter) is her healthcare proxy, form was signed and a copy was given to the family.  We will f/u their decision regarding location and SW will initiate the process of the transfer today.    VS reviewed  Labs reviewed    GI bleed: currently non bloody output from ostomy. EGD declined by pt. Trending CBC- hgb remains stable. IV protonix  Continue current pain regimen  Tolerating reg diet  Encourage IS use  Replete lytes prn  DVT ppx: Lovenox  Dispo: cont inpt care w/ plan for d/c to hospice    Guadalupe Jay MD GYN ONC Fellow Addendum:    Pt seen and examined at bedside. Agree with above. Pain well controlled. Non bloody colostomy output this AM.    Extensive conversation w/ pt and cousin Cookie at bedside this AM about plan for discharge. Pt is very clear about her wishes and able to verbalize understanding of her condition and the plan. She expressed to the team her desire to be discharged to hospice where she would receive wound care and pain control. She understands she is currently not a candidate for disease modifying treatment for her cancer and that she will likely not be able to receive treatment in the future given her clinical status. SW came to see pt and left information regarding [facility] and locations as well as other facilities available to her. I also signed a HCP form w/ the pt where she signified that Lydia Oleary (her daughter) is her healthcare proxy. She did not elect for a secondary proxy and stated that in the event Lydia can't be reached she would like her clinical team to make decisions. Form was signed and a copy was given to the family.  We will f/u their decision regarding location and SW will initiate the process of the transfer today.    VS reviewed  Labs reviewed    GI bleed: currently non bloody output from ostomy. EGD declined by pt. Trending CBC- hgb remains stable. IV protonix  Continue current pain regimen  Tolerating reg diet  Encourage IS use  Replete lytes prn  DVT ppx: Lovenox  Dispo: cont inpt care w/ plan for d/c to hospice    Guadalupe Jay MD

## 2022-07-09 NOTE — PROGRESS NOTE ADULT - SUBJECTIVE AND OBJECTIVE BOX
Patient is a 52y old  Female who presents with a chief complaint of neutropenic fever (09 Jul 2022 05:08)      SUBJECTIVE / OVERNIGHT EVENTS:  Patient has no new complaints. still c/o back pain. Denies cp, SOB, N/V/D. Tolerating diet and passing gas.   MEDICATIONS  (STANDING):  enoxaparin Injectable 40 milliGRAM(s) SubCutaneous every 24 hours  lactated ringers. 1000 milliLiter(s) (100 mL/Hr) IV Continuous <Continuous>  lidocaine 1% (Preservative-free) Injectable 50 milliLiter(s) Local Injection once  metoprolol succinate  milliGRAM(s) Oral daily  mirtazapine 15 milliGRAM(s) Oral daily  oxyCODONE  ER Tablet 15 milliGRAM(s) Oral every 12 hours  pantoprazole  Injectable 40 milliGRAM(s) IV Push two times a day  piperacillin/tazobactam IVPB.. 3.375 Gram(s) IV Intermittent every 8 hours    MEDICATIONS  (PRN):  cyclobenzaprine 5 milliGRAM(s) Oral three times a day PRN Muscle Spasm  lidocaine   4% Patch 1 Patch Transdermal daily PRN back pain  oxyCODONE    IR 5 milliGRAM(s) Oral every 3 hours PRN Moderate Pain (4 - 6)  oxyCODONE    IR 7.5 milliGRAM(s) Oral every 3 hours PRN Severe Pain (7 - 10)  simethicone 80 milliGRAM(s) Chew three times a day PRN Gas      Vital Signs Last 24 Hrs  T(C): 36.4 (09 Jul 2022 10:00), Max: 37.2 (08 Jul 2022 14:50)  T(F): 97.5 (09 Jul 2022 10:00), Max: 98.9 (08 Jul 2022 14:50)  HR: 100 (09 Jul 2022 10:00) (95 - 103)  BP: 110/75 (09 Jul 2022 10:00) (104/67 - 129/83)  BP(mean): --  RR: 18 (09 Jul 2022 10:00) (18 - 18)  SpO2: 100% (09 Jul 2022 10:00) (100% - 100%)    Parameters below as of 09 Jul 2022 10:00  Patient On (Oxygen Delivery Method): room air      CAPILLARY BLOOD GLUCOSE        I&O's Summary    08 Jul 2022 07:01  -  09 Jul 2022 07:00  --------------------------------------------------------  IN: 3360 mL / OUT: 1062 mL / NET: 2298 mL        PHYSICAL EXAM:  GENERAL: NAD, well-developed  HEAD:  Atraumatic, Normocephalic  EYES: EOMI, PERRLA, conjunctiva and sclera clear  NECK: Supple, No JVD  CHEST/LUNG: Clear to auscultation bilaterally; No wheeze  HEART: Regular rate and rhythm; No murmurs, rubs, or gallops  ABDOMEN: Soft, Diffusely tender. Red blood mixed with brown stool in the ostomy bag. Lt pelvis drain w/ serous fluid. RLQ and LLQ drains w/ seropurulent fluid.  R&L nephrostomy tubes draining slightly cloudy urine.  EXTREMITIES:  2+ Peripheral Pulses, No clubbing, cyanosis, or edema  PSYCH: AAOx3  NEUROLOGY: non-focal  SKIN: No rashes or lesions    LABS:                        7.9    7.67  )-----------( 141      ( 09 Jul 2022 06:32 )             24.8     07-09    144  |  110<H>  |  23  ----------------------------<  87  4.3   |  23  |  0.83    Ca    7.7<L>      09 Jul 2022 06:32  Phos  2.6     07-08  Mg     1.80     07-08                RADIOLOGY & ADDITIONAL TESTS:    Imaging Personally Reviewed:    Consultant(s) Notes Reviewed:      Care Discussed with Consultants/Other Providers:

## 2022-07-09 NOTE — PROGRESS NOTE ADULT - ATTENDING COMMENTS
Patient is resting comfortably  discussed goc with patient Patient is resting comfortably  discussed goc with patient, she is clear that she wants to go to hospice. She is interested in being comfortable and would like to transfer to hospice. Her gi bleeding has subsided and she is currently stable for transfer  She will need skilled services available including ostomy care, PCN care and wound care  Her cousin Cookie is at bedside and appreciates the plan for SW to come to bedside and discuss hospice options  All questions answered, patient is medically stable for discharge.

## 2022-07-09 NOTE — PROGRESS NOTE ADULT - PROBLEM SELECTOR PLAN 2
-h/o recurrent cervical CA, receives Gyn Oncology care at [facility] ([facility]).   -she initially was diagnosed with stage III cervical CA in 2014 s/p VBRT + chemo but never had surgical intervention. -Reported now being s/p 5 cycles of chemo, unsure of regimen but next due 6/21/22.  -course complicated by bowel perforation s/p washout and bridging vicryl mesh placement to close fascial gap on 6/14 w/ skin closure device place don 6/17  -further management per general surgery, gyn onc and plastic surgery  -overall prognosis is poor   -patient is DNI/DNR   -Patient and family agreed with hospice referral

## 2022-07-09 NOTE — PROGRESS NOTE ADULT - PROBLEM SELECTOR PLAN 4
-likely secondary to UGIB; pt hemodynamically stable  -stool guaiac in ostomy back positive for blood  -s/p transfusion of several pRBCs and 1unit given on 7/5  -s/p 2 Units PRBCs on 7/7 with improved Hb     4. UGIB:  -GI consulted and recommending pantoprazole 40mg IV BID and to transfuse for goal >7.   -H/H stable after PRBCs  -no plans for  EGD/push enteroscopy to evaluate for UGIB after speaking to family.  -patient for hospice

## 2022-07-10 LAB
ANION GAP SERPL CALC-SCNC: 13 MMOL/L — SIGNIFICANT CHANGE UP (ref 7–14)
BASOPHILS # BLD AUTO: 0.02 K/UL — SIGNIFICANT CHANGE UP (ref 0–0.2)
BASOPHILS NFR BLD AUTO: 0.3 % — SIGNIFICANT CHANGE UP (ref 0–2)
BUN SERPL-MCNC: 22 MG/DL — SIGNIFICANT CHANGE UP (ref 7–23)
CALCIUM SERPL-MCNC: 7.8 MG/DL — LOW (ref 8.4–10.5)
CHLORIDE SERPL-SCNC: 110 MMOL/L — HIGH (ref 98–107)
CO2 SERPL-SCNC: 16 MMOL/L — LOW (ref 22–31)
CREAT SERPL-MCNC: 0.84 MG/DL — SIGNIFICANT CHANGE UP (ref 0.5–1.3)
EGFR: 83 ML/MIN/1.73M2 — SIGNIFICANT CHANGE UP
EOSINOPHIL # BLD AUTO: 0.17 K/UL — SIGNIFICANT CHANGE UP (ref 0–0.5)
EOSINOPHIL NFR BLD AUTO: 2.7 % — SIGNIFICANT CHANGE UP (ref 0–6)
GLUCOSE SERPL-MCNC: 69 MG/DL — LOW (ref 70–99)
HCT VFR BLD CALC: 21.1 % — LOW (ref 34.5–45)
HGB BLD-MCNC: 6.8 G/DL — CRITICAL LOW (ref 11.5–15.5)
IANC: 4.68 K/UL — SIGNIFICANT CHANGE UP (ref 1.8–7.4)
IMM GRANULOCYTES NFR BLD AUTO: 1.6 % — HIGH (ref 0–1.5)
LYMPHOCYTES # BLD AUTO: 0.6 K/UL — LOW (ref 1–3.3)
LYMPHOCYTES # BLD AUTO: 9.7 % — LOW (ref 13–44)
MAGNESIUM SERPL-MCNC: 1.9 MG/DL — SIGNIFICANT CHANGE UP (ref 1.6–2.6)
MCHC RBC-ENTMCNC: 29.8 PG — SIGNIFICANT CHANGE UP (ref 27–34)
MCHC RBC-ENTMCNC: 32.2 GM/DL — SIGNIFICANT CHANGE UP (ref 32–36)
MCV RBC AUTO: 92.5 FL — SIGNIFICANT CHANGE UP (ref 80–100)
MONOCYTES # BLD AUTO: 0.62 K/UL — SIGNIFICANT CHANGE UP (ref 0–0.9)
MONOCYTES NFR BLD AUTO: 10 % — SIGNIFICANT CHANGE UP (ref 2–14)
NEUTROPHILS # BLD AUTO: 4.68 K/UL — SIGNIFICANT CHANGE UP (ref 1.8–7.4)
NEUTROPHILS NFR BLD AUTO: 75.7 % — SIGNIFICANT CHANGE UP (ref 43–77)
NRBC # BLD: 0 /100 WBCS — SIGNIFICANT CHANGE UP
NRBC # FLD: 0.04 K/UL — HIGH
PHOSPHATE SERPL-MCNC: 3.2 MG/DL — SIGNIFICANT CHANGE UP (ref 2.5–4.5)
PLATELET # BLD AUTO: 130 K/UL — LOW (ref 150–400)
POTASSIUM SERPL-MCNC: 4.9 MMOL/L — SIGNIFICANT CHANGE UP (ref 3.5–5.3)
POTASSIUM SERPL-SCNC: 4.9 MMOL/L — SIGNIFICANT CHANGE UP (ref 3.5–5.3)
RBC # BLD: 2.28 M/UL — LOW (ref 3.8–5.2)
RBC # FLD: 20.4 % — HIGH (ref 10.3–14.5)
SODIUM SERPL-SCNC: 139 MMOL/L — SIGNIFICANT CHANGE UP (ref 135–145)
WBC # BLD: 6.19 K/UL — SIGNIFICANT CHANGE UP (ref 3.8–10.5)
WBC # FLD AUTO: 6.19 K/UL — SIGNIFICANT CHANGE UP (ref 3.8–10.5)

## 2022-07-10 PROCEDURE — 99232 SBSQ HOSP IP/OBS MODERATE 35: CPT

## 2022-07-10 RX ADMIN — PIPERACILLIN AND TAZOBACTAM 25 GRAM(S): 4; .5 INJECTION, POWDER, LYOPHILIZED, FOR SOLUTION INTRAVENOUS at 01:32

## 2022-07-10 RX ADMIN — PIPERACILLIN AND TAZOBACTAM 25 GRAM(S): 4; .5 INJECTION, POWDER, LYOPHILIZED, FOR SOLUTION INTRAVENOUS at 09:59

## 2022-07-10 RX ADMIN — PANTOPRAZOLE SODIUM 40 MILLIGRAM(S): 20 TABLET, DELAYED RELEASE ORAL at 18:22

## 2022-07-10 RX ADMIN — PANTOPRAZOLE SODIUM 40 MILLIGRAM(S): 20 TABLET, DELAYED RELEASE ORAL at 05:22

## 2022-07-10 RX ADMIN — PIPERACILLIN AND TAZOBACTAM 25 GRAM(S): 4; .5 INJECTION, POWDER, LYOPHILIZED, FOR SOLUTION INTRAVENOUS at 18:37

## 2022-07-10 RX ADMIN — OXYCODONE HYDROCHLORIDE 7.5 MILLIGRAM(S): 5 TABLET ORAL at 10:00

## 2022-07-10 RX ADMIN — OXYCODONE HYDROCHLORIDE 7.5 MILLIGRAM(S): 5 TABLET ORAL at 19:43

## 2022-07-10 RX ADMIN — OXYCODONE HYDROCHLORIDE 7.5 MILLIGRAM(S): 5 TABLET ORAL at 00:48

## 2022-07-10 RX ADMIN — SODIUM CHLORIDE 100 MILLILITER(S): 9 INJECTION, SOLUTION INTRAVENOUS at 05:21

## 2022-07-10 RX ADMIN — OXYCODONE HYDROCHLORIDE 15 MILLIGRAM(S): 5 TABLET ORAL at 07:15

## 2022-07-10 RX ADMIN — OXYCODONE HYDROCHLORIDE 7.5 MILLIGRAM(S): 5 TABLET ORAL at 01:35

## 2022-07-10 RX ADMIN — OXYCODONE HYDROCHLORIDE 7.5 MILLIGRAM(S): 5 TABLET ORAL at 04:19

## 2022-07-10 RX ADMIN — Medication 100 MILLIGRAM(S): at 05:21

## 2022-07-10 RX ADMIN — MIRTAZAPINE 15 MILLIGRAM(S): 45 TABLET, ORALLY DISINTEGRATING ORAL at 13:20

## 2022-07-10 RX ADMIN — OXYCODONE HYDROCHLORIDE 7.5 MILLIGRAM(S): 5 TABLET ORAL at 20:38

## 2022-07-10 RX ADMIN — OXYCODONE HYDROCHLORIDE 15 MILLIGRAM(S): 5 TABLET ORAL at 18:22

## 2022-07-10 RX ADMIN — ENOXAPARIN SODIUM 40 MILLIGRAM(S): 100 INJECTION SUBCUTANEOUS at 13:22

## 2022-07-10 RX ADMIN — OXYCODONE HYDROCHLORIDE 7.5 MILLIGRAM(S): 5 TABLET ORAL at 05:00

## 2022-07-10 RX ADMIN — OXYCODONE HYDROCHLORIDE 15 MILLIGRAM(S): 5 TABLET ORAL at 06:30

## 2022-07-10 RX ADMIN — OXYCODONE HYDROCHLORIDE 7.5 MILLIGRAM(S): 5 TABLET ORAL at 10:40

## 2022-07-10 NOTE — PROGRESS NOTE ADULT - PROBLEM SELECTOR PLAN 1
Gyn Onc Fellow Addendum:    Pt seen and examined at bedside. Agree with above. Back pain still present but overall controlled.    VS reviewed  Labs reviewed    GI bleed: currently non bloody output from ostomy. EGD declined by pt. Trending CBC- hgb remains stable. IV protonix  Continue current pain regimen  Tolerating reg diet  Encourage IS use  Replete lytes prn  DVT ppx: Lovenox  Dispo: cont inpt care w/ plan for d/c to hospice    Guadalupe Jay MD.

## 2022-07-10 NOTE — PROGRESS NOTE ADULT - ATTENDING COMMENTS
patient seen and evaluated, communicating  low hct - will give 1 u - likely slow oozing GI bleed  continue high dose protonix  SW - gave info on hospice  family to choose location today  HCP form signed now by patient, given she has full capacity  HCP is Lydia, daughter, no 2nd proxy per patient request  Per patient, if Lydia cannot be reached, she would like the medical team to make medical decisions patient seen and evaluated, communicating  low hct - will give 2 u - likely slow oozing GI bleed  continue high dose protonix  SW - gave info on hospice  family to choose location today  HCP form signed now by patient, given she has full capacity  HCP is Lydia, daughter, no 2nd proxy per patient request  Per patient, if Lydia cannot be reached, she would like the medical team to make medical decisions

## 2022-07-10 NOTE — PROVIDER CONTACT NOTE (CRITICAL VALUE NOTIFICATION) - SITUATION
routine am labs,
WBC 0.56
WBC count 0.47
Hemoglobin 6.2/ HCt 19.5
WON Cortés informed that patient potassium taken this am is 2.6
S. potassium 2.8
WBC 0.39
potassium level is 2.7
WBC 0.50
Hemoglobin 7.0   Hematocrit 21.3
Potassium 2.8
WON Cortés notified that patient H/H is 5.9/17.9

## 2022-07-10 NOTE — PROVIDER CONTACT NOTE (CRITICAL VALUE NOTIFICATION) - ASSESSMENT
Pt on neutropenic precautions
Patient axox3 with periods of forgetfulness
Patient resting in bed, tachycardic.
Patient resting in bed. No complaints at this time.
awake and alert, no acute distress noted
No new orders
Pt is on regular diet, vital signs stable.
pt resting, denies dizziness, lightheadedness, v/s stable.  remains tachycardic
Patient axox3. VSS
Pt in no signs of acute distress.

## 2022-07-10 NOTE — PROVIDER CONTACT NOTE (CRITICAL VALUE NOTIFICATION) - PERSON GIVING RESULT:
MS Cherry from hematology
PAUL Fang/lab
Ginger Iraheta, toxicology
YVES Arroyo
Joshua BURNS
Kamron from hematology
Katia / erwin
MICHAEL Vu Toxicology
Maikel/ erwin
TERRY Hooker
EMIL Stapleton (toxicology)
YVES Frey Hematology lab

## 2022-07-10 NOTE — PROGRESS NOTE ADULT - SUBJECTIVE AND OBJECTIVE BOX
Patient is a 52y old  Female who presents with a chief complaint of neutropenic fever (10 Jul 2022 04:56)      SUBJECTIVE / OVERNIGHT EVENTS:    MEDICATIONS  (STANDING):  enoxaparin Injectable 40 milliGRAM(s) SubCutaneous every 24 hours  lactated ringers. 1000 milliLiter(s) (100 mL/Hr) IV Continuous <Continuous>  lidocaine 1% (Preservative-free) Injectable 50 milliLiter(s) Local Injection once  metoprolol succinate  milliGRAM(s) Oral daily  mirtazapine 15 milliGRAM(s) Oral daily  oxyCODONE  ER Tablet 15 milliGRAM(s) Oral every 12 hours  pantoprazole  Injectable 40 milliGRAM(s) IV Push two times a day  piperacillin/tazobactam IVPB.. 3.375 Gram(s) IV Intermittent every 8 hours    MEDICATIONS  (PRN):  cyclobenzaprine 5 milliGRAM(s) Oral three times a day PRN Muscle Spasm  lidocaine   4% Patch 1 Patch Transdermal daily PRN back pain  oxyCODONE    IR 5 milliGRAM(s) Oral every 3 hours PRN Moderate Pain (4 - 6)  oxyCODONE    IR 7.5 milliGRAM(s) Oral every 3 hours PRN Severe Pain (7 - 10)  simethicone 80 milliGRAM(s) Chew three times a day PRN Gas      Vital Signs Last 24 Hrs  T(C): 36.6 (10 Jul 2022 05:20), Max: 36.6 (09 Jul 2022 18:00)  T(F): 97.9 (10 Jul 2022 05:20), Max: 97.9 (09 Jul 2022 18:00)  HR: 102 (10 Jul 2022 05:20) (100 - 103)  BP: 128/82 (10 Jul 2022 05:20) (110/71 - 128/82)  BP(mean): --  RR: 18 (10 Jul 2022 05:20) (17 - 18)  SpO2: 100% (10 Jul 2022 05:20) (99% - 100%)    Parameters below as of 10 Jul 2022 05:20  Patient On (Oxygen Delivery Method): room air      CAPILLARY BLOOD GLUCOSE        I&O's Summary    09 Jul 2022 07:01  -  10 Jul 2022 07:00  --------------------------------------------------------  IN: 1640 mL / OUT: 1480 mL / NET: 160 mL        PHYSICAL EXAM:  GENERAL: NAD, well-developed  HEAD:  Atraumatic, Normocephalic  EYES: EOMI, PERRLA, conjunctiva and sclera clear  NECK: Supple, No JVD  CHEST/LUNG: Clear to auscultation bilaterally; No wheeze  HEART: Regular rate and rhythm; No murmurs, rubs, or gallops  ABDOMEN: Soft, Diffusely tender. Red blood mixed with brown stool in the ostomy bag. Lt pelvis drain w/ serous fluid. RLQ and LLQ drains w/ seropurulent fluid.  R&L nephrostomy tubes draining slightly cloudy urine.  EXTREMITIES:  2+ Peripheral Pulses, No clubbing, cyanosis, or edema  PSYCH: AAOx3  NEUROLOGY: non-focal  SKIN: No rashes or lesions    LABS:                        7.5    7.09  )-----------( 134      ( 09 Jul 2022 21:40 )             22.8     07-10    139  |  110<H>  |  22  ----------------------------<  69<L>  4.9   |  16<L>  |  0.84    Ca    7.8<L>      10 Jul 2022 05:25  Phos  3.2     07-10  Mg     1.90     07-10                RADIOLOGY & ADDITIONAL TESTS:    Imaging Personally Reviewed:    Consultant(s) Notes Reviewed:      Care Discussed with Consultants/Other Providers:   Patient is a 52y old  Female who presents with a chief complaint of neutropenic fever (10 Jul 2022 04:56)      SUBJECTIVE / OVERNIGHT EVENTS:  Still c/o intermittent back pain improved with pain meds. Tolerating diet. Patient has no new complaints. Denies cp, SOB, N/V/D     MEDICATIONS  (STANDING):  enoxaparin Injectable 40 milliGRAM(s) SubCutaneous every 24 hours  lactated ringers. 1000 milliLiter(s) (100 mL/Hr) IV Continuous <Continuous>  lidocaine 1% (Preservative-free) Injectable 50 milliLiter(s) Local Injection once  metoprolol succinate  milliGRAM(s) Oral daily  mirtazapine 15 milliGRAM(s) Oral daily  oxyCODONE  ER Tablet 15 milliGRAM(s) Oral every 12 hours  pantoprazole  Injectable 40 milliGRAM(s) IV Push two times a day  piperacillin/tazobactam IVPB.. 3.375 Gram(s) IV Intermittent every 8 hours    MEDICATIONS  (PRN):  cyclobenzaprine 5 milliGRAM(s) Oral three times a day PRN Muscle Spasm  lidocaine   4% Patch 1 Patch Transdermal daily PRN back pain  oxyCODONE    IR 5 milliGRAM(s) Oral every 3 hours PRN Moderate Pain (4 - 6)  oxyCODONE    IR 7.5 milliGRAM(s) Oral every 3 hours PRN Severe Pain (7 - 10)  simethicone 80 milliGRAM(s) Chew three times a day PRN Gas      Vital Signs Last 24 Hrs  T(C): 36.6 (10 Jul 2022 05:20), Max: 36.6 (09 Jul 2022 18:00)  T(F): 97.9 (10 Jul 2022 05:20), Max: 97.9 (09 Jul 2022 18:00)  HR: 102 (10 Jul 2022 05:20) (100 - 103)  BP: 128/82 (10 Jul 2022 05:20) (110/71 - 128/82)  BP(mean): --  RR: 18 (10 Jul 2022 05:20) (17 - 18)  SpO2: 100% (10 Jul 2022 05:20) (99% - 100%)    Parameters below as of 10 Jul 2022 05:20  Patient On (Oxygen Delivery Method): room air      CAPILLARY BLOOD GLUCOSE        I&O's Summary    09 Jul 2022 07:01  -  10 Jul 2022 07:00  --------------------------------------------------------  IN: 1640 mL / OUT: 1480 mL / NET: 160 mL        PHYSICAL EXAM:  GENERAL: NAD, well-developed  HEAD:  Atraumatic, Normocephalic  EYES: EOMI, PERRLA, conjunctiva and sclera clear  NECK: Supple, No JVD  CHEST/LUNG: Clear to auscultation bilaterally; No wheeze  HEART: Regular rate and rhythm; No murmurs, rubs, or gallops  ABDOMEN: Soft, Diffusely tender. small amount of red blood mixed with brown stool in the ostomy bag. Lt pelvis drain w/ serous fluid. RLQ and LLQ drains w/ seropurulent fluid.  R&L nephrostomy tubes draining slightly cloudy urine.  EXTREMITIES:  2+ Peripheral Pulses, No clubbing, cyanosis, or edema  PSYCH: AAOx3  NEUROLOGY: non-focal  SKIN: No rashes or lesions    LABS:                        7.5    7.09  )-----------( 134      ( 09 Jul 2022 21:40 )             22.8     07-10    139  |  110<H>  |  22  ----------------------------<  69<L>  4.9   |  16<L>  |  0.84    Ca    7.8<L>      10 Jul 2022 05:25  Phos  3.2     07-10  Mg     1.90     07-10                RADIOLOGY & ADDITIONAL TESTS:    Imaging Personally Reviewed:    Consultant(s) Notes Reviewed:      Care Discussed with Consultants/Other Providers:

## 2022-07-10 NOTE — PROGRESS NOTE ADULT - SUBJECTIVE AND OBJECTIVE BOX
R Gyn ONC Progress Note HD#[id number]    Subjective:   Pt seen and examined at bedside. No events overnight. Pain well controlled. Patient ambulating. Passing flatus. Tolerating regular diet. Pt denies fever, chills, chest pain, SOB, nausea, vomiting, lightheadedness, dizziness.      Objective:  T(F): 97.6 (07-10-22 @ 01:32), Max: 97.9 (07-09-22 @ 18:00)  HR: 103 (07-10-22 @ 01:32) (100 - 103)  BP: 117/80 (07-10-22 @ 01:32) (110/71 - 117/80)  RR: 18 (07-10-22 @ 01:32) (17 - 18)  SpO2: 100% (07-10-22 @ 01:32) (99% - 100%)  Wt(kg): --  I&O's Summary    08 Jul 2022 07:01  -  09 Jul 2022 07:00  --------------------------------------------------------  IN: 3360 mL / OUT: 1062 mL / NET: 2298 mL    09 Jul 2022 07:01  -  10 Jul 2022 04:56  --------------------------------------------------------  IN: 1120 mL / OUT: 1210 mL / NET: -90 mL      CAPILLARY BLOOD GLUCOSE          MEDICATIONS  (STANDING):  enoxaparin Injectable 40 milliGRAM(s) SubCutaneous every 24 hours  lactated ringers. 1000 milliLiter(s) (100 mL/Hr) IV Continuous <Continuous>  lidocaine 1% (Preservative-free) Injectable 50 milliLiter(s) Local Injection once  metoprolol succinate  milliGRAM(s) Oral daily  mirtazapine 15 milliGRAM(s) Oral daily  oxyCODONE  ER Tablet 15 milliGRAM(s) Oral every 12 hours  pantoprazole  Injectable 40 milliGRAM(s) IV Push two times a day  piperacillin/tazobactam IVPB.. 3.375 Gram(s) IV Intermittent every 8 hours    MEDICATIONS  (PRN):  cyclobenzaprine 5 milliGRAM(s) Oral three times a day PRN Muscle Spasm  lidocaine   4% Patch 1 Patch Transdermal daily PRN back pain  oxyCODONE    IR 5 milliGRAM(s) Oral every 3 hours PRN Moderate Pain (4 - 6)  oxyCODONE    IR 7.5 milliGRAM(s) Oral every 3 hours PRN Severe Pain (7 - 10)  simethicone 80 milliGRAM(s) Chew three times a day PRN Gas    Physical Exam:  Constitutional: Awake. Alert.   CV: Tachycardic  rate. Regular rhythm.   Lungs: CTA b/l   Abd: Soft, non-distended. Diffusely tender. Packing in place w/ overlying abdominal pad. Red/brown liquid stool output in the ostomy bag. Lt pelvis drain w/ serous fluid. RLQ and LLQ drains w/ seropurulent f  : R&L nephrostomy tubes draining slightly cloudy urine.  Extremities: SCDs in place. No calf tenderness b/l      LABS:               7.5    7.09  )-----------( 134      ( 07-09 @ 21:40 )             22.8                7.0    7.58  )-----------( 133      ( 07-09 @ 14:00 )             21.3                7.9    7.67  )-----------( 141      ( 07-09 @ 06:32 )             24.8                7.7    7.61  )-----------( 134      ( 07-08 @ 23:29 )             24.1                7.0    8.86  )-----------( 124      ( 07-08 @ 14:12 )             21.4                7.7    8.42  )-----------( 131      ( 07-08 @ 05:40 )             23.6                7.4    7.65  )-----------( 122      ( 07-07 @ 23:15 )             22.8                5.9    9.48  )-----------( 117      ( 07-07 @ 05:25 )             17.9       07-09    144    |  110<H>  |  23     ----------------------------<  87     4.3     |  23     |  0.83     Ca    7.7<L>      09 Jul 2022 06:32

## 2022-07-10 NOTE — PROGRESS NOTE ADULT - ASSESSMENT
52y  HD#35 with recurrent cervical CA admitted for management of neutropenic fever, now s/p emergent exploratory laparotomy, abdominal washout, rectosigmoid colectomy, diverting colostomy, bronchoscopy and Abthera placement on 22 for bowel perforation. Patient s/p washout and bridging vicryl mesh placement to close fascial gap on . She is s/p wound vac, discontinued  given signs of infection. Patient with signs of sepsis beginning  including persistent tachycardia, tachypnea and hypotension as well as mental status change.  Following extensive discussion with Palliative care, Ethics and surgery teams on board, patient code status now DNR/DNI. At this time, mental status improved. Evaluation by GI with concern for GI bleeding, who did not recommend EGD. Upon speaking to patient's family, decision was not made to pursue EGD. Will continue to monitor ostomy output. At this time, patient wants to be dispo to inpatient hospice. SW aware and currently working on placement. Patient remains mildly tachycardic and VS are otherwise stable.     Neuro: IV Tylenol, Dilaudid, Flexeril, lidocaine patch for pain, ATC and PRN dosing adjusted for patient comfort.  Appreciate GHOS and Palliative recommendations.  CV: Persistently tachycardic (100-105).   - H/H stable yesterday, no blood transfusions given (7.9/24.8->7/21.3->7.5/22.8). F/u AM CBC  - Continue to monitor ostomy output, CBC q8h per GI.   - CT A/P and CT angio () negative for PE   - h/o HTN: on Lopressor 50mg BID, holding 2/2 sepsis and low BPs, BPs now trending toward baseline   Pulm: Maintaining appropriate SpO2.   GI: Regular diet as tolerated   - Concern for GI bleed in setting of stool guiac positive, s/p 2U pRBC () with appropriate response. Output still bloody, but less  - Ostomy: area of separation packed w/ quacel, ostomy powder and liquid barrier, hydrocolloid dressing.  - Senna, Protonix BID. Appreciate GI recs   -GI Recommendations: Transfuse CBC q8h and transfuse for Hb <7, pantoprazole 40mg BID, no plan for endoscopic evaluation this time  : B/l nephrostomy tubes. ASHLEY w/ increased Creatinine likely in setting of sepsis, now downtrending (0.81 this AM). Appreciate hospitalist recs.   FEN: LR@100.   -F/u AM BMP, replete electrolytes PRN  Heme: DVT ppx: Lovenox, SCDs while in bed.    - Thrombocytopenia: likely multifactorial, now normalized. Appreciate Heme Onc recs.  ID: Ostomy noted to leak into subcutaneous tissue w/ subsequent increasing leukocytosis and fevers. Now afebrile, VS otherwise improved with persistent tachycardia. No leukocytosis at present time   - Continue Zosyn (- ) for broad antibiotic coverage.   - F/u BCx (), NGTD  - UCx () nl jessica   - s/p Caspofungin and Cefepime  - S/p Zarixo (-6/10) for neutropenic fever.   - S/p Zosyn (-)  MSK: Previously evaluated by PMR with rec for subacute rehab. Significant clinical changes have occurred since this time, will reconsult PT for evaluation and recommendations C/w Lidocaine patches and Flexeril PRN for muscle spasms.     Dispo: Patient amenable to inpatient hospice. Social work working on setting up transfer. Code status = DNR/DNI.     patient seen and evaluated with Gyn Onc team    Tiffanie Cowan, PGY-2

## 2022-07-10 NOTE — PROGRESS NOTE ADULT - PROBLEM SELECTOR PLAN 2
-h/o recurrent cervical CA, receives Gyn Oncology care at [facility] ([place]).   -she initially was diagnosed with stage III cervical CA in 2014 s/p VBRT + chemo but never had surgical intervention. -Reported now being s/p 5 cycles of chemo, unsure of regimen but next due 6/21/22.  -course complicated by bowel perforation s/p washout and bridging vicryl mesh placement to close fascial gap on 6/14 w/ skin closure device place don 6/17  -further management per general surgery, gyn onc and plastic surgery  -overall prognosis is poor   -patient is DNI/DNR   -Patient and family agreed with hospice referral

## 2022-07-10 NOTE — PROVIDER CONTACT NOTE (CRITICAL VALUE NOTIFICATION) - TEST AND RESULT REPORTED:
WBC count 0.47
H&H results
potassium 2.7
WBC 0.39
WBC 0.56
H/H is 5.9/17.9
Potassium 2.8
Hemoglobin
Hemoglobin 6.2/ HCt 19.5
Potassium is 2.6
S. potassium 2.8
WBC 0.50

## 2022-07-10 NOTE — PROVIDER CONTACT NOTE (CRITICAL VALUE NOTIFICATION) - NAME OF MD/NP/PA/DO NOTIFIED:
Tiffanie Cowan Md [id number]
WON Hale
[facility] [id number]
WON Cortés #[id number]
WON Deluca
Gloria Tracey#[id number]
Joelle Garcia MD
WON Cortés
Dawson Ordoñez
MD Frank[facility]
WON Hale
WON Cortés #[id number]

## 2022-07-10 NOTE — PROVIDER CONTACT NOTE (CRITICAL VALUE NOTIFICATION) - BACKGROUND
Pt on neutropenic precautions, WBC 6/6 0.4
Patient admitted for multiple abdominal washout, creation of colostomy and hernia repair
Patient s/p ex lap, colectomy and colostomy creation.
Patient s/p exlap, colectomy and colostomy creation
S/P EX lap. abd washout, colectomy, colostomy creation, hernia reapair
Admitted for fever
S/P emergent exploratory laparotomy, abdominal washout, rectosigmoid colectomy, diverting colostomy, bronchoscopy and Abthera placement on 6/8/22   S/P washout and bridging vicryl mesh placement to close fascial gap, incisional hernia repair with mesh on 6/14 w/ skin closure device place on 6/17.  Extubated on 06/19  Patient had multiple abdominal washout
Patient admitted with neutropenic fever and severe sepsis. S/P multiple abdominal washout, creation of colostomy and hernia repair
Pt s/p abd washout, open resection of left colon with colostomy creation, hernia repair
s/p washout, open resection of left colon, w/ colostomy creation.

## 2022-07-10 NOTE — PROVIDER CONTACT NOTE (CRITICAL VALUE NOTIFICATION) - ACTION/TREATMENT ORDERED:
Will let team know, may order blood transfusion
PA made aware. No new orders received. Will continue to monitor.
WON Cortés will order 2 unit of PRBC. Will endorse to incoming RN.
MD to assess, 2 units PRBCs transfused
Potassium Chloride ER every 4 hours total of 3 doses.
MD made aware. No new orders received at this time. Will continue to monitor.
1unit PRBC
APOLLO Cortés will order potassium replacements.
Potassium supplements.
Team made aware
Will replace potassium.

## 2022-07-11 LAB
ANION GAP SERPL CALC-SCNC: 13 MMOL/L — SIGNIFICANT CHANGE UP (ref 7–14)
BASOPHILS # BLD AUTO: 0.04 K/UL — SIGNIFICANT CHANGE UP (ref 0–0.2)
BASOPHILS NFR BLD AUTO: 0.7 % — SIGNIFICANT CHANGE UP (ref 0–2)
BUN SERPL-MCNC: 22 MG/DL — SIGNIFICANT CHANGE UP (ref 7–23)
CALCIUM SERPL-MCNC: 7.7 MG/DL — LOW (ref 8.4–10.5)
CHLORIDE SERPL-SCNC: 107 MMOL/L — SIGNIFICANT CHANGE UP (ref 98–107)
CO2 SERPL-SCNC: 22 MMOL/L — SIGNIFICANT CHANGE UP (ref 22–31)
CREAT SERPL-MCNC: 1.06 MG/DL — SIGNIFICANT CHANGE UP (ref 0.5–1.3)
EGFR: 62 ML/MIN/1.73M2 — SIGNIFICANT CHANGE UP
EOSINOPHIL # BLD AUTO: 0.13 K/UL — SIGNIFICANT CHANGE UP (ref 0–0.5)
EOSINOPHIL NFR BLD AUTO: 2.2 % — SIGNIFICANT CHANGE UP (ref 0–6)
GLUCOSE SERPL-MCNC: 79 MG/DL — SIGNIFICANT CHANGE UP (ref 70–99)
HCT VFR BLD CALC: 31.6 % — LOW (ref 34.5–45)
HGB BLD-MCNC: 10.3 G/DL — LOW (ref 11.5–15.5)
IANC: 4.35 K/UL — SIGNIFICANT CHANGE UP (ref 1.8–7.4)
IMM GRANULOCYTES NFR BLD AUTO: 1.9 % — HIGH (ref 0–1.5)
LYMPHOCYTES # BLD AUTO: 0.58 K/UL — LOW (ref 1–3.3)
LYMPHOCYTES # BLD AUTO: 9.9 % — LOW (ref 13–44)
MAGNESIUM SERPL-MCNC: 1.7 MG/DL — SIGNIFICANT CHANGE UP (ref 1.6–2.6)
MCHC RBC-ENTMCNC: 29.8 PG — SIGNIFICANT CHANGE UP (ref 27–34)
MCHC RBC-ENTMCNC: 32.6 GM/DL — SIGNIFICANT CHANGE UP (ref 32–36)
MCV RBC AUTO: 91.3 FL — SIGNIFICANT CHANGE UP (ref 80–100)
MONOCYTES # BLD AUTO: 0.67 K/UL — SIGNIFICANT CHANGE UP (ref 0–0.9)
MONOCYTES NFR BLD AUTO: 11.4 % — SIGNIFICANT CHANGE UP (ref 2–14)
NEUTROPHILS # BLD AUTO: 4.35 K/UL — SIGNIFICANT CHANGE UP (ref 1.8–7.4)
NEUTROPHILS NFR BLD AUTO: 73.9 % — SIGNIFICANT CHANGE UP (ref 43–77)
NRBC # BLD: 0 /100 WBCS — SIGNIFICANT CHANGE UP
NRBC # FLD: 0.06 K/UL — HIGH
PHOSPHATE SERPL-MCNC: 3.2 MG/DL — SIGNIFICANT CHANGE UP (ref 2.5–4.5)
PLATELET # BLD AUTO: 135 K/UL — LOW (ref 150–400)
POTASSIUM SERPL-MCNC: 3.7 MMOL/L — SIGNIFICANT CHANGE UP (ref 3.5–5.3)
POTASSIUM SERPL-SCNC: 3.7 MMOL/L — SIGNIFICANT CHANGE UP (ref 3.5–5.3)
RBC # BLD: 3.46 M/UL — LOW (ref 3.8–5.2)
RBC # FLD: 20.4 % — HIGH (ref 10.3–14.5)
SODIUM SERPL-SCNC: 142 MMOL/L — SIGNIFICANT CHANGE UP (ref 135–145)
WBC # BLD: 5.88 K/UL — SIGNIFICANT CHANGE UP (ref 3.8–10.5)
WBC # FLD AUTO: 5.88 K/UL — SIGNIFICANT CHANGE UP (ref 3.8–10.5)

## 2022-07-11 PROCEDURE — 99232 SBSQ HOSP IP/OBS MODERATE 35: CPT

## 2022-07-11 RX ADMIN — OXYCODONE HYDROCHLORIDE 7.5 MILLIGRAM(S): 5 TABLET ORAL at 19:51

## 2022-07-11 RX ADMIN — Medication 100 MILLIGRAM(S): at 05:30

## 2022-07-11 RX ADMIN — SODIUM CHLORIDE 100 MILLILITER(S): 9 INJECTION, SOLUTION INTRAVENOUS at 08:35

## 2022-07-11 RX ADMIN — PIPERACILLIN AND TAZOBACTAM 25 GRAM(S): 4; .5 INJECTION, POWDER, LYOPHILIZED, FOR SOLUTION INTRAVENOUS at 01:11

## 2022-07-11 RX ADMIN — OXYCODONE HYDROCHLORIDE 15 MILLIGRAM(S): 5 TABLET ORAL at 06:20

## 2022-07-11 RX ADMIN — OXYCODONE HYDROCHLORIDE 5 MILLIGRAM(S): 5 TABLET ORAL at 01:12

## 2022-07-11 RX ADMIN — OXYCODONE HYDROCHLORIDE 15 MILLIGRAM(S): 5 TABLET ORAL at 17:41

## 2022-07-11 RX ADMIN — SODIUM CHLORIDE 100 MILLILITER(S): 9 INJECTION, SOLUTION INTRAVENOUS at 22:06

## 2022-07-11 RX ADMIN — ENOXAPARIN SODIUM 40 MILLIGRAM(S): 100 INJECTION SUBCUTANEOUS at 11:29

## 2022-07-11 RX ADMIN — OXYCODONE HYDROCHLORIDE 7.5 MILLIGRAM(S): 5 TABLET ORAL at 11:26

## 2022-07-11 RX ADMIN — PIPERACILLIN AND TAZOBACTAM 25 GRAM(S): 4; .5 INJECTION, POWDER, LYOPHILIZED, FOR SOLUTION INTRAVENOUS at 11:25

## 2022-07-11 RX ADMIN — OXYCODONE HYDROCHLORIDE 7.5 MILLIGRAM(S): 5 TABLET ORAL at 16:20

## 2022-07-11 RX ADMIN — MIRTAZAPINE 15 MILLIGRAM(S): 45 TABLET, ORALLY DISINTEGRATING ORAL at 11:29

## 2022-07-11 RX ADMIN — OXYCODONE HYDROCHLORIDE 7.5 MILLIGRAM(S): 5 TABLET ORAL at 12:30

## 2022-07-11 RX ADMIN — PANTOPRAZOLE SODIUM 40 MILLIGRAM(S): 20 TABLET, DELAYED RELEASE ORAL at 17:42

## 2022-07-11 RX ADMIN — PIPERACILLIN AND TAZOBACTAM 25 GRAM(S): 4; .5 INJECTION, POWDER, LYOPHILIZED, FOR SOLUTION INTRAVENOUS at 17:41

## 2022-07-11 RX ADMIN — OXYCODONE HYDROCHLORIDE 15 MILLIGRAM(S): 5 TABLET ORAL at 05:22

## 2022-07-11 RX ADMIN — SODIUM CHLORIDE 100 MILLILITER(S): 9 INJECTION, SOLUTION INTRAVENOUS at 01:11

## 2022-07-11 RX ADMIN — PANTOPRAZOLE SODIUM 40 MILLIGRAM(S): 20 TABLET, DELAYED RELEASE ORAL at 05:23

## 2022-07-11 RX ADMIN — OXYCODONE HYDROCHLORIDE 7.5 MILLIGRAM(S): 5 TABLET ORAL at 20:45

## 2022-07-11 RX ADMIN — OXYCODONE HYDROCHLORIDE 5 MILLIGRAM(S): 5 TABLET ORAL at 02:00

## 2022-07-11 NOTE — PROGRESS NOTE ADULT - SUBJECTIVE AND OBJECTIVE BOX
Patient is a 52y old  Female who presents with a chief complaint of neutropenic fever (11 Jul 2022 06:27)      SUBJECTIVE / OVERNIGHT EVENTS:  Patient has no new complaints. Denies cp, SOB, abdominal pain, N/V/D     MEDICATIONS  (STANDING):  enoxaparin Injectable 40 milliGRAM(s) SubCutaneous every 24 hours  lactated ringers. 1000 milliLiter(s) (100 mL/Hr) IV Continuous <Continuous>  lidocaine 1% (Preservative-free) Injectable 50 milliLiter(s) Local Injection once  metoprolol succinate  milliGRAM(s) Oral daily  mirtazapine 15 milliGRAM(s) Oral daily  oxyCODONE  ER Tablet 15 milliGRAM(s) Oral every 12 hours  pantoprazole  Injectable 40 milliGRAM(s) IV Push two times a day  piperacillin/tazobactam IVPB.. 3.375 Gram(s) IV Intermittent every 8 hours    MEDICATIONS  (PRN):  cyclobenzaprine 5 milliGRAM(s) Oral three times a day PRN Muscle Spasm  lidocaine   4% Patch 1 Patch Transdermal daily PRN back pain  oxyCODONE    IR 5 milliGRAM(s) Oral every 3 hours PRN Moderate Pain (4 - 6)  oxyCODONE    IR 7.5 milliGRAM(s) Oral every 3 hours PRN Severe Pain (7 - 10)  simethicone 80 milliGRAM(s) Chew three times a day PRN Gas      Vital Signs Last 24 Hrs  T(C): 36.4 (11 Jul 2022 05:20), Max: 36.7 (10 Jul 2022 10:00)  T(F): 97.6 (11 Jul 2022 05:20), Max: 98.1 (10 Jul 2022 10:00)  HR: 110 (11 Jul 2022 05:20) (101 - 110)  BP: 139/88 (11 Jul 2022 05:20) (110/88 - 139/88)  BP(mean): --  RR: 20 (11 Jul 2022 05:20) (17 - 20)  SpO2: 100% (11 Jul 2022 05:20) (95% - 100%)    Parameters below as of 11 Jul 2022 05:20  Patient On (Oxygen Delivery Method): room air      CAPILLARY BLOOD GLUCOSE        I&O's Summary    10 Jul 2022 07:01  -  11 Jul 2022 07:00  --------------------------------------------------------  IN: 1780 mL / OUT: 1882 mL / NET: -102 mL        PHYSICAL EXAM:  GENERAL: NAD, well-developed  HEAD:  Atraumatic, Normocephalic  EYES: EOMI, PERRLA, conjunctiva and sclera clear  NECK: Supple, No JVD  CHEST/LUNG: Clear to auscultation bilaterally; No wheeze  HEART: Regular rate and rhythm; No murmurs, rubs, or gallops  ABDOMEN: Soft, Nontender, Nondistended; Bowel sounds present  EXTREMITIES:  2+ Peripheral Pulses, No clubbing, cyanosis, or edema  PSYCH: AAOx3  NEUROLOGY: non-focal  SKIN: No rashes or lesions    LABS:                        10.3   5.88  )-----------( 135      ( 11 Jul 2022 05:17 )             31.6     07-11    142  |  107  |  22  ----------------------------<  79  3.7   |  22  |  1.06    Ca    7.7<L>      11 Jul 2022 05:17  Phos  3.2     07-11  Mg     1.70     07-11                RADIOLOGY & ADDITIONAL TESTS:    Imaging Personally Reviewed:    Consultant(s) Notes Reviewed:      Care Discussed with Consultants/Other Providers:   Patient is a 52y old  Female who presents with a chief complaint of neutropenic fever (11 Jul 2022 06:27)      SUBJECTIVE / OVERNIGHT EVENTS:  Patient has no new complaints. Denies cp, SOB, abdominal pain, N/V/D     MEDICATIONS  (STANDING):  enoxaparin Injectable 40 milliGRAM(s) SubCutaneous every 24 hours  lactated ringers. 1000 milliLiter(s) (100 mL/Hr) IV Continuous <Continuous>  lidocaine 1% (Preservative-free) Injectable 50 milliLiter(s) Local Injection once  metoprolol succinate  milliGRAM(s) Oral daily  mirtazapine 15 milliGRAM(s) Oral daily  oxyCODONE  ER Tablet 15 milliGRAM(s) Oral every 12 hours  pantoprazole  Injectable 40 milliGRAM(s) IV Push two times a day  piperacillin/tazobactam IVPB.. 3.375 Gram(s) IV Intermittent every 8 hours    MEDICATIONS  (PRN):  cyclobenzaprine 5 milliGRAM(s) Oral three times a day PRN Muscle Spasm  lidocaine   4% Patch 1 Patch Transdermal daily PRN back pain  oxyCODONE    IR 5 milliGRAM(s) Oral every 3 hours PRN Moderate Pain (4 - 6)  oxyCODONE    IR 7.5 milliGRAM(s) Oral every 3 hours PRN Severe Pain (7 - 10)  simethicone 80 milliGRAM(s) Chew three times a day PRN Gas      Vital Signs Last 24 Hrs  T(C): 36.4 (11 Jul 2022 05:20), Max: 36.7 (10 Jul 2022 10:00)  T(F): 97.6 (11 Jul 2022 05:20), Max: 98.1 (10 Jul 2022 10:00)  HR: 110 (11 Jul 2022 05:20) (101 - 110)  BP: 139/88 (11 Jul 2022 05:20) (110/88 - 139/88)  BP(mean): --  RR: 20 (11 Jul 2022 05:20) (17 - 20)  SpO2: 100% (11 Jul 2022 05:20) (95% - 100%)    Parameters below as of 11 Jul 2022 05:20  Patient On (Oxygen Delivery Method): room air      CAPILLARY BLOOD GLUCOSE        I&O's Summary    10 Jul 2022 07:01  -  11 Jul 2022 07:00  --------------------------------------------------------  IN: 1780 mL / OUT: 1882 mL / NET: -102 mL      PHYSICAL EXAM:  GENERAL: NAD, well-developed  HEAD:  Atraumatic, Normocephalic  EYES: EOMI, PERRLA, conjunctiva and sclera clear  NECK: Supple, No JVD  CHEST/LUNG: Clear to auscultation bilaterally; No wheeze  HEART: Regular rate and rhythm; No murmurs, rubs, or gallops  ABDOMEN: Soft, Diffusely tender. small amount of red blood mixed with brown stool in the ostomy bag. Lt pelvis drain w/ serous fluid. RLQ and LLQ drains w/ seropurulent fluid.  R&L nephrostomy tubes draining slightly cloudy urine.  EXTREMITIES:  2+ Peripheral Pulses, No clubbing, cyanosis, or edema  PSYCH: AAOx3  NEUROLOGY: non-focal  SKIN: No rashes or lesions    LABS:                        10.3   5.88  )-----------( 135      ( 11 Jul 2022 05:17 )             31.6     07-11    142  |  107  |  22  ----------------------------<  79  3.7   |  22  |  1.06    Ca    7.7<L>      11 Jul 2022 05:17  Phos  3.2     07-11  Mg     1.70     07-11                RADIOLOGY & ADDITIONAL TESTS:    Imaging Personally Reviewed:    Consultant(s) Notes Reviewed:      Care Discussed with Consultants/Other Providers:

## 2022-07-11 NOTE — PROGRESS NOTE ADULT - ASSESSMENT
52y  HD#37 with recurrent cervical CA admitted for management of neutropenic fever, now s/p emergent exploratory laparotomy, abdominal washout, rectosigmoid colectomy, diverting colostomy, bronchoscopy and Abthera placement on 22 for bowel perforation. Patient s/p washout and bridging vicryl mesh placement to close fascial gap on . She is s/p wound vac, discontinued  given signs of infection. Patient with signs of sepsis beginning  including persistent tachycardia, tachypnea and hypotension as well as mental status change.  Following extensive discussion with Palliative care, Ethics and surgery teams on board, patient code status now DNR/DNI. At this time, mental status improved. Evaluation by GI with concern for GI bleeding, who recommended against EGD at this time and for serial CBC's in setting of current status as well as patient's wishes. H/h was noted to be downtrending to 6.8/21.1 on 7/10 - now s/p 2uPRBC overnight w/ appropriate response to 10.3/31.6. Will continue to monitor ostomy output.  At this time, patient requesting hospice. SW aware and currently working on placement. Patient remains mildly tachycardic and VS are otherwise stable.     Neuro: IV Tylenol, Dilaudid, Flexeril, lidocaine patch for pain, ATC and PRN dosing adjusted for patient comfort.  Appreciate GHOS and Palliative recommendations.  CV: Persistently tachycardic (100s-110s).   - H/h was noted to be downtrending to 6.8/21.1 on 7/10 - now s/p 2uPRBC overnight w/ appropriate response to 10.3/31.6.   - Continue to monitor ostomy output, CBC q8h per GI.   - CT A/P and CT angio () negative for PE   - h/o HTN: on Lopressor 50mg BID, holding 2/2 sepsis and low BPs, BPs now trending toward baseline   Pulm: Maintaining appropriate SpO2.   GI: Regular diet + Ensure suppplement, as tolerated   - Concern for GI bleed in setting of stool guiac positive, s/p 2U pRBC () and 2uRPRBC (7/10) with appropriate response. Output still bloody, but less  - Ostomy: area of separation packed w/ quacel, ostomy powder and liquid barrier, hydrocolloid dressing.  - Senna, Protonix BID. Appreciate GI recs   -GI Recommendations: Transfuse CBC q8h and transfuse for Hb <7, pantoprazole 40mg BID, no plan for endoscopic evaluation this time  : B/l nephrostomy tubes. ASHLEY w/ increased Creatinine likely in setting of sepsis, w/ mild uptrend to 1.06 from 0.84  this morning.  Appreciate hospitalist recs.   FEN: LR@100.   -F/u AM BMP, replete electrolytes PRN  Heme: DVT ppx: Lovenox, SCDs while in bed.    - Thrombocytopenia: likely multifactorial, now normalized. Appreciate Heme Onc recs.  ID: Ostomy noted to leak into subcutaneous tissue w/ subsequent increasing leukocytosis and fevers. Now afebrile, VS otherwise improved with persistent tachycardia. No leukocytosis at present time   - Continue Zosyn (- ) for broad antibiotic coverage.   - F/u BCx (), NGTD  - UCx () nl jessica   - s/p Caspofungin and Cefepime  - S/p Zarixo (-6/10) for neutropenic fever.   - S/p Zosyn (-)  MSK: Previously evaluated by PMR with rec for subacute rehab. Significant clinical changes have occurred since this time, will reconsult PT for evaluation and recommendations.  C/w Lidocaine patches and Flexeril PRN for muscle spasms.     Dispo: Patient amenable to hospice. Social work working on setting up transfer. Code status = DNR/DNI.     Seen at bedside with GYN Oncology team  Kaci Grimm,  PGY2 52y  HD#37 with recurrent cervical CA admitted for management of neutropenic fever, now s/p emergent exploratory laparotomy, abdominal washout, rectosigmoid colectomy, diverting colostomy, bronchoscopy and Abthera placement on 22 for bowel perforation. Patient s/p washout and bridging vicryl mesh placement to close fascial gap on . She is s/p wound vac, discontinued  given signs of infection. Patient with signs of sepsis beginning  including persistent tachycardia, tachypnea and hypotension as well as mental status change.  Following extensive discussion with Palliative care, Ethics and surgery teams on board, patient code status now DNR/DNI. At this time, mental status improved. Evaluation by GI with concern for GI bleeding, who recommended against EGD at this time and for serial CBC's in setting of current status as well as patient's wishes. H/h was noted to be downtrending to 6.8/21.1 on 7/10 - now s/p 2uPRBC overnight w/ appropriate response to 10.3/31.6. Will continue to monitor ostomy output.  At this time, patient requesting hospice. SW aware and currently working on placement. Patient remains mildly tachycardic and VS are otherwise stable.     Neuro: IV Tylenol, Dilaudid, Flexeril, lidocaine patch for pain, ATC and PRN dosing adjusted for patient comfort.  Appreciate GHOS and Palliative recommendations.  CV: Persistently tachycardic (100s-110s).   - H/h was noted to be downtrending to 6.8/21.1 on 7/10 - now s/p 2uPRBC overnight w/ appropriate response to 10.3/31.6.   - Continue to monitor ostomy output, CBC q8h per GI.   - CT A/P and CT angio () negative for PE   - h/o HTN: on Lopressor 50mg BID, holding 2/2 sepsis and low BPs, BPs now trending toward baseline   Pulm: Maintaining appropriate SpO2.   GI: Regular diet + Ensure suppplement, as tolerated   - Concern for GI bleed in setting of stool guiac positive, s/p 2U pRBC () and 2uRPRBC (7/10) with appropriate response. Output still bloody, but less  - Ostomy: area of separation packed w/ quacel, ostomy powder and liquid barrier, hydrocolloid dressing.  - Senna, Protonix BID. Appreciate GI recs   -GI Recommendations: Transfuse CBC q8h and transfuse for Hb <7, pantoprazole 40mg BID, no plan for endoscopic evaluation this time  : B/l nephrostomy tubes. ASHLEY w/ increased Creatinine likely in setting of sepsis, w/ mild uptrend to 1.06 from 0.84  this morning.  Appreciate hospitalist recs.   FEN: LR@100.   -F/u AM BMP, replete electrolytes PRN  Heme: DVT ppx: Lovenox, SCDs while in bed.    - Thrombocytopenia: likely multifactorial, now normalized. Appreciate Heme Onc recs.  ID: Ostomy noted to leak into subcutaneous tissue w/ subsequent increasing leukocytosis and fevers. Now afebrile, VS otherwise improved with persistent tachycardia. No leukocytosis at present time   - Continue Zosyn (- ) for broad antibiotic coverage.   - F/u BCx (), NGTD  - UCx () nl jessica   - s/p Caspofungin and Cefepime  - S/p Zarixo (-6/10) for neutropenic fever.   - S/p Zosyn (-)  MSK: Previously evaluated by PMR with rec for subacute rehab. Significant clinical changes have occurred since this time, will reconsult PT for evaluation and recommendations.  C/w Lidocaine patches and Flexeril PRN for muscle spasms.     Dispo: Patient amenable to hospice. Social work working on setting up transfer. Code status = DNR/DNI.     Seen at bedside with GYN Oncology team  Kaci Grimm,  PGY2  Julissa Leach MD, PGY4

## 2022-07-11 NOTE — PROGRESS NOTE ADULT - PROBLEM SELECTOR PLAN 2
-h/o recurrent cervical CA, receives Gyn Oncology care at [facility] ([facility]).   -she initially was diagnosed with stage III cervical CA in 2014 s/p VBRT + chemo but never had surgical intervention. -Reported now being s/p 5 cycles of chemo, unsure of regimen but next due 6/21/22.  -course complicated by bowel perforation s/p washout and bridging vicryl mesh placement to close fascial gap on 6/14 w/ skin closure device place don 6/17  -further management per general surgery, gyn onc and plastic surgery  -overall prognosis is poor   -patient is DNI/DNR   -Patient and family agreed with hospice referral

## 2022-07-11 NOTE — PROGRESS NOTE ADULT - ATTENDING COMMENTS
Patient seen and evaluated on rounds with team.   Agree with assessment and plan above.   Progressive metastatic cervical cancer, bowel perforation, sepsis.   For hospice placement.

## 2022-07-11 NOTE — CHART NOTE - NSCHARTNOTEFT_GEN_A_CORE
R3 PM Rounding Note       Patient seen at bedside on PM rounds. Back pain well controlled with current pain regimen. Pending acceptance to hospice.     VS  T(C): 36.6 (07-11-22 @ 14:10)  HR: 101 (07-11-22 @ 14:10)  BP: 128/90 (07-11-22 @ 14:10)  RR: 24 (07-11-22 @ 14:10)  SpO2: 100% (07-11-22 @ 14:10)    - cont current mgmt  - AM labs  - pending hospice placement     Marika Lemons MD PGY3   Patient seen with Dr. Jay PGY6

## 2022-07-11 NOTE — PROGRESS NOTE ADULT - SUBJECTIVE AND OBJECTIVE BOX
Gyn ONC Progress Note     HD #37    Subjective:   Pt seen and examined at bedside. Received 2uPRBC yesterday/overnight.  Pain well controlled w/ current regimen. Passing flatus. Tolerating regular diet. Pt denies fever, chills, chest pain, SOB, nausea, vomiting, lightheadedness, dizziness.      Objective:  T(F): 97.6 (07-11-22 @ 05:20), Max: 98.1 (07-10-22 @ 10:00)  HR: 110 (07-11-22 @ 05:20) (101 - 110)  BP: 139/88 (07-11-22 @ 05:20) (110/88 - 139/88)  RR: 20 (07-11-22 @ 05:20) (17 - 20)  SpO2: 100% (07-11-22 @ 05:20) (95% - 100%)    I&O's Summary    09 Jul 2022 07:01  -  10 Jul 2022 07:00  --------------------------------------------------------  IN: 1640 mL / OUT: 1480 mL / NET: 160 mL    10 Jul 2022 07:01  -  11 Jul 2022 06:28  --------------------------------------------------------  IN: 1780 mL / OUT: 1822 mL / NET: -42 mL    MEDICATIONS  (STANDING):  enoxaparin Injectable 40 milliGRAM(s) SubCutaneous every 24 hours  lactated ringers. 1000 milliLiter(s) (100 mL/Hr) IV Continuous <Continuous>  lidocaine 1% (Preservative-free) Injectable 50 milliLiter(s) Local Injection once  metoprolol succinate  milliGRAM(s) Oral daily  mirtazapine 15 milliGRAM(s) Oral daily  oxyCODONE  ER Tablet 15 milliGRAM(s) Oral every 12 hours  pantoprazole  Injectable 40 milliGRAM(s) IV Push two times a day  piperacillin/tazobactam IVPB.. 3.375 Gram(s) IV Intermittent every 8 hours    MEDICATIONS  (PRN):  cyclobenzaprine 5 milliGRAM(s) Oral three times a day PRN Muscle Spasm  lidocaine   4% Patch 1 Patch Transdermal daily PRN back pain  oxyCODONE    IR 5 milliGRAM(s) Oral every 3 hours PRN Moderate Pain (4 - 6)  oxyCODONE    IR 7.5 milliGRAM(s) Oral every 3 hours PRN Severe Pain (7 - 10)  simethicone 80 milliGRAM(s) Chew three times a day PRN Gas    Physical Exam:  Constitutional: Awake. Alert. NAD.   CV: Tachycardic rate. Regular rhythm.   Lungs: CTA b/l   Abd: Soft, non-distended. Diffusely tender. Packing in place w/ overlying abdominal pad. Red/brown liquid stool output in the ostomy bag. L pelvis, RLQ and LLQ drains w/ seropurulent output.  : R&L nephrostomy tubes draining slightly cloudy urine.  Extremities: SCDs in place. No calf tenderness b/l    LABS:                        10.3   5.88  )-----------( 135      ( 11 Jul 2022 05:17 )             31.6     07-11    142    |  107    |  22     ----------------------------<  79     3.7     |  22     |  1.06   07-10    139    |  110<H>  |  22     ----------------------------<  69<L>  4.9     |  16<L>  |  0.84     Ca    7.7<L>      11 Jul 2022 05:17  Ca    7.8<L>      10 Jul 2022 05:25  Phos  3.2       07-11  Phos  3.2       07-10  Mg     1.70      07-11  Mg     1.90      07-10                 Gyn ONC Progress Note     HD #37    Subjective:   Pt seen and examined at bedside. Received 2uPRBC yesterday/overnight.  Pain well controlled w/ current regimen. Passing flatus. Tolerating regular diet. Expresses desire to leave hospital.  Pt denies fever, chills, chest pain, SOB, nausea, vomiting, lightheadedness, dizziness.      Objective:  T(F): 97.6 (07-11-22 @ 05:20), Max: 98.1 (07-10-22 @ 10:00)  HR: 110 (07-11-22 @ 05:20) (101 - 110)  BP: 139/88 (07-11-22 @ 05:20) (110/88 - 139/88)  RR: 20 (07-11-22 @ 05:20) (17 - 20)  SpO2: 100% (07-11-22 @ 05:20) (95% - 100%)    I&O's Summary  09 Jul 2022 07:01  -  10 Jul 2022 07:00  --------------------------------------------------------  IN: 1640 mL / OUT: 1480 mL / NET: 160 mL    10 Jul 2022 07:01  -  11 Jul 2022 06:28  --------------------------------------------------------  IN: 1780 mL / OUT: 1822 mL / NET: -42 mL      MEDICATIONS  (STANDING):  enoxaparin Injectable 40 milliGRAM(s) SubCutaneous every 24 hours  lactated ringers. 1000 milliLiter(s) (100 mL/Hr) IV Continuous <Continuous>  lidocaine 1% (Preservative-free) Injectable 50 milliLiter(s) Local Injection once  metoprolol succinate  milliGRAM(s) Oral daily  mirtazapine 15 milliGRAM(s) Oral daily  oxyCODONE  ER Tablet 15 milliGRAM(s) Oral every 12 hours  pantoprazole  Injectable 40 milliGRAM(s) IV Push two times a day  piperacillin/tazobactam IVPB.. 3.375 Gram(s) IV Intermittent every 8 hours    MEDICATIONS  (PRN):  cyclobenzaprine 5 milliGRAM(s) Oral three times a day PRN Muscle Spasm  lidocaine   4% Patch 1 Patch Transdermal daily PRN back pain  oxyCODONE    IR 5 milliGRAM(s) Oral every 3 hours PRN Moderate Pain (4 - 6)  oxyCODONE    IR 7.5 milliGRAM(s) Oral every 3 hours PRN Severe Pain (7 - 10)  simethicone 80 milliGRAM(s) Chew three times a day PRN Gas    Physical Exam:  Constitutional: Awake. Alert. NAD.   CV: Tachycardic rate. Regular rhythm.   Lungs: CTA b/l   Abd: Soft, non-distended. Diffusely tender. Packing in place w/ overlying abdominal pad. Red/brown liquid stool output in the ostomy bag. L pelvis, RLQ and LLQ drains w/ seropurulent output.  : R&L nephrostomy tubes draining slightly cloudy urine.  Extremities: SCDs in place. No calf tenderness b/l    LABS:                        10.3   5.88  )-----------( 135      ( 11 Jul 2022 05:17 )             31.6     07-11    142    |  107    |  22     ----------------------------<  79     3.7     |  22     |  1.06   07-10    139    |  110<H>  |  22     ----------------------------<  69<L>  4.9     |  16<L>  |  0.84     Ca    7.7<L>      11 Jul 2022 05:17  Ca    7.8<L>      10 Jul 2022 05:25  Phos  3.2       07-11  Phos  3.2       07-10  Mg     1.70      07-11  Mg     1.90      07-10

## 2022-07-11 NOTE — PROGRESS NOTE ADULT - PROBLEM SELECTOR PLAN 1
GYN ONC Fellow Addendum:    Pt seen and examined at bedside. Agree with above. Pt reports she wants to leave today. No bleeding in colostomy.    VS reviewed  Labs reviewed    Received 2u yest, Hgb responded appropriately  Continue current pain regimen  Tolerating reg diet  Encourage ambulation and IS use  Replete lytes prn  DVT ppx: Lovenox  Dispo: discharge planning to hospice    Guadalupe Jay MD

## 2022-07-12 LAB
ANION GAP SERPL CALC-SCNC: 12 MMOL/L — SIGNIFICANT CHANGE UP (ref 7–14)
BUN SERPL-MCNC: 22 MG/DL — SIGNIFICANT CHANGE UP (ref 7–23)
CALCIUM SERPL-MCNC: 7.9 MG/DL — LOW (ref 8.4–10.5)
CHLORIDE SERPL-SCNC: 108 MMOL/L — HIGH (ref 98–107)
CO2 SERPL-SCNC: 23 MMOL/L — SIGNIFICANT CHANGE UP (ref 22–31)
CREAT SERPL-MCNC: 1.09 MG/DL — SIGNIFICANT CHANGE UP (ref 0.5–1.3)
EGFR: 60 ML/MIN/1.73M2 — SIGNIFICANT CHANGE UP
GLUCOSE SERPL-MCNC: 76 MG/DL — SIGNIFICANT CHANGE UP (ref 70–99)
HCT VFR BLD CALC: 35 % — SIGNIFICANT CHANGE UP (ref 34.5–45)
HGB BLD-MCNC: 11.3 G/DL — LOW (ref 11.5–15.5)
MAGNESIUM SERPL-MCNC: 1.6 MG/DL — SIGNIFICANT CHANGE UP (ref 1.6–2.6)
MCHC RBC-ENTMCNC: 29.8 PG — SIGNIFICANT CHANGE UP (ref 27–34)
MCHC RBC-ENTMCNC: 32.3 GM/DL — SIGNIFICANT CHANGE UP (ref 32–36)
MCV RBC AUTO: 92.3 FL — SIGNIFICANT CHANGE UP (ref 80–100)
NRBC # BLD: 0 /100 WBCS — SIGNIFICANT CHANGE UP
NRBC # FLD: 0.02 K/UL — HIGH
PHOSPHATE SERPL-MCNC: 3.4 MG/DL — SIGNIFICANT CHANGE UP (ref 2.5–4.5)
PLATELET # BLD AUTO: 141 K/UL — LOW (ref 150–400)
POTASSIUM SERPL-MCNC: 3.6 MMOL/L — SIGNIFICANT CHANGE UP (ref 3.5–5.3)
POTASSIUM SERPL-SCNC: 3.6 MMOL/L — SIGNIFICANT CHANGE UP (ref 3.5–5.3)
RBC # BLD: 3.79 M/UL — LOW (ref 3.8–5.2)
RBC # FLD: 20.8 % — HIGH (ref 10.3–14.5)
SARS-COV-2 RNA SPEC QL NAA+PROBE: SIGNIFICANT CHANGE UP
SODIUM SERPL-SCNC: 143 MMOL/L — SIGNIFICANT CHANGE UP (ref 135–145)
WBC # BLD: 5.58 K/UL — SIGNIFICANT CHANGE UP (ref 3.8–10.5)
WBC # FLD AUTO: 5.58 K/UL — SIGNIFICANT CHANGE UP (ref 3.8–10.5)

## 2022-07-12 PROCEDURE — 99232 SBSQ HOSP IP/OBS MODERATE 35: CPT

## 2022-07-12 RX ORDER — ENOXAPARIN SODIUM 100 MG/ML
40 INJECTION SUBCUTANEOUS
Qty: 0 | Refills: 0 | DISCHARGE
Start: 2022-07-12

## 2022-07-12 RX ORDER — ACETAMINOPHEN 500 MG
3 TABLET ORAL
Qty: 0 | Refills: 0 | DISCHARGE
Start: 2022-07-12

## 2022-07-12 RX ORDER — OXYCODONE HYDROCHLORIDE 5 MG/1
1 TABLET ORAL
Qty: 0 | Refills: 0 | DISCHARGE
Start: 2022-07-12

## 2022-07-12 RX ORDER — MIRTAZAPINE 45 MG/1
1 TABLET, ORALLY DISINTEGRATING ORAL
Qty: 0 | Refills: 0 | DISCHARGE
Start: 2022-07-12

## 2022-07-12 RX ORDER — ACETAMINOPHEN 500 MG
975 TABLET ORAL EVERY 6 HOURS
Refills: 0 | Status: DISCONTINUED | OUTPATIENT
Start: 2022-07-12 | End: 2022-07-18

## 2022-07-12 RX ADMIN — SODIUM CHLORIDE 100 MILLILITER(S): 9 INJECTION, SOLUTION INTRAVENOUS at 13:49

## 2022-07-12 RX ADMIN — OXYCODONE HYDROCHLORIDE 7.5 MILLIGRAM(S): 5 TABLET ORAL at 05:15

## 2022-07-12 RX ADMIN — CYCLOBENZAPRINE HYDROCHLORIDE 5 MILLIGRAM(S): 10 TABLET, FILM COATED ORAL at 13:47

## 2022-07-12 RX ADMIN — OXYCODONE HYDROCHLORIDE 5 MILLIGRAM(S): 5 TABLET ORAL at 14:45

## 2022-07-12 RX ADMIN — SODIUM CHLORIDE 100 MILLILITER(S): 9 INJECTION, SOLUTION INTRAVENOUS at 11:21

## 2022-07-12 RX ADMIN — PIPERACILLIN AND TAZOBACTAM 25 GRAM(S): 4; .5 INJECTION, POWDER, LYOPHILIZED, FOR SOLUTION INTRAVENOUS at 18:59

## 2022-07-12 RX ADMIN — PANTOPRAZOLE SODIUM 40 MILLIGRAM(S): 20 TABLET, DELAYED RELEASE ORAL at 19:07

## 2022-07-12 RX ADMIN — ENOXAPARIN SODIUM 40 MILLIGRAM(S): 100 INJECTION SUBCUTANEOUS at 12:47

## 2022-07-12 RX ADMIN — LIDOCAINE 1 PATCH: 4 CREAM TOPICAL at 07:13

## 2022-07-12 RX ADMIN — SODIUM CHLORIDE 100 MILLILITER(S): 9 INJECTION, SOLUTION INTRAVENOUS at 06:01

## 2022-07-12 RX ADMIN — LIDOCAINE 1 PATCH: 4 CREAM TOPICAL at 06:36

## 2022-07-12 RX ADMIN — OXYCODONE HYDROCHLORIDE 7.5 MILLIGRAM(S): 5 TABLET ORAL at 01:08

## 2022-07-12 RX ADMIN — PIPERACILLIN AND TAZOBACTAM 25 GRAM(S): 4; .5 INJECTION, POWDER, LYOPHILIZED, FOR SOLUTION INTRAVENOUS at 01:42

## 2022-07-12 RX ADMIN — OXYCODONE HYDROCHLORIDE 15 MILLIGRAM(S): 5 TABLET ORAL at 06:00

## 2022-07-12 RX ADMIN — PANTOPRAZOLE SODIUM 40 MILLIGRAM(S): 20 TABLET, DELAYED RELEASE ORAL at 06:00

## 2022-07-12 RX ADMIN — MIRTAZAPINE 15 MILLIGRAM(S): 45 TABLET, ORALLY DISINTEGRATING ORAL at 13:48

## 2022-07-12 RX ADMIN — OXYCODONE HYDROCHLORIDE 7.5 MILLIGRAM(S): 5 TABLET ORAL at 00:13

## 2022-07-12 RX ADMIN — PIPERACILLIN AND TAZOBACTAM 25 GRAM(S): 4; .5 INJECTION, POWDER, LYOPHILIZED, FOR SOLUTION INTRAVENOUS at 10:21

## 2022-07-12 RX ADMIN — OXYCODONE HYDROCHLORIDE 7.5 MILLIGRAM(S): 5 TABLET ORAL at 23:48

## 2022-07-12 RX ADMIN — OXYCODONE HYDROCHLORIDE 7.5 MILLIGRAM(S): 5 TABLET ORAL at 04:30

## 2022-07-12 RX ADMIN — OXYCODONE HYDROCHLORIDE 15 MILLIGRAM(S): 5 TABLET ORAL at 18:58

## 2022-07-12 RX ADMIN — OXYCODONE HYDROCHLORIDE 15 MILLIGRAM(S): 5 TABLET ORAL at 07:20

## 2022-07-12 RX ADMIN — OXYCODONE HYDROCHLORIDE 7.5 MILLIGRAM(S): 5 TABLET ORAL at 23:18

## 2022-07-12 RX ADMIN — OXYCODONE HYDROCHLORIDE 15 MILLIGRAM(S): 5 TABLET ORAL at 19:30

## 2022-07-12 RX ADMIN — Medication 100 MILLIGRAM(S): at 06:00

## 2022-07-12 RX ADMIN — OXYCODONE HYDROCHLORIDE 5 MILLIGRAM(S): 5 TABLET ORAL at 13:46

## 2022-07-12 RX ADMIN — LIDOCAINE 1 PATCH: 4 CREAM TOPICAL at 18:30

## 2022-07-12 NOTE — PROGRESS NOTE ADULT - PROBLEM SELECTOR PLAN 6
-vitals reviewed and patient since admission had HR in 100s-110s  -recent CTPA negative for PE  -c/w with treatment underlying sepsis  -c/w metoprolol.

## 2022-07-12 NOTE — PROGRESS NOTE ADULT - ATTENDING COMMENTS
plan for  [facility] once approved  patient alert and oriented, making decisions  desires transfer as soon as possible Patient seen and evaluated, partner at bedside. let patient know that [facility] bed is approved, pending insurance auth  partner became upset that patient is going to hospice. Patient stated she prefers hospice, explained to partner that rehab will not be done at hospice facility given patient's limitations  Oli was upset and left the room screaming about the staff, the decision making, was unhappy, security was called  Consequently , we called that daughter HCP who asked her cousin Cookie to be on the call  We discussed and confirmed plan for pal care at hospice, [facility]  daughter and cousin agreed  they approved the plan and understand we are awaiting insurance auth  partner is not hcp, and per patient has periods of alcoholism, unable to make decisions for her  all questions were answered, SW and Nurse  on the call as well

## 2022-07-12 NOTE — CHART NOTE - NSCHARTNOTEFT_GEN_A_CORE
At 8:15 pm I received a page from the nurse for Ms. Rudd explaining that during the change of shift, she walked in to the patient's room to check on the patient and overheard Ms. Rudd on the phone with the police. She also explained that Ms. Rudd exclaimed that she is tired of being in the hospital and desires to be discharged home. Nurse notified me that the situation seemed to be under control and she would notify me if the police come to the hospital to assess the situation.     d/w Dr. Fonseca, PGY4 and Dr. Jay, GYN ONC Fellow    Jessika Pathak, PGY2

## 2022-07-12 NOTE — PROGRESS NOTE ADULT - PROBLEM SELECTOR PLAN 1
GYN ONC Fellow Addendum:    Pt seen and examined at bedside. Agree with above. Agitation overnight, this AM resting comfortably. Persistent back pain improved w/ medication.    VS reviewed  Labs reviewed    GI bleed: colostomy remains w/o bloody output, H/H stable. Cont IV protonix  Continue current pain regimen  Tolerating reg diet  Encourage IS use  Replete lytes prn  DVT ppx: Lovenox  Dispo: pending d/c to [facility] Jay MD

## 2022-07-12 NOTE — PROGRESS NOTE ADULT - SUBJECTIVE AND OBJECTIVE BOX
Patient is a 52y old  Female who presents with a chief complaint of neutropenic fever (12 Jul 2022 06:23)      SUBJECTIVE / OVERNIGHT EVENTS:  Patient has no new complaints and wants to be discharged. Denies cp, SOB, abdominal pain, N/V/D     MEDICATIONS  (STANDING):  enoxaparin Injectable 40 milliGRAM(s) SubCutaneous every 24 hours  lactated ringers. 1000 milliLiter(s) (100 mL/Hr) IV Continuous <Continuous>  lidocaine 1% (Preservative-free) Injectable 50 milliLiter(s) Local Injection once  metoprolol succinate  milliGRAM(s) Oral daily  mirtazapine 15 milliGRAM(s) Oral daily  oxyCODONE  ER Tablet 15 milliGRAM(s) Oral every 12 hours  pantoprazole  Injectable 40 milliGRAM(s) IV Push two times a day  piperacillin/tazobactam IVPB.. 3.375 Gram(s) IV Intermittent every 8 hours    MEDICATIONS  (PRN):  acetaminophen     Tablet .. 975 milliGRAM(s) Oral every 6 hours PRN Mild Pain (1 - 3)  cyclobenzaprine 5 milliGRAM(s) Oral three times a day PRN Muscle Spasm  lidocaine   4% Patch 1 Patch Transdermal daily PRN back pain  oxyCODONE    IR 5 milliGRAM(s) Oral every 3 hours PRN Moderate Pain (4 - 6)  oxyCODONE    IR 7.5 milliGRAM(s) Oral every 3 hours PRN Severe Pain (7 - 10)  simethicone 80 milliGRAM(s) Chew three times a day PRN Gas      Vital Signs Last 24 Hrs  T(C): 36.3 (12 Jul 2022 10:13), Max: 36.7 (11 Jul 2022 17:34)  T(F): 97.3 (12 Jul 2022 10:13), Max: 98 (11 Jul 2022 17:34)  HR: 98 (12 Jul 2022 10:13) (98 - 110)  BP: 154/93 (12 Jul 2022 10:13) (128/90 - 154/93)  BP(mean): --  RR: 18 (12 Jul 2022 10:13) (18 - 24)  SpO2: 99% (12 Jul 2022 10:13) (96% - 100%)    Parameters below as of 12 Jul 2022 10:13  Patient On (Oxygen Delivery Method): room air      CAPILLARY BLOOD GLUCOSE        I&O's Summary    11 Jul 2022 07:01  -  12 Jul 2022 07:00  --------------------------------------------------------  IN: 3140 mL / OUT: 3042.5 mL / NET: 97.5 mL        GENERAL: NAD, well-developed  HEAD:  Atraumatic, Normocephalic  EYES: EOMI, PERRLA, conjunctiva and sclera clear  NECK: Supple, No JVD  CHEST/LUNG: Clear to auscultation bilaterally; No wheeze  HEART: Regular rate and rhythm; No murmurs, rubs, or gallops  ABDOMEN: Soft, mild Diffusely tender. brown stool in the ostomy bag. Lt pelvis drain w/ serous fluid. RLQ and LLQ drains w/ seropurulent fluid.  R&L nephrostomy tubes draining slightly cloudy urine.  EXTREMITIES:  2+ Peripheral Pulses, No clubbing, cyanosis, or edema  PSYCH: AAOx3  NEUROLOGY: non-focal  SKIN: No rashes or lesions    LABS:                        11.3   5.58  )-----------( 141      ( 12 Jul 2022 06:00 )             35.0     07-12    143  |  108<H>  |  22  ----------------------------<  76  3.6   |  23  |  1.09    Ca    7.9<L>      12 Jul 2022 06:00  Phos  3.4     07-12  Mg     1.60     07-12                RADIOLOGY & ADDITIONAL TESTS:    Imaging Personally Reviewed:    Consultant(s) Notes Reviewed:      Care Discussed with Consultants/Other Providers:

## 2022-07-12 NOTE — PROGRESS NOTE ADULT - PROBLEM SELECTOR PLAN 2
-h/o recurrent cervical CA, receives Gyn Oncology care at [facility] ([place]).   -she initially was diagnosed with stage III cervical CA in 2014 s/p VBRT + chemo but never had surgical intervention. -Reported now being s/p 5 cycles of chemo, unsure of regimen but next due 6/21/22.  -course complicated by bowel perforation s/p washout and bridging vicryl mesh placement to close fascial gap on 6/14 w/ skin closure device place don 6/17  -further management per general surgery, gyn onc and plastic surgery  -overall prognosis is poor   -patient is DNI/DNR   -Patient awaiting authorization for [facility] hospice.

## 2022-07-12 NOTE — PROGRESS NOTE ADULT - PROBLEM SELECTOR PLAN 4
-likely secondary to UGIB; pt hemodynamically stable  -stool guaiac in ostomy back positive for blood  -s/p transfusion of several pRBCs and 1unit given on 7/5  -s/p 2 Units PRBCs on 7/7 with improved Hb     4. UGIB:  -GI consulted and recommending pantoprazole 40mg IV BID and to transfuse for goal >7.   -H/H stable after PRBCs  -no plans for  EGD/push enteroscopy to evaluate for UGIB after speaking to family.  -patient awaiting transfer to hospice

## 2022-07-12 NOTE — PROVIDER CONTACT NOTE (OTHER) - ACTION/TREATMENT ORDERED:
MD made aware, pt. educated on importance , risks and benefits of wearing sequential compressing device, continue to monitor.

## 2022-07-12 NOTE — PROGRESS NOTE ADULT - SUBJECTIVE AND OBJECTIVE BOX
Gyn ONC Progress Note HD #[id number]    Patient seen and examined at bedside. Overnight, she became agitated and expressed strong desire to leave the hospital. This morning she endorses pain unchanged from previous days, which is relieved with medications. Passing flatus. Tolerating regular diet. Pt denies fever, chills, chest pain, SOB, nausea, vomiting, lightheadedness, dizziness.      Objective:  T(F): 97.8 (07-12-22 @ 05:51), Max: 98 (07-11-22 @ 17:34)  HR: 98 (07-12-22 @ 05:51) (98 - 110)  BP: 141/96 (07-12-22 @ 05:51) (128/90 - 144/76)  RR: 18 (07-12-22 @ 05:51) (18 - 24)  SpO2: 96% (07-12-22 @ 05:51) (94% - 100%)      I&O's Summary  10 Jul 2022 07:01  -  11 Jul 2022 07:00  --------------------------------------------------------  IN: 1780 mL / OUT: 1882 mL / NET: -102 mL    11 Jul 2022 07:01  -  12 Jul 2022 06:24  --------------------------------------------------------  IN: 2620 mL / OUT: 2605 mL / NET: 15 mL      Physical Exam:  Constitutional: Awake, alert. NAD, A+O x3  CV: RRR, normal S1/S2  Lungs: Clear to auscultation bilaterally  Abdomen: Soft, non-distended, mild diffuse tenderness. Abdominal packing in place with overlying bandage. Brown liquid stool output in ostomy. L pelvis, RLQ and LLQ drain with seropurulent output.   : R&L nephrostomy tubes draining slightly cloudy urine   Extremities: No calf tenderness b/l. SCDs in place       LABS:  07-11    142    |  107    |  22     ----------------------------<  79     3.7     |  22     |  1.06     Ca    7.7<L>      11 Jul 2022 05:17  Phos  3.2       07-11  Mg     1.70      07-11      MEDICATIONS  (STANDING):  enoxaparin Injectable 40 milliGRAM(s) SubCutaneous every 24 hours  lactated ringers. 1000 milliLiter(s) (100 mL/Hr) IV Continuous <Continuous>  lidocaine 1% (Preservative-free) Injectable 50 milliLiter(s) Local Injection once  metoprolol succinate  milliGRAM(s) Oral daily  mirtazapine 15 milliGRAM(s) Oral daily  oxyCODONE  ER Tablet 15 milliGRAM(s) Oral every 12 hours  pantoprazole  Injectable 40 milliGRAM(s) IV Push two times a day  piperacillin/tazobactam IVPB.. 3.375 Gram(s) IV Intermittent every 8 hours    MEDICATIONS  (PRN):  cyclobenzaprine 5 milliGRAM(s) Oral three times a day PRN Muscle Spasm  lidocaine   4% Patch 1 Patch Transdermal daily PRN back pain  oxyCODONE    IR 5 milliGRAM(s) Oral every 3 hours PRN Moderate Pain (4 - 6)  oxyCODONE    IR 7.5 milliGRAM(s) Oral every 3 hours PRN Severe Pain (7 - 10)  simethicone 80 milliGRAM(s) Chew three times a day PRN Gas

## 2022-07-12 NOTE — PROVIDER CONTACT NOTE (OTHER) - SITUATION
during change of shift, walked in on pt on the phone with the  giving name and phone number. pt stated she called the  because she is tired of being here and is uncomfortable.

## 2022-07-12 NOTE — PROGRESS NOTE ADULT - ASSESSMENT
52y  HD#38 with recurrent cervical CA admitted for management of neutropenic fever, now s/p emergent exploratory laparotomy, abdominal washout, rectosigmoid colectomy, diverting colostomy, bronchoscopy and Abthera placement on 22 for bowel perforation. Patient s/p washout and bridging vicryl mesh placement to close fascial gap on . She is s/p wound vac, discontinued  given signs of infection. Patient with signs of sepsis beginning  including persistent tachycardia, tachypnea and hypotension as well as mental status change.  Following extensive discussion with Palliative care, Ethics and surgery teams on board, patient code status now DNR/DNI. Patient noted to have GI bleed, s/p GI eval and conservative management. Currently improved, with stable H/H s/p 2u PRBC (7/10). Mental status improved, patient requesting discharge to Hospice. SW aware and working on placement. Patient remains with stable vitals.     Neuro: Tylenol, Oxycodone, Flexeril, Lidocaine patch for pain, ATC and PRN dosing adjusted for patient comfort.  Appreciate GHOS and Palliative recommendations.  CV: Persist mild tachycardia (100s-110s).   - H/h was noted to be downtrending to 6.8/21.1 on 7/10 - now s/p 2uPRBC overnight w/ appropriate response to 10.3/31.6.   - Continue to monitor ostomy output.   - CT A/P and CT angio () negative for PE   - h/o HTN: on Toprol 100mg qD  Pulm: Maintaining appropriate SpO2.   GI: Regular diet + Ensure supplement as tolerated   - s/p GI eval for GI bleed. Recommended against EGD given patient wishes. q8CBC, IV Pantoprazole BID. s/p 2U pRBC () and 2uRPRBC (7/10) with appropriate response. Output no longer bloody.   - Ostomy: area of separation packed w/ quacel, ostomy powder and liquid barrier, hydrocolloid dressing.  - Senna, Protonix BID. Appreciate GI recs   : B/l nephrostomy tubes. ASHLEY w/ increased Creatinine likely in setting of sepsis, w/ last 1.06.  Appreciate hospitalist recs.   FEN: LR@100.   -F/u AM BMP, replete electrolytes PRN  Heme: DVT ppx: Lovenox, SCDs while in bed.    - Thrombocytopenia: likely multifactorial, now normalized. Appreciate Heme recs.  ID: Ostomy noted to leak into subcutaneous tissue w/ subsequent increasing leukocytosis and fevers. Now afebrile, VS improved and leukocytosis downtrended.   - Continue Zosyn (- ) for broad antibiotic coverage.   - BCx () NG  - UCx () nl jessica   - s/p Caspofungin and Cefepime  - S/p Zarixo (-6/10) for neutropenic fever.   - S/p Zosyn (-)    Dispo: Patient desires discharge to inpatient hospice. Social work working on setting up transfer. Code status = DNR/DNI.     Julissa Leach MD, PGY4

## 2022-07-12 NOTE — PROVIDER CONTACT NOTE (OTHER) - BACKGROUND
S/P emergent exp lap. abdominal washout, rectosigmoid colectomy, diverting colostomy, bronchoscopy and Abthera placement on 6/8/22, s/p S/P washout and incisional hernia repair with mesh

## 2022-07-12 NOTE — PROGRESS NOTE ADULT - ASSESSMENT
52 y.o. Female DNR/DNI with recurrent cervical CA admitted for management of neutropenic fever, now s/p emergent exploratory laparotomy, abdominal washout, rectosigmoid colectomy, diverting colostomy, bronchoscopy and Abthera placement on 6/8/22 for findings of increased free air on abdominal xray concerning for bowel perforation. Patient s/p washout and bridging vicryl mesh placement to close fascial gap on 6/14 w/ skin closure device place 6/17 course c/b sepsis concerning for peritonitis (6/20) now with slight improvement in mental status - prognosis poor/guarded. Hospital course also c/b GIB. Patient for [facility] hospice today.

## 2022-07-13 PROCEDURE — 99232 SBSQ HOSP IP/OBS MODERATE 35: CPT

## 2022-07-13 RX ORDER — OXYCODONE HYDROCHLORIDE 5 MG/1
15 TABLET ORAL EVERY 12 HOURS
Refills: 0 | Status: DISCONTINUED | OUTPATIENT
Start: 2022-07-13 | End: 2022-07-14

## 2022-07-13 RX ADMIN — PANTOPRAZOLE SODIUM 40 MILLIGRAM(S): 20 TABLET, DELAYED RELEASE ORAL at 05:40

## 2022-07-13 RX ADMIN — PANTOPRAZOLE SODIUM 40 MILLIGRAM(S): 20 TABLET, DELAYED RELEASE ORAL at 17:23

## 2022-07-13 RX ADMIN — ENOXAPARIN SODIUM 40 MILLIGRAM(S): 100 INJECTION SUBCUTANEOUS at 11:50

## 2022-07-13 RX ADMIN — OXYCODONE HYDROCHLORIDE 15 MILLIGRAM(S): 5 TABLET ORAL at 05:40

## 2022-07-13 RX ADMIN — SODIUM CHLORIDE 100 MILLILITER(S): 9 INJECTION, SOLUTION INTRAVENOUS at 11:46

## 2022-07-13 RX ADMIN — OXYCODONE HYDROCHLORIDE 15 MILLIGRAM(S): 5 TABLET ORAL at 17:29

## 2022-07-13 RX ADMIN — PIPERACILLIN AND TAZOBACTAM 25 GRAM(S): 4; .5 INJECTION, POWDER, LYOPHILIZED, FOR SOLUTION INTRAVENOUS at 03:29

## 2022-07-13 RX ADMIN — SODIUM CHLORIDE 100 MILLILITER(S): 9 INJECTION, SOLUTION INTRAVENOUS at 05:40

## 2022-07-13 RX ADMIN — SODIUM CHLORIDE 100 MILLILITER(S): 9 INJECTION, SOLUTION INTRAVENOUS at 19:57

## 2022-07-13 RX ADMIN — OXYCODONE HYDROCHLORIDE 7.5 MILLIGRAM(S): 5 TABLET ORAL at 23:50

## 2022-07-13 RX ADMIN — OXYCODONE HYDROCHLORIDE 7.5 MILLIGRAM(S): 5 TABLET ORAL at 23:09

## 2022-07-13 RX ADMIN — Medication 100 MILLIGRAM(S): at 05:46

## 2022-07-13 RX ADMIN — OXYCODONE HYDROCHLORIDE 7.5 MILLIGRAM(S): 5 TABLET ORAL at 03:29

## 2022-07-13 RX ADMIN — PIPERACILLIN AND TAZOBACTAM 25 GRAM(S): 4; .5 INJECTION, POWDER, LYOPHILIZED, FOR SOLUTION INTRAVENOUS at 11:46

## 2022-07-13 RX ADMIN — OXYCODONE HYDROCHLORIDE 7.5 MILLIGRAM(S): 5 TABLET ORAL at 20:45

## 2022-07-13 RX ADMIN — OXYCODONE HYDROCHLORIDE 7.5 MILLIGRAM(S): 5 TABLET ORAL at 19:57

## 2022-07-13 RX ADMIN — OXYCODONE HYDROCHLORIDE 7.5 MILLIGRAM(S): 5 TABLET ORAL at 04:00

## 2022-07-13 RX ADMIN — LIDOCAINE 1 PATCH: 4 CREAM TOPICAL at 21:26

## 2022-07-13 RX ADMIN — MIRTAZAPINE 15 MILLIGRAM(S): 45 TABLET, ORALLY DISINTEGRATING ORAL at 11:50

## 2022-07-13 NOTE — PROGRESS NOTE ADULT - ASSESSMENT
52y  HD#39 with recurrent cervical CA admitted for management of neutropenic fever, now s/p emergent exploratory laparotomy, abdominal washout, rectosigmoid colectomy, diverting colostomy, bronchoscopy and Abthera placement on 22 for bowel perforation. Patient s/p washout and bridging vicryl mesh placement to close fascial gap on . She is s/p wound vac, discontinued  given signs of infection. Patient with signs of sepsis beginning  including persistent tachycardia, tachypnea and hypotension as well as mental status change.  Following extensive discussion with Palliative care, Ethics and surgery teams on board, patient code status now DNR/DNI. Patient noted to have GI bleed, s/p GI eval and conservative management. Currently improved, with stable H/H s/p 2u PRBC (7/10). Mental status improved, patient requesting discharge to Hospice. SW aware and working on placement. Overnight, patient upset regarding her prolonged stay and was on the phone with the police, removed her left pelvic drain. Episode of agitation resolved spontaneously and patient otherwise with stable vitals.     Neuro: Tylenol, Oxycodone, Flexeril, Lidocaine patch for pain, ATC and PRN dosing adjusted for patient comfort.  Appreciate GHOS and Palliative recommendations.  CV: Persist mild tachycardia (100s-110s). F/u AM CBC.   - H/h was noted to be downtrending to 6.8/21.1 on 7/10 - s/p 2uPRBC w/ appropriate response to 10.3/31.6.   - Continue to monitor ostomy output.   - CT A/P and CT angio () negative for PE   - h/o HTN: on Toprol 100mg qD  Pulm: Maintaining appropriate SpO2.   GI: Regular diet + Ensure supplement as tolerated   - s/p GI eval for GI bleed. Recommended against EGD given patient wishes. q8CBC, IV Pantoprazole BID. s/p 2U pRBC () and 2uRPRBC (7/10) with appropriate response. Output no longer bloody.   - Ostomy: area of separation packed w/ quacel, ostomy powder and liquid barrier, hydrocolloid dressing.  - Senna, Protonix BID. Appreciate GI recs   : B/l nephrostomy tubes. ASHLEY w/ increased Creatinine likely in setting of sepsis, w/ last 1.06.  Appreciate hospitalist recs.   FEN: LR@100.   -F/u AM BMP, replete electrolytes PRN  Heme: DVT ppx: Lovenox, SCDs while in bed.    - Thrombocytopenia: likely multifactorial, now normalized. Appreciate Heme recs.  ID: Ostomy noted to leak into subcutaneous tissue w/ subsequent increasing leukocytosis and fevers. Now afebrile, VS improved and leukocytosis downtrended.   - Continue Zosyn (- ) for broad antibiotic coverage.   - BCx () NG  - UCx () nl jessica   - s/p Caspofungin and Cefepime  - S/p Zarixo (-6/10) for neutropenic fever.   - S/p Zosyn (-)    Dispo: Patient desires discharge to inpatient hospice. Patient has been accepted at [facility], currently awaiting insurance authorization. Code status = DNR/DNI.     Seen at bedside with GYN Oncology team   52y  HD#39 with recurrent cervical CA admitted for management of neutropenic fever, now s/p emergent exploratory laparotomy, abdominal washout, rectosigmoid colectomy, diverting colostomy, bronchoscopy and Abthera placement on 22 for bowel perforation. Patient s/p washout and bridging vicryl mesh placement to close fascial gap on . She is s/p wound vac, discontinued  given signs of infection. Patient with signs of sepsis beginning  including persistent tachycardia, tachypnea and hypotension as well as mental status change.  Following extensive discussion with Palliative care, Ethics and surgery teams on board, patient code status now DNR/DNI. Patient noted to have GI bleed, s/p GI eval and conservative management. Currently improved, with stable H/H s/p 2u PRBC (7/10). Mental status improved, patient requesting discharge to Hospice. Patient accepted to [facility], continues to await insurance authorization. Overnight, patient upset regarding her prolonged stay and was on the phone with the police, removed her left pelvic drain. Episode of agitation resolved spontaneously and patient otherwise with stable vitals.     Neuro: Tylenol, Oxycodone, Flexeril, Lidocaine patch for pain, ATC and PRN dosing adjusted for patient comfort.  Appreciate GHOS and Palliative recommendations.  CV: Persist mild tachycardia (100s-110s). F/u AM CBC.   - H/h was noted to be downtrending to 6.8/21.1 on 7/10 - s/p 2uPRBC w/ appropriate response to 10.3/31.6.   - Continue to monitor ostomy output.   - CT A/P and CT angio () negative for PE   - h/o HTN: on Toprol 100mg qD  Pulm: Maintaining appropriate SpO2.   GI: Regular diet + Ensure supplement as tolerated   - s/p GI eval for GI bleed. Recommended against EGD given patient wishes. q8CBC, IV Pantoprazole BID. s/p 2U pRBC () and 2uRPRBC (7/10) with appropriate response. Output no longer bloody.   - Ostomy: area of separation packed w/ quacel, ostomy powder and liquid barrier, hydrocolloid dressing.  - Senna, Protonix BID. Appreciate GI recs   : B/l nephrostomy tubes. ASHLEY w/ increased Creatinine likely in setting of sepsis, w/ last 1.06.  Appreciate hospitalist recs.   FEN: LR@100.   -F/u AM BMP, replete electrolytes PRN  Heme: DVT ppx: Lovenox, SCDs while in bed.    - Thrombocytopenia: likely multifactorial, now normalized. Appreciate Heme recs.  ID: Ostomy noted to leak into subcutaneous tissue w/ subsequent increasing leukocytosis and fevers. Now afebrile, VS improved and leukocytosis downtrended.   - Continue Zosyn (- ) for broad antibiotic coverage.   - BCx () NG  - UCx () nl jessica   - s/p Caspofungin and Cefepime  - S/p Zarixo (-6/10) for neutropenic fever.   - S/p Zosyn (-)    Dispo: Patient desires discharge to inpatient hospice. Patient has been accepted at [facility], currently awaiting insurance authorization. Code status = DNR/DNI.     Seen at bedside with GYN Oncology team  Julissa Leach MD, PGY4

## 2022-07-13 NOTE — PROGRESS NOTE ADULT - ASSESSMENT
52 y.o. Female DNR/DNI with recurrent cervical CA admitted for management of neutropenic fever, now s/p emergent exploratory laparotomy, abdominal washout, rectosigmoid colectomy, diverting colostomy, bronchoscopy and Abthera placement on 6/8/22 for findings of increased free air on abdominal xray concerning for bowel perforation. Patient s/p washout and bridging vicryl mesh placement to close fascial gap on 6/14 w/ skin closure device place 6/17 course c/b sepsis concerning for peritonitis (6/20) now with slight improvement in mental status - prognosis poor/guarded. Hospital course also c/b GIB. Patient for [facility] hospice today.

## 2022-07-13 NOTE — PROGRESS NOTE ADULT - SUBJECTIVE AND OBJECTIVE BOX
Patient is a 52y old  Female who presents with a chief complaint of neutropenic fever (13 Jul 2022 06:55)      SUBJECTIVE / OVERNIGHT EVENTS:    MEDICATIONS  (STANDING):  enoxaparin Injectable 40 milliGRAM(s) SubCutaneous every 24 hours  lactated ringers. 1000 milliLiter(s) (100 mL/Hr) IV Continuous <Continuous>  lidocaine 1% (Preservative-free) Injectable 50 milliLiter(s) Local Injection once  metoprolol succinate  milliGRAM(s) Oral daily  mirtazapine 15 milliGRAM(s) Oral daily  oxyCODONE  ER Tablet 15 milliGRAM(s) Oral every 12 hours  pantoprazole  Injectable 40 milliGRAM(s) IV Push two times a day  piperacillin/tazobactam IVPB.. 3.375 Gram(s) IV Intermittent every 8 hours    MEDICATIONS  (PRN):  acetaminophen     Tablet .. 975 milliGRAM(s) Oral every 6 hours PRN Mild Pain (1 - 3)  cyclobenzaprine 5 milliGRAM(s) Oral three times a day PRN Muscle Spasm  lidocaine   4% Patch 1 Patch Transdermal daily PRN back pain  oxyCODONE    IR 5 milliGRAM(s) Oral every 3 hours PRN Moderate Pain (4 - 6)  oxyCODONE    IR 7.5 milliGRAM(s) Oral every 3 hours PRN Severe Pain (7 - 10)  simethicone 80 milliGRAM(s) Chew three times a day PRN Gas      Vital Signs Last 24 Hrs  T(C): 36.4 (13 Jul 2022 09:00), Max: 36.4 (12 Jul 2022 13:38)  T(F): 97.6 (13 Jul 2022 09:00), Max: 97.6 (13 Jul 2022 09:00)  HR: 97 (13 Jul 2022 09:00) (94 - 108)  BP: 130/94 (13 Jul 2022 09:00) (128/80 - 154/93)  BP(mean): --  RR: 18 (13 Jul 2022 09:00) (18 - 20)  SpO2: 97% (13 Jul 2022 09:00) (97% - 100%)    Parameters below as of 13 Jul 2022 09:00  Patient On (Oxygen Delivery Method): room air      CAPILLARY BLOOD GLUCOSE        I&O's Summary    12 Jul 2022 07:01 - 13 Jul 2022 07:00  --------------------------------------------------------  IN: 0 mL / OUT: 1350 mL / NET: -1350 mL        GENERAL: NAD, well-developed  HEAD:  Atraumatic, Normocephalic  EYES: EOMI, PERRLA, conjunctiva and sclera clear  NECK: Supple, No JVD  CHEST/LUNG: Clear to auscultation bilaterally; No wheeze  HEART: Regular rate and rhythm; No murmurs, rubs, or gallops  ABDOMEN: Soft, mild Diffusely tender. brown stool in the ostomy bag. Lt pelvis drain w/ serous fluid. RLQ and LLQ drains w/ seropurulent fluid.  R&L nephrostomy tubes draining slightly cloudy urine.  EXTREMITIES:  2+ Peripheral Pulses, No clubbing, cyanosis, or edema  PSYCH: AAOx3  NEUROLOGY: non-focal  SKIN: No rashes or lesions    LABS:                        11.3   5.58  )-----------( 141      ( 12 Jul 2022 06:00 )             35.0     07-12    143  |  108<H>  |  22  ----------------------------<  76  3.6   |  23  |  1.09    Ca    7.9<L>      12 Jul 2022 06:00  Phos  3.4     07-12  Mg     1.60     07-12                RADIOLOGY & ADDITIONAL TESTS:    Imaging Personally Reviewed:    Consultant(s) Notes Reviewed:      Care Discussed with Consultants/Other Providers:   Patient is a 52y old  Female who presents with a chief complaint of neutropenic fever (13 Jul 2022 06:55)      SUBJECTIVE / OVERNIGHT EVENTS:  Patient has no new complaints. Denies cp, SOB, abdominal pain, N/V/D     MEDICATIONS  (STANDING):  enoxaparin Injectable 40 milliGRAM(s) SubCutaneous every 24 hours  lactated ringers. 1000 milliLiter(s) (100 mL/Hr) IV Continuous <Continuous>  lidocaine 1% (Preservative-free) Injectable 50 milliLiter(s) Local Injection once  metoprolol succinate  milliGRAM(s) Oral daily  mirtazapine 15 milliGRAM(s) Oral daily  oxyCODONE  ER Tablet 15 milliGRAM(s) Oral every 12 hours  pantoprazole  Injectable 40 milliGRAM(s) IV Push two times a day  piperacillin/tazobactam IVPB.. 3.375 Gram(s) IV Intermittent every 8 hours    MEDICATIONS  (PRN):  acetaminophen     Tablet .. 975 milliGRAM(s) Oral every 6 hours PRN Mild Pain (1 - 3)  cyclobenzaprine 5 milliGRAM(s) Oral three times a day PRN Muscle Spasm  lidocaine   4% Patch 1 Patch Transdermal daily PRN back pain  oxyCODONE    IR 5 milliGRAM(s) Oral every 3 hours PRN Moderate Pain (4 - 6)  oxyCODONE    IR 7.5 milliGRAM(s) Oral every 3 hours PRN Severe Pain (7 - 10)  simethicone 80 milliGRAM(s) Chew three times a day PRN Gas      Vital Signs Last 24 Hrs  T(C): 36.4 (13 Jul 2022 09:00), Max: 36.4 (12 Jul 2022 13:38)  T(F): 97.6 (13 Jul 2022 09:00), Max: 97.6 (13 Jul 2022 09:00)  HR: 97 (13 Jul 2022 09:00) (94 - 108)  BP: 130/94 (13 Jul 2022 09:00) (128/80 - 154/93)  BP(mean): --  RR: 18 (13 Jul 2022 09:00) (18 - 20)  SpO2: 97% (13 Jul 2022 09:00) (97% - 100%)    Parameters below as of 13 Jul 2022 09:00  Patient On (Oxygen Delivery Method): room air      CAPILLARY BLOOD GLUCOSE        I&O's Summary    12 Jul 2022 07:01  -  13 Jul 2022 07:00  --------------------------------------------------------  IN: 0 mL / OUT: 1350 mL / NET: -1350 mL        GENERAL: NAD, well-developed  HEAD:  Atraumatic, Normocephalic  EYES: EOMI, PERRLA, conjunctiva and sclera clear  NECK: Supple, No JVD  CHEST/LUNG: Clear to auscultation bilaterally; No wheeze  HEART: Regular rate and rhythm; No murmurs, rubs, or gallops  ABDOMEN: Soft, mild Diffusely tender. brown stool in the ostomy bag. Lt pelvis drain w/ serous fluid. RLQ and LLQ drains w/ seropurulent fluid.  R&L nephrostomy tubes draining slightly cloudy urine.  EXTREMITIES:  2+ Peripheral Pulses, No clubbing, cyanosis, or edema  PSYCH: AAOx3  NEUROLOGY: non-focal  SKIN: No rashes or lesions    LABS:                        11.3   5.58  )-----------( 141      ( 12 Jul 2022 06:00 )             35.0     07-12    143  |  108<H>  |  22  ----------------------------<  76  3.6   |  23  |  1.09    Ca    7.9<L>      12 Jul 2022 06:00  Phos  3.4     07-12  Mg     1.60     07-12                RADIOLOGY & ADDITIONAL TESTS:    Imaging Personally Reviewed:    Consultant(s) Notes Reviewed:      Care Discussed with Consultants/Other Providers:

## 2022-07-13 NOTE — PROGRESS NOTE ADULT - PROBLEM SELECTOR PLAN 1
GYN ONC Fellow Addendum:    Pt seen and examined at bedside. Agree with above. As previously documented, pt w/ waxing and waning mental status. Last evening w/ agitation, pulled out drain and called police.  This AM pt is stating she needs to get to an appointment today at 9AM but cannot clarify where. Pt and her HCP her daughter Lydia have previously agreed to discharge to [facility] and we have been actively trying to coordinate discharge.  Discussed d/c planning w/ SW Lydia this afternoon and told that insurance auth has been received and we are awaiting response of acceptance from [facility].      VS reviewed  Labs reviewed    No bleeding noted in colostomy, H/H stable.  Cont protonix.  Continue current pain regimen  Tolerating reg diet  Replete lytes prn  DVT ppx: Lovenox  Dispo: cont discharge planning to [facility] Lara Jay MD

## 2022-07-13 NOTE — PROGRESS NOTE ADULT - PROBLEM SELECTOR PLAN 2
-h/o recurrent cervical CA, receives Gyn Oncology care at [facility] ([facility]).   -she initially was diagnosed with stage III cervical CA in 2014 s/p VBRT + chemo but never had surgical intervention. -Reported now being s/p 5 cycles of chemo, unsure of regimen but next due 6/21/22.  -course complicated by bowel perforation s/p washout and bridging vicryl mesh placement to close fascial gap on 6/14 w/ skin closure device place don 6/17  -further management per general surgery, gyn onc and plastic surgery  -overall prognosis is poor   -patient is DNI/DNR   -Patient awaiting authorization for [facility] hospice.

## 2022-07-13 NOTE — PROGRESS NOTE ADULT - SUBJECTIVE AND OBJECTIVE BOX
Gyn ONC Progress Note     HD #39    Subjective:   Patient seen and examined at bedside. Overnight, patient called the police to report that she was being held in the hospital, expressed strong desire to leave the hospital and pulled her pelvic drain out. Otherwise no events and she slept comfortably.  This morning she endorses pain unchanged from previous days, which is relieved with medications. Passing flatus. Tolerating regular diet. Pt denies fever, chills, chest pain, SOB, nausea, vomiting, lightheadedness, dizziness.     Objective:  T(F): 97.4 (07-13-22 @ 05:41), Max: 97.5 (07-12-22 @ 13:38)  HR: 108 (07-13-22 @ 05:41) (94 - 108)  BP: 128/80 (07-13-22 @ 05:41) (128/80 - 154/93)  RR: 18 (07-13-22 @ 05:41) (18 - 20)  SpO2: 100% (07-13-22 @ 05:41) (97% - 100%)  I&O's Summary    11 Jul 2022 07:01  -  12 Jul 2022 07:00  --------------------------------------------------------  IN: 3140 mL / OUT: 3042.5 mL / NET: 97.5 mL    12 Jul 2022 07:01  -  13 Jul 2022 06:55  --------------------------------------------------------  IN: 0 mL / OUT: 1350 mL / NET: -1350 mL      MEDICATIONS  (STANDING):  enoxaparin Injectable 40 milliGRAM(s) SubCutaneous every 24 hours  lactated ringers. 1000 milliLiter(s) (100 mL/Hr) IV Continuous <Continuous>  lidocaine 1% (Preservative-free) Injectable 50 milliLiter(s) Local Injection once  metoprolol succinate  milliGRAM(s) Oral daily  mirtazapine 15 milliGRAM(s) Oral daily  oxyCODONE  ER Tablet 15 milliGRAM(s) Oral every 12 hours  pantoprazole  Injectable 40 milliGRAM(s) IV Push two times a day  piperacillin/tazobactam IVPB.. 3.375 Gram(s) IV Intermittent every 8 hours    MEDICATIONS  (PRN):  acetaminophen     Tablet .. 975 milliGRAM(s) Oral every 6 hours PRN Mild Pain (1 - 3)  cyclobenzaprine 5 milliGRAM(s) Oral three times a day PRN Muscle Spasm  lidocaine   4% Patch 1 Patch Transdermal daily PRN back pain  oxyCODONE    IR 5 milliGRAM(s) Oral every 3 hours PRN Moderate Pain (4 - 6)  oxyCODONE    IR 7.5 milliGRAM(s) Oral every 3 hours PRN Severe Pain (7 - 10)  simethicone 80 milliGRAM(s) Chew three times a day PRN Gas      Physical Exam:  Constitutional: Awake, alert. NAD, A+O x3  CV: RRR, normal S1/S2  Lungs: Clear to auscultation bilaterally  Abdomen: Soft, non-distended, mild diffuse tenderness. Abdominal packing in place with overlying bandage. Brown liquid stool output in ostomy. RLQ and LLQ drain with seropurulent output.  L pelvic drain removed, site of former left pelvic drain ***  : R&L nephrostomy tubes draining slightly cloudy urine   Extremities: No calf tenderness b/l. SCDs in place     LABS:                                     11.3   5.58  )-----------( 141      ( 12 Jul 2022 06:00 )             35.0     07-12    143    |  108<H>  |  22     ----------------------------<  76     3.6     |  23     |  1.09     Ca    7.9<L>      12 Jul 2022 06:00  Phos  3.4       07-12  Mg     1.60      07-12               Gyn ONC Progress Note     HD #39    Subjective:   Patient seen and examined at bedside. Overnight, patient called the police to report that she was being held in the hospital, expressed strong desire to leave the hospital and pulled her pelvic drain out. Otherwise no events and she slept comfortably.  This morning she endorses pain unchanged from previous days, which is relieved with medications. Passing flatus. Tolerating regular diet. Pt denies fever, chills, chest pain, SOB, nausea, vomiting, lightheadedness, dizziness.     Objective:  T(F): 97.4 (07-13-22 @ 05:41), Max: 97.5 (07-12-22 @ 13:38)  HR: 108 (07-13-22 @ 05:41) (94 - 108)  BP: 128/80 (07-13-22 @ 05:41) (128/80 - 154/93)  RR: 18 (07-13-22 @ 05:41) (18 - 20)  SpO2: 100% (07-13-22 @ 05:41) (97% - 100%)    I&O's Summary  11 Jul 2022 07:01  -  12 Jul 2022 07:00  --------------------------------------------------------  IN: 3140 mL / OUT: 3042.5 mL / NET: 97.5 mL    12 Jul 2022 07:01  -  13 Jul 2022 06:55  --------------------------------------------------------  IN: 0 mL / OUT: 1350 mL / NET: -1350 mL      MEDICATIONS  (STANDING):  enoxaparin Injectable 40 milliGRAM(s) SubCutaneous every 24 hours  lactated ringers. 1000 milliLiter(s) (100 mL/Hr) IV Continuous <Continuous>  lidocaine 1% (Preservative-free) Injectable 50 milliLiter(s) Local Injection once  metoprolol succinate  milliGRAM(s) Oral daily  mirtazapine 15 milliGRAM(s) Oral daily  oxyCODONE  ER Tablet 15 milliGRAM(s) Oral every 12 hours  pantoprazole  Injectable 40 milliGRAM(s) IV Push two times a day  piperacillin/tazobactam IVPB.. 3.375 Gram(s) IV Intermittent every 8 hours    MEDICATIONS  (PRN):  acetaminophen     Tablet .. 975 milliGRAM(s) Oral every 6 hours PRN Mild Pain (1 - 3)  cyclobenzaprine 5 milliGRAM(s) Oral three times a day PRN Muscle Spasm  lidocaine   4% Patch 1 Patch Transdermal daily PRN back pain  oxyCODONE    IR 5 milliGRAM(s) Oral every 3 hours PRN Moderate Pain (4 - 6)  oxyCODONE    IR 7.5 milliGRAM(s) Oral every 3 hours PRN Severe Pain (7 - 10)  simethicone 80 milliGRAM(s) Chew three times a day PRN Gas      Physical Exam:  Constitutional: Awake, alert. NAD, A+O x3  CV: RRR, normal S1/S2  Lungs: Clear to auscultation bilaterally  Abdomen: Soft, non-distended, mild diffuse tenderness. Abdominal packing in place with overlying bandage. Brown liquid stool output in ostomy. RLQ and LLQ drain with seropurulent output.  L pelvic drain removed, Gauze over site of former pelvic drain.   : R&L nephrostomy tubes draining slightly cloudy urine   Extremities: No calf tenderness b/l. SCDs in place       LABS:             11.3   5.58  )-----------( 141      ( 12 Jul 2022 06:00 )             35.0     07-12    143    |  108<H>  |  22     ----------------------------<  76     3.6     |  23     |  1.09     Ca    7.9<L>      12 Jul 2022 06:00  Phos  3.4       07-12  Mg     1.60      07-12

## 2022-07-14 PROCEDURE — 99233 SBSQ HOSP IP/OBS HIGH 50: CPT

## 2022-07-14 PROCEDURE — 99358 PROLONG SERVICE W/O CONTACT: CPT

## 2022-07-14 RX ORDER — OXYCODONE HYDROCHLORIDE 5 MG/1
15 TABLET ORAL EVERY 4 HOURS
Refills: 0 | Status: DISCONTINUED | OUTPATIENT
Start: 2022-07-14 | End: 2022-07-18

## 2022-07-14 RX ORDER — OXYCODONE HYDROCHLORIDE 5 MG/1
7.5 TABLET ORAL
Refills: 0 | Status: DISCONTINUED | OUTPATIENT
Start: 2022-07-14 | End: 2022-07-18

## 2022-07-14 RX ORDER — OXYCODONE HYDROCHLORIDE 5 MG/1
30 TABLET ORAL EVERY 12 HOURS
Refills: 0 | Status: DISCONTINUED | OUTPATIENT
Start: 2022-07-14 | End: 2022-07-20

## 2022-07-14 RX ADMIN — SODIUM CHLORIDE 100 MILLILITER(S): 9 INJECTION, SOLUTION INTRAVENOUS at 05:15

## 2022-07-14 RX ADMIN — PANTOPRAZOLE SODIUM 40 MILLIGRAM(S): 20 TABLET, DELAYED RELEASE ORAL at 05:16

## 2022-07-14 RX ADMIN — OXYCODONE HYDROCHLORIDE 7.5 MILLIGRAM(S): 5 TABLET ORAL at 12:30

## 2022-07-14 RX ADMIN — OXYCODONE HYDROCHLORIDE 7.5 MILLIGRAM(S): 5 TABLET ORAL at 22:00

## 2022-07-14 RX ADMIN — SODIUM CHLORIDE 100 MILLILITER(S): 9 INJECTION, SOLUTION INTRAVENOUS at 11:39

## 2022-07-14 RX ADMIN — ENOXAPARIN SODIUM 40 MILLIGRAM(S): 100 INJECTION SUBCUTANEOUS at 11:39

## 2022-07-14 RX ADMIN — Medication 100 MILLIGRAM(S): at 05:16

## 2022-07-14 RX ADMIN — OXYCODONE HYDROCHLORIDE 15 MILLIGRAM(S): 5 TABLET ORAL at 05:15

## 2022-07-14 RX ADMIN — OXYCODONE HYDROCHLORIDE 7.5 MILLIGRAM(S): 5 TABLET ORAL at 03:29

## 2022-07-14 RX ADMIN — SODIUM CHLORIDE 100 MILLILITER(S): 9 INJECTION, SOLUTION INTRAVENOUS at 21:17

## 2022-07-14 RX ADMIN — PANTOPRAZOLE SODIUM 40 MILLIGRAM(S): 20 TABLET, DELAYED RELEASE ORAL at 17:25

## 2022-07-14 RX ADMIN — OXYCODONE HYDROCHLORIDE 30 MILLIGRAM(S): 5 TABLET ORAL at 18:06

## 2022-07-14 RX ADMIN — OXYCODONE HYDROCHLORIDE 7.5 MILLIGRAM(S): 5 TABLET ORAL at 11:41

## 2022-07-14 RX ADMIN — OXYCODONE HYDROCHLORIDE 7.5 MILLIGRAM(S): 5 TABLET ORAL at 21:17

## 2022-07-14 RX ADMIN — OXYCODONE HYDROCHLORIDE 7.5 MILLIGRAM(S): 5 TABLET ORAL at 04:15

## 2022-07-14 RX ADMIN — LIDOCAINE 1 PATCH: 4 CREAM TOPICAL at 09:11

## 2022-07-14 RX ADMIN — LIDOCAINE 1 PATCH: 4 CREAM TOPICAL at 06:51

## 2022-07-14 RX ADMIN — OXYCODONE HYDROCHLORIDE 30 MILLIGRAM(S): 5 TABLET ORAL at 17:25

## 2022-07-14 RX ADMIN — OXYCODONE HYDROCHLORIDE 15 MILLIGRAM(S): 5 TABLET ORAL at 06:00

## 2022-07-14 NOTE — CHART NOTE - NSCHARTNOTEFT_GEN_A_CORE
Source: Patient A&Ox [ ]    Family [ ]     other [ x] chart     Nutrition f/u for severe protein calorie malnutrition.     52 y.o. Female DNR/DNI with recurrent cervical CA admitted for management of neutropenic fever, now s/p emergent exploratory laparotomy, abdominal washout, rectosigmoid colectomy, diverting colostomy, bronchoscopy and Abthera placement on 6/8/22 for findings of increased free air on abdominal xray concerning for bowel perforation. Patient s/p washout and bridging vicryl mesh placement to close fascial gap on 6/14 w/ skin closure device place 6/17 course c/b sepsis concerning for peritonitis (6/20) now with slight improvement in mental status - prognosis poor/guarded. Hospital course also c/b GIB. Patient for [facility] inpatient hospice pending insurance authorization.          Diet, Regular:   Supplement Feeding Modality:  Oral  Ensure Clear Cans or Servings Per Day:  1       Frequency:  Three Times a day (07-08-22 @ 14:14)      GI: +colostomy [45 mL output in 24 hrs)      PO intake:  < 50% [ x] 0-25% intake per RN flow sheets.       Anthropometrics:   Height (cm): 167 (06-14)  Weight (kg): 109.2 (07-14), 102.4 (06-29) ,100.4 (06-14), 98.9 (06-04)  BMI (kg/m2): 36 (06-14), 35.2 (06-04)    Edema:   3+ left leg; left foot; left ankle  2+ RLE  1+ generalized     Pressure Injuries: stage 2 intergluteal fold     __________________ Pertinent Medications__________________   MEDICATIONS  (STANDING):  enoxaparin Injectable 40 milliGRAM(s) SubCutaneous every 24 hours  lactated ringers. 1000 milliLiter(s) (100 mL/Hr) IV Continuous <Continuous>  lidocaine 1% (Preservative-free) Injectable 50 milliLiter(s) Local Injection once  metoprolol succinate  milliGRAM(s) Oral daily  mirtazapine 15 milliGRAM(s) Oral daily  oxyCODONE  ER Tablet 15 milliGRAM(s) Oral every 12 hours  pantoprazole  Injectable 40 milliGRAM(s) IV Push two times a day    MEDICATIONS  (PRN):  acetaminophen     Tablet .. 975 milliGRAM(s) Oral every 6 hours PRN Mild Pain (1 - 3)  cyclobenzaprine 5 milliGRAM(s) Oral three times a day PRN Muscle Spasm  lidocaine   4% Patch 1 Patch Transdermal daily PRN back pain  oxyCODONE    IR 5 milliGRAM(s) Oral every 3 hours PRN Moderate Pain (4 - 6)  oxyCODONE    IR 7.5 milliGRAM(s) Oral every 3 hours PRN Severe Pain (7 - 10)  simethicone 80 milliGRAM(s) Chew three times a day PRN Gas      __________________ Pertinent Labs__________________   07-12 Na143 mmol/L Glu 76 mg/dL K+ 3.6 mmol/L Cr  1.09 mg/dL BUN 22 mg/dL 07-12 Phos 3.4 mg/dL                Estimated Needs:   [x ] no change since previous assessment      Previous Nutrition Diagnosis: severe protein calorie malnutrition     Nutrition Diagnosis is [x ] ongoing        Recommendations:  1. Regular diet + Ensure Clear 3x daily (540 jenn and 24 gm protein).   2. Encourage PO intake and honor food preferences as able.         Monitoring and Evaluation:      [ x] Tolerance to diet prescription [x ] weights [x ] follow up per protocol  [ ] other: Source: Patient A&Ox [ ]    Family [ ]     other [ x] chart     Nutrition f/u for severe protein calorie malnutrition.     52 y.o. Female DNR/DNI with recurrent cervical CA admitted for management of neutropenic fever, now s/p emergent exploratory laparotomy, abdominal washout, rectosigmoid colectomy, diverting colostomy, bronchoscopy and Abthera placement on 6/8/22 for findings of increased free air on abdominal xray concerning for bowel perforation. Patient s/p washout and bridging vicryl mesh placement to close fascial gap on 6/14 w/ skin closure device place 6/17 course c/b sepsis concerning for peritonitis (6/20) now with slight improvement in mental status - prognosis poor/guarded. Hospital course also c/b GIB. Patient for [facility] inpatient hospice pending insurance authorization.     Pt seen with RN in room. Pt is refusing mostly everything. Pt did not have any food preferences. Ordered for 15 mg Remeron daily. She stated she just wants to go home. RN states family brings food from home sometimes. No noted GI distress. Plan for inpatient hospice.     Diet, Regular:   Supplement Feeding Modality:  Oral  Ensure Clear Cans or Servings Per Day:  1       Frequency:  Three Times a day (07-08-22 @ 14:14)      GI: +colostomy [45 mL output in 24 hrs)      PO intake:  < 50% [ x] 0-25% [consistently] intake per RN flow sheets.       Anthropometrics:   Height (cm): 167 (06-14)  Weight (kg): 109.2 (07-14), 102.4 (06-29) ,100.4 (06-14), 98.9 (06-04)  BMI (kg/m2): 36 (06-14), 35.2 (06-04)    Edema:   3+ left leg; left foot; left ankle  2+ RLE  1+ generalized     Pressure Injuries: stage 2 intergluteal fold     __________________ Pertinent Medications__________________   MEDICATIONS  (STANDING):  enoxaparin Injectable 40 milliGRAM(s) SubCutaneous every 24 hours  lactated ringers. 1000 milliLiter(s) (100 mL/Hr) IV Continuous <Continuous>  lidocaine 1% (Preservative-free) Injectable 50 milliLiter(s) Local Injection once  metoprolol succinate  milliGRAM(s) Oral daily  mirtazapine 15 milliGRAM(s) Oral daily  oxyCODONE  ER Tablet 15 milliGRAM(s) Oral every 12 hours  pantoprazole  Injectable 40 milliGRAM(s) IV Push two times a day    MEDICATIONS  (PRN):  acetaminophen     Tablet .. 975 milliGRAM(s) Oral every 6 hours PRN Mild Pain (1 - 3)  cyclobenzaprine 5 milliGRAM(s) Oral three times a day PRN Muscle Spasm  lidocaine   4% Patch 1 Patch Transdermal daily PRN back pain  oxyCODONE    IR 5 milliGRAM(s) Oral every 3 hours PRN Moderate Pain (4 - 6)  oxyCODONE    IR 7.5 milliGRAM(s) Oral every 3 hours PRN Severe Pain (7 - 10)  simethicone 80 milliGRAM(s) Chew three times a day PRN Gas      __________________ Pertinent Labs__________________   07-12 Na143 mmol/L Glu 76 mg/dL K+ 3.6 mmol/L Cr  1.09 mg/dL BUN 22 mg/dL 07-12 Phos 3.4 mg/dL                Estimated Needs:   [x ] no change since previous assessment      Previous Nutrition Diagnosis: severe protein calorie malnutrition     Nutrition Diagnosis is [x ] ongoing        Recommendations:  1. Continue regular diet. May d/c Ensure Clear since pt is not drinking.   2. Nutrition per GOC. Encourage PO intake and honor food preferences as able.          Monitoring and Evaluation:      [ x] Tolerance to diet prescription [x ] weights [x ] follow up per protocol  [ ] other:

## 2022-07-14 NOTE — PROVIDER CONTACT NOTE (OTHER) - DATE AND TIME:
01-Jul-2022 09:20
10-Noe-2022 18:00
12-Jul-2022 20:15
30-Jun-2022 18:00
14-Jul-2022 15:00
27-Jun-2022 10:10
29-Jun-2022 17:30
30-Jun-2022 10:20
06-Jul-2022 05:17
07-Jun-2022 17:30
14-Jul-2022 11:30
27-Jun-2022 15:27
30-Jun-2022 05:34
01-Jul-2022 03:20
28-Jun-2022 06:36
06-Jun-2022 16:00
07-Jun-2022 10:04
08-Jun-2022 05:40
12-Jul-2022 02:30
26-Jun-2022 00:35
28-Jun-2022 09:55
05-Jul-2022 06:35
05-Jun-2022 21:00
23-Jun-2022 02:20

## 2022-07-14 NOTE — CHART NOTE - NSCHARTNOTEFT_GEN_A_CORE
Gyn Oncology Fellow Family Meeting Note    Meeting held this afternoon at 4PM w/ pt's daughter/HCP Lydia Oleary.  Present at meeting was Dr. Merino, the medical director, the ethics team and director, and the palliative care team.  We reviewed the most recent update regarding discharge- awaiting the appropriate insurance auth for d/c to [facility] w/ necessary services.  We reviewed Lydia's concerns regarding the plan of care and plan for discharge and the approach of palliative care in a hospice facility.  We also discussed that the pt would continue to receive wound care and pain control while at [facility]. We reviewed that the pt will not receive disease modifying therapy while at [facility].  She was informed that the pt's outpt gyn oncologist was updated by Dr. Merino about the inpt course. She expressed understanding of plan and of her mother's current status and poor prognosis. All questions were answered.  I filled out [facility] paperwork w/ Ms. Oleary.  Palliative care team recommended modification to pt's pain medications, all changes ordered.     Guadalupe Jay MD

## 2022-07-14 NOTE — CHART NOTE - NSCHARTNOTEFT_GEN_A_CORE
Met with patient's daughter/hcp, Lydia, along with primary medical team, ethics, and Medical Director. Explored Lydia's concerns and questions regarding patient's plan of care. Clarified and provided information on the differences between acute care, palliative care and hospice care. She recognizes that her mother's condition is deteriorating and that her overall prognosis is poor. She is struggling with accepting this reality and recognizes that her mom is also struggling with accepting her diagnosis and prognosis. She understands that she is not able to continue getting cancer directed treatments and is in agreement with her being transitioned to [facility] to continue with wound care, antibiotic care and symptom directed care. Introduced the role of hospice in the future to help palliate symptoms to elevate patient's quality of life. She felt reassured that her mother has this option in the future. Encouraged her to seek support to address her anticipatory grief and bereavement. Emotional support provided.    Thank you for allowing us to participate in your patient's care.     Shannon Schwab D.O. and Kaylynn Avina [facility]   Palliative Medicine Met with patient's daughter/hcp, Lydia, along with primary medical team, ethics, and Medical Director. Explored Lydia's concerns and questions regarding patient's plan of care. Clarified and provided information on the differences between acute care, palliative care and hospice care. She recognizes that her mother's condition is deteriorating and that her overall prognosis is poor. She is struggling with accepting this reality and recognizes that her mom is also struggling with accepting her diagnosis and prognosis. She understands that she is not able to continue getting cancer directed treatments and is in agreement with her being transitioned to [facility] to continue with wound care, antibiotic care and symptom directed care. Introduced the role of hospice in the future to help palliate symptoms to elevate patient's quality of life. She felt reassured that her mother has this option in the future. Encouraged her to seek support to address her anticipatory grief and bereavement. Emotional support provided.    Thank you for allowing us to participate in your patient's care.     Shannon Schwab D.O. and Kaylynn Avina [facility]   Palliative Medicine    ************************************************************************  PALLIATIVE MEDICINE COORDINATION OF CARE NOTE FOR MADELYN MAN  [x] Inpatient Consult  [ ] Other:  ************************************************************************  SUMMARY OF RECOMMENDATIONS:    > Chart reviewed. Discussed with IDT.   > SYMPTOM MANAGEMENT: Pain management. Chart reviewed. RECOMMEND INCREASING OXYCONTIN TO 30MG BID and increase PRNs to 7.5mg oxycodone q4h prn moderate pain and 15mg oxycodone q4h prn severe pain.   > GOALS OF CARE: To transition to [facility] for wound care optimization, continuation of abx and symptom directed care   > CODE STATUS: DNR/DNI   > HCP/ SURROGATE: Lydia Oleary (daughter)   > Communicated with primary team.     ************************************************************************  CHART REVIEW:  > Palliative team had signed off on  as symptoms were controlled and goals established at that time. Over the past 24 hours, patient required PRNs of oxycodone 7.5mg x4.     HPI:    Gyn Oncology Admission Note    52y  with recurrent cervical CA (per patient stage III) presenting to the ED as transfer from [facility] a/w neutropenic fever. Patient reports she had last cycle of chemotherapy 5 days prior.  Patient had VNS care stop by yesterday and was noted to be tachycardic on vital signs and was sent to the ED.  She reports feeling weak + fatigued. + upper abdominal pain that worsens while having chills. She denies any vomiting, chest pain, SOB. Last BM yesterday morning which was normal.     At [facility] she was given Meropenem and Zosyn and was noted to be tachycardic to 140s and febrile to 39.5 and was thus transferred for further management.     MICU was consulted on patient with recommendation to continue with broad spectrum antibiotics. Patient receives Gyn Oncology care at [facility] She reports that she initially was diagnosed with stage III cervical CA in  s/p VBRT + chemo but never had surgical intervention. She reports now being s/p 5 cycles of chemo, unsure of regimen but next due .    OB/GYN HISTORY:  x 1, sAB x 2. Denies any ovarian cysts, fibroids, STIs   PSHx: b/l PCN  and exchange  , hernia repair  PMHx:  HTN   All: No Known Allergies  Meds: Metoprolol 100mg qd, unsure of other medications  Psych: denies any anxiety or depression   Social: Denies any tobacco, alcohol, or illicit substance use      (2022 03:21)    MEDICATIONS  (STANDING):  enoxaparin Injectable 40 milliGRAM(s) SubCutaneous every 24 hours  lactated ringers. 1000 milliLiter(s) (100 mL/Hr) IV Continuous <Continuous>  lidocaine 1% (Preservative-free) Injectable 50 milliLiter(s) Local Injection once  metoprolol succinate  milliGRAM(s) Oral daily  mirtazapine 15 milliGRAM(s) Oral daily  oxyCODONE  ER Tablet 15 milliGRAM(s) Oral every 12 hours  pantoprazole  Injectable 40 milliGRAM(s) IV Push two times a day    MEDICATIONS  (PRN):  acetaminophen     Tablet .. 975 milliGRAM(s) Oral every 6 hours PRN Mild Pain (1 - 3)  cyclobenzaprine 5 milliGRAM(s) Oral three times a day PRN Muscle Spasm  lidocaine   4% Patch 1 Patch Transdermal daily PRN back pain  oxyCODONE    IR 5 milliGRAM(s) Oral every 3 hours PRN Moderate Pain (4 - 6)  oxyCODONE    IR 7.5 milliGRAM(s) Oral every 3 hours PRN Severe Pain (7 - 10)  simethicone 80 milliGRAM(s) Chew three times a day PRN Gas      ************************************************************************  MEDICATION REVIEW:  --- Pls refer to current medications in the body of this note  --- PRN usage: oxycodone 7.5mg x4   ------------------------------------------------------------------------  COORDINATION OF CARE:  --- Palliative Care consulted for: goc   --- Patient previously seen by Palliative Care service: Yes   ADVANCE CARE PLANNING  --- Code status: DNR/DNI   --- MOLST reviewed in chart: Yes   --- HCP/ Surrogate: Lydia Oleary (daughter)   --- GOC document found in Alpha: NONE  --- HCP/ Living will/ Other advanced directives in Alpha: NONE  CARE PROVIDER DOCUMENTATION:  --- SW/CM notes: reviewed  --- PT recs: n/a   --- Sp/Sw recs: Regular   --- Nutrition recs: ensure tid   PLAN OF CARE  --- Known admissions in past year: 0  --- Current admit date: 22  --- LOS: 40  --- LACE score: 17  --- Current dispo plan: Pt waiting for [place] authorization   ------------------------------------------------------------------------  --- Chart reviewed: 30 Minutes [including time used to gather, review and transfer data to this note]  --- Start: 4:00  --- End: 4:30  Prolonged services rendered, as part of this patient's care provided by Palliative Medicine, include: i.chart review for provider and ancillary service documentation, ii.pertinent diagnostics including laboratory and imaging studies,iii. medication review including PRN use, iv. admission history including previous palliative care encounters and GOC notes, v.advance care planning documents including HCP and MOLST forms in Alpha. Part of Palliative Medicine extended evaluation and management also involves coordination of care with our IDT, the primary and consulting shanique, and unit CM/SW and Hospice if eligible. Recommendations based on the information gathered and discussed are outline in the AP of our notes.    ************************************************************************  Care coordination of MADELYNKATHY MAN was communicated with the interdisciplinary team during IDT rounds and with the primary team.

## 2022-07-14 NOTE — ADVANCED PRACTICE NURSE CONSULT - APN SPECIALTY LIST
Wound Ostomy Care

## 2022-07-14 NOTE — PROGRESS NOTE ADULT - SUBJECTIVE AND OBJECTIVE BOX
Patient is a 52y old  Female who presents with a chief complaint of neutropenic fever (14 Jul 2022 06:31)      SUBJECTIVE / OVERNIGHT EVENTS:  Patient has no new complaints. Denies cp, SOB, abdominal pain, N/V/D     MEDICATIONS  (STANDING):  enoxaparin Injectable 40 milliGRAM(s) SubCutaneous every 24 hours  lactated ringers. 1000 milliLiter(s) (100 mL/Hr) IV Continuous <Continuous>  lidocaine 1% (Preservative-free) Injectable 50 milliLiter(s) Local Injection once  metoprolol succinate  milliGRAM(s) Oral daily  mirtazapine 15 milliGRAM(s) Oral daily  oxyCODONE  ER Tablet 15 milliGRAM(s) Oral every 12 hours  pantoprazole  Injectable 40 milliGRAM(s) IV Push two times a day    MEDICATIONS  (PRN):  acetaminophen     Tablet .. 975 milliGRAM(s) Oral every 6 hours PRN Mild Pain (1 - 3)  cyclobenzaprine 5 milliGRAM(s) Oral three times a day PRN Muscle Spasm  lidocaine   4% Patch 1 Patch Transdermal daily PRN back pain  oxyCODONE    IR 5 milliGRAM(s) Oral every 3 hours PRN Moderate Pain (4 - 6)  oxyCODONE    IR 7.5 milliGRAM(s) Oral every 3 hours PRN Severe Pain (7 - 10)  simethicone 80 milliGRAM(s) Chew three times a day PRN Gas      Vital Signs Last 24 Hrs  T(C): 36.3 (14 Jul 2022 09:00), Max: 36.4 (14 Jul 2022 05:14)  T(F): 97.4 (14 Jul 2022 09:00), Max: 97.6 (14 Jul 2022 05:14)  HR: 101 (14 Jul 2022 09:00) (98 - 106)  BP: 148/88 (14 Jul 2022 09:00) (136/80 - 148/88)  BP(mean): --  RR: 18 (14 Jul 2022 09:00) (17 - 18)  SpO2: 100% (14 Jul 2022 09:00) (98% - 100%)    Parameters below as of 14 Jul 2022 09:00  Patient On (Oxygen Delivery Method): room air      CAPILLARY BLOOD GLUCOSE        I&O's Summary    13 Jul 2022 07:01  -  14 Jul 2022 07:00  --------------------------------------------------------  IN: 2960 mL / OUT: 2167.5 mL / NET: 792.5 mL        PHYSICAL EXAM:   GENERAL: NAD, well-developed  HEAD:  Atraumatic, Normocephalic  EYES: EOMI, PERRLA, conjunctiva and sclera clear  NECK: Supple, No JVD  CHEST/LUNG: Clear to auscultation bilaterally; No wheeze  HEART: Regular rate and rhythm; No murmurs, rubs, or gallops  ABDOMEN: Soft, mild Diffusely tender. brown stool in the ostomy bag. Lt pelvis drain w/ serous fluid. RLQ and LLQ drains w/ seropurulent fluid.  R&L nephrostomy tubes draining slightly cloudy urine.  EXTREMITIES:  2+ Peripheral Pulses, No clubbing, cyanosis, or edema  PSYCH: AAOx3  NEUROLOGY: non-focal  SKIN: No rashes or lesions  LABS:                    RADIOLOGY & ADDITIONAL TESTS:    Imaging Personally Reviewed:    Consultant(s) Notes Reviewed:      Care Discussed with Consultants/Other Providers:

## 2022-07-14 NOTE — CONSULT NOTE ADULT - PROVIDER SPECIALTY LIST ADULT
Gastroenterology
Plastic Surgery
Endocrinology
Ethics
Gastroenterology
MICU
Rehab Medicine
Heme/Onc
Neurology
Ethics
SICU
SICU
Intervent Radiology
Hospitalist
Palliative Care

## 2022-07-14 NOTE — CONSULT NOTE ADULT - CONSULT REASON
Hemodynamic monitoring
To assist in the communication with a patient with terminal cancer and her daughter as proxy
Sepsis
eval for rehab needs
slightly retracted b/l PCN on CT
GI bleed
To assist in end of life goals of care in a 52 year old female with end stage cervical cancer whose family is struggling with Advance Directives
thrombocytopenia and anemia
Assist in the ethical dilemma conflict in communication regarding patient's prognosis in a terminally ill female with cervical cancer.
Follow up om Patient
Sepsis
AMS
coffee ground emesis
abdominal wound
Hypothyroidism
sx management and goc
Medical consult requested for gyne-onc medicine co-management.

## 2022-07-14 NOTE — PROGRESS NOTE ADULT - ASSESSMENT
52y  HD#40 with recurrent cervical CA admitted for management of neutropenic fever, now s/p emergent exploratory laparotomy, abdominal washout, rectosigmoid colectomy, diverting colostomy, bronchoscopy and Abthera placement on 22 for bowel perforation. Patient s/p washout and bridging vicryl mesh placement to close fascial gap on . She is s/p wound vac, discontinued  given signs of infection. Patient with signs of sepsis beginning  including persistent tachycardia, tachypnea and hypotension as well as mental status change.  Made DNR/DNI after extensive multidisciplinary discussion. She was later noted to have a GI bleed, managed conservatively with input from GI and currently improved.  Her mental status is overall improved and she has both insight and capacity, however waxes and wanes with intermittent agitation. Patient requesting discharge for inpatient palliative care. Patient accepted to [facility], continues to await insurance authorization.     Neuro: Tylenol, Oxycodone, Flexeril, Lidocaine patch for pain, ATC and PRN dosing adjusted for patient comfort.  Appreciate GHOS and Palliative recommendations.  CV: Persist mild tachycardia (100s-110s).  - CT A/P and CT angio () negative for PE   - h/o HTN: on Toprol 100mg qD  Pulm: Maintaining appropriate SpO2.   GI: Regular diet + Ensure supplement as tolerated   - s/p GI eval for GI bleed. Recommended against EGD given patient wishes. q8CBC, IV Pantoprazole BID. s/p 2U pRBC () and 2uRPRBC (7/10) with appropriate response. Output no longer bloody.   - Ostomy: area of separation packed w/ quacel, ostomy powder and liquid barrier, hydrocolloid dressing.  - Senna, Protonix BID. Appreciate GI recs   : B/l nephrostomy tubes. ASHLEY w/ increased Creatinine likely in setting of sepsis, w/ last 1.06.  Appreciate hospitalist recs.   FEN: LR@100.   Heme: DVT ppx: Lovenox, SCDs while in bed.    - Thrombocytopenia: likely multifactorial, now normalized. Appreciate Heme recs.  ID: Ostomy noted to leak into subcutaneous tissue w/ subsequent increasing leukocytosis and fevers. Now afebrile, VS improved and leukocytosis downtrended.   - Continue Zosyn (- ) for broad antibiotic coverage.   - BCx () NG  - UCx () nl jessica   - s/p Caspofungin and Cefepime  - S/p Zarixo (-6/10) for neutropenic fever.   - S/p Zosyn (-)    Dispo: Patient desires discharge to inpatient palliative care unit at [facility]. Patient has been accepted, however continuing to await insurance authorization. Code status = DNR/DNI.     Seen at bedside with GYN Oncology team  Julissa Leach MD, PGY4

## 2022-07-14 NOTE — PROGRESS NOTE ADULT - SUBJECTIVE AND OBJECTIVE BOX
Gyn ONC Progress Note HD#40    Patient seen and examined at bedside. No acute events overnight. Denies pain this morning. Tolerating regular diet. Continues to express desire to leave the hospital. Passin flatus, tolerating regular diet. Pt denies fever, chills, chest pain, SOB, nausea, vomiting, lightheadedness, dizziness.        Objective:  T(F): 97.6 (07-14-22 @ 05:14), Max: 97.6 (07-13-22 @ 09:00)  HR: 98 (07-14-22 @ 05:14) (97 - 106)  BP: 147/81 (07-14-22 @ 05:14) (130/94 - 147/81)  RR: 18 (07-14-22 @ 05:14) (17 - 18)  SpO2: 100% (07-14-22 @ 05:14) (97% - 100%)        I&O's Summary  12 Jul 2022 07:01  -  13 Jul 2022 07:00  --------------------------------------------------------  IN: 0 mL / OUT: 1350 mL / NET: -1350 mL    13 Jul 2022 07:01  -  14 Jul 2022 06:32  --------------------------------------------------------  IN: 2320 mL / OUT: 2117.5 mL / NET: 202.5 mL    Physical Exam:  Constitutional: Awake, alert. NAD, A+O x3  CV: RRR, normal S1/S2  Lungs: Clear to auscultation bilaterally  Abdomen: Soft, non-distended, mild diffuse tenderness. Abdominal packing in place with overlying bandage. Brown liquid stool output in ostomy. RLQ and LLQ drain with seropurulent output.  L pelvic drain removed, Gauze over site of former pelvic drain.   : R&L nephrostomy tubes draining slightly cloudy urine   Extremities: No calf tenderness b/l. SCDs in place      MEDICATIONS  (STANDING):  enoxaparin Injectable 40 milliGRAM(s) SubCutaneous every 24 hours  lactated ringers. 1000 milliLiter(s) (100 mL/Hr) IV Continuous <Continuous>  lidocaine 1% (Preservative-free) Injectable 50 milliLiter(s) Local Injection once  metoprolol succinate  milliGRAM(s) Oral daily  mirtazapine 15 milliGRAM(s) Oral daily  oxyCODONE  ER Tablet 15 milliGRAM(s) Oral every 12 hours  pantoprazole  Injectable 40 milliGRAM(s) IV Push two times a day    MEDICATIONS  (PRN):  acetaminophen     Tablet .. 975 milliGRAM(s) Oral every 6 hours PRN Mild Pain (1 - 3)  cyclobenzaprine 5 milliGRAM(s) Oral three times a day PRN Muscle Spasm  lidocaine   4% Patch 1 Patch Transdermal daily PRN back pain  oxyCODONE    IR 5 milliGRAM(s) Oral every 3 hours PRN Moderate Pain (4 - 6)  oxyCODONE    IR 7.5 milliGRAM(s) Oral every 3 hours PRN Severe Pain (7 - 10)  simethicone 80 milliGRAM(s) Chew three times a day PRN Gas Gyn ONC Progress Note HD#40    Patient seen and examined at bedside. No acute events overnight. Denies pain this morning. Tolerating regular diet. Continues to express desire to leave the hospital. Passing flatus, tolerating regular diet. Pt denies fever, chills, chest pain, SOB, nausea, vomiting, lightheadedness, dizziness.        Objective:  T(F): 97.6 (07-14-22 @ 05:14), Max: 97.6 (07-13-22 @ 09:00)  HR: 98 (07-14-22 @ 05:14) (97 - 106)  BP: 147/81 (07-14-22 @ 05:14) (130/94 - 147/81)  RR: 18 (07-14-22 @ 05:14) (17 - 18)  SpO2: 100% (07-14-22 @ 05:14) (97% - 100%)        I&O's Summary  12 Jul 2022 07:01  -  13 Jul 2022 07:00  --------------------------------------------------------  IN: 0 mL / OUT: 1350 mL / NET: -1350 mL    13 Jul 2022 07:01  -  14 Jul 2022 06:32  --------------------------------------------------------  IN: 2320 mL / OUT: 2117.5 mL / NET: 202.5 mL    Physical Exam:  Constitutional: Awake, alert. NAD, A+O x3  CV: RRR, normal S1/S2  Lungs: Clear to auscultation bilaterally  Abdomen: Soft, non-distended, mild diffuse tenderness. Abdominal packing in place with overlying bandage. Brown liquid stool output in ostomy. RLQ and LLQ drain with seropurulent output.  L pelvic drain removed, Gauze over site of former pelvic drain.   : R&L nephrostomy tubes draining slightly cloudy urine   Extremities: No calf tenderness b/l. SCDs in place      MEDICATIONS  (STANDING):  enoxaparin Injectable 40 milliGRAM(s) SubCutaneous every 24 hours  lactated ringers. 1000 milliLiter(s) (100 mL/Hr) IV Continuous <Continuous>  lidocaine 1% (Preservative-free) Injectable 50 milliLiter(s) Local Injection once  metoprolol succinate  milliGRAM(s) Oral daily  mirtazapine 15 milliGRAM(s) Oral daily  oxyCODONE  ER Tablet 15 milliGRAM(s) Oral every 12 hours  pantoprazole  Injectable 40 milliGRAM(s) IV Push two times a day    MEDICATIONS  (PRN):  acetaminophen     Tablet .. 975 milliGRAM(s) Oral every 6 hours PRN Mild Pain (1 - 3)  cyclobenzaprine 5 milliGRAM(s) Oral three times a day PRN Muscle Spasm  lidocaine   4% Patch 1 Patch Transdermal daily PRN back pain  oxyCODONE    IR 5 milliGRAM(s) Oral every 3 hours PRN Moderate Pain (4 - 6)  oxyCODONE    IR 7.5 milliGRAM(s) Oral every 3 hours PRN Severe Pain (7 - 10)  simethicone 80 milliGRAM(s) Chew three times a day PRN Gas

## 2022-07-14 NOTE — ADVANCED PRACTICE NURSE CONSULT - REASON FOR CONSULT
Patient seen for follow up of colostomy and NPWT VAC. 
Patient seen for follow up of colostomy.
Patient seen for followup of colostomy. 
Patient seen for wound vac change, team also called to bedside by primary RN for assessment of peristomal skin.  
Patient seen for ostomy followup.
Patient seen for Colostomy follow up. Attempted to reach daughter, Lydia Oleary, via telephone without success to see if able to join educational session as pouch change and emptying difficult for patient due to overall condition. 
Patient seen for colostomy followup.
Patient seen for initial colostomy teaching. Patient stating she lives with  and daughter lives within close proximity, would be willing to join ostomy teaching. 
Patient seen on skin care rounds after wound care referral received for assessment of skin impairment and recommendations of topical management. Patient known to wound care ostomy service, last seen on June 30th, 2022.

## 2022-07-14 NOTE — CONSULT NOTE ADULT - SUBJECTIVE AND OBJECTIVE BOX
HPI:    Gyn Oncology Admission Note    52y  with recurrent cervical CA (per patient stage III) presenting to the ED as transfer from [place] a/w neutropenic fever. Patient reports she had last cycle of chemotherapy 5 days prior.  Patient had VNS care stop by yesterday and was noted to be tachycardic on vital signs and was sent to the ED.  She reports feeling weak + fatigued. + upper abdominal pain that worsens while having chills. She denies any vomiting, chest pain, SOB. Last BM yesterday morning which was normal.     At [facility] she was given Meropenem and Zosyn and was noted to be tachycardic to 140s and febrile to 39.5 and was thus transferred for further management.     MICU was consulted on patient with recommendation to continue with broad spectrum antibiotics. Patient receives Gyn Oncology care at [facility]). She reports that she initially was diagnosed with stage III cervical CA in  s/p VBRT + chemo but never had surgical intervention. She reports now being s/p 5 cycles of chemo, unsure of regimen but next due .    OB/GYN HISTORY:  x 1, sAB x 2. Denies any ovarian cysts, fibroids, STIs   PSHx: b/l PCN  and exchange  , hernia repair  PMHx:  HTN   All: No Known Allergies  Meds: Metoprolol 100mg qd, unsure of other medications  Psych: denies any anxiety or depression   Social: Denies any tobacco, alcohol, or illicit substance use     Ethics consult ongoing with the family    Prognosis Estimate (survival in days, wks, mos, yrs): terminal 									    Patient Decision-Making Capacity:  [  ] Has capacity    [  ] Lacks capacity because		Capacity waxes and wanes 			     Patient Aware of:  Diagnosis:  [ X ] Yes   [  ] No  [  ] Unknown    Prognosis:  [X  ] Yes   [  ] No  [  ] Unknown           Name of medical decision-maker should patient lack capacity: Lydia Rudd                      Relationship:   daughter 				       Role:  [  ] Health Care Agent     [  ] Legal Surrogate   Contact #(s):                (c)                            (h)				       Other ‘stakeholders’:  											      Other Decision-Maker (i.e., HCA or Surrogate) Aware of:  Diagnosis: [X  ]Yes   [  ]No      Prognosis:  [ X ] Yes    [  ] No     Evidence of Patient’s Preference of Life-Sustaining Treatment (Written or Oral):	none 			               	     Resuscitation status:   DNR:  [  ]Yes   [X  ]No      DNI:  [  ]Yes   [ X]No        Discussion: 2022 1400- 1500 pm Team meeting conducted by  Dr. Merino (attending); Dr. Jay (resident) ; Kaylynn Avina ([facility]); MATEUSZ Schwab (palliative); SARAH Schulte (medical ethics); ALEJANDRO Christiansen MD (attending ) and Dr. Oconnell (CMO) with daughter Lydia Freeman describes her concern regarding her mother lacking capacity and waxing and waning and feeling that her mother is being sent to hospice.   Team provided support and explained mother's capacity waxes and wanes and with capacity she named her as health care proxy. Also discussed [facility] as an inpatient facility. Supportive care provided as well as addressing her father's activity. Lydia shared losing her grandmother recently, being an only child and difficulty   BIOETHICS ANALYSIS:	see prior note											  	      CONCLUSION: 	Clear communication provided. Health literacy of this young surrogate enduring grief supported. Social work has corrected referral to [place] 											       MORE THAN 50% OF THE TIME OF THIS CONSULTATION WAS SPENT IN COORDINATION OF CARE OF PATIENT

## 2022-07-14 NOTE — CONSULT NOTE ADULT - REASON FOR ADMISSION
neutropenic fever

## 2022-07-14 NOTE — PROGRESS NOTE ADULT - ATTENDING COMMENTS
met with family today to discuss plan for hospice hospital transfer at [facility]  daughter agrees, and was appreciative of meeting, ethics and pall care also presents, along with medical director Dr. Lezama  all questions answered in detail  awaiting insurance auth.

## 2022-07-14 NOTE — PROGRESS NOTE ADULT - PROBLEM SELECTOR PLAN 2
-h/o recurrent cervical CA, receives Gyn Oncology care at [facility] ([place]).   -she initially was diagnosed with stage III cervical CA in 2014 s/p VBRT + chemo but never had surgical intervention. -Reported now being s/p 5 cycles of chemo, unsure of regimen but next due 6/21/22.  -course complicated by bowel perforation s/p washout and bridging vicryl mesh placement to close fascial gap on 6/14 w/ skin closure device place don 6/17  -further management per general surgery, gyn onc and plastic surgery  -overall prognosis is poor   -patient is DNI/DNR   -Patient awaiting authorization for [facility] hospice.

## 2022-07-14 NOTE — PROVIDER CONTACT NOTE (OTHER) - REASON
Patient with /101, 
low urine output
Patient with 
emesis
patient vomited large amount of coffee color emesis
pt. refusing sequential compressing device
urinary output
Patient pulling at lines, refused medication
Pt  upon admission
Pt heart rate 135 palpable
pt called the  to get her out of the hospital
REfused Remeron
Tachypnea; hypotension
hematuria from right nephro. tube
Febrile; Tachycardia
Mucocutaneous separation to stoma, able to express stool
Patient with .
Pt. verbalized to student RN that she wants to end her life.
blood clots from ostomy
changed mental status noted on pt
pt. lethargic, unable to take PO medication ,low urine output
Patient LLQ SARAH leaking at suture site and Pelvic SARAH losing suction
Pt's urine output from 6am to 10am is 100ml
Tachypnea

## 2022-07-14 NOTE — ADVANCED PRACTICE NURSE CONSULT - RECOMMEDATIONS
Notified GYN/ONC team Gloria of retracted stoma.     Please contact Wound/Ostomy Care Service Line if we can be of further assistance (ext [phone]).  Notified GYN/ONC team Gloria Tracey of retracted stoma.     Please contact Wound/Ostomy Care Service Line if we can be of further assistance (ext [phone]).

## 2022-07-14 NOTE — CONSULT NOTE ADULT - CONSULT REQUESTED DATE/TIME
02-Jul-2022
05-Jun-2022 02:23
12-Jun-2022 17:12
30-Jun-2022 17:17
15-Noe-2022 11:00
28-Jun-2022 14:09
01-Jul-2022 17:00
24-Jun-2022 14:57
30-Jun-2022 11:00
07-Jun-2022 22:31
09-Jun-2022 00:35
08-Jun-2022 07:44
14-Jul-2022 17:05
14-Jun-2022 17:58
07-Jul-2022 13:05
08-Jun-2022 15:48
06-Jun-2022 12:16

## 2022-07-14 NOTE — PROVIDER CONTACT NOTE (OTHER) - ASSESSMENT
AOX2-3, pt. verbalized frustration, prefers to go home and spend time with families, Pt. denies hurting herself. Per pt. she doesn't mean it that way. She just wants to go home.
No s/s of distress
Ostomy covered with dried black stool, after cleansing revealed pink moist stoma. Mucocutaneous separation from 6 to 9 o clock, sutures no longer intact. Area of dehiscence extending 1cm under the skin, able to express liquid stool from 6-9 o clock. Area of peristomal erosion noted to 11 o clock, measuring 1cmx1.5cmx0.2cm, exposing 100% fibrinous tissue.
Patient axox1. Remains restless and attempting to pull out lines. VSS.
Patient axox3 with periods of forgetfulness. VSS, Bilateral nephrostomy draining yellow urine. Adam continues to drain purulent drainage
Patient axox3. VSS however HR remains elevated
Patient is alert; unable to asses mentation at this time  Patient is unable to follow commands  Poor PO intake  RT upper arm arrow not flushing    RT upper arm arrow not flushing
Patient resting in bed. No c/o chest pain, palpitations or SOB.
Patient sitting in the recliner. No c/o chest pain, palpitations or SOB.  Patient received metoprolol at approx. 1300
pt. lethargic, awakes to verbal comment, unable to take PO medication , b/l NT, low urine output from 02:00 --06:00, ,Right NT -30 , left NT 60, -120s throughout the shift, 
Reactive to verbal and tactile stimulation, HR=on high 120's to low 130's, EP=959/77, afebrile, RR=between 25-28bpm, O2 sat=99% RA, (+) grimace face
pt. awake, alert and oriented x3, agitated, stated she want to leave, b/L NT, SARAH x3, colostomy , midline abdomen dressing, IVF
No distress noted on pt
Pt b/l NT drain la nena color urine, dressing dry and intact, LR@150ml/hr, pt is on regular diet.
pt. in no acute distress, awake, alert and oriented x4, b/l NT in place, patent, urine concentrated, IVSL, wound VAC, SARAH x3,
AOX2-3, refused Remeron
pt complains of pain but states " I am getting too many pain meds I don't want anything" pt laying in bed comfortably.
Patient resting in bed. Oxycodone given for c/o pain. Wound care team changing wound vac dressing.  Patient denies chest pain, palpitations or SOB.
Pt reports pain in back & right lower side of abdomen at times, but denies pain at this time. VS WNL otherwise
patient refusing temp and medications, pulled out SARAH bulbs  and attempting to pull iv lines.
Pt awake, alert and oriented to person and place.  /92, P135, RR20, T98.4, O2sat 100% on room air

## 2022-07-14 NOTE — PROVIDER CONTACT NOTE (OTHER) - RECOMMENDATIONS
Please assess pt
Encouraged pt to drink more fluid
bolus
reoriented pt, assess pt
General surgery team at bedside, as per Dr. Brand, area of mucocutaneous separation to be packed.
IVF
MD notify, education on importance , risks and benefits of wearing sequential compressing device
Transfer telemetry/ ICU  Change PO meds to IV
EKG, Bolus, and antihypertensive.
Educated on benefits of taking Remeron.
notify provider to assess the patient
see patient

## 2022-07-14 NOTE — ADVANCED PRACTICE NURSE CONSULT - ASSESSMENT
Patient is a&ox2-3, verbalizes frustration, states that she wants to be discharged home. Patient is refusing removal of ostomy bag for full assessment of ostomy, states she will only let us remove the bag for assessment of stoma if we let her go home. Only able to visualize presence of surgical drain and retracted stoma through the bag. Purulent and blood tinged drainage noted in the bag. As per primary RN Alejandra, drainage has been that color this past week. Confirmed with GYN/ONC provider Jaky      Patient is a&ox2-3, verbalizes frustration, states that she wants to be discharged home. Patient is refusing removal of ostomy bag for full assessment of ostomy, states she will only let us remove the bag for assessment of stoma if we let her go home. Only able to visualize presence of surgical drain and retracted stoma through the bag. Purulent and blood tinged drainage noted in the bag. As per primary RN Alejandra, drainage has been that color this past week. Confirmed with GYN/ONC provider Gloria Tracey.

## 2022-07-14 NOTE — CONSULT NOTE ADULT - CONSULT REQUESTED BY NAME
Angelique
Gyn Onc
NANCY Merino
Betina Merino
Dr. Barnett
Dr. Merino
primary team
Angelique
Betina Merino
Dr. Merino
Primary team
Dr. Merino
Primary Team
primary team
Dr. Merino

## 2022-07-14 NOTE — PROGRESS NOTE ADULT - PROBLEM SELECTOR PLAN 12
-c/w lovenox subq daily    11. Dispo:  -for inpatient hospice ( [place]) pending authorization. Spoke to CM/SW. Also reaching out to administration.

## 2022-07-14 NOTE — PROGRESS NOTE ADULT - ASSESSMENT
52 y.o. Female DNR/DNI with recurrent cervical CA admitted for management of neutropenic fever, now s/p emergent exploratory laparotomy, abdominal washout, rectosigmoid colectomy, diverting colostomy, bronchoscopy and Abthera placement on 6/8/22 for findings of increased free air on abdominal xray concerning for bowel perforation. Patient s/p washout and bridging vicryl mesh placement to close fascial gap on 6/14 w/ skin closure device place 6/17 course c/b sepsis concerning for peritonitis (6/20) now with slight improvement in mental status - prognosis poor/guarded. Hospital course also c/b GIB. Patient for [facility] hospice pending insurance aurthorization.

## 2022-07-15 PROCEDURE — 99232 SBSQ HOSP IP/OBS MODERATE 35: CPT

## 2022-07-15 RX ADMIN — OXYCODONE HYDROCHLORIDE 7.5 MILLIGRAM(S): 5 TABLET ORAL at 12:00

## 2022-07-15 RX ADMIN — ENOXAPARIN SODIUM 40 MILLIGRAM(S): 100 INJECTION SUBCUTANEOUS at 11:32

## 2022-07-15 RX ADMIN — SODIUM CHLORIDE 100 MILLILITER(S): 9 INJECTION, SOLUTION INTRAVENOUS at 11:36

## 2022-07-15 RX ADMIN — OXYCODONE HYDROCHLORIDE 7.5 MILLIGRAM(S): 5 TABLET ORAL at 11:28

## 2022-07-15 RX ADMIN — PANTOPRAZOLE SODIUM 40 MILLIGRAM(S): 20 TABLET, DELAYED RELEASE ORAL at 05:46

## 2022-07-15 RX ADMIN — OXYCODONE HYDROCHLORIDE 7.5 MILLIGRAM(S): 5 TABLET ORAL at 22:28

## 2022-07-15 RX ADMIN — PANTOPRAZOLE SODIUM 40 MILLIGRAM(S): 20 TABLET, DELAYED RELEASE ORAL at 17:50

## 2022-07-15 RX ADMIN — OXYCODONE HYDROCHLORIDE 30 MILLIGRAM(S): 5 TABLET ORAL at 05:46

## 2022-07-15 RX ADMIN — OXYCODONE HYDROCHLORIDE 30 MILLIGRAM(S): 5 TABLET ORAL at 06:30

## 2022-07-15 RX ADMIN — OXYCODONE HYDROCHLORIDE 7.5 MILLIGRAM(S): 5 TABLET ORAL at 03:21

## 2022-07-15 RX ADMIN — OXYCODONE HYDROCHLORIDE 30 MILLIGRAM(S): 5 TABLET ORAL at 18:48

## 2022-07-15 RX ADMIN — OXYCODONE HYDROCHLORIDE 30 MILLIGRAM(S): 5 TABLET ORAL at 17:51

## 2022-07-15 RX ADMIN — Medication 100 MILLIGRAM(S): at 05:46

## 2022-07-15 RX ADMIN — OXYCODONE HYDROCHLORIDE 7.5 MILLIGRAM(S): 5 TABLET ORAL at 04:15

## 2022-07-15 RX ADMIN — OXYCODONE HYDROCHLORIDE 7.5 MILLIGRAM(S): 5 TABLET ORAL at 23:28

## 2022-07-15 RX ADMIN — SODIUM CHLORIDE 100 MILLILITER(S): 9 INJECTION, SOLUTION INTRAVENOUS at 05:46

## 2022-07-15 RX ADMIN — MIRTAZAPINE 15 MILLIGRAM(S): 45 TABLET, ORALLY DISINTEGRATING ORAL at 11:32

## 2022-07-15 NOTE — PROGRESS NOTE ADULT - SUBJECTIVE AND OBJECTIVE BOX
Gyn ONC Progress Note HD#41    Patient seen and examined at bedside during dressing change. She reports her pain control is improved s/p adjustments to her medication regimen yesterday. She denies nausea or emesis. Denies fevers or chills. Not OOB.       Objective:  T(F): 97.7 (07-15-22 @ 05:41), Max: 97.7 (07-15-22 @ 05:41)  HR: 107 (07-15-22 @ 05:41) (97 - 107)  BP: 148/94 (07-15-22 @ 05:41) (145/92 - 148/94)  RR: 18 (07-15-22 @ 05:41) (18 - 18)  SpO2: 100% (07-15-22 @ 05:41) (100% - 100%)      I&O's Summary  13 Jul 2022 07:01  -  14 Jul 2022 07:00  --------------------------------------------------------  IN: 2960 mL / OUT: 2167.5 mL / NET: 792.5 mL    14 Jul 2022 07:01  -  15 Jul 2022 06:40  --------------------------------------------------------  IN: 1850 mL / OUT: 1570 mL / NET: 280 mL      Physical Exam:  Constitutional: Awake, alert. NAD, A+O x3  CV: RRR, normal S1/S2  Lungs: Clear to auscultation bilaterally  Abdomen: Soft, non-distended, mild diffuse tenderness. Abdominal packing in place with overlying bandage. Brown liquid stool output in ostomy. Mucosa retracted away from skin edge although bowel loop visualized attached to subcutaneous space. RLQ and LLQ drain with seropurulent output.  L pelvic drain removed, Gauze over site of former pelvic drain.   : R&L nephrostomy tubes draining slightly cloudy urine   Extremities: No calf tenderness b/l. SCDs in place      MEDICATIONS  (STANDING):  enoxaparin Injectable 40 milliGRAM(s) SubCutaneous every 24 hours  lactated ringers. 1000 milliLiter(s) (100 mL/Hr) IV Continuous <Continuous>  lidocaine 1% (Preservative-free) Injectable 50 milliLiter(s) Local Injection once  metoprolol succinate  milliGRAM(s) Oral daily  mirtazapine 15 milliGRAM(s) Oral daily  oxyCODONE  ER Tablet 30 milliGRAM(s) Oral every 12 hours  pantoprazole  Injectable 40 milliGRAM(s) IV Push two times a day    MEDICATIONS  (PRN):  acetaminophen     Tablet .. 975 milliGRAM(s) Oral every 6 hours PRN Mild Pain (1 - 3)  cyclobenzaprine 5 milliGRAM(s) Oral three times a day PRN Muscle Spasm  lidocaine   4% Patch 1 Patch Transdermal daily PRN back pain  oxyCODONE    IR 7.5 milliGRAM(s) Oral every 3 hours PRN Moderate Pain (4 - 6)  oxyCODONE    IR 15 milliGRAM(s) Oral every 4 hours PRN Severe Pain (7 - 10)  simethicone 80 milliGRAM(s) Chew three times a day PRN Gas

## 2022-07-15 NOTE — PROGRESS NOTE ADULT - PROBLEM SELECTOR PLAN 12
-c/w lovenox subq daily    11. Dispo:  -for inpatient hospice ( [place]) pending. CM/SW still working on it.

## 2022-07-15 NOTE — PROGRESS NOTE ADULT - SUBJECTIVE AND OBJECTIVE BOX
Patient is a 52y old  Female who presents with a chief complaint of neutropenic fever (15 Jul 2022 06:38)      SUBJECTIVE / OVERNIGHT EVENTS:  Patient has no new complaints. Denies cp, SOB, abdominal pain, N/V/D     MEDICATIONS  (STANDING):  enoxaparin Injectable 40 milliGRAM(s) SubCutaneous every 24 hours  lactated ringers. 1000 milliLiter(s) (100 mL/Hr) IV Continuous <Continuous>  lidocaine 1% (Preservative-free) Injectable 50 milliLiter(s) Local Injection once  metoprolol succinate  milliGRAM(s) Oral daily  mirtazapine 15 milliGRAM(s) Oral daily  oxyCODONE  ER Tablet 30 milliGRAM(s) Oral every 12 hours  pantoprazole  Injectable 40 milliGRAM(s) IV Push two times a day    MEDICATIONS  (PRN):  acetaminophen     Tablet .. 975 milliGRAM(s) Oral every 6 hours PRN Mild Pain (1 - 3)  cyclobenzaprine 5 milliGRAM(s) Oral three times a day PRN Muscle Spasm  lidocaine   4% Patch 1 Patch Transdermal daily PRN back pain  oxyCODONE    IR 7.5 milliGRAM(s) Oral every 3 hours PRN Moderate Pain (4 - 6)  oxyCODONE    IR 15 milliGRAM(s) Oral every 4 hours PRN Severe Pain (7 - 10)  simethicone 80 milliGRAM(s) Chew three times a day PRN Gas      Vital Signs Last 24 Hrs  T(C): 36.6 (15 Jul 2022 10:22), Max: 36.6 (15 Jul 2022 10:22)  T(F): 97.8 (15 Jul 2022 10:22), Max: 97.8 (15 Jul 2022 10:22)  HR: 99 (15 Jul 2022 10:22) (97 - 107)  BP: 148/84 (15 Jul 2022 10:22) (145/92 - 148/94)  BP(mean): --  RR: 18 (15 Jul 2022 10:22) (18 - 18)  SpO2: 99% (15 Jul 2022 10:22) (99% - 100%)    Parameters below as of 15 Jul 2022 10:22  Patient On (Oxygen Delivery Method): room air      CAPILLARY BLOOD GLUCOSE        I&O's Summary    14 Jul 2022 07:01  -  15 Jul 2022 07:00  --------------------------------------------------------  IN: 2890 mL / OUT: 1900 mL / NET: 990 mL    15 Jul 2022 07:01  -  15 Jul 2022 10:52  --------------------------------------------------------  IN: 0 mL / OUT: 675 mL / NET: -675 mL        PHYSICAL EXAM:   GENERAL: NAD, well-developed  HEAD:  Atraumatic, Normocephalic  EYES: EOMI, PERRLA, conjunctiva and sclera clear  NECK: Supple, No JVD  CHEST/LUNG: Clear to auscultation bilaterally; No wheeze  HEART: Regular rate and rhythm; No murmurs, rubs, or gallops  ABDOMEN: Soft, mild Diffusely tender. brown stool in the ostomy bag. Lt pelvis drain w/ serous fluid. RLQ and LLQ drains w/ seropurulent fluid.  R&L nephrostomy tubes draining slightly cloudy urine.  EXTREMITIES:  2+ Peripheral Pulses, No clubbing, cyanosis, or edema  PSYCH: AAOx3  NEUROLOGY: non-focal  SKIN: No rashes or lesions    LABS:                    RADIOLOGY & ADDITIONAL TESTS:    Imaging Personally Reviewed:    Consultant(s) Notes Reviewed:      Care Discussed with Consultants/Other Providers:

## 2022-07-15 NOTE — CHART NOTE - NSCHARTNOTEFT_GEN_A_CORE
R3 PM Rounding Note     Patient seen on PM rounds. Still awaiting authorization for [place]. No clinical changes.     VS  T(C): 36.6 (07-15-22 @ 10:22)  HR: 99 (07-15-22 @ 10:22)  BP: 148/84 (07-15-22 @ 10:22)  RR: 18 (07-15-22 @ 10:22)  SpO2: 99% (07-15-22 @ 10:22)    - Vitals per routine   - cont drain care   - dispo pending insurance auth     Marika Lemons MD PGY3

## 2022-07-15 NOTE — PROGRESS NOTE ADULT - PROBLEM SELECTOR PLAN 2
-h/o recurrent cervical CA, receives Gyn Oncology care at [facility] ([place]).   -she initially was diagnosed with stage III cervical CA in 2014 s/p VBRT + chemo but never had surgical intervention. -Reported now being s/p 5 cycles of chemo, unsure of regimen but next due 6/21/22.  -course complicated by bowel perforation s/p washout and bridging vicryl mesh placement to close fascial gap on 6/14 w/ skin closure device place don 6/17  -further management per general surgery, gyn onc and plastic surgery  -overall prognosis is poor   -patient is DNI/DNR   -Patient awaiting placement at [facility].

## 2022-07-15 NOTE — PROGRESS NOTE ADULT - PROBLEM SELECTOR PLAN 1
-patient with sepsis on 6/30 from neutropenic fever  -CTPA without e/o PE or consolidation but given abdominal wound noted to have stool there is concern for fistulization vs peritonitis.   -c/w wound management per gyn-onc   -s/p course of zosyn for peritonitis   -very poor prognosis.

## 2022-07-15 NOTE — PROGRESS NOTE ADULT - ASSESSMENT
52 y.o. Female DNR/DNI with recurrent cervical CA admitted for management of neutropenic fever, now s/p emergent exploratory laparotomy, abdominal washout, rectosigmoid colectomy, diverting colostomy, bronchoscopy and Abthera placement on 6/8/22 for findings of increased free air on abdominal xray concerning for bowel perforation. Patient s/p washout and bridging vicryl mesh placement to close fascial gap on 6/14 w/ skin closure device place 6/17 course c/b sepsis concerning for peritonitis (6/20) now with slight improvement in mental status - prognosis poor/guarded. Hospital course also c/b GIB. Patient still waiting for transfer to [facility] hospice.

## 2022-07-15 NOTE — PROGRESS NOTE ADULT - ASSESSMENT
52y  HD#41 with recurrent cervical CA admitted for management of neutropenic fever, now s/p emergent exploratory laparotomy, abdominal washout, rectosigmoid colectomy, diverting colostomy, bronchoscopy and Abthera placement on 22 for bowel perforation. Patient s/p washout and bridging vicryl mesh placement to close fascial gap on . She is s/p wound vac, discontinued given signs of infection. Patient with previously septic with mental status change ().  Made DNR/DNI after extensive multidisciplinary discussion. She was later noted to have a GI bleed, managed conservatively with input from GI and currently improved.  Her mental status is overall improved and she has both insight and capacity, however waxes and wanes with intermittent agitation. Patient requesting discharge for inpatient palliative care. Patient accepted to [facility], continues to await insurance authorization.     Neuro: Tylenol, Oxycodone, Flexeril, Lidocaine patch for pain, ATC and PRN dosing adjusted for patient comfort.  Appreciate GHOS and Palliative recommendations.  CV: Persist mild tachycardia (100s-110s).  - CT A/P and CT angio () negative for PE   - h/o HTN: on Toprol 100mg qD  Pulm: Maintaining appropriate SpO2.   GI: Regular diet + Ensure supplement as tolerated   - s/p GI eval for GI bleed. Recommended against EGD given patient wishes. q8CBC, IV Pantoprazole BID. s/p 2U pRBC () and 2uRPRBC (7/10) with appropriate response. Output no longer bloody.   - Ostomy: area of separation packed w/ quacel, ostomy powder and liquid barrier, hydrocolloid dressing.  - Senna, Protonix BID. Appreciate GI recs   : B/l nephrostomy tubes. ASHLEY w/ increased Creatinine likely in setting of sepsis, w/ last 1.06.  Appreciate hospitalist recs.   FEN: LR@100.   Heme: DVT ppx: Lovenox, SCDs while in bed.    - Thrombocytopenia: likely multifactorial, now normalized. Appreciate Heme recs.  ID: Ostomy noted to leak into subcutaneous tissue w/ subsequent increasing leukocytosis and fevers. Now afebrile, VS improved and leukocytosis downtrended.   - Continue Zosyn (- ) for broad antibiotic coverage.   - BCx () NG  - UCx () nl jessica   - s/p Caspofungin and Cefepime  - S/p Zarixo (-6/10) for neutropenic fever.   - S/p Zosyn (-)    Dispo: Patient desires discharge to inpatient palliative care unit at [facility]. Patient has been accepted, however continuing to await insurance authorization. Code status = DNR/DNI.     Seen at bedside with GYN Oncology team  Julissa Leach MD, PGY4

## 2022-07-16 PROCEDURE — 99232 SBSQ HOSP IP/OBS MODERATE 35: CPT

## 2022-07-16 RX ADMIN — OXYCODONE HYDROCHLORIDE 30 MILLIGRAM(S): 5 TABLET ORAL at 06:25

## 2022-07-16 RX ADMIN — SODIUM CHLORIDE 100 MILLILITER(S): 9 INJECTION, SOLUTION INTRAVENOUS at 12:10

## 2022-07-16 RX ADMIN — OXYCODONE HYDROCHLORIDE 30 MILLIGRAM(S): 5 TABLET ORAL at 05:24

## 2022-07-16 RX ADMIN — SODIUM CHLORIDE 100 MILLILITER(S): 9 INJECTION, SOLUTION INTRAVENOUS at 22:22

## 2022-07-16 RX ADMIN — PANTOPRAZOLE SODIUM 40 MILLIGRAM(S): 20 TABLET, DELAYED RELEASE ORAL at 20:10

## 2022-07-16 RX ADMIN — PANTOPRAZOLE SODIUM 40 MILLIGRAM(S): 20 TABLET, DELAYED RELEASE ORAL at 05:22

## 2022-07-16 RX ADMIN — OXYCODONE HYDROCHLORIDE 7.5 MILLIGRAM(S): 5 TABLET ORAL at 05:33

## 2022-07-16 RX ADMIN — OXYCODONE HYDROCHLORIDE 7.5 MILLIGRAM(S): 5 TABLET ORAL at 04:33

## 2022-07-16 RX ADMIN — OXYCODONE HYDROCHLORIDE 7.5 MILLIGRAM(S): 5 TABLET ORAL at 22:19

## 2022-07-16 RX ADMIN — SODIUM CHLORIDE 100 MILLILITER(S): 9 INJECTION, SOLUTION INTRAVENOUS at 05:02

## 2022-07-16 RX ADMIN — OXYCODONE HYDROCHLORIDE 30 MILLIGRAM(S): 5 TABLET ORAL at 21:00

## 2022-07-16 RX ADMIN — OXYCODONE HYDROCHLORIDE 7.5 MILLIGRAM(S): 5 TABLET ORAL at 23:15

## 2022-07-16 RX ADMIN — OXYCODONE HYDROCHLORIDE 30 MILLIGRAM(S): 5 TABLET ORAL at 20:08

## 2022-07-16 RX ADMIN — ENOXAPARIN SODIUM 40 MILLIGRAM(S): 100 INJECTION SUBCUTANEOUS at 12:10

## 2022-07-16 RX ADMIN — Medication 100 MILLIGRAM(S): at 05:26

## 2022-07-16 RX ADMIN — MIRTAZAPINE 15 MILLIGRAM(S): 45 TABLET, ORALLY DISINTEGRATING ORAL at 12:11

## 2022-07-16 NOTE — PROGRESS NOTE ADULT - PROBLEM SELECTOR PLAN 1
GYN ONC Fellow Addendum:    Pt seen and examined at bedside. Agree with above. Without complaints this AM    VS reviewed  No labs    - po analgesia prn  - daily dressing changes  - Reg diet with ensure  - Encourage ambulation and IS use  - Replete lytes prn  - DVT ppx: lovenox  - OOB  - Dispo: pending insurance authorization for michelle Mckeon, PGY7

## 2022-07-16 NOTE — PROGRESS NOTE ADULT - PROBLEM SELECTOR PLAN 2
-h/o recurrent cervical CA, receives Gyn Oncology care at [facility] ([place]).   -she initially was diagnosed with stage III cervical CA in 2014 s/p VBRT + chemo but never had surgical intervention. -Reported now being s/p 5 cycles of chemo, unsure of regimen but next due 6/21/22.  -course complicated by bowel perforation s/p washout and bridging vicryl mesh placement to close fascial gap on 6/14 w/ skin closure device place don 6/17  -further management per general surgery, gyn onc and plastic surgery  -overall prognosis is poor   -patient is DNI/DNR   -Patient awaiting placement at [facility].

## 2022-07-16 NOTE — PROGRESS NOTE ADULT - SUBJECTIVE AND OBJECTIVE BOX
Gyn ONC Progress Note     Subjective:   Pt seen and examined at bedside. No events overnight . Pain well controlled. Tolerating regular diet. Pt denies fever, chills, chest pain, SOB, nausea, vomiting, lightheadedness, dizziness.      Objective:  T(F): 98.3 (07-15-22 @ 21:28), Max: 98.6 (07-15-22 @ 17:55)  HR: 101 (07-15-22 @ 21:28) (91 - 107)  BP: 132/84 (07-15-22 @ 21:28) (132/84 - 149/88)  RR: 17 (07-15-22 @ 21:28) (17 - 18)  SpO2: 96% (07-15-22 @ 21:28) (96% - 100%)  Wt(kg): --  I&O's Summary    14 Jul 2022 07:01  -  15 Jul 2022 07:00  --------------------------------------------------------  IN: 2890 mL / OUT: 1900 mL / NET: 990 mL    15 Jul 2022 07:01  -  16 Jul 2022 05:31  --------------------------------------------------------  IN: 0 mL / OUT: 2542 mL / NET: -2542 mL      CAPILLARY BLOOD GLUCOSE          MEDICATIONS  (STANDING):  enoxaparin Injectable 40 milliGRAM(s) SubCutaneous every 24 hours  lactated ringers. 1000 milliLiter(s) (100 mL/Hr) IV Continuous <Continuous>  lidocaine 1% (Preservative-free) Injectable 50 milliLiter(s) Local Injection once  metoprolol succinate  milliGRAM(s) Oral daily  mirtazapine 15 milliGRAM(s) Oral daily  oxyCODONE  ER Tablet 30 milliGRAM(s) Oral every 12 hours  pantoprazole  Injectable 40 milliGRAM(s) IV Push two times a day    MEDICATIONS  (PRN):  acetaminophen     Tablet .. 975 milliGRAM(s) Oral every 6 hours PRN Mild Pain (1 - 3)  cyclobenzaprine 5 milliGRAM(s) Oral three times a day PRN Muscle Spasm  lidocaine   4% Patch 1 Patch Transdermal daily PRN back pain  oxyCODONE    IR 7.5 milliGRAM(s) Oral every 3 hours PRN Moderate Pain (4 - 6)  oxyCODONE    IR 15 milliGRAM(s) Oral every 4 hours PRN Severe Pain (7 - 10)  simethicone 80 milliGRAM(s) Chew three times a day PRN Gas    Physical Exam:  Constitutional: Awake, alert. NAD, A+O x3  CV: RRR, normal S1/S2  Lungs: Clear to auscultation bilaterally  Abdomen: Soft, non-distended, mild diffuse tenderness. Abdominal packing in place with overlying bandage. Brown liquid stool output in ostomy. Mucosa retracted away from skin edge although bowel loop visualized attached to subcutaneous space. RLQ and LLQ drain with seropurulent output. L pelvic drain removed, Gauze over site of former pelvic drain.  : R&L nephrostomy tubes draining slightly cloudy urine  Extremities: No calf tenderness b/l. SCDs in place

## 2022-07-16 NOTE — PROGRESS NOTE ADULT - ASSESSMENT
52 y.o. Female DNR/DNI with recurrent cervical CA admitted for management of neutropenic fever, now s/p emergent exploratory laparotomy, abdominal washout, rectosigmoid colectomy, diverting colostomy, bronchoscopy and Abthera placement on 6/8/22 for findings of increased free air on abdominal xray concerning for bowel perforation. Patient s/p washout and bridging vicryl mesh placement to close fascial gap on 6/14 w/ skin closure device place 6/17 course c/b sepsis concerning for peritonitis (6/20) now with slight improvement in mental status - prognosis poor/guarded. Hospital course also c/b GIB. Patient still waiting for transfer to [facility] hospice.

## 2022-07-16 NOTE — PROGRESS NOTE ADULT - ATTENDING COMMENTS
Patient seen and examined on am rounds with fellow.   Progressive metastatic cervical cancer complicated by neutropenic fever, bowel perforation and peritonitis.   Extensive GOC discussions with family by team.   Plan for transfer to hospice care at [facility] - awaiting insurance authorizations.   Agree with above assessment and plan.

## 2022-07-16 NOTE — PROGRESS NOTE ADULT - PROBLEM SELECTOR PLAN 12
-c/w lovenox subq daily    11. Dispo:  -for inpatient hospice ( [facility]) pending. CM/SW still working on it.

## 2022-07-16 NOTE — PROGRESS NOTE ADULT - SUBJECTIVE AND OBJECTIVE BOX
Patient is a 52y old  Female who presents with a chief complaint of neutropenic fever (16 Jul 2022 05:31)      SUBJECTIVE / OVERNIGHT EVENTS:    No events overnight. This AM, patient without n/v/d/cp/sob.      MEDICATIONS  (STANDING):  enoxaparin Injectable 40 milliGRAM(s) SubCutaneous every 24 hours  lactated ringers. 1000 milliLiter(s) (100 mL/Hr) IV Continuous <Continuous>  lidocaine 1% (Preservative-free) Injectable 50 milliLiter(s) Local Injection once  metoprolol succinate  milliGRAM(s) Oral daily  mirtazapine 15 milliGRAM(s) Oral daily  oxyCODONE  ER Tablet 30 milliGRAM(s) Oral every 12 hours  pantoprazole  Injectable 40 milliGRAM(s) IV Push two times a day    MEDICATIONS  (PRN):  acetaminophen     Tablet .. 975 milliGRAM(s) Oral every 6 hours PRN Mild Pain (1 - 3)  cyclobenzaprine 5 milliGRAM(s) Oral three times a day PRN Muscle Spasm  lidocaine   4% Patch 1 Patch Transdermal daily PRN back pain  oxyCODONE    IR 7.5 milliGRAM(s) Oral every 3 hours PRN Moderate Pain (4 - 6)  oxyCODONE    IR 15 milliGRAM(s) Oral every 4 hours PRN Severe Pain (7 - 10)  simethicone 80 milliGRAM(s) Chew three times a day PRN Gas      PHYSICAL EXAM:  T(C): 36.8 (07-15-22 @ 21:28), Max: 37 (07-15-22 @ 17:55)  HR: 102 (07-16-22 @ 05:00) (91 - 102)  BP: 145/87 (07-16-22 @ 05:00) (132/84 - 149/88)  RR: 17 (07-15-22 @ 21:28) (17 - 18)  SpO2: 96% (07-15-22 @ 21:28) (96% - 100%)  I&O's Summary    15 Jul 2022 07:01  -  16 Jul 2022 07:00  --------------------------------------------------------  IN: 400 mL / OUT: 2942 mL / NET: -2542 mL      GENERAL: NAD, well-developed  HEAD:  Atraumatic, Normocephalic, MMM  CHEST/LUNG: No use of accessory muscles, CTAB, breathing non-labored  COR: RR, no mrcg  ABD: Soft, ND/NT, +BS; +ostomy bag  PSYCH: AAOx3  NEUROLOGY: CN II-XII grossly intact, moving all extremities  SKIN: No rashes or lesions  EXT: wwp, no cce    LABS:  CAPILLARY BLOOD GLUCOSE                            RADIOLOGY & ADDITIONAL TESTS:    Telemetry Personally Reviewed -     Imaging Personally Reviewed -     Imaging Reviewed -     Consultant(s) Notes Reviewed -       Care Discussed with Consultants/Other Providers -

## 2022-07-16 NOTE — PROGRESS NOTE ADULT - ASSESSMENT
52y  HD#42 with recurrent cervical CA admitted for management of neutropenic fever, now s/p emergent exploratory laparotomy, abdominal washout, rectosigmoid colectomy, diverting colostomy, bronchoscopy and Abthera placement on 22 for bowel perforation. Patient s/p washout and bridging vicryl mesh placement to close fascial gap on . She is s/p wound vac, discontinued given signs of infection. Patient with previously septic with mental status change (). Made DNR/DNI after extensive multidisciplinary discussion. She was later noted to have a GI bleed, managed conservatively with input from GI and currently improved. Her mental status is overall improved and she has both insight and capacity, however waxes and wanes with intermittent agitation. Patient requesting discharge for inpatient palliative care. Patient accepted to [place], continues to await insurance authorization.    Neuro: Tylenol, Oxycodone, Flexeril, Lidocaine patch for pain, ATC and PRN dosing adjusted for patient comfort. Appreciate GHOS and Palliative recommendations.  CV: Persistent mild tachycardia.  - CT A/P and CT angio () negative for PE  - h/o HTN: on Toprol 100mg qD  Pulm: Maintaining appropriate SpO2.  GI: Regular diet + Ensure supplement as tolerated  - s/p GI eval for GI bleed. Recommended against EGD given patient wishes. q8CBC, IV Pantoprazole BID. s/p 2U pRBC () and 2uRPRBC (7/10) with appropriate response. Output no longer bloody.  - Ostomy: area of separation packed w/ quacel, ostomy powder and liquid barrier, hydrocolloid dressing.  - Senna, Protonix BID. Appreciate GI recs  : B/l nephrostomy tubes. ASHLEY w/ increased Creatinine likely in setting of sepsis, w/ last 1.06. Appreciate hospitalist recs.  FEN: LR@100.  Heme: DVT ppx: Lovenox, SCDs while in bed.  - Thrombocytopenia: likely multifactorial, now normalized. Appreciate Heme recs.  ID: Ostomy noted to leak into subcutaneous tissue w/ subsequent increasing leukocytosis and fevers. Now afebrile, VS improved and leukocytosis downtrended.  - Continue Zosyn (- ) for broad antibiotic coverage.  - BCx () NG  - UCx () nl jessica  - s/p Caspofungin and Cefepime  - S/p Zarixo (-6/10) for neutropenic fever.  - S/p Zosyn (-)    Dispo: Patient desires discharge to inpatient palliative care unit at [place]. Patient has been accepted, however continuing to await insurance authorization. Code status = DNR/DNI.    Seen at bedside with GYN Oncology team  Kaci Grimm, PGY2

## 2022-07-17 LAB — SARS-COV-2 RNA SPEC QL NAA+PROBE: SIGNIFICANT CHANGE UP

## 2022-07-17 PROCEDURE — 99232 SBSQ HOSP IP/OBS MODERATE 35: CPT

## 2022-07-17 RX ORDER — PIPERACILLIN AND TAZOBACTAM 4; .5 G/20ML; G/20ML
3.38 INJECTION, POWDER, LYOPHILIZED, FOR SOLUTION INTRAVENOUS EVERY 8 HOURS
Refills: 0 | Status: DISCONTINUED | OUTPATIENT
Start: 2022-07-17 | End: 2022-07-20

## 2022-07-17 RX ADMIN — PIPERACILLIN AND TAZOBACTAM 25 GRAM(S): 4; .5 INJECTION, POWDER, LYOPHILIZED, FOR SOLUTION INTRAVENOUS at 17:29

## 2022-07-17 RX ADMIN — LIDOCAINE 1 PATCH: 4 CREAM TOPICAL at 18:22

## 2022-07-17 RX ADMIN — OXYCODONE HYDROCHLORIDE 15 MILLIGRAM(S): 5 TABLET ORAL at 09:46

## 2022-07-17 RX ADMIN — OXYCODONE HYDROCHLORIDE 15 MILLIGRAM(S): 5 TABLET ORAL at 10:20

## 2022-07-17 RX ADMIN — SODIUM CHLORIDE 100 MILLILITER(S): 9 INJECTION, SOLUTION INTRAVENOUS at 21:29

## 2022-07-17 RX ADMIN — OXYCODONE HYDROCHLORIDE 7.5 MILLIGRAM(S): 5 TABLET ORAL at 04:00

## 2022-07-17 RX ADMIN — PIPERACILLIN AND TAZOBACTAM 25 GRAM(S): 4; .5 INJECTION, POWDER, LYOPHILIZED, FOR SOLUTION INTRAVENOUS at 09:08

## 2022-07-17 RX ADMIN — OXYCODONE HYDROCHLORIDE 30 MILLIGRAM(S): 5 TABLET ORAL at 07:03

## 2022-07-17 RX ADMIN — Medication 100 MILLIGRAM(S): at 06:23

## 2022-07-17 RX ADMIN — LIDOCAINE 1 PATCH: 4 CREAM TOPICAL at 10:33

## 2022-07-17 RX ADMIN — ENOXAPARIN SODIUM 40 MILLIGRAM(S): 100 INJECTION SUBCUTANEOUS at 11:45

## 2022-07-17 RX ADMIN — SODIUM CHLORIDE 100 MILLILITER(S): 9 INJECTION, SOLUTION INTRAVENOUS at 09:08

## 2022-07-17 RX ADMIN — PANTOPRAZOLE SODIUM 40 MILLIGRAM(S): 20 TABLET, DELAYED RELEASE ORAL at 17:29

## 2022-07-17 RX ADMIN — OXYCODONE HYDROCHLORIDE 7.5 MILLIGRAM(S): 5 TABLET ORAL at 03:02

## 2022-07-17 RX ADMIN — OXYCODONE HYDROCHLORIDE 30 MILLIGRAM(S): 5 TABLET ORAL at 21:29

## 2022-07-17 RX ADMIN — LIDOCAINE 1 PATCH: 4 CREAM TOPICAL at 22:30

## 2022-07-17 RX ADMIN — MIRTAZAPINE 15 MILLIGRAM(S): 45 TABLET, ORALLY DISINTEGRATING ORAL at 11:45

## 2022-07-17 RX ADMIN — PANTOPRAZOLE SODIUM 40 MILLIGRAM(S): 20 TABLET, DELAYED RELEASE ORAL at 06:18

## 2022-07-17 RX ADMIN — OXYCODONE HYDROCHLORIDE 30 MILLIGRAM(S): 5 TABLET ORAL at 06:19

## 2022-07-17 NOTE — PROGRESS NOTE ADULT - ASSESSMENT
52 y.o. Female DNR/DNI with recurrent cervical CA admitted for management of neutropenic fever, now s/p emergent exploratory laparotomy, abdominal washout, rectosigmoid colectomy, diverting colostomy, bronchoscopy and Abthera placement on 6/8/22 for findings of increased free air on abdominal xray concerning for bowel perforation. Patient s/p washout and bridging vicryl mesh placement to close fascial gap on 6/14 w/ skin closure device place 6/17 course c/b sepsis concerning for peritonitis (6/20) now with slight improvement in mental status - prognosis poor/guarded. Hospital course also c/b GIB. Patient still waiting for transfer to [facility] hospice.

## 2022-07-17 NOTE — PROGRESS NOTE ADULT - PROBLEM SELECTOR PLAN 12
-c/w lovenox subq daily    11. Dispo:  -for inpatient hospice ( [place]) pending. CM/SW still working on it.

## 2022-07-17 NOTE — PROGRESS NOTE ADULT - SUBJECTIVE AND OBJECTIVE BOX
Gyn ONC Progress Note     Subjective:   Pt seen and examined at bedside. No events overnight . Pain well controlled. Patient was OOB to chair all day yesterday.  Tolerating regular diet. Pt denies fever, chills, chest pain, SOB, nausea, vomiting, lightheadedness, dizziness.      Objective:  T(F): 98.4 (07-17-22 @ 01:41), Max: 99 (07-16-22 @ 17:38)  HR: 104 (07-17-22 @ 01:41) (103 - 106)  BP: 136/81 (07-17-22 @ 01:41) (136/81 - 142/82)  RR: 18 (07-17-22 @ 01:41) (17 - 18)  SpO2: 95% (07-17-22 @ 01:41) (95% - 99%)    I&O's Summary    15 Jul 2022 07:01  -  16 Jul 2022 07:00  --------------------------------------------------------  IN: 400 mL / OUT: 2942 mL / NET: -2542 mL    16 Jul 2022 07:01  -  17 Jul 2022 05:19  --------------------------------------------------------  IN: 800 mL / OUT: 3440 mL / NET: -2640 mL      MEDICATIONS  (STANDING):  enoxaparin Injectable 40 milliGRAM(s) SubCutaneous every 24 hours  lactated ringers. 1000 milliLiter(s) (100 mL/Hr) IV Continuous <Continuous>  lidocaine 1% (Preservative-free) Injectable 50 milliLiter(s) Local Injection once  metoprolol succinate  milliGRAM(s) Oral daily  mirtazapine 15 milliGRAM(s) Oral daily  oxyCODONE  ER Tablet 30 milliGRAM(s) Oral every 12 hours  pantoprazole  Injectable 40 milliGRAM(s) IV Push two times a day    MEDICATIONS  (PRN):  acetaminophen     Tablet .. 975 milliGRAM(s) Oral every 6 hours PRN Mild Pain (1 - 3)  cyclobenzaprine 5 milliGRAM(s) Oral three times a day PRN Muscle Spasm  lidocaine   4% Patch 1 Patch Transdermal daily PRN back pain  oxyCODONE    IR 7.5 milliGRAM(s) Oral every 3 hours PRN Moderate Pain (4 - 6)  oxyCODONE    IR 15 milliGRAM(s) Oral every 4 hours PRN Severe Pain (7 - 10)  simethicone 80 milliGRAM(s) Chew three times a day PRN Gas    Physical Exam:  Constitutional: Awake, alert. NAD, A+O x3  CV: RRR, normal S1/S2  Lungs: Clear to auscultation bilaterally  Abdomen: Soft, non-distended, mild diffuse tenderness. Abdominal packing in place with overlying bandage. Brown liquid stool output in ostomy. Mucosa retracted away from skin edge although bowel loop visualized attached to subcutaneous space. RLQ and LLQ drain with seropurulent output. L pelvic drain removed, Gauze over site of former pelvic drain.  : R&L nephrostomy tubes draining slightly cloudy urine  Extremities: No calf tenderness b/l. SCDs in place    LABS: no AM labs collected today

## 2022-07-17 NOTE — PROGRESS NOTE ADULT - SUBJECTIVE AND OBJECTIVE BOX
Patient is a 52y old  Female who presents with a chief complaint of neutropenic fever (17 Jul 2022 05:19)      SUBJECTIVE / OVERNIGHT EVENTS:    No events overnight. This AM, patient without n/v/d/cp/sob.  States she had some pain overnight in her abdomen and received pain medication with some improvement. Doesn't want any additional pain medication this AM.    MEDICATIONS  (STANDING):  enoxaparin Injectable 40 milliGRAM(s) SubCutaneous every 24 hours  lactated ringers. 1000 milliLiter(s) (100 mL/Hr) IV Continuous <Continuous>  lidocaine 1% (Preservative-free) Injectable 50 milliLiter(s) Local Injection once  metoprolol succinate  milliGRAM(s) Oral daily  mirtazapine 15 milliGRAM(s) Oral daily  oxyCODONE  ER Tablet 30 milliGRAM(s) Oral every 12 hours  pantoprazole  Injectable 40 milliGRAM(s) IV Push two times a day  piperacillin/tazobactam IVPB.. 3.375 Gram(s) IV Intermittent every 8 hours    MEDICATIONS  (PRN):  acetaminophen     Tablet .. 975 milliGRAM(s) Oral every 6 hours PRN Mild Pain (1 - 3)  cyclobenzaprine 5 milliGRAM(s) Oral three times a day PRN Muscle Spasm  lidocaine   4% Patch 1 Patch Transdermal daily PRN back pain  oxyCODONE    IR 7.5 milliGRAM(s) Oral every 3 hours PRN Moderate Pain (4 - 6)  oxyCODONE    IR 15 milliGRAM(s) Oral every 4 hours PRN Severe Pain (7 - 10)  simethicone 80 milliGRAM(s) Chew three times a day PRN Gas      PHYSICAL EXAM:  T(C): 36.4 (07-17-22 @ 06:15), Max: 37.2 (07-16-22 @ 17:38)  HR: 104 (07-17-22 @ 06:15) (103 - 106)  BP: 136/80 (07-17-22 @ 06:15) (136/80 - 142/82)  RR: 20 (07-17-22 @ 06:15) (17 - 20)  SpO2: 97% (07-17-22 @ 06:15) (95% - 99%)  I&O's Summary    16 Jul 2022 07:01  -  17 Jul 2022 07:00  --------------------------------------------------------  IN: 1200 mL / OUT: 4145 mL / NET: -2945 mL      GENERAL: NAD, well-developed  HEAD:  Atraumatic, Normocephalic, MMM  CHEST/LUNG: No use of accessory muscles, CTAB, breathing non-labored  COR: RR, no mrcg  ABD: Soft, ND/NT, +BS  PSYCH: AAOx3  NEUROLOGY: CN II-XII grossly intact, moving all extremities  SKIN: No rashes or lesions  EXT: wwp, no cce    LABS:  CAPILLARY BLOOD GLUCOSE                            RADIOLOGY & ADDITIONAL TESTS:    Telemetry Personally Reviewed -     Imaging Personally Reviewed -     Imaging Reviewed -     Consultant(s) Notes Reviewed -       Care Discussed with Consultants/Other Providers -

## 2022-07-17 NOTE — PROGRESS NOTE ADULT - PROBLEM SELECTOR PLAN 1
GYN ONC Fellow Addendum:    Pt seen and examined at bedside. Agree with above. Reports back pain.     VS reviewed  No labs    - Continue current pain regimen, appreciate palliative recs  - Reg diet with ensure  - iv abx  - continue drains  - Replete lytes prn  - DVT ppx: lovenox  - Dispo: dispo planning  - DNR/DNI    GRIS Mckeon, PGY7

## 2022-07-17 NOTE — PROGRESS NOTE ADULT - PROBLEM SELECTOR PLAN 2
-h/o recurrent cervical CA, receives Gyn Oncology care at [facility] ([place]).   -she initially was diagnosed with stage III cervical CA in 2014 s/p VBRT + chemo but never had surgical intervention. -Reported now being s/p 5 cycles of chemo, unsure of regimen but next due 6/21/22.  -course complicated by bowel perforation s/p washout and bridging vicryl mesh placement to close fascial gap on 6/14 w/ skin closure device place don 6/17  -further management per general surgery, gyn onc and plastic surgery  -overall prognosis is poor   -patient is DNI/DNR   -Patient awaiting placement at [facility].

## 2022-07-17 NOTE — PROGRESS NOTE ADULT - ATTENDING COMMENTS
Patient seen and evaluated.   For inpatient hospice - awaiting insurance authorization for [facility].

## 2022-07-17 NOTE — PROGRESS NOTE ADULT - ASSESSMENT
52y  HD#43 with recurrent cervical CA admitted for management of neutropenic fever, now s/p emergent exploratory laparotomy, abdominal washout, rectosigmoid colectomy, diverting colostomy, bronchoscopy and Abthera placement on 22 for bowel perforation. Patient s/p washout and bridging vicryl mesh placement to close fascial gap on . She is s/p wound vac, discontinued given signs of infection. Patient with previously septic with mental status change (). Made DNR/DNI after extensive multidisciplinary discussion. She was later noted to have a GI bleed, managed conservatively with input from GI and currently improved. Her mental status is overall improved and she has both insight and capacity, however waxes and wanes with intermittent agitation. Patient requesting discharge for inpatient palliative care. Patient accepted to [place], continues to await insurance authorization.     Neuro: Tylenol, Oxycodone, Flexeril, Lidocaine patch for pain, ATC and PRN dosing adjusted for patient comfort. Appreciate GHOS and Palliative recommendations.  CV: Persistent mild tachycardia.  - CT A/P and CT angio () negative for PE  - h/o HTN: on Toprol 100mg qD  Pulm: Maintaining appropriate SpO2.  GI: Regular diet + Ensure supplement as tolerated  - s/p GI eval for GI bleed. Recommended against EGD given patient wishes. q8CBC, IV Pantoprazole BID. s/p 2U pRBC () and 2uRPRBC (7/10) with appropriate response. Output no longer bloody.  - Ostomy: area of separation packed w/ quacel, ostomy powder and liquid barrier, hydrocolloid dressing.  - Senna, Protonix BID. Appreciate GI recs  : B/l nephrostomy tubes. ASHLEY w/ increased Creatinine likely in setting of sepsis, w/ last 1.06. Appreciate hospitalist recs.  FEN: LR@100.  Heme: DVT ppx: Lovenox, SCDs while in bed.  - Thrombocytopenia: likely multifactorial, now normalized. Appreciate Heme recs.  ID: Ostomy noted to leak into subcutaneous tissue w/ subsequent increasing leukocytosis and fevers. Now afebrile, VS improved and leukocytosis downtrended.  - S/p Zosyn course (- ) for broad antibiotic coverage.  - BCx () NG  - UCx () nl jessica  - s/p Caspofungin and Cefepime  - S/p Zarixo (-6/10) for neutropenic fever.  - S/p Zosyn (-)    Dispo: Patient desires discharge to inpatient palliative care unit at [place]. Patient has been accepted, however continuing to await insurance authorization. Code status = DNR/DNI.    Seen at bedside with GYN Oncology team  Kaci Grimm, PGY2

## 2022-07-18 PROCEDURE — 99232 SBSQ HOSP IP/OBS MODERATE 35: CPT

## 2022-07-18 RX ORDER — HYDROMORPHONE HYDROCHLORIDE 2 MG/ML
0.5 INJECTION INTRAMUSCULAR; INTRAVENOUS; SUBCUTANEOUS
Refills: 0 | Status: DISCONTINUED | OUTPATIENT
Start: 2022-07-18 | End: 2022-07-18

## 2022-07-18 RX ORDER — ACETAMINOPHEN 500 MG
1000 TABLET ORAL EVERY 6 HOURS
Refills: 0 | Status: DISCONTINUED | OUTPATIENT
Start: 2022-07-18 | End: 2022-07-20

## 2022-07-18 RX ORDER — HYDROMORPHONE HYDROCHLORIDE 2 MG/ML
1 INJECTION INTRAMUSCULAR; INTRAVENOUS; SUBCUTANEOUS
Refills: 0 | Status: DISCONTINUED | OUTPATIENT
Start: 2022-07-18 | End: 2022-07-18

## 2022-07-18 RX ORDER — PANTOPRAZOLE SODIUM 20 MG/1
40 TABLET, DELAYED RELEASE ORAL
Refills: 0 | Status: DISCONTINUED | OUTPATIENT
Start: 2022-07-18 | End: 2022-07-20

## 2022-07-18 RX ORDER — HYDROMORPHONE HYDROCHLORIDE 2 MG/ML
1 INJECTION INTRAMUSCULAR; INTRAVENOUS; SUBCUTANEOUS EVERY 4 HOURS
Refills: 0 | Status: DISCONTINUED | OUTPATIENT
Start: 2022-07-18 | End: 2022-07-20

## 2022-07-18 RX ORDER — HYDROMORPHONE HYDROCHLORIDE 2 MG/ML
0.5 INJECTION INTRAMUSCULAR; INTRAVENOUS; SUBCUTANEOUS
Refills: 0 | Status: DISCONTINUED | OUTPATIENT
Start: 2022-07-18 | End: 2022-07-20

## 2022-07-18 RX ORDER — HYDROMORPHONE HYDROCHLORIDE 2 MG/ML
2 INJECTION INTRAMUSCULAR; INTRAVENOUS; SUBCUTANEOUS EVERY 4 HOURS
Refills: 0 | Status: DISCONTINUED | OUTPATIENT
Start: 2022-07-18 | End: 2022-07-18

## 2022-07-18 RX ADMIN — OXYCODONE HYDROCHLORIDE 15 MILLIGRAM(S): 5 TABLET ORAL at 13:40

## 2022-07-18 RX ADMIN — OXYCODONE HYDROCHLORIDE 15 MILLIGRAM(S): 5 TABLET ORAL at 07:30

## 2022-07-18 RX ADMIN — PIPERACILLIN AND TAZOBACTAM 25 GRAM(S): 4; .5 INJECTION, POWDER, LYOPHILIZED, FOR SOLUTION INTRAVENOUS at 17:06

## 2022-07-18 RX ADMIN — OXYCODONE HYDROCHLORIDE 15 MILLIGRAM(S): 5 TABLET ORAL at 00:45

## 2022-07-18 RX ADMIN — SODIUM CHLORIDE 100 MILLILITER(S): 9 INJECTION, SOLUTION INTRAVENOUS at 22:00

## 2022-07-18 RX ADMIN — PIPERACILLIN AND TAZOBACTAM 25 GRAM(S): 4; .5 INJECTION, POWDER, LYOPHILIZED, FOR SOLUTION INTRAVENOUS at 09:42

## 2022-07-18 RX ADMIN — SODIUM CHLORIDE 100 MILLILITER(S): 9 INJECTION, SOLUTION INTRAVENOUS at 09:40

## 2022-07-18 RX ADMIN — HYDROMORPHONE HYDROCHLORIDE 1 MILLIGRAM(S): 2 INJECTION INTRAMUSCULAR; INTRAVENOUS; SUBCUTANEOUS at 18:07

## 2022-07-18 RX ADMIN — OXYCODONE HYDROCHLORIDE 30 MILLIGRAM(S): 5 TABLET ORAL at 22:55

## 2022-07-18 RX ADMIN — HYDROMORPHONE HYDROCHLORIDE 1 MILLIGRAM(S): 2 INJECTION INTRAMUSCULAR; INTRAVENOUS; SUBCUTANEOUS at 18:30

## 2022-07-18 RX ADMIN — ENOXAPARIN SODIUM 40 MILLIGRAM(S): 100 INJECTION SUBCUTANEOUS at 12:05

## 2022-07-18 RX ADMIN — OXYCODONE HYDROCHLORIDE 30 MILLIGRAM(S): 5 TABLET ORAL at 21:57

## 2022-07-18 RX ADMIN — OXYCODONE HYDROCHLORIDE 30 MILLIGRAM(S): 5 TABLET ORAL at 09:41

## 2022-07-18 RX ADMIN — PANTOPRAZOLE SODIUM 40 MILLIGRAM(S): 20 TABLET, DELAYED RELEASE ORAL at 05:10

## 2022-07-18 RX ADMIN — OXYCODONE HYDROCHLORIDE 15 MILLIGRAM(S): 5 TABLET ORAL at 00:00

## 2022-07-18 RX ADMIN — PANTOPRAZOLE SODIUM 40 MILLIGRAM(S): 20 TABLET, DELAYED RELEASE ORAL at 18:06

## 2022-07-18 RX ADMIN — PIPERACILLIN AND TAZOBACTAM 25 GRAM(S): 4; .5 INJECTION, POWDER, LYOPHILIZED, FOR SOLUTION INTRAVENOUS at 01:58

## 2022-07-18 RX ADMIN — OXYCODONE HYDROCHLORIDE 15 MILLIGRAM(S): 5 TABLET ORAL at 12:55

## 2022-07-18 RX ADMIN — OXYCODONE HYDROCHLORIDE 15 MILLIGRAM(S): 5 TABLET ORAL at 06:50

## 2022-07-18 RX ADMIN — Medication 100 MILLIGRAM(S): at 05:11

## 2022-07-18 RX ADMIN — MIRTAZAPINE 15 MILLIGRAM(S): 45 TABLET, ORALLY DISINTEGRATING ORAL at 12:06

## 2022-07-18 NOTE — PROGRESS NOTE ADULT - ATTENDING COMMENTS
Patient seen and evaluated on am rounds.   Stable for discharge to inpatient hospice.   Awaiting insurance authorizations.

## 2022-07-18 NOTE — PROGRESS NOTE ADULT - SUBJECTIVE AND OBJECTIVE BOX
Gyn ONC Progress Note    Patient seen and examined at bedside. No acute events overnight. She expresses hope that insurance approves her transfer to [place] today. Her pain is well controlled. She is OOB with assistance only. Passing flatus.  Tolerating regular diet. Pt denies fever, chills, chest pain, SOB, nausea, vomiting, lightheadedness, dizziness.      Objective:  T(F): 98.6 (07-18-22 @ 05:11), Max: 98.6 (07-18-22 @ 05:11)  HR: 106 (07-18-22 @ 05:11) (106 - 108)  BP: 134/92 (07-18-22 @ 05:11) (134/92 - 139/87)  RR: 19 (07-18-22 @ 05:11) (17 - 19)  SpO2: 99% (07-18-22 @ 05:11) (99% - 100%)      I&O's Summary  16 Jul 2022 07:01  -  17 Jul 2022 07:00  --------------------------------------------------------  IN: 1200 mL / OUT: 4145 mL / NET: -2945 mL    17 Jul 2022 07:01  -  18 Jul 2022 06:27  --------------------------------------------------------  IN: 2780 mL / OUT: 2247.5 mL / NET: 532.5 mL      MEDICATIONS  (STANDING):  enoxaparin Injectable 40 milliGRAM(s) SubCutaneous every 24 hours  lactated ringers. 1000 milliLiter(s) (100 mL/Hr) IV Continuous <Continuous>  lidocaine 1% (Preservative-free) Injectable 50 milliLiter(s) Local Injection once  metoprolol succinate  milliGRAM(s) Oral daily  mirtazapine 15 milliGRAM(s) Oral daily  oxyCODONE  ER Tablet 30 milliGRAM(s) Oral every 12 hours  pantoprazole  Injectable 40 milliGRAM(s) IV Push two times a day  piperacillin/tazobactam IVPB.. 3.375 Gram(s) IV Intermittent every 8 hours    MEDICATIONS  (PRN):  acetaminophen     Tablet .. 975 milliGRAM(s) Oral every 6 hours PRN Mild Pain (1 - 3)  cyclobenzaprine 5 milliGRAM(s) Oral three times a day PRN Muscle Spasm  lidocaine   4% Patch 1 Patch Transdermal daily PRN back pain  oxyCODONE    IR 7.5 milliGRAM(s) Oral every 3 hours PRN Moderate Pain (4 - 6)  oxyCODONE    IR 15 milliGRAM(s) Oral every 4 hours PRN Severe Pain (7 - 10)  simethicone 80 milliGRAM(s) Chew three times a day PRN Gas      Physical Exam:  Constitutional: Awake, alert. NAD, A+O x3  CV: RRR, normal S1/S2  Lungs: Clear to auscultation bilaterally  Abdomen: Soft, non-distended, mild diffuse tenderness. Abdominal packing in place with overlying bandage. Brown/red tinged liquid stool output in ostomy with ?small clot at skin edge. Mucosa retracted away from skin edge although bowel loop visualized attached to subcutaneous space. RLQ and LLQ drain with seropurulent output. L pelvic drain removed, Gauze over site of former pelvic drain.  : R&L nephrostomy tubes draining slightly cloudy urine  Extremities: No calf tenderness b/l. SCDs in place

## 2022-07-18 NOTE — PROGRESS NOTE ADULT - ASSESSMENT
52y  HD#44 with recurrent cervical CA admitted for management of neutropenic fever, noted to have bowel perforation 22 now s/p emergent exploratory laparotomy, abdominal washout, rectosigmoid colectomy, diverting colostomy, bronchoscopy and Abthera placement. Patient s/p washout and bridging vicryl mesh placement to close fascial gap on . She is s/p wound vac, discontinued given signs of infection. Patient with previously septic with mental status change (). Made DNR/DNI after extensive multidisciplinary discussion. She was later noted to have a GI bleed, managed conservatively with input from GI and currently improved. Her mental status is improved and she has both insight and capacity, however waxes and wanes with intermittent agitation. Patient desires discharge for inpatient palliative care. Patient accepted to [place], continues to await insurance authorization.     Neuro: Tylenol, Oxycodone, Flexeril, Lidocaine patch for pain, ATC and PRN dosing adjusted for patient comfort. Appreciate GHOS and Palliative recommendations.  CV: Persistent mild tachycardia.  - CT A/P and CT angio () negative for PE  - h/o HTN: on Toprol 100mg qD  Pulm: Maintaining appropriate SpO2.  GI: Regular diet + Ensure supplement as tolerated  - s/p GI eval for GI bleed. Recommended against EGD given patient wishes. q8CBC, IV Pantoprazole BID. s/p 2U pRBC () and 2uRPRBC (7/10) with appropriate response. Output no longer bloody.  - Ostomy: area of separation packed w/ quacel, ostomy powder and liquid barrier, hydrocolloid dressing.  - Senna, Protonix BID. Appreciate GI recs  : B/l nephrostomy tubes. ASHLEY w/ increased Creatinine likely in setting of sepsis, w/ last 1.06. Appreciate hospitalist recs.  FEN: LR@100.  Heme: DVT ppx: Lovenox, SCDs while in bed.  - Thrombocytopenia: likely multifactorial, now normalized. Appreciate Heme recs.  ID: Ostomy noted to leak into subcutaneous tissue w/ subsequent increasing leukocytosis and fevers. Now afebrile, VS improved and leukocytosis downtrended.  - S/p Zosyn course (-) for broad antibiotic coverage.  - BCx () NG  - UCx () nl jessica  - s/p Caspofungin and Cefepime  - S/p Zarixo (-6/10) for neutropenic fever.  - S/p Zosyn (-)    Dispo: Patient has been accepted to palliative care unit at [facility], however continuing to await insurance authorization. Code status = DNR/DNI.      Julissa Leach MD, PGY4

## 2022-07-18 NOTE — PROGRESS NOTE ADULT - ASSESSMENT
52 y.o. Female DNR/DNI with recurrent cervical CA admitted for management of neutropenic fever, now s/p emergent exploratory laparotomy, abdominal washout, rectosigmoid colectomy, diverting colostomy, bronchoscopy and Abthera placement on 6/8/22 for findings of increased free air on abdominal xray concerning for bowel perforation. Patient s/p washout and bridging vicryl mesh placement to close fascial gap on 6/14 w/ skin closure device place 6/17 course c/b sepsis concerning for peritonitis (6/20) now with slight improvement in mental status - prognosis poor/guarded. Hospital course also c/b GIB. Patient still waiting for transfer to [facility] hospice.

## 2022-07-18 NOTE — PROGRESS NOTE ADULT - PROBLEM SELECTOR PLAN 2
-h/o recurrent cervical CA, receives Gyn Oncology care at [facility] ([place]).   -she initially was diagnosed with stage III cervical CA in 2014 s/p VBRT + chemo but never had surgical intervention. -Reported now being s/p 5 cycles of chemo, unsure of regimen but next due 6/21/22.  -course complicated by bowel perforation s/p washout and bridging vicryl mesh placement to close fascial gap on 6/14 w/ skin closure device place don 6/17  -further management per general surgery, gyn onc and plastic surgery  -overall prognosis is poor   -patient is DNI/DNR   -Patient awaiting placement at [facility].

## 2022-07-18 NOTE — CHART NOTE - NSCHARTNOTEFT_GEN_A_CORE
R3 PM Rounding Note     Patient seen on PM rounds. Drain care being performed. Ostomy with red/brown output, liquid stool. Patient states that pain is well controlled.     VS  T(C): 37 (07-18-22 @ 05:11)  HR: 106 (07-18-22 @ 05:11)  BP: 134/92 (07-18-22 @ 05:11)  RR: 19 (07-18-22 @ 05:11)  SpO2: 99% (07-18-22 @ 05:11)    - cont pain regimen, dilaudid IV PRN   - drain/ostomy care   - pending placement at [facility], supporting documentation faxed today     Marika Lemons MD PGY3   d/w Dr. Jay

## 2022-07-18 NOTE — PROGRESS NOTE ADULT - PROBLEM SELECTOR PLAN 12
-c/w lovenox subq daily    11. Dispo:  -for inpatient hospice ( [place]) pending. CM/SW still working on it.

## 2022-07-18 NOTE — PROGRESS NOTE ADULT - SUBJECTIVE AND OBJECTIVE BOX
[facility] Division of Hospital Medicine  Waldo Busby MD  Pager (M-F, 8A-5P): [phone]  Other Times:  [phone]    Patient is a 52y old  Female who presents with a chief complaint of neutropenic fever (18 Jul 2022 06:27)    SUBJECTIVE / OVERNIGHT EVENTS:  Patient offers no new complaints.  No F/C, N/V, CP, SOB, Cough, lightheadedness, dizziness, abdominal pain, diarrhea, dysuria.    MEDICATIONS  (STANDING):  enoxaparin Injectable 40 milliGRAM(s) SubCutaneous every 24 hours  lactated ringers. 1000 milliLiter(s) (100 mL/Hr) IV Continuous <Continuous>  lidocaine 1% (Preservative-free) Injectable 50 milliLiter(s) Local Injection once  metoprolol succinate  milliGRAM(s) Oral daily  mirtazapine 15 milliGRAM(s) Oral daily  oxyCODONE  ER Tablet 30 milliGRAM(s) Oral every 12 hours  pantoprazole  Injectable 40 milliGRAM(s) IV Push two times a day  piperacillin/tazobactam IVPB.. 3.375 Gram(s) IV Intermittent every 8 hours    MEDICATIONS  (PRN):  acetaminophen     Tablet .. 975 milliGRAM(s) Oral every 6 hours PRN Mild Pain (1 - 3)  cyclobenzaprine 5 milliGRAM(s) Oral three times a day PRN Muscle Spasm  lidocaine   4% Patch 1 Patch Transdermal daily PRN back pain  oxyCODONE    IR 7.5 milliGRAM(s) Oral every 3 hours PRN Moderate Pain (4 - 6)  oxyCODONE    IR 15 milliGRAM(s) Oral every 4 hours PRN Severe Pain (7 - 10)  simethicone 80 milliGRAM(s) Chew three times a day PRN Gas      Vital Signs Last 24 Hrs  T(C): 37 (18 Jul 2022 05:11), Max: 37 (18 Jul 2022 05:11)  T(F): 98.6 (18 Jul 2022 05:11), Max: 98.6 (18 Jul 2022 05:11)  HR: 106 (18 Jul 2022 05:11) (106 - 108)  BP: 134/92 (18 Jul 2022 05:11) (134/92 - 139/87)  BP(mean): --  RR: 19 (18 Jul 2022 05:11) (17 - 19)  SpO2: 99% (18 Jul 2022 05:11) (99% - 100%)    Parameters below as of 18 Jul 2022 05:11  Patient On (Oxygen Delivery Method): room air      CAPILLARY BLOOD GLUCOSE        I&O's Summary    17 Jul 2022 07:01  -  18 Jul 2022 07:00  --------------------------------------------------------  IN: 2780 mL / OUT: 2247.5 mL / NET: 532.5 mL        PHYSICAL EXAM:  CONSTITUTIONAL: NAD  EYES: PERRLA; conjunctiva and sclera clear  ENMT: Moist oral mucosa, no pharyngeal injection or exudates; normal dentition  NECK: Supple, no palpable masses; no thyromegaly  RESPIRATORY: Normal respiratory effort; lungs are clear to auscultation bilaterally  CARDIOVASCULAR: Regular rate and rhythm, normal S1 and S2, no murmur/rub/gallop; No lower extremity edema; Peripheral pulses are 2+ bilaterally  ABDOMEN: tenderness to palpation around surgical site, normoactive bowel sounds, no rebound/guarding; No hepatosplenomegaly  MUSCULOSKELETAL:  Normal gait; no clubbing or cyanosis of digits; no joint swelling or tenderness to palpation  PSYCH: A+O to person, place, and time; affect appropriate  NEUROLOGY: CN 2-12 are intact and symmetric; no gross sensory deficits   SKIN: No rashes; no palpable lesions, surgical dressing C/D/I    LABS:                    RADIOLOGY & ADDITIONAL TESTS:    Imaging Personally Reviewed:    Care Discussed with Consultants/Other Providers:    Care Discussed with Gyn/Onc about:

## 2022-07-18 NOTE — CHART NOTE - NSCHARTNOTEFT_GEN_A_CORE
52y  HD#44 with recurrent cervical CA admitted for management of neutropenic fever, noted to have bowel perforation 22 now s/p emergent exploratory laparotomy, abdominal washout, rectosigmoid colectomy, diverting colostomy, bronchoscopy and Abthera placement. Patient s/p washout and bridging vicryl mesh placement to close fascial gap on . She is s/p wound vac, discontinued given signs of infection. Patient with previously septic with mental status change (). Made DNR/DNI after extensive multidisciplinary discussion. She was later noted to have a GI bleed, managed conservatively with input from GI and currently improved. Her mental status is improved and she has both insight and capacity, however waxes and wanes with intermittent agitation. Patient is currently on pain regimen of IV Tylenol, IV Dilaudid 1 mg q 3 hrs for moderate pain and IV Dilaudid 2 mg q 3 hrs for severe pain prn. Patient desires discharge for inpatient palliative care. Patient accepted to [place], continues to await insurance authorization.     Yosi Cortés PAC  #[id number]

## 2022-07-18 NOTE — PROGRESS NOTE ADULT - PROBLEM SELECTOR PLAN 1
GYN ONC Fellow Addendum:    Pt seen and examined at bedside. Agree with above. Pain well controlled.    VS reviewed    Continue current pain regimen  Tolerating reg diet  Encourage ambulation and IS use  Replete lytes prn  DVT ppx: Lovenox  Dispo: continued dispo planning- appreciate CM/SW invovlement    Guadalupe Jay MD

## 2022-07-19 LAB — SARS-COV-2 RNA SPEC QL NAA+PROBE: SIGNIFICANT CHANGE UP

## 2022-07-19 PROCEDURE — 99232 SBSQ HOSP IP/OBS MODERATE 35: CPT

## 2022-07-19 RX ADMIN — HYDROMORPHONE HYDROCHLORIDE 1 MILLIGRAM(S): 2 INJECTION INTRAMUSCULAR; INTRAVENOUS; SUBCUTANEOUS at 17:37

## 2022-07-19 RX ADMIN — Medication 30 MILLILITER(S): at 04:18

## 2022-07-19 RX ADMIN — HYDROMORPHONE HYDROCHLORIDE 1 MILLIGRAM(S): 2 INJECTION INTRAMUSCULAR; INTRAVENOUS; SUBCUTANEOUS at 11:44

## 2022-07-19 RX ADMIN — MIRTAZAPINE 15 MILLIGRAM(S): 45 TABLET, ORALLY DISINTEGRATING ORAL at 11:26

## 2022-07-19 RX ADMIN — PIPERACILLIN AND TAZOBACTAM 25 GRAM(S): 4; .5 INJECTION, POWDER, LYOPHILIZED, FOR SOLUTION INTRAVENOUS at 17:09

## 2022-07-19 RX ADMIN — HYDROMORPHONE HYDROCHLORIDE 1 MILLIGRAM(S): 2 INJECTION INTRAMUSCULAR; INTRAVENOUS; SUBCUTANEOUS at 23:30

## 2022-07-19 RX ADMIN — OXYCODONE HYDROCHLORIDE 30 MILLIGRAM(S): 5 TABLET ORAL at 10:34

## 2022-07-19 RX ADMIN — Medication 100 MILLIGRAM(S): at 05:44

## 2022-07-19 RX ADMIN — PIPERACILLIN AND TAZOBACTAM 25 GRAM(S): 4; .5 INJECTION, POWDER, LYOPHILIZED, FOR SOLUTION INTRAVENOUS at 09:11

## 2022-07-19 RX ADMIN — PIPERACILLIN AND TAZOBACTAM 25 GRAM(S): 4; .5 INJECTION, POWDER, LYOPHILIZED, FOR SOLUTION INTRAVENOUS at 00:59

## 2022-07-19 RX ADMIN — OXYCODONE HYDROCHLORIDE 30 MILLIGRAM(S): 5 TABLET ORAL at 23:00

## 2022-07-19 RX ADMIN — OXYCODONE HYDROCHLORIDE 30 MILLIGRAM(S): 5 TABLET ORAL at 22:03

## 2022-07-19 RX ADMIN — HYDROMORPHONE HYDROCHLORIDE 1 MILLIGRAM(S): 2 INJECTION INTRAMUSCULAR; INTRAVENOUS; SUBCUTANEOUS at 17:08

## 2022-07-19 RX ADMIN — ENOXAPARIN SODIUM 40 MILLIGRAM(S): 100 INJECTION SUBCUTANEOUS at 11:25

## 2022-07-19 RX ADMIN — PANTOPRAZOLE SODIUM 40 MILLIGRAM(S): 20 TABLET, DELAYED RELEASE ORAL at 17:09

## 2022-07-19 RX ADMIN — SODIUM CHLORIDE 100 MILLILITER(S): 9 INJECTION, SOLUTION INTRAVENOUS at 23:24

## 2022-07-19 RX ADMIN — OXYCODONE HYDROCHLORIDE 30 MILLIGRAM(S): 5 TABLET ORAL at 09:11

## 2022-07-19 RX ADMIN — HYDROMORPHONE HYDROCHLORIDE 0.5 MILLIGRAM(S): 2 INJECTION INTRAMUSCULAR; INTRAVENOUS; SUBCUTANEOUS at 20:35

## 2022-07-19 RX ADMIN — HYDROMORPHONE HYDROCHLORIDE 0.5 MILLIGRAM(S): 2 INJECTION INTRAMUSCULAR; INTRAVENOUS; SUBCUTANEOUS at 20:04

## 2022-07-19 RX ADMIN — PANTOPRAZOLE SODIUM 40 MILLIGRAM(S): 20 TABLET, DELAYED RELEASE ORAL at 05:44

## 2022-07-19 RX ADMIN — SIMETHICONE 80 MILLIGRAM(S): 80 TABLET, CHEWABLE ORAL at 03:07

## 2022-07-19 RX ADMIN — HYDROMORPHONE HYDROCHLORIDE 1 MILLIGRAM(S): 2 INJECTION INTRAMUSCULAR; INTRAVENOUS; SUBCUTANEOUS at 11:26

## 2022-07-19 NOTE — PROGRESS NOTE ADULT - PROBLEM SELECTOR PLAN 1
GYN ONC Fellow Addendum:    Pt seen and examined at bedside. Agree with above. Resting comfortably this AM    VS reviewed    Continue current pain regimen  Tolerating reg diet  IV protonix  DVT ppx: Lovenox, SCDs  Dispo: still awaiting insurance approval for [facility]. SW continues to actively f/u on discharge planning    Guadalupe Jay MD

## 2022-07-19 NOTE — PROGRESS NOTE ADULT - SUBJECTIVE AND OBJECTIVE BOX
[facility] Division of Hospital Medicine  Waldo Busby MD  Pager (M-F, 8A-5P): [phone]  Other Times:  [phone]    Patient is a 52y old  Female who presents with a chief complaint of neutropenic fever (19 Jul 2022 06:34)    SUBJECTIVE / OVERNIGHT EVENTS:  Patient complaining of the abdominal pain - at baseline.  No F/C, N/V, CP, SOB, Cough, lightheadedness, dizziness, diarrhea, dysuria.    MEDICATIONS  (STANDING):  enoxaparin Injectable 40 milliGRAM(s) SubCutaneous every 24 hours  lactated ringers. 1000 milliLiter(s) (100 mL/Hr) IV Continuous <Continuous>  lidocaine 1% (Preservative-free) Injectable 50 milliLiter(s) Local Injection once  metoprolol succinate  milliGRAM(s) Oral daily  mirtazapine 15 milliGRAM(s) Oral daily  oxyCODONE  ER Tablet 30 milliGRAM(s) Oral every 12 hours  pantoprazole  Injectable 40 milliGRAM(s) IV Push two times a day  piperacillin/tazobactam IVPB.. 3.375 Gram(s) IV Intermittent every 8 hours    MEDICATIONS  (PRN):  acetaminophen   IVPB .. 1000 milliGRAM(s) IV Intermittent every 6 hours PRN Mild Pain (1 - 3)  aluminum hydroxide/magnesium hydroxide/simethicone Suspension 30 milliLiter(s) Oral every 4 hours PRN Dyspepsia  cyclobenzaprine 5 milliGRAM(s) Oral three times a day PRN Muscle Spasm  HYDROmorphone  Injectable 0.5 milliGRAM(s) IV Push every 3 hours PRN Moderate Pain (4 - 6)  HYDROmorphone  Injectable 1 milliGRAM(s) IV Push every 4 hours PRN Severe Pain (7 - 10)  lidocaine   4% Patch 1 Patch Transdermal daily PRN back pain  simethicone 80 milliGRAM(s) Chew three times a day PRN Gas      Vital Signs Last 24 Hrs  T(C): 37 (19 Jul 2022 09:00), Max: 37.5 (18 Jul 2022 21:43)  T(F): 98.6 (19 Jul 2022 09:00), Max: 99.5 (18 Jul 2022 21:43)  HR: 108 (19 Jul 2022 09:00) (102 - 108)  BP: 134/- (19 Jul 2022 09:00) (126/77 - 148/84)  BP(mean): --  RR: 18 (19 Jul 2022 09:00) (16 - 18)  SpO2: 100% (19 Jul 2022 09:00) (96% - 100%)    Parameters below as of 19 Jul 2022 09:00  Patient On (Oxygen Delivery Method): room air      CAPILLARY BLOOD GLUCOSE        I&O's Summary    18 Jul 2022 07:01  -  19 Jul 2022 07:00  --------------------------------------------------------  IN: 3010 mL / OUT: 2012 mL / NET: 998 mL        PHYSICAL EXAM:  CONSTITUTIONAL: NAD  EYES: PERRLA; conjunctiva and sclera clear  ENMT: Moist oral mucosa, no pharyngeal injection or exudates; normal dentition  NECK: Supple, no palpable masses; no thyromegaly  RESPIRATORY: Normal respiratory effort; lungs are clear to auscultation bilaterally  CARDIOVASCULAR: Regular rate and rhythm, normal S1 and S2, no murmur/rub/gallop; No lower extremity edema; Peripheral pulses are 2+ bilaterally  ABDOMEN: tenderness to palpation around surgical site, normoactive bowel sounds, no rebound/guarding; No hepatosplenomegaly; colonostomy bag in place  MUSCULOSKELETAL:  Normal gait; no clubbing or cyanosis of digits; no joint swelling or tenderness to palpation  PSYCH: A+O to person, place, and time; affect appropriate  NEUROLOGY: CN 2-12 are intact and symmetric; no gross sensory deficits   SKIN: No rashes; no palpable lesions, surgical dressing C/D/I    LABS:                    RADIOLOGY & ADDITIONAL TESTS:    Imaging Personally Reviewed:    Care Discussed with Consultants/Other Providers:

## 2022-07-19 NOTE — PROGRESS NOTE ADULT - PROBLEM SELECTOR PLAN 2
-h/o recurrent cervical CA, receives Gyn Oncology care at [facility] ([place]).   -she initially was diagnosed with stage III cervical CA in 2014 s/p VBRT + chemo but never had surgical intervention. -Reported now being s/p 5 cycles of chemo, unsure of regimen but next due 6/21/22.  -course complicated by bowel perforation s/p washout and bridging vicryl mesh placement to close fascial gap on 6/14 w/ skin closure device place don 6/17  -further management per general surgery, gyn onc and plastic surgery  -overall prognosis is poor   -patient is DNI/DNR   -Patient awaiting placement at [facility].

## 2022-07-19 NOTE — PROGRESS NOTE ADULT - SUBJECTIVE AND OBJECTIVE BOX
Gyn ONC Progress Note    Patient seen and examined at bedside. No acute events overnight. Reports being sore this morning. She is OOB to chair with assistance. Passing flatus.  Tolerating regular diet. Continues inquire about her pending transfer to [place].  Pt denies fever, chills, chest pain, SOB, nausea, vomiting, lightheadedness, dizziness.      Objective:  T(F): 98.3 (07-19-22 @ 06:00), Max: 99.5 (07-18-22 @ 21:43)  HR: 102 (07-19-22 @ 06:00) (102 - 106)  BP: 148/84 (07-19-22 @ 06:00) (126/77 - 148/84)  RR: 18 (07-19-22 @ 06:00) (16 - 18)  SpO2: 99% (07-19-22 @ 06:00) (96% - 99%)      I&O's Summary  17 Jul 2022 07:01  -  18 Jul 2022 07:00  --------------------------------------------------------  IN: 2780 mL / OUT: 2247.5 mL / NET: 532.5 mL    18 Jul 2022 07:01  -  19 Jul 2022 06:34  --------------------------------------------------------  IN: 2570 mL / OUT: 2012 mL / NET: 558 mL      MEDICATIONS  (STANDING):  enoxaparin Injectable 40 milliGRAM(s) SubCutaneous every 24 hours  lactated ringers. 1000 milliLiter(s) (100 mL/Hr) IV Continuous <Continuous>  lidocaine 1% (Preservative-free) Injectable 50 milliLiter(s) Local Injection once  metoprolol succinate  milliGRAM(s) Oral daily  mirtazapine 15 milliGRAM(s) Oral daily  oxyCODONE  ER Tablet 30 milliGRAM(s) Oral every 12 hours  pantoprazole  Injectable 40 milliGRAM(s) IV Push two times a day  piperacillin/tazobactam IVPB.. 3.375 Gram(s) IV Intermittent every 8 hours    MEDICATIONS  (PRN):  acetaminophen   IVPB .. 1000 milliGRAM(s) IV Intermittent every 6 hours PRN Mild Pain (1 - 3)  aluminum hydroxide/magnesium hydroxide/simethicone Suspension 30 milliLiter(s) Oral every 4 hours PRN Dyspepsia  cyclobenzaprine 5 milliGRAM(s) Oral three times a day PRN Muscle Spasm  HYDROmorphone  Injectable 0.5 milliGRAM(s) IV Push every 3 hours PRN Moderate Pain (4 - 6)  HYDROmorphone  Injectable 1 milliGRAM(s) IV Push every 4 hours PRN Severe Pain (7 - 10)  lidocaine   4% Patch 1 Patch Transdermal daily PRN back pain  simethicone 80 milliGRAM(s) Chew three times a day PRN Gas      Physical Exam:  Constitutional: Awake, alert. NAD, A+O x3  CV: RRR, normal S1/S2  Lungs: Clear to auscultation bilaterally  Abdomen: Soft, non-distended, mild diffuse tenderness. Abdominal packing in place with overlying bandage. Scant liquid stool output in ostomy with ?small clot at skin edge. Mucosa retracted away from skin edge although bowel loop visualized attached to subcutaneous space. RLQ and LLQ drain with seropurulent output. L pelvic drain removed, Gauze over site of former pelvic drain.  : R&L nephrostomy tubes draining slightly cloudy urine  Extremities: No calf tenderness b/l. SCDs in place

## 2022-07-19 NOTE — CHART NOTE - NSCHARTNOTEFT_GEN_A_CORE
Gyn Onc PM Note    Patient evaluated at bedside this afternoon. Sitting in chair. Denies complaints.     Objective  T(C): 36.7 (07-19-22 @ 17:09), Max: 37 (07-19-22 @ 09:00)  HR: 107 (07-19-22 @ 17:09) (100 - 108)  BP: 132/87 (07-19-22 @ 17:09) (129/76 - 148/84)  RR: 18 (07-19-22 @ 17:09) (15 - 18)  SpO2: 99% (07-19-22 @ 17:09) (99% - 100%)  Wt(kg): --      Interval updates:  - per SW note, insurance authorization complete for transfer to [facility]  - pending bed availability at [facility]  - Covid negative 7/19    VELIA Carreon-Quita PGY2

## 2022-07-19 NOTE — PROGRESS NOTE ADULT - ASSESSMENT
52y  HD#44 with recurrent cervical CA admitted for management of neutropenic fever, noted to have bowel perforation 22 now s/p emergent exploratory laparotomy, abdominal washout, rectosigmoid colectomy, diverting colostomy, bronchoscopy and Abthera placement. Patient s/p washout and bridging vicryl mesh placement to close fascial gap on . She is s/p wound vac, discontinued given signs of infection. Patient with previously septic with mental status change (). Made DNR/DNI after extensive multidisciplinary discussion. She was later noted to have a GI bleed, managed conservatively with input from GI and currently improved. Her mental status is improved and she has both insight and capacity, however waxes and wanes with intermittent agitation. Patient desires discharge for inpatient palliative care. Patient accepted to [facility], continues to await insurance authorization.     Neuro: Oxycontin, IV Tylenol, IV Dilaudid PRN, Flexeril, Lidocaine patch for pain, ATC and PRN dosing adjusted for patient comfort. Appreciate GHOS and Palliative recommendations.  CV: Persistent mild tachycardia.  - CT A/P and CT angio () negative for PE  - h/o HTN: on Toprol 100mg qD  Pulm: Maintaining appropriate SpO2.  GI: Regular diet + Ensure supplement as tolerated  - s/p GI eval for GI bleed. Recommended against EGD given patient wishes. q8CBC, IV Pantoprazole BID. s/p 2U pRBC () and 2uRPRBC (7/10) with appropriate response. Output no longer bloody.  - Ostomy: area of separation packed w/ quacel, ostomy powder and liquid barrier, hydrocolloid dressing.  - Senna, Protonix BID. Appreciate GI recs  : B/l nephrostomy tubes. ASHLEY w/ increased Creatinine likely in setting of sepsis, w/ last 1.06. Appreciate hospitalist recs.  FEN: LR@100.  Heme: DVT ppx: Lovenox, SCDs while in bed.  - Thrombocytopenia: now normalized. Appreciate Heme recs.  ID: Ostomy noted to leak into subcutaneous tissue w/ subsequent increasing leukocytosis and fevers. Now afebrile, VS improved and leukocytosis downtrended.  - Continue Zosyn (-7/13), (-) for broad antibiotic coverage.  - BCx () NG  - UCx () nl jessica  - s/p Caspofungin and Cefepime  - S/p Zarixo (-6/10) for neutropenic fever.  - S/p Zosyn (-)    Dispo: Patient has been accepted to palliative care unit at [facility], however continuing to await insurance authorization. Code status = DNR/DNI.    Julissa Leach MD, PGY4

## 2022-07-19 NOTE — PROGRESS NOTE ADULT - PROBLEM SELECTOR PLAN 12
-c/w lovenox subq daily    11. Dispo:  -for inpatient hospice ( [place]) pending. CM/SW still working on it.

## 2022-07-20 ENCOUNTER — TRANSCRIPTION ENCOUNTER (OUTPATIENT)
Age: 52
End: 2022-07-20

## 2022-07-20 VITALS
RESPIRATION RATE: 20 BRPM | SYSTOLIC BLOOD PRESSURE: 134 MMHG | DIASTOLIC BLOOD PRESSURE: 78 MMHG | TEMPERATURE: 98 F | HEART RATE: 102 BPM | OXYGEN SATURATION: 98 %

## 2022-07-20 DIAGNOSIS — K65.9 PERITONITIS, UNSPECIFIED: ICD-10-CM

## 2022-07-20 PROCEDURE — 99232 SBSQ HOSP IP/OBS MODERATE 35: CPT

## 2022-07-20 RX ORDER — PANTOPRAZOLE SODIUM 20 MG/1
40 TABLET, DELAYED RELEASE ORAL
Qty: 0 | Refills: 0 | DISCHARGE
Start: 2022-07-20

## 2022-07-20 RX ORDER — PIPERACILLIN AND TAZOBACTAM 4; .5 G/20ML; G/20ML
3.38 INJECTION, POWDER, LYOPHILIZED, FOR SOLUTION INTRAVENOUS
Qty: 0 | Refills: 0 | DISCHARGE
Start: 2022-07-20

## 2022-07-20 RX ORDER — HYDROMORPHONE HYDROCHLORIDE 2 MG/ML
0.5 INJECTION INTRAMUSCULAR; INTRAVENOUS; SUBCUTANEOUS
Qty: 0 | Refills: 0 | DISCHARGE
Start: 2022-07-20

## 2022-07-20 RX ORDER — SIMETHICONE 80 MG/1
1 TABLET, CHEWABLE ORAL
Qty: 0 | Refills: 0 | DISCHARGE
Start: 2022-07-20

## 2022-07-20 RX ORDER — METOPROLOL TARTRATE 50 MG
1 TABLET ORAL
Qty: 0 | Refills: 0 | DISCHARGE
Start: 2022-07-20

## 2022-07-20 RX ORDER — HYDROMORPHONE HYDROCHLORIDE 2 MG/ML
1 INJECTION INTRAMUSCULAR; INTRAVENOUS; SUBCUTANEOUS
Qty: 0 | Refills: 0 | DISCHARGE
Start: 2022-07-20

## 2022-07-20 RX ORDER — OXYCODONE HYDROCHLORIDE 5 MG/1
1 TABLET ORAL
Qty: 0 | Refills: 0 | DISCHARGE
Start: 2022-07-20

## 2022-07-20 RX ORDER — ACETAMINOPHEN 500 MG
100 TABLET ORAL
Qty: 0 | Refills: 0 | DISCHARGE
Start: 2022-07-20

## 2022-07-20 RX ORDER — CYCLOBENZAPRINE HYDROCHLORIDE 10 MG/1
1 TABLET, FILM COATED ORAL
Qty: 0 | Refills: 0 | DISCHARGE
Start: 2022-07-20

## 2022-07-20 RX ADMIN — PANTOPRAZOLE SODIUM 40 MILLIGRAM(S): 20 TABLET, DELAYED RELEASE ORAL at 05:49

## 2022-07-20 RX ADMIN — MIRTAZAPINE 15 MILLIGRAM(S): 45 TABLET, ORALLY DISINTEGRATING ORAL at 11:30

## 2022-07-20 RX ADMIN — HYDROMORPHONE HYDROCHLORIDE 1 MILLIGRAM(S): 2 INJECTION INTRAMUSCULAR; INTRAVENOUS; SUBCUTANEOUS at 00:00

## 2022-07-20 RX ADMIN — HYDROMORPHONE HYDROCHLORIDE 1 MILLIGRAM(S): 2 INJECTION INTRAMUSCULAR; INTRAVENOUS; SUBCUTANEOUS at 11:30

## 2022-07-20 RX ADMIN — HYDROMORPHONE HYDROCHLORIDE 1 MILLIGRAM(S): 2 INJECTION INTRAMUSCULAR; INTRAVENOUS; SUBCUTANEOUS at 11:45

## 2022-07-20 RX ADMIN — OXYCODONE HYDROCHLORIDE 30 MILLIGRAM(S): 5 TABLET ORAL at 09:19

## 2022-07-20 RX ADMIN — OXYCODONE HYDROCHLORIDE 30 MILLIGRAM(S): 5 TABLET ORAL at 09:49

## 2022-07-20 RX ADMIN — PIPERACILLIN AND TAZOBACTAM 25 GRAM(S): 4; .5 INJECTION, POWDER, LYOPHILIZED, FOR SOLUTION INTRAVENOUS at 09:19

## 2022-07-20 RX ADMIN — ENOXAPARIN SODIUM 40 MILLIGRAM(S): 100 INJECTION SUBCUTANEOUS at 11:30

## 2022-07-20 RX ADMIN — PIPERACILLIN AND TAZOBACTAM 25 GRAM(S): 4; .5 INJECTION, POWDER, LYOPHILIZED, FOR SOLUTION INTRAVENOUS at 01:02

## 2022-07-20 RX ADMIN — Medication 100 MILLIGRAM(S): at 05:49

## 2022-07-20 NOTE — PROGRESS NOTE ADULT - ATTENDING SUPERVISION STATEMENT
Resident
Fellow
Resident
Resident/Fellow
Resident/Fellow
Fellow
Resident
Resident/Fellow
Fellow
Fellow
Resident/Fellow
Resident/Fellow
Fellow
Resident
Resident/Fellow
Resident/Fellow
Fellow
Resident
Resident
Resident/Fellow
Fellow
Resident/Fellow
Resident
Resident/Fellow
Fellow
Resident/Fellow
Resident
Fellow
Resident
Resident/Fellow

## 2022-07-20 NOTE — PROGRESS NOTE ADULT - NUTRITIONAL ASSESSMENT
This patient has been assessed with a concern for Malnutrition and has been determined to have a diagnosis/diagnoses of Severe protein-calorie malnutrition.    This patient is being managed with:   Diet NPO with Tube Feed-  Tube Feeding Modality: Nasogastric  Jevity 1.2 Mike (JEVITY1.2RTH)  Total Volume for 24 Hours (mL): 600  Continuous  Starting Tube Feed Rate {mL per Hour}: 25  Until Goal Tube Feed Rate (mL per Hour): 25  Tube Feed Duration (in Hours): 24  Tube Feed Start Time: 10:00  Entered: Jun 11 2022  9:57AM    
This patient has been assessed with a concern for Malnutrition and has been determined to have a diagnosis/diagnoses of Severe protein-calorie malnutrition.    This patient is being managed with:   Diet NPO with Tube Feed-  Tube Feeding Modality: Nasogastric  Jevity 1.2 Mike (JEVITY1.2RTH)  Total Volume for 24 Hours (mL): 600  Continuous  Starting Tube Feed Rate {mL per Hour}: 25  Until Goal Tube Feed Rate (mL per Hour): 25  Tube Feed Duration (in Hours): 24  Tube Feed Start Time: 10:00  Entered: Jun 11 2022  9:57AM    
This patient has been assessed with a concern for Malnutrition and has been determined to have a diagnosis/diagnoses of Severe protein-calorie malnutrition.    This patient is being managed with:   Diet NPO with Tube Feed-  Tube Feeding Modality: Nasogastric  Nepro with Carb Steady (NEPRORTH)  Total Volume for 24 Hours (mL): 720  Continuous  Starting Tube Feed Rate {mL per Hour}: 30  Until Goal Tube Feed Rate (mL per Hour): 30  Tube Feed Duration (in Hours): 24  Tube Feed Start Time: 18:45  Entered: Jun 13 2022  6:39PM    Diet NPO after Midnight-     NPO Start Date: 13-Jun-2022   NPO Start Time: 23:59  Entered: Jun 13 2022  6:39PM    
This patient has been assessed with a concern for Malnutrition and has been determined to have a diagnosis/diagnoses of Severe protein-calorie malnutrition.    This patient is being managed with:   Diet NPO with Tube Feed-  Tube Feeding Modality: Nasogastric  TwoCal HN (TWOCALHNRTH)  Total Volume for 24 Hours (mL): 600  Continuous  Starting Tube Feed Rate {mL per Hour}: 25  Until Goal Tube Feed Rate (mL per Hour): 25  Tube Feed Duration (in Hours): 24  Tube Feed Start Time: 11:30  Entered: Jun 16 2022 11:58AM    
This patient has been assessed with a concern for Malnutrition and has been determined to have a diagnosis/diagnoses of Severe protein-calorie malnutrition.    This patient is being managed with:   Diet Regular-  No Beef  Supplement Feeding Modality:  Oral  Ensure Enlive Cans or Servings Per Day:  1       Frequency:  Three Times a day  Entered: Jun 22 2022  3:19PM    
This patient has been assessed with a concern for Malnutrition and has been determined to have a diagnosis/diagnoses of Severe protein-calorie malnutrition.    This patient is being managed with:   Diet Regular-  Supplement Feeding Modality:  Oral  Ensure Clear Cans or Servings Per Day:  1       Frequency:  Three Times a day  Entered: Jul 8 2022  2:14PM    
This patient has been assessed with a concern for Malnutrition and has been determined to have a diagnosis/diagnoses of Severe protein-calorie malnutrition.    This patient is being managed with:   Diet NPO with Tube Feed-  Tube Feeding Modality: Nasogastric  Jevity 1.2 Mike (JEVITY1.2RTH)  Total Volume for 24 Hours (mL): 600  Continuous  Starting Tube Feed Rate {mL per Hour}: 25  Until Goal Tube Feed Rate (mL per Hour): 25  Tube Feed Duration (in Hours): 24  Tube Feed Start Time: 10:00  Entered: Jun 11 2022  9:57AM    
This patient has been assessed with a concern for Malnutrition and has been determined to have a diagnosis/diagnoses of Severe protein-calorie malnutrition.    This patient is being managed with:   Diet NPO with Tube Feed-  Tube Feeding Modality: Nasogastric  Nepro with Carb Steady (NEPRORTH)  Total Volume for 24 Hours (mL): 720  Continuous  Starting Tube Feed Rate {mL per Hour}: 30  Until Goal Tube Feed Rate (mL per Hour): 30  Tube Feed Duration (in Hours): 24  Tube Feed Start Time: 18:45  Entered: Jun 13 2022  6:39PM    
This patient has been assessed with a concern for Malnutrition and has been determined to have a diagnosis/diagnoses of Severe protein-calorie malnutrition.    This patient is being managed with:   Diet NPO with Tube Feed-  Tube Feeding Modality: Nasogastric  Nepro with Carb Steady (NEPRORTH)  Total Volume for 24 Hours (mL): 720  Continuous  Starting Tube Feed Rate {mL per Hour}: 30  Until Goal Tube Feed Rate (mL per Hour): 30  Tube Feed Duration (in Hours): 24  Tube Feed Start Time: 18:45  Entered: Jun 13 2022  6:39PM    
This patient has been assessed with a concern for Malnutrition and has been determined to have a diagnosis/diagnoses of Severe protein-calorie malnutrition.    This patient is being managed with:   Diet NPO with Tube Feed-  Tube Feeding Modality: Nasogastric  TwoCal HN (TWOCALHNRTH)  Total Volume for 24 Hours (mL): 600  Continuous  Starting Tube Feed Rate {mL per Hour}: 25  Until Goal Tube Feed Rate (mL per Hour): 25  Tube Feed Duration (in Hours): 24  Tube Feed Start Time: 11:30  Entered: Jun 16 2022 11:58AM    
This patient has been assessed with a concern for Malnutrition and has been determined to have a diagnosis/diagnoses of Severe protein-calorie malnutrition.    This patient is being managed with:   Diet NPO with Tube Feed-  Tube Feeding Modality: Nasogastric  TwoCal HN (TWOCALHNRTH)  Total Volume for 24 Hours (mL): 600  Continuous  Starting Tube Feed Rate {mL per Hour}: 25  Until Goal Tube Feed Rate (mL per Hour): 25  Tube Feed Duration (in Hours): 24  Tube Feed Start Time: 11:30  Entered: Jun 16 2022 11:58AM    
This patient has been assessed with a concern for Malnutrition and has been determined to have a diagnosis/diagnoses of Severe protein-calorie malnutrition.    This patient is being managed with:   Diet NPO-  Except Medications  Entered: Jun 8 2022  7:05AM    
This patient has been assessed with a concern for Malnutrition and has been determined to have a diagnosis/diagnoses of Severe protein-calorie malnutrition.    This patient is being managed with:   Diet Regular-  No Beef  Supplement Feeding Modality:  Oral  Ensure Enlive Cans or Servings Per Day:  1       Frequency:  Three Times a day  Entered: Jun 22 2022  3:19PM    
This patient has been assessed with a concern for Malnutrition and has been determined to have a diagnosis/diagnoses of Severe protein-calorie malnutrition.    This patient is being managed with:   Diet Regular-  Supplement Feeding Modality:  Oral  Ensure Clear Cans or Servings Per Day:  1       Frequency:  Three Times a day  Entered: Jul 8 2022  2:14PM    
This patient has been assessed with a concern for Malnutrition and has been determined to have a diagnosis/diagnoses of Severe protein-calorie malnutrition.    This patient is being managed with:   Diet Regular-  Supplement Feeding Modality:  Oral  Ensure Enlive Cans or Servings Per Day:  1       Frequency:  Three Times a day  Entered: Jul 19 2022  9:39AM    
This patient has been assessed with a concern for Malnutrition and has been determined to have a diagnosis/diagnoses of Severe protein-calorie malnutrition.    This patient is being managed with:   Diet NPO with Tube Feed-  Tube Feeding Modality: Nasogastric  Jevity 1.2 Miek (JEVITY1.2RTH)  Total Volume for 24 Hours (mL): 600  Continuous  Starting Tube Feed Rate {mL per Hour}: 25  Until Goal Tube Feed Rate (mL per Hour): 25  Tube Feed Duration (in Hours): 24  Tube Feed Start Time: 10:00  Entered: Jun 11 2022  9:57AM    Diet NPO after Midnight-     NPO Start Date: 11-Jun-2022   NPO Start Time: 23:59  Entered: Jun 11 2022  8:14AM    
This patient has been assessed with a concern for Malnutrition and has been determined to have a diagnosis/diagnoses of Severe protein-calorie malnutrition.    This patient is being managed with:   Diet NPO with Tube Feed-  Tube Feeding Modality: Nasogastric  Nepro with Carb Steady (NEPRORTH)  Total Volume for 24 Hours (mL): 720  Continuous  Starting Tube Feed Rate {mL per Hour}: 30  Until Goal Tube Feed Rate (mL per Hour): 30  Tube Feed Duration (in Hours): 24  Tube Feed Start Time: 18:45  Entered: Jun 13 2022  6:39PM    
This patient has been assessed with a concern for Malnutrition and has been determined to have a diagnosis/diagnoses of Severe protein-calorie malnutrition.    This patient is being managed with:   Diet NPO with Tube Feed-  Tube Feeding Modality: Nasogastric  TwoCal HN (TWOCALHNRTH)  Total Volume for 24 Hours (mL): 600  Continuous  Starting Tube Feed Rate {mL per Hour}: 25  Until Goal Tube Feed Rate (mL per Hour): 25  Tube Feed Duration (in Hours): 24  Tube Feed Start Time: 11:30  Entered: Jun 16 2022 11:58AM    
This patient has been assessed with a concern for Malnutrition and has been determined to have a diagnosis/diagnoses of Severe protein-calorie malnutrition.    This patient is being managed with:   Diet NPO-  Except Medications  Entered: Jun 8 2022  7:05AM    
This patient has been assessed with a concern for Malnutrition and has been determined to have a diagnosis/diagnoses of Severe protein-calorie malnutrition.    This patient is being managed with:   Diet NPO-  NPO for Procedure/Test     NPO Start Date: 08-Jul-2022   NPO Start Time: 09:00  Except Medications  Entered: Jul 8 2022 10:55AM    
This patient has been assessed with a concern for Malnutrition and has been determined to have a diagnosis/diagnoses of Severe protein-calorie malnutrition.    This patient is being managed with:   Diet Regular-  No Beef  Supplement Feeding Modality:  Oral  Ensure Enlive Cans or Servings Per Day:  1       Frequency:  Three Times a day  Entered: Jun 22 2022  3:19PM    
This patient has been assessed with a concern for Malnutrition and has been determined to have a diagnosis/diagnoses of Severe protein-calorie malnutrition.    This patient is being managed with:   Diet NPO with Tube Feed-  Tube Feeding Modality: Nasogastric  Nepro with Carb Steady (NEPRORTH)  Total Volume for 24 Hours (mL): 720  Continuous  Starting Tube Feed Rate {mL per Hour}: 30  Until Goal Tube Feed Rate (mL per Hour): 30  Tube Feed Duration (in Hours): 24  Tube Feed Start Time: 18:45  Entered: Jun 13 2022  6:39PM    Diet NPO after Midnight-     NPO Start Date: 13-Jun-2022   NPO Start Time: 23:59  Entered: Jun 13 2022  6:39PM    
This patient has been assessed with a concern for Malnutrition and has been determined to have a diagnosis/diagnoses of Severe protein-calorie malnutrition.    This patient is being managed with:   Diet NPO with Tube Feed-  Tube Feeding Modality: Nasogastric  TwoCal HN (TWOCALHNRTH)  Total Volume for 24 Hours (mL): 600  Continuous  Starting Tube Feed Rate {mL per Hour}: 25  Until Goal Tube Feed Rate (mL per Hour): 25  Tube Feed Duration (in Hours): 24  Tube Feed Start Time: 11:30  Entered: Jun 16 2022 11:58AM    
This patient has been assessed with a concern for Malnutrition and has been determined to have a diagnosis/diagnoses of Severe protein-calorie malnutrition.    This patient is being managed with:   Diet NPO-  Except Medications  Entered: Jun 13 2022 10:54AM    
This patient has been assessed with a concern for Malnutrition and has been determined to have a diagnosis/diagnoses of Severe protein-calorie malnutrition.    This patient is being managed with:   Diet NPO-  Except Medications  Entered: Jun 13 2022 10:54AM      This patient has been assessed with a concern for Malnutrition and has been determined to have a diagnosis/diagnoses of Severe protein-calorie malnutrition.    This patient is being managed with:   Diet NPO-  Except Medications  Entered: Jun 13 2022 10:54AM    
This patient has been assessed with a concern for Malnutrition and has been determined to have a diagnosis/diagnoses of Severe protein-calorie malnutrition.    This patient is being managed with:   Diet Regular-  No Beef  Entered: Jun 20 2022  4:47PM    
This patient has been assessed with a concern for Malnutrition and has been determined to have a diagnosis/diagnoses of Severe protein-calorie malnutrition.    This patient is being managed with:   Diet Regular-  No Beef  Entered: Jun 20 2022  4:47PM    
This patient has been assessed with a concern for Malnutrition and has been determined to have a diagnosis/diagnoses of Severe protein-calorie malnutrition.    This patient is being managed with:   Diet Regular-  No Beef  Supplement Feeding Modality:  Oral  Ensure Enlive Cans or Servings Per Day:  1       Frequency:  Three Times a day  Entered: Jun 22 2022  3:19PM    
This patient has been assessed with a concern for Malnutrition and has been determined to have a diagnosis/diagnoses of Severe protein-calorie malnutrition.    This patient is being managed with:   Diet Regular-  Supplement Feeding Modality:  Oral  Ensure Clear Cans or Servings Per Day:  1       Frequency:  Three Times a day  Entered: Jul 8 2022  2:14PM    
This patient has been assessed with a concern for Malnutrition and has been determined to have a diagnosis/diagnoses of Severe protein-calorie malnutrition.    This patient is being managed with:   Diet NPO after Midnight-     NPO Start Date: 17-Jun-2022   NPO Start Time: 23:59  Entered: Jun 17 2022 12:48AM    Diet NPO with Tube Feed-  Tube Feeding Modality: Nasogastric  TwoCal HN (TWOCALHNRTH)  Total Volume for 24 Hours (mL): 600  Continuous  Starting Tube Feed Rate {mL per Hour}: 25  Until Goal Tube Feed Rate (mL per Hour): 25  Tube Feed Duration (in Hours): 24  Tube Feed Start Time: 11:30  Entered: Jun 16 2022 11:58AM    
This patient has been assessed with a concern for Malnutrition and has been determined to have a diagnosis/diagnoses of Severe protein-calorie malnutrition.    This patient is being managed with:   Diet NPO with Tube Feed-  Tube Feeding Modality: Nasogastric  Nepro with Carb Steady (NEPRORTH)  Total Volume for 24 Hours (mL): 720  Continuous  Starting Tube Feed Rate {mL per Hour}: 30  Until Goal Tube Feed Rate (mL per Hour): 30  Tube Feed Duration (in Hours): 24  Tube Feed Start Time: 18:45  Entered: Jun 13 2022  6:39PM    
This patient has been assessed with a concern for Malnutrition and has been determined to have a diagnosis/diagnoses of Severe protein-calorie malnutrition.    This patient is being managed with:   Diet NPO with Tube Feed-  Tube Feeding Modality: Nasogastric  Nepro with Carb Steady (NEPRORTH)  Total Volume for 24 Hours (mL): 720  Continuous  Starting Tube Feed Rate {mL per Hour}: 30  Until Goal Tube Feed Rate (mL per Hour): 30  Tube Feed Duration (in Hours): 24  Tube Feed Start Time: 18:45  Entered: Jun 13 2022  6:39PM    Diet NPO after Midnight-     NPO Start Date: 13-Jun-2022   NPO Start Time: 23:59  Entered: Jun 13 2022  6:39PM    
This patient has been assessed with a concern for Malnutrition and has been determined to have a diagnosis/diagnoses of Severe protein-calorie malnutrition.    This patient is being managed with:   Diet Regular-  No Beef  Entered: Jun 20 2022  4:47PM    
This patient has been assessed with a concern for Malnutrition and has been determined to have a diagnosis/diagnoses of Severe protein-calorie malnutrition.    This patient is being managed with:   Diet Regular-  No Beef  Supplement Feeding Modality:  Oral  Ensure Enlive Cans or Servings Per Day:  1       Frequency:  Three Times a day  Entered: Jun 22 2022  3:19PM    
This patient has been assessed with a concern for Malnutrition and has been determined to have a diagnosis/diagnoses of Severe protein-calorie malnutrition.    This patient is being managed with:   Diet Regular-  Supplement Feeding Modality:  Oral  Ensure Clear Cans or Servings Per Day:  1       Frequency:  Three Times a day  Entered: Jul 8 2022  2:14PM    
This patient has been assessed with a concern for Malnutrition and has been determined to have a diagnosis/diagnoses of Severe protein-calorie malnutrition.    This patient is being managed with:   Diet Regular-  No Beef  Supplement Feeding Modality:  Oral  Ensure Enlive Cans or Servings Per Day:  1       Frequency:  Three Times a day  Entered: Jun 22 2022  3:19PM    
This patient has been assessed with a concern for Malnutrition and has been determined to have a diagnosis/diagnoses of Severe protein-calorie malnutrition.    This patient is being managed with:   Diet Regular-  Supplement Feeding Modality:  Oral  Ensure Clear Cans or Servings Per Day:  1       Frequency:  Three Times a day  Entered: Jul 8 2022  2:14PM    
This patient has been assessed with a concern for Malnutrition and has been determined to have a diagnosis/diagnoses of Severe protein-calorie malnutrition.    This patient is being managed with:   Diet Regular-  Supplement Feeding Modality:  Oral  Ensure Clear Cans or Servings Per Day:  1       Frequency:  Three Times a day  Entered: Jul 8 2022  2:14PM    
This patient has been assessed with a concern for Malnutrition and has been determined to have a diagnosis/diagnoses of Severe protein-calorie malnutrition.    This patient is being managed with:   Diet Regular-  No Beef  Supplement Feeding Modality:  Oral  Ensure Enlive Cans or Servings Per Day:  1       Frequency:  Three Times a day  Entered: Jun 22 2022  3:19PM    
This patient has been assessed with a concern for Malnutrition and has been determined to have a diagnosis/diagnoses of Severe protein-calorie malnutrition.    This patient is being managed with:   Diet Regular-  Supplement Feeding Modality:  Oral  Ensure Clear Cans or Servings Per Day:  1       Frequency:  Three Times a day  Entered: Jul 8 2022  2:14PM    
This patient has been assessed with a concern for Malnutrition and has been determined to have a diagnosis/diagnoses of Severe protein-calorie malnutrition.    This patient is being managed with:   Diet Regular-  No Beef  Supplement Feeding Modality:  Oral  Ensure Enlive Cans or Servings Per Day:  1       Frequency:  Three Times a day  Entered: Jun 22 2022  3:19PM    
This patient has been assessed with a concern for Malnutrition and has been determined to have a diagnosis/diagnoses of Severe protein-calorie malnutrition.    This patient is being managed with:   Diet Regular-  Supplement Feeding Modality:  Oral  Ensure Clear Cans or Servings Per Day:  1       Frequency:  Three Times a day  Entered: Jul 8 2022  2:14PM    
This patient has been assessed with a concern for Malnutrition and has been determined to have a diagnosis/diagnoses of Severe protein-calorie malnutrition.    This patient is being managed with:   Diet NPO-  NPO for Procedure/Test     NPO Start Date: 08-Jul-2022   NPO Start Time: 09:00  Except Medications  Entered: Jul 8 2022 10:55AM    
This patient has been assessed with a concern for Malnutrition and has been determined to have a diagnosis/diagnoses of Severe protein-calorie malnutrition.    This patient is being managed with:   Diet Regular-  No Beef  Supplement Feeding Modality:  Oral  Ensure Enlive Cans or Servings Per Day:  1       Frequency:  Three Times a day  Entered: Jun 22 2022  3:19PM    
This patient has been assessed with a concern for Malnutrition and has been determined to have a diagnosis/diagnoses of Severe protein-calorie malnutrition.    This patient is being managed with:   Diet Regular-  No Beef  Supplement Feeding Modality:  Oral  Ensure Enlive Cans or Servings Per Day:  1       Frequency:  Three Times a day  Entered: Jun 22 2022  3:19PM    
This patient has been assessed with a concern for Malnutrition and has been determined to have a diagnosis/diagnoses of Severe protein-calorie malnutrition.    This patient is being managed with:   Diet Regular-  Supplement Feeding Modality:  Oral  Ensure Clear Cans or Servings Per Day:  1       Frequency:  Three Times a day  Entered: Jul 8 2022  2:14PM    
This patient has been assessed with a concern for Malnutrition and has been determined to have a diagnosis/diagnoses of Severe protein-calorie malnutrition.    This patient is being managed with:   Diet Regular-  No Beef  Supplement Feeding Modality:  Oral  Ensure Enlive Cans or Servings Per Day:  1       Frequency:  Three Times a day  Entered: Jun 22 2022  3:19PM    
This patient has been assessed with a concern for Malnutrition and has been determined to have a diagnosis/diagnoses of Severe protein-calorie malnutrition.    This patient is being managed with:   Diet Regular-  Supplement Feeding Modality:  Oral  Ensure Clear Cans or Servings Per Day:  1       Frequency:  Three Times a day  Entered: Jul 8 2022  2:14PM    

## 2022-07-20 NOTE — PROGRESS NOTE ADULT - SUBJECTIVE AND OBJECTIVE BOX
[facility] Division of Hospital Medicine  Waldo Busby MD  Pager (JOEL-FINA, 8A-[phone]): [phone]  Other Times:  [phone]    Patient is a 52y old  Female who presents with a chief complaint of neutropenic fever (20 Jul 2022 07:05)    SUBJECTIVE / OVERNIGHT EVENTS:  Patient states her pain is worse than yesterday.  Awaiting to have pain med dispensed by the nursing staff.  Offers no new complaints.  Still awaiting placement to [place].  No F/C, N/V, CP, SOB, Cough, lightheadedness, dizziness, diarrhea, dysuria.    MEDICATIONS  (STANDING):  enoxaparin Injectable 40 milliGRAM(s) SubCutaneous every 24 hours  lactated ringers. 1000 milliLiter(s) (100 mL/Hr) IV Continuous <Continuous>  lidocaine 1% (Preservative-free) Injectable 50 milliLiter(s) Local Injection once  metoprolol succinate  milliGRAM(s) Oral daily  mirtazapine 15 milliGRAM(s) Oral daily  oxyCODONE  ER Tablet 30 milliGRAM(s) Oral every 12 hours  pantoprazole  Injectable 40 milliGRAM(s) IV Push two times a day  piperacillin/tazobactam IVPB.. 3.375 Gram(s) IV Intermittent every 8 hours    MEDICATIONS  (PRN):  acetaminophen   IVPB .. 1000 milliGRAM(s) IV Intermittent every 6 hours PRN Mild Pain (1 - 3)  aluminum hydroxide/magnesium hydroxide/simethicone Suspension 30 milliLiter(s) Oral every 4 hours PRN Dyspepsia  cyclobenzaprine 5 milliGRAM(s) Oral three times a day PRN Muscle Spasm  HYDROmorphone  Injectable 0.5 milliGRAM(s) IV Push every 3 hours PRN Moderate Pain (4 - 6)  HYDROmorphone  Injectable 1 milliGRAM(s) IV Push every 4 hours PRN Severe Pain (7 - 10)  lidocaine   4% Patch 1 Patch Transdermal daily PRN back pain  simethicone 80 milliGRAM(s) Chew three times a day PRN Gas      Vital Signs Last 24 Hrs  T(C): 36.7 (20 Jul 2022 05:45), Max: 36.8 (19 Jul 2022 17:38)  T(F): 98 (20 Jul 2022 05:45), Max: 98.3 (19 Jul 2022 17:38)  HR: 104 (20 Jul 2022 05:45) (102 - 107)  BP: 140/69 (20 Jul 2022 05:45) (123/79 - 140/69)  BP(mean): --  RR: 20 (20 Jul 2022 05:45) (15 - 20)  SpO2: 98% (20 Jul 2022 05:45) (98% - 100%)    Parameters below as of 20 Jul 2022 05:45  Patient On (Oxygen Delivery Method): room air      CAPILLARY BLOOD GLUCOSE        I&O's Summary    19 Jul 2022 07:01  -  20 Jul 2022 07:00  --------------------------------------------------------  IN: 2050 mL / OUT: 1700 mL / NET: 350 mL        PHYSICAL EXAM:  CONSTITUTIONAL: NAD  EYES: PERRLA; conjunctiva and sclera clear  ENMT: Moist oral mucosa, no pharyngeal injection or exudates; normal dentition  NECK: Supple, no palpable masses; no thyromegaly  RESPIRATORY: Normal respiratory effort; lungs are clear to auscultation bilaterally  CARDIOVASCULAR: Regular rate and rhythm, normal S1 and S2, no murmur/rub/gallop; No lower extremity edema; Peripheral pulses are 2+ bilaterally  ABDOMEN: tenderness to palpation around surgical site, normoactive bowel sounds, no rebound/guarding; No hepatosplenomegaly; colonostomy bag in place  MUSCULOSKELETAL:  Normal gait; no clubbing or cyanosis of digits; no joint swelling or tenderness to palpation  PSYCH: A+O to person, place, and time; affect appropriate  NEUROLOGY: CN 2-12 are intact and symmetric; no gross sensory deficits   SKIN: No rashes; no palpable lesions, surgical dressing C/D/I    LABS:                    RADIOLOGY & ADDITIONAL TESTS:    Imaging Personally Reviewed:    Care Discussed with Consultants/Other Providers:

## 2022-07-20 NOTE — PROGRESS NOTE ADULT - PROBLEM SELECTOR PLAN 1
-CTPA without e/o PE or consolidation but given abdominal wound noted to have stool there is concern for fistulization vs peritonitis.   -c/w wound management per gyn-onc   - on zosyn for peritonitis   -very poor prognosis.

## 2022-07-20 NOTE — PROGRESS NOTE ADULT - PROBLEM SELECTOR PLAN 1
GYN ONC Fellow Addendum:    Pt seen and examined at bedside. Agree with above. Mild back pain otherwise no complaints.    VS reviewed  Labs reviewed    Continue current pain regimen  Tolerating reg diet  IV protonix  DVT ppx: Lovenox  Dispo: awaiting bed availability at [facility]     Guadalupe Jay MD

## 2022-07-20 NOTE — PROGRESS NOTE ADULT - PROVIDER SPECIALTY LIST ADULT
Anesthesia
Endocrinology
Gastroenterology
Gyn Onc
Plastic Surgery
Rehab Medicine
SICU
Surgery
Endocrinology
Endocrinology
Gyn Onc
Hospitalist
Neurology
Plastic Surgery
Plastic Surgery
SICU
Surgery
Anesthesia
Gyn Onc
Hospitalist
Plastic Surgery
SICU
Surgery
Endocrinology
Gyn Onc
Hospitalist
Plastic Surgery
SICU
SICU
Surgery
Gyn Onc
SICU
Surgery
Hospitalist
Hospitalist
Palliative Care
Hospitalist
Hospitalist
Palliative Care
Hospitalist

## 2022-07-20 NOTE — DISCHARGE NOTE NURSING/CASE MANAGEMENT/SOCIAL WORK - PATIENT PORTAL LINK FT
You can access the FollowMyHealth Patient Portal offered by [facility] by registering at the following website: [email or web address]. By joining [facility]’s FollowMyHealth portal, you will also be able to view your health information using other applications (apps) compatible with our system.

## 2022-07-20 NOTE — PROGRESS NOTE ADULT - PROBLEM SELECTOR PLAN 2
-h/o recurrent cervical CA, receives Gyn Oncology care at [facility] ([place]).   -she initially was diagnosed with stage III cervical CA in 2014 s/p VBRT + chemo but never had surgical intervention. -Reported now being s/p 5 cycles of chemo, unsure of regimen but next due 6/21/22.  -course complicated by bowel perforation s/p washout and bridging vicryl mesh placement to close fascial gap on 6/14 w/ skin closure device place don 6/17  -further management per general surgery, gyn onc and plastic surgery  -overall prognosis is poor   -patient is DNI/DNR   -Patient awaiting placement at [facility].

## 2022-07-20 NOTE — PROGRESS NOTE ADULT - ASSESSMENT
52y  HD#45 with recurrent cervical CA admitted for management of neutropenic fever, noted to have bowel perforation 22 now s/p emergent exploratory laparotomy, abdominal washout, rectosigmoid colectomy, diverting colostomy, bronchoscopy and Abthera placement. Patient s/p washout and bridging vicryl mesh placement to close fascial gap on . She is s/p wound vac, discontinued given signs of infection. Patient with previously septic with mental status change (). Made DNR/DNI after extensive multidisciplinary discussion. She was later noted to have a GI bleed, managed conservatively with input from GI and currently improved. Her mental status is improved and she has both insight and capacity, however waxes and wanes with intermittent agitation. Patient desires discharge for inpatient palliative care. Patient accepted to [facility], authorization from insurance complete, awaiting open bed at [facility].     Neuro: Oxycontin, IV Tylenol, IV Dilaudid PRN, Flexeril, Lidocaine patch for pain, ATC and PRN dosing adjusted for patient comfort. Appreciate GHOS and Palliative recommendations.  CV: Persistent mild tachycardia.  - CT A/P and CT angio () negative for PE  - h/o HTN: on Toprol 100mg qD  Pulm: Maintaining appropriate SpO2.  GI: Regular diet + Ensure supplement as tolerated  - s/p GI eval for GI bleed. Recommended against EGD given patient wishes. IV Pantoprazole BID. s/p 2U pRBC () and 2uRPRBC (7/10) with appropriate response. Output no longer bloody.  - Ostomy: area of separation packed w/ quacel, ostomy powder and liquid barrier, hydrocolloid dressing.  - Senna, Protonix BID. Appreciate GI recs  : B/l nephrostomy tubes. ASHLEY w/ increased Creatinine likely in setting of sepsis, w/ last 1.06. Appreciate hospitalist recs.  FEN: LR@100.  Heme: DVT ppx: Lovenox, SCDs while in bed.  - Thrombocytopenia: now normalized. Appreciate Heme recs.  ID: Ostomy noted to leak into subcutaneous tissue w/ subsequent increasing leukocytosis and fevers. Now afebrile, VS improved and leukocytosis downtrended.  - Continue Zosyn (-), (-) for broad antibiotic coverage.  - BCx () NG  - UCx () nl jessica  - s/p Caspofungin and Cefepime  - S/p Zarixo (-6/10) for neutropenic fever.  - S/p Zosyn (-)    Dispo: Patient has been accepted to palliative care unit at [facility], insurance authorized. Awaiting open bed at [facility]. Code status = DNR/DNI.    Marika Lemons MD PGY3

## 2022-07-20 NOTE — PROGRESS NOTE ADULT - PROBLEM SELECTOR PLAN 12
-c/w lovenox subq daily    11. Dispo:  -for inpatient hospice ( [place]) pending. CM/SW still working on it.

## 2022-07-20 NOTE — DISCHARGE NOTE NURSING/CASE MANAGEMENT/SOCIAL WORK - NSDCPEFALRISK_GEN_ALL_CORE
For information on Fall & Injury Prevention, visit: [email or web address] OR  [email or web address] OR  https://www.cdc.gov/steadi/patient.html

## 2022-07-20 NOTE — PROGRESS NOTE ADULT - SUBJECTIVE AND OBJECTIVE BOX
R3 Progress Note      Pt seen and examined at bedside. No overnight events. Patient endorsing continued low back pain, controlled with pain regimen. Also notes soreness around ostomy during changes.    She denies SOB/CP/palpitations, fever/chills, nausea/emesis.     MEDICATIONS  (STANDING):  enoxaparin Injectable 40 milliGRAM(s) SubCutaneous every 24 hours  lactated ringers. 1000 milliLiter(s) (100 mL/Hr) IV Continuous <Continuous>  lidocaine 1% (Preservative-free) Injectable 50 milliLiter(s) Local Injection once  metoprolol succinate  milliGRAM(s) Oral daily  mirtazapine 15 milliGRAM(s) Oral daily  oxyCODONE  ER Tablet 30 milliGRAM(s) Oral every 12 hours  pantoprazole  Injectable 40 milliGRAM(s) IV Push two times a day  piperacillin/tazobactam IVPB.. 3.375 Gram(s) IV Intermittent every 8 hours    MEDICATIONS  (PRN):  acetaminophen   IVPB .. 1000 milliGRAM(s) IV Intermittent every 6 hours PRN Mild Pain (1 - 3)  aluminum hydroxide/magnesium hydroxide/simethicone Suspension 30 milliLiter(s) Oral every 4 hours PRN Dyspepsia  cyclobenzaprine 5 milliGRAM(s) Oral three times a day PRN Muscle Spasm  HYDROmorphone  Injectable 0.5 milliGRAM(s) IV Push every 3 hours PRN Moderate Pain (4 - 6)  HYDROmorphone  Injectable 1 milliGRAM(s) IV Push every 4 hours PRN Severe Pain (7 - 10)  lidocaine   4% Patch 1 Patch Transdermal daily PRN back pain  simethicone 80 milliGRAM(s) Chew three times a day PRN Gas        Vital Signs Last 24 Hrs  T(C): 36.7 (20 Jul 2022 05:45), Max: 37 (19 Jul 2022 09:00)  T(F): 98 (20 Jul 2022 05:45), Max: 98.6 (19 Jul 2022 09:00)  HR: 104 (20 Jul 2022 05:45) (100 - 108)  BP: 140/69 (20 Jul 2022 05:45) (123/79 - 140/69)  BP(mean): --  RR: 20 (20 Jul 2022 05:45) (15 - 20)  SpO2: 98% (20 Jul 2022 05:45) (98% - 100%)    Parameters below as of 20 Jul 2022 05:45  Patient On (Oxygen Delivery Method): room air        07-19 @ 07:01  -  07-20 @ 07:00  --------------------------------------------------------  IN: 2050 mL / OUT: 1700 mL / NET: 350 mL        Physical Exam:  Constitutional: Awake, alert. NAD, A+O x3  CV: RRR, normal S1/S2  Lungs: Clear to auscultation bilaterally  Abdomen: Soft, non-distended, mild diffuse tenderness. Abdominal packing in place with overlying bandage. Scant liquid stool output in ostomy, recently changed. Mucosa retracted away from skin edge although bowel loop visualized attached to subcutaneous space. RLQ and LLQ drain with seropurulent output. L pelvic drain removed, Gauze over site of former pelvic drain.  : R&L nephrostomy tubes draining slightly cloudy urine  Extremities: No calf tenderness b/l. SCDs in place      Labs, additional tests:

## 2022-07-20 NOTE — PROGRESS NOTE ADULT - REASON FOR ADMISSION
neutropenic fever

## 2022-07-20 NOTE — PROGRESS NOTE ADULT - ASSESSMENT
52 y.o. Female DNR/DNI with recurrent cervical CA admitted for management of neutropenic fever, now s/p emergent exploratory laparotomy, abdominal washout, rectosigmoid colectomy, diverting colostomy, bronchoscopy and Abthera placement on 6/8/22 for findings of increased free air on abdominal xray concerning for bowel perforation. Patient s/p washout and bridging vicryl mesh placement to close fascial gap on 6/14 w/ skin closure device place 6/17 course c/b sepsis concerning for peritonitis (6/20) now with slight improvement in mental status - prognosis poor/guarded. Hospital course also c/b GIB. Patient still waiting for transfer to [facility] hospice.

## 2022-08-03 NOTE — ED PROVIDER NOTE - CHIEF COMPLAINT
R1 Progress Note      SUBJECTIVE     Yesterday patient had 7-second pause on telemetry.  Patient states that during this time patient had felt somewhat lightheaded.  Denies any chest pain or palpitations.  Does state that he was able to get up and walk around yesterday with no difficulty.    Inpatient Meds  Current Facility-Administered Medications   Medication    sodium chloride 0.9% infusion    [START ON 8/4/2022] pantoprazole (PROTONIX) EC tablet 40 mg    sodium chloride 0.9 % flush bag 25 mL    sodium chloride (PF) 0.9 % injection 2 mL    pantoprazole (PROTONIX INJECT) injection 40 mg    metoPROLOL (LOPRESSOR) injection 5 mg    rosuvastatin (CRESTOR) tablet 20 mg    [Held by provider] metoPROLOL succinate (TOPROL-XL) ER tablet 50 mg    [Held by provider] lisinopril (ZESTRIL) tablet 20 mg    [Held by provider] bumetanide (BUMEX) tablet 1 mg    acetaminophen (TYLENOL) tablet 650 mg    sodium chloride 0.9% infusion        OBJECTIVE     VITAL SIGNS:     Vital Last Value 24 Hour Range   Temperature 98.6 °F (37 °C) (08/03/22 1113) Temp  Min: 96.8 °F (36 °C)  Max: 98.6 °F (37 °C)   Pulse 68 (08/03/22 1113) Pulse  Min: 62  Max: 94   Respiratory 18 (08/03/22 1113) Resp  Min: 16  Max: 24   Non-Invasive  Blood Pressure 114/46 (08/03/22 1113) BP  Min: 87/61  Max: 141/47   Pulse Oximetry 99 % (08/03/22 1050) SpO2  Min: 97 %  Max: 100 %       INTAKE/OUTPUT:  I/O last 3 completed shifts:  In: 550 [I.V.:550]  Out: 800 [Urine:800]  I/O this shift:  In: -   Out: 150 [Urine:150]    Intake/Output Summary (Last 24 hours) at 8/3/2022 1141  Last data filed at 8/3/2022 0730  Gross per 24 hour   Intake 550 ml   Output 700 ml   Net -150 ml       PHYSICAL EXAM    Physical Exam  Vitals reviewed.   Constitutional:       Appearance: Normal appearance.   HENT:      Neck: Normal range of motion.   Eyes:      Extraocular Movements: Extraocular movements intact.   Cardiovascular:      Rate and Rhythm: Normal rate and regular rhythm.      Pulses:  Normal pulses.      Heart sounds: Normal heart sounds. No murmur heard.  Pulmonary:      Effort: Pulmonary effort is normal.      Breath sounds: Normal breath sounds. No wheezing, rhonchi or rales.   Abdominal:      General: Abdomen is flat. There is no distension.      Palpations: Abdomen is soft.      Tenderness: There is no abdominal tenderness. There is no guarding or rebound.   Musculoskeletal:         General: Normal range of motion.   Skin:     General: Skin is warm and dry.   Neurological:      Mental Status: He is alert and oriented to person, place, and time.   Psychiatric:         Mood and Affect: Mood normal.         Behavior: Behavior normal.         LABS    Recent Labs   Lab 08/03/22 0441 08/02/22  0427 08/01/22  0803 07/31/22  2150   SODIUM 141 142 142 142   POTASSIUM 5.4* 4.6 4.7 4.5   CHLORIDE 115* 115* 117* 113*   CO2 20* 21 19* 21   ANIONGAP 11 11 11 13   BUN 37* 38* 50* 58*   CREATININE 1.38* 1.34* 1.56* 1.79*   CALCIUM 9.3 9.3 9.0 8.7   GLUCOSE 99 92 102* 109*         Recent Labs   Lab 08/03/22 0441 08/02/22  1018 08/02/22  0427 08/02/22  0155 08/01/22  1756 08/01/22  1312 08/01/22  0803 07/31/22  2150   HGB 7.0* 7.7* 7.4* 7.5* 7.7*   < > 7.4* 5.7*   HCT 21.7* 24.0* 23.2* 22.8* 23.8*   < > 23.0* 17.9*   *  --  134*  --   --   --  134* 140   WBC 3.3*  --  3.3*  --   --   --  3.5* 3.5*    < > = values in this interval not displayed.         Recent Labs   Lab 08/03/22 0441 08/02/22 0427 08/01/22  0803 07/31/22  2150   MG 2.7* 2.7* 2.8* 2.5*         Recent Labs   Lab 08/01/22  1756 07/31/22  2150   PT 12.1* 12.2*   PTT  --  29   INR 1.1 1.1         Recent Labs   Lab 08/03/22  0441 08/02/22  0427 08/01/22  0803 07/31/22  2150   ALBUMIN 3.3* 3.2* 3.5* 3.7   AST 20 25 26 30       UA  Lab Results   Component Value Date    UWBC Negative 01/18/2022    URBC Negative 01/18/2022        IMAGING    EGD   Final Result      XR CHEST PA OR AP 1 VIEW   Final Result      1.   No radiographically evident  acute cardiopulmonary process.         Electronically Signed by: HYACINTH SMITH M.D.    Signed on: 7/31/2022 10:59 PM                  ASSESSMENT AND PLAN     Mr. Nguyen is a 72-year-old male with past medical history of coronary artery disease, COPD, HFpEF, hypertension, HLD, aortic arch ulcer, right carotid stenosis, A. Fib who presented to the emergency room on 07/31/2022 complaining of lightheadedness and dizziness and was admitted for an acute GI bleed with LANA.     #GI Bleed likely 2/2 to Eliquis use   #Anemia 2/2 to GI Bleed   Plan:  - Transfuse 2 Unit of Blood in ED  - Hemoglobin 7 this morning.  Will transfuse 1 additional unit today.  - IV Protonix 40mg BID   - GI on consult, plans for EGD today  - Hold anticoagulants and antiplatelets   - Clear liquid diet  - NPO currently  - RCRI Class 2. Patient is medically optimized for low risk procedure (EGD). No further evaluation required.      #Acute Kidney Injury likely secondary to GI Bleed  Plan:   - Transfuse 2 Unit of Blood   - CTM     #A. Fib s/p Watchmen procedure now on Eliquis  #A. Fib with RVR, now resolved  #HTN  #HLD   #History of CAD   -Metoprolol currently on hold  - Continue Rosuvastatin   - Lopressor PRN for increased heart rates >120   - Hold oral anticoagulants   - EP consulted, currently recommending continuing Eliquis for about another week and a half pending EGD results     #HFpEF  - Hold beta-blocker  - Hold bumetanide and lisinopril due to LANA   - May resume medications when LANA resolves  - Continue to monitor patient's volume status   - Monitor I/Os     FEN:  Clear liquid diet.   DVT prophylaxis:  SCDs  GI prophylaxis: IV Protonix  Code Status: Full  Primary Care Provider: Homer Edouard MD  Dispo: Pending workup      Discussed with attending, Dr. Price. Please see attending physician note for final recommendations.     Deacon San MD  PGY-1, Internal Medicine  Patient was seen and examined with  this morning.  Patient now  has a hemoglobin of 7.  No overt bleeding has been noted however.  We will give transfusion 1 unit packed RBCs today.  He also had a 7-second pause in his telemetry followed by 6-second pause.  We will consult cardiology as I suspect he likely has sick sinus syndrome.  EGD was noted with some AVMs seen that were not bleeding recently at least and were lasered.  Discussed with patient he will not be leaving the hospital today.  Await cardiology input about heart block.  We will have to have a discussion with both GI and structural cardiology regarding the anticoagulation and risk of bleeds.  He does also have AVMs in the small bowel they are not amenable to any type of treatment.  All orders were discussed at the time of visit.   The patient is a 51y Female complaining of swelling of legs.

## 2022-09-14 NOTE — PROGRESS NOTE ADULT - SUBJECTIVE AND OBJECTIVE BOX
Surgery Progress Note    INTERVAl/SUBJECTIVE: No acute event overnight. Patient seen and examined in am rounds. Return back to OR today.     Vital Signs Last 24 Hrs  T(C): 37.1 (12 Jun 2022 07:15), Max: 37.8 (11 Jun 2022 20:00)  T(F): 98.8 (12 Jun 2022 07:15), Max: 100 (11 Jun 2022 20:00)  HR: 81 (12 Jun 2022 08:00) (73 - 143)  BP: 114/67 (12 Jun 2022 06:33) (114/67 - 114/67)  BP(mean): --  RR: 18 (12 Jun 2022 08:00) (18 - 25)  SpO2: 100% (12 Jun 2022 08:00) (78% - 100%)    Physical Exam:  GEN: NAD, resting quietly  PULM: symmetric chest rise bilaterally, no increased WOB  ABD: soft, abthera to suction, ostomy pink and viable with no gas or stool in bag   EXTR: no LE erythema, moving all extremities    LABS:                        7.6    8.52  )-----------( 46       ( 12 Jun 2022 07:08 )             22.8     06-12    141  |  105  |  36<H>  ----------------------------<  103<H>  3.2<L>   |  21<L>  |  1.31<H>    Ca    8.0<L>      12 Jun 2022 01:05  Phos  2.8     06-12  Mg     1.80     06-12    TPro  4.5<L>  /  Alb  2.1<L>  /  TBili  3.3<H>  /  DBili  3.2<H>  /  AST  33<H>  /  ALT  16  /  AlkPhos  54  06-11    PT/INR - ( 12 Jun 2022 01:05 )   PT: 14.1 sec;   INR: 1.21 ratio         PTT - ( 12 Jun 2022 01:05 )  PTT:30.2 sec      INs and OUTs:    06-11-22 @ 07:01  -  06-12-22 @ 07:00  --------------------------------------------------------  IN: 2832.1 mL / OUT: 2810 mL / NET: 22.1 mL    06-12-22 @ 07:01  -  06-12-22 @ 09:21  --------------------------------------------------------  IN: 155.9 mL / OUT: 55 mL / NET: 100.9 mL     12-16 yrs

## 2022-09-23 NOTE — ED ADULT TRIAGE NOTE - TEMPERATURE IN [facility] (DEGREES C)
Spoke to pharmacist at [facility] and completed check list in order for patient to be set up with Xolair home injections.     Left message for patient to return call   36.7

## 2022-11-07 NOTE — ED ADULT NURSE NOTE - CHIEF COMPLAINT QUOTE
Kary, his mother, asking if there is something that Dr. Rousseau can call in for Андрей because he is in so much pain.  They are still waiting on the Pain [facility] to call them but haven't heard back yet.  He was in [place], on the [facility] but they said he didn't need to be there because his issue is pain, not behavior.   
Tried calling, no answer  
PT a&O x4  ambulatory with cane. hx of slipped disc 2014. . pt c.o lower back pain and new onset of left leg swelling 4-5 days lower back pain 7/10. took tylenol at home minimal relief. PMH htn

## 2022-11-11 NOTE — H&P ADULT - NSICDXFAMHXNEG_GEN_ALL
Patient : Cassie Parra Age: 78 year old Sex: female   MRN: [id number] Encounter Date: 11/10/2022      History     Chief Complaint   Patient presents with   • Chest Pain Adult   • Shortness of Breath     78-year-old female with past medical history of CAD, DVT, hypothyroid, who presented to the emergency department with complaint of chest pain.     Patient endorses having chronic chest pain at baseline, follows with Dr. Chairez (cardiology), who saw patient today (negative troponin, non concerning ECG, echocardiogram completed.  Cardiologist was concerned for possible per PE, called ahead and requested CT PE study).  Patient has current chest pain complaint started roughly 2 weeks ago and has waxed and waned.  Initially, she associated it with raking leaves, because pain was worse 4 days following raking leaves.  However, pain resolved and has reoccurred within the last few days.     + chest pain, left breast, radiating to back, sharp, dull and achy, not worse with exertion  + associated with shortness of breath    Denies fever, nausea, vomiting, cough, hemoptysis.   Patient has baseline lower extremity swelling, right leg swelling is greater than left.  She had negative DVT ultrasound at outside hospital within the last 2 weeks.               No Known Allergies    Current Discharge Medication List      Prior to Admission Medications    Details   levothyroxine 100 MCG tablet       fluconazole (Diflucan) 150 MG tablet Take one pill today, if symptoms are not improved, take the second pill on day 3.  Qty: 1 tablet, Refills: 0      ezetimibe (ZETIA) 10 MG tablet TAKE 1 TABLET BY MOUTH  DAILY  Qty: 90 tablet, Refills: 3      Polyethylene Glycol 3350 (MIRALAX PO)       B Complex Vitamins (B COMPLEX PO)       albuterol 108 (90 Base) MCG/ACT inhaler Inhale 2 puffs into the lungs every 4 hours as needed for Shortness of Breath or Wheezing.      magnesium gluconate 500 (27 Mg) MG tablet       Psyllium (METAMUCIL FIBER PO) Take  by mouth daily.       furosemide (LASIX) 20 MG tablet Take 1 tablet by mouth daily.  Qty: 90 tablet, Refills: 3      fluticasone (FLONASE) 50 MCG/ACT nasal spray Spray 2 sprays in each nostril daily. Do not start before November 13, 2019.  Qty: 16 g, Refills: 12      loratadine (CLARITIN) 10 MG tablet Take 1 tablet by mouth daily (before breakfast). Do not start before November 13, 2019.  Qty: 30 tablet, Refills: 1      Cyanocobalamin (B-12 PO) Take by mouth daily.      potassium chloride (KLOR-CON M) 10 MEQ julio ER tablet Take 10 mEq by mouth daily.      levothyroxine (SYNTHROID, LEVOTHROID) 137 MCG tablet Take 1 tablet by mouth daily.  Qty: 90 tablet, Refills: 1      Cholecalciferol (VITAMIN D-3) 1000 units Cap Take 1,000 Units by mouth.      Omega 3 1000 MG capsule Take 2,000 mg by mouth daily.      omeprazole (PRILOSEC) 20 MG capsule Take 40 mg by mouth daily.             Past Medical History:   Diagnosis Date   • Blood clot associated with vein wall inflammation 2016    left lower extremitiy, following travel   • Essential (primary) hypertension    • Fracture     foot, right arm   • Gastroesophageal reflux disease    • High cholesterol    • Sinusitis, chronic    • Sleep apnea     CPAP NIGHTLY   • Thyroid condition     hypothyroid   • Wears dentures     Full - upper   • Wears hearing aid in both ears    • Wears prescription eyeglasses     for distance       Past Surgical History:   Procedure Laterality Date   • APPENDECTOMY  1963   • CARDIAC CATHERIZATION     • CARDIAC CATHETERIZATION/POSSIBLE PTCA/POSSIBLE STENT  11/11/2019   • COLONOSCOPY  10/14/2009    severe diverticulosis; redundant colon   • COLONOSCOPY W/ POLYPECTOMY  08/31/2017   • ESOPHAGOGASTRODUODENOSCOPY  01/27/2020   • ESOPHAGOGASTRODUODENOSCOPY  11/21/2019   • GASTRIC FUNDOPLICATION  12/30/2009    Nissen procedure w/ hernia repair   • GASTROSCOPY  12/30/2010   • GASTROSCOPY  07/17/2017   • HERNIA REPAIR     • TUBAL LIGATION         Family History    Problem Relation Age of Onset   • Dementia/Alzheimers Mother    • Heart disease Mother    • Congestive Heart Failure Father        Social History     Tobacco Use   • Smoking status: Never Smoker   • Smokeless tobacco: Never Used   Substance Use Topics   • Alcohol use: Yes     Alcohol/week: 1.0 standard drink     Types: 1 Glasses of wine per week     Comment: occas   • Drug use: No       E-cigarette/Vaping     E-Cigarette/Vaping Substances & Devices       Review of Systems   Constitutional: Negative for fever.   HENT: Negative for sore throat.    Eyes: Negative for visual disturbance.   Respiratory: Positive for shortness of breath. Negative for cough.    Cardiovascular: Positive for chest pain.   Gastrointestinal: Negative for abdominal pain, nausea and vomiting.   Genitourinary: Negative for dysuria.   Musculoskeletal: Negative for neck pain and neck stiffness.   Skin: Negative for rash.   Neurological: Negative for syncope and headaches.   Hematological: Negative.    Psychiatric/Behavioral: Negative for agitation.       Physical Exam     ED Triage Vitals [11/10/22 1543]   ED Triage Vitals Group      Temp 98.1 °F (36.7 °C)      Heart Rate (!) 55      Resp 14      BP (!) 180/84      SpO2 99 %      EtCO2 mmHg       Height 5' 5\" (1.651 m)      Weight 199 lb 15.3 oz (90.7 kg)      Weight Scale Used       BMI (Calculated) 33.27      IBW/kg (Calculated) 57       Physical Exam  Constitutional:       General: She is not in acute distress.  HENT:      Mouth/Throat:      Mouth: Mucous membranes are moist.   Eyes:      Extraocular Movements: Extraocular movements intact.      Pupils: Pupils are equal, round, and reactive to light.   Cardiovascular:      Rate and Rhythm: Regular rhythm. Tachycardia present.      Pulses:           Radial pulses are 2+ on the right side and 2+ on the left side.        Dorsalis pedis pulses are 2+ on the right side and 2+ on the left side.      Heart sounds: Normal heart sounds. No murmur  heard.    No S3 sounds.   Pulmonary:      Effort: Pulmonary effort is normal. No tachypnea or respiratory distress.      Breath sounds: No wheezing or rales.   Abdominal:      General: Abdomen is flat. Bowel sounds are normal. There is no distension.      Palpations: Abdomen is soft.      Tenderness: There is no abdominal tenderness.   Skin:     General: Skin is warm and dry.      Capillary Refill: Capillary refill takes less than 2 seconds.   Neurological:      General: No focal deficit present.      Mental Status: She is alert and oriented to person, place, and time.   Psychiatric:         Mood and Affect: Mood normal.         ED Course     Procedures    Lab Results     Results for orders placed or performed during the hospital encounter of 11/10/22   TROPONIN I, HIGH SENSITIVITY   Result Value Ref Range    Troponin I, High Sensitivity <4 <52 ng/L   Comprehensive Metabolic Panel   Result Value Ref Range    Fasting Status      Sodium 142 135 - 145 mmol/L    Potassium 4.9 3.4 - 5.1 mmol/L    Chloride 106 97 - 110 mmol/L    Carbon Dioxide 26 21 - 32 mmol/L    Anion Gap 15 7 - 19 mmol/L    Glucose 100 (H) 70 - 99 mg/dL    BUN 13 6 - 20 mg/dL    Creatinine 0.85 0.51 - 0.95 mg/dL    Glomerular Filtration Rate 69 >=60    BUN/ Creatinine Ratio 15 7 - 25    Calcium 9.2 8.4 - 10.2 mg/dL    Bilirubin, Total 0.4 0.2 - 1.0 mg/dL    GOT/AST 26 <=37 Units/L    GPT/ALT 22 <64 Units/L    Alkaline Phosphatase 67 45 - 117 Units/L    Albumin 3.4 (L) 3.6 - 5.1 g/dL    Protein, Total 7.0 6.4 - 8.2 g/dL    Globulin 3.6 2.0 - 4.0 g/dL    A/G Ratio 0.9 (L) 1.0 - 2.4   CBC with Automated Differential (performable only)   Result Value Ref Range    WBC 7.7 4.2 - 11.0 K/mcL    RBC 5.26 (H) 4.00 - 5.20 mil/mcL    HGB 15.0 12.0 - 15.5 g/dL    HCT 44.6 36.0 - 46.5 %    MCV 84.8 78.0 - 100.0 fl    MCH 28.5 26.0 - 34.0 pg    MCHC 33.6 32.0 - 36.5 g/dL    RDW-CV 14.8 11.0 - 15.0 %    RDW-SD 46.0 39.0 - 50.0 fL     140 - 450 K/mcL    NRBC 0  n/c <=0 /100 WBC    Neutrophil, Percent 51 %    Lymphocytes, Percent 37 %    Mono, Percent 7 %    Eosinophils, Percent 4 %    Basophils, Percent 1 %    Immature Granulocytes 0 %    Absolute Neutrophils 3.9 1.8 - 7.7 K/mcL    Absolute Lymphocytes 2.9 1.0 - 4.0 K/mcL    Absolute Monocytes 0.6 0.3 - 0.9 K/mcL    Absolute Eosinophils  0.3 0.0 - 0.5 K/mcL    Absolute Basophils 0.1 0.0 - 0.3 K/mcL    Absolute Immmature Granulocytes 0.0 0.0 - 0.2 K/mcL       EKG Results     EKG Interpretation  Rate: 54  Rhythm: normal sinus rhythm   Abnormality: no acute ST elevation or depression    EKG tracing interpreted by ED physician    Radiology Results     Imaging Results          CT Chest PE Imaging (Final result)  Result time 11/10/22 20:27:29    Final result                 Impression:    IMPRESSION:     1.  No evidence of acute pulmonary embolus to the first subsegmental  arterial level.   2.  There is an air-fluid level in the mid to distal esophagus in this  patient with history of fundoplication. This may reflect refluxed or  retained liquid.                   Narrative:    EXAM: CT ANGIOGRAM CHEST PE IMAGING- 3D    CLINICAL INFORMATION: PE suspected, low/intermediate prob, positive  D-dimer, Outside D-dimer obtained.    COMPARISON: CT chest 7/2/2020    TECHNIQUE: After administration of intravenous contrast, spiral CT of the  chest was performed according to the CT pulmonary embolism protocol.  Sagittal and coronal reformats are provided for review. Maximum intensity  projection images were created for review as well. IV contrast: 75 mL  Omnipaque-350.    FINDINGS:     Pulmonary arteries: The pulmonary arteries are well opacified with  contrast. No pulmonary artery embolism is identified to the first  subsegmental level.     Aorta: The thoracic aorta is not significantly dilated.     Heart: Heart size is normal. No pericardial effusion. Coronary artery  calcifications.    Lungs: Calcified granuloma in the right upper lobe. No  suspicious nodules.  No consolidation.     Airways: The central airways are clear.    Pleural spaces: No pleural effusion or pneumothorax.     Mediastinum: There is an air-fluid level in the mid to distal esophagus.  The very distal esophagus is patulous. No hilar or mediastinal adenopathy.    Upper abdomen: Surgical changes around the GE junction of prior  fundoplication.    Soft Tissues/Bones: No acute abnormality.                                    ED Medication Orders (From admission, onward)    None               MDM  Number of Diagnoses or Management Options  Chest pain, unspecified type  Diagnosis management comments: 78-year-old female past medical history of DVT, CAD, who presents to the emergency department with complaint of chest.   - patient has had negative outpatient cardiology workup, but cardiologist was concern for possible pulmonary embolus  - patient well-appearing on ED arrival, hypertensive by vitals, SpO2 within normal limits, heart rate bradycardia  - ECG without any acute ST elevation or depression, no classic ECG findings for pulmonary embolus  - labs unremarkable    - CT PE study:  Negative for PE  - Pt well appearing and well established with cardiology who provided thorough workup today and has established follow up in 1 week. Discharged to home with return precuations.        Amount and/or Complexity of Data Reviewed  Clinical lab tests: ordered and reviewed  Tests in the radiology section of CPT®: ordered and reviewed        Clinical Impression     No diagnosis found.    Disposition        There is no disposition no dispo time  There is no comment

## 2022-11-14 NOTE — ED ADULT NURSE NOTE - NSFALLRSKINDICATORS_ED_ALL_ED
Quality 110: Preventive Care And Screening: Influenza Immunization: Influenza Immunization Administered during Influenza season
Quality 226: Preventive Care And Screening: Tobacco Use: Screening And Cessation Intervention: Patient screened for tobacco use and is an ex/non-smoker
Quality 111:Pneumonia Vaccination Status For Older Adults: Pneumococcal vaccine (PPSV23) administered on or after patient’s 60th birthday and before the end of the measurement period
Detail Level: Detailed
Quality 130: Documentation Of Current Medications In The Medical Record: Current Medications Documented
no

## 2023-01-03 NOTE — ED BEHAVIORAL HEALTH ASSESSMENT NOTE - IMPULSE CONTROL
Humira Counseling:  I discussed with the patient the risks of adalimumab including but not limited to myelosuppression, immunosuppression, autoimmune hepatitis, demyelinating diseases, lymphoma, and serious infections.  The patient understands that monitoring is required including a PPD at baseline and must alert us or the primary physician if symptoms of infection or other concerning signs are noted. Normal

## 2023-05-17 NOTE — ED ADULT NURSE NOTE - CHIEF COMPLAINT
Ms. Sky is a 64F with PMH ciliary dyskinesia on home O2 and nightly AVAPS w/ freq admissions for bronchiectasis (last 4/22), HTN, who presents with 3 days of AMS. Per triage note patient had elevated PCO2 as seen by PMD. Per family at bedside patient has also been having episodes of vomiting, last appx 7PM this evening. Leandro speaking, accompanied by family at bedside. The patient is a 51y Female complaining of swelling of legs.

## 2024-04-22 NOTE — DIETITIAN INITIAL EVALUATION ADULT. - OBTAIN CURRENT WEIGHT
04/22/24 [address] [place] [address]    Assessment/Plan  Arthritis left subtalar joint and ankle    - Patient is here for fluoroscopic injections.  Risks and benefits of injections were fully discussed with patient.  No guarantees were given to the patient.  Please see below.  Post injections protocol discussed with the patient.  Patient will follow up and 4-6 weeks for re-evaluation.    The injection site was prepped with Chloraprep. 1 cc of 40mg Depo-Medrol with 2cc of 0.25% Marcaine was injected into left ankle under fluoroscopy. Patient tolerated injection. A band-aid was placed over the injection site. Postoperative injection protocol was discussed with the patient which includes oral NSAID use and icing.     The injection site was prepped with Chloraprep. 1 cc of 40mg Depo-Medrol with 2cc of 0.25% Marcaine was injected into left subtalar joint. Patient tolerated injection. A band-aid was placed over the injection site. Postoperative injection protocol was discussed with the patient which includes oral NSAID use and icing.       Johnie Trejo DPM     yes

## 2024-09-07 NOTE — CONSULT NOTE ADULT - PROBLEM SELECTOR RECOMMENDATION 9
ONC:  # Smoldering System Mastocytosis diagnosed 8/23/23  - S/p Molecular therapy started 2/13/24  # AML diagnosed per [facility] on 6/27/24. Positive for RUNX1, CKit, SRSF2 and CBL C404Y  - S/p C1D1 Venetoclax/Azacitidine on 7/24/24. Developed TLS with renal dysfunction  - [facility] (8/28/24): c/w persistent disease and 27% blast  # AML.    - Admitted 9/5/24 for C2 of Jeremy/Aza with Venetoclx ramp up dosing (D1 20mg, D2 50 mg, D3 onward 100 mg daily).   - Azacitadine subcutaneous on days 1-7  - Close monitoring of possible TLS  - Currently D3 C2   - IVF's @ 150 ml/hr and Allopurinol  - TLS labs bid ordered on admit     HEME:  - Pancytopenic, neutropenic  - Keep hgb > 7.0, plts > 10k  - Tx 1 unit prbc (9/6) w lasix 20 mg   - PRBC on 9/7 with lasix 20mg given      ID:  # Cont prophylaxis meds: Acyclovir & Posaconazole  # Neutropenia. Cont Levofloxacin     FEN/GI:  - Admit wt: 91.9 kg, current wt :  91.9  kg  - Cont home PPI, Pepcid and antiemetics  - Low pathogen diet  - Monitor for fluid overload     CARD:  - ECHO June 2023 c/w LA severely dilated  - Cont home Norvasc     RENAL:  # H/o hematuria, resolved  - S/p Cystoscopy (1/15/24): Normal exam  # C/o frequent urination especially at night  - UA & PSA ordered on admit. Last PSA 0.66 (5/31/23)     MISC:  - Cont home Allegra and Singular         Patient examined & discussed with Dr. Lydia Day  
with leukopenia, tachycardia, fever with elevated lactate  source could be infected tumor or neutropenic fever  - IVF hydration given  - on Cefepime IV  - Monitor CBC  - tachycardia likely secondary to ongoing sepsis - treat underlying cause
Patient seen today, unable to communicate with patient as she was lethargic. Per chart review, patient has been declining over the past 3-4 weeks.   Please assist with ADLs

## 2024-10-09 NOTE — CHART NOTE - NSCHARTNOTEFT_GEN_A_CORE
How Is Your Wound Healing?: healed The patient was evaluated at bedside for an acute change in mental status. The patient was about to get a CT A/P with oral contrast when she started spitting up. She became agitated and refused the scan. She was brought back to the floor. On the floor when evaluated, she was oriented to person/place; however, was not oriented to time. She was answering basic questions incorrectly, but was responsive. Her family was at bedside and concerned about her change in mental status. The patient said that she feels well. Denies HA, dizziness, lightheadedness, CP, SOB, palpitations, abd pain. She has been spitting up mostly saliva and occasional brown emesis. Vomiting has increased in frequency since her intake of PO contrast.     Vitals:   BP 110s/60s  HR 110s  O2 98% on RA  T 98F    Gen: NAD, A&Ox2  Neuro: PEERL, moving all extremities bilaterally, Equal strength bilaterally, equal sensation bilaterally  Abd: Soft, NT, minimally distended, no guarding/rebound    A/P  -STAT CBC, Coags, VBG, Lactate, CMP/Mg/Phos  -Non-Con CT Head  -Hold narcotics  -[facility] reconsulted for evaluation & recs  -Neuro consulted  -Will postpone CT AP w/ po contrast at this time and reassess  -Continue to monitor VS  -Continue 1:1 observation  -POC discussed with patient and family at bedside    Lea pgy4 d/w Dr. Nuñez Name Of Surgery: Mohs

## 2025-03-03 NOTE — CHART NOTE - NSCHARTNOTEFT_GEN_A_CORE
Mounjaro PA Approved    The prior authorization for Bird Russoowen's HARSH 2.5 MG INEJECTIONS  prescription has been APPROVED FROM 3/3/2025 TO 12/31/2039 with copayment of $25.00.        [facility] will reach out to patient for further correspondence.      If there are any additional questions or concerns, please contact me.     Thanks,  Jania Samuels  Pharmacy Technician   [facility]  Phone: [phone]  Fax: [phone]   Patient last seen by this RDN on 6/13, now for malnutrition follow up. Spoke with RN and obtained subjective information from extensive chart review.       Current Diet : Diet, NPO after Midnight:      NPO Start Date: 17-Jun-2022,   NPO Start Time: 23:59 (06-17-22 @ 00:49)  Diet, NPO with Tube Feed:   Tube Feeding Modality: Nasogastric  TwoCal HN (TWOCALHNRTH)  Total Volume for 24 Hours (mL): 600  Continuous  Starting Tube Feed Rate {mL per Hour}: 25  Until Goal Tube Feed Rate (mL per Hour): 25  Tube Feed Duration (in Hours): 24  Tube Feed Start Time: 11:30 (06-16-22 @ 11:58)    TF Provides:  1200 kcals  50 gms protein  420 mL free H2O  600 mL total volume    Current Weight: 102.7 kg (6/17), 123.8 kg (6/14), 117.7 kg (6/13), 115.6 kg (6/11), 115.1 kg (6/10), 108.7 kg (6/9)  Height (cm): 167.6   Dosing Weight (kg): 100.4   BMI (kg/m2): 35.7   IBW (kg): 59.3 kg    Nutrition Interval Events: Pt remains intubated and sedated on precedex. Receiving TF without any noted/reported intolerance at this time (no nausea/vomiting or abdominal distention). TF is meeting pt's caloric need; To better meet estimated protein need, suggest adding No Carb Prosource x 4/day (60 gms protein) which will provide a daily protein amount of 110 gms. Enteral recommendation will give pt 12 kcals/kg and 1.1 gms protein/kg of dosing weight. Weight trend reflective of fluid shifts of edema, now 2+ generalized. Pt noted with colostomy output of 200 mL 6/17. Pt remains at severe risk of malnutrition based on continuation of muscle mass wasting with noted fluid accumulation. RDN services to remain available as needed.       __________________ Pertinent Medications__________________   MEDICATIONS  (STANDING):  caspofungin IVPB      caspofungin IVPB 50 milliGRAM(s) IV Intermittent every 24 hours  chlorhexidine 0.12% Liquid 15 milliLiter(s) Oral Mucosa every 12 hours  chlorhexidine 4% Liquid 1 Application(s) Topical daily  dexMEDEtomidine Infusion 1 MICROgram(s)/kG/Hr (25.1 mL/Hr) IV Continuous <Continuous>  folic acid 1 milliGRAM(s) Oral daily  furosemide   Injectable 60 milliGRAM(s) IV Push daily  heparin   Injectable 5000 Unit(s) SubCutaneous every 8 hours  pantoprazole  Injectable 40 milliGRAM(s) IV Push two times a day  piperacillin/tazobactam IVPB.. 3.375 Gram(s) IV Intermittent every 8 hours  QUEtiapine 100 milliGRAM(s) Oral every 12 hours  thiamine Injectable 100 milliGRAM(s) IV Push daily    MEDICATIONS  (PRN):  oxyCODONE    Solution 5 milliGRAM(s) Enteral Tube every 4 hours PRN Moderate Pain (4 - 6)  oxyCODONE    Solution 10 milliGRAM(s) Enteral Tube every 4 hours PRN Severe Pain (7 - 10)      __________________ Pertinent Labs__________________   06-17 Na146 mmol/L<H> Glu 111 mg/dL<H> K+ 3.2 mmol/L<L> Cr  1.16 mg/dL BUN 29 mg/dL<H> 06-17 Phos 3.6 mg/dL 06-14 Alb 2.2 g/dL<L>      Skin: Intact    Estimated Needs:     1104 - 1405 kcals/day (11-14 kcals/kg of dosing weight)  119 gms protein/day (2.0 gms protein/kg of IBW)      Nutrition Diagnosis: Severe malnutrition  [x] ongoing    Goal(s):  1. Patient to meet > 75% estimated energy needs    Recommendations:   1. Suggest No Carb Prosource x 4/day.    Monitoring and Evaluation:   1. Monitor weights, labs, BMs, skin integrity, enteral tolerance and edema.  2. RD services to remain available.

## (undated) DEVICE — SUT VICRYL 2-0 27" SH UNDYED

## (undated) DEVICE — SUT VICRYL 0 27" UR-6

## (undated) DEVICE — SUT QUILL MONODERM 3-0 30CM PS-2

## (undated) DEVICE — CANISTER KCI 500ML GEL SENSA TRAC

## (undated) DEVICE — PREP BETADINE SPONGE STICKS

## (undated) DEVICE — DRAPE GENERAL ENDOSCOPY

## (undated) DEVICE — DRAPE LAPAROTOMY W VELCRO CORD TABS

## (undated) DEVICE — PROTECTOR HEEL / ELBOW FLUFFY

## (undated) DEVICE — SUT VICRYL 0 18" TIES UNDYED

## (undated) DEVICE — BASIN SET SINGLE

## (undated) DEVICE — STAPLER SKIN MULTI DIRECTION W35

## (undated) DEVICE — FOLEY TRAY 16FR LF URINE METER SURESTEP

## (undated) DEVICE — UTERINE MANIPULATOR CLINICAL INNOVATIONS CLEARVIEW 7CM

## (undated) DEVICE — GLV 7.5 PROTEXIS (CREAM) MICRO

## (undated) DEVICE — INSUFFLATION NDL COVIDIEN SURGINEEDLE VERESS 120MM

## (undated) DEVICE — DRAPE LIGHT HANDLE COVER (GREEN)

## (undated) DEVICE — MEDICINE CUP WITH LID 60ML

## (undated) DEVICE — GOWN LG

## (undated) DEVICE — SOL IRR POUR NS 0.9% 1500ML

## (undated) DEVICE — DRSG MASTISOL

## (undated) DEVICE — DRAPE 3/4 SHEET 52X76"

## (undated) DEVICE — DRSG PAD SANITARY OB

## (undated) DEVICE — SUT PLAIN GUT 2-0 27" CT-1

## (undated) DEVICE — DRSG VAC GRANUFOAM LARGE (BLACK)

## (undated) DEVICE — DRSG TEGADERM 2.5X3"

## (undated) DEVICE — LABELS BLANK W PEN

## (undated) DEVICE — SUT PDS II 1 48" TP-1

## (undated) DEVICE — DRSG CURITY GAUZE SPONGE 4 X 4" 12-PLY

## (undated) DEVICE — SPONGE PEANUT AUTO COUNT

## (undated) DEVICE — Device

## (undated) DEVICE — LAP PAD 18 X 18"

## (undated) DEVICE — DRSG MEDIPORE + PAD 3.5X10"

## (undated) DEVICE — SUT SILK 3-0 18" SH (POP-OFF)

## (undated) DEVICE — GLV 5.5 PROTEXIS (WHITE)

## (undated) DEVICE — GLV 6 PROTEXIS (BLUE)

## (undated) DEVICE — DRSG TELFA 3 X 8

## (undated) DEVICE — OSTOMY KIT 2-PIECE 2.25" NS (RED)

## (undated) DEVICE — SUT VICRYL 2-0 18" TIES UNDYED

## (undated) DEVICE — SUT VICRYL 0 36" CT-1 UNDYED

## (undated) DEVICE — TROCAR COVIDIEN VERSAONE BLADELESS FIXATION 5MM STANDARD

## (undated) DEVICE — TROCAR COVIDIEN VERSAONE BLADED FIXATION 11MM STANDARD

## (undated) DEVICE — PACK GENERAL LAPAROSCOPY

## (undated) DEVICE — SOL IRR POUR H2O 250ML

## (undated) DEVICE — SOL IRR POUR H2O 1500ML

## (undated) DEVICE — HANDPIECE INTERPULSE W/ COAXIAL FAN SPRAY TIP

## (undated) DEVICE — SYR LUER LOK 10CC

## (undated) DEVICE — POSITIONER FOAM EGG CRATE ULNAR 2PCS (PINK)

## (undated) DEVICE — STAPLER COVIDIEN ENDO GIA STANDARD HANDLE

## (undated) DEVICE — DRAPE INSTRUMENT POUCH 6.75" X 11"

## (undated) DEVICE — SUT PROLENE 2 60" TP-1

## (undated) DEVICE — TIP METZENBAUM SCISSOR MONOPOLAR ENDOCUT (ORANGE)

## (undated) DEVICE — SUT VICRYL 3-0 27" SH UNDYED

## (undated) DEVICE — ELCTR BOVIE BLADE 3/4" EXTENDED LENGTH 6"

## (undated) DEVICE — DRSG VAC ABTHERS

## (undated) DEVICE — WARMING BLANKET FULL ADULT

## (undated) DEVICE — POSITIONER PURPLE ARM ONE STEP (LARGE)

## (undated) DEVICE — CANISTER DISPOSABLE THIN WALL 3000CC

## (undated) DEVICE — UTERINE MANIPULATOR COOPER SURGICAL 5MM 33CM GREEN

## (undated) DEVICE — DRAIN RESERVOIR FOR JACKSON PRATT 100CC CARDINAL

## (undated) DEVICE — OSTOMY KIT 2-PIECE 4" NS (YELLOW)

## (undated) DEVICE — DRAPE TOWEL BLUE 17" X 24"

## (undated) DEVICE — SUT PROLENE 1 30" CT-1

## (undated) DEVICE — SOL IRR POUR H2O 500ML

## (undated) DEVICE — LIGASURE IMPACT

## (undated) DEVICE — POSITIONER STRAP ARMBOARD VELCRO TS-30

## (undated) DEVICE — DRAPE FLUID WARMER 44 X 44"

## (undated) DEVICE — TROCAR COVIDIEN VERSAPORT BLADELESS OPTICAL 5MM STANDARD

## (undated) DEVICE — ELCTR GROUNDING PAD ADULT COVIDIEN

## (undated) DEVICE — DRSG XEROFORM 5 X 9"

## (undated) DEVICE — TUBING OLYMPUS INSUFFLATION

## (undated) DEVICE — PREP BETADINE KIT

## (undated) DEVICE — POOLE SUCTION TIP

## (undated) DEVICE — SUT CHROMIC 3-0 27" SH

## (undated) DEVICE — SUT MONOCRYL 4-0 27" PS-2 UNDYED

## (undated) DEVICE — SOL IRR POUR NS 0.9% 500ML

## (undated) DEVICE — PACK MAJOR ABDOMINAL WITH LAP

## (undated) DEVICE — POSITIONER PINK PAD PIGAZZI SYSTEM

## (undated) DEVICE — VENODYNE/SCD SLEEVE CALF MEDIUM

## (undated) DEVICE — DRSG STERISTRIPS 0.5 X 4"

## (undated) DEVICE — WARMING BLANKET UPPER ADULT

## (undated) DEVICE — SUT VICRYL 3-0 18" SH UNDYED (POP-OFF)

## (undated) DEVICE — PACK PERI GYN

## (undated) DEVICE — BLADE SURGICAL #15 CARBON

## (undated) DEVICE — GLV 7 PROTEXIS (WHITE)

## (undated) DEVICE — FOLEY TRAY 16FR 5CC LF UMETER CLOSED